# Patient Record
Sex: FEMALE | Race: WHITE | NOT HISPANIC OR LATINO | Employment: OTHER | URBAN - METROPOLITAN AREA
[De-identification: names, ages, dates, MRNs, and addresses within clinical notes are randomized per-mention and may not be internally consistent; named-entity substitution may affect disease eponyms.]

---

## 2020-01-27 ENCOUNTER — TELEPHONE (OUTPATIENT)
Dept: SCHEDULING | Facility: IMAGING CENTER | Age: 65
End: 2020-01-27

## 2020-02-05 ENCOUNTER — OFFICE VISIT (OUTPATIENT)
Dept: MEDICAL GROUP | Facility: MEDICAL CENTER | Age: 65
End: 2020-02-05
Payer: MEDICARE

## 2020-02-05 ENCOUNTER — TELEPHONE (OUTPATIENT)
Dept: SCHEDULING | Facility: IMAGING CENTER | Age: 65
End: 2020-02-05

## 2020-02-05 VITALS
HEIGHT: 63 IN | WEIGHT: 176 LBS | DIASTOLIC BLOOD PRESSURE: 74 MMHG | SYSTOLIC BLOOD PRESSURE: 110 MMHG | TEMPERATURE: 97.2 F | OXYGEN SATURATION: 91 % | BODY MASS INDEX: 31.18 KG/M2 | HEART RATE: 91 BPM

## 2020-02-05 DIAGNOSIS — Z12.11 COLON CANCER SCREENING: ICD-10-CM

## 2020-02-05 DIAGNOSIS — M54.41 CHRONIC BILATERAL LOW BACK PAIN WITH BILATERAL SCIATICA: ICD-10-CM

## 2020-02-05 DIAGNOSIS — Z00.00 HEALTHCARE MAINTENANCE: ICD-10-CM

## 2020-02-05 DIAGNOSIS — G89.29 CHRONIC BILATERAL LOW BACK PAIN WITH BILATERAL SCIATICA: ICD-10-CM

## 2020-02-05 DIAGNOSIS — K21.9 GASTROESOPHAGEAL REFLUX DISEASE WITHOUT ESOPHAGITIS: ICD-10-CM

## 2020-02-05 DIAGNOSIS — I10 ESSENTIAL HYPERTENSION: ICD-10-CM

## 2020-02-05 DIAGNOSIS — F33.41 RECURRENT MAJOR DEPRESSIVE DISORDER, IN PARTIAL REMISSION (HCC): ICD-10-CM

## 2020-02-05 DIAGNOSIS — E78.2 MIXED HYPERLIPIDEMIA: ICD-10-CM

## 2020-02-05 DIAGNOSIS — E66.9 DIABETES MELLITUS TYPE 2 IN OBESE: ICD-10-CM

## 2020-02-05 DIAGNOSIS — M54.42 CHRONIC BILATERAL LOW BACK PAIN WITH BILATERAL SCIATICA: ICD-10-CM

## 2020-02-05 DIAGNOSIS — E11.69 DIABETES MELLITUS TYPE 2 IN OBESE: ICD-10-CM

## 2020-02-05 DIAGNOSIS — E03.9 HYPOTHYROIDISM (ACQUIRED): ICD-10-CM

## 2020-02-05 DIAGNOSIS — F41.9 ANXIETY: ICD-10-CM

## 2020-02-05 PROCEDURE — 99204 OFFICE O/P NEW MOD 45 MIN: CPT | Performed by: FAMILY MEDICINE

## 2020-02-05 RX ORDER — GABAPENTIN 600 MG/1
600 TABLET ORAL 3 TIMES DAILY
COMMUNITY
End: 2020-03-25 | Stop reason: SDUPTHER

## 2020-02-05 RX ORDER — OMEPRAZOLE 40 MG/1
40 CAPSULE, DELAYED RELEASE ORAL DAILY
COMMUNITY
End: 2021-02-01 | Stop reason: SDUPTHER

## 2020-02-05 RX ORDER — LISINOPRIL 10 MG/1
TABLET ORAL
COMMUNITY
Start: 2020-02-03 | End: 2020-03-25 | Stop reason: SDUPTHER

## 2020-02-05 RX ORDER — SIMVASTATIN 40 MG
TABLET ORAL
COMMUNITY
Start: 2020-02-03 | End: 2020-04-14 | Stop reason: SDUPTHER

## 2020-02-05 RX ORDER — CYCLOBENZAPRINE HCL 10 MG
10 TABLET ORAL 3 TIMES DAILY PRN
COMMUNITY
End: 2020-04-20 | Stop reason: SDUPTHER

## 2020-02-05 RX ORDER — ESTRADIOL 1 MG/1
1 TABLET ORAL DAILY
COMMUNITY
End: 2020-04-14

## 2020-02-05 RX ORDER — MORPHINE SULFATE 15 MG/1
15 TABLET ORAL EVERY 4 HOURS PRN
COMMUNITY
End: 2020-04-14

## 2020-02-05 RX ORDER — LEVOTHYROXINE SODIUM 0.05 MG/1
50 TABLET ORAL
COMMUNITY
End: 2020-05-26 | Stop reason: SDUPTHER

## 2020-02-05 RX ORDER — DULOXETIN HYDROCHLORIDE 20 MG/1
20 CAPSULE, DELAYED RELEASE ORAL DAILY
COMMUNITY
End: 2020-05-08

## 2020-02-05 RX ORDER — ERGOCALCIFEROL 1.25 MG/1
CAPSULE ORAL
COMMUNITY
End: 2020-10-05

## 2020-02-05 RX ORDER — HYDROXYZINE HYDROCHLORIDE 25 MG/1
25 TABLET, FILM COATED ORAL 3 TIMES DAILY PRN
COMMUNITY
End: 2020-05-12 | Stop reason: SDUPTHER

## 2020-02-05 RX ORDER — CARBAMAZEPINE 200 MG/1
200 TABLET ORAL 3 TIMES DAILY
COMMUNITY
End: 2020-03-25 | Stop reason: SDUPTHER

## 2020-02-05 RX ORDER — CLONAZEPAM 1 MG/1
0.5 TABLET ORAL 2 TIMES DAILY
COMMUNITY
End: 2020-04-14 | Stop reason: SDUPTHER

## 2020-02-05 RX ORDER — DICLOFENAC SODIUM 75 MG/1
75 TABLET, DELAYED RELEASE ORAL 2 TIMES DAILY
COMMUNITY
End: 2021-05-18

## 2020-02-05 RX ORDER — BUSPIRONE HYDROCHLORIDE 10 MG/1
10 TABLET ORAL 2 TIMES DAILY
COMMUNITY
End: 2020-05-12

## 2020-02-05 RX ORDER — GEMFIBROZIL 600 MG/1
600 TABLET, FILM COATED ORAL 2 TIMES DAILY
COMMUNITY
End: 2020-04-14

## 2020-02-05 ASSESSMENT — PATIENT HEALTH QUESTIONNAIRE - PHQ9: CLINICAL INTERPRETATION OF PHQ2 SCORE: 0

## 2020-02-06 NOTE — PROGRESS NOTES
CC: New patient: Hypertension, hyperlipidemia, diabetes, hypothyroidism, chronic back pain, depression, anxiety, acid reflux,    HPI:  Grisel presents today to establish new PCP.  Just moved from [place].    Patient has been active and independent with all ADLs.  Has the following chronic medical issues:    Essential hypertension  Has been adequately controlled on current medication. Denies headache, chest pain, and SOB.patient has been on lisinopril 10 mg daily.  No side effects    Mixed hyperlipidemia  She has been tolerating the statin. Denies muscle pain LFTs has been normal.  She is currently on simvastatin 40 mg daily, and gemfibrozil 600 mg twice a day.  Reported no previous side effects.    Diabetes mellitus type 2 in obese (HCC)  As per patient last A1c was 6.0.  Denies any polyuria and polydipsia.  Currently on Jardiance 10 mg daily and metformin 1000 mg twice a day.  No side effects.    Hypothyroidism (acquired)  He has been tolerating Levothyroxine, no palpitation, no cold or heat intolerance, she is currently on levothyroxine 50 mcg daily.    Recurrent major depressive disorder, in partial remission (HCC)/Anxiety  Her mood has been fluctuating, it has been associated with anxiety.  Patient stated that he is very sensitive person.  Lately she has been having a lot of family loss and stressors.  She has been on brexpiprazole, BuSpar, and duloxetine.  She also has been on clonazepam 0.5 mg twice a day for more than 10 years.Her previous PCP tried to taper it down she develops severe withdrawal symptoms.    Gastroesophageal reflux disease without esophagitis  Reported no epigastric pain, heartburn, nausea, vomiting.  Patient has been doing fine on omeprazole 40 mg daily.    Chronic bilateral low back pain with bilateral sciatica  Patient has history of lower back surgery (laminectomy plus fusion the lumbar spine ).  And since then she has been on morphine.  She used to see pain management in [place],  wants to establish with one in [place]    Pneumonia shot and flu shot are up-to-date.  Patient is due for colonoscopy.            Patient Active Problem List    Diagnosis Date Noted   • Essential hypertension 02/05/2020   • Mixed hyperlipidemia 02/05/2020   • Diabetes mellitus type 2 in obese ([facility]) 02/05/2020   • Hypothyroidism (acquired) 02/05/2020   • Recurrent major depressive disorder, in partial remission ([facility]) 02/05/2020   • Gastroesophageal reflux disease without esophagitis 02/05/2020   • Anxiety 02/05/2020       Current Outpatient Medications   Medication Sig Dispense Refill   • lisinopril (PRINIVIL) 10 MG Tab      • simvastatin (ZOCOR) 40 MG Tab      • metformin (GLUCOPHAGE) 1000 MG tablet Take 1,000 mg by mouth 2 times a day, with meals.     • omeprazole (PRILOSEC) 40 MG delayed-release capsule Take 40 mg by mouth every day.     • gemfibrozil (LOPID) 600 MG Tab Take 600 mg by mouth 2 times a day.     • clonazePAM (KLONOPIN) 1 MG Tab Take 0.5 mg by mouth 2 times a day.     • morphine (MS IR) 15 MG tablet Take 15 mg by mouth every four hours as needed for Severe Pain.     • levothyroxine (SYNTHROID) 50 MCG Tab Take 50 mcg by mouth Every morning on an empty stomach.     • busPIRone (BUSPAR) 10 MG Tab tablet Take 10 mg by mouth 2 times a day.     • DULoxetine (CYMBALTA) 20 MG Cap DR Particles Take 20 mg by mouth every day.     • Empagliflozin (JARDIANCE) 10 MG Tab Take  by mouth.     • Brexpiprazole (REXULTI) 1 MG Tab Take  by mouth.     • metoprolol (LOPRESSOR) 25 MG Tab Take 25 mg by mouth 2 times a day.     • estradiol (ESTRACE) 1 MG Tab Take 1 mg by mouth every day.     • gabapentin (NEURONTIN) 600 MG tablet Take 600 mg by mouth 3 times a day.     • vitamin D, Ergocalciferol, (DRISDOL) 1.25 MG (56583 UT) Cap capsule Take  by mouth every 7 days.     • diclofenac EC (VOLTAREN) 75 MG Tablet Delayed Response Take 75 mg by mouth 2 times a day.     • carBAMazepine (TEGRETOL) 200 MG Tab Take 200 mg by mouth 3 times  a day.     • cyclobenzaprine (FLEXERIL) 10 MG Tab Take 10 mg by mouth 3 times a day as needed.     • hydrOXYzine HCl (ATARAX) 25 MG Tab Take 25 mg by mouth 3 times a day as needed for Itching.       No current facility-administered medications for this visit.          Allergies as of 02/05/2020   • (No Known Allergies)        Social History     Socioeconomic History   • Marital status:      Spouse name: Not on file   • Number of children: Not on file   • Years of education: Not on file   • Highest education level: Not on file   Occupational History   • Not on file   Social Needs   • Financial resource strain: Not on file   • Food insecurity:     Worry: Not on file     Inability: Not on file   • Transportation needs:     Medical: Not on file     Non-medical: Not on file   Tobacco Use   • Smoking status: Current Some Day Smoker     Packs/day: 0.25     Types: Cigarettes   • Smokeless tobacco: Never Used   Substance and Sexual Activity   • Alcohol use: Yes     Comment: rare   • Drug use: Never   • Sexual activity: Not Currently   Lifestyle   • Physical activity:     Days per week: Not on file     Minutes per session: Not on file   • Stress: Not on file   Relationships   • Social connections:     Talks on phone: Not on file     Gets together: Not on file     Attends Religion service: Not on file     Active member of club or organization: Not on file     Attends meetings of clubs or organizations: Not on file     Relationship status: Not on file   • Intimate partner violence:     Fear of current or ex partner: Not on file     Emotionally abused: Not on file     Physically abused: Not on file     Forced sexual activity: Not on file   Other Topics Concern   • Not on file   Social History Narrative   • Not on file       History reviewed. No pertinent family history.    History reviewed. No pertinent surgical history.    ROS:  Denies any Headache, Blurred Vision, Confusion Chest pain,  Shortness of breath,  Abdominal  "pain, Changes of bowel or bladder, Lower ext edema, Fevers, Nights sweats, Weight Changes, Focal weakness or numbness.  All other systems are negative.    /74 (BP Location: Left arm, Patient Position: Sitting, BP Cuff Size: Adult)   Pulse 91   Temp 36.2 °C (97.2 °F) (Temporal)   Ht 1.6 m (5' 3\")   Wt 79.8 kg (176 lb)   SpO2 91%   BMI 31.18 kg/m²     Physical Exam:  Gen:         Alert and oriented, No apparent distress.  HEENT:   Perrla, TM clear,  Oralpharynx no erythema or exudates.  Neck:       No Jugular venous distension, Lymphadenopathy, Thyromegaly, Bruits.  Lungs:     Clear to auscultation bilaterally  CV:          Regular rate and rhythm. No murmurs, rubs or gallops.  Abd:         Soft non tender, non distended. Normal active bowel sounds. No                                        Hepatosplenomegaly, No pulsatile masses.  Ext:          No clubbing, cyanosis, edema.      Assessment and Plan.   64 y.o. female     1. Essential hypertension  Controlled.  Continue on lisinopril 10 mg daily    - CBC WITH DIFFERENTIAL; Future    2. Mixed hyperlipidemia  He has been tolerating the statin. Denies muscle pain LFTs has been normal  For now continue on simvastatin 40 mg daily, and gemfibrozil 600 mg twice a day.  Will check lipid panel.    - Lipid Profile; Future    3. Diabetes mellitus type 2 in obese (HCC)  As per patient last A1c was 6.0  For now continue on Jardiance 10 mg daily and metformin 1000 mg twice a day.    - CBC WITH DIFFERENTIAL; Future  - Comp Metabolic Panel; Future  - HEMOGLOBIN A1C; Future  - Lipid Profile; Future  - MICROALBUMIN CREAT RATIO URINE; Future    4. Hypothyroidism (acquired)  He has been tolerating Levothyroxine, no palpitation, no cold or heat intolerance  Continue levothyroxine 50 mcg daily.    - TSH; Future  - FREE THYROXINE; Future    5. Recurrent major depressive disorder, in partial remission (HCC)  Mood has been fluctuating, associated with anxiety.  She is currently on: " Brexpiprazole, BuSpar, duloxetine  Patient has been taking clonazepam for anxiety, 0.5 mg twice a day.  Refer patient to psychiatry.    - REFERRAL TO PSYCHIATRY    6. Gastroesophageal reflux disease without esophagitis  Patient has been doing fine on omeprazole 40 mg daily.    7. Anxiety  Has been doing fine on clonazepam, 0.5 mg twice a day.  Has been taking it for more than 10 years.  Her previous PCP tried to taper it down she develops severe withdrawal symptoms.  Refer patient to psychiatry.    - REFERRAL TO PSYCHIATRY    8. Healthcare maintenance  Pneumonia shot and flu shot are up-to-date.  Patient is due for colonoscopy.    9. Colon cancer screening    - REFERRAL TO GI FOR COLONOSCOPY    10. Chronic bilateral low back pain with bilateral sciatica  Has been on morphine.  Discussed avoid taking benzodiazepines with pain medication.  Patient referred to pain clinic.    - REFERRAL TO PAIN CLINIC

## 2020-03-16 ENCOUNTER — TELEPHONE (OUTPATIENT)
Dept: MEDICAL GROUP | Facility: PHYSICIAN GROUP | Age: 65
End: 2020-03-16

## 2020-03-16 NOTE — TELEPHONE ENCOUNTER
Future Appointments       Provider Department Center    3/25/2020 5:00 PM FANNY Bell [facility]        NEW PATIENT VISIT PRE-VISIT PLANNING    1.  [employer] Patient is checked in Patient Demographics? YES    2.  Immunizations were updated in Epic using WebIZ?: No WebIZ record       •  Web Iz Recommendations: Patient has no WebIZ Records    3.  Is this appointment scheduled as a Hospital Follow-Up? No    4.  Patient is due for the following Health Maintenance Topics:   Health Maintenance Due   Topic Date Due   • Annual Wellness Visit  1955   • HEPATITIS C SCREENING  1955   • A1C SCREENING  1955   • FASTING LIPID PROFILE  1955   • URINE ACR / MICROALBUMIN  1955   • DIABETES MONOFILAMENT / LE EXAM  1955   • IMM PNEUMOCOCCAL VACCINE: 0-64 Years (1 of 1 - PPSV23) 05/01/1961   • IMM DTaP/Tdap/Td Vaccine (1 - Tdap) 05/01/1966   • RETINAL SCREENING  05/01/1973   • SERUM CREATININE  05/01/1973   • IMM HEP B VACCINE (1 of 3 - Risk 3-dose series) 05/01/1974   • PAP SMEAR  05/01/1976   • MAMMOGRAM  05/01/1995   • COLONOSCOPY  05/01/2005   • IMM ZOSTER VACCINES (1 of 2) 05/01/2005   • IMM INFLUENZA (1) 09/01/2019     5. Orders for overdue Health Maintenance topics pended in Pre-Charting? NO    6.  Reviewed/Updated the following with patient:   •   Preferred Pharmacy? YES       •   Preferred Lab? YES       •   Preferred Communication? YES       •   Allergies? YES       •   Medications? YES. Was Abstract Encounter opened and chart updated? YES       •   Social History? YES. Was Abstract Encounter opened and chart updated? YES       •   Family History (document living status of immediate family members and if + hx of cancer, diabetes, hypertension, hyperlipidemia, heart attack, stroke) YES. Was Abstract Encounter opened and chart updated? YES    7.  Updated Care Team?       •   DME Company (gait device, O2, CPAP, etc.) YES       •   Other  Specialists (eye doctor, derm, GYN, cardiology, endo, etc): YES    8.  Patient was informed to arrive 15 min prior to their   scheduled appointment and bring in their medication bottles.

## 2020-03-25 ENCOUNTER — OFFICE VISIT (OUTPATIENT)
Dept: MEDICAL GROUP | Facility: PHYSICIAN GROUP | Age: 65
End: 2020-03-25
Payer: MEDICARE

## 2020-03-25 VITALS
SYSTOLIC BLOOD PRESSURE: 126 MMHG | HEIGHT: 63 IN | TEMPERATURE: 98.4 F | BODY MASS INDEX: 31.89 KG/M2 | WEIGHT: 180 LBS | OXYGEN SATURATION: 97 % | DIASTOLIC BLOOD PRESSURE: 70 MMHG | HEART RATE: 89 BPM

## 2020-03-25 DIAGNOSIS — K21.9 GASTROESOPHAGEAL REFLUX DISEASE WITHOUT ESOPHAGITIS: ICD-10-CM

## 2020-03-25 DIAGNOSIS — H66.002 NON-RECURRENT ACUTE SUPPURATIVE OTITIS MEDIA OF LEFT EAR WITHOUT SPONTANEOUS RUPTURE OF TYMPANIC MEMBRANE: ICD-10-CM

## 2020-03-25 DIAGNOSIS — G89.29 OTHER CHRONIC PAIN: ICD-10-CM

## 2020-03-25 DIAGNOSIS — F33.41 RECURRENT MAJOR DEPRESSIVE DISORDER, IN PARTIAL REMISSION (HCC): ICD-10-CM

## 2020-03-25 DIAGNOSIS — Z11.59 NEED FOR HEPATITIS C SCREENING TEST: ICD-10-CM

## 2020-03-25 DIAGNOSIS — E11.69 DIABETES MELLITUS TYPE 2 IN OBESE: ICD-10-CM

## 2020-03-25 DIAGNOSIS — F41.9 ANXIETY: ICD-10-CM

## 2020-03-25 DIAGNOSIS — I10 ESSENTIAL HYPERTENSION: ICD-10-CM

## 2020-03-25 DIAGNOSIS — E66.9 DIABETES MELLITUS TYPE 2 IN OBESE: ICD-10-CM

## 2020-03-25 DIAGNOSIS — Z23 NEED FOR VACCINATION: ICD-10-CM

## 2020-03-25 DIAGNOSIS — E03.9 HYPOTHYROIDISM (ACQUIRED): ICD-10-CM

## 2020-03-25 DIAGNOSIS — E78.2 MIXED HYPERLIPIDEMIA: ICD-10-CM

## 2020-03-25 DIAGNOSIS — Z12.31 ENCOUNTER FOR SCREENING MAMMOGRAM FOR MALIGNANT NEOPLASM OF BREAST: ICD-10-CM

## 2020-03-25 PROCEDURE — 90472 IMMUNIZATION ADMIN EACH ADD: CPT | Performed by: NURSE PRACTITIONER

## 2020-03-25 PROCEDURE — 90471 IMMUNIZATION ADMIN: CPT | Performed by: NURSE PRACTITIONER

## 2020-03-25 PROCEDURE — 99215 OFFICE O/P EST HI 40 MIN: CPT | Mod: 25 | Performed by: NURSE PRACTITIONER

## 2020-03-25 PROCEDURE — 90750 HZV VACC RECOMBINANT IM: CPT | Performed by: NURSE PRACTITIONER

## 2020-03-25 PROCEDURE — 90715 TDAP VACCINE 7 YRS/> IM: CPT | Performed by: NURSE PRACTITIONER

## 2020-03-25 RX ORDER — CARBAMAZEPINE 200 MG/1
200 TABLET ORAL DAILY
Qty: 90 TAB | Refills: 0 | Status: SHIPPED | OUTPATIENT
Start: 2020-03-25 | End: 2020-04-14

## 2020-03-25 RX ORDER — GABAPENTIN 600 MG/1
600 TABLET ORAL 3 TIMES DAILY
Qty: 180 TAB | Refills: 2 | Status: SHIPPED | OUTPATIENT
Start: 2020-03-25 | End: 2020-09-22 | Stop reason: SDUPTHER

## 2020-03-25 RX ORDER — AMOXICILLIN AND CLAVULANATE POTASSIUM 875; 125 MG/1; MG/1
1 TABLET, FILM COATED ORAL 2 TIMES DAILY
Qty: 20 TAB | Refills: 0 | Status: SHIPPED | OUTPATIENT
Start: 2020-03-25 | End: 2020-04-14

## 2020-03-25 RX ORDER — LISINOPRIL 10 MG/1
10 TABLET ORAL DAILY
Qty: 90 TAB | Refills: 3 | Status: SHIPPED | OUTPATIENT
Start: 2020-03-25 | End: 2020-04-14

## 2020-03-25 ASSESSMENT — PATIENT HEALTH QUESTIONNAIRE - PHQ9
1. LITTLE INTEREST OR PLEASURE IN DOING THINGS: SEVERAL DAYS
2. FEELING DOWN, DEPRESSED, IRRITABLE, OR HOPELESS: SEVERAL DAYS
6. FEELING BAD ABOUT YOURSELF - OR THAT YOU ARE A FAILURE OR HAVE LET YOURSELF OR YOUR FAMILY DOWN: SEVERAL DAYS
3. TROUBLE FALLING OR STAYING ASLEEP OR SLEEPING TOO MUCH: NEARLY EVERY DAY
9. THOUGHTS THAT YOU WOULD BE BETTER OFF DEAD, OR OF HURTING YOURSELF: NOT AT ALL
5. POOR APPETITE OR OVEREATING: NEARLY EVERY DAY
4. FEELING TIRED OR HAVING LITTLE ENERGY: MORE THAN HALF THE DAYS
8. MOVING OR SPEAKING SO SLOWLY THAT OTHER PEOPLE COULD HAVE NOTICED. OR THE OPPOSITE, BEING SO FIGETY OR RESTLESS THAT YOU HAVE BEEN MOVING AROUND A LOT MORE THAN USUAL: SEVERAL DAYS
SUM OF ALL RESPONSES TO PHQ QUESTIONS 1-9: 13
7. TROUBLE CONCENTRATING ON THINGS, SUCH AS READING THE NEWSPAPER OR WATCHING TELEVISION: SEVERAL DAYS
SUM OF ALL RESPONSES TO PHQ9 QUESTIONS 1 AND 2: 2

## 2020-03-25 NOTE — LETTER
Grisel Mark  [address]  [place] [address]

## 2020-03-25 NOTE — LETTER
Counts include 234 beds at the [facility]  FANNY Bell  [address]  [place] [address]  Fax: [phone]   Authorization for Release/Disclosure of   Protected Health Information   Name: NASH SOMMER : 1955 SSN: [id number]   Address: [address] [place] [address] [address]  [place] [address] Phone:    [phone] (home)    I authorize the entity listed below to release/disclose the PHI below to:   Counts include 234 beds at the [facility]/FANNY Bell and FANNY Bell   Provider or Entity Name:  DR. SHAVONNE STEIN   [facility]   Phone:  [phone]    Fax:     Reason for request: continuity of care   Information to be released:    [  ] LAST COLONOSCOPY,  including any PATH REPORT and follow-up  [  ] LAST FIT/COLOGUARD RESULT [  ] LAST DEXA  [  ] LAST MAMMOGRAM  [  ] LAST PAP  [  ] LAST LABS [  ] RETINA EXAM REPORT  [  ] IMMUNIZATION RECORDS  [XXX] Release all info      [  ] Check here and initial the line next to each item to release ALL health information INCLUDING  _____ Care and treatment for drug and / or alcohol abuse  _____ HIV testing, infection status, or AIDS  _____ Genetic Testing    DATES OF SERVICE OR TIME PERIOD TO BE DISCLOSED: _____________  I understand and acknowledge that:  * This Authorization may be revoked at any time by you in writing, except if your health information has already been used or disclosed.  * Your health information that will be used or disclosed as a result of you signing this authorization could be re-disclosed by the recipient. If this occurs, your re-disclosed health information may no longer be protected by State or Federal laws.  * You may refuse to sign this Authorization. Your refusal will not affect your ability to obtain treatment.  * This Authorization becomes effective upon signing and will  on (date) __________.      If no date is indicated, this Authorization will  one (1) year from the signature date.    Name:  Grisel Mark    Signature:continuity of care   Date:     3/25/2020       PLEASE FAX REQUESTED RECORDS BACK TO: [phone]

## 2020-03-26 NOTE — PATIENT INSTRUCTIONS
"Get established with eye doc locally  Make appt for colonoscopy  Get mammogram     Cut estrogen; if you have a lot of cramps (legs / muscles) take over the counter magnesium supplement \"Mag Calm\" drink;     Cut back on senna; increase water intake / fiber / probiotic + prebiotic / Miralax    Begin tapering Tegretol (carbemazapine);     "

## 2020-03-26 NOTE — PROGRESS NOTES
Annual Health Assessment Questions:    1.  Are you currently engaging in any exercise or physical activity? Yes    2.  How would you describe your mood or emotional well-being today? depressed    3.  Have you had any falls in the last year? No    4.  Have you noticed any problems with your balance or had difficulty walking? No    5.  In the last six months have you experienced any leakage of urine? Yes    6. DPA/Advanced Directive: Patient does not have an Advanced Directive.  A packet and workshop information was given on Advanced Directives.

## 2020-03-30 ENCOUNTER — TELEPHONE (OUTPATIENT)
Dept: MEDICAL GROUP | Facility: PHYSICIAN GROUP | Age: 65
End: 2020-03-30

## 2020-03-30 NOTE — TELEPHONE ENCOUNTER
ESTABLISHED PATIENT PRE-VISIT PLANNING     Patient was contacted to complete PVP.    1.  Reviewed notes from the last few office visits within the medical group: Yes    2.  If any orders were placed at last visit or intended to be done for this visit (i.e. 6 mos follow-up), do we have Results/Consult Notes?        •  Labs - Labs ordered, NOT completed. Patient advised to complete prior to next appointment.       •  Imaging - Imaging ordered, NOT completed. Patient advised to complete prior to next appointment.       •  Referrals - Referral ordered, patient has NOT been seen.    3. Is this appointment scheduled as a Hospital Follow-Up? No    4.  Immunizations were updated in [employer] using WebIZ?: Yes       •  Web Iz Recommendations: FLU and VARICELLA (Chicken Pox)     5.  Patient is due for the following Health Maintenance Topics:   Health Maintenance Due   Topic Date Due   • Annual Wellness Visit  1955   • HEPATITIS C SCREENING  1955   • A1C SCREENING  1955   • FASTING LIPID PROFILE  1955   • URINE ACR / MICROALBUMIN  1955   • DIABETES MONOFILAMENT / LE EXAM  1955   • IMM PNEUMOCOCCAL VACCINE: 0-64 Years (1 of 1 - PPSV23) 05/01/1961   • RETINAL SCREENING  05/01/1973   • SERUM CREATININE  05/01/1973   • IMM HEP B VACCINE (1 of 3 - Risk 3-dose series) 05/01/1974   • MAMMOGRAM  05/01/1995   • COLONOSCOPY  05/01/2005   • IMM INFLUENZA (1) 09/01/2019       6. Orders for overdue Health Maintenance topics pended in Pre-Charting? NO    7.  AHA (MDX) form printed for Provider? YES    8.  Patient was informed to arrive 15 min prior to their scheduled appointment and bring in their medication bottles.

## 2020-04-09 ENCOUNTER — HOSPITAL ENCOUNTER (OUTPATIENT)
Dept: LAB | Facility: MEDICAL CENTER | Age: 65
End: 2020-04-09
Attending: NURSE PRACTITIONER
Payer: MEDICARE

## 2020-04-09 DIAGNOSIS — E03.9 HYPOTHYROIDISM (ACQUIRED): ICD-10-CM

## 2020-04-09 DIAGNOSIS — Z11.59 NEED FOR HEPATITIS C SCREENING TEST: ICD-10-CM

## 2020-04-09 DIAGNOSIS — F33.41 RECURRENT MAJOR DEPRESSIVE DISORDER, IN PARTIAL REMISSION (HCC): ICD-10-CM

## 2020-04-09 DIAGNOSIS — I10 ESSENTIAL HYPERTENSION: ICD-10-CM

## 2020-04-09 DIAGNOSIS — E66.9 DIABETES MELLITUS TYPE 2 IN OBESE: ICD-10-CM

## 2020-04-09 DIAGNOSIS — E78.2 MIXED HYPERLIPIDEMIA: ICD-10-CM

## 2020-04-09 DIAGNOSIS — E11.69 DIABETES MELLITUS TYPE 2 IN OBESE: ICD-10-CM

## 2020-04-09 DIAGNOSIS — K21.9 GASTROESOPHAGEAL REFLUX DISEASE WITHOUT ESOPHAGITIS: ICD-10-CM

## 2020-04-09 DIAGNOSIS — F41.9 ANXIETY: ICD-10-CM

## 2020-04-09 LAB
25(OH)D3 SERPL-MCNC: 28 NG/ML (ref 30–100)
ALBUMIN SERPL BCP-MCNC: 4.1 G/DL (ref 3.2–4.9)
ALBUMIN/GLOB SERPL: 1.3 G/DL
ALP SERPL-CCNC: 61 U/L (ref 30–99)
ALT SERPL-CCNC: 7 U/L (ref 2–50)
ANION GAP SERPL CALC-SCNC: 14 MMOL/L (ref 7–16)
AST SERPL-CCNC: 10 U/L (ref 12–45)
BASOPHILS # BLD AUTO: 0.4 % (ref 0–1.8)
BASOPHILS # BLD: 0.03 K/UL (ref 0–0.12)
BILIRUB SERPL-MCNC: 0.3 MG/DL (ref 0.1–1.5)
BUN SERPL-MCNC: 19 MG/DL (ref 8–22)
CALCIUM SERPL-MCNC: 9.8 MG/DL (ref 8.5–10.5)
CHLORIDE SERPL-SCNC: 100 MMOL/L (ref 96–112)
CHOLEST SERPL-MCNC: 138 MG/DL (ref 100–199)
CO2 SERPL-SCNC: 21 MMOL/L (ref 20–33)
CREAT SERPL-MCNC: 0.71 MG/DL (ref 0.5–1.4)
CREAT UR-MCNC: 109.36 MG/DL
EOSINOPHIL # BLD AUTO: 0.11 K/UL (ref 0–0.51)
EOSINOPHIL NFR BLD: 1.4 % (ref 0–6.9)
ERYTHROCYTE [DISTWIDTH] IN BLOOD BY AUTOMATED COUNT: 45.5 FL (ref 35.9–50)
EST. AVERAGE GLUCOSE BLD GHB EST-MCNC: 148 MG/DL
FASTING STATUS PATIENT QL REPORTED: NORMAL
GLOBULIN SER CALC-MCNC: 3.2 G/DL (ref 1.9–3.5)
GLUCOSE SERPL-MCNC: 140 MG/DL (ref 65–99)
HBA1C MFR BLD: 6.8 % (ref 0–5.6)
HCT VFR BLD AUTO: 40.7 % (ref 37–47)
HCV AB SER QL: NORMAL
HDLC SERPL-MCNC: 42 MG/DL
HGB BLD-MCNC: 13.3 G/DL (ref 12–16)
IMM GRANULOCYTES # BLD AUTO: 0.04 K/UL (ref 0–0.11)
IMM GRANULOCYTES NFR BLD AUTO: 0.5 % (ref 0–0.9)
LDLC SERPL CALC-MCNC: 67 MG/DL
LYMPHOCYTES # BLD AUTO: 2.66 K/UL (ref 1–4.8)
LYMPHOCYTES NFR BLD: 34.8 % (ref 22–41)
MCH RBC QN AUTO: 31.3 PG (ref 27–33)
MCHC RBC AUTO-ENTMCNC: 32.7 G/DL (ref 33.6–35)
MCV RBC AUTO: 95.8 FL (ref 81.4–97.8)
MICROALBUMIN UR-MCNC: 9.5 MG/DL
MICROALBUMIN/CREAT UR: 87 MG/G (ref 0–30)
MONOCYTES # BLD AUTO: 0.77 K/UL (ref 0–0.85)
MONOCYTES NFR BLD AUTO: 10.1 % (ref 0–13.4)
NEUTROPHILS # BLD AUTO: 4.03 K/UL (ref 2–7.15)
NEUTROPHILS NFR BLD: 52.8 % (ref 44–72)
NRBC # BLD AUTO: 0 K/UL
NRBC BLD-RTO: 0 /100 WBC
PLATELET # BLD AUTO: 263 K/UL (ref 164–446)
PMV BLD AUTO: 11.1 FL (ref 9–12.9)
POTASSIUM SERPL-SCNC: 4.7 MMOL/L (ref 3.6–5.5)
PROT SERPL-MCNC: 7.3 G/DL (ref 6–8.2)
RBC # BLD AUTO: 4.25 M/UL (ref 4.2–5.4)
SODIUM SERPL-SCNC: 135 MMOL/L (ref 135–145)
TRIGL SERPL-MCNC: 144 MG/DL (ref 0–149)
TSH SERPL DL<=0.005 MIU/L-ACNC: 3.57 UIU/ML (ref 0.38–5.33)
VIT B12 SERPL-MCNC: 217 PG/ML (ref 211–911)
WBC # BLD AUTO: 7.6 K/UL (ref 4.8–10.8)

## 2020-04-09 PROCEDURE — 36415 COLL VENOUS BLD VENIPUNCTURE: CPT

## 2020-04-09 PROCEDURE — 82607 VITAMIN B-12: CPT

## 2020-04-09 PROCEDURE — 82306 VITAMIN D 25 HYDROXY: CPT

## 2020-04-09 PROCEDURE — 82043 UR ALBUMIN QUANTITATIVE: CPT

## 2020-04-09 PROCEDURE — 85025 COMPLETE CBC W/AUTO DIFF WBC: CPT

## 2020-04-09 PROCEDURE — 80061 LIPID PANEL: CPT

## 2020-04-09 PROCEDURE — 83036 HEMOGLOBIN GLYCOSYLATED A1C: CPT

## 2020-04-09 PROCEDURE — 84443 ASSAY THYROID STIM HORMONE: CPT

## 2020-04-09 PROCEDURE — G0472 HEP C SCREEN HIGH RISK/OTHER: HCPCS

## 2020-04-09 PROCEDURE — 82570 ASSAY OF URINE CREATININE: CPT

## 2020-04-09 PROCEDURE — 80053 COMPREHEN METABOLIC PANEL: CPT

## 2020-04-14 ENCOUNTER — TELEMEDICINE (OUTPATIENT)
Dept: MEDICAL GROUP | Facility: PHYSICIAN GROUP | Age: 65
End: 2020-04-14
Payer: MEDICARE

## 2020-04-14 VITALS
BODY MASS INDEX: 33.13 KG/M2 | TEMPERATURE: 98 F | DIASTOLIC BLOOD PRESSURE: 66 MMHG | SYSTOLIC BLOOD PRESSURE: 107 MMHG | HEART RATE: 64 BPM | WEIGHT: 180 LBS | HEIGHT: 62 IN

## 2020-04-14 DIAGNOSIS — H60.392 OTHER INFECTIVE ACUTE OTITIS EXTERNA OF LEFT EAR: ICD-10-CM

## 2020-04-14 DIAGNOSIS — E78.2 MIXED HYPERLIPIDEMIA: ICD-10-CM

## 2020-04-14 DIAGNOSIS — I10 ESSENTIAL HYPERTENSION: ICD-10-CM

## 2020-04-14 DIAGNOSIS — E03.9 HYPOTHYROIDISM (ACQUIRED): ICD-10-CM

## 2020-04-14 DIAGNOSIS — F41.9 ANXIETY: ICD-10-CM

## 2020-04-14 DIAGNOSIS — F33.41 RECURRENT MAJOR DEPRESSIVE DISORDER, IN PARTIAL REMISSION (HCC): ICD-10-CM

## 2020-04-14 DIAGNOSIS — E66.9 DIABETES MELLITUS TYPE 2 IN OBESE: ICD-10-CM

## 2020-04-14 DIAGNOSIS — E11.69 DIABETES MELLITUS TYPE 2 IN OBESE: ICD-10-CM

## 2020-04-14 PROCEDURE — 99214 OFFICE O/P EST MOD 30 MIN: CPT | Mod: CR,95 | Performed by: NURSE PRACTITIONER

## 2020-04-14 RX ORDER — LISINOPRIL 10 MG/1
10 TABLET ORAL DAILY
Qty: 90 TAB | Refills: 3 | Status: SHIPPED | OUTPATIENT
Start: 2020-04-14 | End: 2021-03-02 | Stop reason: SDUPTHER

## 2020-04-14 RX ORDER — CIPROFLOXACIN AND DEXAMETHASONE 3; 1 MG/ML; MG/ML
4 SUSPENSION/ DROPS AURICULAR (OTIC) 2 TIMES DAILY
Qty: 1 BOTTLE | Refills: 0 | Status: SHIPPED | OUTPATIENT
Start: 2020-04-14 | End: 2020-04-21

## 2020-04-14 RX ORDER — SIMVASTATIN 40 MG
40 TABLET ORAL EVERY EVENING
Qty: 90 TAB | Refills: 3 | Status: SHIPPED | OUTPATIENT
Start: 2020-04-14 | End: 2020-07-14

## 2020-04-14 RX ORDER — CLONAZEPAM 1 MG/1
0.5 TABLET ORAL 2 TIMES DAILY
Qty: 90 TAB | Refills: 0 | Status: SHIPPED
Start: 2020-04-14 | End: 2020-05-12 | Stop reason: SDUPTHER

## 2020-04-14 RX ORDER — BREXPIPRAZOLE 1 MG/1
1 TABLET ORAL DAILY
Qty: 90 TAB | Refills: 3 | Status: SHIPPED | OUTPATIENT
Start: 2020-04-14 | End: 2020-05-12 | Stop reason: SDUPTHER

## 2020-04-14 RX ORDER — EMPAGLIFLOZIN 10 MG/1
10 TABLET, FILM COATED ORAL DAILY
Qty: 90 TAB | Refills: 3 | Status: SHIPPED | OUTPATIENT
Start: 2020-04-14 | End: 2020-05-19

## 2020-04-14 ASSESSMENT — FIBROSIS 4 INDEX: FIB4 SCORE: 0.92

## 2020-04-15 ENCOUNTER — TELEPHONE (OUTPATIENT)
Dept: MEDICAL GROUP | Facility: PHYSICIAN GROUP | Age: 65
End: 2020-04-15

## 2020-04-15 NOTE — PROGRESS NOTES
Telemedicine Visit: Established Patient     This encounter was conducted via Zoom .   Verbal consent was obtained. Patient's identity was verified.    Subjective:   CC: Ear pain  Grisel Mark is a 64 y.o. female presenting for evaluation and management of:    Ear pain: Patient treated with 10-day course of Augmentin last month due to otitis media, but reports persistent external ear tenderness.  Pain elicited when patient tugs on pinna of left ear and when pressing on area surrounding tragus.  Denies swelling, redness, pain with chewing and extending jaw.  Not experiencing fever, fatigue, malaise.  This is a recurrent problem.    Chronic constipation: After discussion, patient has drastically limited the use of senna based laxatives and has incorporated MiraLAX into her diet as well as Metamucil.  She reports she is much more regular and experiencing much less abdominal pain and distention than previously.    Anxiety: Is a chronic issue for patient.  She has been on clonazepam since 1995, however is decreased her dosing over the past several years.  Now taking 0.5 mg twice daily.  She has plans to establish with a local mental health care provider, however this is been put on hold due to the current viral pandemic.  Anxiety and depression normally fairly well controlled with Rexulti and clonazepam, however due to the pandemic she feels much more on edge.  Currently not taking any other controlled substances.  Low risk for abuse.    ROS   Denies any recent fevers or chills. No nausea or vomiting. No chest pains or shortness of breath.     No Known Allergies    Current medicines (including changes today)  Current Outpatient Medications   Medication Sig Dispense Refill   • clonazePAM (KLONOPIN) 1 MG Tab Take 0.5 Tabs by mouth 2 times a day for 90 days. 90 Tab 0   • ciprofloxacin/dexamethasone (CIPRODEX) 0.3-0.1 % Suspension Place 4 Drops in left ear 2 times a day for 7 days. 1 Bottle 0   • lisinopril (PRINIVIL) 10 MG  Tab Take 1 Tab by mouth every day. 90 Tab 3   • simvastatin (ZOCOR) 40 MG Tab Take 1 Tab by mouth every evening. 90 Tab 3   • metformin (GLUCOPHAGE) 1000 MG tablet Take 1 Tab by mouth 2 times a day, with meals. 180 Tab 3   • Empagliflozin (JARDIANCE) 10 MG Tab Take 10 mg by mouth every day. 90 Tab 3   • Brexpiprazole (REXULTI) 1 MG Tab Take 1 mg by mouth every day. 90 Tab 3   • gabapentin (NEURONTIN) 600 MG tablet Take 1 Tab by mouth 3 times a day. 180 Tab 2   • omeprazole (PRILOSEC) 40 MG delayed-release capsule Take 40 mg by mouth every day.     • levothyroxine (SYNTHROID) 50 MCG Tab Take 50 mcg by mouth Every morning on an empty stomach.     • busPIRone (BUSPAR) 10 MG Tab tablet Take 10 mg by mouth 2 times a day.     • DULoxetine (CYMBALTA) 20 MG Cap DR Particles Take 20 mg by mouth every day.     • metoprolol (LOPRESSOR) 25 MG Tab Take 25 mg by mouth 2 times a day.     • vitamin D, Ergocalciferol, (DRISDOL) 1.25 MG (79503 UT) Cap capsule Take  by mouth every 7 days.     • diclofenac EC (VOLTAREN) 75 MG Tablet Delayed Response Take 75 mg by mouth 2 times a day.     • cyclobenzaprine (FLEXERIL) 10 MG Tab Take 10 mg by mouth 3 times a day as needed.     • hydrOXYzine HCl (ATARAX) 25 MG Tab Take 25 mg by mouth 3 times a day as needed for Itching.       No current facility-administered medications for this visit.        Patient Active Problem List    Diagnosis Date Noted   • Essential hypertension 02/05/2020   • Mixed hyperlipidemia 02/05/2020   • Diabetes mellitus type 2 in obese (HCC) 02/05/2020   • Hypothyroidism (acquired) 02/05/2020   • Recurrent major depressive disorder, in partial remission ([facility]) 02/05/2020   • Gastroesophageal reflux disease without esophagitis 02/05/2020   • Anxiety 02/05/2020       Family History   Problem Relation Age of Onset   • Cancer Mother    • Stroke Father         Heart attack   • Dementia Sister    • Stroke Brother         heart Attack   • Cancer Brother         Skin   • Diabetes  Brother    • Kidney Disease Brother    • Cancer Brother    • Parkinson's Disease Sister    • No Known Problems Sister    • Diabetes Daughter    • Hypertension Daughter        She  has a past medical history of Anxiety, Chronic back pain, Constipation, Depression, Diabetes (HCC), GERD (gastroesophageal reflux disease), Heart murmur, Hyperlipidemia, Hypertension, and Thyroid disease.  She  has a past surgical history that includes laminotomy; cholecystectomy; carpal tunnel release; and abdominal hysterectomy total.       Objective:   Vitals obtained by patient:  Vitals:    04/14/20 1556   BP: 107/66   Pulse: 64   Temp: 36.7 °C (98 °F)         Physical Exam:  Constitutional: Alert, no distress, well-groomed.  Skin: No rashes in visible areas.  Eye: Round. Conjunctiva clear, lids normal. No icterus.   ENMT: Lips pink without lesions, good dentition, moist mucous membranes. Phonation normal.  Left ear structure nonerythematous, no visible sites of edema insurrounding tissue.  Neck: No masses, no thyromegaly. Moves freely without pain.  CV: Pulse as reported by patient  Respiratory: Unlabored respiratory effort, no cough or audible wheeze  Psych: Alert and oriented x3, normal affect and mood.       Assessment and Plan:   The following treatment plan was discussed:     1. Anxiety: Discussed controlled substance agreement and obtained verbal consent.  Patient is motivated to continue weaning use of this medication and hopefully get on a better controlled regimen on a routine basis.  However, due to the nature of this medication type regarding dependence, withdrawal, potential for seizure and death; continued use while gradually weaning is indicated.  While waiting to get in with mental health care specialist, I am happy to refill this.  Records have been requested multiple times from previous provider, we will continue to do so, however it is understandable as many clinics are running limited schedule or exclusively  telemedicine due to current viral pandemic.    90-day supply provided, patient aware that this is a temporary leniency allowed by the [facility] due to the pandemic in order to reduce excessive movement outside of the home and reduce viral spread.  [place] controlled substance program checked along with narc check, no other controlled substances being prescribed or used at this time.    - clonazePAM (KLONOPIN) 1 MG Tab; Take 0.5 Tabs by mouth 2 times a day for 90 days.  Dispense: 90 Tab; Refill: 0  - Brexpiprazole (REXULTI) 1 MG Tab; Take 1 mg by mouth every day.  Dispense: 90 Tab; Refill: 3    2. Other infective acute otitis externa of left ear: Due to external tenderness, it does seem that patient may have continued inflammation with possible infection of the external auditory canal.  At this time we will do combination antibiotic and glucocorticoid drop.  We did discuss several red flag symptoms for her to seek out additional medical care such as tenderness with jaw movement, redness pain swelling and heat in the tissue surrounding the ear, or persistent worsening symptoms in general.  - ciprofloxacin/dexamethasone (CIPRODEX) 0.3-0.1 % Suspension; Place 4 Drops in left ear 2 times a day for 7 days.  Dispense: 1 Bottle; Refill: 0    3. Essential hypertension: Continue current regimen  - lisinopril (PRINIVIL) 10 MG Tab; Take 1 Tab by mouth every day.  Dispense: 90 Tab; Refill: 3    4. Hypothyroidism (acquired): We will recheck TSH with reflex T4    5. Diabetes mellitus type 2 in obese (HCC): Well-controlled on current regimen, will continue  - metformin (GLUCOPHAGE) 1000 MG tablet; Take 1 Tab by mouth 2 times a day, with meals.  Dispense: 180 Tab; Refill: 3  - Empagliflozin (JARDIANCE) 10 MG Tab; Take 10 mg by mouth every day.  Dispense: 90 Tab; Refill: 3    6. Mixed hyperlipidemia  - simvastatin (ZOCOR) 40 MG Tab; Take 1 Tab by mouth every evening.  Dispense: 90 Tab; Refill: 3    7. Recurrent major depressive disorder, in  partial remission (HCC)  - Brexpiprazole (REXULTI) 1 MG Tab; Take 1 mg by mouth every day.  Dispense: 90 Tab; Refill: 3    Overall, patient is doing quite well.  Refills provided and I encouraged her to follow through with the referrals previously placed.  She was previously anxious to do this because of the pandemic however we discussed the benefit of getting on the schedule for the next several months in order to reduce overall wait time.    I am comfortable with patient returning in 6 months for further checkup at that point we can reevaluate labs    Follow-up: Return in about 6 months (around 10/14/2020).

## 2020-04-15 NOTE — TELEPHONE ENCOUNTER
Patient called and requested that you provide refills on her pain medication. I was going to call her and ask her to make an appointment, but it looks like she has been using Morphine Sulfate. Wanted to double check and see if she needs to go to pain management before just making an appointment. Thank you.

## 2020-04-17 ENCOUNTER — OFFICE VISIT (OUTPATIENT)
Dept: PHYSICAL MEDICINE AND REHAB | Facility: MEDICAL CENTER | Age: 65
End: 2020-04-17
Payer: MEDICARE

## 2020-04-17 VITALS
HEART RATE: 74 BPM | DIASTOLIC BLOOD PRESSURE: 84 MMHG | HEIGHT: 62 IN | OXYGEN SATURATION: 95 % | WEIGHT: 182.32 LBS | BODY MASS INDEX: 33.55 KG/M2 | TEMPERATURE: 97.8 F | SYSTOLIC BLOOD PRESSURE: 120 MMHG

## 2020-04-17 DIAGNOSIS — Z98.890 S/P LUMBAR LAMINECTOMY: ICD-10-CM

## 2020-04-17 DIAGNOSIS — F41.9 ANXIETY: ICD-10-CM

## 2020-04-17 DIAGNOSIS — M54.16 LEFT LUMBAR RADICULITIS: ICD-10-CM

## 2020-04-17 DIAGNOSIS — E55.9 VITAMIN D DEFICIENCY: ICD-10-CM

## 2020-04-17 DIAGNOSIS — F33.41 RECURRENT MAJOR DEPRESSIVE DISORDER, IN PARTIAL REMISSION (HCC): ICD-10-CM

## 2020-04-17 PROCEDURE — 99205 OFFICE O/P NEW HI 60 MIN: CPT | Performed by: PHYSICAL MEDICINE & REHABILITATION

## 2020-04-17 ASSESSMENT — PATIENT HEALTH QUESTIONNAIRE - PHQ9
5. POOR APPETITE OR OVEREATING: 3 - NEARLY EVERY DAY
CLINICAL INTERPRETATION OF PHQ2 SCORE: 2
SUM OF ALL RESPONSES TO PHQ QUESTIONS 1-9: 11

## 2020-04-17 ASSESSMENT — PAIN SCALES - GENERAL: PAINLEVEL: 8=MODERATE-SEVERE PAIN

## 2020-04-17 ASSESSMENT — FIBROSIS 4 INDEX: FIB4 SCORE: 0.92

## 2020-04-17 NOTE — PROGRESS NOTES
"New patient note    Physiatry (physical medicine and  Rehabilitation), interventional spine and sports medicine    Date of Service: 04/17/2020    Chief complaint:   Chief Complaint   Patient presents with   • New Patient     Lower back pain       HISTORY    HPI: Grisel Mark 64 y.o. female who presents today for evaluation of low back and leg pain.     She reports that she has \"major problems\" in her back.  Her primary physician in [place] used to treat her with morphine and muscle relaxants.  She reports that she moved here from [place] in December 2019.  Previously, she was treated at a pain clinic.   From what she reports, she has been on opioids for a long time, at least 25 years.    She has not had a prescription in two months and now has about 1-2 weeks left of her morphine from previous scripts written in [place].  Her new PCP has written clonazepam for her.  From what she reports, she has been taking this since 1995.  In the past, she was taking 5/day and this has been weaned gradually.     Currently, she is taking gabapentin 600mg po tid.      Her pain she describes as \"sharp back pain\" and at times, she cannot walk on it.  Pain radiates into the left leg with burning pain, numbness.    When she coughs and sneezings, this can sometimes cause \"terrorizing pain.\"  Sometimes she cannot get out of bed.  Everything makes her pain worse.  Walking is very difficult.  Pain is a 8/10 on the VAS.    She reports that she has been disabled since her surgery in 1995    Medical records review:  I reviewed the note from the referring provider Peter Bruce * dated 02/05/2020: She was seen to establish care, having just moved from [place].  She was seen for essential hypertension, mixed hyperlipidemia, DM2 in obese, hypothyroidism, recurrent MDD in partial depression/anxiety, GERD without esophagitis, chronic bilateral low back pain with bilateral sciatica.  Medications associated with the above were " written, related labs ordered including CBC, CMP, Hb A1c, lipids, microalbumin, TSH, free thryoxine, referral to ps\ychiatry, referral to GI for colonoscopy and referral to pain clinic.    Previous treatments:    Physical Therapy: Yes    Medications the patient is tried: Narcotics, gabapentin and muscle relaxers    Previous interventions: [facility] these included right lumbar radiofrequency procedures    Previous surgeries to relieve the above pain:  Surgery on October 28, 1998      ROS:   CV: irregular heart, reports history of tachycardia  Psych: depression since 1995  Heme: anemia  Endo: hypothyroidism, diabetes    Red Flags ROS:   Fever, Chills, Sweats: Denies  Involuntary Weight Loss: Denies  Bladder Incontinence: Denies  Bowel Incontinence: Denies  Saddle Anesthesia: Denies    All other systems reviewed and negative.       PMHx:   Past Medical History:   Diagnosis Date   • Anxiety    • Chronic back pain    • Constipation    • Depression    • Diabetes (HCC)    • GERD (gastroesophageal reflux disease)    • Heart murmur     tachycardia   • Hyperlipidemia    • Hypertension    • Thyroid disease        PSHx:   Past Surgical History:   Procedure Laterality Date   • ABDOMINAL HYSTERECTOMY TOTAL     • CARPAL TUNNEL RELEASE     • CHOLECYSTECTOMY     • LAMINOTOMY         Family history   Family History   Problem Relation Age of Onset   • Cancer Mother    • Stroke Father         Heart attack   • Dementia Sister    • Stroke Brother         heart Attack   • Cancer Brother         Skin   • Diabetes Brother    • Kidney Disease Brother    • Cancer Brother    • Parkinson's Disease Sister    • No Known Problems Sister    • Diabetes Daughter    • Hypertension Daughter          Medications:   Current Outpatient Medications   Medication   • clonazePAM (KLONOPIN) 1 MG Tab   • ciprofloxacin/dexamethasone (CIPRODEX) 0.3-0.1 % Suspension   • lisinopril (PRINIVIL) 10 MG Tab   • simvastatin (ZOCOR) 40 MG Tab    • metformin (GLUCOPHAGE) 1000 MG tablet   • Empagliflozin (JARDIANCE) 10 MG Tab   • Brexpiprazole (REXULTI) 1 MG Tab   • gabapentin (NEURONTIN) 600 MG tablet   • omeprazole (PRILOSEC) 40 MG delayed-release capsule   • levothyroxine (SYNTHROID) 50 MCG Tab   • busPIRone (BUSPAR) 10 MG Tab tablet   • DULoxetine (CYMBALTA) 20 MG Cap DR Particles   • metoprolol (LOPRESSOR) 25 MG Tab   • vitamin D, Ergocalciferol, (DRISDOL) 1.25 MG (20639 UT) Cap capsule   • diclofenac EC (VOLTAREN) 75 MG Tablet Delayed Response   • cyclobenzaprine (FLEXERIL) 10 MG Tab   • hydrOXYzine HCl (ATARAX) 25 MG Tab     No current facility-administered medications for this visit.        Allergies:   No Known Allergies    Social Hx:   Social History     Socioeconomic History   • Marital status:      Spouse name: Not on file   • Number of children: Not on file   • Years of education: Not on file   • Highest education level: Not on file   Occupational History   • Not on file   Social Needs   • Financial resource strain: Not on file   • Food insecurity     Worry: Not on file     Inability: Not on file   • Transportation needs     Medical: Not on file     Non-medical: Not on file   Tobacco Use   • Smoking status: Light Tobacco Smoker     Packs/day: 0.25     Types: Cigarettes   • Smokeless tobacco: Never Used   Substance and Sexual Activity   • Alcohol use: Not Currently     Comment: rare   • Drug use: Never   • Sexual activity: Not Currently   Lifestyle   • Physical activity     Days per week: Not on file     Minutes per session: Not on file   • Stress: Not on file   Relationships   • Social connections     Talks on phone: Not on file     Gets together: Not on file     Attends Bahai service: Not on file     Active member of club or organization: Not on file     Attends meetings of clubs or organizations: Not on file     Relationship status: Not on file   • Intimate partner violence     Fear of current or ex partner: Not on file      "Emotionally abused: Not on file     Physically abused: Not on file     Forced sexual activity: Not on file   Other Topics Concern   •  Service No   • Blood Transfusions Yes   • Caffeine Concern No   • Occupational Exposure No   • Hobby Hazards No   • Sleep Concern Yes   • Stress Concern Yes   • Weight Concern Yes   • Special Diet No   • Back Care No   • Exercise Yes   • Bike Helmet No   • Seat Belt Yes   • Self-Exams Yes   Social History Narrative   • Not on file         EXAMINATION     Physical Exam:   Vitals: /84 (BP Location: Left arm, Patient Position: Sitting, BP Cuff Size: Small adult)   Pulse 74   Temp 36.6 °C (97.8 °F) (Temporal)   Ht 1.575 m (5' 2\")   Wt 82.7 kg (182 lb 5.1 oz)   SpO2 95%     Constitutional:   Body Habitus: Body mass index is 33.35 kg/m².  Cooperation: Fully cooperates with exam  Appearance: Well-groomed, well-nourished, not disheveled, no acute distress    Eyes: No scleral icterus, no proptosis     ENT -no obvious auditory deficits, no facial droop     Skin -no rashes or lesions noted     Respiratory-  breathing comfortable on room air, no audible wheezing    Cardiovascular- capillary refills less than 2 seconds. No lower extremity edema is noted.     Gastrointestinal - no obvious abdominal masses, No tenderness to palpation in the abdomen    Psychiatric- alert and oriented ×3. Normal affect.     Gait - normal gait, no use of ambulatory device, nonantalgic. The patient can toe walk with ease. The patient can heel walk with ease..     Musculoskeletal -   Cervical spine   Inspection: No deformities of the skin over the cervical spine. No rashes or lesions.    full  A/P ROM in all directions, without  pain      Spurling’s sign: negative bilaterally    No signs of muscular atrophy in bilateral upper extremities     Thoracic/Lumbar Spine/Sacral Spine/Hips   Inspection: No evidence of atrophy in bilateral lower extremities throughout     ROM: full  AROM with flexion with " increased pain, extension, lateral flexion bilaterally    Palpation:   No tenderness to palpation in midline at T1-T12 levels. No tenderness to palpation in the left and right of the midline T1-L3, POSITIVE for tenderness to palpation to the para-midline region in the lower lumbar levels.  Mild tenderness of trapezius and medial elbows bilaterally.  No medial knee tenderness  palpation over SI joint: positive bilaterally    palpation over buttock: positive right, negative left    palpation in hip or over the greater trochanters: negative bilaterally      Lumbar spine Special tests  Neuro tension  Straight leg test positive on the left and negative on the right    HIP  Range of motion in the hips is within normal limits in internal and external rotation bilaterally    SI joint tests  Observation patient sits on one buttocks: Negative  ALLIE test positive bilaterally       Neuro       Key points for the international standards for neurological classification of spinal cord injury (ISNCSCI) to light touch.     Dermatome R L   C4 2 2   C5 2 2   C6 2 2   C7 2 2   C8 2 2   T1 2 2   T2 2 2   L2 2 2   L3 2 2   L4 2 2   L5 2 2   S1 2 2   S2 2 2           Motor Exam Upper Extremities   ? Myotome R L   Shoulder flexion C5 5 5   Elbow flexion C5 5 5   Wrist extension C6 5 5   Elbow extension C7 5 5   Finger flexion C8 5 5   Finger abduction T1 5 5         Motor Exam Lower Extremities    ? Myotome R L   Hip flexion L2 5 5   Knee extension L3 5 5   Ankle dorsiflexion L4 5 5   Toe extension L5 5 4   Ankle plantarflexion S1 5 5         Luna’s sign negative bilaterally   Babinski sign negative bilaterally   Clonus of the ankle negative bilaterally     Reflexes  ?  R L   Biceps  2+ 2+   Brachioradialis  2+ 2+   Patella  2+ 2+   Achilles   2+ 1+       MEDICAL DECISION MAKING    Medical records review: see under HPI section.     DATA    Labs:     04/09/2020 Vitamin D, 25:  28L  04/09/2020 Vitamin B12: 217    Lab Results   Component  Value Date/Time    SODIUM 135 04/09/2020 09:26 AM    POTASSIUM 4.7 04/09/2020 09:26 AM    CHLORIDE 100 04/09/2020 09:26 AM    CO2 21 04/09/2020 09:26 AM    ANION 14.0 04/09/2020 09:26 AM    GLUCOSE 140 (H) 04/09/2020 09:26 AM    BUN 19 04/09/2020 09:26 AM    CREATININE 0.71 04/09/2020 09:26 AM    CALCIUM 9.8 04/09/2020 09:26 AM    ASTSGOT 10 (L) 04/09/2020 09:26 AM    ALTSGPT 7 04/09/2020 09:26 AM    TBILIRUBIN 0.3 04/09/2020 09:26 AM    ALBUMIN 4.1 04/09/2020 09:26 AM    TOTPROTEIN 7.3 04/09/2020 09:26 AM    GLOBULIN 3.2 04/09/2020 09:26 AM    AGRATIO 1.3 04/09/2020 09:26 AM       No results found for: PROTHROMBTM, INR     Lab Results   Component Value Date/Time    WBC 7.6 04/09/2020 09:26 AM    RBC 4.25 04/09/2020 09:26 AM    HEMOGLOBIN 13.3 04/09/2020 09:26 AM    HEMATOCRIT 40.7 04/09/2020 09:26 AM    MCV 95.8 04/09/2020 09:26 AM    MCH 31.3 04/09/2020 09:26 AM    MCHC 32.7 (L) 04/09/2020 09:26 AM    MPV 11.1 04/09/2020 09:26 AM    NEUTSPOLYS 52.80 04/09/2020 09:26 AM    LYMPHOCYTES 34.80 04/09/2020 09:26 AM    MONOCYTES 10.10 04/09/2020 09:26 AM    EOSINOPHILS 1.40 04/09/2020 09:26 AM    BASOPHILS 0.40 04/09/2020 09:26 AM        Lab Results   Component Value Date/Time    HBA1C 6.8 (H) 04/09/2020 09:26 AM        Imaging: I personally reviewed following images, these are my reads: None available    IMAGING radiology reads. I reviewed the following radiology reads: None available                                                                                                                                                                                Diagnosis   Visit Diagnoses     ICD-10-CM   1. Left lumbar radiculitis M54.16   2. S/P lumbar laminectomy Z98.890   3. Vitamin D deficiency E55.9   4. Recurrent major depressive disorder, in partial remission (HCC) F33.41   5. Anxiety F41.9           ASSESSMENT:  Grisel Mark 64 y.o. female seen for above     Grisel was seen today for new patient.    Diagnoses and  all orders for this visit:    Left lumbar radiculitis  -     DX-LUMBAR SPINE-4+ VIEWS; Future  -     MR-LUMBAR SPINE-WITH & W/O; Future  -     Patient has been identified as having a positive depression screening. Appropriate orders and counseling have been given.    S/P lumbar laminectomy  -     Pain Management Screen  -     DX-LUMBAR SPINE-4+ VIEWS; Future  -     MR-LUMBAR SPINE-WITH & W/O; Future    Vitamin D deficiency    Recurrent major depressive disorder, in partial remission (HCC)  -     Patient has been identified as having a positive depression screening. Appropriate orders and counseling have been given.    Anxiety  -     Patient has been identified as having a positive depression screening. Appropriate orders and counseling have been given.      1. Discussed obtaining xray and MRI of the lumbar spine to further evaluate findings on exam and symptoms from history to determine possible treatment targets.  There may be other treatment options that will allow for reduction of chronic medications.  2. Discussed that she will continue vitamin D supplementation.  Encouraged her to supplement vitamin B12 as well, given that she is at the lowest end of normal.  3. Continue gabapentin 600mg po tid.  4. We discussed obtaining UDS today.  Given that she is taking chronic benzodiazepines, we discussed that this will need to be carefully considered as the combination of chronic benzodiazepines and opioids is a high risk.  She has a referral to Psychiatry pending after visit with PCP on 02/05/2020.    Outside records requested:  The patient signed outside records request form for her outside records including outside images.      Follow-up: Return after imaging studies.    Thank you very much for asking me to participate in Grisel Mark's care.  Please contact me with any questions or concerns.    Please note that this dictation was created using voice recognition software. I have made every reasonable attempt to  correct obvious errors but there may be errors of grammar and content that I may have overlooked prior to finalization of this note.      Paresh Ogden MD  Physical Medicine and Rehabilitation  Interventional Spine and Sports Physiatry  [facility]            Peter Bruce

## 2020-04-20 RX ORDER — CYCLOBENZAPRINE HCL 10 MG
10 TABLET ORAL 3 TIMES DAILY PRN
Qty: 30 TAB | Refills: 0 | Status: SHIPPED | OUTPATIENT
Start: 2020-04-20 | End: 2020-07-14

## 2020-04-20 NOTE — TELEPHONE ENCOUNTER
----- Message from Grisel Mark sent at 4/20/2020  9:05 AM PDT -----  Regarding: Prescription Question  Contact: [phone]  Have been having back spasms alot.Need  Cyclobenzaprine filled please .Thank you

## 2020-04-29 ENCOUNTER — HOSPITAL ENCOUNTER (OUTPATIENT)
Dept: RADIOLOGY | Facility: MEDICAL CENTER | Age: 65
End: 2020-04-29
Payer: MEDICARE

## 2020-05-05 ENCOUNTER — HOSPITAL ENCOUNTER (OUTPATIENT)
Dept: RADIOLOGY | Facility: MEDICAL CENTER | Age: 65
End: 2020-05-05
Attending: PHYSICAL MEDICINE & REHABILITATION
Payer: MEDICARE

## 2020-05-05 DIAGNOSIS — Z98.890 S/P LUMBAR LAMINECTOMY: ICD-10-CM

## 2020-05-05 DIAGNOSIS — M54.16 LEFT LUMBAR RADICULITIS: ICD-10-CM

## 2020-05-05 PROCEDURE — 72110 X-RAY EXAM L-2 SPINE 4/>VWS: CPT

## 2020-05-05 PROCEDURE — 700117 HCHG RX CONTRAST REV CODE 255: Performed by: PHYSICAL MEDICINE & REHABILITATION

## 2020-05-05 PROCEDURE — A9576 INJ PROHANCE MULTIPACK: HCPCS | Performed by: PHYSICAL MEDICINE & REHABILITATION

## 2020-05-05 PROCEDURE — 72158 MRI LUMBAR SPINE W/O & W/DYE: CPT

## 2020-05-05 RX ADMIN — GADOTERIDOL 15 ML: 279.3 INJECTION, SOLUTION INTRAVENOUS at 14:33

## 2020-05-06 ENCOUNTER — TELEPHONE (OUTPATIENT)
Dept: PHYSICAL MEDICINE AND REHAB | Facility: MEDICAL CENTER | Age: 65
End: 2020-05-06

## 2020-05-06 NOTE — TELEPHONE ENCOUNTER
I called to Dr Hathaway's office for folow up regarding Medical request sent 4/21//2020 and resent 4/30/2020 I spoke with Kathryn and they said they are waiting for the approval from the Doctor to send the records. RR

## 2020-05-08 ENCOUNTER — OFFICE VISIT (OUTPATIENT)
Dept: PHYSICAL MEDICINE AND REHAB | Facility: MEDICAL CENTER | Age: 65
End: 2020-05-08
Payer: MEDICARE

## 2020-05-08 VITALS
WEIGHT: 186.29 LBS | HEART RATE: 75 BPM | TEMPERATURE: 98.2 F | BODY MASS INDEX: 33.01 KG/M2 | OXYGEN SATURATION: 93 % | DIASTOLIC BLOOD PRESSURE: 80 MMHG | HEIGHT: 63 IN | SYSTOLIC BLOOD PRESSURE: 130 MMHG

## 2020-05-08 DIAGNOSIS — M47.816 LUMBAR SPONDYLOSIS: ICD-10-CM

## 2020-05-08 DIAGNOSIS — F33.41 RECURRENT MAJOR DEPRESSIVE DISORDER, IN PARTIAL REMISSION (HCC): ICD-10-CM

## 2020-05-08 DIAGNOSIS — M54.16 LEFT LUMBAR RADICULITIS: ICD-10-CM

## 2020-05-08 DIAGNOSIS — Z98.890 S/P LUMBAR LAMINECTOMY: ICD-10-CM

## 2020-05-08 DIAGNOSIS — M51.36 DDD (DEGENERATIVE DISC DISEASE), LUMBAR: ICD-10-CM

## 2020-05-08 PROCEDURE — 99215 OFFICE O/P EST HI 40 MIN: CPT | Performed by: PHYSICAL MEDICINE & REHABILITATION

## 2020-05-08 RX ORDER — HYDROCODONE BITARTRATE AND ACETAMINOPHEN 5; 325 MG/1; MG/1
1 TABLET ORAL 2 TIMES DAILY PRN
Qty: 60 TAB | Refills: 0 | Status: SHIPPED | OUTPATIENT
Start: 2020-05-08 | End: 2020-06-07

## 2020-05-08 RX ORDER — DULOXETIN HYDROCHLORIDE 30 MG/1
30 CAPSULE, DELAYED RELEASE ORAL DAILY
Qty: 7 CAP | Refills: 0 | Status: SHIPPED | OUTPATIENT
Start: 2020-05-08 | End: 2020-05-12

## 2020-05-08 RX ORDER — DULOXETIN HYDROCHLORIDE 60 MG/1
60 CAPSULE, DELAYED RELEASE ORAL DAILY
Qty: 30 CAP | Refills: 0 | Status: SHIPPED | OUTPATIENT
Start: 2020-05-08 | End: 2020-05-12

## 2020-05-08 ASSESSMENT — PATIENT HEALTH QUESTIONNAIRE - PHQ9
5. POOR APPETITE OR OVEREATING: 3 - NEARLY EVERY DAY
SUM OF ALL RESPONSES TO PHQ QUESTIONS 1-9: 17
CLINICAL INTERPRETATION OF PHQ2 SCORE: 4

## 2020-05-08 ASSESSMENT — PAIN SCALES - GENERAL: PAINLEVEL: 9=SEVERE PAIN

## 2020-05-08 ASSESSMENT — FIBROSIS 4 INDEX: FIB4 SCORE: .934132727969572995

## 2020-05-08 NOTE — PATIENT INSTRUCTIONS
Your procedure will be at the [facility] special procedure suite.    [facility] [place] [address]       PRE-PROCEDURE INSTRUCTIONS  You may take your regular medications except:   · No Anti-inflammatories 5 days prior to your procedure. Anti-inflammatories include medicines such as  ibuprofen (Motrin, Advil), Excedrin, Naproxen (Aleve, Anaprox, Naprelan, Naprosyn), Celecoxib (Celebrex), Diclofenac (Voltaren-XR tab), and Meloxicam (Mobic).   · No Glucophage or Metformin 24 hours before your procedure. You may resume next day after your procedure.  · Call the physiatry office if you are taking or prescribed anti-biotics within five days of procedure.  · Please ask provider if you are taking any new diabetes medication.  · CONTINUE TAKING BLOOD PRESSURE MEDICATIONS AS PRESCRIBED.  · Pain medications will not be prescribed on the procedure day. Procedural pain medication may be used by your provider   · Call your doctor's office performing the procedure if you have a fever, chills, rash or new illness prior to your procedure    Anticoagulation/antiplatelet medications  No Blood thinning medications such as Coumadin or Plavix 5 days prior to procedure unless your doctor said to continue these medications. Call your doctor if a new medication is prescribed in this class.     Restrictions for eating before procedure:   · If you are getting procedural sedation, then do not eat to for 8 hours prior to procedure appointment time. Do not drink fluids for four hours prior to your procedure time.   · If you are not having procedural sedation, then Skip the meal prior to your procedure. If you have a morning procedure then skip breakfast. If you have an afternoon procedure then skip lunch.   · You may drink clear liquids up to 2 hours prior to your procedure  · You must have a  the day of procedure to accompany you home.      POST PROCEDURE INSTRUCTIONS   · No heavy lifting, strenuous bending or  strenuous exercise for 3 days after your procedure.  · No hot tubs, baths, swimming for 3 days after your procedure  · You can remove the bandage the day after the procedure.  · IF YOU RECEIVED A STEROID INJECTION. PLEASE NOTE THAT THERE MAY BE A DELAY FOR THE INJECTION TO START WORKING, THE DELAY MAY BE UP TO TWO WEEKS. IF YOU HAVE DIABETES, PLEASE NOTE THAT YOUR SUGAR LEVELS MAY BE ELEVATED FOR 1-2 DAYS AFTER A STEROID INJECTION.  THE STEROID MAY CAUSE TEMPORARY SYMPTOMS WHICH USUALLY RESOLVE ON THEIR OWN WITHIN 1 TO 2 DAYS INCLUDING FACIAL FLUSHING OR A FEELING OF WARMTH ON THE FACE, TEMPORARY INCREASES IN BLOOD SUGAR, INSOMNIA, INCREASED HUNGER  · IF YOU RECEIVED A DIAGNOSTIC PROCEDURE (SUCH AS A MEDIAL BRANCH BLOCK), PLEASE NOTE THAT WE DO EXPECT THIS INJECTION TO WEAR OFF.  IT IS IMPORTANT TO COMPLETE THE PAIN DIARY AND LIST THE PAIN SCORE ONLY FOR THE REGION WHERE THE PROCEDURE WAS AND BRING THIS TO YOUR FOLLOW UP VISIT.  · IF YOU RECEIVED A RADIOFREQUENCY PROCEDURE, THERE MAY BE SOME SORENESS AFTER THE PROCEDURE.  THIS IS NORMAL.  · IF YOU EXPERIENCE PROLONGED WEAKNESS LONGER THAN ONE DAY, BOWEL OR BLADDER INCONTINENCE THEN PLEASE CALL THE PHYSIATRY OFFICE.  · Your leg may feel heavy, weak and numb for up to 1-2 days. Be very careful walking.   ·  You may resume normal activities 3 days after procedure.

## 2020-05-08 NOTE — PROGRESS NOTES
"Follow-up patient note    Physiatry (physical medicine and  Rehabilitation), interventional spine and sports medicine    Date of Service: 05/08/2020    Chief complaint:   Chief Complaint   Patient presents with   • Follow-Up     Lower back pain       HISTORY    HPI: Grisel Mark 65 y.o. female who presents today for follow-up evaluation of low back and leg pain. She has at least a 25 year history of chronic back pain which has been managed with opioids for that long.  Additionally, she has chronically been prescribed benzodiazepines and this has been gradually weaned.  She has been disabled since her surgery in 1995.    Today, she reports that she has some increased pain over the last two weeks.  She has pain that is radiating into the left leg down the side of her leg.  She reports that her pain is a 10/10 on the VAS.  Tingling is present in both feet.    She reports that she had \"burning of the nerves\" and this only lasted for about 3 weeks of relief.  This was within the last few years.    She is taking gabapentin 600mg po tid.  She does think that this has been doing pretty well.  Her pain medications from Morphine 15mg is now once a day, which is down from three times a day.    From what she reports, she was told by her surgeon that she would \"be in a wheelchair\" in a few years, around the time of her surgery.    A few years ago, she worked with a pain psychologist.  It sounds like they worked on some breathing exercises and stretching exercises.    Medical records review:  Review of records   Dr. Dheeraj De La Rosa:  08/20/2019 Right L3-4, L4-5, L5-S1 radiofrequency ablation      I reviewed the note from the referring provider Peter Bruce * dated 02/05/2020: She was seen to establish care, having just moved from [place].  She was seen for essential hypertension, mixed hyperlipidemia, DM2 in obese, hypothyroidism, recurrent MDD in partial depression/anxiety, GERD without esophagitis, chronic " bilateral low back pain with bilateral sciatica.  Medications associated with the above were written, related labs ordered including CBC, CMP, Hb A1c, lipids, microalbumin, TSH, free thryoxine, referral to ps\ychiatry, referral to GI for colonoscopy and referral to pain clinic.    Previous treatments:    Physical Therapy: Yes    Medications the patient is tried: Narcotics, gabapentin and muscle relaxers    Previous interventions: [facility] these included right lumbar radiofrequency procedures    Previous surgeries to relieve the above pain:  Surgery on October 28, 1998      ROS: Unchanged from 04/17/2020 except as noted in the HPI   CV: irregular heart, reports history of tachycardia  Psych: depression since 1995  Heme: anemia  Endo: hypothyroidism, diabetes    Red Flags ROS:   Fever, Chills, Sweats: Denies  Involuntary Weight Loss: Denies  Bladder Incontinence: Denies  Bowel Incontinence: Denies  Saddle Anesthesia: Denies    All other systems reviewed and negative.       PMHx:   Past Medical History:   Diagnosis Date   • Anxiety    • Chronic back pain    • Constipation    • Depression    • Diabetes (HCC)    • GERD (gastroesophageal reflux disease)    • Heart murmur     tachycardia   • Hyperlipidemia    • Hypertension    • Thyroid disease        PSHx:   Past Surgical History:   Procedure Laterality Date   • ABDOMINAL HYSTERECTOMY TOTAL     • CARPAL TUNNEL RELEASE     • CHOLECYSTECTOMY     • LAMINOTOMY         Family history   Family History   Problem Relation Age of Onset   • Cancer Mother    • Stroke Father         Heart attack   • Dementia Sister    • Stroke Brother         heart Attack   • Cancer Brother         Skin   • Diabetes Brother    • Kidney Disease Brother    • Cancer Brother    • Parkinson's Disease Sister    • No Known Problems Sister    • Diabetes Daughter    • Hypertension Daughter          Medications:   Current Outpatient Medications   Medication   • DULoxetine  (CYMBALTA) 30 MG Cap DR Particles   • DULoxetine (CYMBALTA) 60 MG Cap DR Particles delayed-release capsule   • HYDROcodone-acetaminophen (NORCO) 5-325 MG Tab per tablet   • cyclobenzaprine (FLEXERIL) 10 MG Tab   • clonazePAM (KLONOPIN) 1 MG Tab   • lisinopril (PRINIVIL) 10 MG Tab   • simvastatin (ZOCOR) 40 MG Tab   • metformin (GLUCOPHAGE) 1000 MG tablet   • Empagliflozin (JARDIANCE) 10 MG Tab   • Brexpiprazole (REXULTI) 1 MG Tab   • gabapentin (NEURONTIN) 600 MG tablet   • omeprazole (PRILOSEC) 40 MG delayed-release capsule   • levothyroxine (SYNTHROID) 50 MCG Tab   • busPIRone (BUSPAR) 10 MG Tab tablet   • metoprolol (LOPRESSOR) 25 MG Tab   • vitamin D, Ergocalciferol, (DRISDOL) 1.25 MG (10530 UT) Cap capsule   • diclofenac EC (VOLTAREN) 75 MG Tablet Delayed Response   • hydrOXYzine HCl (ATARAX) 25 MG Tab     No current facility-administered medications for this visit.        Allergies:   No Known Allergies    Social Hx:   Social History     Socioeconomic History   • Marital status:      Spouse name: Not on file   • Number of children: Not on file   • Years of education: Not on file   • Highest education level: Not on file   Occupational History   • Not on file   Social Needs   • Financial resource strain: Not on file   • Food insecurity     Worry: Not on file     Inability: Not on file   • Transportation needs     Medical: Not on file     Non-medical: Not on file   Tobacco Use   • Smoking status: Light Tobacco Smoker     Packs/day: 0.25     Types: Cigarettes   • Smokeless tobacco: Never Used   Substance and Sexual Activity   • Alcohol use: Not Currently     Comment: rare   • Drug use: Never   • Sexual activity: Not Currently   Lifestyle   • Physical activity     Days per week: Not on file     Minutes per session: Not on file   • Stress: Not on file   Relationships   • Social connections     Talks on phone: Not on file     Gets together: Not on file     Attends Hindu service: Not on file     Active  "member of club or organization: Not on file     Attends meetings of clubs or organizations: Not on file     Relationship status: Not on file   • Intimate partner violence     Fear of current or ex partner: Not on file     Emotionally abused: Not on file     Physically abused: Not on file     Forced sexual activity: Not on file   Other Topics Concern   •  Service No   • Blood Transfusions Yes   • Caffeine Concern No   • Occupational Exposure No   • Hobby Hazards No   • Sleep Concern Yes   • Stress Concern Yes   • Weight Concern Yes   • Special Diet No   • Back Care No   • Exercise Yes   • Bike Helmet No   • Seat Belt Yes   • Self-Exams Yes   Social History Narrative   • Not on file         EXAMINATION     Physical Exam:   Vitals: /80 (BP Location: Left arm, Patient Position: Sitting, BP Cuff Size: Large adult)   Pulse 75   Temp 36.8 °C (98.2 °F) (Temporal)   Ht 1.6 m (5' 3\")   Wt 84.5 kg (186 lb 4.6 oz)   SpO2 93%     Constitutional:   Body Habitus: Body mass index is 33 kg/m².  Cooperation: Fully cooperates with exam  Appearance: Well-groomed, well-nourished, not disheveled, no acute distress    Eyes: No scleral icterus, no proptosis     ENT -no obvious auditory deficits, wearing a facial mask    Skin -no rashes or lesions noted     Respiratory-  breathing comfortable on room air, no audible wheezing    Cardiovascular- capillary refills less than 2 seconds. no lower extremity edema is noted.     Psychiatric- alert and oriented ×3. Normal affect.     Gait - normal gait, no use of ambulatory device, mildly antalgic.     Musculoskeletal -   Thoracic/Lumbar Spine/Sacral Spine/Hips   Inspection: no evidence of atrophy in bilateral lower extremities throughout     Pain with lumbar flexion, but greater pain with extension/rotation bilaterally    Palpation:   No tenderness to palpation in midline at T1-T12 levels. No tenderness to palpation in the left and right of the midline T1-L3, POSITIVE for " tenderness to palpation to the para-midline region in the lower lumbar levels.      Neuro     Key points for the international standards for neurological classification of spinal cord injury (ISNCSCI) to light touch.     Dermatome R L   L2 2 2   L3 2 2   L4 2 2   L5 2 2   S1 2 2   S2 2 2       Motor Exam Lower Extremities    ? Myotome R L   Hip flexion L2 5 5   Knee extension L3 5 5   Ankle dorsiflexion L4 5 5   Toe extension L5 5 4   Ankle plantarflexion S1 5 5       Reflexes  ?  R L   Patella  2+ 2+   Achilles   2+ 1+       MEDICAL DECISION MAKING    Medical records review: see under HPI section.     DATA    Labs:   04/24/2020 UDS positive for morphine and metabolites, clonazepam  04/09/2020 Vitamin D, 25:  28L  04/09/2020 Vitamin B12: 217    Lab Results   Component Value Date/Time    SODIUM 135 04/09/2020 09:26 AM    POTASSIUM 4.7 04/09/2020 09:26 AM    CHLORIDE 100 04/09/2020 09:26 AM    CO2 21 04/09/2020 09:26 AM    ANION 14.0 04/09/2020 09:26 AM    GLUCOSE 140 (H) 04/09/2020 09:26 AM    BUN 19 04/09/2020 09:26 AM    CREATININE 0.71 04/09/2020 09:26 AM    CALCIUM 9.8 04/09/2020 09:26 AM    ASTSGOT 10 (L) 04/09/2020 09:26 AM    ALTSGPT 7 04/09/2020 09:26 AM    TBILIRUBIN 0.3 04/09/2020 09:26 AM    ALBUMIN 4.1 04/09/2020 09:26 AM    TOTPROTEIN 7.3 04/09/2020 09:26 AM    GLOBULIN 3.2 04/09/2020 09:26 AM    AGRATIO 1.3 04/09/2020 09:26 AM       No results found for: PROTHROMBTM, INR     Lab Results   Component Value Date/Time    WBC 7.6 04/09/2020 09:26 AM    RBC 4.25 04/09/2020 09:26 AM    HEMOGLOBIN 13.3 04/09/2020 09:26 AM    HEMATOCRIT 40.7 04/09/2020 09:26 AM    MCV 95.8 04/09/2020 09:26 AM    MCH 31.3 04/09/2020 09:26 AM    MCHC 32.7 (L) 04/09/2020 09:26 AM    MPV 11.1 04/09/2020 09:26 AM    NEUTSPOLYS 52.80 04/09/2020 09:26 AM    LYMPHOCYTES 34.80 04/09/2020 09:26 AM    MONOCYTES 10.10 04/09/2020 09:26 AM    EOSINOPHILS 1.40 04/09/2020 09:26 AM    BASOPHILS 0.40 04/09/2020 09:26 AM        Lab Results    Component Value Date/Time    HBA1C 6.8 (H) 04/09/2020 09:26 AM        Imaging: I personally reviewed following images, these are my reads:   MRI lumbar spine 05/05/2020  At L1-2, no central or foraminal stenosis  At L2-3, no central or foraminal stenosis  At L3-4, mild disc bulge and mild ligamentum flavum thickening.  No central canal stenosis. Borderline left foraminal stenosis. Schmorl's node.   At L4-5, diffuse disc bulge with mild ligamentum flavum thickening.  No central canal stenosis.  There is facet arthropathy, left greater than right. Mild foraminal stenosis bilaterally. S/P left L4/5 hemilaminectomy  At L5-S1, there is mild-borderline foraminal stenosis with facet arthropathy and mild disc bulge.  No central canal stenosis    Xray lumbar spine 05/05/2020  There is mild decreased joint space at L3-4 and L4-5.    Facet arthropathy is noted, greatest at L5-S1 bilaterally.  No significant motion on flexion and extension films    IMAGING radiology reads. I reviewed the following radiology reads  MRI lumbar spine 05/05/2020  IMPRESSION:     1.  Caudal lumbar facet arthropathy left worse than right with no bony destruction to indicate septic or inflammatory arthropathy. This most likely is just degenerative     2.  Mild caudal lumbar foraminal stenoses     3.  No significant central stenosis.     4.  Left L4/5 hemilaminectomy                                                                                   Xray lumbar spine 05/05/2020     IMPRESSION:     1.  No acute lumbar compression fracture or subluxation.     2.  Posterior disc space narrowing at L4-5 and to lesser extent at L3-4.     3.  Bilateral facet arthropathy at L5-S1.                                                                                             Diagnosis   Visit Diagnoses     ICD-10-CM   1. Lumbar spondylosis M47.816   2. DDD (degenerative disc disease), lumbar M51.36   3. Left lumbar radiculitis M54.16   4. S/P lumbar laminectomy  Z98.890   5. Recurrent major depressive disorder, in partial remission (formerly [facility]) F33.41           ASSESSMENT:  Grisel Mark 65 y.o. female seen for above     Grisel was seen today for follow-up.    Diagnoses and all orders for this visit:    Lumbar spondylosis  -     REFERRAL TO PHYSIATRY (PMR)    DDD (degenerative disc disease), lumbar  -     REFERRAL TO PHYSIATRY (PMR)    Left lumbar radiculitis    S/P lumbar laminectomy  -     DULoxetine (CYMBALTA) 30 MG Cap DR Particles; Take 1 Cap by mouth every day for 7 days.  -     DULoxetine (CYMBALTA) 60 MG Cap DR Particles delayed-release capsule; Take 1 Cap by mouth every day for 30 days.  -     HYDROcodone-acetaminophen (NORCO) 5-325 MG Tab per tablet; Take 1 Tab by mouth 2 times a day as needed for up to 30 days.  -     Consent for Opiate Prescription  -     Controlled Substance Treatment Agreement    Recurrent major depressive disorder, in partial remission (HCC)  -     Patient has been identified as having a positive depression screening. Appropriate orders and counseling have been given.  -     DULoxetine (CYMBALTA) 30 MG Cap DR Particles; Take 1 Cap by mouth every day for 7 days.  -     DULoxetine (CYMBALTA) 60 MG Cap DR Particles delayed-release capsule; Take 1 Cap by mouth every day for 30 days.         1. Discussed findings of imaging studies.    2. Discussed left lumbar three through lumbar five medial branch blocks.  If the diagnostic blocks are successful, will consider radiofrequency ablation. The risks benefits and alternatives to this procedure were discussed and the patient wishes to proceed with the procedure. Risks include but are not limited to damage to surrounding structures, infection, bleeding, worsening of pain which can be permanent, weakness which can be permanent. Benefits include pain relief, improved function. Alternatives includes not doing the procedure.   We can consider other treatment options, including epidural or transforaminal  injections, depending on results of above treatment.  3. Discussed use of low dose of opioids with goal of reducing.  She has been taking morphine 15mg po tid and now is down to one a day.  Trial use of norco with goal of minimizing.  We discussed risks of use of opioids and benzodiazepines.  Psychiatry referral pending.  Placed by PCP on 02/05/2020.  4. Continue vitamin D3 and encouraged her to get vitamin B12.      Follow-up: Return in about 4 weeks (around 6/5/2020) for Hospital injection.    Thank you very much for asking me to participate in Grisel Mark's care.  Please contact me with any questions or concerns.    Please note that this dictation was created using voice recognition software. I have made every reasonable attempt to correct obvious errors but there may be errors of grammar and content that I may have overlooked prior to finalization of this note.      Paresh Ogden MD  Physical Medicine and Rehabilitation  Interventional Spine and Sports Physiatry  [facility]
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
## 2020-05-12 ENCOUNTER — TELEMEDICINE (OUTPATIENT)
Dept: BEHAVIORAL HEALTH | Facility: CLINIC | Age: 65
End: 2020-05-12
Payer: MEDICARE

## 2020-05-12 VITALS — HEIGHT: 63 IN | BODY MASS INDEX: 32.6 KG/M2 | WEIGHT: 184 LBS

## 2020-05-12 DIAGNOSIS — F43.10 POSTTRAUMATIC STRESS DISORDER, DELAYED ONSET: ICD-10-CM

## 2020-05-12 DIAGNOSIS — F41.1 GENERALIZED ANXIETY DISORDER: ICD-10-CM

## 2020-05-12 DIAGNOSIS — F33.1 DEPRESSION, MAJOR, RECURRENT, MODERATE (HCC): ICD-10-CM

## 2020-05-12 DIAGNOSIS — F41.9 ANXIETY: ICD-10-CM

## 2020-05-12 DIAGNOSIS — F33.41 RECURRENT MAJOR DEPRESSIVE DISORDER, IN PARTIAL REMISSION (HCC): ICD-10-CM

## 2020-05-12 DIAGNOSIS — Z98.890 S/P LUMBAR LAMINECTOMY: ICD-10-CM

## 2020-05-12 PROCEDURE — 90792 PSYCH DIAG EVAL W/MED SRVCS: CPT | Mod: 95,CR | Performed by: PSYCHIATRY & NEUROLOGY

## 2020-05-12 RX ORDER — CLONAZEPAM 1 MG/1
0.5 TABLET ORAL 2 TIMES DAILY
Qty: 60 TAB | Refills: 2 | Status: SHIPPED | OUTPATIENT
Start: 2020-05-12 | End: 2020-06-02

## 2020-05-12 RX ORDER — BUPROPION HYDROCHLORIDE 150 MG/1
150 TABLET ORAL EVERY MORNING
Qty: 30 TAB | Refills: 2 | Status: SHIPPED | OUTPATIENT
Start: 2020-05-12 | End: 2020-08-06

## 2020-05-12 RX ORDER — BREXPIPRAZOLE 1 MG/1
1 TABLET ORAL DAILY
Qty: 90 TAB | Refills: 3 | Status: SHIPPED | OUTPATIENT
Start: 2020-05-12 | End: 2020-06-19

## 2020-05-12 RX ORDER — HYDROXYZINE HYDROCHLORIDE 25 MG/1
25 TABLET, FILM COATED ORAL 3 TIMES DAILY PRN
Qty: 90 TAB | Refills: 2 | Status: SHIPPED | OUTPATIENT
Start: 2020-05-12 | End: 2020-06-11

## 2020-05-12 ASSESSMENT — FIBROSIS 4 INDEX: FIB4 SCORE: .934132727969572995

## 2020-05-14 ENCOUNTER — TELEPHONE (OUTPATIENT)
Dept: PHYSICAL MEDICINE AND REHAB | Facility: MEDICAL CENTER | Age: 65
End: 2020-05-14

## 2020-05-14 NOTE — TELEPHONE ENCOUNTER
Let's schedule her for a sooner follow-up so that we can discuss her feeling that she needs to change her medication.  Thank you.

## 2020-05-14 NOTE — TELEPHONE ENCOUNTER
Patient called asking if she can  change her Norco 2 tabs 2 times a day because the way she is taking now 1 tab 2 times a day it is to long in between and she can afford the pain. Please advise. Thank you. RR

## 2020-05-19 DIAGNOSIS — E11.69 DIABETES MELLITUS TYPE 2 IN OBESE: ICD-10-CM

## 2020-05-19 DIAGNOSIS — E66.9 DIABETES MELLITUS TYPE 2 IN OBESE: ICD-10-CM

## 2020-05-19 RX ORDER — EMPAGLIFLOZIN 25 MG/1
25 TABLET, FILM COATED ORAL DAILY
Qty: 90 TAB | Refills: 3 | Status: SHIPPED | OUTPATIENT
Start: 2020-05-19 | End: 2021-04-06 | Stop reason: SDUPTHER

## 2020-05-20 ENCOUNTER — OFFICE VISIT (OUTPATIENT)
Dept: PHYSICAL MEDICINE AND REHAB | Facility: MEDICAL CENTER | Age: 65
End: 2020-05-20
Payer: MEDICARE

## 2020-05-20 VITALS
HEIGHT: 63 IN | HEART RATE: 63 BPM | DIASTOLIC BLOOD PRESSURE: 70 MMHG | OXYGEN SATURATION: 95 % | SYSTOLIC BLOOD PRESSURE: 110 MMHG | BODY MASS INDEX: 31.84 KG/M2 | TEMPERATURE: 96.8 F | WEIGHT: 179.68 LBS

## 2020-05-20 DIAGNOSIS — Z98.890 S/P LUMBAR LAMINECTOMY: ICD-10-CM

## 2020-05-20 DIAGNOSIS — M47.816 LUMBAR SPONDYLOSIS: ICD-10-CM

## 2020-05-20 DIAGNOSIS — E55.9 VITAMIN D DEFICIENCY: ICD-10-CM

## 2020-05-20 DIAGNOSIS — F33.41 RECURRENT MAJOR DEPRESSIVE DISORDER, IN PARTIAL REMISSION (HCC): ICD-10-CM

## 2020-05-20 DIAGNOSIS — F41.9 ANXIETY: ICD-10-CM

## 2020-05-20 DIAGNOSIS — M51.36 DDD (DEGENERATIVE DISC DISEASE), LUMBAR: ICD-10-CM

## 2020-05-20 PROCEDURE — 99215 OFFICE O/P EST HI 40 MIN: CPT | Performed by: PHYSICAL MEDICINE & REHABILITATION

## 2020-05-20 RX ORDER — DULOXETIN HYDROCHLORIDE 30 MG/1
90 CAPSULE, DELAYED RELEASE ORAL
COMMUNITY
Start: 2020-05-12 | End: 2020-06-19 | Stop reason: SDUPTHER

## 2020-05-20 RX ORDER — MORPHINE SULFATE 15 MG/1
15 TABLET, FILM COATED, EXTENDED RELEASE ORAL EVERY 12 HOURS
Qty: 60 TAB | Refills: 0 | Status: SHIPPED | OUTPATIENT
Start: 2020-05-20 | End: 2020-06-19 | Stop reason: SDUPTHER

## 2020-05-20 RX ORDER — ESTRADIOL 1 MG/1
TABLET ORAL
COMMUNITY
Start: 2020-05-19 | End: 2020-08-28 | Stop reason: SDUPTHER

## 2020-05-20 ASSESSMENT — PATIENT HEALTH QUESTIONNAIRE - PHQ9
SUM OF ALL RESPONSES TO PHQ QUESTIONS 1-9: 10
5. POOR APPETITE OR OVEREATING: 3 - NEARLY EVERY DAY
CLINICAL INTERPRETATION OF PHQ2 SCORE: 2

## 2020-05-20 ASSESSMENT — FIBROSIS 4 INDEX: FIB4 SCORE: .934132727969572995

## 2020-05-20 ASSESSMENT — PAIN SCALES - GENERAL: PAINLEVEL: 9=SEVERE PAIN

## 2020-05-20 NOTE — PROGRESS NOTES
Follow-up patient note    Physiatry (physical medicine and  Rehabilitation), interventional spine and sports medicine    Date of Service: 05/20/2020    Chief complaint:   Chief Complaint   Patient presents with   • Follow-Up     back pain       HISTORY    HPI: Grisel Mark 65 y.o. female who presents today for follow-up evaluation of low back and leg pain. She has at least a 25 year history of chronic back pain which has been managed with opioids for that long.  Additionally, she has chronically been prescribed benzodiazepines and this has been gradually weaned.  She has been disabled since her surgery in 1995.    Since her last visit, she has been to see the psychiatrist and they are making changes to her medications.  They are titrating her duloxetine to 90mg daily.  She has been weaned off of the klonipin as well.    She reports that her pain is a 9/10 on the VAS.  She thinks that the having more back spasms.  Her pain continues to be primarily axial.  Usually, she takes cyclobenzaprine at night and gets 5-6 hours of sleep.    Her pain has been managed with chronic opioids for more than a decade.  Additionally, she takes gabapentin 600mg po tid.  This seems to help somewhat.    She has been taking vitamin D.      Medical records review:  Dr. Francisco Olmos, plan to increase duloxetine 90mg daily, discontinue buspirone 20mg po bid, start buproprion XL 150mg daily, continue brexipiprazole 1mg qhs, hydroxyzine 25mg po tid prn, clonazepam 0.5mg daily prn    Review of records   Dr. Dheeraj De La Rosa:  08/20/2019 Right L3-4, L4-5, L5-S1 radiofrequency ablation      I reviewed the note from the referring provider Peter Bruce * dated 02/05/2020: She was seen to establish care, having just moved from [place].  She was seen for essential hypertension, mixed hyperlipidemia, DM2 in obese, hypothyroidism, recurrent MDD in partial depression/anxiety, GERD without esophagitis, chronic bilateral low back pain  with bilateral sciatica.  Medications associated with the above were written, related labs ordered including CBC, CMP, Hb A1c, lipids, microalbumin, TSH, free thryoxine, referral to ps\ychiatry, referral to GI for colonoscopy and referral to pain clinic.    Previous treatments:    Physical Therapy: Yes    Medications the patient is tried: Narcotics, gabapentin and muscle relaxers    Previous interventions: [facility] [place] these included right lumbar radiofrequency procedures    Previous surgeries to relieve the above pain:  Surgery on October 28, 1998      ROS: Unchanged from 05/08/2020 except as noted in the HPI   CV: irregular heart, reports history of tachycardia  Psych: depression since 1995  Heme: anemia  Endo: hypothyroidism, diabetes    Red Flags ROS:   Fever, Chills, Sweats: Denies  Involuntary Weight Loss: Denies  Bladder Incontinence: Denies  Bowel Incontinence: Denies  Saddle Anesthesia: Denies    All other systems reviewed and negative.       PMHx:   Past Medical History:   Diagnosis Date   • Anxiety    • Chronic back pain    • Constipation    • Depression    • Diabetes (HCC)    • GERD (gastroesophageal reflux disease)    • Heart murmur     tachycardia   • Hyperlipidemia    • Hypertension    • Thyroid disease        PSHx:   Past Surgical History:   Procedure Laterality Date   • ABDOMINAL HYSTERECTOMY TOTAL     • CARPAL TUNNEL RELEASE     • CHOLECYSTECTOMY     • LAMINOTOMY         Family history   Family History   Problem Relation Age of Onset   • Cancer Mother    • Stroke Father         Heart attack   • Dementia Sister    • Stroke Brother         heart Attack   • Cancer Brother         Skin   • Diabetes Brother    • Kidney Disease Brother    • Cancer Brother    • Parkinson's Disease Sister    • No Known Problems Sister    • Diabetes Daughter    • Hypertension Daughter          Medications:   Current Outpatient Medications   Medication   • morphine ER (MS CONTIN) 15 MG Tab CR tablet    • Empagliflozin (JARDIANCE) 25 MG Tab   • DULoxetine HCl 30 MG Capsule Delayed Release Sprinkle   • buPROPion (WELLBUTRIN XL) 150 MG XL tablet   • Brexpiprazole (REXULTI) 1 MG Tab   • hydrOXYzine HCl (ATARAX) 25 MG Tab   • clonazePAM (KLONOPIN) 1 MG Tab   • HYDROcodone-acetaminophen (NORCO) 5-325 MG Tab per tablet   • cyclobenzaprine (FLEXERIL) 10 MG Tab   • lisinopril (PRINIVIL) 10 MG Tab   • simvastatin (ZOCOR) 40 MG Tab   • metformin (GLUCOPHAGE) 1000 MG tablet   • gabapentin (NEURONTIN) 600 MG tablet   • omeprazole (PRILOSEC) 40 MG delayed-release capsule   • levothyroxine (SYNTHROID) 50 MCG Tab   • metoprolol (LOPRESSOR) 25 MG Tab   • vitamin D, Ergocalciferol, (DRISDOL) 1.25 MG (74047 UT) Cap capsule   • diclofenac EC (VOLTAREN) 75 MG Tablet Delayed Response   • estradiol (ESTRACE) 1 MG Tab   • DULoxetine (CYMBALTA) 30 MG Cap DR Particles     No current facility-administered medications for this visit.        Allergies:   No Known Allergies    Social Hx:   Social History     Socioeconomic History   • Marital status:      Spouse name: Not on file   • Number of children: Not on file   • Years of education: Not on file   • Highest education level: Not on file   Occupational History   • Not on file   Social Needs   • Financial resource strain: Not on file   • Food insecurity     Worry: Not on file     Inability: Not on file   • Transportation needs     Medical: Not on file     Non-medical: Not on file   Tobacco Use   • Smoking status: Light Tobacco Smoker     Packs/day: 0.25     Types: Cigarettes   • Smokeless tobacco: Never Used   Substance and Sexual Activity   • Alcohol use: Not Currently     Comment: rare   • Drug use: Never   • Sexual activity: Not Currently   Lifestyle   • Physical activity     Days per week: Not on file     Minutes per session: Not on file   • Stress: Not on file   Relationships   • Social connections     Talks on phone: Not on file     Gets together: Not on file     Attends  "[employer]ish service: Not on file     Active member of club or organization: Not on file     Attends meetings of clubs or organizations: Not on file     Relationship status: Not on file   • Intimate partner violence     Fear of current or ex partner: Not on file     Emotionally abused: Not on file     Physically abused: Not on file     Forced sexual activity: Not on file   Other Topics Concern   •  Service No   • Blood Transfusions Yes   • Caffeine Concern No   • Occupational Exposure No   • Hobby Hazards No   • Sleep Concern Yes   • Stress Concern Yes   • Weight Concern Yes   • Special Diet No   • Back Care No   • Exercise Yes   • Bike Helmet No   • Seat Belt Yes   • Self-Exams Yes   Social History Narrative   • Not on file         EXAMINATION     Physical Exam:   Vitals: /70 (BP Location: Left arm, Patient Position: Sitting, BP Cuff Size: Adult)   Pulse 63   Temp 36 °C (96.8 °F) (Temporal)   Ht 1.6 m (5' 3\")   Wt 81.5 kg (179 lb 10.8 oz)   SpO2 95%     Constitutional:   Body Habitus: Body mass index is 31.83 kg/m².  Cooperation: Fully cooperates with exam  Appearance: Well-groomed, well-nourished, not disheveled, no acute distress    Eyes: No scleral icterus, no proptosis     ENT -no obvious auditory deficits, wearing a facial mask    Skin -no rashes or lesions noted     Respiratory-  breathing comfortable on room air, no audible wheezing    Cardiovascular- capillary refills less than 2 seconds. no lower extremity edema is noted.     Psychiatric- alert and oriented ×3. Normal affect.     Gait - normal gait, no use of ambulatory device, mildly antalgic.     Musculoskeletal -   Thoracic/Lumbar Spine/Sacral Spine/Hips   Inspection: no evidence of atrophy in bilateral lower extremities throughout     Pain with lumbar flexion, but greater pain with extension/rotation bilaterally    Palpation:   No tenderness to palpation in midline at T1-T12 levels. No tenderness to palpation in the left and right of " the midline T1-L3, POSITIVE for tenderness to palpation to the para-midline region in the lower lumbar levels.      Neuro     Key points for the international standards for neurological classification of spinal cord injury (ISNCSCI) to light touch.     Dermatome R L   L2 2 2   L3 2 2   L4 2 2   L5 2 2   S1 2 2   S2 2 2       Motor Exam Lower Extremities    ? Myotome R L   Hip flexion L2 5 5   Knee extension L3 5 5   Ankle dorsiflexion L4 5 5   Toe extension L5 5 4   Ankle plantarflexion S1 5 5       Reflexes  ?  R L   Patella  2+ 2+   Achilles   2+ 1+       MEDICAL DECISION MAKING    Medical records review: see under HPI section.     DATA    Labs:   04/24/2020 UDS positive for morphine and metabolites, clonazepam  04/09/2020 Vitamin D, 25:  28L  04/09/2020 Vitamin B12: 217    Lab Results   Component Value Date/Time    SODIUM 135 04/09/2020 09:26 AM    POTASSIUM 4.7 04/09/2020 09:26 AM    CHLORIDE 100 04/09/2020 09:26 AM    CO2 21 04/09/2020 09:26 AM    ANION 14.0 04/09/2020 09:26 AM    GLUCOSE 140 (H) 04/09/2020 09:26 AM    BUN 19 04/09/2020 09:26 AM    CREATININE 0.71 04/09/2020 09:26 AM    CALCIUM 9.8 04/09/2020 09:26 AM    ASTSGOT 10 (L) 04/09/2020 09:26 AM    ALTSGPT 7 04/09/2020 09:26 AM    TBILIRUBIN 0.3 04/09/2020 09:26 AM    ALBUMIN 4.1 04/09/2020 09:26 AM    TOTPROTEIN 7.3 04/09/2020 09:26 AM    GLOBULIN 3.2 04/09/2020 09:26 AM    AGRATIO 1.3 04/09/2020 09:26 AM       No results found for: PROTHROMBTM, INR     Lab Results   Component Value Date/Time    WBC 7.6 04/09/2020 09:26 AM    RBC 4.25 04/09/2020 09:26 AM    HEMOGLOBIN 13.3 04/09/2020 09:26 AM    HEMATOCRIT 40.7 04/09/2020 09:26 AM    MCV 95.8 04/09/2020 09:26 AM    MCH 31.3 04/09/2020 09:26 AM    MCHC 32.7 (L) 04/09/2020 09:26 AM    MPV 11.1 04/09/2020 09:26 AM    NEUTSPOLYS 52.80 04/09/2020 09:26 AM    LYMPHOCYTES 34.80 04/09/2020 09:26 AM    MONOCYTES 10.10 04/09/2020 09:26 AM    EOSINOPHILS 1.40 04/09/2020 09:26 AM    BASOPHILS 0.40 04/09/2020  09:26 AM        Lab Results   Component Value Date/Time    HBA1C 6.8 (H) 04/09/2020 09:26 AM        Imaging: I personally reviewed following images, these are my reads:   MRI lumbar spine 05/05/2020  At L1-2, no central or foraminal stenosis  At L2-3, no central or foraminal stenosis  At L3-4, mild disc bulge and mild ligamentum flavum thickening.  No central canal stenosis. Borderline left foraminal stenosis. Schmorl's node.   At L4-5, diffuse disc bulge with mild ligamentum flavum thickening.  No central canal stenosis.  There is facet arthropathy, left greater than right. Mild foraminal stenosis bilaterally. S/P left L4/5 hemilaminectomy  At L5-S1, there is mild-borderline foraminal stenosis with facet arthropathy and mild disc bulge.  No central canal stenosis    Xray lumbar spine 05/05/2020  There is mild decreased joint space at L3-4 and L4-5.    Facet arthropathy is noted, greatest at L5-S1 bilaterally.  No significant motion on flexion and extension films    IMAGING radiology reads. I reviewed the following radiology reads  MRI lumbar spine 05/05/2020  IMPRESSION:     1.  Caudal lumbar facet arthropathy left worse than right with no bony destruction to indicate septic or inflammatory arthropathy. This most likely is just degenerative     2.  Mild caudal lumbar foraminal stenoses     3.  No significant central stenosis.     4.  Left L4/5 hemilaminectomy                                                                                   Xray lumbar spine 05/05/2020     IMPRESSION:     1.  No acute lumbar compression fracture or subluxation.     2.  Posterior disc space narrowing at L4-5 and to lesser extent at L3-4.     3.  Bilateral facet arthropathy at L5-S1.                                                                                             Diagnosis   Visit Diagnoses     ICD-10-CM   1. S/P lumbar laminectomy  Z98.890   2. DDD (degenerative disc disease), lumbar  M51.36   3. Lumbar spondylosis   M47.816   4. Recurrent major depressive disorder, in partial remission (HCC)  F33.41   5. Anxiety  F41.9   6. Vitamin D deficiency  E55.9         ASSESSMENT:  Grisel Mark 65 y.o. female seen for above     Grisel was seen today for follow-up.    Diagnoses and all orders for this visit:    S/P lumbar laminectomy  -     morphine ER (MS CONTIN) 15 MG Tab CR tablet; Take 1 Tab by mouth every 12 hours for 30 days.  -     Consent for Opiate Prescription  -     Controlled Substance Treatment Agreement    DDD (degenerative disc disease), lumbar  -     morphine ER (MS CONTIN) 15 MG Tab CR tablet; Take 1 Tab by mouth every 12 hours for 30 days.  -     Consent for Opiate Prescription  -     Controlled Substance Treatment Agreement    Lumbar spondylosis    Recurrent major depressive disorder, in partial remission (HCC)  -     Patient has been identified as having a positive depression screening. Appropriate orders and counseling have been given.    Anxiety    Vitamin D deficiency       1. Discussed trial of left lumbar three through lumbar five medial branch blocks.  If the diagnostic blocks are successful, will consider radiofrequency ablation. The risks benefits and alternatives to this procedure were discussed and the patient wishes to proceed with the procedure. Risks include but are not limited to damage to surrounding structures, infection, bleeding, worsening of pain which can be permanent, weakness which can be permanent. Benefits include pain relief, improved function. Alternatives includes not doing the procedure.   We can consider other treatment options, including epidural or transforaminal injections, depending on results of above treatment.  Order was placed at last visit, will schedule as soon as possible.  2. Continue to follow-up with Behavioral health.  Discussed medication changes.  She will continue with these changes, she is titrating duloxetine to 90mg daily.  Weaning klonipin.  3. Continue vitamin D3  4.  Plan to trial MS Contin 15mg po q12h.  Discussed that she take this twice a day.  Okay to continue cyclobenzaprine at bedtime and discussed that she might need to increase this as she decreases klonipin.  She will consider this.  Risks of chronic opioids discussed and we have previously discussed goal of decreasing or discontinuing klonipin as she is able.        Follow-up: Return in about 4 weeks (around 6/17/2020) for OV and for Hospital injection, when able to schedule.    Thank you very much for asking me to participate in Grisel Mark's care.  Please contact me with any questions or concerns.    Please note that this dictation was created using voice recognition software. I have made every reasonable attempt to correct obvious errors but there may be errors of grammar and content that I may have overlooked prior to finalization of this note.      Paresh Ogden MD  Physical Medicine and Rehabilitation  Interventional Spine and Sports Physiatry  [facility]

## 2020-05-21 DIAGNOSIS — E66.9 DIABETES MELLITUS TYPE 2 IN OBESE: ICD-10-CM

## 2020-05-21 DIAGNOSIS — E11.69 DIABETES MELLITUS TYPE 2 IN OBESE: ICD-10-CM

## 2020-05-28 ENCOUNTER — TELEPHONE (OUTPATIENT)
Dept: BEHAVIORAL HEALTH | Facility: CLINIC | Age: 65
End: 2020-05-28

## 2020-05-28 ENCOUNTER — HOSPITAL ENCOUNTER (OUTPATIENT)
Dept: RADIOLOGY | Facility: MEDICAL CENTER | Age: 65
End: 2020-05-28
Attending: NURSE PRACTITIONER
Payer: MEDICARE

## 2020-05-28 DIAGNOSIS — Z12.31 ENCOUNTER FOR SCREENING MAMMOGRAM FOR MALIGNANT NEOPLASM OF BREAST: ICD-10-CM

## 2020-05-28 PROCEDURE — 77067 SCR MAMMO BI INCL CAD: CPT

## 2020-05-28 RX ORDER — LEVOTHYROXINE SODIUM 0.05 MG/1
50 TABLET ORAL
Qty: 90 TAB | Refills: 3 | Status: SHIPPED | OUTPATIENT
Start: 2020-05-28 | End: 2021-03-03 | Stop reason: SDUPTHER

## 2020-05-28 NOTE — TELEPHONE ENCOUNTER
1. Caller Name: Grisel Davidson                          Call Back Number: [phone]        How would the patient prefer to be contacted with a response: Phone call OK to leave a detailed message    patient called today because she had questions about her medication. Please call pt back.

## 2020-05-28 NOTE — TELEPHONE ENCOUNTER
Patient was last seen by PCP 3/20. Last TSH read 3.57 (4/20). Will refill for 12 months. 6 months sent on BP med.     Lab Results   Component Value Date/Time    SODIUM 135 04/09/2020 09:26 AM    POTASSIUM 4.7 04/09/2020 09:26 AM    CHLORIDE 100 04/09/2020 09:26 AM    CO2 21 04/09/2020 09:26 AM    GLUCOSE 140 (H) 04/09/2020 09:26 AM    BUN 19 04/09/2020 09:26 AM    CREATININE 0.71 04/09/2020 09:26 AM

## 2020-05-29 ENCOUNTER — TELEPHONE (OUTPATIENT)
Dept: BEHAVIORAL HEALTH | Facility: CLINIC | Age: 65
End: 2020-05-29

## 2020-05-29 NOTE — TELEPHONE ENCOUNTER
1. Caller Name: Grisel Davidson                          Call Back Number: [phone]        How would the patient prefer to be contacted with a response: Phone call OK to leave a detailed message    patient would like a call back because she is feeling really tired and with no motivation to do anything. Please advise

## 2020-06-02 NOTE — NON-PROVIDER
PAT note: PAT call made 02/06/2020 at 1222. Spoke with Grisel. Health history, allergies, medications and pre-procedure/sedation instructions reviewed with patient. Pre-procedure respiratory screening completed. Pt denies being sick/symptomatic(fever, cough, shortness of breath)  within 72 hours or having been in close contact with symptomatic individuals in the past 2 weeks.Pt was informed to contact his physician should he or any close personal contact become symptomatic prior to the procedure. Pt was told to bring a mask as he would be wearing it during the entire stay. It was recommended that the pt self isolate until the procedure and that his  would have to remain on site in vehicle til pt is d/milind. Pt verbalized understanding of instructions.Pt took ASA 81 mg this AM. Advised pt to call  regarding this. Pt. verbalized understanding

## 2020-06-03 ENCOUNTER — HOSPITAL ENCOUNTER (OUTPATIENT)
Dept: RADIOLOGY | Facility: REHABILITATION | Age: 65
End: 2020-06-03
Attending: PHYSICAL MEDICINE & REHABILITATION
Payer: MEDICARE

## 2020-06-03 ENCOUNTER — HOSPITAL ENCOUNTER (OUTPATIENT)
Dept: PAIN MANAGEMENT | Facility: REHABILITATION | Age: 65
End: 2020-06-03
Attending: PHYSICAL MEDICINE & REHABILITATION
Payer: MEDICARE

## 2020-06-03 VITALS
HEIGHT: 63 IN | HEART RATE: 74 BPM | TEMPERATURE: 96.2 F | BODY MASS INDEX: 31.6 KG/M2 | WEIGHT: 178.35 LBS | RESPIRATION RATE: 17 BRPM | DIASTOLIC BLOOD PRESSURE: 65 MMHG | OXYGEN SATURATION: 94 % | SYSTOLIC BLOOD PRESSURE: 105 MMHG

## 2020-06-03 PROCEDURE — 64494 INJ PARAVERT F JNT L/S 2 LEV: CPT

## 2020-06-03 PROCEDURE — 64495 INJ PARAVERT F JNT L/S 3 LEV: CPT

## 2020-06-03 PROCEDURE — 700101 HCHG RX REV CODE 250

## 2020-06-03 PROCEDURE — 700117 HCHG RX CONTRAST REV CODE 255

## 2020-06-03 PROCEDURE — 64493 INJ PARAVERT F JNT L/S 1 LEV: CPT

## 2020-06-03 RX ORDER — LIDOCAINE HYDROCHLORIDE 20 MG/ML
INJECTION, SOLUTION EPIDURAL; INFILTRATION; INTRACAUDAL; PERINEURAL
Status: COMPLETED
Start: 2020-06-03 | End: 2020-06-03

## 2020-06-03 RX ORDER — BUPIVACAINE HYDROCHLORIDE 5 MG/ML
INJECTION, SOLUTION EPIDURAL; INTRACAUDAL
Status: COMPLETED
Start: 2020-06-03 | End: 2020-06-03

## 2020-06-03 RX ORDER — DEXAMETHASONE SODIUM PHOSPHATE 10 MG/ML
INJECTION, SOLUTION INTRAMUSCULAR; INTRAVENOUS
Status: COMPLETED
Start: 2020-06-03 | End: 2020-06-03

## 2020-06-03 RX ADMIN — LIDOCAINE HYDROCHLORIDE 5 ML: 20 INJECTION, SOLUTION EPIDURAL; INFILTRATION; INTRACAUDAL; PERINEURAL at 09:06

## 2020-06-03 RX ADMIN — IOHEXOL 5 ML: 240 INJECTION, SOLUTION INTRATHECAL; INTRAVASCULAR; INTRAVENOUS; ORAL at 09:05

## 2020-06-03 ASSESSMENT — FIBROSIS 4 INDEX: FIB4 SCORE: .934132727969572995

## 2020-06-03 NOTE — NON-PROVIDER
Accompanied  by RN  from procedure room ambulatory  .Fluids and food tolerated well. Ice compress applied to procedure site. Reviewed  discharged home care  and pain diary  instruction given and understood by patient. Post procedure evaluated  by Dr. Ogden . Discharged ambulatory  without difficulty or any deficits with  designated .

## 2020-06-03 NOTE — NON-PROVIDER
Verified patient name.Confirmed procedure, site and  consent signed. H&P updated. Reviewed medication allergies and current medication in [facility]. Printed home care discharged, SWATH[facility] education sheet information  protocol , pre and post including infection  prevention verbal instruction given to patient and verbalized understanding. Has  Busty / daughter.Pertinent medical health  information reviewed with her  in epic  ( PTSD, type 2 DM, A. Fib.using CPAP machine but not diagnosed with sleep apnea ) .  Significant information ( blood sugar taken at 0700 AM today was 161 mg. / dl,  Stop bang score 4 , had Norco and Lorazepam today).  Patient denied taking blood thinner and anti-inflammatory medication. Hand off given to GASTON Jamison RN. and  Dr. Ogden .

## 2020-06-03 NOTE — PROCEDURES
Physician/s:Paresh Ogden MD     Pre-operative Diagnosis: Lumbar spondylosis(M47.816), Lumbar Degenerative Disc Disease (M51.36)     Post-operative Diagnosis: Lumbar spondylosis(M47.816), Lumbar Degenerative Disc Disease (M51.36)     Procedure: Left lumbar three medial branch block                     Left lumbar four medial branch block                     Left lumbar five dorsal ramus block    Description of procedure:     The risks, benefits, and alternatives of the procedure were reviewed and discussed with the patient.  Written informed consent was freely obtained. A pre-procedural time-out was conducted by the physician verifying patient’s identity, procedure to be performed, procedure site and side, and allergy verification. Appropriate equipment was determined to be in place for the procedure.      In the fluoroscopy suite the patient was placed in a prone position and the skin was prepped and draped in the usual sterile fashion. The fluoroscope was placed over the lower back at the appropriate angles, and the targets for injection were marked. A 25g 3.5 inch needle was placed into each of the markings at the three levels, and approx 1cc of 1% Lidocaine was injected subcutaneously into the epidermal and dermal layers. The needle was removed. A 25g needle was then placed at the intersection of the transverse process and superior articular process at the left lumbar three medial branch, left lumbar four medial branch and left lumbar five dorsal ramus levels.  The needle tips were then verified by AP and lateral views. In the AP view, less than 1 cc of contrast dye was used to highlight the medial branch while the fluoroscope was running live. Following negative aspiration, approx 1.0 cc containing 2% lidocaine without epinephrine was then injected at the above levels, and the needles were removed intact after restyleted. The patient's back was covered with a 4x4 gauze, the area was cleansed with sterile normal  saline, and a dressing was applied.      There were no complications noted, the patient was hemodynamically stable, and tolerated the procedure well.      She noted improvement in her low back pain after the procedure from 7/10 to 0/10, she will bring in information about how she feels today at follow-up.     Paresh Ogden MD  Physical Medicine and Rehabilitation  Interventional Spine and Sports Physiatry  [facility]

## 2020-06-03 NOTE — NON-PROVIDER
Preprocedure assessment completed.  Pertinent health informatio type 2 diabetic, Afib hx. communicated to physician and staff during time out.  Patient positioned preprocedure by RN, CST, and XRAY.  Pillow under knees for support.

## 2020-06-04 DIAGNOSIS — E11.69 DIABETES MELLITUS TYPE 2 IN OBESE: ICD-10-CM

## 2020-06-04 DIAGNOSIS — E66.9 DIABETES MELLITUS TYPE 2 IN OBESE: ICD-10-CM

## 2020-06-08 DIAGNOSIS — G47.33 OBSTRUCTIVE SLEEP APNEA: ICD-10-CM

## 2020-06-09 NOTE — PROGRESS NOTES
Patient reports 15 years of CPAP use for sleep apnea.  [facility]  Placed.

## 2020-06-16 DIAGNOSIS — F41.1 GENERALIZED ANXIETY DISORDER: ICD-10-CM

## 2020-06-16 DIAGNOSIS — F33.1 DEPRESSION, MAJOR, RECURRENT, MODERATE (HCC): ICD-10-CM

## 2020-06-16 DIAGNOSIS — F43.10 POSTTRAUMATIC STRESS DISORDER, DELAYED ONSET: ICD-10-CM

## 2020-06-19 ENCOUNTER — OFFICE VISIT (OUTPATIENT)
Dept: PHYSICAL MEDICINE AND REHAB | Facility: MEDICAL CENTER | Age: 65
End: 2020-06-19
Payer: MEDICARE

## 2020-06-19 VITALS
SYSTOLIC BLOOD PRESSURE: 128 MMHG | HEART RATE: 77 BPM | WEIGHT: 178.57 LBS | DIASTOLIC BLOOD PRESSURE: 68 MMHG | BODY MASS INDEX: 31.64 KG/M2 | HEIGHT: 63 IN | TEMPERATURE: 97.3 F | OXYGEN SATURATION: 94 %

## 2020-06-19 DIAGNOSIS — E55.9 VITAMIN D DEFICIENCY: ICD-10-CM

## 2020-06-19 DIAGNOSIS — Z98.890 S/P LUMBAR LAMINECTOMY: ICD-10-CM

## 2020-06-19 DIAGNOSIS — M47.816 LUMBAR SPONDYLOSIS: ICD-10-CM

## 2020-06-19 DIAGNOSIS — M51.36 DDD (DEGENERATIVE DISC DISEASE), LUMBAR: ICD-10-CM

## 2020-06-19 DIAGNOSIS — F41.9 ANXIETY: ICD-10-CM

## 2020-06-19 DIAGNOSIS — F33.41 RECURRENT MAJOR DEPRESSIVE DISORDER, IN PARTIAL REMISSION (HCC): ICD-10-CM

## 2020-06-19 PROCEDURE — 99215 OFFICE O/P EST HI 40 MIN: CPT | Performed by: PHYSICAL MEDICINE & REHABILITATION

## 2020-06-19 RX ORDER — CLONAZEPAM 1 MG/1
1 TABLET ORAL DAILY
COMMUNITY
End: 2020-07-14 | Stop reason: SDUPTHER

## 2020-06-19 RX ORDER — MORPHINE SULFATE 15 MG/1
15 TABLET, FILM COATED, EXTENDED RELEASE ORAL EVERY 12 HOURS
Qty: 60 TAB | Refills: 0 | Status: SHIPPED | OUTPATIENT
Start: 2020-06-20 | End: 2020-07-17 | Stop reason: SDUPTHER

## 2020-06-19 RX ORDER — DULOXETIN HYDROCHLORIDE 30 MG/1
90 CAPSULE, DELAYED RELEASE ORAL DAILY
Qty: 90 CAP | Refills: 2 | Status: SHIPPED | OUTPATIENT
Start: 2020-06-19 | End: 2020-07-19

## 2020-06-19 RX ORDER — BUPROPION HYDROCHLORIDE 150 MG/1
150 TABLET ORAL EVERY MORNING
COMMUNITY
End: 2020-07-14

## 2020-06-19 ASSESSMENT — PATIENT HEALTH QUESTIONNAIRE - PHQ9
CLINICAL INTERPRETATION OF PHQ2 SCORE: 4
5. POOR APPETITE OR OVEREATING: 1 - SEVERAL DAYS
SUM OF ALL RESPONSES TO PHQ QUESTIONS 1-9: 9

## 2020-06-19 ASSESSMENT — PAIN SCALES - GENERAL: PAINLEVEL: 8=MODERATE-SEVERE PAIN

## 2020-06-19 ASSESSMENT — FIBROSIS 4 INDEX: FIB4 SCORE: .934132727969572995

## 2020-06-19 NOTE — PROGRESS NOTES
"Follow-up patient note    Physiatry (physical medicine and  Rehabilitation), interventional spine and sports medicine    Date of Service: 06/19/2020    Chief complaint:   Chief Complaint   Patient presents with   • Follow-Up     Back pain       HISTORY    HPI: Grisel Mark 65 y.o. female who presents today for follow-up evaluation of low back and leg pain.     She returns after left lumbar three through lumbar five medial branch blocks on 06/03/2020.  She reports that she felt \"fantastic\" the day of the procedure with more than 50% reduction of her pain from 7 to 3/10 on the NRS.  This gradually returned to baseline.  She feels like she was \"feeling no pain\" the day of the procedure and this took a few days before the pain and spasms returned.    Since the last visit, she reports that she was taken off of her benzodiazepines \"cold turkey\" and reports that she was \"totally out of it\" taking hydroxyzine 25mg po tid.  This did not help her anxiety and she is now back to taking clonazepam 1mg once a day.  She has been on this since around 1995 and is fine to continue with gradual wean.  She has had other changes to her psych meds, but is not sure of the dose of the wellbutrin.  Continue duloxetine 90mg daily.    She does have some radicular symptoms into the left leg.  This has been starting to worsen again.  No falls.    For constipation, she takes metamucil, she thinks.  She is managing to have daily.  MS Contin 15mg po q12h.  This seems to be managing her pain.  When pain worsens, she takes a break.    She reports that her pain is a 8/10 on the NRS.    Her pain has been managed with chronic opioids for more than a decade.  Additionally, she takes gabapentin 600mg po tid.  This does seem to help somewhat.  She is rarely taking flexeril, sometimes at bedtime.    She has been taking vitamin D.      Medical records review:  Dr. Francisco Olmos, plan to increase duloxetine 90mg daily, discontinue buspirone 20mg po " bid, start buproprion XL 150mg daily, continue brexipiprazole 1mg qhs, hydroxyzine 25mg po tid prn, clonazepam 0.5mg daily prn    Review of records   Dr. Dheeraj De La Rosa:  08/20/2019 Right L3-4, L4-5, L5-S1 radiofrequency ablation      I reviewed the note from the referring provider Peter Bruce * dated 02/05/2020: She was seen to [facility] care, having just moved from [place].  She was seen for essential hypertension, mixed hyperlipidemia, DM2 in obese, hypothyroidism, recurrent MDD in partial depression/anxiety, GERD without esophagitis, chronic bilateral low back pain with bilateral sciatica.  Medications associated with the above were written, related labs ordered including CBC, CMP, Hb A1c, lipids, microalbumin, TSH, free thryoxine, referral to ps\ychiatry, referral to GI for colonoscopy and referral to pain clinic.    Previous treatments:    Physical Therapy: Yes    Medications the patient is tried: Narcotics, gabapentin and muscle relaxers    Previous interventions[facility] these included right lumbar radiofrequency procedures    Previous surgeries to relieve the above pain:  Surgery on October 28, 1998      ROS: Unchanged from 05/20/2020 except as noted in the HPI   CV: irregular heart, reports history of tachycardia  Psych: depression since 1995  Heme: anemia  Endo: hypothyroidism, diabetes    Red Flags ROS:   Fever, Chills, Sweats: Denies  Involuntary Weight Loss: Denies  Bladder Incontinence: Denies  Bowel Incontinence: Denies  Saddle Anesthesia: Denies    All other systems reviewed and negative.       PMHx:   Past Medical History:   Diagnosis Date   • Anxiety    • Chronic back pain    • Constipation    • Depression    • Diabetes (HCC)    • GERD (gastroesophageal reflux disease)    • Heart murmur     tachycardia   • Hyperlipidemia    • Hypertension    • Thyroid disease        PSHx:   Past Surgical History:   Procedure Laterality Date   • ABDOMINAL HYSTERECTOMY  TOTAL     • CARPAL TUNNEL RELEASE     • CHOLECYSTECTOMY     • LAMINOTOMY         Family history   Family History   Problem Relation Age of Onset   • Cancer Mother    • Stroke Father         Heart attack   • Dementia Sister    • Stroke Brother         heart Attack   • Cancer Brother         Skin   • Diabetes Brother    • Kidney Disease Brother    • Cancer Brother    • Parkinson's Disease Sister    • No Known Problems Sister    • Diabetes Daughter    • Hypertension Daughter          Medications:   Current Outpatient Medications   Medication   • buPROPion (WELLBUTRIN XL) 150 MG XL tablet   • clonazePAM (KLONOPIN) 1 MG Tab   • [START ON 6/20/2020] morphine ER (MS CONTIN) 15 MG Tab CR tablet   • levothyroxine (SYNTHROID) 50 MCG Tab   • metoprolol (LOPRESSOR) 25 MG Tab   • estradiol (ESTRACE) 1 MG Tab   • Empagliflozin (JARDIANCE) 25 MG Tab   • cyclobenzaprine (FLEXERIL) 10 MG Tab   • lisinopril (PRINIVIL) 10 MG Tab   • simvastatin (ZOCOR) 40 MG Tab   • metformin (GLUCOPHAGE) 1000 MG tablet   • gabapentin (NEURONTIN) 600 MG tablet   • omeprazole (PRILOSEC) 40 MG delayed-release capsule   • vitamin D, Ergocalciferol, (DRISDOL) 1.25 MG (19801 UT) Cap capsule   • Blood Glucose Meter Kit   • DULoxetine (CYMBALTA) 30 MG Cap DR Particles   • diclofenac EC (VOLTAREN) 75 MG Tablet Delayed Response     No current facility-administered medications for this visit.        Allergies:   No Known Allergies    Social Hx:   Social History     Socioeconomic History   • Marital status:      Spouse name: Not on file   • Number of children: Not on file   • Years of education: Not on file   • Highest education level: Not on file   Occupational History   • Not on file   Social Needs   • Financial resource strain: Not on file   • Food insecurity     Worry: Not on file     Inability: Not on file   • Transportation needs     Medical: Not on file     Non-medical: Not on file   Tobacco Use   • Smoking status: Light Tobacco Smoker      "Packs/day: 0.25     Types: Cigarettes   • Smokeless tobacco: Never Used   Substance and Sexual Activity   • Alcohol use: Not Currently     Comment: rare   • Drug use: Never   • Sexual activity: Not Currently   Lifestyle   • Physical activity     Days per week: Not on file     Minutes per session: Not on file   • Stress: Not on file   Relationships   • Social connections     Talks on phone: Not on file     Gets together: Not on file     Attends Congregation service: Not on file     Active member of club or organization: Not on file     Attends meetings of clubs or organizations: Not on file     Relationship status: Not on file   • Intimate partner violence     Fear of current or ex partner: Not on file     Emotionally abused: Not on file     Physically abused: Not on file     Forced sexual activity: Not on file   Other Topics Concern   •  Service No   • Blood Transfusions Yes   • Caffeine Concern No   • Occupational Exposure No   • Hobby Hazards No   • Sleep Concern Yes   • Stress Concern Yes   • Weight Concern Yes   • Special Diet No   • Back Care No   • Exercise Yes   • Bike Helmet No   • Seat Belt Yes   • Self-Exams Yes   Social History Narrative   • Not on file         EXAMINATION     Physical Exam:   Vitals: /68 (BP Location: Left arm, Patient Position: Sitting, BP Cuff Size: Large adult long)   Pulse 77   Temp 36.3 °C (97.3 °F) (Temporal)   Ht 1.6 m (5' 3\")   Wt 81 kg (178 lb 9.2 oz)   SpO2 94%     Constitutional:   Body Habitus: Body mass index is 31.63 kg/m².  Cooperation: Fully cooperates with exam  Appearance: Well-groomed, well-nourished, not disheveled, no acute distress    Eyes: No scleral icterus, no proptosis     ENT -no obvious auditory deficits, wearing a facial mask    Skin -no rashes or lesions noted     Respiratory-  breathing comfortable on room air, no audible wheezing    Cardiovascular- no lower extremity edema is noted.     Psychiatric- alert and oriented ×3. Normal affect. "     Gait - normal gait, no use of ambulatory device, mildly antalgic.     Musculoskeletal -   Thoracic/Lumbar Spine/Sacral Spine/Hips   Inspection: no evidence of atrophy in bilateral lower extremities throughout     Pain with lumbar flexion, but greater pain with extension/rotation bilaterally    Palpation:   No tenderness to palpation in midline at T1-T12 levels. No tenderness to palpation in the left and right of the midline T1-L3, POSITIVE for tenderness to palpation to the para-midline region in the lower lumbar levels.      Neuro     Key points for the international standards for neurological classification of spinal cord injury (ISNCSCI) to light touch.     Dermatome R L   L2 2 2   L3 2 2   L4 2 2   L5 2 1   S1 2 2   S2 2 2       Motor Exam Lower Extremities    ? Myotome R L   Hip flexion L2 5 5   Knee extension L3 5 5   Ankle dorsiflexion L4 5 5   Toe extension L5 5 4   Ankle plantarflexion S1 5 5       Reflexes  ?  R L   Patella  2+ 2+   Achilles   2+ 1+       MEDICAL DECISION MAKING    Medical records review: see under HPI section.     DATA    Labs:   04/24/2020 UDS positive for morphine and metabolites, clonazepam  04/09/2020 Vitamin D, 25:  28L  04/09/2020 Vitamin B12: 217    Lab Results   Component Value Date/Time    SODIUM 135 04/09/2020 09:26 AM    POTASSIUM 4.7 04/09/2020 09:26 AM    CHLORIDE 100 04/09/2020 09:26 AM    CO2 21 04/09/2020 09:26 AM    ANION 14.0 04/09/2020 09:26 AM    GLUCOSE 140 (H) 04/09/2020 09:26 AM    BUN 19 04/09/2020 09:26 AM    CREATININE 0.71 04/09/2020 09:26 AM    CALCIUM 9.8 04/09/2020 09:26 AM    ASTSGOT 10 (L) 04/09/2020 09:26 AM    ALTSGPT 7 04/09/2020 09:26 AM    TBILIRUBIN 0.3 04/09/2020 09:26 AM    ALBUMIN 4.1 04/09/2020 09:26 AM    TOTPROTEIN 7.3 04/09/2020 09:26 AM    GLOBULIN 3.2 04/09/2020 09:26 AM    AGRATIO 1.3 04/09/2020 09:26 AM       No results found for: PROTHROMBTM, INR     Lab Results   Component Value Date/Time    WBC 7.6 04/09/2020 09:26 AM    RBC 4.25  04/09/2020 09:26 AM    HEMOGLOBIN 13.3 04/09/2020 09:26 AM    HEMATOCRIT 40.7 04/09/2020 09:26 AM    MCV 95.8 04/09/2020 09:26 AM    MCH 31.3 04/09/2020 09:26 AM    MCHC 32.7 (L) 04/09/2020 09:26 AM    MPV 11.1 04/09/2020 09:26 AM    NEUTSPOLYS 52.80 04/09/2020 09:26 AM    LYMPHOCYTES 34.80 04/09/2020 09:26 AM    MONOCYTES 10.10 04/09/2020 09:26 AM    EOSINOPHILS 1.40 04/09/2020 09:26 AM    BASOPHILS 0.40 04/09/2020 09:26 AM        Lab Results   Component Value Date/Time    HBA1C 6.8 (H) 04/09/2020 09:26 AM        Imaging: I personally reviewed following images, these are my reads:   MRI lumbar spine 05/05/2020  At L1-2, no central or foraminal stenosis  At L2-3, no central or foraminal stenosis  At L3-4, mild disc bulge and mild ligamentum flavum thickening.  No central canal stenosis. Borderline left foraminal stenosis. Schmorl's node.   At L4-5, diffuse disc bulge with mild ligamentum flavum thickening.  No central canal stenosis.  There is facet arthropathy, left greater than right. Mild foraminal stenosis bilaterally. S/P left L4/5 hemilaminectomy  At L5-S1, there is mild-borderline foraminal stenosis with facet arthropathy and mild disc bulge.  No central canal stenosis    Xray lumbar spine 05/05/2020  There is mild decreased joint space at L3-4 and L4-5.    Facet arthropathy is noted, greatest at L5-S1 bilaterally.  No significant motion on flexion and extension films    IMAGING radiology reads. I reviewed the following radiology reads  MRI lumbar spine 05/05/2020  IMPRESSION:     1.  Caudal lumbar facet arthropathy left worse than right with no bony destruction to indicate septic or inflammatory arthropathy. This most likely is just degenerative     2.  Mild caudal lumbar foraminal stenoses     3.  No significant central stenosis.     4.  Left L4/5 hemilaminectomy                                                                                   Xray lumbar spine 05/05/2020     IMPRESSION:     1.  No acute  lumbar compression fracture or subluxation.     2.  Posterior disc space narrowing at L4-5 and to lesser extent at L3-4.     3.  Bilateral facet arthropathy at L5-S1.                                                                                             Diagnosis   Visit Diagnoses     ICD-10-CM   1. S/P lumbar laminectomy  Z98.890   2. DDD (degenerative disc disease), lumbar  M51.36   3. Lumbar spondylosis  M47.816   4. Recurrent major depressive disorder, in partial remission (HCC)  F33.41   5. Anxiety  F41.9   6. Vitamin D deficiency  E55.9         ASSESSMENT:  Grisel Mark 65 y.o. female seen for above     Grisel was seen today for follow-up.    Diagnoses and all orders for this visit:    S/P lumbar laminectomy  -     morphine ER (MS CONTIN) 15 MG Tab CR tablet; Take 1 Tab by mouth every 12 hours for 30 days.  -     Consent for Opiate Prescription  -     Controlled Substance Treatment Agreement    DDD (degenerative disc disease), lumbar  -     REFERRAL TO PHYSIATRY (PMR)  -     morphine ER (MS CONTIN) 15 MG Tab CR tablet; Take 1 Tab by mouth every 12 hours for 30 days.  -     Consent for Opiate Prescription  -     Controlled Substance Treatment Agreement    Lumbar spondylosis  -     REFERRAL TO PHYSIATRY (PMR)    Recurrent major depressive disorder, in partial remission (HCC)    Anxiety    Vitamin D deficiency         1. Discussed successful trial of left lumbar three through lumbar five medial branch blocks.  If the diagnostic blocks are successful, will consider radiofrequency ablation. Plan for second diagnostic block.  The risks benefits and alternatives to this procedure were discussed and the patient wishes to proceed with the procedure. Risks include but are not limited to damage to surrounding structures, infection, bleeding, worsening of pain which can be permanent, weakness which can be permanent. Benefits include pain relief, improved function. Alternatives includes not doing the procedure.   We  can consider other treatment options, including epidural or transforaminal injections, depending on results of above treatment.  Order was placed at last visit, will schedule as soon as possible.  2. Discussed that we will consider left lumbar four and lumbar five TFESI in the future, if radicular symptoms persist.  Pain in the low back was significantly reduced with lumbar 3-5 MBB, will to work on this first.  She is in agreement.  3. Continue to follow-up with Behavioral health.  Discussed medication changes.  She will continue with these changes, she is titrating duloxetine to 90mg daily.  Weaning klonipin.  Discussed that she will follow-up with them for gradual wean of klonipin to see if this is more successful.  4. Continue vitamin D3  5.  and most recent UDS reviewed.  Plan to trial MS Contin 15mg po q12h.  Discussed that she take this twice a day.  Okay to continue cyclobenzaprine at bedtime as needed and discussed that she might need to increase this as she decreases klonipin.  She will consider this.  Risks of chronic opioids discussed and we have previously discussed goal of decreasing or discontinuing klonipin as she is able.  Discussed that this might take months.  Continue regular bowel movements with OTC medications as needed.    6. She will continue with Dr. Wadsworth, Psychiatry about this.      Follow-up: Return for Hospital injection.    Thank you very much for asking me to participate in Grisel Mark's care.  Please contact me with any questions or concerns.    Please note that this dictation was created using voice recognition software. I have made every reasonable attempt to correct obvious errors but there may be errors of grammar and content that I may have overlooked prior to finalization of this note.      Paresh Ogden MD  Physical Medicine and Rehabilitation  Interventional Spine and Sports Physiatry  [facility]

## 2020-06-19 NOTE — PATIENT INSTRUCTIONS
Your procedure will be at the [facility] special procedure suite.    [facility] [place] [address]       PRE-PROCEDURE INSTRUCTIONS  You may take your regular medications except:   · No Anti-inflammatories 5 days prior to your procedure. Anti-inflammatories include medicines such as  ibuprofen (Motrin, Advil), Excedrin, Naproxen (Aleve, Anaprox, Naprelan, Naprosyn), Celecoxib (Celebrex), Diclofenac (Voltaren-XR tab), and Meloxicam (Mobic).   · No Glucophage or Metformin 24 hours before your procedure. You may resume next day after your procedure.  · Call the physiatry office if you are taking or prescribed anti-biotics within five days of procedure.  · Please ask provider if you are taking any new diabetes medication.  · CONTINUE TAKING BLOOD PRESSURE MEDICATIONS AS PRESCRIBED.  · Pain medications will not be prescribed on the procedure day. Procedural pain medication may be used by your provider   · Call your doctor's office performing the procedure if you have a fever, chills, rash or new illness prior to your procedure    Anticoagulation/antiplatelet medications  No Blood thinning medications such as Coumadin or Plavix 5 days prior to procedure unless your doctor said to continue these medications. Call your doctor if a new medication is prescribed in this class.     Restrictions for eating before procedure:   · If you are getting procedural sedation, then do not eat to for 8 hours prior to procedure appointment time. Do not drink fluids for four hours prior to your procedure time.   · If you are not having procedural sedation, then Skip the meal prior to your procedure. If you have a morning procedure then skip breakfast. If you have an afternoon procedure then skip lunch.   · You may drink clear liquids up to 2 hours prior to your procedure  · You must have a  the day of procedure to accompany you home.      POST PROCEDURE INSTRUCTIONS   · No heavy lifting, strenuous bending or  strenuous exercise for 3 days after your procedure.  · No hot tubs, baths, swimming for 3 days after your procedure  · You can remove the bandage the day after the procedure.  · IF YOU RECEIVED A STEROID INJECTION. PLEASE NOTE THAT THERE MAY BE A DELAY FOR THE INJECTION TO START WORKING, THE DELAY MAY BE UP TO TWO WEEKS. IF YOU HAVE DIABETES, PLEASE NOTE THAT YOUR SUGAR LEVELS MAY BE ELEVATED FOR 1-2 DAYS AFTER A STEROID INJECTION.  THE STEROID MAY CAUSE TEMPORARY SYMPTOMS WHICH USUALLY RESOLVE ON THEIR OWN WITHIN 1 TO 2 DAYS INCLUDING FACIAL FLUSHING OR A FEELING OF WARMTH ON THE FACE, TEMPORARY INCREASES IN BLOOD SUGAR, INSOMNIA, INCREASED HUNGER  · IF YOU RECEIVED A DIAGNOSTIC PROCEDURE (SUCH AS A MEDIAL BRANCH BLOCK), PLEASE NOTE THAT WE DO EXPECT THIS INJECTION TO WEAR OFF.  IT IS IMPORTANT TO COMPLETE THE PAIN DIARY AND LIST THE PAIN SCORE ONLY FOR THE REGION WHERE THE PROCEDURE WAS AND BRING THIS TO YOUR FOLLOW UP VISIT.  · IF YOU RECEIVED A RADIOFREQUENCY PROCEDURE, THERE MAY BE SOME SORENESS AFTER THE PROCEDURE.  THIS IS NORMAL.  · IF YOU EXPERIENCE PROLONGED WEAKNESS LONGER THAN ONE DAY, BOWEL OR BLADDER INCONTINENCE THEN PLEASE CALL THE PHYSIATRY OFFICE.  · Your leg may feel heavy, weak and numb for up to 1-2 days. Be very careful walking.   ·  You may resume normal activities 3 days after procedure.

## 2020-07-10 ENCOUNTER — TELEPHONE (OUTPATIENT)
Dept: MEDICAL GROUP | Facility: PHYSICIAN GROUP | Age: 65
End: 2020-07-10

## 2020-07-10 NOTE — TELEPHONE ENCOUNTER
ESTABLISHED PATIENT PRE-VISIT PLANNING     Patient was contacted to complete PVP.    1.  Reviewed notes from the last few office visits within the medical group: Yes    2.  If any orders were placed at last visit or intended to be done for this visit (i.e. 6 mos follow-up), do we have Results/Consult Notes?        •  Labs - Labs ordered, NOT completed. Patient advised to complete prior to next appointment.       •  Imaging - Imaging ordered, completed and results are in chart.       •  Referrals - Referral ordered, patient was seen and consult notes are in chart. Care Teams updated  YES.    3. Is this appointment scheduled as a Hospital Follow-Up? No    4.  Immunizations were updated in [facility] using WebIZ?: Yes       •  Web Iz Recommendations: FLU, PNEUMOVAX (PPSV23), TD, VARICELLA (Chicken Pox)  and SHINGRIX (Shingles)    5.  Patient is due for the following Health Maintenance Topics:   Health Maintenance Due   Topic Date Due   • Annual Wellness Visit  1955   • DIABETES MONOFILAMENT / LE EXAM  1955   • RETINAL SCREENING  05/01/1973   • IMM HEP B VACCINE (1 of 3 - Risk 3-dose series) 05/01/1974   • COLONOSCOPY  05/01/2005   • BONE DENSITY  05/01/2020   • IMM PNEUMOCOCCAL VACCINE: 65+ Years (1 of 2 - PCV13) 05/01/2020   • IMM ZOSTER VACCINES (2 of 2) 05/20/2020       6. Orders for overdue Health Maintenance topics pended in Pre-Charting? NO    7.  AHA (MDX) form printed for Provider? No, already completed    8.  Patient was informed to arrive 15 min prior to their scheduled appointment and bring in their medication bottles.

## 2020-07-13 RX ORDER — GEMFIBROZIL 600 MG/1
600 TABLET, FILM COATED ORAL 2 TIMES DAILY
Qty: 60 TAB | Refills: 0 | OUTPATIENT
Start: 2020-07-13

## 2020-07-14 ENCOUNTER — OFFICE VISIT (OUTPATIENT)
Dept: MEDICAL GROUP | Facility: PHYSICIAN GROUP | Age: 65
End: 2020-07-14
Payer: MEDICARE

## 2020-07-14 ENCOUNTER — APPOINTMENT (OUTPATIENT)
Dept: BEHAVIORAL HEALTH | Facility: CLINIC | Age: 65
End: 2020-07-14
Payer: MEDICARE

## 2020-07-14 VITALS
HEIGHT: 63 IN | WEIGHT: 179 LBS | BODY MASS INDEX: 31.71 KG/M2 | TEMPERATURE: 97.9 F | DIASTOLIC BLOOD PRESSURE: 66 MMHG | OXYGEN SATURATION: 94 % | SYSTOLIC BLOOD PRESSURE: 110 MMHG | HEART RATE: 66 BPM

## 2020-07-14 DIAGNOSIS — F43.10 POSTTRAUMATIC STRESS DISORDER, DELAYED ONSET: ICD-10-CM

## 2020-07-14 DIAGNOSIS — E78.2 MIXED HYPERLIPIDEMIA: ICD-10-CM

## 2020-07-14 DIAGNOSIS — F33.1 DEPRESSION, MAJOR, RECURRENT, MODERATE (HCC): ICD-10-CM

## 2020-07-14 DIAGNOSIS — F41.9 ANXIETY: ICD-10-CM

## 2020-07-14 PROCEDURE — 99214 OFFICE O/P EST MOD 30 MIN: CPT | Performed by: NURSE PRACTITIONER

## 2020-07-14 RX ORDER — CLONAZEPAM 1 MG/1
1 TABLET ORAL DAILY
Qty: 90 TAB | Refills: 0 | Status: SHIPPED | OUTPATIENT
Start: 2020-07-14 | End: 2020-10-09 | Stop reason: SDUPTHER

## 2020-07-14 RX ORDER — HYDROXYZINE HYDROCHLORIDE 25 MG/1
25 TABLET, FILM COATED ORAL 3 TIMES DAILY PRN
COMMUNITY
End: 2020-07-14

## 2020-07-14 RX ORDER — ROSUVASTATIN CALCIUM 10 MG/1
10 TABLET, COATED ORAL EVERY EVENING
Qty: 90 TAB | Refills: 3 | Status: SHIPPED | OUTPATIENT
Start: 2020-07-14 | End: 2021-03-03 | Stop reason: SDUPTHER

## 2020-07-14 ASSESSMENT — FIBROSIS 4 INDEX: FIB4 SCORE: .934132727969572995

## 2020-07-14 NOTE — PROGRESS NOTES
Chief Complaint   Patient presents with   • Follow-Up     Storm:   Morphine ER and fluoroscope    Psych: duloxetine 90mg daily  Clonazepam 1mg daily  buspar 10mg TID two am one pm    Estradiol 1mg daily      Subjective:     Grisel Mark is a 65 y.o. female presenting for controlled substance visit.    Patient has a chronic depression, anxiety, and posttraumatic stress disorder.  She has been on clonazepam 1 mg daily for roughly 30 years.  Previously referred to psychiatry for assistance in tapering down use or optimizing mental health care treatment.  Unfortunately, patient had quite a negative experience with this referral.  Reports she was told to stop taking her clonazepam abruptly and instead placed on hydroxyzine 3 times a day.  Patient experienced extreme fatigue and weakness.  She does not want to go back to that provider, but is still amenable to seeing another specialist.  She is currently taking clonazepam, BuSpar, duloxetine.  She has returned to her previous medication regimen.  Reports a previous psychiatrist added Rexulti and that caused her to have spasmodic jerking movements.  She is also stopped the hydroxyzine due to the excessive sedation and negative side effects.    Patient established with physiatry for chronic back pain.  She is on extended release morphine with good control and undergoing epidural injections.    Patient is on controlled substances, but denies excessive sedation and reports overall symptom control with use.  Narc check reviewed, consistent with prescription and patient report.  Urine drug screen done through physiatry and April, updated controlled substance agreement for this office indicated.    Dyslipidemia: Patient previously on simvastatin and gemfibrozil for hypertriglyceridemia and elevated LDL.  We have previously discussed switching her to rosuvastatin to simplify medication regimen and getting rid of gemfibrozil.  Patient is amenable to this.  Fortunately not  experiencing any notable side effects of the current statin therapy.      Review of systems:      Denies chest pain, shortness of breath, sore throat, difficulty swallowing, new cough, dizziness, severe headache, altered cognition, changes in bowel or bladder habits, decreased sensation, decreased strength, numbness or tingling, rash or skin concerns, changes in vision, fatigue, myalgias, painful or swollen lymph nodes.       Current Outpatient Medications:   •  clonazePAM (KLONOPIN) 1 MG Tab, Take 1 Tab by mouth every day for 90 days., Disp: 90 Tab, Rfl: 0  •  rosuvastatin (CRESTOR) 10 MG Tab, Take 1 Tab by mouth every evening., Disp: 90 Tab, Rfl: 3  •  morphine ER (MS CONTIN) 15 MG Tab CR tablet, Take 1 Tab by mouth every 12 hours for 30 days., Disp: 60 Tab, Rfl: 0  •  DULoxetine (CYMBALTA) 30 MG Cap DR Particles, Take 3 Caps by mouth every day for 30 days., Disp: 90 Cap, Rfl: 2  •  levothyroxine (SYNTHROID) 50 MCG Tab, Take 1 Tab by mouth Every morning on an empty stomach., Disp: 90 Tab, Rfl: 3  •  metoprolol (LOPRESSOR) 25 MG Tab, Take 1 Tab by mouth 2 times a day., Disp: 180 Tab, Rfl: 1  •  Blood Glucose Meter Kit, Test blood sugar as recommended by provider. Abbott Freestyle Lite blood glucose monitoring kit., Disp: 1 Kit, Rfl: 0  •  estradiol (ESTRACE) 1 MG Tab, , Disp: , Rfl:   •  Empagliflozin (JARDIANCE) 25 MG Tab, Take 25 mg by mouth every day., Disp: 90 Tab, Rfl: 3  •  lisinopril (PRINIVIL) 10 MG Tab, Take 1 Tab by mouth every day., Disp: 90 Tab, Rfl: 3  •  metformin (GLUCOPHAGE) 1000 MG tablet, Take 1 Tab by mouth 2 times a day, with meals., Disp: 180 Tab, Rfl: 3  •  gabapentin (NEURONTIN) 600 MG tablet, Take 1 Tab by mouth 3 times a day., Disp: 180 Tab, Rfl: 2  •  omeprazole (PRILOSEC) 40 MG delayed-release capsule, Take 40 mg by mouth every day., Disp: , Rfl:   •  vitamin D, Ergocalciferol, (DRISDOL) 1.25 MG (68171 UT) Cap capsule, Take  by mouth every 7 days., Disp: , Rfl:   •  diclofenac EC  "(VOLTAREN) 75 MG Tablet Delayed Response, Take 75 mg by mouth 2 times a day., Disp: , Rfl:     Allergies, past medical history, past surgical history, family history, social history reviewed and updated    Objective:     Vitals: /66 (BP Location: Left arm, Patient Position: Sitting, BP Cuff Size: Adult)   Pulse 66   Temp 36.6 °C (97.9 °F) (Temporal)   Ht 1.6 m (5' 3\")   Wt 81.2 kg (179 lb)   LMP  (LMP Unknown)   SpO2 94%   BMI 31.71 kg/m²   Constitutional: Alert, no distress, well-groomed.  Skin: Warm, dry, good turgor, no rashes in visible areas.  Eye: Equal, round and reactive, conjunctiva clear, lids normal.  ENMT: Lips without lesions, good dentition, moist mucous membranes.  Neck: Trachea midline, no masses, no thyromegaly.  Respiratory: Unlabored respiratory effort, no cough.  MSK: Normal gait, moves all extremities.  Neuro: Grossly non-focal.   Psych: Alert and oriented x3, normal affect and mood.      Assessment/Plan:     Grisel was seen today for follow-up.    Diagnoses and all orders for this visit:    Anxiety / Posttraumatic stress disorder, delayed onset / Depression, major, recurrent, moderate (HCC): Additional referral placed to psychiatry for new provider.  Encourage patient to reach out to me if she does not feel comfortable and would like a new practice.  We did discuss that due to her history of multiple medications and negative side effects, she would most benefit from specialty oversight.  She is talked about stopping the duloxetine and switching to citalopram.  Refills for clonazepam placed.  -     REFERRAL TO PSYCHIATRY  -     clonazePAM (KLONOPIN) 1 MG Tab; Take 1 Tab by mouth every day for 90 days.  -     Controlled Substance Treatment Agreement    Mixed hyperlipidemia: Advised patient to stop gemfibrozil and simvastatin and start 10 mg rosuvastatin.  Did review signs and symptoms of adverse effects such as myalgias.  If this occurs, she is to stop the medication and follow-up in " the clinic immediately.  -     rosuvastatin (CRESTOR) 10 MG Tab; Take 1 Tab by mouth every evening.    Patient to follow-up in 3 months for controlled substance refill if she has not yet established with additional psychiatrist.  At that time we will also review an updated A1c and complete wellness measures.      Return in about 3 months (around 10/14/2020) for Controlled Sub Refill.    Patient verbalized understanding and agreed to plan of care.  Encouraged to contact me with needs via [employer] or by phone if needed.      I have placed the above orders and discussed them with an approved delegating provider.  The MA is performing the below orders under the direction of Dr Walls.    Please note that this dictation was created using voice recognition software. I have made every reasonable attempt to correct obvious errors, but I expect that there are errors of grammar and possibly content that I did not discover before finalizing the note.

## 2020-07-17 ENCOUNTER — OFFICE VISIT (OUTPATIENT)
Dept: PHYSICAL MEDICINE AND REHAB | Facility: MEDICAL CENTER | Age: 65
End: 2020-07-17
Payer: MEDICARE

## 2020-07-17 VITALS
WEIGHT: 179.45 LBS | BODY MASS INDEX: 31.8 KG/M2 | OXYGEN SATURATION: 95 % | HEIGHT: 63 IN | TEMPERATURE: 97.3 F | SYSTOLIC BLOOD PRESSURE: 128 MMHG | HEART RATE: 84 BPM | DIASTOLIC BLOOD PRESSURE: 92 MMHG

## 2020-07-17 DIAGNOSIS — M51.36 DDD (DEGENERATIVE DISC DISEASE), LUMBAR: ICD-10-CM

## 2020-07-17 DIAGNOSIS — Z98.890 S/P LUMBAR LAMINECTOMY: ICD-10-CM

## 2020-07-17 DIAGNOSIS — R10.9 FLANK PAIN: ICD-10-CM

## 2020-07-17 DIAGNOSIS — M62.838 MUSCLE SPASM: ICD-10-CM

## 2020-07-17 DIAGNOSIS — M47.816 LUMBAR SPONDYLOSIS: ICD-10-CM

## 2020-07-17 DIAGNOSIS — Z79.899 MEDICATION MANAGEMENT: ICD-10-CM

## 2020-07-17 PROCEDURE — 99215 OFFICE O/P EST HI 40 MIN: CPT | Performed by: PHYSICAL MEDICINE & REHABILITATION

## 2020-07-17 RX ORDER — MORPHINE SULFATE 15 MG/1
15 TABLET, FILM COATED, EXTENDED RELEASE ORAL EVERY 12 HOURS
Qty: 60 TAB | Refills: 0 | Status: SHIPPED | OUTPATIENT
Start: 2020-07-20 | End: 2020-08-18 | Stop reason: SDUPTHER

## 2020-07-17 RX ORDER — METHOCARBAMOL 750 MG/1
750 TABLET, FILM COATED ORAL 4 TIMES DAILY
Qty: 40 TAB | Refills: 0 | Status: SHIPPED | OUTPATIENT
Start: 2020-07-17 | End: 2020-07-27

## 2020-07-17 ASSESSMENT — PATIENT HEALTH QUESTIONNAIRE - PHQ9
CLINICAL INTERPRETATION OF PHQ2 SCORE: 2
5. POOR APPETITE OR OVEREATING: 1 - SEVERAL DAYS
SUM OF ALL RESPONSES TO PHQ QUESTIONS 1-9: 7

## 2020-07-17 ASSESSMENT — PAIN SCALES - GENERAL: PAINLEVEL: 9=SEVERE PAIN

## 2020-07-17 ASSESSMENT — FIBROSIS 4 INDEX: FIB4 SCORE: .934132727969572995

## 2020-07-17 NOTE — PROGRESS NOTES
Follow-up patient note    Physiatry (physical medicine and  Rehabilitation), interventional spine and sports medicine    Date of Service: 07/17/2020    Chief complaint:   Chief Complaint   Patient presents with   • Follow-Up     Back pain       HISTORY    HPI: Grisel Mark 65 y.o. female who presents today for follow-up evaluation of low back and leg pain.     Today, Grisel reports that she started having worsened pain in the last two weeks.  She is not sure what she did that set this off. It is very painful on the left gluteal region and this radiates into the left leg and goes all the way to the foot.  When she moves her head into flexion, this makes the pain worse.  Pain is 9/10 on the NRS.  Sneezing makes her pain worse and it shoots in her ribs.  After talking more, she reports that she did the laundry, which required lifting and reaching above shoulder height from her demonstration.  It is the only thing she has done that is not a usual thing that she does.    She is taking MS Contin 15mg po q12h, which helps with some of her pain.  She is taking duloxetine 90mg daily.  She is taking metamucil.  Her bowel movements are intermittently constipated.  She is not taking a stool softener.  Additionally, she continues gabapentin 600mg po tid.  With increased pain, she did use a few of the norco that she has remaining from previous script prior to change to MS Contin.  She has not otherwise required short-acting medication.    Since this last visit, she has seen Bridget Nice and has resumed clonazepam 1mg.  She has a new referral to a psychiatrist and they will work on reducing this more gradually.      Medical records review:  Dr. Francisco Olmos, plan to increase duloxetine 90mg daily, discontinue buspirone 20mg po bid, start buproprion XL 150mg daily, continue brexipiprazole 1mg qhs, hydroxyzine 25mg po tid prn, clonazepam 0.5mg daily prn    Review of records   Dr. Dheeraj De La Rosa:  08/20/2019 Right L3-4,  L4-5, L5-S1 radiofrequency ablation    I reviewed the note from the referring provider Peter Bruce * dated 02/05/2020: She was seen to [facility], having just moved from [place].  She was seen for essential hypertension, mixed hyperlipidemia, DM2 in obese, hypothyroidism, recurrent MDD in partial depression/anxiety, GERD without esophagitis, chronic bilateral low back pain with bilateral sciatica.  Medications associated with the above were written, related labs ordered including CBC, CMP, Hb A1c, lipids, microalbumin, TSH, free thryoxine, referral to ps\ychiatry, referral to GI for colonoscopy and referral to pain clinic.    Previous treatments:    Physical Therapy: Yes    Medications the patient is tried: Narcotics, gabapentin and muscle relaxers    Previous interventions: [facility] these included right lumbar radiofrequency procedures    Previous surgeries to relieve the above pain:  Surgery on October 28, 1998      ROS: Unchanged from 06/19/2020 except as noted in the HPI   CV: irregular heart, reports history of tachycardia  Psych: depression since 1995  Heme: anemia  Endo: hypothyroidism, diabetes    Red Flags ROS:   Fever, Chills, Sweats: Denies  Involuntary Weight Loss: Denies  Bladder Incontinence: Denies  Bowel Incontinence: Denies  Saddle Anesthesia: Denies    All other systems reviewed and negative.       PMHx:   Past Medical History:   Diagnosis Date   • Anxiety    • Chronic back pain    • Constipation    • Depression    • Diabetes (HCC)    • GERD (gastroesophageal reflux disease)    • Heart murmur     tachycardia   • Hyperlipidemia    • Hypertension    • Thyroid disease        PSHx:   Past Surgical History:   Procedure Laterality Date   • ABDOMINAL HYSTERECTOMY TOTAL     • CARPAL TUNNEL RELEASE     • CHOLECYSTECTOMY     • LAMINOTOMY         Family history   Family History   Problem Relation Age of Onset   • Cancer Mother    • Stroke Father         Heart  attack   • Dementia Sister    • Stroke Brother         heart Attack   • Cancer Brother         Skin   • Diabetes Brother    • Kidney Disease Brother    • Cancer Brother    • Parkinson's Disease Sister    • No Known Problems Sister    • Diabetes Daughter    • Hypertension Daughter          Medications:   Current Outpatient Medications   Medication   • methocarbamol (ROBAXIN) 750 MG Tab   • [START ON 7/20/2020] morphine ER (MS CONTIN) 15 MG Tab CR tablet   • clonazePAM (KLONOPIN) 1 MG Tab   • rosuvastatin (CRESTOR) 10 MG Tab   • DULoxetine (CYMBALTA) 30 MG Cap DR Particles   • levothyroxine (SYNTHROID) 50 MCG Tab   • metoprolol (LOPRESSOR) 25 MG Tab   • estradiol (ESTRACE) 1 MG Tab   • Empagliflozin (JARDIANCE) 25 MG Tab   • lisinopril (PRINIVIL) 10 MG Tab   • metformin (GLUCOPHAGE) 1000 MG tablet   • gabapentin (NEURONTIN) 600 MG tablet   • omeprazole (PRILOSEC) 40 MG delayed-release capsule   • vitamin D, Ergocalciferol, (DRISDOL) 1.25 MG (78480 UT) Cap capsule   • diclofenac EC (VOLTAREN) 75 MG Tablet Delayed Response   • Blood Glucose Meter Kit     No current facility-administered medications for this visit.        Allergies:   No Known Allergies    Social Hx:   Social History     Socioeconomic History   • Marital status:      Spouse name: Not on file   • Number of children: Not on file   • Years of education: Not on file   • Highest education level: Not on file   Occupational History   • Not on file   Social Needs   • Financial resource strain: Not on file   • Food insecurity     Worry: Not on file     Inability: Not on file   • Transportation needs     Medical: Not on file     Non-medical: Not on file   Tobacco Use   • Smoking status: Light Tobacco Smoker     Packs/day: 0.25     Types: Cigarettes   • Smokeless tobacco: Never Used   Substance and Sexual Activity   • Alcohol use: Not Currently     Comment: rare   • Drug use: Never   • Sexual activity: Not Currently   Lifestyle   • Physical activity      "Days per week: Not on file     Minutes per session: Not on file   • Stress: Not on file   Relationships   • Social connections     Talks on phone: Not on file     Gets together: Not on file     Attends Scientologist service: Not on file     Active member of club or organization: Not on file     Attends meetings of clubs or organizations: Not on file     Relationship status: Not on file   • Intimate partner violence     Fear of current or ex partner: Not on file     Emotionally abused: Not on file     Physically abused: Not on file     Forced sexual activity: Not on file   Other Topics Concern   •  Service No   • Blood Transfusions Yes   • Caffeine Concern No   • Occupational Exposure No   • Hobby Hazards No   • Sleep Concern Yes   • Stress Concern Yes   • Weight Concern Yes   • Special Diet No   • Back Care No   • Exercise Yes   • Bike Helmet No   • Seat Belt Yes   • Self-Exams Yes   Social History Narrative   • Not on file         EXAMINATION     Physical Exam:   Vitals: /92 (BP Location: Left arm, Patient Position: Sitting, BP Cuff Size: Large adult)   Pulse 84   Temp 36.3 °C (97.3 °F) (Temporal)   Ht 1.6 m (5' 3\")   Wt 81.4 kg (179 lb 7.3 oz)   SpO2 95%     Constitutional:   Body Habitus: Body mass index is 31.79 kg/m².  Cooperation: Fully cooperates with exam  Appearance: Well-groomed, well-nourished, not disheveled, no acute distress    Eyes: No scleral icterus, no proptosis     ENT -no obvious auditory deficits, wearing a facial mask    Skin -no rashes or lesions noted     Respiratory-  breathing comfortable on room air, no audible wheezing    Cardiovascular- no lower extremity edema is noted.     Psychiatric- alert and oriented ×3. Normal affect.     Gait - normal gait, no use of ambulatory device, antalgic.     Musculoskeletal -   Thoracic/Lumbar Spine/Sacral Spine/Hips   Inspection: no evidence of atrophy in bilateral lower extremities throughout     Pain with lumbar flexion limited, limited " lateral side bending, pain with extension on the left.    Palpation:   No tenderness to palpation in midline at T1-T12 levels. No tenderness to palpation in the left and right of the midline T1-L3, POSITIVE for tenderness to palpation in the thoracic and para-midline region in the lower lumbar levels with muscle spasms left thoracic paraspinals and lumbar paraspinals      Flank tenderness to percussion on the left    Neuro     Key points for the international standards for neurological classification of spinal cord injury (ISNCSCI) to light touch.     Dermatome R L   L2 2 2   L3 2 2   L4 2 2   L5 2 1   S1 2 2   S2 2 2       Motor Exam Lower Extremities    ? Myotome R L   Hip flexion L2 5 5   Knee extension L3 5 5   Ankle dorsiflexion L4 5 5   Toe extension L5 5 4   Ankle plantarflexion S1 5 5       Reflexes  ?  R L   Patella  2+ 2+   Achilles   2+ 1+       MEDICAL DECISION MAKING    Medical records review: see under HPI section.     DATA    Labs:   04/24/2020 UDS positive for morphine and metabolites, clonazepam  04/09/2020 Vitamin D, 25:  28L  04/09/2020 Vitamin B12: 217    Lab Results   Component Value Date/Time    SODIUM 135 04/09/2020 09:26 AM    POTASSIUM 4.7 04/09/2020 09:26 AM    CHLORIDE 100 04/09/2020 09:26 AM    CO2 21 04/09/2020 09:26 AM    ANION 14.0 04/09/2020 09:26 AM    GLUCOSE 140 (H) 04/09/2020 09:26 AM    BUN 19 04/09/2020 09:26 AM    CREATININE 0.71 04/09/2020 09:26 AM    CALCIUM 9.8 04/09/2020 09:26 AM    ASTSGOT 10 (L) 04/09/2020 09:26 AM    ALTSGPT 7 04/09/2020 09:26 AM    TBILIRUBIN 0.3 04/09/2020 09:26 AM    ALBUMIN 4.1 04/09/2020 09:26 AM    TOTPROTEIN 7.3 04/09/2020 09:26 AM    GLOBULIN 3.2 04/09/2020 09:26 AM    AGRATIO 1.3 04/09/2020 09:26 AM       No results found for: PROTHROMBTM, INR     Lab Results   Component Value Date/Time    WBC 7.6 04/09/2020 09:26 AM    RBC 4.25 04/09/2020 09:26 AM    HEMOGLOBIN 13.3 04/09/2020 09:26 AM    HEMATOCRIT 40.7 04/09/2020 09:26 AM    MCV 95.8  04/09/2020 09:26 AM    MCH 31.3 04/09/2020 09:26 AM    MCHC 32.7 (L) 04/09/2020 09:26 AM    MPV 11.1 04/09/2020 09:26 AM    NEUTSPOLYS 52.80 04/09/2020 09:26 AM    LYMPHOCYTES 34.80 04/09/2020 09:26 AM    MONOCYTES 10.10 04/09/2020 09:26 AM    EOSINOPHILS 1.40 04/09/2020 09:26 AM    BASOPHILS 0.40 04/09/2020 09:26 AM        Lab Results   Component Value Date/Time    HBA1C 6.8 (H) 04/09/2020 09:26 AM        Imaging: I personally reviewed following images, these are my reads:   MRI lumbar spine 05/05/2020  At L1-2, no central or foraminal stenosis  At L2-3, no central or foraminal stenosis  At L3-4, mild disc bulge and mild ligamentum flavum thickening.  No central canal stenosis. Borderline left foraminal stenosis. Schmorl's node.   At L4-5, diffuse disc bulge with mild ligamentum flavum thickening.  No central canal stenosis.  There is facet arthropathy, left greater than right. Mild foraminal stenosis bilaterally. S/P left L4/5 hemilaminectomy  At L5-S1, there is mild-borderline foraminal stenosis with facet arthropathy and mild disc bulge.  No central canal stenosis    Xray lumbar spine 05/05/2020  There is mild decreased joint space at L3-4 and L4-5.    Facet arthropathy is noted, greatest at L5-S1 bilaterally.  No significant motion on flexion and extension films    IMAGING radiology reads. I reviewed the following radiology reads  MRI lumbar spine 05/05/2020  IMPRESSION:     1.  Caudal lumbar facet arthropathy left worse than right with no bony destruction to indicate septic or inflammatory arthropathy. This most likely is just degenerative     2.  Mild caudal lumbar foraminal stenoses     3.  No significant central stenosis.     4.  Left L4/5 hemilaminectomy                                                                                   Xray lumbar spine 05/05/2020     IMPRESSION:     1.  No acute lumbar compression fracture or subluxation.     2.  Posterior disc space narrowing at L4-5 and to lesser extent  at L3-4.     3.  Bilateral facet arthropathy at L5-S1.                                                                                             Diagnosis   Visit Diagnoses     ICD-10-CM   1. S/P lumbar laminectomy  Z98.890   2. DDD (degenerative disc disease), lumbar  M51.36   3. Muscle spasm  M62.838   4. Lumbar spondylosis  M47.816   5. Flank pain  R10.9   6. Medication management  Z79.899         ASSESSMENT:  Grisel Mark 65 y.o. female seen for above     Grisel was seen today for follow-up.    Diagnoses and all orders for this visit:    S/P lumbar laminectomy  -     morphine ER (MS CONTIN) 15 MG Tab CR tablet; Take 1 Tab by mouth every 12 hours for 30 days.  -     Consent for Opiate Prescription  -     Controlled Substance Treatment Agreement    DDD (degenerative disc disease), lumbar  -     morphine ER (MS CONTIN) 15 MG Tab CR tablet; Take 1 Tab by mouth every 12 hours for 30 days.  -     Consent for Opiate Prescription  -     Controlled Substance Treatment Agreement    Muscle spasm  -     methocarbamol (ROBAXIN) 750 MG Tab; Take 1 Tab by mouth 4 times a day for 10 days.    Lumbar spondylosis    Flank pain  -     Cancel: URINALYSIS; Future  -     Basic Metabolic Panel; Future  -     [id number] VIEW; Future  -     URINALYSIS; Future    Medication management  -     Basic Metabolic Panel; Future  -     URINALYSIS; Future       1. Discussed acute muscle spasms.  She does not have any urinary symptoms, but plan to order U/A, BMP and abdominal xray.  If symptoms worsen, she will see UC/ED care.  Most suspicious for muscle spasms caused by lifting and stretching at/above shoulder height.  Trial short-term use of robaxin.  Discussed side effects.   2. Discussed previously successful trial of left lumbar three through lumbar five medial branch blocks.  Given the acute change in symptoms, we have determined that we will hold off on second block until she returns to baseline.  If the diagnostic blocks are  successful, will consider radiofrequency ablation. Plan for second diagnostic block.  The risks benefits and alternatives to this procedure were discussed and the patient wishes to proceed with the procedure. Risks include but are not limited to damage to surrounding structures, infection, bleeding, worsening of pain which can be permanent, weakness which can be permanent. Benefits include pain relief, improved function. Alternatives includes not doing the procedure.   We can consider other treatment options, including epidural or transforaminal injections, depending on results of above treatment.  Order was placed at last visit, will schedule as soon as possible.  3. Discussed that we will consider left lumbar four and lumbar five TFESI in the future, if radicular symptoms persist.  Pain in the low back was significantly reduced with lumbar 3-5 MBB, will to work on this first.  Plan to reassess this.  4. Continue to follow-up with Behavioral health.  Discussed medication changes.  She will continue with these changes, she is titrating duloxetine to 90mg daily.  Discussed that she will follow-up with them for gradual wean of klonipin to see if this is more successful.  She has returned to 1mg daily at this time.  5. Continue vitamin D3  6.  and most recent UDS reviewed.  Plan to trial MS Contin 15mg po q12h.  Discussed that she take this twice a day.  Risks of chronic opioids discussed and we have previously discussed goal of decreasing or discontinuing klonipin as she is able.  Discussed that this might take months.    7. Continue regular bowel movements with OTC medications as needed.   Advised taking colase 100mg daily and miralax prn for constipation.  Decrease colace to QOD if she develops loose stools.      Follow-up: Return in about 2 weeks (around 7/31/2020).    Thank you very much for asking me to participate in Grisel Mark's care.  Please contact me with any questions or concerns.    Please note that  this dictation was created using voice recognition software. I have made every reasonable attempt to correct obvious errors but there may be errors of grammar and content that I may have overlooked prior to finalization of this note.      Paresh Ogden MD  Physical Medicine and Rehabilitation  Interventional Spine and Sports Physiatry  [facility]

## 2020-07-20 ENCOUNTER — PATIENT MESSAGE (OUTPATIENT)
Dept: MEDICAL GROUP | Facility: PHYSICIAN GROUP | Age: 65
End: 2020-07-20

## 2020-07-20 RX ORDER — BUSPIRONE HYDROCHLORIDE 10 MG/1
10 TABLET ORAL 2 TIMES DAILY
Qty: 180 TAB | Refills: 0 | Status: SHIPPED | OUTPATIENT
Start: 2020-07-20 | End: 2020-09-21 | Stop reason: SDUPTHER

## 2020-07-21 ENCOUNTER — PATIENT MESSAGE (OUTPATIENT)
Dept: MEDICAL GROUP | Facility: PHYSICIAN GROUP | Age: 65
End: 2020-07-21

## 2020-07-21 DIAGNOSIS — F33.1 DEPRESSION, MAJOR, RECURRENT, MODERATE (HCC): ICD-10-CM

## 2020-07-25 ENCOUNTER — HOSPITAL ENCOUNTER (OUTPATIENT)
Dept: RADIOLOGY | Facility: MEDICAL CENTER | Age: 65
End: 2020-07-25
Attending: PHYSICAL MEDICINE & REHABILITATION
Payer: MEDICARE

## 2020-07-25 DIAGNOSIS — R10.9 FLANK PAIN: ICD-10-CM

## 2020-07-25 PROCEDURE — 74018 RADEX ABDOMEN 1 VIEW: CPT

## 2020-07-27 RX ORDER — DULOXETIN HYDROCHLORIDE 30 MG/1
90 CAPSULE, DELAYED RELEASE ORAL DAILY
Qty: 270 CAP | Refills: 1 | Status: SHIPPED | OUTPATIENT
Start: 2020-07-27 | End: 2021-03-24 | Stop reason: SDUPTHER

## 2020-07-29 DIAGNOSIS — M51.36 DDD (DEGENERATIVE DISC DISEASE), LUMBAR: ICD-10-CM

## 2020-07-29 DIAGNOSIS — M47.816 LUMBAR SPONDYLOSIS: ICD-10-CM

## 2020-07-29 DIAGNOSIS — M62.838 MUSCLE SPASM: ICD-10-CM

## 2020-07-29 DIAGNOSIS — Z98.890 S/P LUMBAR LAMINECTOMY: ICD-10-CM

## 2020-07-29 RX ORDER — METHOCARBAMOL 750 MG/1
750 TABLET, FILM COATED ORAL 4 TIMES DAILY PRN
Qty: 40 TAB | Refills: 0 | Status: SHIPPED | OUTPATIENT
Start: 2020-07-29 | End: 2020-08-25 | Stop reason: SDUPTHER

## 2020-07-29 RX ORDER — HYDROCODONE BITARTRATE AND ACETAMINOPHEN 5; 325 MG/1; MG/1
1 TABLET ORAL EVERY 6 HOURS PRN
Qty: 30 TAB | Refills: 0 | Status: SHIPPED | OUTPATIENT
Start: 2020-07-29 | End: 2020-08-18

## 2020-07-31 ENCOUNTER — OFFICE VISIT (OUTPATIENT)
Dept: PHYSICAL MEDICINE AND REHAB | Facility: MEDICAL CENTER | Age: 65
End: 2020-07-31
Payer: MEDICARE

## 2020-07-31 VITALS
SYSTOLIC BLOOD PRESSURE: 122 MMHG | WEIGHT: 179.9 LBS | HEART RATE: 84 BPM | DIASTOLIC BLOOD PRESSURE: 80 MMHG | HEIGHT: 63 IN | OXYGEN SATURATION: 92 % | TEMPERATURE: 97.4 F | BODY MASS INDEX: 31.88 KG/M2

## 2020-07-31 DIAGNOSIS — Z98.890 S/P LUMBAR LAMINECTOMY: ICD-10-CM

## 2020-07-31 DIAGNOSIS — M62.838 MUSCLE SPASM: ICD-10-CM

## 2020-07-31 DIAGNOSIS — M51.36 DDD (DEGENERATIVE DISC DISEASE), LUMBAR: ICD-10-CM

## 2020-07-31 DIAGNOSIS — M54.16 LEFT LUMBAR RADICULITIS: ICD-10-CM

## 2020-07-31 DIAGNOSIS — M47.816 LUMBAR SPONDYLOSIS: ICD-10-CM

## 2020-07-31 PROCEDURE — 99215 OFFICE O/P EST HI 40 MIN: CPT | Performed by: PHYSICAL MEDICINE & REHABILITATION

## 2020-07-31 ASSESSMENT — PAIN SCALES - GENERAL: PAINLEVEL: 8=MODERATE-SEVERE PAIN

## 2020-07-31 ASSESSMENT — FIBROSIS 4 INDEX: FIB4 SCORE: .934132727969572995

## 2020-07-31 ASSESSMENT — PATIENT HEALTH QUESTIONNAIRE - PHQ9
CLINICAL INTERPRETATION OF PHQ2 SCORE: 2
5. POOR APPETITE OR OVEREATING: 2 - MORE THAN HALF THE DAYS
SUM OF ALL RESPONSES TO PHQ QUESTIONS 1-9: 6

## 2020-07-31 NOTE — H&P (VIEW-ONLY)
Follow-up patient note    Physiatry (physical medicine and  Rehabilitation), interventional spine and sports medicine    Date of Service: 07/31/2020    Chief complaint:   Chief Complaint   Patient presents with   • Follow-Up     Back pain       HISTORY    HPI: Grisel Mark 65 y.o. female who presents today for follow-up evaluation of low back and leg pain.     Since the last visit, she reports that she has less pain that at the visit on 07/17/2020.  No     She reports that her pain is better than it was.  Her pain was 8/10 on the NRS.  She has noted more pain in the left leg when she sits for a long time.  Walking seems to aggravate the back and laying down too much worsens pain in the back.    It sounds like she has had some good bowel movements and this has helped.  Also, she has been taking the muscle relaxant and this has been helpful.  When she did not have them for two days, her pain was worse.  Still, it is much better than at last visit on 07/17/2020.  Sneezing is not painful now.    Otherwise, she continues to take her MS Contin 15mg po q12h, which helps with some of her pain.  She is taking duloxetine 90mg daily.  Her medications seem to help the most for the first few hours after taking MS Contin.  It sounds like she has been trying to be as active as she can.    She is now taking her colace daily and up to twice a day.  Continuing metamucil.  This has helped with regular bowel movements.    Continuing gabapentin 600mg po tid.        Medical records review:  Dr. Francisco Olmos, plan to increase duloxetine 90mg daily, discontinue buspirone 20mg po bid, start buproprion XL 150mg daily, continue brexipiprazole 1mg qhs, hydroxyzine 25mg po tid prn, clonazepam 0.5mg daily prn    Review of records   Dr. Dheeraj De La Rosa:  08/20/2019 Right L3-4, L4-5, L5-S1 radiofrequency ablation    I reviewed the note from the referring provider Peetr Bruce * dated 02/05/2020: She was seen to establish care,  having just moved from [place].  She was seen for essential hypertension, mixed hyperlipidemia, DM2 in obese, hypothyroidism, recurrent MDD in partial depression/anxiety, GERD without esophagitis, chronic bilateral low back pain with bilateral sciatica.  Medications associated with the above were written, related labs ordered including CBC, CMP, Hb A1c, lipids, microalbumin, TSH, free thryoxine, referral to ps\ychiatry, referral to GI for colonoscopy and referral to pain clinic.    Previous treatments:    Physical Therapy: Yes    Medications the patient is tried: Narcotics, gabapentin and muscle relaxers    Previous interventions: [facility] these included right lumbar radiofrequency procedures    Previous surgeries to relieve the above pain:  Surgery on October 28, 1998      ROS: Unchanged from 07/17/2020 except as noted in the HPI   CV: irregular heart, reports history of tachycardia  Psych: depression since 1995  Heme: anemia  Endo: hypothyroidism, diabetes    Red Flags ROS:   Fever, Chills, Sweats: Denies  Involuntary Weight Loss: Denies  Bladder Incontinence: Denies  Bowel Incontinence: Denies  Saddle Anesthesia: Denies    All other systems reviewed and negative.       PMHx:   Past Medical History:   Diagnosis Date   • Anxiety    • Chronic back pain    • Constipation    • Depression    • Diabetes (HCC)    • GERD (gastroesophageal reflux disease)    • Heart murmur     tachycardia   • Hyperlipidemia    • Hypertension    • Thyroid disease        PSHx:   Past Surgical History:   Procedure Laterality Date   • ABDOMINAL HYSTERECTOMY TOTAL     • CARPAL TUNNEL RELEASE     • CHOLECYSTECTOMY     • LAMINOTOMY         Family history   Family History   Problem Relation Age of Onset   • Cancer Mother    • Stroke Father         Heart attack   • Dementia Sister    • Stroke Brother         heart Attack   • Cancer Brother         Skin   • Diabetes Brother    • Kidney Disease Brother    • Cancer  Brother    • Parkinson's Disease Sister    • No Known Problems Sister    • Diabetes Daughter    • Hypertension Daughter          Medications:   Current Outpatient Medications   Medication   • methocarbamol (ROBAXIN) 750 MG Tab   • HYDROcodone-acetaminophen (NORCO) 5-325 MG Tab per tablet   • DULoxetine (CYMBALTA) 30 MG Cap DR Particles   • busPIRone (BUSPAR) 10 MG Tab tablet   • morphine ER (MS CONTIN) 15 MG Tab CR tablet   • clonazePAM (KLONOPIN) 1 MG Tab   • rosuvastatin (CRESTOR) 10 MG Tab   • levothyroxine (SYNTHROID) 50 MCG Tab   • metoprolol (LOPRESSOR) 25 MG Tab   • estradiol (ESTRACE) 1 MG Tab   • Empagliflozin (JARDIANCE) 25 MG Tab   • lisinopril (PRINIVIL) 10 MG Tab   • metformin (GLUCOPHAGE) 1000 MG tablet   • gabapentin (NEURONTIN) 600 MG tablet   • omeprazole (PRILOSEC) 40 MG delayed-release capsule   • vitamin D, Ergocalciferol, (DRISDOL) 1.25 MG (28594 UT) Cap capsule   • diclofenac EC (VOLTAREN) 75 MG Tablet Delayed Response   • Blood Glucose Meter Kit     No current facility-administered medications for this visit.        Allergies:   No Known Allergies    Social Hx:   Social History     Socioeconomic History   • Marital status:      Spouse name: Not on file   • Number of children: Not on file   • Years of education: Not on file   • Highest education level: Not on file   Occupational History   • Not on file   Social Needs   • Financial resource strain: Not on file   • Food insecurity     Worry: Not on file     Inability: Not on file   • Transportation needs     Medical: Not on file     Non-medical: Not on file   Tobacco Use   • Smoking status: Light Tobacco Smoker     Packs/day: 0.25     Types: Cigarettes   • Smokeless tobacco: Never Used   Substance and Sexual Activity   • Alcohol use: Not Currently     Comment: rare   • Drug use: Never   • Sexual activity: Not Currently   Lifestyle   • Physical activity     Days per week: Not on file     Minutes per session: Not on file   • Stress: Not on  "file   Relationships   • Social connections     Talks on phone: Not on file     Gets together: Not on file     Attends Confucianism service: Not on file     Active member of club or organization: Not on file     Attends meetings of clubs or organizations: Not on file     Relationship status: Not on file   • Intimate partner violence     Fear of current or ex partner: Not on file     Emotionally abused: Not on file     Physically abused: Not on file     Forced sexual activity: Not on file   Other Topics Concern   •  Service No   • Blood Transfusions Yes   • Caffeine Concern No   • Occupational Exposure No   • Hobby Hazards No   • Sleep Concern Yes   • Stress Concern Yes   • Weight Concern Yes   • Special Diet No   • Back Care No   • Exercise Yes   • Bike Helmet No   • Seat Belt Yes   • Self-Exams Yes   Social History Narrative   • Not on file         EXAMINATION     Physical Exam:   Vitals: /80 (BP Location: Left arm, Patient Position: Sitting, BP Cuff Size: Small adult)   Pulse 84   Temp 36.3 °C (97.4 °F) (Temporal)   Ht 1.6 m (5' 3\")   Wt 81.6 kg (179 lb 14.3 oz)   SpO2 92%     Constitutional:   Body Habitus: Body mass index is 31.87 kg/m².  Cooperation: Fully cooperates with exam  Appearance: Well-groomed, well-nourished, not disheveled, no acute distress    Eyes: No scleral icterus, no proptosis     ENT -no obvious auditory deficits, wearing a facial mask    Skin -no rashes or lesions noted     Respiratory-  breathing comfortable on room air, no audible wheezing    Cardiovascular- no lower extremity edema is noted.     Psychiatric- alert and oriented ×3. Normal affect.     Gait - normal gait, no use of ambulatory device, antalgic.     Musculoskeletal -   Thoracic/Lumbar Spine/Sacral Spine/Hips   Inspection: no evidence of atrophy in bilateral lower extremities throughout     Pain with extension and rotation on the left.  Otherwise functional ROM    Palpation:   No tenderness to palpation in " midline at T1-T12 levels. No tenderness to palpation in the left and right of the midline T1-L3, mildly POSITIVE for tenderness to palpation in the thoracic and para-midline region in the lower lumbar levels with muscle spasms left thoracic paraspinals and lumbar paraspinals      No flank tenderness on the left to percussion    Neuro     Key points for the international standards for neurological classification of spinal cord injury (ISNCSCI) to light touch.     Dermatome R L   L2 2 2   L3 2 2   L4 2 2   L5 2 1   S1 2 2   S2 2 2       Motor Exam Lower Extremities    ? Myotome R L   Hip flexion L2 5 5   Knee extension L3 5 5   Ankle dorsiflexion L4 5 5   Toe extension L5 5 4   Ankle plantarflexion S1 5 5       Reflexes  ?  R L   Patella  2+ 2+   Achilles   2+ 1+       MEDICAL DECISION MAKING    Medical records review: see under HPI section.     DATA    Labs:   04/24/2020 UDS positive for morphine and metabolites, clonazepam  04/09/2020 Vitamin D, 25:  28L  04/09/2020 Vitamin B12: 217    Lab Results   Component Value Date/Time    SODIUM 135 04/09/2020 09:26 AM    POTASSIUM 4.7 04/09/2020 09:26 AM    CHLORIDE 100 04/09/2020 09:26 AM    CO2 21 04/09/2020 09:26 AM    ANION 14.0 04/09/2020 09:26 AM    GLUCOSE 140 (H) 04/09/2020 09:26 AM    BUN 19 04/09/2020 09:26 AM    CREATININE 0.71 04/09/2020 09:26 AM    CALCIUM 9.8 04/09/2020 09:26 AM    ASTSGOT 10 (L) 04/09/2020 09:26 AM    ALTSGPT 7 04/09/2020 09:26 AM    TBILIRUBIN 0.3 04/09/2020 09:26 AM    ALBUMIN 4.1 04/09/2020 09:26 AM    TOTPROTEIN 7.3 04/09/2020 09:26 AM    GLOBULIN 3.2 04/09/2020 09:26 AM    AGRATIO 1.3 04/09/2020 09:26 AM       No results found for: PROTHROMBTM, INR     Lab Results   Component Value Date/Time    WBC 7.6 04/09/2020 09:26 AM    RBC 4.25 04/09/2020 09:26 AM    HEMOGLOBIN 13.3 04/09/2020 09:26 AM    HEMATOCRIT 40.7 04/09/2020 09:26 AM    MCV 95.8 04/09/2020 09:26 AM    MCH 31.3 04/09/2020 09:26 AM    MCHC 32.7 (L) 04/09/2020 09:26 AM    MPV  11.1 04/09/2020 09:26 AM    NEUTSPOLYS 52.80 04/09/2020 09:26 AM    LYMPHOCYTES 34.80 04/09/2020 09:26 AM    MONOCYTES 10.10 04/09/2020 09:26 AM    EOSINOPHILS 1.40 04/09/2020 09:26 AM    BASOPHILS 0.40 04/09/2020 09:26 AM        Lab Results   Component Value Date/Time    HBA1C 6.8 (H) 04/09/2020 09:26 AM        Imaging: I personally reviewed following images, these are my reads:     MRI lumbar spine 05/05/2020  At L1-2, no central or foraminal stenosis  At L2-3, no central or foraminal stenosis  At L3-4, mild disc bulge and mild ligamentum flavum thickening.  No central canal stenosis. Borderline left foraminal stenosis. Schmorl's node.   At L4-5, diffuse disc bulge with mild ligamentum flavum thickening.  No central canal stenosis.  There is facet arthropathy, left greater than right. Mild foraminal stenosis bilaterally. S/P left L4/5 hemilaminectomy  At L5-S1, there is mild-borderline foraminal stenosis with facet arthropathy and mild disc bulge.  No central canal stenosis    Xray lumbar spine 05/05/2020  There is mild decreased joint space at L3-4 and L4-5.    Facet arthropathy is noted, greatest at L5-S1 bilaterally.  No significant motion on flexion and extension films    IMAGING radiology reads. I reviewed the following radiology reads    Xray abdomen 07/17/2020  IMPRESSION:     Nonspecific bowel gas pattern. No evidence small bowel obstruction.      MRI lumbar spine 05/05/2020  IMPRESSION:     1.  Caudal lumbar facet arthropathy left worse than right with no bony destruction to indicate septic or inflammatory arthropathy. This most likely is just degenerative     2.  Mild caudal lumbar foraminal stenoses     3.  No significant central stenosis.     4.  Left L4/5 hemilaminectomy                                                                                   Xray lumbar spine 05/05/2020     IMPRESSION:     1.  No acute lumbar compression fracture or subluxation.     2.  Posterior disc space narrowing at L4-5  and to lesser extent at L3-4.     3.  Bilateral facet arthropathy at L5-S1.                                                                                             Diagnosis   Visit Diagnoses     ICD-10-CM   1. S/P lumbar laminectomy  Z98.890   2. Muscle spasm  M62.838   3. DDD (degenerative disc disease), lumbar  M51.36   4. Lumbar spondylosis  M47.816   5. Left lumbar radiculitis  M54.16         ASSESSMENT:  Grisel Mark 65 y.o. female seen for above     Grisel was seen today for follow-up.    Diagnoses and all orders for this visit:    S/P lumbar laminectomy  -     REFERRAL TO PHYSIATRY (PMR)    Muscle spasm    DDD (degenerative disc disease), lumbar    Lumbar spondylosis    Left lumbar radiculitis  -     REFERRAL TO PHYSIATRY (PMR)       1. Discussed previously successful trial of left lumbar three through lumbar five medial branch blocks.  Plan to proceed with second block.  If the diagnostic blocks are successful, will consider radiofrequency ablation. Plan for second diagnostic block.  The risks benefits and alternatives to this procedure were discussed and the patient wishes to proceed with the procedure. Risks include but are not limited to damage to surrounding structures, infection, bleeding, worsening of pain which can be permanent, weakness which can be permanent. Benefits include pain relief, improved function. Alternatives includes not doing the procedure.     2. Discussed that we will consider left lumbar four and lumbar five TFESI in the future, plan to schedule this after the MBB.   Pain in the low back was significantly reduced with lumbar 3-5 MBB, will to work on this first.  Her axial pain is constant.  She continues to have some intermittent radicular symptoms.  Discussed left lumbar four and lumbar five TFESI.  The risks benefits and alternatives to this procedure were discussed and the patient wishes to proceed with the procedure. Risks include but are not limited to damage to  surrounding structures, infection, bleeding, worsening of pain which can be permanent, weakness which can be permanent. Benefits include pain relief, improved function. Alternatives includes not doing the procedure.  Plan to schedule after her second MBB first.  She is in agreement with the plan.  3. Continue to follow-up with Behavioral health for long-term goal of weaning klonipin.  Continue duloxetine 90mg daily.   4. Continue vitamin D3  5.  and most recent UDS reviewed.  Continue MS Contin 15mg po q12h.  Discussed that she take this twice a day.  Risks of chronic opioids discussed and we have previously discussed goal of decreasing or discontinuing klonipin as she is able.  Discussed that this might take months.  Short-term use of norco with goal using for severe break through pain.  6. Continue regular bowel movements with OTC medications as needed.   Advised taking colase 100mg daily and miralax prn for constipation.  Decrease colace to QOD if she develops loose stools.      Follow-up: Return for Hospital injection.    Thank you very much for asking me to participate in Grisel Mark's care.  Please contact me with any questions or concerns.    Please note that this dictation was created using voice recognition software. I have made every reasonable attempt to correct obvious errors but there may be errors of grammar and content that I may have overlooked prior to finalization of this note.      Paresh Ogden MD  Physical Medicine and Rehabilitation  Interventional Spine and Sports Physiatry  [facility]

## 2020-07-31 NOTE — PATIENT INSTRUCTIONS
Your procedure will be at the [facility] special procedure suite.    [facility] [place] [address]       PRE-PROCEDURE INSTRUCTIONS  You may take your regular medications except:   · No Anti-inflammatories 5 days prior to your procedure. Anti-inflammatories include medicines such as  ibuprofen (Motrin, Advil), Excedrin, Naproxen (Aleve, Anaprox, Naprelan, Naprosyn), Celecoxib (Celebrex), Diclofenac (Voltaren-XR tab), and Meloxicam (Mobic).   · No Glucophage or Metformin 24 hours before your procedure. You may resume next day after your procedure.  · Call the physiatry office if you are taking or prescribed anti-biotics within five days of procedure.  · Please ask provider if you are taking any new diabetes medication.  · CONTINUE TAKING BLOOD PRESSURE MEDICATIONS AS PRESCRIBED.  · Pain medications will not be prescribed on the procedure day. Procedural pain medication may be used by your provider   · Call your doctor's office performing the procedure if you have a fever, chills, rash or new illness prior to your procedure    Anticoagulation/antiplatelet medications  No Blood thinning medications such as Coumadin or Plavix 5 days prior to procedure unless your doctor said to continue these medications. Call your doctor if a new medication is prescribed in this class.     Restrictions for eating before procedure:   · If you are getting procedural sedation, then do not eat to for 8 hours prior to procedure appointment time. Do not drink fluids for four hours prior to your procedure time.   · If you are not having procedural sedation, then Skip the meal prior to your procedure. If you have a morning procedure then skip breakfast. If you have an afternoon procedure then skip lunch.   · You may drink clear liquids up to 2 hours prior to your procedure  · You must have a  the day of procedure to accompany you home.      POST PROCEDURE INSTRUCTIONS   · No heavy lifting, strenuous bending or  strenuous exercise for 3 days after your procedure.  · No hot tubs, baths, swimming for 3 days after your procedure  · You can remove the bandage the day after the procedure.  · IF YOU RECEIVED A STEROID INJECTION. PLEASE NOTE THAT THERE MAY BE A DELAY FOR THE INJECTION TO START WORKING, THE DELAY MAY BE UP TO TWO WEEKS. IF YOU HAVE DIABETES, PLEASE NOTE THAT YOUR SUGAR LEVELS MAY BE ELEVATED FOR 1-2 DAYS AFTER A STEROID INJECTION.  THE STEROID MAY CAUSE TEMPORARY SYMPTOMS WHICH USUALLY RESOLVE ON THEIR OWN WITHIN 1 TO 2 DAYS INCLUDING FACIAL FLUSHING OR A FEELING OF WARMTH ON THE FACE, TEMPORARY INCREASES IN BLOOD SUGAR, INSOMNIA, INCREASED HUNGER  · IF YOU RECEIVED A DIAGNOSTIC PROCEDURE (SUCH AS A MEDIAL BRANCH BLOCK), PLEASE NOTE THAT WE DO EXPECT THIS INJECTION TO WEAR OFF.  IT IS IMPORTANT TO COMPLETE THE PAIN DIARY AND LIST THE PAIN SCORE ONLY FOR THE REGION WHERE THE PROCEDURE WAS AND BRING THIS TO YOUR FOLLOW UP VISIT.  · IF YOU RECEIVED A RADIOFREQUENCY PROCEDURE, THERE MAY BE SOME SORENESS AFTER THE PROCEDURE.  THIS IS NORMAL.  · IF YOU EXPERIENCE PROLONGED WEAKNESS LONGER THAN ONE DAY, BOWEL OR BLADDER INCONTINENCE THEN PLEASE CALL THE PHYSIATRY OFFICE.  · Your leg may feel heavy, weak and numb for up to 1-2 days. Be very careful walking.   ·  You may resume normal activities 3 days after procedure.

## 2020-07-31 NOTE — PROGRESS NOTES
Follow-up patient note    Physiatry (physical medicine and  Rehabilitation), interventional spine and sports medicine    Date of Service: 07/31/2020    Chief complaint:   Chief Complaint   Patient presents with   • Follow-Up     Back pain       HISTORY    HPI: Grisel Mark 65 y.o. female who presents today for follow-up evaluation of low back and leg pain.     Since the last visit, she reports that she has less pain that at the visit on 07/17/2020.  No     She reports that her pain is better than it was.  Her pain was 8/10 on the NRS.  She has noted more pain in the left leg when she sits for a long time.  Walking seems to aggravate the back and laying down too much worsens pain in the back.    It sounds like she has had some good bowel movements and this has helped.  Also, she has been taking the muscle relaxant and this has been helpful.  When she did not have them for two days, her pain was worse.  Still, it is much better than at last visit on 07/17/2020.  Sneezing is not painful now.    Otherwise, she continues to take her MS Contin 15mg po q12h, which helps with some of her pain.  She is taking duloxetine 90mg daily.  Her medications seem to help the most for the first few hours after taking MS Contin.  It sounds like she has been trying to be as active as she can.    She is now taking her colace daily and up to twice a day.  Continuing metamucil.  This has helped with regular bowel movements.    Continuing gabapentin 600mg po tid.        Medical records review:  Dr. Francisco Olmos, plan to increase duloxetine 90mg daily, discontinue buspirone 20mg po bid, start buproprion XL 150mg daily, continue brexipiprazole 1mg qhs, hydroxyzine 25mg po tid prn, clonazepam 0.5mg daily prn    Review of records   Dr. Dheeraj De La Rosa:  08/20/2019 Right L3-4, L4-5, L5-S1 radiofrequency ablation    I reviewed the note from the referring provider Peter Bruce * dated 02/05/2020: She was seen to establish care,  having just moved from [place].  She was seen for essential hypertension, mixed hyperlipidemia, DM2 in obese, hypothyroidism, recurrent MDD in partial depression/anxiety, GERD without esophagitis, chronic bilateral low back pain with bilateral sciatica.  Medications associated with the above were written, related labs ordered including CBC, CMP, Hb A1c, lipids, microalbumin, TSH, free thryoxine, referral to ps\ychiatry, referral to GI for colonoscopy and referral to pain clinic.    Previous treatments:    Physical Therapy: Yes    Medications the patient is tried: Narcotics, gabapentin and muscle relaxers    Previous interventions: [facility] these included right lumbar radiofrequency procedures    Previous surgeries to relieve the above pain:  Surgery on October 28, 1998      ROS: Unchanged from 07/17/2020 except as noted in the HPI   CV: irregular heart, reports history of tachycardia  Psych: depression since 1995  Heme: anemia  Endo: hypothyroidism, diabetes    Red Flags ROS:   Fever, Chills, Sweats: Denies  Involuntary Weight Loss: Denies  Bladder Incontinence: Denies  Bowel Incontinence: Denies  Saddle Anesthesia: Denies    All other systems reviewed and negative.       PMHx:   Past Medical History:   Diagnosis Date   • Anxiety    • Chronic back pain    • Constipation    • Depression    • Diabetes (HCC)    • GERD (gastroesophageal reflux disease)    • Heart murmur     tachycardia   • Hyperlipidemia    • Hypertension    • Thyroid disease        PSHx:   Past Surgical History:   Procedure Laterality Date   • ABDOMINAL HYSTERECTOMY TOTAL     • CARPAL TUNNEL RELEASE     • CHOLECYSTECTOMY     • LAMINOTOMY         Family history   Family History   Problem Relation Age of Onset   • Cancer Mother    • Stroke Father         Heart attack   • Dementia Sister    • Stroke Brother         heart Attack   • Cancer Brother         Skin   • Diabetes Brother    • Kidney Disease Brother    • Cancer  Brother    • Parkinson's Disease Sister    • No Known Problems Sister    • Diabetes Daughter    • Hypertension Daughter          Medications:   Current Outpatient Medications   Medication   • methocarbamol (ROBAXIN) 750 MG Tab   • HYDROcodone-acetaminophen (NORCO) 5-325 MG Tab per tablet   • DULoxetine (CYMBALTA) 30 MG Cap DR Particles   • busPIRone (BUSPAR) 10 MG Tab tablet   • morphine ER (MS CONTIN) 15 MG Tab CR tablet   • clonazePAM (KLONOPIN) 1 MG Tab   • rosuvastatin (CRESTOR) 10 MG Tab   • levothyroxine (SYNTHROID) 50 MCG Tab   • metoprolol (LOPRESSOR) 25 MG Tab   • estradiol (ESTRACE) 1 MG Tab   • Empagliflozin (JARDIANCE) 25 MG Tab   • lisinopril (PRINIVIL) 10 MG Tab   • metformin (GLUCOPHAGE) 1000 MG tablet   • gabapentin (NEURONTIN) 600 MG tablet   • omeprazole (PRILOSEC) 40 MG delayed-release capsule   • vitamin D, Ergocalciferol, (DRISDOL) 1.25 MG (28294 UT) Cap capsule   • diclofenac EC (VOLTAREN) 75 MG Tablet Delayed Response   • Blood Glucose Meter Kit     No current facility-administered medications for this visit.        Allergies:   No Known Allergies    Social Hx:   Social History     Socioeconomic History   • Marital status:      Spouse name: Not on file   • Number of children: Not on file   • Years of education: Not on file   • Highest education level: Not on file   Occupational History   • Not on file   Social Needs   • Financial resource strain: Not on file   • Food insecurity     Worry: Not on file     Inability: Not on file   • Transportation needs     Medical: Not on file     Non-medical: Not on file   Tobacco Use   • Smoking status: Light Tobacco Smoker     Packs/day: 0.25     Types: Cigarettes   • Smokeless tobacco: Never Used   Substance and Sexual Activity   • Alcohol use: Not Currently     Comment: rare   • Drug use: Never   • Sexual activity: Not Currently   Lifestyle   • Physical activity     Days per week: Not on file     Minutes per session: Not on file   • Stress: Not on  "file   Relationships   • Social connections     Talks on phone: Not on file     Gets together: Not on file     Attends Anabaptist service: Not on file     Active member of club or organization: Not on file     Attends meetings of clubs or organizations: Not on file     Relationship status: Not on file   • Intimate partner violence     Fear of current or ex partner: Not on file     Emotionally abused: Not on file     Physically abused: Not on file     Forced sexual activity: Not on file   Other Topics Concern   •  Service No   • Blood Transfusions Yes   • Caffeine Concern No   • Occupational Exposure No   • Hobby Hazards No   • Sleep Concern Yes   • Stress Concern Yes   • Weight Concern Yes   • Special Diet No   • Back Care No   • Exercise Yes   • Bike Helmet No   • Seat Belt Yes   • Self-Exams Yes   Social History Narrative   • Not on file         EXAMINATION     Physical Exam:   Vitals: /80 (BP Location: Left arm, Patient Position: Sitting, BP Cuff Size: Small adult)   Pulse 84   Temp 36.3 °C (97.4 °F) (Temporal)   Ht 1.6 m (5' 3\")   Wt 81.6 kg (179 lb 14.3 oz)   SpO2 92%     Constitutional:   Body Habitus: Body mass index is 31.87 kg/m².  Cooperation: Fully cooperates with exam  Appearance: Well-groomed, well-nourished, not disheveled, no acute distress    Eyes: No scleral icterus, no proptosis     ENT -no obvious auditory deficits, wearing a facial mask    Skin -no rashes or lesions noted     Respiratory-  breathing comfortable on room air, no audible wheezing    Cardiovascular- no lower extremity edema is noted.     Psychiatric- alert and oriented ×3. Normal affect.     Gait - normal gait, no use of ambulatory device, antalgic.     Musculoskeletal -   Thoracic/Lumbar Spine/Sacral Spine/Hips   Inspection: no evidence of atrophy in bilateral lower extremities throughout     Pain with extension and rotation on the left.  Otherwise functional ROM    Palpation:   No tenderness to palpation in " midline at T1-T12 levels. No tenderness to palpation in the left and right of the midline T1-L3, mildly POSITIVE for tenderness to palpation in the thoracic and para-midline region in the lower lumbar levels with muscle spasms left thoracic paraspinals and lumbar paraspinals      No flank tenderness on the left to percussion    Neuro     Key points for the international standards for neurological classification of spinal cord injury (ISNCSCI) to light touch.     Dermatome R L   L2 2 2   L3 2 2   L4 2 2   L5 2 1   S1 2 2   S2 2 2       Motor Exam Lower Extremities    ? Myotome R L   Hip flexion L2 5 5   Knee extension L3 5 5   Ankle dorsiflexion L4 5 5   Toe extension L5 5 4   Ankle plantarflexion S1 5 5       Reflexes  ?  R L   Patella  2+ 2+   Achilles   2+ 1+       MEDICAL DECISION MAKING    Medical records review: see under HPI section.     DATA    Labs:   04/24/2020 UDS positive for morphine and metabolites, clonazepam  04/09/2020 Vitamin D, 25:  28L  04/09/2020 Vitamin B12: 217    Lab Results   Component Value Date/Time    SODIUM 135 04/09/2020 09:26 AM    POTASSIUM 4.7 04/09/2020 09:26 AM    CHLORIDE 100 04/09/2020 09:26 AM    CO2 21 04/09/2020 09:26 AM    ANION 14.0 04/09/2020 09:26 AM    GLUCOSE 140 (H) 04/09/2020 09:26 AM    BUN 19 04/09/2020 09:26 AM    CREATININE 0.71 04/09/2020 09:26 AM    CALCIUM 9.8 04/09/2020 09:26 AM    ASTSGOT 10 (L) 04/09/2020 09:26 AM    ALTSGPT 7 04/09/2020 09:26 AM    TBILIRUBIN 0.3 04/09/2020 09:26 AM    ALBUMIN 4.1 04/09/2020 09:26 AM    TOTPROTEIN 7.3 04/09/2020 09:26 AM    GLOBULIN 3.2 04/09/2020 09:26 AM    AGRATIO 1.3 04/09/2020 09:26 AM       No results found for: PROTHROMBTM, INR     Lab Results   Component Value Date/Time    WBC 7.6 04/09/2020 09:26 AM    RBC 4.25 04/09/2020 09:26 AM    HEMOGLOBIN 13.3 04/09/2020 09:26 AM    HEMATOCRIT 40.7 04/09/2020 09:26 AM    MCV 95.8 04/09/2020 09:26 AM    MCH 31.3 04/09/2020 09:26 AM    MCHC 32.7 (L) 04/09/2020 09:26 AM    MPV  11.1 04/09/2020 09:26 AM    NEUTSPOLYS 52.80 04/09/2020 09:26 AM    LYMPHOCYTES 34.80 04/09/2020 09:26 AM    MONOCYTES 10.10 04/09/2020 09:26 AM    EOSINOPHILS 1.40 04/09/2020 09:26 AM    BASOPHILS 0.40 04/09/2020 09:26 AM        Lab Results   Component Value Date/Time    HBA1C 6.8 (H) 04/09/2020 09:26 AM        Imaging: I personally reviewed following images, these are my reads:     MRI lumbar spine 05/05/2020  At L1-2, no central or foraminal stenosis  At L2-3, no central or foraminal stenosis  At L3-4, mild disc bulge and mild ligamentum flavum thickening.  No central canal stenosis. Borderline left foraminal stenosis. Schmorl's node.   At L4-5, diffuse disc bulge with mild ligamentum flavum thickening.  No central canal stenosis.  There is facet arthropathy, left greater than right. Mild foraminal stenosis bilaterally. S/P left L4/5 hemilaminectomy  At L5-S1, there is mild-borderline foraminal stenosis with facet arthropathy and mild disc bulge.  No central canal stenosis    Xray lumbar spine 05/05/2020  There is mild decreased joint space at L3-4 and L4-5.    Facet arthropathy is noted, greatest at L5-S1 bilaterally.  No significant motion on flexion and extension films    IMAGING radiology reads. I reviewed the following radiology reads    Xray abdomen 07/17/2020  IMPRESSION:     Nonspecific bowel gas pattern. No evidence small bowel obstruction.      MRI lumbar spine 05/05/2020  IMPRESSION:     1.  Caudal lumbar facet arthropathy left worse than right with no bony destruction to indicate septic or inflammatory arthropathy. This most likely is just degenerative     2.  Mild caudal lumbar foraminal stenoses     3.  No significant central stenosis.     4.  Left L4/5 hemilaminectomy                                                                                   Xray lumbar spine 05/05/2020     IMPRESSION:     1.  No acute lumbar compression fracture or subluxation.     2.  Posterior disc space narrowing at L4-5  and to lesser extent at L3-4.     3.  Bilateral facet arthropathy at L5-S1.                                                                                             Diagnosis   Visit Diagnoses     ICD-10-CM   1. S/P lumbar laminectomy  Z98.890   2. Muscle spasm  M62.838   3. DDD (degenerative disc disease), lumbar  M51.36   4. Lumbar spondylosis  M47.816   5. Left lumbar radiculitis  M54.16         ASSESSMENT:  Grisel Mark 65 y.o. female seen for above     Grisel was seen today for follow-up.    Diagnoses and all orders for this visit:    S/P lumbar laminectomy  -     REFERRAL TO PHYSIATRY (PMR)    Muscle spasm    DDD (degenerative disc disease), lumbar    Lumbar spondylosis    Left lumbar radiculitis  -     REFERRAL TO PHYSIATRY (PMR)       1. Discussed previously successful trial of left lumbar three through lumbar five medial branch blocks.  Plan to proceed with second block.  If the diagnostic blocks are successful, will consider radiofrequency ablation. Plan for second diagnostic block.  The risks benefits and alternatives to this procedure were discussed and the patient wishes to proceed with the procedure. Risks include but are not limited to damage to surrounding structures, infection, bleeding, worsening of pain which can be permanent, weakness which can be permanent. Benefits include pain relief, improved function. Alternatives includes not doing the procedure.     2. Discussed that we will consider left lumbar four and lumbar five TFESI in the future, plan to schedule this after the MBB.   Pain in the low back was significantly reduced with lumbar 3-5 MBB, will to work on this first.  Her axial pain is constant.  She continues to have some intermittent radicular symptoms.  Discussed left lumbar four and lumbar five TFESI.  The risks benefits and alternatives to this procedure were discussed and the patient wishes to proceed with the procedure. Risks include but are not limited to damage to  surrounding structures, infection, bleeding, worsening of pain which can be permanent, weakness which can be permanent. Benefits include pain relief, improved function. Alternatives includes not doing the procedure.  Plan to schedule after her second MBB first.  She is in agreement with the plan.  3. Continue to follow-up with Behavioral health for long-term goal of weaning klonipin.  Continue duloxetine 90mg daily.   4. Continue vitamin D3  5.  and most recent UDS reviewed.  Continue MS Contin 15mg po q12h.  Discussed that she take this twice a day.  Risks of chronic opioids discussed and we have previously discussed goal of decreasing or discontinuing klonipin as she is able.  Discussed that this might take months.  Short-term use of norco with goal using for severe break through pain.  6. Continue regular bowel movements with OTC medications as needed.   Advised taking colase 100mg daily and miralax prn for constipation.  Decrease colace to QOD if she develops loose stools.      Follow-up: Return for Hospital injection.    Thank you very much for asking me to participate in Grisel Mark's care.  Please contact me with any questions or concerns.    Please note that this dictation was created using voice recognition software. I have made every reasonable attempt to correct obvious errors but there may be errors of grammar and content that I may have overlooked prior to finalization of this note.      Paresh Ogden MD  Physical Medicine and Rehabilitation  Interventional Spine and Sports Physiatry  [facility]

## 2020-08-06 RX ORDER — BUPROPION HYDROCHLORIDE 150 MG/1
TABLET ORAL
Qty: 30 TAB | Refills: 2 | Status: SHIPPED | OUTPATIENT
Start: 2020-08-06 | End: 2020-08-10

## 2020-08-10 NOTE — PREPROCEDURE INSTRUCTIONS
PAT call made 8/10/2020 at approx 14:40, spoke with pt after confirming 2 pt identifiers. Health hx, medications, allergies  reviewed with pt, as well as pre-procedure/sedation instructions. Respiratory screen completed. Pt denies being sick/symptomatic (fever, cough, SOB, diarrhea) w/in last 72 hrs, or having close contact w/ sick individuals in the past 2 wks. Pt education to notify his/her MD should he/she become symptomatic prior to procedure. Pt verbalizes understanding. Pt told to bring mask and the he/she will be wearing it entire stay. Informed pt it is recommended pt self isolate for 72 hrs or from time of this call until procedure, also the their  needs to remain on site in vehicle until pt is discharged. Pt verbalizes understanding.

## 2020-08-12 ENCOUNTER — HOSPITAL ENCOUNTER (OUTPATIENT)
Facility: REHABILITATION | Age: 65
End: 2020-08-12
Attending: PHYSICAL MEDICINE & REHABILITATION | Admitting: PHYSICAL MEDICINE & REHABILITATION
Payer: MEDICARE

## 2020-08-12 ENCOUNTER — APPOINTMENT (OUTPATIENT)
Dept: RADIOLOGY | Facility: REHABILITATION | Age: 65
End: 2020-08-12
Attending: PHYSICAL MEDICINE & REHABILITATION
Payer: MEDICARE

## 2020-08-12 ENCOUNTER — APPOINTMENT (OUTPATIENT)
Dept: PAIN MANAGEMENT | Facility: REHABILITATION | Age: 65
End: 2020-08-12
Attending: PHYSICAL MEDICINE & REHABILITATION
Payer: MEDICARE

## 2020-08-12 VITALS
HEIGHT: 63 IN | TEMPERATURE: 96.6 F | OXYGEN SATURATION: 93 % | WEIGHT: 177.47 LBS | DIASTOLIC BLOOD PRESSURE: 68 MMHG | RESPIRATION RATE: 16 BRPM | SYSTOLIC BLOOD PRESSURE: 103 MMHG | BODY MASS INDEX: 31.45 KG/M2 | HEART RATE: 66 BPM

## 2020-08-12 PROCEDURE — 64484 NJX AA&/STRD TFRM EPI L/S EA: CPT

## 2020-08-12 PROCEDURE — 700117 HCHG RX CONTRAST REV CODE 255

## 2020-08-12 PROCEDURE — 700101 HCHG RX REV CODE 250

## 2020-08-12 PROCEDURE — 700111 HCHG RX REV CODE 636 W/ 250 OVERRIDE (IP)

## 2020-08-12 PROCEDURE — 64483 NJX AA&/STRD TFRM EPI L/S 1: CPT

## 2020-08-12 PROCEDURE — 64494 INJ PARAVERT F JNT L/S 2 LEV: CPT

## 2020-08-12 PROCEDURE — 64493 INJ PARAVERT F JNT L/S 1 LEV: CPT

## 2020-08-12 RX ORDER — DEXAMETHASONE SODIUM PHOSPHATE 10 MG/ML
20 INJECTION, SOLUTION INTRAMUSCULAR; INTRAVENOUS ONCE
Status: DISCONTINUED | OUTPATIENT
Start: 2020-08-12 | End: 2020-08-12 | Stop reason: HOSPADM

## 2020-08-12 RX ORDER — DEXAMETHASONE SODIUM PHOSPHATE 10 MG/ML
INJECTION, SOLUTION INTRAMUSCULAR; INTRAVENOUS
Status: COMPLETED
Start: 2020-08-12 | End: 2020-08-12

## 2020-08-12 RX ORDER — BUPIVACAINE HYDROCHLORIDE 5 MG/ML
INJECTION, SOLUTION EPIDURAL; INTRACAUDAL
Status: COMPLETED
Start: 2020-08-12 | End: 2020-08-12

## 2020-08-12 RX ORDER — LIDOCAINE HYDROCHLORIDE 20 MG/ML
INJECTION, SOLUTION EPIDURAL; INFILTRATION; INTRACAUDAL; PERINEURAL
Status: COMPLETED
Start: 2020-08-12 | End: 2020-08-12

## 2020-08-12 RX ADMIN — DEXAMETHASONE SODIUM PHOSPHATE 10 MG: 10 INJECTION, SOLUTION INTRAMUSCULAR; INTRAVENOUS at 08:26

## 2020-08-12 RX ADMIN — LIDOCAINE HYDROCHLORIDE 2 ML: 20 INJECTION, SOLUTION EPIDURAL; INFILTRATION; INTRACAUDAL; PERINEURAL at 08:23

## 2020-08-12 RX ADMIN — IOHEXOL 2 ML: 240 INJECTION, SOLUTION INTRATHECAL; INTRAVASCULAR; INTRAVENOUS; ORAL at 08:22

## 2020-08-12 RX ADMIN — LIDOCAINE HYDROCHLORIDE 2 ML: 20 INJECTION, SOLUTION EPIDURAL; INFILTRATION; INTRACAUDAL; PERINEURAL at 08:26

## 2020-08-12 ASSESSMENT — FIBROSIS 4 INDEX: FIB4 SCORE: .934132727969572995

## 2020-08-12 NOTE — NON-PROVIDER
Pt tolerated food and fluid post procedure without nausea or vomiting. Ambulated without difficulty. Discharged into care of responsible adult.Tegederm with guaze clean,dry and intact.

## 2020-08-12 NOTE — NON-PROVIDER
Current meds. See medication reconciliation form. Reviewed with pt. Pt denies taking ASA,other blood thinners or anti-inflammatories. Pre-procedure assessment completed and information  communicated to procedure room RN during handoff. Pt has a ride post-procedure (daughter is ). Printed and verbal discharge instructions as well as education regarding infection prevention given to pt/pt's family who verbalized understanding. Pt's blood sugar was 118 at 0630 today, taken at home.Dr Ogden made aware pre-procedure.

## 2020-08-12 NOTE — INTERVAL H&P NOTE
H&P updated today for procedure.    CV: RRR, nl S1, S2  Lungs: Clear to auscultation bilaterally    We discussed that we elect to proceed with left lumbar four and lumbar five TFESI today based on her worsening leg symptoms.    Paresh Ogden MD  Physical Medicine and Rehabilitation  Interventional Spine and Sports Physiatry  [facility]

## 2020-08-12 NOTE — PROCEDURES
Pre-operative Diagnosis: s/p lumbar laminectomy (Z98.890)                     Left lumbar radiculitis (M54.16)     Post-operative Diagnosis:s/p lumbar laminectomy (Z98.890)                     Left lumbar radiculitis (M54.16)     Procedure: Left Lumbar Transforaminal Epidural Steroid  at the L4-5 and L5-S1 levels.      Type of Anesthesia: Local anesthesia  Blood Loss: None  Physician: Paresh Ogden MD     Description of procedure:     The risks, benefits, and alternatives of the procedure were reviewed and discussed with the patient.  Written informed consent was freely obtained. A pre-procedural time-out was conducted by the physician verifying patient’s identity, procedure to be performed, procedure site and side, and allergy verification. Appropriate equipment was determined to be in place for the procedure.      Method of Procedure: The patient signed an informed consent form in the pre-op area after all risks and complications were discussed and all questions were answered.     The patient was prepped and draped in a sterile fashion in the prone position.  The patient's spine was surveyed under fluoroscopic visualization and anatomical landmarks were identified.     The patient's vital signs were carefully monitored before, throughout, and after the procedure.      Left L4 transforaminal epidural steroid injection     The fluoroscope was placed over the lumbar spine and adjusted into the proper AP/Oblique view to enter the transforaminal space at the levels below. The targets for injection were then marked at the left L4-5. A 25g 1.5 inch needle was placed into the marked site, and approx 2cc of 1% Lidocaine was injected subcutaneously into the epidermal and dermal layers. The needle was removed. A 25g 3.5 inch spinal needle was then placed and advanced under fluoroscopic guidance in an oblique view towards the subpedicular epidural space of the levels noted below. The needle position was confirmed to not be  "past the 6 o'clock position in the AP view and it was in the neural foramen and the lateral view. Under live fluoroscopic guidance in the AP view, contrast dye was used to highlight the epidural space spread.  Following negative aspiration, approx 1mL of 2% lidocaine preservative free with 1 cc of dexamethasone(10mg/cc)  and 0.5ml of preservative free normal saline was then injected at each level, and the needle was removed leaving a trail of 1% lidocaine. The patient's back was covered with a 4x4 gauze, the area was cleansed with sterile normal saline, and a dressing was applied. There were no complications noted.      Perisheathogram and Spot Film:  After Omnipaque was injected, a left L4 \"perisheathogram\" occurred without evidence of subarachnoid or vascular flow.  A spot films was ordered in AP and lateral to confirm the correct needle tip placement.     Leftt L5 transforaminal epidural steroid injection     The fluoroscope was placed over the lumbar spine and adjusted into the proper AP/Oblique view to enter the transforaminal space at the levels below. The targets for injection were then marked at the left L5-S1. A 25g 1.5 inch needle was placed into the marked site, and approx 2cc of 1% Lidocaine was injected subcutaneously into the epidermal and dermal layers. The needle was removed. A 25g 3.5 inch spinal needle was then placed and advanced under fluoroscopic guidance in an oblique view towards the subpedicular epidural space of the levels noted below. The needle position was confirmed to not be past the 6 o'clock position in the AP view and it was in the neural foramen and the lateral view. Under live fluoroscopic guidance in the AP view, contrast dye was used to highlight the epidural space spread.  Following negative aspiration, approx 2.0cc of a solution containing 1cc of 2% lidocaine preservative free with 1cc of dexamethasone(10mg/cc) and 1cc of normal saline was then injected, and the needles were " "removed intact after restyleted. The patient's back was covered with a 4x4 gauze, the area was cleansed with sterile normal saline, and a dressing was applied. There were no complications noted.         Perisheathogram and Spot Film:  After Omnipaque was injected, a left L5 \"perisheathogram\" occurred without evidence of subarachnoid or vascular flow.  A spot films was ordered in AP and lateral to confirm the correct needle tip placement.     The patient was then evaluated post-procedure, and was hemodynamically stable prior to leaving the post-operative care unit.      Paresh Ogden MD  Physical Medicine and Rehabilitation  Interventional Spine and Sports Physiatry  [facility]       "

## 2020-08-12 NOTE — NON-PROVIDER
Pre- procedure nursing checklist  assessment done.  Consent signed and H&P updated. Patient escorted   to procedure room. Positioned onto procedure table and necessary monitoring equipment connected ( BP,  pulse oximeter ) with the help by  team  (ST,  X-ray TECH and RN ). Hands supported with stool underneath headrest of the bed , lower extremities supported with pillow.

## 2020-08-12 NOTE — NON-PROVIDER
Patient identified. Site and procedure confirmed and rajwinder by Dr. Ogden .Medication allergies. Reviewed pertinent medical history ( type 2 DM, SWATHI, seizure).  Patient using CPAP machine, blood sugar this morning was 118 mg. /dl.Aspirin 81 mg and Voltaren off for 4 days. .Timeout completed, team and patient agreed.

## 2020-08-13 ENCOUNTER — TELEPHONE (OUTPATIENT)
Dept: PHYSICAL MEDICINE AND REHAB | Facility: MEDICAL CENTER | Age: 65
End: 2020-08-13

## 2020-08-13 NOTE — TELEPHONE ENCOUNTER
I called patient after her Special procedure Left lumbar four and lumbar five transforaminal epidural steroid injection and she stated she is doing ok , yesterday  She was a little sore but today she is ok and she is icing the area. RR

## 2020-08-18 ENCOUNTER — OFFICE VISIT (OUTPATIENT)
Dept: PHYSICAL MEDICINE AND REHAB | Facility: MEDICAL CENTER | Age: 65
End: 2020-08-18
Payer: MEDICARE

## 2020-08-18 VITALS
SYSTOLIC BLOOD PRESSURE: 128 MMHG | HEIGHT: 63 IN | OXYGEN SATURATION: 92 % | HEART RATE: 79 BPM | TEMPERATURE: 97.1 F | DIASTOLIC BLOOD PRESSURE: 64 MMHG | WEIGHT: 176.37 LBS | BODY MASS INDEX: 31.25 KG/M2

## 2020-08-18 DIAGNOSIS — Z98.890 S/P LUMBAR LAMINECTOMY: ICD-10-CM

## 2020-08-18 DIAGNOSIS — M62.838 MUSCLE SPASM: ICD-10-CM

## 2020-08-18 DIAGNOSIS — E55.9 VITAMIN D DEFICIENCY: ICD-10-CM

## 2020-08-18 DIAGNOSIS — M51.36 DDD (DEGENERATIVE DISC DISEASE), LUMBAR: ICD-10-CM

## 2020-08-18 DIAGNOSIS — M54.16 LEFT LUMBAR RADICULITIS: ICD-10-CM

## 2020-08-18 DIAGNOSIS — M47.816 LUMBAR SPONDYLOSIS: ICD-10-CM

## 2020-08-18 PROCEDURE — 99214 OFFICE O/P EST MOD 30 MIN: CPT | Performed by: PHYSICAL MEDICINE & REHABILITATION

## 2020-08-18 RX ORDER — NALOXONE HYDROCHLORIDE 4 MG/.1ML
SPRAY NASAL
Qty: 1 EACH | Refills: 0 | Status: SHIPPED | OUTPATIENT
Start: 2020-08-18 | End: 2020-09-17 | Stop reason: SDUPTHER

## 2020-08-18 RX ORDER — MORPHINE SULFATE 15 MG/1
15 TABLET, FILM COATED, EXTENDED RELEASE ORAL EVERY 12 HOURS
Qty: 60 TAB | Refills: 0 | Status: SHIPPED | OUTPATIENT
Start: 2020-08-28 | End: 2020-09-22 | Stop reason: SDUPTHER

## 2020-08-18 ASSESSMENT — PATIENT HEALTH QUESTIONNAIRE - PHQ9
SUM OF ALL RESPONSES TO PHQ QUESTIONS 1-9: 4
5. POOR APPETITE OR OVEREATING: 0 - NOT AT ALL
CLINICAL INTERPRETATION OF PHQ2 SCORE: 2

## 2020-08-18 ASSESSMENT — FIBROSIS 4 INDEX: FIB4 SCORE: .934132727969572995

## 2020-08-18 ASSESSMENT — PAIN SCALES - GENERAL: PAINLEVEL: 9=SEVERE PAIN

## 2020-08-18 NOTE — PROGRESS NOTES
Follow-up patient note    Physiatry (physical medicine and  Rehabilitation), interventional spine and sports medicine    Date of Service: 08/18/2020    Chief complaint:   Chief Complaint   Patient presents with   • Follow-Up     Lower back pain       HISTORY    HPI: Grisel Mark 65 y.o. female who presents today for follow-up evaluation of low back and leg pain.     Grisel returns for follow-up regarding her low back and leg pain.  She had a left L4 and L5 TFESI on 08/12/2020.   Her leg pain has significantly improved.  The left ankle and leg pain is much better.  Her back pain continues.  She did have a temporary increase in her glucose levels, up to 180.    Overall, her back pain is a 9/10 on the NRS.  This is axial pain.  She does not feel like her leg is going to give out.  Walking is more comfortable.  Laying down is more comfortable as well.     She continues to take MS Contin 15mg po q12h.  This helps with her pain.  She does not report side effects.  She has not had to take norco 5/325 and still has some at home.    Her bowel movements are regular.  She takes colace twice a day and metamucil.  She seems to be able to take the colace more once a day.    Continuing gabapentin 600mg po tid.        Medical records review:  Dr. Francisco Olmos, plan to increase duloxetine 90mg daily, discontinue buspirone 20mg po bid, start buproprion XL 150mg daily, continue brexipiprazole 1mg qhs, hydroxyzine 25mg po tid prn, clonazepam 0.5mg daily prn    Review of records   Dr. Dheeraj De La Rosa:  08/20/2019 Right L3-4, L4-5, L5-S1 radiofrequency ablation    I reviewed the note from the referring provider Peter Bruce * dated 02/05/2020: She was seen to establish care, having just moved from [place].  She was seen for essential hypertension, mixed hyperlipidemia, DM2 in obese, hypothyroidism, recurrent MDD in partial depression/anxiety, GERD without esophagitis, chronic bilateral low back pain with bilateral  sciatica.  Medications associated with the above were written, related labs ordered including CBC, CMP, Hb A1c, lipids, microalbumin, TSH, free thryoxine, referral to ps\ychiatry, referral to GI for colonoscopy and referral to pain clinic.    Previous treatments:    Physical Therapy: Yes    Medications the patient is tried: Narcotics, gabapentin and muscle relaxers    Previous interventions: [facility] these included right lumbar radiofrequency procedures    Previous surgeries to relieve the above pain:  Surgery on October 28, 1998      ROS: Unchanged from 07/31/2020 except as noted in the HPI   CV: irregular heart, reports history of tachycardia  Psych: depression since 1995  Heme: anemia  Endo: hypothyroidism, diabetes    Red Flags ROS:   Fever, Chills, Sweats: Denies  Involuntary Weight Loss: Denies  Bladder Incontinence: Denies  Bowel Incontinence: Denies  Saddle Anesthesia: Denies    All other systems reviewed and negative.       PMHx:   Past Medical History:   Diagnosis Date   • Anxiety    • Chronic back pain    • Constipation    • Depression    • Diabetes (HCC)    • GERD (gastroesophageal reflux disease)    • Heart murmur     tachycardia   • Hyperlipidemia    • Hypertension    • Thyroid disease        PSHx:   Past Surgical History:   Procedure Laterality Date   • PB INJ,FORAMEN,L/S,1 LEVEL Left 8/12/2020    Procedure: INJECTION, SPINE, LUMBOSACRAL, EPIDURAL, 1 LEVEL, TRANSFORAMINAL APPROACH;  Surgeon: Paresh Ogden M.D.;  [facility];  Service: Pain Management   • PB INJ,FORAMEN,L/S,ADDL LEVELS Left 8/12/2020    Procedure: INJECTION, SPINE, LUMBOSACRAL, EPIDURAL, TRANSFORAMINAL APPROACH;  Surgeon: Paresh Ogden M.D.;  Location: [facility];  Service: Pain Management   • ABDOMINAL HYSTERECTOMY TOTAL     • CARPAL TUNNEL RELEASE     • CHOLECYSTECTOMY     • LAMINOTOMY         Family history   Family History   Problem Relation Age of  Onset   • Cancer Mother    • Stroke Father         Heart attack   • Dementia Sister    • Stroke Brother         heart Attack   • Cancer Brother         Skin   • Diabetes Brother    • Kidney Disease Brother    • Cancer Brother    • Parkinson's Disease Sister    • No Known Problems Sister    • Diabetes Daughter    • Hypertension Daughter          Medications:   Current Outpatient Medications   Medication   • [START ON 8/28/2020] morphine ER (MS CONTIN) 15 MG Tab CR tablet   • Naloxone (NARCAN) 4 MG/0.1ML Liquid   • HYDROcodone-acetaminophen (NORCO) 5-325 MG Tab per tablet   • DULoxetine (CYMBALTA) 30 MG Cap DR Particles   • busPIRone (BUSPAR) 10 MG Tab tablet   • clonazePAM (KLONOPIN) 1 MG Tab   • rosuvastatin (CRESTOR) 10 MG Tab   • levothyroxine (SYNTHROID) 50 MCG Tab   • metoprolol (LOPRESSOR) 25 MG Tab   • Blood Glucose Meter Kit   • estradiol (ESTRACE) 1 MG Tab   • Empagliflozin (JARDIANCE) 25 MG Tab   • lisinopril (PRINIVIL) 10 MG Tab   • metformin (GLUCOPHAGE) 1000 MG tablet   • gabapentin (NEURONTIN) 600 MG tablet   • omeprazole (PRILOSEC) 40 MG delayed-release capsule   • vitamin D, Ergocalciferol, (DRISDOL) 1.25 MG (96386 UT) Cap capsule   • diclofenac EC (VOLTAREN) 75 MG Tablet Delayed Response     No current facility-administered medications for this visit.        Allergies:   No Known Allergies    Social Hx:   Social History     Socioeconomic History   • Marital status:      Spouse name: Not on file   • Number of children: Not on file   • Years of education: Not on file   • Highest education level: Not on file   Occupational History   • Not on file   Social Needs   • Financial resource strain: Not on file   • Food insecurity     Worry: Not on file     Inability: Not on file   • Transportation needs     Medical: Not on file     Non-medical: Not on file   Tobacco Use   • Smoking status: Light Tobacco Smoker     Packs/day: 0.25     Types: Cigarettes   • Smokeless tobacco: Never Used   Substance and  "Sexual Activity   • Alcohol use: Not Currently     Comment: rare   • Drug use: Never   • Sexual activity: Not Currently   Lifestyle   • Physical activity     Days per week: Not on file     Minutes per session: Not on file   • Stress: Not on file   Relationships   • Social connections     Talks on phone: Not on file     Gets together: Not on file     Attends Church service: Not on file     Active member of club or organization: Not on file     Attends meetings of clubs or organizations: Not on file     Relationship status: Not on file   • Intimate partner violence     Fear of current or ex partner: Not on file     Emotionally abused: Not on file     Physically abused: Not on file     Forced sexual activity: Not on file   Other Topics Concern   •  Service No   • Blood Transfusions Yes   • Caffeine Concern No   • Occupational Exposure No   • Hobby Hazards No   • Sleep Concern Yes   • Stress Concern Yes   • Weight Concern Yes   • Special Diet No   • Back Care No   • Exercise Yes   • Bike Helmet No   • Seat Belt Yes   • Self-Exams Yes   Social History Narrative   • Not on file         EXAMINATION     Physical Exam:   Vitals: /64 (BP Location: Left arm, Patient Position: Sitting, BP Cuff Size: Small adult)   Pulse 79   Temp 36.2 °C (97.1 °F) (Temporal)   Ht 1.6 m (5' 3\")   Wt 80 kg (176 lb 5.9 oz)   SpO2 92%     Constitutional:   Body Habitus: Body mass index is 31.24 kg/m².  Cooperation: Fully cooperates with exam  Appearance: Well-groomed, well-nourished, not disheveled, no acute distress    Eyes: No scleral icterus, no proptosis     ENT -no obvious auditory deficits, wearing a facial mask    Skin -no rashes or lesions noted, no lesions near site of injection from 08/12/2020    Respiratory-  breathing comfortable on room air, no audible wheezing    Cardiovascular- no lower extremity edema is noted.     Psychiatric- alert and oriented ×3. Normal affect.     Gait - normal gait, no use of ambulatory " device, antalgic.     Musculoskeletal -   Thoracic/Lumbar Spine/Sacral Spine/Hips   Inspection: no evidence of atrophy in bilateral lower extremities throughout     Pain with extension and rotation on the left.  Otherwise functional ROM    Palpation:   No tenderness to palpation in midline at T1-T12 levels. No tenderness to palpation in the left and right of the midline T1-L3, mildly POSITIVE for tenderness to palpation in the thoracic and para-midline region in the lower lumbar levels with mild muscle spasms left thoracic paraspinals and lumbar paraspinals      Neuro     Key points for the international standards for neurological classification of spinal cord injury (ISNCSCI) to light touch.     Dermatome R L   L2 2 2   L3 2 2   L4 2 2   L5 2 2   S1 2 2   S2 2 2       Motor Exam Lower Extremities    ? Myotome R L   Hip flexion L2 5 5   Knee extension L3 5 5   Ankle dorsiflexion L4 5 5   Toe extension L5 5 5   Ankle plantarflexion S1 5 5       Reflexes  ?  R L   Patella  2+ 2+   Achilles   2+ 1+       MEDICAL DECISION MAKING    Medical records review: see under HPI section.     DATA    Labs:   04/24/2020 UDS positive for morphine and metabolites, clonazepam  04/09/2020 Vitamin D, 25:  28L  04/09/2020 Vitamin B12: 217    Lab Results   Component Value Date/Time    SODIUM 135 04/09/2020 09:26 AM    POTASSIUM 4.7 04/09/2020 09:26 AM    CHLORIDE 100 04/09/2020 09:26 AM    CO2 21 04/09/2020 09:26 AM    ANION 14.0 04/09/2020 09:26 AM    GLUCOSE 140 (H) 04/09/2020 09:26 AM    BUN 19 04/09/2020 09:26 AM    CREATININE 0.71 04/09/2020 09:26 AM    CALCIUM 9.8 04/09/2020 09:26 AM    ASTSGOT 10 (L) 04/09/2020 09:26 AM    ALTSGPT 7 04/09/2020 09:26 AM    TBILIRUBIN 0.3 04/09/2020 09:26 AM    ALBUMIN 4.1 04/09/2020 09:26 AM    TOTPROTEIN 7.3 04/09/2020 09:26 AM    GLOBULIN 3.2 04/09/2020 09:26 AM    AGRATIO 1.3 04/09/2020 09:26 AM       No results found for: PROTHROMBTM, INR     Lab Results   Component Value Date/Time    WBC 7.6  04/09/2020 09:26 AM    RBC 4.25 04/09/2020 09:26 AM    HEMOGLOBIN 13.3 04/09/2020 09:26 AM    HEMATOCRIT 40.7 04/09/2020 09:26 AM    MCV 95.8 04/09/2020 09:26 AM    MCH 31.3 04/09/2020 09:26 AM    MCHC 32.7 (L) 04/09/2020 09:26 AM    MPV 11.1 04/09/2020 09:26 AM    NEUTSPOLYS 52.80 04/09/2020 09:26 AM    LYMPHOCYTES 34.80 04/09/2020 09:26 AM    MONOCYTES 10.10 04/09/2020 09:26 AM    EOSINOPHILS 1.40 04/09/2020 09:26 AM    BASOPHILS 0.40 04/09/2020 09:26 AM        Lab Results   Component Value Date/Time    HBA1C 6.8 (H) 04/09/2020 09:26 AM        Imaging: I personally reviewed following images, these are my reads:     MRI lumbar spine 05/05/2020  At L1-2, no central or foraminal stenosis  At L2-3, no central or foraminal stenosis  At L3-4, mild disc bulge and mild ligamentum flavum thickening.  No central canal stenosis. Borderline left foraminal stenosis. Schmorl's node.   At L4-5, diffuse disc bulge with mild ligamentum flavum thickening.  No central canal stenosis.  There is facet arthropathy, left greater than right. Mild foraminal stenosis bilaterally. S/P left L4/5 hemilaminectomy  At L5-S1, there is mild-borderline foraminal stenosis with facet arthropathy and mild disc bulge.  No central canal stenosis    Xray lumbar spine 05/05/2020  There is mild decreased joint space at L3-4 and L4-5.    Facet arthropathy is noted, greatest at L5-S1 bilaterally.  No significant motion on flexion and extension films    IMAGING radiology reads. I reviewed the following radiology reads    Xray abdomen 07/17/2020  IMPRESSION:     Nonspecific bowel gas pattern. No evidence small bowel obstruction.      MRI lumbar spine 05/05/2020  IMPRESSION:     1.  Caudal lumbar facet arthropathy left worse than right with no bony destruction to indicate septic or inflammatory arthropathy. This most likely is just degenerative     2.  Mild caudal lumbar foraminal stenoses     3.  No significant central stenosis.     4.  Left L4/5  hemilaminectomy                                                                                   Xray lumbar spine 05/05/2020     IMPRESSION:     1.  No acute lumbar compression fracture or subluxation.     2.  Posterior disc space narrowing at L4-5 and to lesser extent at L3-4.     3.  Bilateral facet arthropathy at L5-S1.                                                                                             Diagnosis   Visit Diagnoses     ICD-10-CM   1. S/P lumbar laminectomy  Z98.890   2. Lumbar spondylosis  M47.816   3. DDD (degenerative disc disease), lumbar  M51.36   4. Left lumbar radiculitis  M54.16   5. Vitamin D deficiency  E55.9   6. Muscle spasm  M62.838         ASSESSMENT:  Grisel Mark 65 y.o. female seen for above     Grisel was seen today for follow-up.    Diagnoses and all orders for this visit:    S/P lumbar laminectomy  -     morphine ER (MS CONTIN) 15 MG Tab CR tablet; Take 1 Tab by mouth every 12 hours for 30 days.  -     Consent for Opiate Prescription  -     Controlled Substance Treatment Agreement  -     Naloxone (NARCAN) 4 MG/0.1ML Liquid; One spray in one nostril for overdose and call 911.  -     Pain Management Screen    Lumbar spondylosis    DDD (degenerative disc disease), lumbar  -     morphine ER (MS CONTIN) 15 MG Tab CR tablet; Take 1 Tab by mouth every 12 hours for 30 days.  -     Consent for Opiate Prescription  -     Controlled Substance Treatment Agreement  -     Naloxone (NARCAN) 4 MG/0.1ML Liquid; One spray in one nostril for overdose and call 911.  -     Pain Management Screen    Left lumbar radiculitis  Comments:  improved    Vitamin D deficiency    Muscle spasm       1. She has had improvement of her left lumbar radiculitis after the left lumbar four and lumbar five TFESI.  She has continued axial back pain, as we suspected that she would.  Discussed previously successful trial of left lumbar three through lumbar five medial branch blocks.  Plan to proceed with second  block.  If the diagnostic blocks are successful, will consider radiofrequency ablation. Plan for second diagnostic block.  The risks benefits and alternatives to this procedure were discussed and the patient wishes to proceed with the procedure. Risks include but are not limited to damage to surrounding structures, infection, bleeding, worsening of pain which can be permanent, weakness which can be permanent. Benefits include pain relief, improved function. Alternatives includes not doing the procedure.   For   2. Discussed future wean of clonazepam.  She is currently taking 1mg at night.  We discussed plans for gradual wean starting in October.  If she meets with Behavioral health, they can discuss this.  Otherwise, we discussed that I can help her with a gradual wean.    3. Check UDS today.   4. Continue vitamin D3  5.  reviewed.  Continue MS Contin 15mg po q12h.  Discussed that she take this twice a day.  Risks of chronic opioids discussed and we have previously discussed goal of decreasing or discontinuing klonipin as she is able.  No norco refilled today.  Short-term use of norco with goal using for severe break through pain.  6. Continue colase 100mg daily and miralax prn for constipation.  Decrease colace to QOD if she develops loose stools. She is maintaining regular bowel movements.  7. Muscle spasms are better.  She will discontinue regular use of muscle relaxants.      Follow-up: Return in about 5 weeks (around 9/22/2020) for Hospital injection.    Thank you very much for asking me to participate in Grisel Mark's care.  Please contact me with any questions or concerns.    Please note that this dictation was created using voice recognition software. I have made every reasonable attempt to correct obvious errors but there may be errors of grammar and content that I may have overlooked prior to finalization of this note.      Paresh Ogden MD  Physical Medicine and Rehabilitation  Interventional Spine  and Sports Physiatry  [facility]

## 2020-08-18 NOTE — H&P (VIEW-ONLY)
Follow-up patient note    Physiatry (physical medicine and  Rehabilitation), interventional spine and sports medicine    Date of Service: 08/18/2020    Chief complaint:   Chief Complaint   Patient presents with   • Follow-Up     Lower back pain       HISTORY    HPI: Grisel Mark 65 y.o. female who presents today for follow-up evaluation of low back and leg pain.     Grisel returns for follow-up regarding her low back and leg pain.  She had a left L4 and L5 TFESI on 08/12/2020.   Her leg pain has significantly improved.  The left ankle and leg pain is much better.  Her back pain continues.  She did have a temporary increase in her glucose levels, up to 180.    Overall, her back pain is a 9/10 on the NRS.  This is axial pain.  She does not feel like her leg is going to give out.  Walking is more comfortable.  Laying down is more comfortable as well.     She continues to take MS Contin 15mg po q12h.  This helps with her pain.  She does not report side effects.  She has not had to take norco 5/325 and still has some at home.    Her bowel movements are regular.  She takes colace twice a day and metamucil.  She seems to be able to take the colace more once a day.    Continuing gabapentin 600mg po tid.        Medical records review:  Dr. Francisco Olmos, plan to increase duloxetine 90mg daily, discontinue buspirone 20mg po bid, start buproprion XL 150mg daily, continue brexipiprazole 1mg qhs, hydroxyzine 25mg po tid prn, clonazepam 0.5mg daily prn    Review of records   Dr. Dheeraj De La Rosa:  08/20/2019 Right L3-4, L4-5, L5-S1 radiofrequency ablation    I reviewed the note from the referring provider Peter Bruce * dated 02/05/2020: She was seen to establish care, having just moved from [place].  She was seen for essential hypertension, mixed hyperlipidemia, DM2 in obese, hypothyroidism, recurrent MDD in partial depression/anxiety, GERD without esophagitis, chronic bilateral low back pain with bilateral  sciatica.  Medications associated with the above were written, related labs ordered including CBC, CMP, Hb A1c, lipids, microalbumin, TSH, free thryoxine, referral to ps\ychiatry, referral to GI for colonoscopy and referral to pain clinic.    Previous treatments:    Physical Therapy: Yes    Medications the patient is tried: Narcotics, gabapentin and muscle relaxers    Previous interventions: [facility] these included right lumbar radiofrequency procedures    Previous surgeries to relieve the above pain:  Surgery on October 28, 1998      ROS: Unchanged from 07/31/2020 except as noted in the HPI   CV: irregular heart, reports history of tachycardia  Psych: depression since 1995  Heme: anemia  Endo: hypothyroidism, diabetes    Red Flags ROS:   Fever, Chills, Sweats: Denies  Involuntary Weight Loss: Denies  Bladder Incontinence: Denies  Bowel Incontinence: Denies  Saddle Anesthesia: Denies    All other systems reviewed and negative.       PMHx:   Past Medical History:   Diagnosis Date   • Anxiety    • Chronic back pain    • Constipation    • Depression    • Diabetes (HCC)    • GERD (gastroesophageal reflux disease)    • Heart murmur     tachycardia   • Hyperlipidemia    • Hypertension    • Thyroid disease        PSHx:   Past Surgical History:   Procedure Laterality Date   • PB INJ,FORAMEN,L/S,1 LEVEL Left 8/12/2020    Procedure: INJECTION, SPINE, LUMBOSACRAL, EPIDURAL, 1 LEVEL, TRANSFORAMINAL APPROACH;  Surgeon: Paresh Ogden M.D.;  [facility];  Service: Pain Management   • PB INJ,FORAMEN,L/S,ADDL LEVELS Left 8/12/2020    Procedure: INJECTION, SPINE, LUMBOSACRAL, EPIDURAL, TRANSFORAMINAL APPROACH;  Surgeon: Paresh Ogden M.D.;  Location: [facility];  Service: Pain Management   • ABDOMINAL HYSTERECTOMY TOTAL     • CARPAL TUNNEL RELEASE     • CHOLECYSTECTOMY     • LAMINOTOMY         Family history   Family History   Problem Relation Age of  Onset   • Cancer Mother    • Stroke Father         Heart attack   • Dementia Sister    • Stroke Brother         heart Attack   • Cancer Brother         Skin   • Diabetes Brother    • Kidney Disease Brother    • Cancer Brother    • Parkinson's Disease Sister    • No Known Problems Sister    • Diabetes Daughter    • Hypertension Daughter          Medications:   Current Outpatient Medications   Medication   • [START ON 8/28/2020] morphine ER (MS CONTIN) 15 MG Tab CR tablet   • Naloxone (NARCAN) 4 MG/0.1ML Liquid   • HYDROcodone-acetaminophen (NORCO) 5-325 MG Tab per tablet   • DULoxetine (CYMBALTA) 30 MG Cap DR Particles   • busPIRone (BUSPAR) 10 MG Tab tablet   • clonazePAM (KLONOPIN) 1 MG Tab   • rosuvastatin (CRESTOR) 10 MG Tab   • levothyroxine (SYNTHROID) 50 MCG Tab   • metoprolol (LOPRESSOR) 25 MG Tab   • Blood Glucose Meter Kit   • estradiol (ESTRACE) 1 MG Tab   • Empagliflozin (JARDIANCE) 25 MG Tab   • lisinopril (PRINIVIL) 10 MG Tab   • metformin (GLUCOPHAGE) 1000 MG tablet   • gabapentin (NEURONTIN) 600 MG tablet   • omeprazole (PRILOSEC) 40 MG delayed-release capsule   • vitamin D, Ergocalciferol, (DRISDOL) 1.25 MG (04148 UT) Cap capsule   • diclofenac EC (VOLTAREN) 75 MG Tablet Delayed Response     No current facility-administered medications for this visit.        Allergies:   No Known Allergies    Social Hx:   Social History     Socioeconomic History   • Marital status:      Spouse name: Not on file   • Number of children: Not on file   • Years of education: Not on file   • Highest education level: Not on file   Occupational History   • Not on file   Social Needs   • Financial resource strain: Not on file   • Food insecurity     Worry: Not on file     Inability: Not on file   • Transportation needs     Medical: Not on file     Non-medical: Not on file   Tobacco Use   • Smoking status: Light Tobacco Smoker     Packs/day: 0.25     Types: Cigarettes   • Smokeless tobacco: Never Used   Substance and  "Sexual Activity   • Alcohol use: Not Currently     Comment: rare   • Drug use: Never   • Sexual activity: Not Currently   Lifestyle   • Physical activity     Days per week: Not on file     Minutes per session: Not on file   • Stress: Not on file   Relationships   • Social connections     Talks on phone: Not on file     Gets together: Not on file     Attends [employer] service: Not on file     Active member of club or organization: Not on file     Attends meetings of clubs or organizations: Not on file     Relationship status: Not on file   • Intimate partner violence     Fear of current or ex partner: Not on file     Emotionally abused: Not on file     Physically abused: Not on file     Forced sexual activity: Not on file   Other Topics Concern   •  Service No   • Blood Transfusions Yes   • Caffeine Concern No   • Occupational Exposure No   • Hobby Hazards No   • Sleep Concern Yes   • Stress Concern Yes   • Weight Concern Yes   • Special Diet No   • Back Care No   • Exercise Yes   • Bike Helmet No   • Seat Belt Yes   • Self-Exams Yes   Social History Narrative   • Not on file         EXAMINATION     Physical Exam:   Vitals: /64 (BP Location: Left arm, Patient Position: Sitting, BP Cuff Size: Small adult)   Pulse 79   Temp 36.2 °C (97.1 °F) (Temporal)   Ht 1.6 m (5' 3\")   Wt 80 kg (176 lb 5.9 oz)   SpO2 92%     Constitutional:   Body Habitus: Body mass index is 31.24 kg/m².  Cooperation: Fully cooperates with exam  Appearance: Well-groomed, well-nourished, not disheveled, no acute distress    Eyes: No scleral icterus, no proptosis     ENT -no obvious auditory deficits, wearing a facial mask    Skin -no rashes or lesions noted, no lesions near site of injection from 08/12/2020    Respiratory-  breathing comfortable on room air, no audible wheezing    Cardiovascular- no lower extremity edema is noted.     Psychiatric- alert and oriented ×3. Normal affect.     Gait - normal gait, no use of ambulatory " device, antalgic.     Musculoskeletal -   Thoracic/Lumbar Spine/Sacral Spine/Hips   Inspection: no evidence of atrophy in bilateral lower extremities throughout     Pain with extension and rotation on the left.  Otherwise functional ROM    Palpation:   No tenderness to palpation in midline at T1-T12 levels. No tenderness to palpation in the left and right of the midline T1-L3, mildly POSITIVE for tenderness to palpation in the thoracic and para-midline region in the lower lumbar levels with mild muscle spasms left thoracic paraspinals and lumbar paraspinals      Neuro     Key points for the international standards for neurological classification of spinal cord injury (ISNCSCI) to light touch.     Dermatome R L   L2 2 2   L3 2 2   L4 2 2   L5 2 2   S1 2 2   S2 2 2       Motor Exam Lower Extremities    ? Myotome R L   Hip flexion L2 5 5   Knee extension L3 5 5   Ankle dorsiflexion L4 5 5   Toe extension L5 5 5   Ankle plantarflexion S1 5 5       Reflexes  ?  R L   Patella  2+ 2+   Achilles   2+ 1+       MEDICAL DECISION MAKING    Medical records review: see under HPI section.     DATA    Labs:   04/24/2020 UDS positive for morphine and metabolites, clonazepam  04/09/2020 Vitamin D, 25:  28L  04/09/2020 Vitamin B12: 217    Lab Results   Component Value Date/Time    SODIUM 135 04/09/2020 09:26 AM    POTASSIUM 4.7 04/09/2020 09:26 AM    CHLORIDE 100 04/09/2020 09:26 AM    CO2 21 04/09/2020 09:26 AM    ANION 14.0 04/09/2020 09:26 AM    GLUCOSE 140 (H) 04/09/2020 09:26 AM    BUN 19 04/09/2020 09:26 AM    CREATININE 0.71 04/09/2020 09:26 AM    CALCIUM 9.8 04/09/2020 09:26 AM    ASTSGOT 10 (L) 04/09/2020 09:26 AM    ALTSGPT 7 04/09/2020 09:26 AM    TBILIRUBIN 0.3 04/09/2020 09:26 AM    ALBUMIN 4.1 04/09/2020 09:26 AM    TOTPROTEIN 7.3 04/09/2020 09:26 AM    GLOBULIN 3.2 04/09/2020 09:26 AM    AGRATIO 1.3 04/09/2020 09:26 AM       No results found for: PROTHROMBTM, INR     Lab Results   Component Value Date/Time    WBC 7.6  04/09/2020 09:26 AM    RBC 4.25 04/09/2020 09:26 AM    HEMOGLOBIN 13.3 04/09/2020 09:26 AM    HEMATOCRIT 40.7 04/09/2020 09:26 AM    MCV 95.8 04/09/2020 09:26 AM    MCH 31.3 04/09/2020 09:26 AM    MCHC 32.7 (L) 04/09/2020 09:26 AM    MPV 11.1 04/09/2020 09:26 AM    NEUTSPOLYS 52.80 04/09/2020 09:26 AM    LYMPHOCYTES 34.80 04/09/2020 09:26 AM    MONOCYTES 10.10 04/09/2020 09:26 AM    EOSINOPHILS 1.40 04/09/2020 09:26 AM    BASOPHILS 0.40 04/09/2020 09:26 AM        Lab Results   Component Value Date/Time    HBA1C 6.8 (H) 04/09/2020 09:26 AM        Imaging: I personally reviewed following images, these are my reads:     MRI lumbar spine 05/05/2020  At L1-2, no central or foraminal stenosis  At L2-3, no central or foraminal stenosis  At L3-4, mild disc bulge and mild ligamentum flavum thickening.  No central canal stenosis. Borderline left foraminal stenosis. Schmorl's node.   At L4-5, diffuse disc bulge with mild ligamentum flavum thickening.  No central canal stenosis.  There is facet arthropathy, left greater than right. Mild foraminal stenosis bilaterally. S/P left L4/5 hemilaminectomy  At L5-S1, there is mild-borderline foraminal stenosis with facet arthropathy and mild disc bulge.  No central canal stenosis    Xray lumbar spine 05/05/2020  There is mild decreased joint space at L3-4 and L4-5.    Facet arthropathy is noted, greatest at L5-S1 bilaterally.  No significant motion on flexion and extension films    IMAGING radiology reads. I reviewed the following radiology reads    Xray abdomen 07/17/2020  IMPRESSION:     Nonspecific bowel gas pattern. No evidence small bowel obstruction.      MRI lumbar spine 05/05/2020  IMPRESSION:     1.  Caudal lumbar facet arthropathy left worse than right with no bony destruction to indicate septic or inflammatory arthropathy. This most likely is just degenerative     2.  Mild caudal lumbar foraminal stenoses     3.  No significant central stenosis.     4.  Left L4/5  hemilaminectomy                                                                                   Xray lumbar spine 05/05/2020     IMPRESSION:     1.  No acute lumbar compression fracture or subluxation.     2.  Posterior disc space narrowing at L4-5 and to lesser extent at L3-4.     3.  Bilateral facet arthropathy at L5-S1.                                                                                             Diagnosis   Visit Diagnoses     ICD-10-CM   1. S/P lumbar laminectomy  Z98.890   2. Lumbar spondylosis  M47.816   3. DDD (degenerative disc disease), lumbar  M51.36   4. Left lumbar radiculitis  M54.16   5. Vitamin D deficiency  E55.9   6. Muscle spasm  M62.838         ASSESSMENT:  Grisel Mark 65 y.o. female seen for above     Grisel was seen today for follow-up.    Diagnoses and all orders for this visit:    S/P lumbar laminectomy  -     morphine ER (MS CONTIN) 15 MG Tab CR tablet; Take 1 Tab by mouth every 12 hours for 30 days.  -     Consent for Opiate Prescription  -     Controlled Substance Treatment Agreement  -     Naloxone (NARCAN) 4 MG/0.1ML Liquid; One spray in one nostril for overdose and call 911.  -     Pain Management Screen    Lumbar spondylosis    DDD (degenerative disc disease), lumbar  -     morphine ER (MS CONTIN) 15 MG Tab CR tablet; Take 1 Tab by mouth every 12 hours for 30 days.  -     Consent for Opiate Prescription  -     Controlled Substance Treatment Agreement  -     Naloxone (NARCAN) 4 MG/0.1ML Liquid; One spray in one nostril for overdose and call 911.  -     Pain Management Screen    Left lumbar radiculitis  Comments:  improved    Vitamin D deficiency    Muscle spasm       1. She has had improvement of her left lumbar radiculitis after the left lumbar four and lumbar five TFESI.  She has continued axial back pain, as we suspected that she would.  Discussed previously successful trial of left lumbar three through lumbar five medial branch blocks.  Plan to proceed with second  block.  If the diagnostic blocks are successful, will consider radiofrequency ablation. Plan for second diagnostic block.  The risks benefits and alternatives to this procedure were discussed and the patient wishes to proceed with the procedure. Risks include but are not limited to damage to surrounding structures, infection, bleeding, worsening of pain which can be permanent, weakness which can be permanent. Benefits include pain relief, improved function. Alternatives includes not doing the procedure.   For   2. Discussed future wean of clonazepam.  She is currently taking 1mg at night.  We discussed plans for gradual wean starting in October.  If she meets with Behavioral health, they can discuss this.  Otherwise, we discussed that I can help her with a gradual wean.    3. Check UDS today.   4. Continue vitamin D3  5.  reviewed.  Continue MS Contin 15mg po q12h.  Discussed that she take this twice a day.  Risks of chronic opioids discussed and we have previously discussed goal of decreasing or discontinuing klonipin as she is able.  No norco refilled today.  Short-term use of norco with goal using for severe break through pain.  6. Continue colase 100mg daily and miralax prn for constipation.  Decrease colace to QOD if she develops loose stools. She is maintaining regular bowel movements.  7. Muscle spasms are better.  She will discontinue regular use of muscle relaxants.      Follow-up: Return in about 5 weeks (around 9/22/2020) for Hospital injection.    Thank you very much for asking me to participate in Grisel Mark's care.  Please contact me with any questions or concerns.    Please note that this dictation was created using voice recognition software. I have made every reasonable attempt to correct obvious errors but there may be errors of grammar and content that I may have overlooked prior to finalization of this note.      Paresh Ogden MD  Physical Medicine and Rehabilitation  Interventional Spine  and Sports Physiatry  [facility]

## 2020-08-25 ENCOUNTER — TELEMEDICINE (OUTPATIENT)
Dept: MEDICAL GROUP | Facility: PHYSICIAN GROUP | Age: 65
End: 2020-08-25
Payer: MEDICARE

## 2020-08-25 VITALS — WEIGHT: 176 LBS | BODY MASS INDEX: 31.18 KG/M2 | HEIGHT: 63 IN

## 2020-08-25 DIAGNOSIS — B37.9 YEAST INFECTION: ICD-10-CM

## 2020-08-25 DIAGNOSIS — M62.838 MUSCLE SPASM: ICD-10-CM

## 2020-08-25 PROCEDURE — 99214 OFFICE O/P EST MOD 30 MIN: CPT | Mod: 95,CR | Performed by: NURSE PRACTITIONER

## 2020-08-25 RX ORDER — FLUCONAZOLE 150 MG/1
150 TABLET ORAL
Qty: 2 TAB | Refills: 0 | Status: SHIPPED | OUTPATIENT
Start: 2020-08-25 | End: 2021-07-19

## 2020-08-25 RX ORDER — METHOCARBAMOL 750 MG/1
750 TABLET, FILM COATED ORAL 4 TIMES DAILY PRN
Qty: 40 TAB | Refills: 0 | Status: SHIPPED | OUTPATIENT
Start: 2020-08-25 | End: 2020-09-04

## 2020-08-25 ASSESSMENT — FIBROSIS 4 INDEX: FIB4 SCORE: .934132727969572995

## 2020-08-25 ASSESSMENT — ENCOUNTER SYMPTOMS
DIAPHORESIS: 0
BACK PAIN: 1
MYALGIAS: 1
COUGH: 0
FLANK PAIN: 0
PALPITATIONS: 0
POLYDIPSIA: 0
CHILLS: 0
WEIGHT LOSS: 0
FEVER: 0
BRUISES/BLEEDS EASILY: 0

## 2020-08-25 NOTE — PROGRESS NOTES
Telemedicine: Established Patient   This evaluation was concted via Zoom using secure and encrypted videoconferencing technology. The patient was in a private location in the state of [place].    The patient's identity was confirmed and verbal consent was obtained for this virtual visit.    Subjective:   CC:   Chief Complaint   Patient presents with   • Other     burning sensation        Grisel Mark is a 65 y.o. female presenting for evaluation and management of:    Perineum pain: Patient reports she has had roughly week of waxing and waning irritation/discomfort of her vulva.  She denies any burning with urination, however does report the urine itself irritates the tissue surrounding the urethra.  No blood in urine, malodorous urine, abnormal vaginal discharge.  She does report that she experiences occasional intermittent urinary incontinence, usually resulting in scant/mild leakage.  For this she uses a panty liner.  She feels this may have been causing irritation and excoriation to the left so she has taken a break from these for several days and noticed mild symptom improvement.  She feels the area is feels well overall and is mildly red.    Muscle spasm: Patient reports she has a muscle spasm on the left shoulder.  This is happened before and she has been treated successfully with muscle relaxers.  She is established with [facility] and undergoing treatment with Dr. lepe.  She reports that she was in a lot of stress over the last several weeks as her sister and nephew recently passed away.  She thinks the tension she is carrying potentiated the muscle spasm that was triggered by an odd movement.  She has been doing heat application and gentle stretching but this is not providing sufficient relief.    Review of Systems   Constitutional: Negative for chills, diaphoresis, fever, malaise/fatigue and weight loss.   Respiratory: Negative for cough.    Cardiovascular: Negative for chest pain and palpitations.    Genitourinary: Positive for dysuria. Negative for flank pain, frequency, hematuria and urgency.   Musculoskeletal: Positive for back pain and myalgias.   Endo/Heme/Allergies: Negative for environmental allergies and polydipsia. Does not bruise/bleed easily.         No Known Allergies    Current medicines (including changes today)  Current Outpatient Medications   Medication Sig Dispense Refill   • fluconazole (DIFLUCAN) 150 MG tablet Take 1 Tab by mouth every 72 hours. 2 Tab 0   • miconazole (MICONAZOLE 7) 2 % Cream Insert 1 Applicator in vagina every evening. 45 g 0   • methocarbamol (ROBAXIN) 750 MG Tab Take 1 Tab by mouth 4 times a day as needed for up to 10 days. 40 Tab 0   • [START ON 8/28/2020] morphine ER (MS CONTIN) 15 MG Tab CR tablet Take 1 Tab by mouth every 12 hours for 30 days. 60 Tab 0   • Naloxone (NARCAN) 4 MG/0.1ML Liquid One spray in one nostril for overdose and call 911. 1 Each 0   • DULoxetine (CYMBALTA) 30 MG Cap DR Particles Take 3 Caps by mouth every day. 270 Cap 1   • busPIRone (BUSPAR) 10 MG Tab tablet Take 1 Tab by mouth 2 times a day. 180 Tab 0   • clonazePAM (KLONOPIN) 1 MG Tab Take 1 Tab by mouth every day for 90 days. 90 Tab 0   • rosuvastatin (CRESTOR) 10 MG Tab Take 1 Tab by mouth every evening. 90 Tab 3   • levothyroxine (SYNTHROID) 50 MCG Tab Take 1 Tab by mouth Every morning on an empty stomach. 90 Tab 3   • metoprolol (LOPRESSOR) 25 MG Tab Take 1 Tab by mouth 2 times a day. 180 Tab 1   • Blood Glucose Meter Kit Test blood sugar as recommended by provider. Abbott Freestyle Lite blood glucose monitoring kit. 1 Kit 0   • estradiol (ESTRACE) 1 MG Tab      • Empagliflozin (JARDIANCE) 25 MG Tab Take 25 mg by mouth every day. 90 Tab 3   • lisinopril (PRINIVIL) 10 MG Tab Take 1 Tab by mouth every day. 90 Tab 3   • metformin (GLUCOPHAGE) 1000 MG tablet Take 1 Tab by mouth 2 times a day, with meals. 180 Tab 3   • gabapentin (NEURONTIN) 600 MG tablet Take 1 Tab by mouth 3 times a day. 180  "Tab 2   • omeprazole (PRILOSEC) 40 MG delayed-release capsule Take 40 mg by mouth every day.     • vitamin D, Ergocalciferol, (DRISDOL) 1.25 MG (27211 UT) Cap capsule Take  by mouth every 7 days.     • diclofenac EC (VOLTAREN) 75 MG Tablet Delayed Response Take 75 mg by mouth 2 times a day.       No current facility-administered medications for this visit.        Patient Active Problem List    Diagnosis Date Noted   • Generalized anxiety disorder 05/12/2020   • Posttraumatic stress disorder, delayed onset 05/12/2020   • Essential hypertension 02/05/2020   • Mixed hyperlipidemia 02/05/2020   • Diabetes mellitus type 2 in obese (HCC) 02/05/2020   • Hypothyroidism (acquired) 02/05/2020   • Depression, major, recurrent, moderate (HCC) 02/05/2020   • Gastroesophageal reflux disease without esophagitis 02/05/2020   • Anxiety 02/05/2020       Family History   Problem Relation Age of Onset   • Cancer Mother    • Stroke Father         Heart attack   • Dementia Sister    • Stroke Brother         heart Attack   • Cancer Brother         Skin   • Diabetes Brother    • Kidney Disease Brother    • Cancer Brother    • Parkinson's Disease Sister    • No Known Problems Sister    • Diabetes Daughter    • Hypertension Daughter        She  has a past medical history of Anxiety, Chronic back pain, Constipation, Depression, Diabetes (HCC), GERD (gastroesophageal reflux disease), Heart murmur, Hyperlipidemia, Hypertension, and Thyroid disease.  She  has a past surgical history that includes laminotomy; cholecystectomy; carpal tunnel release; abdominal hysterectomy total; pr inj,foramen,l/s,1 level (Left, 8/12/2020); and pr inj,foramen,l/s,addl levels (Left, 8/12/2020).       Objective:   Ht 1.6 m (5' 3\")   Wt 79.8 kg (176 lb)   LMP  (LMP Unknown)   BMI 31.18 kg/m²     Physical Exam   Constitutional: She is oriented to person, place, and time and well-developed, well-nourished, and in no distress. No distress.   HENT:   Head: " Normocephalic and atraumatic.   Right Ear: External ear normal.   Left Ear: External ear normal.   Nose: Nose normal.   Eyes: Conjunctivae and EOM are normal.   Pulmonary/Chest: Effort normal.   Neurological: She is alert and oriented to person, place, and time.   Skin: Skin is warm and dry. She is not diaphoretic.   Psychiatric: Mood, memory, affect and judgment normal.       Assessment and Plan:   The following treatment plan was discussed:     1. Yeast infection: Patient is currently trying to shelter in place as much as possible to reduce risk of infection of the COVID-19 pandemic.  Her daughter is newly pregnant and she does not want to expose her unnecessarily.  We discussed the potential of her coming to the clinic and doing a self swab for vaginal pathogens panel.  Due to the sensitivity of the vulva, I do think a yeast infection is a likely diagnosis, particularly due to continuous use of incontinence pads.  To reduce unnecessary exposure, we will treat for yeast infection with oral Diflucan and topical miconazole.  I advised the patient that she should apply a thin layer of the miconazole ointment over the vulva prior to bedtime.  If symptoms worsen or fail to improve after 48 hours of treatment, she needs to come to the clinic for further evaluation.  - fluconazole (DIFLUCAN) 150 MG tablet; Take 1 Tab by mouth every 72 hours.  Dispense: 2 Tab; Refill: 0  - miconazole (MICONAZOLE 7) 2 % Cream; Insert 1 Applicator in vagina every evening.  Dispense: 45 g; Refill: 0    2. Muscle spasm: Refilled patient's methocarbamol.  She is tolerated this medication in the past well.  Recommended she use this sparingly and progress to the painful muscle.  Engage in gentle stretching with alternating heat and ice application for relief.  If this does not improve over a week, return to clinic or discuss with physiatry.  - methocarbamol (ROBAXIN) 750 MG Tab; Take 1 Tab by mouth 4 times a day as needed for up to 10 days.   Dispense: 40 Tab; Refill: 0        Follow-up: Return As scheduled.

## 2020-08-27 ENCOUNTER — APPOINTMENT (OUTPATIENT)
Dept: PHYSICAL MEDICINE AND REHAB | Facility: MEDICAL CENTER | Age: 65
End: 2020-08-27
Payer: MEDICARE

## 2020-08-28 DIAGNOSIS — N95.1 VASOMOTOR SYMPTOMS DUE TO MENOPAUSE: ICD-10-CM

## 2020-08-28 RX ORDER — ESTRADIOL 1 MG/1
1 TABLET ORAL DAILY
Qty: 30 TAB | Refills: 5 | Status: SHIPPED | OUTPATIENT
Start: 2020-08-28 | End: 2021-02-01

## 2020-08-31 ENCOUNTER — TELEMEDICINE (OUTPATIENT)
Dept: MEDICAL GROUP | Facility: PHYSICIAN GROUP | Age: 65
End: 2020-08-31
Payer: MEDICARE

## 2020-08-31 VITALS — BODY MASS INDEX: 31.18 KG/M2 | WEIGHT: 176 LBS | HEIGHT: 63 IN

## 2020-08-31 DIAGNOSIS — N76.1 SUBACUTE VAGINITIS: ICD-10-CM

## 2020-08-31 DIAGNOSIS — L50.8 CHRONIC URTICARIA: ICD-10-CM

## 2020-08-31 PROCEDURE — 99214 OFFICE O/P EST MOD 30 MIN: CPT | Mod: 95,CR | Performed by: NURSE PRACTITIONER

## 2020-08-31 RX ORDER — METRONIDAZOLE 7.5 MG/G
1 GEL VAGINAL
Qty: 5 EACH | Refills: 0 | Status: SHIPPED | OUTPATIENT
Start: 2020-08-31 | End: 2020-09-05

## 2020-08-31 RX ORDER — FLUOCINOLONE ACETONIDE 0.11 MG/ML
1 OIL TOPICAL 2 TIMES DAILY PRN
Qty: 500 ML | Refills: 1 | Status: SHIPPED | OUTPATIENT
Start: 2020-08-31 | End: 2020-10-05

## 2020-08-31 ASSESSMENT — ENCOUNTER SYMPTOMS
COUGH: 0
HEADACHES: 0
WHEEZING: 0
NAUSEA: 0
DIZZINESS: 0
PALPITATIONS: 0
MYALGIAS: 1
FEVER: 0
VOMITING: 0
WEIGHT LOSS: 0

## 2020-08-31 ASSESSMENT — FIBROSIS 4 INDEX: FIB4 SCORE: .934132727969572995

## 2020-08-31 NOTE — PROGRESS NOTES
Telemedicine: Established Patient   This evaluation was conducted via [employer] using secure and encrypted videoconferencing technology. The patient was in a private location in the state of [place].    The patient's identity was confirmed and verbal consent was obtained for this virtual visit.    Subjective:   CC:   Chief Complaint   Patient presents with   • Follow-Up   • Itching     head/sometimes whole body        Grisel Mark is a 65 y.o. female presenting for evaluation and management of:    Chronic vaginitis: Chronic, uncontrolled.  Patient has had episodes of vaginitis including hypersensitivity of the vulva feeling extra friable and tender for the past several weeks.  Initially tried a course of antifungal presuming this was a yeast infection due to discharge reported.  She reports the symptoms improved briefly but recurred a couple of days after completing regimen.  Denies notable itching or odor.  Is on estrogen replacement therapy, 1 mg estradiol daily.    Cholinergic urticaria: Patient is having acute stress response due to daughter getting surgery, other daughter pregnant, losing her sister recently, and the global pandemic.  This profound increase in stress over the past month has led to a an urticarial rash over her body entirely.  This does include her scalp, she has itched it to the point that there are small scabs.  Does take a Benadryl at night reports this helps her sleep and minimizes itching.  Has not tried any topical remedies.    Review of Systems   Constitutional: Negative for fever, malaise/fatigue and weight loss.   Respiratory: Negative for cough and wheezing.    Cardiovascular: Negative for chest pain and palpitations.   Gastrointestinal: Negative for nausea and vomiting.   Musculoskeletal: Positive for myalgias.   Skin: Positive for itching and rash.   Neurological: Negative for dizziness and headaches.         No Known Allergies    Current medicines (including changes today)  Current  Outpatient Medications   Medication Sig Dispense Refill   • metroNIDAZOLE (METROGEL VAGINAL) 0.75 % Gel Insert 1 Applicator in vagina every bedtime for 5 days. 5 Each 0   • Fluocinolone Acetonide Body 0.01 % Oil 1 Application by Apply externally route 2 times a day as needed (itching). 500 mL 1   • estradiol (ESTRACE) 1 MG Tab Take 1 Tab by mouth every day. 30 Tab 5   • fluconazole (DIFLUCAN) 150 MG tablet Take 1 Tab by mouth every 72 hours. 2 Tab 0   • methocarbamol (ROBAXIN) 750 MG Tab Take 1 Tab by mouth 4 times a day as needed for up to 10 days. 40 Tab 0   • morphine ER (MS CONTIN) 15 MG Tab CR tablet Take 1 Tab by mouth every 12 hours for 30 days. 60 Tab 0   • Naloxone (NARCAN) 4 MG/0.1ML Liquid One spray in one nostril for overdose and call 911. 1 Each 0   • DULoxetine (CYMBALTA) 30 MG Cap DR Particles Take 3 Caps by mouth every day. 270 Cap 1   • busPIRone (BUSPAR) 10 MG Tab tablet Take 1 Tab by mouth 2 times a day. 180 Tab 0   • clonazePAM (KLONOPIN) 1 MG Tab Take 1 Tab by mouth every day for 90 days. 90 Tab 0   • rosuvastatin (CRESTOR) 10 MG Tab Take 1 Tab by mouth every evening. 90 Tab 3   • levothyroxine (SYNTHROID) 50 MCG Tab Take 1 Tab by mouth Every morning on an empty stomach. 90 Tab 3   • metoprolol (LOPRESSOR) 25 MG Tab Take 1 Tab by mouth 2 times a day. 180 Tab 1   • Blood Glucose Meter Kit Test blood sugar as recommended by provider. Abbott Freestyle Lite blood glucose monitoring kit. 1 Kit 0   • Empagliflozin (JARDIANCE) 25 MG Tab Take 25 mg by mouth every day. 90 Tab 3   • lisinopril (PRINIVIL) 10 MG Tab Take 1 Tab by mouth every day. 90 Tab 3   • metformin (GLUCOPHAGE) 1000 MG tablet Take 1 Tab by mouth 2 times a day, with meals. 180 Tab 3   • gabapentin (NEURONTIN) 600 MG tablet Take 1 Tab by mouth 3 times a day. 180 Tab 2   • omeprazole (PRILOSEC) 40 MG delayed-release capsule Take 40 mg by mouth every day.     • vitamin D, Ergocalciferol, (DRISDOL) 1.25 MG (22967 UT) Cap capsule Take  by  "mouth every 7 days.     • diclofenac EC (VOLTAREN) 75 MG Tablet Delayed Response Take 75 mg by mouth 2 times a day.       No current facility-administered medications for this visit.        Patient Active Problem List    Diagnosis Date Noted   • Generalized anxiety disorder 05/12/2020   • Posttraumatic stress disorder, delayed onset 05/12/2020   • Essential hypertension 02/05/2020   • Mixed hyperlipidemia 02/05/2020   • Diabetes mellitus type 2 in obese (HCC) 02/05/2020   • Hypothyroidism (acquired) 02/05/2020   • Depression, major, recurrent, moderate (HCC) 02/05/2020   • Gastroesophageal reflux disease without esophagitis 02/05/2020   • Anxiety 02/05/2020       Family History   Problem Relation Age of Onset   • Cancer Mother    • Stroke Father         Heart attack   • Dementia Sister    • Stroke Brother         heart Attack   • Cancer Brother         Skin   • Diabetes Brother    • Kidney Disease Brother    • Cancer Brother    • Parkinson's Disease Sister    • No Known Problems Sister    • Diabetes Daughter    • Hypertension Daughter        She  has a past medical history of Anxiety, Chronic back pain, Constipation, Depression, Diabetes (HCC), GERD (gastroesophageal reflux disease), Heart murmur, Hyperlipidemia, Hypertension, and Thyroid disease.  She  has a past surgical history that includes laminotomy; cholecystectomy; carpal tunnel release; abdominal hysterectomy total; pr inj,foramen,l/s,1 level (Left, 8/12/2020); and pr inj,foramen,l/s,addl levels (Left, 8/12/2020).       Objective:   Ht 1.6 m (5' 3\")   Wt 79.8 kg (176 lb)   LMP  (LMP Unknown)   BMI 31.18 kg/m²     Physical Exam   Constitutional: She is oriented to person, place, and time and well-developed, well-nourished, and in no distress.   HENT:   Head: Normocephalic and atraumatic.   Right Ear: External ear normal.   Left Ear: External ear normal.   Nose: Nose normal.   Eyes: Conjunctivae and EOM are normal.   Pulmonary/Chest: Effort normal. "   Neurological: She is alert and oriented to person, place, and time.   Skin: Rash noted. Rash is urticarial.   Psychiatric: Mood, memory, affect and judgment normal.       Assessment and Plan:   The following treatment plan was discussed:     1. Subacute vaginitis: Patient is still leaving virtual visits due to concern for the health of her pregnant daughter.  As of this is quite difficult to determine the exact cause of vaginitis and was going on.  Failed antifungal therapy, will do 5-day course of intravaginal metronidazole.  Encourage patient to use intravaginal moisturizers.  May benefit from a low-dose topical estrogen to use as needed, but I would like to decrease her systemic estrogen due to the profound risk of thrombosis development  - metroNIDAZOLE (METROGEL VAGINAL) 0.75 % Gel; Insert 1 Applicator in vagina every bedtime for 5 days.  Dispense: 5 Each; Refill: 0    2. Chronic urticaria: Provided low-dose steroid and oil patient can apply this to her body and scalp.  If this does not begin to improve within the week, patient advised to reach out to me and let me know at that point we will consider referral to Derm or a higher dose corticosteroid.  - Fluocinolone Acetonide Body 0.01 % Oil; 1 Application by Apply externally route 2 times a day as needed (itching).  Dispense: 500 mL; Refill: 1        Follow-up: Return in about 3 months (around 11/30/2020) for Annual Wellness.

## 2020-09-04 ENCOUNTER — PATIENT MESSAGE (OUTPATIENT)
Dept: MEDICAL GROUP | Facility: PHYSICIAN GROUP | Age: 65
End: 2020-09-04

## 2020-09-04 DIAGNOSIS — M54.41 CHRONIC BILATERAL LOW BACK PAIN WITH BILATERAL SCIATICA: ICD-10-CM

## 2020-09-04 DIAGNOSIS — G89.29 CHRONIC BILATERAL LOW BACK PAIN WITH BILATERAL SCIATICA: ICD-10-CM

## 2020-09-04 DIAGNOSIS — M54.42 CHRONIC BILATERAL LOW BACK PAIN WITH BILATERAL SCIATICA: ICD-10-CM

## 2020-09-09 ENCOUNTER — HOSPITAL ENCOUNTER (OUTPATIENT)
Facility: REHABILITATION | Age: 65
End: 2020-09-09
Attending: PHYSICAL MEDICINE & REHABILITATION | Admitting: PHYSICAL MEDICINE & REHABILITATION
Payer: MEDICARE

## 2020-09-09 ENCOUNTER — APPOINTMENT (OUTPATIENT)
Dept: RADIOLOGY | Facility: REHABILITATION | Age: 65
End: 2020-09-09
Attending: PHYSICAL MEDICINE & REHABILITATION
Payer: MEDICARE

## 2020-09-09 VITALS
BODY MASS INDEX: 31.6 KG/M2 | RESPIRATION RATE: 15 BRPM | TEMPERATURE: 96.9 F | SYSTOLIC BLOOD PRESSURE: 114 MMHG | DIASTOLIC BLOOD PRESSURE: 64 MMHG | HEIGHT: 63 IN | WEIGHT: 178.35 LBS | HEART RATE: 65 BPM | OXYGEN SATURATION: 94 %

## 2020-09-09 PROCEDURE — 700117 HCHG RX CONTRAST REV CODE 255

## 2020-09-09 PROCEDURE — 64494 INJ PARAVERT F JNT L/S 2 LEV: CPT

## 2020-09-09 PROCEDURE — 700101 HCHG RX REV CODE 250

## 2020-09-09 PROCEDURE — 64493 INJ PARAVERT F JNT L/S 1 LEV: CPT

## 2020-09-09 RX ORDER — BUPIVACAINE HYDROCHLORIDE 5 MG/ML
INJECTION, SOLUTION EPIDURAL; INTRACAUDAL
Status: COMPLETED
Start: 2020-09-09 | End: 2020-09-09

## 2020-09-09 RX ORDER — DEXAMETHASONE SODIUM PHOSPHATE 10 MG/ML
INJECTION, SOLUTION INTRAMUSCULAR; INTRAVENOUS
Status: COMPLETED
Start: 2020-09-09 | End: 2020-09-09

## 2020-09-09 RX ADMIN — BUPIVACAINE HYDROCHLORIDE 3 ML: 5 INJECTION, SOLUTION EPIDURAL; INTRACAUDAL; PERINEURAL at 08:35

## 2020-09-09 RX ADMIN — IOHEXOL 3 ML: 240 INJECTION, SOLUTION INTRATHECAL; INTRAVASCULAR; INTRAVENOUS; ORAL at 08:31

## 2020-09-09 ASSESSMENT — FIBROSIS 4 INDEX: FIB4 SCORE: .934132727969572995

## 2020-09-09 ASSESSMENT — PAIN DESCRIPTION - PAIN TYPE
TYPE: CHRONIC PAIN
TYPE: CHRONIC PAIN

## 2020-09-09 NOTE — NON-PROVIDER
Patient identified. Site and procedure confirmed and rajwinder by Dr. Ogden .Medication allergies. Reviewed pertinent medical history ( HTN, type 2 DM,sleep apnea ).Blood sugar taken this morning was 97 mg. /dl, not using CPAP machine , .Aspirin 81 mg and Diclofenac off for 8 days. Timeout completed, team and patient agreed.

## 2020-09-09 NOTE — NOTE TO PATIENT VIA PORTAL
Patient tolerated food and fluids post procedure without n/v. Ambulated without difficulty. Pain diary given and explained to patient. She verbalized understanding. Discharged into care of responsible adult.

## 2020-09-09 NOTE — INTERVAL H&P NOTE
"H&P reviewed. The patient was examined and there are no changes to the H&P       /63   Pulse 72   Temp 36.1 °C (96.9 °F) (Temporal)   Resp 16   Ht 1.6 m (5' 3\")   Wt 80.9 kg (178 lb 5.6 oz)   SpO2 96%     CV: RRR, Normal S1, S2  RESP: Clear to ascultation bilaterally    Continue with injection as planned.    Paresh Ogden MD  Physical Medicine and Rehabilitation  Interventional Spine and Sports Physiatry  [facility]      "

## 2020-09-09 NOTE — NON-PROVIDER
Patient on prone positioned onto procedure table, with the help by team ( X- ray Tech, CST,RN ). Necessary equipment connected  ( BP, pulse oximeter,  ).Consent for procedure signed and H&P updated. Hands supported on stool underneath headrest of the bed with pillow on lower extremities.

## 2020-09-09 NOTE — NON-PROVIDER
Current meds. See medication reconciliation form. Patient denied taking ASA or any blood thinners/anti inflammatories. Communication to procedure room RN during handoff. Pt has a ride back home post-procedure,her daughter Roxanne is the .  Print and verbal discharge instructions given as well as education regarding infection prevention to patient and daughter who verbalized understanding.

## 2020-09-09 NOTE — OP REPORT
Physician/s: Paresh Ogden MD     Pre-operative Diagnosis: Lumbar DDD(M51.36), Lumbar spondylosis(M47.816)     Post-operative Diagnosis: Lumbar DDD(M51.36), Lumbar spondylosis(M47.816)     Procedure: Left lumbar three medial branch block                     Left lumbar four medial branch block                     Left lumbar five dorsal ramus block                           Description of procedure:     The risks, benefits, and alternatives of the procedure were reviewed and discussed with the patient.  Written informed consent was freely obtained. A pre-procedural time-out was conducted by the physician verifying patient’s identity, procedure to be performed, procedure site and side, and allergy verification. Appropriate equipment was determined to be in place for the procedure.      In the fluoroscopy suite the patient was placed in a prone position and the skin was prepped and draped in the usual sterile fashion. The fluoroscope was placed over the lower back at the appropriate angles, and the targets for injection were marked. A 25g 3.5 inch needle was placed into each of the markings at the three levels, and approx 1cc of 1% Lidocaine was injected subcutaneously into the epidermal and dermal layers. The needle was removed. A 25g needle was then placed at the intersection of the transverse process and superior articular process at the left lumbar three medial branch, left lumbar four medial branch and left lumbar five dorsal ramus levels.  The needle tips were then verified by AP and lateral views. In the AP view, less than 1 cc of contrast dye was used to highlight the medial branch while the fluoroscope was running live. Following negative aspiration, approx 1.0 cc containing 0.5% marcaine without epinephrine was then injected at the above levels, and the needles were removed intact after restyleted. The patient's back was covered with a 4x4 gauze, the area was cleansed with sterile normal saline, and a  dressing was applied.     There were no complications noted, the patient was hemodynamically stable, and tolerated the procedure well.      The patient was given a discharge sheet to bring in information about how she feels today at follow-up.     Paresh Ogden MD  Physical Medicine and Rehabilitation  Interventional Spine and Sports Physiatry  [facility]

## 2020-09-10 ENCOUNTER — TELEPHONE (OUTPATIENT)
Dept: PHYSICAL MEDICINE AND REHAB | Facility: MEDICAL CENTER | Age: 65
End: 2020-09-10

## 2020-09-10 NOTE — TELEPHONE ENCOUNTER
I called patient to follow up after her Special procedure Left lumbar three through lumbar five medial branch blocks, 2nd diagnostic block and she stated she had a little pain this morning but is getting better and she is icing the area. RR

## 2020-09-17 ENCOUNTER — TELEPHONE (OUTPATIENT)
Dept: PHYSICAL MEDICINE AND REHAB | Facility: MEDICAL CENTER | Age: 65
End: 2020-09-17

## 2020-09-17 DIAGNOSIS — Z98.890 S/P LUMBAR LAMINECTOMY: ICD-10-CM

## 2020-09-17 DIAGNOSIS — M51.36 DDD (DEGENERATIVE DISC DISEASE), LUMBAR: ICD-10-CM

## 2020-09-17 DIAGNOSIS — G89.29 OTHER CHRONIC PAIN: ICD-10-CM

## 2020-09-17 RX ORDER — NALOXONE HYDROCHLORIDE 4 MG/.1ML
SPRAY NASAL
Qty: 1 EACH | Refills: 0 | Status: SHIPPED | OUTPATIENT
Start: 2020-09-17 | End: 2022-10-07 | Stop reason: SDUPTHER

## 2020-09-17 NOTE — TELEPHONE ENCOUNTER
Please let Grisel know that I have reordered the Narcan script.  I have been notified by her insurance company that she has not filled this yet.  They have notified me because she is considered high risk due to her medication dose and combination and has not filled this script.  Please have her get this filled to have it on hand, if needed.     Thank you,     Paresh Ogden MD

## 2020-09-18 ENCOUNTER — TELEPHONE (OUTPATIENT)
Dept: PHYSICAL MEDICINE AND REHAB | Facility: MEDICAL CENTER | Age: 65
End: 2020-09-18

## 2020-09-22 ENCOUNTER — OFFICE VISIT (OUTPATIENT)
Dept: PHYSICAL MEDICINE AND REHAB | Facility: MEDICAL CENTER | Age: 65
End: 2020-09-22
Payer: MEDICARE

## 2020-09-22 VITALS
OXYGEN SATURATION: 97 % | DIASTOLIC BLOOD PRESSURE: 68 MMHG | HEIGHT: 62 IN | HEART RATE: 75 BPM | BODY MASS INDEX: 32.33 KG/M2 | SYSTOLIC BLOOD PRESSURE: 110 MMHG | TEMPERATURE: 97.4 F | WEIGHT: 175.71 LBS

## 2020-09-22 DIAGNOSIS — E11.69 DIABETES MELLITUS TYPE 2 IN OBESE: ICD-10-CM

## 2020-09-22 DIAGNOSIS — M25.561 PAIN IN BOTH KNEES, UNSPECIFIED CHRONICITY: ICD-10-CM

## 2020-09-22 DIAGNOSIS — M51.36 DDD (DEGENERATIVE DISC DISEASE), LUMBAR: ICD-10-CM

## 2020-09-22 DIAGNOSIS — M54.16 LEFT LUMBAR RADICULITIS: ICD-10-CM

## 2020-09-22 DIAGNOSIS — E66.9 DIABETES MELLITUS TYPE 2 IN OBESE: ICD-10-CM

## 2020-09-22 DIAGNOSIS — M25.562 PAIN IN BOTH KNEES, UNSPECIFIED CHRONICITY: ICD-10-CM

## 2020-09-22 DIAGNOSIS — Z98.890 S/P LUMBAR LAMINECTOMY: ICD-10-CM

## 2020-09-22 DIAGNOSIS — R20.2 PARESTHESIA: ICD-10-CM

## 2020-09-22 PROCEDURE — 99215 OFFICE O/P EST HI 40 MIN: CPT | Performed by: PHYSICAL MEDICINE & REHABILITATION

## 2020-09-22 RX ORDER — HYDROCODONE BITARTRATE AND ACETAMINOPHEN 5; 325 MG/1; MG/1
1 TABLET ORAL EVERY 4 HOURS PRN
Qty: 30 TAB | Refills: 0 | Status: SHIPPED | OUTPATIENT
Start: 2020-09-27 | End: 2020-10-23 | Stop reason: SDUPTHER

## 2020-09-22 RX ORDER — MORPHINE SULFATE 15 MG/1
15 TABLET, FILM COATED, EXTENDED RELEASE ORAL EVERY 12 HOURS
Qty: 60 TAB | Refills: 0 | Status: SHIPPED | OUTPATIENT
Start: 2020-09-27 | End: 2020-10-23 | Stop reason: SDUPTHER

## 2020-09-22 RX ORDER — GABAPENTIN 600 MG/1
600 TABLET ORAL 4 TIMES DAILY
Qty: 360 TAB | Refills: 0 | Status: SHIPPED | OUTPATIENT
Start: 2020-09-22 | End: 2020-11-06 | Stop reason: SDUPTHER

## 2020-09-22 ASSESSMENT — PATIENT HEALTH QUESTIONNAIRE - PHQ9
5. POOR APPETITE OR OVEREATING: 0 - NOT AT ALL
SUM OF ALL RESPONSES TO PHQ QUESTIONS 1-9: 5
CLINICAL INTERPRETATION OF PHQ2 SCORE: 2

## 2020-09-22 ASSESSMENT — FIBROSIS 4 INDEX: FIB4 SCORE: .934132727969572995

## 2020-09-22 ASSESSMENT — PAIN SCALES - GENERAL: PAINLEVEL: 8=MODERATE-SEVERE PAIN

## 2020-09-22 NOTE — PATIENT INSTRUCTIONS
Gabapentin:  Continue to taking 600mg three times a day.  Increase to 600mg three times a day and 300mg at bedtime for 3-5days  Increase to 600mg four times a day.    Please call the office with any questions.

## 2020-09-22 NOTE — PROGRESS NOTES
Follow-up patient note    Physiatry (physical medicine and  Rehabilitation), interventional spine and sports medicine    Date of Service: 09/22/2020    Chief complaint:   Chief Complaint   Patient presents with   • Follow-Up     Lower back pain       HISTORY    HPI: Grisel Mark 65 y.o. female who presents today for follow-up evaluation of low back and leg pain.     Since the last visit, she had left lumbar three through lumbar five medial branch.  Unlike the first block, which helped with her axial pain, this time, she did not have significant improvement.  Pain was reduced from 7/10 on the NRS to 5/10 on the NRS.    Today, she reports that the pain in her low back and left leg has worsened.  She has more burning into the left leg.  It is more painful.  The leg pain had improved significantly after left L4 and L5 TFESI on 08/12/2020.   She has been having pain that is now a 9/10 on the NRS.     She continues to take MS Contin 15mg po q12h.  This helps with her pain.  She does not report side effects.  She has not had to take norco 5/325 and still has some at home.  The gabapentin 600mg po tid usually helps more, but this is not helping as much.  The foot pain is worse and it is more sensitive.  She continues to take duloxetine 90mg daily.  The leg does not seem particularly weak, but she feels somewhat off balance.   She has had diabetes for about 20 years.    Her knees have both been bothering her more lately.  Worse with weight-bearing    Her bowel movements are regular.  She still takes [employer] stool softener twice a day and metamucil.        Medical records review:  Dr. Francisco Olmos, plan to increase duloxetine 90mg daily, discontinue buspirone 20mg po bid, start buproprion XL 150mg daily, continue brexipiprazole 1mg qhs, hydroxyzine 25mg po tid prn, clonazepam 0.5mg daily prn    Review of records   Dr. Dheeraj De La Rosa:  08/20/2019 Right L3-4, L4-5, L5-S1 radiofrequency ablation    I reviewed the note from  the referring provider Peter Bruce * dated 02/05/2020: She was seen to establish care, having just moved from [place].  She was seen for essential hypertension, mixed hyperlipidemia, DM2 in obese, hypothyroidism, recurrent MDD in partial depression/anxiety, GERD without esophagitis, chronic bilateral low back pain with bilateral sciatica.  Medications associated with the above were written, related labs ordered including CBC, CMP, Hb A1c, lipids, microalbumin, TSH, free thryoxine, referral to ps\ychiatry, referral to GI for colonoscopy and referral to pain clinic.    Previous treatments:    Physical Therapy: Yes    Medications the patient is tried: Narcotics, gabapentin and muscle relaxers    Previous interventions: [facility] these included right lumbar radiofrequency procedures    Previous surgeries to relieve the above pain:  Surgery on October 28, 1998      ROS: Unchanged from 08/18/2020 except as noted in the HPI   CV: irregular heart, reports history of tachycardia  Psych: depression since 1995  Heme: anemia  Endo: hypothyroidism, diabetes    Red Flags ROS:   Fever, Chills, Sweats: Denies  Involuntary Weight Loss: Denies  Bladder Incontinence: Denies  Bowel Incontinence: Denies  Saddle Anesthesia: Denies    All other systems reviewed and negative.       PMHx:   Past Medical History:   Diagnosis Date   • Anxiety    • Chronic back pain    • Constipation    • Depression    • Diabetes (HCC)    • GERD (gastroesophageal reflux disease)    • Heart murmur     tachycardia   • Hyperlipidemia    • Hypertension    • Thyroid disease        PSHx:   Past Surgical History:   Procedure Laterality Date   • LUMBAR MEDIAL BRANCH BLOCKS Left 9/9/2020    Procedure: BLOCK, NERVE, SPINAL, LUMBAR, POSTERIOR RAMUS, MEDIAL BRANCH;  Surgeon: Paresh Ogden M.D.;  [facility];  Service: Pain Management   • PB INJ,FORAMEN,L/S,1 LEVEL Left 8/12/2020    Procedure:  INJECTION, SPINE, LUMBOSACRAL, EPIDURAL, 1 LEVEL, TRANSFORAMINAL APPROACH;  Surgeon: Paresh Ogden M.D.;  Location: [facility];  Service: Pain Management   • PB INJ,FORAMEN,L/S,ADDL LEVELS Left 8/12/2020    Procedure: INJECTION, SPINE, LUMBOSACRAL, EPIDURAL, TRANSFORAMINAL APPROACH;  Surgeon: Paresh Ogden M.D.;  Location: [facility];  Service: Pain Management   • LAMINOTOMY  1998   • ABDOMINAL HYSTERECTOMY TOTAL     • CARPAL TUNNEL RELEASE     • CHOLECYSTECTOMY         Family history   Family History   Problem Relation Age of Onset   • Cancer Mother    • Stroke Father         Heart attack   • Dementia Sister    • Stroke Brother         heart Attack   • Cancer Brother         Skin   • Diabetes Brother    • Kidney Disease Brother    • Cancer Brother    • Parkinson's Disease Sister    • No Known Problems Sister    • Diabetes Daughter    • Hypertension Daughter          Medications:   Current Outpatient Medications   Medication   • gabapentin (NEURONTIN) 600 MG tablet   • [START ON 9/27/2020] morphine ER (MS CONTIN) 15 MG Tab CR tablet   • [START ON 9/27/2020] HYDROcodone-acetaminophen (NORCO) 5-325 MG Tab per tablet   • Naloxone (NARCAN) 4 MG/0.1ML Liquid   • Diclofenac Sodium 1 % Gel   • Fluocinolone Acetonide Body 0.01 % Oil   • estradiol (ESTRACE) 1 MG Tab   • fluconazole (DIFLUCAN) 150 MG tablet   • DULoxetine (CYMBALTA) 30 MG Cap DR Particles   • clonazePAM (KLONOPIN) 1 MG Tab   • rosuvastatin (CRESTOR) 10 MG Tab   • levothyroxine (SYNTHROID) 50 MCG Tab   • metoprolol (LOPRESSOR) 25 MG Tab   • Blood Glucose Meter Kit   • Empagliflozin (JARDIANCE) 25 MG Tab   • lisinopril (PRINIVIL) 10 MG Tab   • metformin (GLUCOPHAGE) 1000 MG tablet   • omeprazole (PRILOSEC) 40 MG delayed-release capsule   • vitamin D, Ergocalciferol, (DRISDOL) 1.25 MG (95253 UT) Cap capsule   • busPIRone (BUSPAR) 10 MG Tab tablet   • diclofenac EC (VOLTAREN) 75 MG Tablet Delayed Response     No current  facility-administered medications for this visit.        Allergies:   No Known Allergies    Social Hx:   Social History     Socioeconomic History   • Marital status:      Spouse name: Not on file   • Number of children: Not on file   • Years of education: Not on file   • Highest education level: Not on file   Occupational History   • Not on file   Social Needs   • Financial resource strain: Not on file   • Food insecurity     Worry: Not on file     Inability: Not on file   • Transportation needs     Medical: Not on file     Non-medical: Not on file   Tobacco Use   • Smoking status: Former Smoker     Packs/day: 0.25     Types: Cigarettes   • Smokeless tobacco: Never Used   Substance and Sexual Activity   • Alcohol use: Not Currently     Comment: rare   • Drug use: Never   • Sexual activity: Not Currently   Lifestyle   • Physical activity     Days per week: Not on file     Minutes per session: Not on file   • Stress: Not on file   Relationships   • Social connections     Talks on phone: Not on file     Gets together: Not on file     Attends Church service: Not on file     Active member of club or organization: Not on file     Attends meetings of clubs or organizations: Not on file     Relationship status: Not on file   • Intimate partner violence     Fear of current or ex partner: Not on file     Emotionally abused: Not on file     Physically abused: Not on file     Forced sexual activity: Not on file   Other Topics Concern   •  Service No   • Blood Transfusions Yes   • Caffeine Concern No   • Occupational Exposure No   • Hobby Hazards No   • Sleep Concern Yes   • Stress Concern Yes   • Weight Concern Yes   • Special Diet No   • Back Care No   • Exercise Yes   • Bike Helmet No   • Seat Belt Yes   • Self-Exams Yes   Social History Narrative   • Not on file         EXAMINATION     Physical Exam:   Vitals: /68 (BP Location: Left arm, Patient Position: Sitting, BP Cuff Size: Adult long)   Pulse  "75   Temp 36.3 °C (97.4 °F) (Temporal)   Ht 1.575 m (5' 2\")   Wt 79.7 kg (175 lb 11.3 oz)   SpO2 97%     Constitutional:   Body Habitus: Body mass index is 32.14 kg/m².  Cooperation: Fully cooperates with exam  Appearance: Well-groomed, well-nourished, not disheveled, no acute distress    Eyes: No scleral icterus, no proptosis     ENT -no obvious auditory deficits, wearing a facial mask    Skin -no rashes or lesions noted, no lesions near site of injection from 09/9/2020     Respiratory-  breathing comfortable on room air, no audible wheezing    Cardiovascular- no lower extremity edema is noted.     Psychiatric- alert and oriented ×3. Normal affect.     Gait - normal gait, no use of ambulatory device, antalgic.     Musculoskeletal -   Thoracic/Lumbar Spine/Sacral Spine/Hips   Inspection: no evidence of atrophy in bilateral lower extremities throughout     Pain with extension and rotation on the left.  Otherwise functional ROM    Palpation:   No tenderness to palpation in midline at T1-T12 levels. No tenderness to palpation in the left and right of the midline T1-L3, mildly POSITIVE for tenderness to palpation in the thoracic and para-midline region in the lower lumbar levels with mild muscle spasms left thoracic paraspinals and lumbar paraspinals      Knees:  Left knee: Mild effusion, medial and lateral joint line tenderness, mild lateral instability.  Negative Lachman's    Right knee: No effusion, mild lateral joint line tenderness, no medial or lateral instability.  Negative Lachman's.    Neuro     Key points for the international standards for neurological classification of spinal cord injury (ISNCSCI) to light touch.     Dermatome R L   L2 2 2   L3 2 2   L4 2 2   L5 2 1   S1 2 1   S2 2 2       Motor Exam Lower Extremities    ? Myotome R L   Hip flexion L2 5 5   Knee extension L3 5 5   Ankle dorsiflexion L4 5 5   Toe extension L5 5 5   Ankle plantarflexion S1 5 5       Reflexes  ?  R L   Patella  2+ 2+ "   Achilles   2+ 1+       MEDICAL DECISION MAKING    Medical records review: see under HPI section.     DATA    Labs:   08/18/2020: UDS positive for morphine and metabolites, clonazepam and metabolites, hydrocodone and metabolites  04/24/2020 UDS positive for morphine and metabolites, clonazepam  04/09/2020 Vitamin D, 25:  28L  04/09/2020 Vitamin B12: 217    Lab Results   Component Value Date/Time    SODIUM 135 04/09/2020 09:26 AM    POTASSIUM 4.7 04/09/2020 09:26 AM    CHLORIDE 100 04/09/2020 09:26 AM    CO2 21 04/09/2020 09:26 AM    ANION 14.0 04/09/2020 09:26 AM    GLUCOSE 140 (H) 04/09/2020 09:26 AM    BUN 19 04/09/2020 09:26 AM    CREATININE 0.71 04/09/2020 09:26 AM    CALCIUM 9.8 04/09/2020 09:26 AM    ASTSGOT 10 (L) 04/09/2020 09:26 AM    ALTSGPT 7 04/09/2020 09:26 AM    TBILIRUBIN 0.3 04/09/2020 09:26 AM    ALBUMIN 4.1 04/09/2020 09:26 AM    TOTPROTEIN 7.3 04/09/2020 09:26 AM    GLOBULIN 3.2 04/09/2020 09:26 AM    AGRATIO 1.3 04/09/2020 09:26 AM       No results found for: PROTHROMBTM, INR     Lab Results   Component Value Date/Time    WBC 7.6 04/09/2020 09:26 AM    RBC 4.25 04/09/2020 09:26 AM    HEMOGLOBIN 13.3 04/09/2020 09:26 AM    HEMATOCRIT 40.7 04/09/2020 09:26 AM    MCV 95.8 04/09/2020 09:26 AM    MCH 31.3 04/09/2020 09:26 AM    MCHC 32.7 (L) 04/09/2020 09:26 AM    MPV 11.1 04/09/2020 09:26 AM    NEUTSPOLYS 52.80 04/09/2020 09:26 AM    LYMPHOCYTES 34.80 04/09/2020 09:26 AM    MONOCYTES 10.10 04/09/2020 09:26 AM    EOSINOPHILS 1.40 04/09/2020 09:26 AM    BASOPHILS 0.40 04/09/2020 09:26 AM        Lab Results   Component Value Date/Time    HBA1C 6.8 (H) 04/09/2020 09:26 AM        Imaging: I personally reviewed following images, these are my reads:     MRI lumbar spine 05/05/2020  At L1-2, no central or foraminal stenosis  At L2-3, no central or foraminal stenosis  At L3-4, mild disc bulge and mild ligamentum flavum thickening.  No central canal stenosis. Borderline left foraminal stenosis. Schmorl's  node.   At L4-5, diffuse disc bulge with mild ligamentum flavum thickening.  No central canal stenosis.  There is facet arthropathy, left greater than right. Mild foraminal stenosis bilaterally. S/P left L4/5 hemilaminectomy  At L5-S1, there is mild-borderline foraminal stenosis with facet arthropathy and mild disc bulge.  No central canal stenosis    Xray lumbar spine 05/05/2020  There is mild decreased joint space at L3-4 and L4-5.    Facet arthropathy is noted, greatest at L5-S1 bilaterally.  No significant motion on flexion and extension films    IMAGING radiology reads. I reviewed the following radiology reads    Xray abdomen 07/17/2020  IMPRESSION:     Nonspecific bowel gas pattern. No evidence small bowel obstruction.      MRI lumbar spine 05/05/2020  IMPRESSION:     1.  Caudal lumbar facet arthropathy left worse than right with no bony destruction to indicate septic or inflammatory arthropathy. This most likely is just degenerative     2.  Mild caudal lumbar foraminal stenoses     3.  No significant central stenosis.     4.  Left L4/5 hemilaminectomy                                                                                   Xray lumbar spine 05/05/2020     IMPRESSION:     1.  No acute lumbar compression fracture or subluxation.     2.  Posterior disc space narrowing at L4-5 and to lesser extent at L3-4.     3.  Bilateral facet arthropathy at L5-S1.                                                                                             Diagnosis   Visit Diagnoses     ICD-10-CM   1. S/P lumbar laminectomy  Z98.890   2. Left lumbar radiculitis  M54.16   3. Pain in both knees, unspecified chronicity  M25.561    M25.562   4. Diabetes mellitus type 2 in obese (HCC)  E11.69    E66.9   5. DDD (degenerative disc disease), lumbar  M51.36   6. Paresthesia  R20.2         ASSESSMENT:  Grisel Mark 65 y.o. female seen for above     Grisel was seen today for follow-up.    Diagnoses and all orders for this  visit:    S/P lumbar laminectomy  -     REFERRAL TO PHYSICAL THERAPY Reason for Therapy: Eval/Treat/Report  -     morphine ER (MS CONTIN) 15 MG Tab CR tablet; Take 1 Tab by mouth every 12 hours for 30 days.  -     HYDROcodone-acetaminophen (NORCO) 5-325 MG Tab per tablet; Take 1 Tab by mouth every four hours as needed for up to 30 days.  -     Consent for Opiate Prescription  -     Controlled Substance Treatment Agreement  -     REFERRAL TO PHYSICAL MEDICINE REHAB    Left lumbar radiculitis  -     REFERRAL TO PHYSICAL THERAPY Reason for Therapy: Eval/Treat/Report  -     gabapentin (NEURONTIN) 600 MG tablet; Take 1 Tab by mouth 4 times a day for 90 days.  -     REFERRAL TO PHYSICAL MEDICINE REHAB    Pain in both knees, unspecified chronicity  -     DX-KNEE COMPLETE 4+ LEFT; Future  -     DX-KNEE COMPLETE 4+ RIGHT; Future    Diabetes mellitus type 2 in obese (HCC)  -     gabapentin (NEURONTIN) 600 MG tablet; Take 1 Tab by mouth 4 times a day for 90 days.    DDD (degenerative disc disease), lumbar  -     morphine ER (MS CONTIN) 15 MG Tab CR tablet; Take 1 Tab by mouth every 12 hours for 30 days.  -     HYDROcodone-acetaminophen (NORCO) 5-325 MG Tab per tablet; Take 1 Tab by mouth every four hours as needed for up to 30 days.  -     Consent for Opiate Prescription  -     Controlled Substance Treatment Agreement    Paresthesia  -     REFERRAL TO PHYSICAL MEDICINE REHAB         1. Discussed getting xrays of the knees bilaterally  2. Continue gabapentin.  Titrate to 600mg po qid.  Instructions given.  3. Discussed possible Behavioral health referral.  She is going to talk with her PCP about this, no referral placed today.  4. She has Narcan at home.  5.  and UDS reviewed.  6. Continue MS Contin 15mg po q12h.  Discussed that she take this twice a day.  Refill for 09/27/2020.  [facility] for severe pain.  #30 for the month  7. Risks of chronic opioids discussed and we have previously discussed goal of decreasing or  discontinuing klonipin as she is able.  No norco refilled today.  Short-term use of norco with goal using for severe break through pain.  8. Continue colase 100mg daily and miralax prn for constipation.  Decrease colace to QOD if she develops loose stools. She is maintaining regular bowel movements.  9. Information about SCS given.    10. Ordered EMG of the bilateral lower extremities      Follow-up: Return for EMG.    Thank you very much for asking me to participate in Grisel Mark's care.  Please contact me with any questions or concerns.    Please note that this dictation was created using voice recognition software. I have made every reasonable attempt to correct obvious errors but there may be errors of grammar and content that I may have overlooked prior to finalization of this note.      Paresh Ogden MD  Physical Medicine and Rehabilitation  Interventional Spine and Sports Physiatry  [facility]

## 2020-09-23 RX ORDER — BUSPIRONE HYDROCHLORIDE 10 MG/1
10 TABLET ORAL 2 TIMES DAILY
Qty: 180 TAB | Refills: 1 | Status: SHIPPED | OUTPATIENT
Start: 2020-09-23 | End: 2020-10-01 | Stop reason: SDUPTHER

## 2020-09-30 ENCOUNTER — PATIENT MESSAGE (OUTPATIENT)
Dept: MEDICAL GROUP | Facility: PHYSICIAN GROUP | Age: 65
End: 2020-09-30

## 2020-09-30 DIAGNOSIS — F41.1 GENERALIZED ANXIETY DISORDER: ICD-10-CM

## 2020-09-30 DIAGNOSIS — E11.9 CONTROLLED TYPE 2 DIABETES MELLITUS WITHOUT COMPLICATION, WITHOUT LONG-TERM CURRENT USE OF INSULIN (HCC): ICD-10-CM

## 2020-10-01 RX ORDER — GLUCOSAMINE HCL/CHONDROITIN SU 500-400 MG
CAPSULE ORAL
Qty: 100 EACH | Refills: 3 | Status: SHIPPED | OUTPATIENT
Start: 2020-10-01 | End: 2021-09-19 | Stop reason: SDUPTHER

## 2020-10-01 RX ORDER — BUSPIRONE HYDROCHLORIDE 10 MG/1
10 TABLET ORAL 3 TIMES DAILY
Qty: 270 TAB | Refills: 3 | Status: SHIPPED | OUTPATIENT
Start: 2020-10-01 | End: 2021-10-07 | Stop reason: SDUPTHER

## 2020-10-01 RX ORDER — LANCETS 30 GAUGE
EACH MISCELLANEOUS
Qty: 300 EACH | Refills: 3 | Status: SHIPPED | OUTPATIENT
Start: 2020-10-01 | End: 2021-09-19 | Stop reason: SDUPTHER

## 2020-10-07 ENCOUNTER — APPOINTMENT (OUTPATIENT)
Dept: SLEEP MEDICINE | Facility: MEDICAL CENTER | Age: 65
End: 2020-10-07
Payer: MEDICARE

## 2020-10-07 ENCOUNTER — TELEPHONE (OUTPATIENT)
Dept: PHYSICAL MEDICINE AND REHAB | Facility: MEDICAL CENTER | Age: 65
End: 2020-10-07

## 2020-10-09 ENCOUNTER — OFFICE VISIT (OUTPATIENT)
Dept: MEDICAL GROUP | Facility: PHYSICIAN GROUP | Age: 65
End: 2020-10-09
Payer: MEDICARE

## 2020-10-09 VITALS
HEIGHT: 62 IN | OXYGEN SATURATION: 95 % | SYSTOLIC BLOOD PRESSURE: 122 MMHG | TEMPERATURE: 97.8 F | WEIGHT: 175 LBS | HEART RATE: 80 BPM | DIASTOLIC BLOOD PRESSURE: 66 MMHG | BODY MASS INDEX: 32.2 KG/M2

## 2020-10-09 DIAGNOSIS — E11.69 DIABETES MELLITUS TYPE 2 IN OBESE: ICD-10-CM

## 2020-10-09 DIAGNOSIS — F43.10 POSTTRAUMATIC STRESS DISORDER, DELAYED ONSET: ICD-10-CM

## 2020-10-09 DIAGNOSIS — L50.9 URTICARIA: ICD-10-CM

## 2020-10-09 DIAGNOSIS — F41.1 GENERALIZED ANXIETY DISORDER: ICD-10-CM

## 2020-10-09 DIAGNOSIS — E66.9 DIABETES MELLITUS TYPE 2 IN OBESE: ICD-10-CM

## 2020-10-09 DIAGNOSIS — F33.1 DEPRESSION, MAJOR, RECURRENT, MODERATE (HCC): ICD-10-CM

## 2020-10-09 DIAGNOSIS — F41.9 ANXIETY: ICD-10-CM

## 2020-10-09 DIAGNOSIS — H66.002 NON-RECURRENT ACUTE SUPPURATIVE OTITIS MEDIA OF LEFT EAR WITHOUT SPONTANEOUS RUPTURE OF TYMPANIC MEMBRANE: ICD-10-CM

## 2020-10-09 DIAGNOSIS — Z23 NEED FOR VACCINATION: ICD-10-CM

## 2020-10-09 PROCEDURE — 99214 OFFICE O/P EST MOD 30 MIN: CPT | Mod: 25 | Performed by: NURSE PRACTITIONER

## 2020-10-09 PROCEDURE — 90750 HZV VACC RECOMBINANT IM: CPT | Performed by: NURSE PRACTITIONER

## 2020-10-09 PROCEDURE — 90472 IMMUNIZATION ADMIN EACH ADD: CPT | Performed by: NURSE PRACTITIONER

## 2020-10-09 PROCEDURE — G0008 ADMIN INFLUENZA VIRUS VAC: HCPCS | Performed by: NURSE PRACTITIONER

## 2020-10-09 PROCEDURE — 90662 IIV NO PRSV INCREASED AG IM: CPT | Performed by: NURSE PRACTITIONER

## 2020-10-09 RX ORDER — AMOXICILLIN AND CLAVULANATE POTASSIUM 875; 125 MG/1; MG/1
1 TABLET, FILM COATED ORAL 2 TIMES DAILY
Qty: 14 TAB | Refills: 0 | Status: SHIPPED | OUTPATIENT
Start: 2020-10-09 | End: 2020-10-16

## 2020-10-09 RX ORDER — HYDROXYZINE HYDROCHLORIDE 25 MG/1
25-50 TABLET, FILM COATED ORAL NIGHTLY PRN
Qty: 60 TAB | Refills: 11 | Status: SHIPPED | OUTPATIENT
Start: 2020-10-09 | End: 2021-08-05

## 2020-10-09 RX ORDER — CLONAZEPAM 1 MG/1
1 TABLET ORAL 2 TIMES DAILY
Qty: 180 TAB | Refills: 0 | Status: SHIPPED | OUTPATIENT
Start: 2020-10-09 | End: 2021-02-18 | Stop reason: SDUPTHER

## 2020-10-09 ASSESSMENT — FIBROSIS 4 INDEX: FIB4 SCORE: .934132727969572995

## 2020-10-09 NOTE — PROGRESS NOTES
Chief Complaint   Patient presents with   • Follow-Up   • Itching     head/arms/back         Subjective:     Grisel Mark is a 65 y.o. female presenting for follow-up.    This is a very nice established patient here for refill of clonazepam.    Anxiety: Chronic, uncontrolled..  Patient has long history of anxiety and posttraumatic stress disorder.  Unfortunately, patient has gone through extreme life stress over the last year losing numerous family members due to cancer, COVID-19, and chronic disease.  She is quite tearful as she is unable to connect with her other children who are out of the state, 1 of which required emergency surgery.  With time, she hopes to taper use, but it is clear that decreasing dose at this point is inappropriate due to significant exacerbation of anxiety and life stress.  She has been on 1 mg clonazepam as well as 30 mg duloxetine 3 times daily for several years.  She is been originally started on the clonazepam 20-30 years ago at higher doses and has gradually tapered down.  Overall she tolerates this well, does not call for early refills, not exhibiting any signs of abuse or inappropriate use.    Up-to-date on urine drug screen and controlled substance agreement.    Otitis media: Patient has had 1 week of significant left ear pain and tenderness.  Has used warm compresses and rest.  Tenderness surrounding the left ear, denies fever, chills, malaise.  Does experience some headache.    Diabetes: Well-controlled on current regimen, tolerating Jardiance and metformin quite well without significant signs or symptoms of adverse effect.  Updated A1c indicated.  Last one done in April, indicated good control of 6.8.  Mild microalbuminuria seen, updated urine sample indicated    Review of systems:      Denies chest pain, shortness of breath, sore throat, difficulty swallowing, new cough, dizziness, severe headache,  painful or swollen lymph nodes.       Current Outpatient Medications:   •   clonazePAM (KLONOPIN) 1 MG Tab, Take 1 Tab by mouth 2 times a day for 90 days., Disp: 180 Tab, Rfl: 0  •  hydrOXYzine HCl (ATARAX) 25 MG Tab, Take 1-2 Tabs by mouth at bedtime as needed for Itching or Anxiety., Disp: 60 Tab, Rfl: 11  •  amoxicillin-clavulanate (AUGMENTIN) 875-125 MG Tab, Take 1 Tab by mouth 2 times a day for 7 days., Disp: 14 Tab, Rfl: 0  •  busPIRone (BUSPAR) 10 MG Tab tablet, Take 1 Tab by mouth 3 times a day., Disp: 270 Tab, Rfl: 3  •  Blood Glucose Test Strips, Use one Abbott Freedom Lite strip to test blood sugar twice daily and PRN., Disp: 270 Each, Rfl: 3  •  Lancets, Use one Abbott Naturita Lite lancet to test blood sugar twice daily and PRN., Disp: 300 Each, Rfl: 3  •  Alcohol Swabs, Wipe site with prep pad prior to injection., Disp: 100 Each, Rfl: 3  •  gabapentin (NEURONTIN) 600 MG tablet, Take 1 Tab by mouth 4 times a day for 90 days., Disp: 360 Tab, Rfl: 0  •  morphine ER (MS CONTIN) 15 MG Tab CR tablet, Take 1 Tab by mouth every 12 hours for 30 days., Disp: 60 Tab, Rfl: 0  •  HYDROcodone-acetaminophen (NORCO) 5-325 MG Tab per tablet, Take 1 Tab by mouth every four hours as needed for up to 30 days., Disp: 30 Tab, Rfl: 0  •  Naloxone (NARCAN) 4 MG/0.1ML Liquid, One spray in one nostril for overdose and call 911., Disp: 1 Each, Rfl: 0  •  Diclofenac Sodium 1 % Gel, Apply 1 g to skin as directed 2 times a day as needed (joint pain)., Disp: 150 g, Rfl: 2  •  estradiol (ESTRACE) 1 MG Tab, Take 1 Tab by mouth every day., Disp: 30 Tab, Rfl: 5  •  fluconazole (DIFLUCAN) 150 MG tablet, Take 1 Tab by mouth every 72 hours., Disp: 2 Tab, Rfl: 0  •  DULoxetine (CYMBALTA) 30 MG Cap DR Particles, Take 3 Caps by mouth every day., Disp: 270 Cap, Rfl: 1  •  rosuvastatin (CRESTOR) 10 MG Tab, Take 1 Tab by mouth every evening., Disp: 90 Tab, Rfl: 3  •  levothyroxine (SYNTHROID) 50 MCG Tab, Take 1 Tab by mouth Every morning on an empty stomach., Disp: 90 Tab, Rfl: 3  •  metoprolol (LOPRESSOR) 25 MG Tab,  "Take 1 Tab by mouth 2 times a day., Disp: 180 Tab, Rfl: 1  •  Blood Glucose Meter Kit, Test blood sugar as recommended by provider. Abbott Freestyle Lite blood glucose monitoring kit., Disp: 1 Kit, Rfl: 0  •  Empagliflozin (JARDIANCE) 25 MG Tab, Take 25 mg by mouth every day., Disp: 90 Tab, Rfl: 3  •  lisinopril (PRINIVIL) 10 MG Tab, Take 1 Tab by mouth every day., Disp: 90 Tab, Rfl: 3  •  metformin (GLUCOPHAGE) 1000 MG tablet, Take 1 Tab by mouth 2 times a day, with meals., Disp: 180 Tab, Rfl: 3  •  omeprazole (PRILOSEC) 40 MG delayed-release capsule, Take 40 mg by mouth every day., Disp: , Rfl:   •  diclofenac EC (VOLTAREN) 75 MG Tablet Delayed Response, Take 75 mg by mouth 2 times a day., Disp: , Rfl:     Allergies, past medical history, past surgical history, family history, social history reviewed and updated    Objective:     Vitals: /66 (BP Location: Right arm, Patient Position: Sitting, BP Cuff Size: Adult)   Pulse 80   Temp 36.6 °C (97.8 °F) (Temporal)   Ht 1.575 m (5' 2\")   Wt 79.4 kg (175 lb)   LMP  (LMP Unknown)   SpO2 95%   BMI 32.01 kg/m²   Constitutional: Alert, no distress, well-groomed.  Skin: Warm, dry, good turgor, no rashes in visible areas.  Eye: Equal, round and reactive, conjunctiva clear, lids normal.  ENMT: Lips without lesions, good dentition, moist mucous membranes.  Left TM dull, bulging, mildly erythematous with separative effusion.  Canal nonerythematous.  Neck: Trachea midline, no masses, no thyromegaly.  Respiratory: Unlabored respiratory effort, no cough.  MSK: Normal gait, moves all extremities.  Neuro: Grossly non-focal.   Psych: Alert and oriented x3, normal affect and mood.    Monofilament testing with a 10 gram force: sensation intact: intact bilaterally  Visual Inspection: Feet without maceration, ulcers, fissures.  Pedal pulses: intact bilaterally    Assessment/Plan:     Grisel was seen today for follow-up and itching.    Diagnoses and all orders for this " visit:      Generalized anxiety disorder: Due to poor control of overall symptoms, will increase back to twice daily dosing.  Advised patient take these with caution due to risk of excess sedation.  In the evening she has been experiencing significant anxiety leading to worsening chronic urticaria, hydroxyzine was also provided for support.  Advised that she not take hydroxyzine and additional dose of clonazepam at the same time but instead begin more routine use of 1 or the other and then at the second agent as needed.  -     clonazePAM (KLONOPIN) 1 MG Tab; Take 1 Tab by mouth 2 times a day for 90 days.  -     hydrOXYzine HCl (ATARAX) 25 MG Tab; Take 1-2 Tabs by mouth at bedtime as needed for Itching or Anxiety.    Posttraumatic stress disorder, delayed onset  -     clonazePAM (KLONOPIN) 1 MG Tab; Take 1 Tab by mouth 2 times a day for 90 days.      Urticaria: Fluocinonide oil prescribed but denied by insurance.  Patient believes this is significantly worsened associated with her anxiety and stress.  I do concur with this but believes she would benefit from dermatology referral for further treatment guidance.  As her daughter is pregnant and she wishes to limit her exposure to the public, she does prefer virtual visit which fortunately [facility] offers at this time.  -     hydrOXYzine HCl (ATARAX) 25 MG Tab; Take 1-2 Tabs by mouth at bedtime as needed for Itching or Anxiety.  -     REFERRAL TO DERMATOLOGY    Need for vaccination:  -     INFLUENZA VACCINE, HIGH DOSE (65+ ONLY)  -     Shingles Vaccine (SHINGRIX)    Non-recurrent acute suppurative otitis media of left ear without spontaneous rupture of tympanic membrane  -     amoxicillin-clavulanate (AUGMENTIN) 875-125 MG Tab; Take 1 Tab by mouth 2 times a day for 7 days.          Return in about 2 months (around 12/9/2020) for Annual Wellness.    Patient verbalized understanding and agreed to plan of care.  Encouraged to contact me with needs via [employer] or  by phone if needed.      I have placed the above orders and discussed them with an approved delegating provider.  The MA is performing the below orders under the direction of Dr Walls.    Please note that this dictation was created using voice recognition software. I have made every reasonable attempt to correct obvious errors, but I expect that there are errors of grammar and possibly content that I did not discover before finalizing the note.

## 2020-10-12 ENCOUNTER — APPOINTMENT (OUTPATIENT)
Dept: PHYSICAL THERAPY | Facility: REHABILITATION | Age: 65
End: 2020-10-12
Attending: PHYSICAL MEDICINE & REHABILITATION
Payer: MEDICARE

## 2020-10-12 NOTE — OP THERAPY EVALUATION
Outpatient Physical Therapy  INITIAL EVALUATION    [facility] [address]  [address]  [place] [address]  Phone:  [phone]  Fax:  [phone]    Date of Evaluation: 10/12/2020    Patient: Grisel Mark  YOB: 1955  MRN: [id number]     Referring Provider: Paresh Ogden M.D.  [address]  [address]  [place] [address]   Referring Diagnosis S/P lumbar laminectomy [Z98.890];Left lumbar radiculitis [M54.16]     Time Calculation                   Chief Complaint: No chief complaint on file.    Visit Diagnoses     ICD-10-CM   1. Left lumbar radiculitis  M54.16   2. S/P lumbar laminectomy  Z98.890         Subjective:   History of Present Illness:     Mechanism of injury:    Grisel Mark 65 y.o. female who presents today for follow-up evaluation of low back and leg pain.      Since the last visit, she had left lumbar three through lumbar five medial branch.  Unlike the first block, which helped with her axial pain, this time, she did not have significant improvement.  Pain was reduced from 7/10 on the NRS to 5/10 on the NRS.     Today, she reports that the pain in her low back and left leg has worsened.  She has more burning into the left leg.  It is more painful.  The leg pain had improved significantly after left L4 and L5 TFESI on 08/12/2020.   She has been having pain that is now a 9/10 on the NRS.      She continues to take MS Contin 15mg po q12h.  This helps with her pain.  She does not report side effects.  She has not had to take norco 5/325 and still has some at home.  The gabapentin 600mg po tid usually helps more, but this is not helping as much.  The foot pain is worse and it is more sensitive.  She continues to take duloxetine 90mg daily.  The leg does not seem particularly weak, but she feels somewhat off balance.   She has had diabetes for about 20 years.         Past Medical History:   Diagnosis Date   • Anxiety    • Chronic back pain    •  Constipation    • Depression    • Diabetes (HCC)    • GERD (gastroesophageal reflux disease)    • Heart murmur     tachycardia   • Hyperlipidemia    • Hypertension    • Thyroid disease      Past Surgical History:   Procedure Laterality Date   • LUMBAR MEDIAL BRANCH BLOCKS Left 9/9/2020    Procedure: BLOCK, NERVE, SPINAL, LUMBAR, POSTERIOR RAMUS, MEDIAL BRANCH;  Surgeon: Paresh Ogden M.D.;  [facility];  Service: Pain Management   • PB INJ,FORAMEN,L/S,1 LEVEL Left 8/12/2020    Procedure: INJECTION, SPINE, LUMBOSACRAL, EPIDURAL, 1 LEVEL, TRANSFORAMINAL APPROACH;  Surgeon: Paresh Ogden M.D.;  Location[facility];  Service: Pain Management   • PB INJ,FORAMEN,L/S,ADDL LEVELS Left 8/12/2020    Procedure: INJECTION, SPINE, LUMBOSACRAL, EPIDURAL, TRANSFORAMINAL APPROACH;  Surgeon: Paresh Ogden M.D.;  Location: [facility];  Service: Pain Management   • LAMINOTOMY  1998   • ABDOMINAL HYSTERECTOMY TOTAL     • CARPAL TUNNEL RELEASE     • CHOLECYSTECTOMY       Social History     Tobacco Use   • Smoking status: Former Smoker     Packs/day: 0.25     Types: Cigarettes   • Smokeless tobacco: Never Used   Substance Use Topics   • Alcohol use: Not Currently     Comment: rare     Family and Occupational History     Socioeconomic History   • Marital status:      Spouse name: Not on file   • Number of children: Not on file   • Years of education: Not on file   • Highest education level: Not on file   Occupational History   • Not on file       Objective    Exercises/Treatment  Time-based treatments/modalities:           Assessment/Response/Plan    Functional Assessment Used        Referring provider co-signature:  I have reviewed this plan of care and my co-signature certifies the need for services.    Certification Period: 10/12/2020 to  12/07/20    Physician Signature: ________________________________ Date: ______________

## 2020-10-16 DIAGNOSIS — H60.392 OTHER INFECTIVE ACUTE OTITIS EXTERNA OF LEFT EAR: ICD-10-CM

## 2020-10-16 RX ORDER — AMOXICILLIN AND CLAVULANATE POTASSIUM 875; 125 MG/1; MG/1
1 TABLET, FILM COATED ORAL 2 TIMES DAILY
Qty: 14 TAB | Refills: 0 | Status: SHIPPED | OUTPATIENT
Start: 2020-10-16 | End: 2020-10-23

## 2020-10-16 RX ORDER — CIPROFLOXACIN AND DEXAMETHASONE 3; 1 MG/ML; MG/ML
4 SUSPENSION/ DROPS AURICULAR (OTIC) 2 TIMES DAILY
Qty: 7.5 ML | Refills: 0 | Status: SHIPPED | OUTPATIENT
Start: 2020-10-16 | End: 2021-01-12 | Stop reason: SDUPTHER

## 2020-10-23 ENCOUNTER — OFFICE VISIT (OUTPATIENT)
Dept: PHYSICAL MEDICINE AND REHAB | Facility: MEDICAL CENTER | Age: 65
End: 2020-10-23
Payer: MEDICARE

## 2020-10-23 VITALS
OXYGEN SATURATION: 93 % | WEIGHT: 183.42 LBS | HEART RATE: 92 BPM | SYSTOLIC BLOOD PRESSURE: 124 MMHG | HEIGHT: 62 IN | BODY MASS INDEX: 33.75 KG/M2 | DIASTOLIC BLOOD PRESSURE: 80 MMHG | TEMPERATURE: 97.2 F

## 2020-10-23 DIAGNOSIS — M54.16 LEFT LUMBAR RADICULITIS: ICD-10-CM

## 2020-10-23 DIAGNOSIS — Z98.890 S/P LUMBAR LAMINECTOMY: ICD-10-CM

## 2020-10-23 DIAGNOSIS — M70.62 GREATER TROCHANTERIC BURSITIS OF LEFT HIP: ICD-10-CM

## 2020-10-23 DIAGNOSIS — M51.36 DDD (DEGENERATIVE DISC DISEASE), LUMBAR: ICD-10-CM

## 2020-10-23 DIAGNOSIS — M54.50 LOW BACK PAIN WITH RADIATION: ICD-10-CM

## 2020-10-23 PROCEDURE — 99214 OFFICE O/P EST MOD 30 MIN: CPT | Mod: 25 | Performed by: PHYSICAL MEDICINE & REHABILITATION

## 2020-10-23 PROCEDURE — 20611 DRAIN/INJ JOINT/BURSA W/US: CPT | Mod: LT | Performed by: PHYSICAL MEDICINE & REHABILITATION

## 2020-10-23 RX ORDER — METHYLPREDNISOLONE ACETATE 80 MG/ML
80 INJECTION, SUSPENSION INTRA-ARTICULAR; INTRALESIONAL; INTRAMUSCULAR; SOFT TISSUE ONCE
Status: COMPLETED | OUTPATIENT
Start: 2020-10-23 | End: 2020-10-23

## 2020-10-23 RX ORDER — MORPHINE SULFATE 15 MG/1
15 TABLET, FILM COATED, EXTENDED RELEASE ORAL EVERY 12 HOURS
Qty: 60 TAB | Refills: 0 | Status: SHIPPED | OUTPATIENT
Start: 2020-10-29 | End: 2020-11-06 | Stop reason: SDUPTHER

## 2020-10-23 RX ORDER — HYDROCODONE BITARTRATE AND ACETAMINOPHEN 5; 325 MG/1; MG/1
1 TABLET ORAL EVERY 4 HOURS PRN
Qty: 45 TAB | Refills: 0 | Status: SHIPPED | OUTPATIENT
Start: 2020-10-29 | End: 2020-11-06 | Stop reason: SDUPTHER

## 2020-10-23 RX ADMIN — METHYLPREDNISOLONE ACETATE 80 MG: 80 INJECTION, SUSPENSION INTRA-ARTICULAR; INTRALESIONAL; INTRAMUSCULAR; SOFT TISSUE at 08:31

## 2020-10-23 ASSESSMENT — PAIN SCALES - GENERAL: PAINLEVEL: 10=SEVERE PAIN

## 2020-10-23 ASSESSMENT — PATIENT HEALTH QUESTIONNAIRE - PHQ9
5. POOR APPETITE OR OVEREATING: 0 - NOT AT ALL
CLINICAL INTERPRETATION OF PHQ2 SCORE: 2
SUM OF ALL RESPONSES TO PHQ QUESTIONS 1-9: 4

## 2020-10-23 ASSESSMENT — FIBROSIS 4 INDEX: FIB4 SCORE: .934132727969572995

## 2020-10-23 NOTE — PROCEDURES
Date of Service: 10/23/2020    Physician/s: Paresh Ogden MD    Pre-operative Diagnosis: left greater trochanteric bursitis    Post-operative Diagnosis: left greater trochanteric bursitis    Procedure: left greater trochanteric bursa injection ultrasound-guided    Description of procedure:    The risks, benefits, and alternatives of the procedure were reviewed and discussed with the patient.  Written informed consent was freely obtained. A pre-procedural time-out was conducted by the physician verifying patient’s identity, procedure to be performed, procedure site and side, and allergy verification. Appropriate equipment was determined to be in place for the procedure.     No sedation was used for this procedure.     In the office suite the patient was placed in a lateral position with her left side up, and the skin was prepped and draped in the usual sterile fashion. The ultrasound probe was placed over the left greater trochanter and the joint space was located and the target for injection was marked. 1% lidocaine was used as a local anesthetic with 27g needle. A 22g 3.5 inch needle was placed into skin and advanced under ultrasound guidance into the greater trochanteric bursa. Following negative aspiration, approx 5cc of 1% lidocaine with 1cc of 80mg/mL depomedrol was then injected into the joint, and the needle was subsequently removed intact after restyleted. The patient's hip was wiped with a 4x4 gauze, the area was cleansed with alcohol prep, and a bandaid was applied. There were no complications noted. The images were saved for permanent storage.       Preprocedural pain: 10/10  Postprocedural pain: 6/10    Paresh Ogden MD  Physical Medicine and Rehabilitation  Interventional Spine and Sports Physiatry  [facility]

## 2020-10-23 NOTE — PROGRESS NOTES
Follow-up patient note    Physiatry (physical medicine and  Rehabilitation), interventional spine and sports medicine    Date of Service: 10/23/2020    Chief complaint:   Chief Complaint   Patient presents with   • Follow-Up     Lower back pain       HISTORY    HPI: Grisel Mark 65 y.o. female who presents today for follow-up evaluation of low back and leg pain.     Since the last visit, Grisel reports that she has had worsening of the pain in her low back and left leg.  It radiates into the left gluteal region and lateral leg.  This goes down into the left leg to the foot.  This is aching and stabbing.  Numbness is present in her toes.  Her laminectomy was in 1998.  Previous injection on left L4 sand L5 TFESI on 08/12/2020 helped with her leg pain.     No bowel or bladder changes.  No falls.    Knees are intermittently painful when she walks, but she has not been walking as much lately.   A copper knee sleeve seems to be helping.    Physical therapy for her low back is scheduled to start on 10/28/2020.    For her pain medication, she is taking MS Contin 15mg po q12h. Norco 5/325 for prn breakthrough.  Gabapentin 600mg po tid.  She takes duloxetine 90mg daily.  She has had diabetes for about 20 years    Her bowel movements are regular.  She still takes [employer] stool softener twice a day and metamucil.        Medical records review:  Dr. Francisco Olmos, plan to increase duloxetine 90mg daily, discontinue buspirone 20mg po bid, start buproprion XL 150mg daily, continue brexipiprazole 1mg qhs, hydroxyzine 25mg po tid prn, clonazepam 0.5mg daily prn    Review of records   Dr. Dheeraj De La Rosa:  08/20/2019 Right L3-4, L4-5, L5-S1 radiofrequency ablation    I reviewed the note from the referring provider Pteer Bruce * dated 02/05/2020: She was seen to establish care, having just moved from [place].  She was seen for essential hypertension, mixed hyperlipidemia, DM2 in obese, hypothyroidism, recurrent MDD in  partial depression/anxiety, GERD without esophagitis, chronic bilateral low back pain with bilateral sciatica.  Medications associated with the above were written, related labs ordered including CBC, CMP, Hb A1c, lipids, microalbumin, TSH, free thryoxine, referral to ps\ychiatry, referral to GI for colonoscopy and referral to pain clinic.    Previous treatments:    Physical Therapy: Yes    Medications the patient is tried: Narcotics, gabapentin and muscle relaxers    Previous interventions: [facility] these included right lumbar radiofrequency procedures    Previous surgeries to relieve the above pain:  Surgery on October 28, 1998      ROS: Unchanged from 09/22/2020 except as noted in the HPI   ENT: ear infection, treated with augmentin  CV: irregular heart, reports history of tachycardia  Psych: depression since 1995  Heme: anemia  Endo: hypothyroidism, diabetes    Red Flags ROS:   Fever, Chills, Sweats: Denies  Involuntary Weight Loss: Denies  Bladder Incontinence: Denies  Bowel Incontinence: Denies  Saddle Anesthesia: Denies    All other systems reviewed and negative.       PMHx:   Past Medical History:   Diagnosis Date   • Anxiety    • Chronic back pain    • Constipation    • Depression    • Diabetes (HCC)    • GERD (gastroesophageal reflux disease)    • Heart murmur     tachycardia   • Hyperlipidemia    • Hypertension    • Thyroid disease        PSHx:   Past Surgical History:   Procedure Laterality Date   • LUMBAR MEDIAL BRANCH BLOCKS Left 9/9/2020    Procedure: BLOCK, NERVE, SPINAL, LUMBAR, POSTERIOR RAMUS, MEDIAL BRANCH;  Surgeon: Paresh Ogden M.D.;  [facility];  Service: Pain Management   • PB INJ,FORAMEN,L/S,1 LEVEL Left 8/12/2020    Procedure: INJECTION, SPINE, LUMBOSACRAL, EPIDURAL, 1 LEVEL, TRANSFORAMINAL APPROACH;  Surgeon: Paresh Ogden M.D.;  Location: [facility];  Service: Pain Management   • PB INJ,FORAMEN,L/S,ADDL  LEVELS Left 8/12/2020    Procedure: INJECTION, SPINE, LUMBOSACRAL, EPIDURAL, TRANSFORAMINAL APPROACH;  Surgeon: Paresh Ogden M.D.;  Location: [facility];  Service: Pain Management   • LAMINOTOMY  1998   • ABDOMINAL HYSTERECTOMY TOTAL     • CARPAL TUNNEL RELEASE     • CHOLECYSTECTOMY         Family history   Family History   Problem Relation Age of Onset   • Cancer Mother    • Stroke Father         Heart attack   • Dementia Sister    • Stroke Brother         heart Attack   • Cancer Brother         Skin   • Diabetes Brother    • Kidney Disease Brother    • Cancer Brother    • Parkinson's Disease Sister    • No Known Problems Sister    • Diabetes Daughter    • Hypertension Daughter          Medications:   Current Outpatient Medications   Medication   • [START ON 10/29/2020] morphine ER (MS CONTIN) 15 MG Tab CR tablet   • [START ON 10/29/2020] HYDROcodone-acetaminophen (NORCO) 5-325 MG Tab per tablet   • clonazePAM (KLONOPIN) 1 MG Tab   • hydrOXYzine HCl (ATARAX) 25 MG Tab   • busPIRone (BUSPAR) 10 MG Tab tablet   • gabapentin (NEURONTIN) 600 MG tablet   • Naloxone (NARCAN) 4 MG/0.1ML Liquid   • Diclofenac Sodium 1 % Gel   • estradiol (ESTRACE) 1 MG Tab   • fluconazole (DIFLUCAN) 150 MG tablet   • DULoxetine (CYMBALTA) 30 MG Cap DR Particles   • rosuvastatin (CRESTOR) 10 MG Tab   • levothyroxine (SYNTHROID) 50 MCG Tab   • metoprolol (LOPRESSOR) 25 MG Tab   • Empagliflozin (JARDIANCE) 25 MG Tab   • lisinopril (PRINIVIL) 10 MG Tab   • metformin (GLUCOPHAGE) 1000 MG tablet   • omeprazole (PRILOSEC) 40 MG delayed-release capsule   • diclofenac EC (VOLTAREN) 75 MG Tablet Delayed Response   • Blood Glucose Test Strips   • Lancets   • Alcohol Swabs   • Blood Glucose Meter Kit     No current facility-administered medications for this visit.        Allergies:   No Known Allergies    Social Hx:   Social History     Socioeconomic History   • Marital status:      Spouse name: Not on file   • Number of  "children: Not on file   • Years of education: Not on file   • Highest education level: Not on file   Occupational History   • Not on file   Social Needs   • Financial resource strain: Not on file   • Food insecurity     Worry: Not on file     Inability: Not on file   • Transportation needs     Medical: Not on file     Non-medical: Not on file   Tobacco Use   • Smoking status: Former Smoker     Packs/day: 0.25     Types: Cigarettes   • Smokeless tobacco: Never Used   Substance and Sexual Activity   • Alcohol use: Not Currently     Comment: rare   • Drug use: Never   • Sexual activity: Not Currently   Lifestyle   • Physical activity     Days per week: Not on file     Minutes per session: Not on file   • Stress: Not on file   Relationships   • Social connections     Talks on phone: Not on file     Gets together: Not on file     Attends Taoist service: Not on file     Active member of club or organization: Not on file     Attends meetings of clubs or organizations: Not on file     Relationship status: Not on file   • Intimate partner violence     Fear of current or ex partner: Not on file     Emotionally abused: Not on file     Physically abused: Not on file     Forced sexual activity: Not on file   Other Topics Concern   •  Service No   • Blood Transfusions Yes   • Caffeine Concern No   • Occupational Exposure No   • Hobby Hazards No   • Sleep Concern Yes   • Stress Concern Yes   • Weight Concern Yes   • Special Diet No   • Back Care No   • Exercise Yes   • Bike Helmet No   • Seat Belt Yes   • Self-Exams Yes   Social History Narrative   • Not on file         EXAMINATION     Physical Exam:   Vitals: /80 (BP Location: Left arm, Patient Position: Sitting, BP Cuff Size: Small adult)   Pulse 92   Temp 36.2 °C (97.2 °F) (Temporal)   Ht 1.575 m (5' 2\")   Wt 83.2 kg (183 lb 6.8 oz)   SpO2 93%     Constitutional:   Body Habitus: Body mass index is 33.55 kg/m².  Cooperation: Fully cooperates with " exam  Appearance: Well-groomed, well-nourished, not disheveled, no acute distress    Eyes: No scleral icterus, no proptosis     ENT -no obvious auditory deficits, wearing a facial mask    Skin -no rashes or lesions noted    Respiratory-  breathing comfortable on room air, no audible wheezing    Cardiovascular- no lower extremity edema is noted.     Psychiatric- alert and oriented ×3. Normal affect.     Gait - normal gait, no use of ambulatory device, antalgic.  Slow, short step length    Musculoskeletal -   Thoracic/Lumbar Spine/Sacral Spine/Hips   Inspection: no evidence of atrophy in bilateral lower extremities throughout     ROM of the lumbar spine not fully evaluated due to pain    Palpation:   Significant tenderness over left greater trochanter.  Tenderness over the left PSIS and lumbar paraspinals left greater than right    Neuro     Key points for the international standards for neurological classification of spinal cord injury (ISNCSCI) to light touch.     Dermatome R L   L2 2 2   L3 2 2   L4 2 2   L5 2 1   S1 2 1   S2 2 2       Motor Exam Lower Extremities    ? Myotome R L   Hip flexion L2 5 5   Knee extension L3 5 5   Ankle dorsiflexion L4 5 5   Toe extension L5 5 5   Ankle plantarflexion S1 5 5       Reflexes  ?  R L   Patella  2+ 2+   Achilles   2+ 1+       MEDICAL DECISION MAKING    Medical records review: see under HPI section.     DATA    Labs:   08/18/2020: UDS positive for morphine and metabolites, clonazepam and metabolites, hydrocodone and metabolites  04/24/2020 UDS positive for morphine and metabolites, clonazepam  04/09/2020 Vitamin D, 25:  28L  04/09/2020 Vitamin B12: 217    Lab Results   Component Value Date/Time    SODIUM 135 04/09/2020 09:26 AM    POTASSIUM 4.7 04/09/2020 09:26 AM    CHLORIDE 100 04/09/2020 09:26 AM    CO2 21 04/09/2020 09:26 AM    ANION 14.0 04/09/2020 09:26 AM    GLUCOSE 140 (H) 04/09/2020 09:26 AM    BUN 19 04/09/2020 09:26 AM    CREATININE 0.71 04/09/2020 09:26 AM     CALCIUM 9.8 04/09/2020 09:26 AM    ASTSGOT 10 (L) 04/09/2020 09:26 AM    ALTSGPT 7 04/09/2020 09:26 AM    TBILIRUBIN 0.3 04/09/2020 09:26 AM    ALBUMIN 4.1 04/09/2020 09:26 AM    TOTPROTEIN 7.3 04/09/2020 09:26 AM    GLOBULIN 3.2 04/09/2020 09:26 AM    AGRATIO 1.3 04/09/2020 09:26 AM       No results found for: PROTHROMBTM, INR     Lab Results   Component Value Date/Time    WBC 7.6 04/09/2020 09:26 AM    RBC 4.25 04/09/2020 09:26 AM    HEMOGLOBIN 13.3 04/09/2020 09:26 AM    HEMATOCRIT 40.7 04/09/2020 09:26 AM    MCV 95.8 04/09/2020 09:26 AM    MCH 31.3 04/09/2020 09:26 AM    MCHC 32.7 (L) 04/09/2020 09:26 AM    MPV 11.1 04/09/2020 09:26 AM    NEUTSPOLYS 52.80 04/09/2020 09:26 AM    LYMPHOCYTES 34.80 04/09/2020 09:26 AM    MONOCYTES 10.10 04/09/2020 09:26 AM    EOSINOPHILS 1.40 04/09/2020 09:26 AM    BASOPHILS 0.40 04/09/2020 09:26 AM        Lab Results   Component Value Date/Time    HBA1C 6.8 (H) 04/09/2020 09:26 AM        Imaging: I personally reviewed following images, these are my reads:     MRI lumbar spine 05/05/2020  At L1-2, no central or foraminal stenosis  At L2-3, no central or foraminal stenosis  At L3-4, mild disc bulge and mild ligamentum flavum thickening.  No central canal stenosis. Borderline left foraminal stenosis. Schmorl's node.   At L4-5, diffuse disc bulge with mild ligamentum flavum thickening.  No central canal stenosis.  There is facet arthropathy, left greater than right. Mild foraminal stenosis bilaterally. S/P left L4/5 hemilaminectomy  At L5-S1, there is mild-borderline foraminal stenosis with facet arthropathy and mild disc bulge.  No central canal stenosis    Xray lumbar spine 05/05/2020  There is mild decreased joint space at L3-4 and L4-5.    Facet arthropathy is noted, greatest at L5-S1 bilaterally.  No significant motion on flexion and extension films    IMAGING radiology reads. I reviewed the following radiology reads    Xray abdomen 07/17/2020  IMPRESSION:     Nonspecific bowel  gas pattern. No evidence small bowel obstruction.      MRI lumbar spine 05/05/2020  IMPRESSION:     1.  Caudal lumbar facet arthropathy left worse than right with no bony destruction to indicate septic or inflammatory arthropathy. This most likely is just degenerative  2.  Mild caudal lumbar foraminal stenoses  3.  No significant central stenosis.  4.  Left L4/5 hemilaminectomy                                                                                   Xray lumbar spine 05/05/2020     IMPRESSION:     1.  No acute lumbar compression fracture or subluxation.  2.  Posterior disc space narrowing at L4-5 and to lesser extent at L3-4.  3.  Bilateral facet arthropathy at L5-S1.                                                                                             Diagnosis   Visit Diagnoses     ICD-10-CM   1. S/P lumbar laminectomy  Z98.890   2. DDD (degenerative disc disease), lumbar  M51.36   3. Low back pain with radiation  M54.5   4. Left lumbar radiculitis  M54.16   5. Greater trochanteric bursitis of left hip  M70.62         ASSESSMENT:  Grisel Mark 65 y.o. female seen for above     Grisel was seen today for follow-up.    Diagnoses and all orders for this visit:    S/P lumbar laminectomy  -     REFERRAL TO [facility] - PHYSIATRY (PMR)  -     morphine ER (MS CONTIN) 15 MG Tab CR tablet; Take 1 Tab by mouth every 12 hours for 30 days.  -     HYDROcodone-acetaminophen (NORCO) 5-325 MG Tab per tablet; Take 1 Tab by mouth every four hours as needed for up to 30 days.  -     Consent for Opiate Prescription  -     Controlled Substance Treatment Agreement  -     REFERRAL TO PHYSIATRY (PMR)    DDD (degenerative disc disease), lumbar  -     morphine ER (MS CONTIN) 15 MG Tab CR tablet; Take 1 Tab by mouth every 12 hours for 30 days.  -     HYDROcodone-acetaminophen (NORCO) 5-325 MG Tab per tablet; Take 1 Tab by mouth every four hours as needed for up to 30 days.  -     Consent for Opiate Prescription  -     Controlled  Substance Treatment Agreement    Low back pain with radiation  -     REFERRAL TO [facility] - PHYSIATRY (PMR)    Left lumbar radiculitis  -     REFERRAL TO PHYSIATRY (PMR)    Greater trochanteric bursitis of left hip  -     REFERRAL TO PHYSIATRY (PMR)  -     methylPREDNISolone acetate (DEPO-MEDROL) injection 80 mg         1.  Left greater trochanteric bursa injection discussed.  The risks benefits and alternatives to this procedure were discussed and the patient wishes to proceed with the procedure. Risks include but are not limited to damage to surrounding structures, infection, bleeding, worsening of pain which can be permanent, weakness which can be permanent. Benefits include pain relief, improved function. Alternatives includes not doing the procedure.  The patient would like to proceed.  See separate procedure note from the same date.  2. She will get the xray orders when she is able.  3. Continue gabapentin 600mg po qid.  4. Reviewed  and most recent UDS.  Continue MS Contin 15mg q12h.  Norco for breakthrough.  #45/month  5. Discussed plans for future wean of clonazepam.  She reports that her PCP did not feel that this was the best time for wean  6. Continue colase 100mg daily and miralax prn for constipation.  Decrease colace to QOD if she develops loose stools. She is maintaining regular bowel movements.  7. Information about SCS given for her review.  8. Ordered EMG of the bilateral lower extremities  9. Discussed possible left lumbar four and lumbar five transforaminal epidural steroid injection, will hold if symptoms improve.  The risks benefits and alternatives to this procedure were discussed and the patient wishes to proceed with the procedure. Risks include but are not limited to damage to surrounding structures, infection, bleeding, worsening of pain which can be permanent, weakness which can be permanent. Benefits include pain relief, improved function. Alternatives includes not doing the procedure.     10. Plan for PT trial as scheduled to start 10/28/2020.      Follow-up: Return in about 2 weeks (around 11/6/2020).    Thank you very much for asking me to participate in Grisel Mark's care.  Please contact me with any questions or concerns.    Please note that this dictation was created using voice recognition software. I have made every reasonable attempt to correct obvious errors but there may be errors of grammar and content that I may have overlooked prior to finalization of this note.      Paresh Ogden MD  Physical Medicine and Rehabilitation  Interventional Spine and Sports Physiatry  [facility]

## 2020-10-23 NOTE — PATIENT INSTRUCTIONS
Your procedure will be at the [facility] special procedure suite.    [facility] [place] [address]       PRE-PROCEDURE INSTRUCTIONS  You may take your regular medications except:   · No Anti-inflammatories 5 days prior to your procedure. Anti-inflammatories include medicines such as  ibuprofen (Motrin, Advil), Excedrin, Naproxen (Aleve, Anaprox, Naprelan, Naprosyn), Celecoxib (Celebrex), Diclofenac (Voltaren-XR tab), and Meloxicam (Mobic).   · No Glucophage or Metformin 24 hours before your procedure. You may resume next day after your procedure.  · Call the physiatry office if you are taking or prescribed anti-biotics within five days of procedure.  · Please ask provider if you are taking any new diabetes medication.  · CONTINUE TAKING BLOOD PRESSURE MEDICATIONS AS PRESCRIBED.  · Pain medications will not be prescribed on the procedure day. Procedural pain medication may be used by your provider   · Call your doctor's office performing the procedure if you have a fever, chills, rash or new illness prior to your procedure    Anticoagulation/antiplatelet medications  No Blood thinning medications such as Coumadin or Plavix 5 days prior to procedure unless your doctor said to continue these medications. Call your doctor if a new medication is prescribed in this class.     Restrictions for eating before procedure:   · If you are getting procedural sedation, then do not eat to for 8 hours prior to procedure appointment time. Do not drink fluids for four hours prior to your procedure time.   · If you are not having procedural sedation, then Skip the meal prior to your procedure. If you have a morning procedure then skip breakfast. If you have an afternoon procedure then skip lunch.   · You may drink clear liquids up to 2 hours prior to your procedure  · You must have a  the day of procedure to accompany you home.      POST PROCEDURE INSTRUCTIONS   · No heavy lifting, strenuous bending or  strenuous exercise for 3 days after your procedure.  · No hot tubs, baths, swimming for 3 days after your procedure  · You can remove the bandage the day after the procedure.  · IF YOU RECEIVED A STEROID INJECTION. PLEASE NOTE THAT THERE MAY BE A DELAY FOR THE INJECTION TO START WORKING, THE DELAY MAY BE UP TO TWO WEEKS. IF YOU HAVE DIABETES, PLEASE NOTE THAT YOUR SUGAR LEVELS MAY BE ELEVATED FOR 1-2 DAYS AFTER A STEROID INJECTION.  THE STEROID MAY CAUSE TEMPORARY SYMPTOMS WHICH USUALLY RESOLVE ON THEIR OWN WITHIN 1 TO 2 DAYS INCLUDING FACIAL FLUSHING OR A FEELING OF WARMTH ON THE FACE, TEMPORARY INCREASES IN BLOOD SUGAR, INSOMNIA, INCREASED HUNGER  · IF YOU RECEIVED A DIAGNOSTIC PROCEDURE (SUCH AS A MEDIAL BRANCH BLOCK), PLEASE NOTE THAT WE DO EXPECT THIS INJECTION TO WEAR OFF.  IT IS IMPORTANT TO COMPLETE THE PAIN DIARY AND LIST THE PAIN SCORE ONLY FOR THE REGION WHERE THE PROCEDURE WAS AND BRING THIS TO YOUR FOLLOW UP VISIT.  · IF YOU RECEIVED A RADIOFREQUENCY PROCEDURE, THERE MAY BE SOME SORENESS AFTER THE PROCEDURE.  THIS IS NORMAL.  · IF YOU EXPERIENCE PROLONGED WEAKNESS LONGER THAN ONE DAY, BOWEL OR BLADDER INCONTINENCE THEN PLEASE CALL THE PHYSIATRY OFFICE.  · Your leg may feel heavy, weak and numb for up to 1-2 days. Be very careful walking.   ·  You may resume normal activities 3 days after procedure.

## 2020-10-28 ENCOUNTER — PHYSICAL THERAPY (OUTPATIENT)
Dept: PHYSICAL THERAPY | Facility: REHABILITATION | Age: 65
End: 2020-10-28
Attending: PHYSICAL MEDICINE & REHABILITATION
Payer: MEDICARE

## 2020-10-28 DIAGNOSIS — Z98.890 S/P LUMBAR LAMINECTOMY: ICD-10-CM

## 2020-10-28 DIAGNOSIS — M54.16 LEFT LUMBAR RADICULITIS: ICD-10-CM

## 2020-10-28 PROCEDURE — 97110 THERAPEUTIC EXERCISES: CPT

## 2020-10-28 PROCEDURE — 97161 PT EVAL LOW COMPLEX 20 MIN: CPT

## 2020-10-28 ASSESSMENT — ENCOUNTER SYMPTOMS
ALLEVIATING FACTORS: LYING DOWN
ALLEVIATING FACTORS: POSITION CHANGE

## 2020-10-28 ASSESSMENT — ACTIVITIES OF DAILY LIVING (ADL): POOR_BALANCE: 1

## 2020-10-28 NOTE — OP THERAPY EVALUATION
"  Outpatient Physical Therapy  INITIAL EVALUATION    [facility] [address]  [address]  [place] [address]  Phone:  [phone]  Fax:  [phone]    Date of Evaluation: 10/28/2020    Patient: Grisel Mark  YOB: 1955  MRN: [id number]     Referring Provider: Paresh Ogden M.D.  [address]  [address]  [facility]  [place] [address]   Referring Diagnosis S/P lumbar laminectomy [Z98.890];Left lumbar radiculitis [M54.16]     Time Calculation                   Chief Complaint: No chief complaint on file.    Visit Diagnoses     ICD-10-CM   1. Left lumbar radiculitis  M54.16   2. S/P lumbar laminectomy  Z98.890         Subjective:   History of Present Illness:     Mechanism of injury:    Pt presents to PT with complaint of worsening of the pain in her low back and left leg.  Notes the problem has worsened over the last 4-5 yrs. The pain radiates into the left gluteal region and lateral leg to the foot.  Numbness is present in her toes (constatnt in the big toe and comes/goes in the others).  PMH notable for a lumbar laminectomy in 1998 (which was done for the L LE pain.  States it helped some, but did not resolve the problem) and DMII. Has undergone an injection on left L4 and L5 TFESI on 08/12/2020 helped with her leg pain, but only for \"about a week.\" Also underwent a L GT injection on 10/23/20. Again states she had relief (80% improvement), but is feeling the pain slowly return just 5 days after.      No bowel or bladder changes.  No falls. Knees are intermittently painful when she walks, but is not on a consistent regimen.  Copper knee sleeve seems to be helping.     Prior level of function:  Retired, active around the home with cleaning/laundry tasks.  Living with her dtr, who typically cooks.   Sleep disturbance:  Interrupted sleep  Pain:     Pain timing: Every morning until about 4 pm, and then again after 9 pm if she doesn't take her meds.    Relieving " factors:  Lying down and position change (Pain meds: MS Contin 15mg po q12h. Norco 5/325 for prn breakthrough.  Gabapentin 600mg po tid.  Duloxetine 90mg daily)    Exacerbated by: Sweeping, walking longer than 30 min, prolonged sitting.    Symptom course: worsening.  Social Support:     Lives in:  One-story house    Lives with:  Adult children (grandbaby on the way)  Hand dominance:  Right  Diagnostic Tests:     MRI studies: abnormal (May 2020)      Diagnostic Tests Comments:    IMPRESSION:     1.  Caudal lumbar facet arthropathy left worse than right with no bony destruction to indicate septic or inflammatory arthropathy. This most likely is just degenerative     2.  Mild caudal lumbar foraminal stenoses     3.  No significant central stenosis.     4.  Left L4/5 hemilaminectomy  Treatments:     Previous treatment:  Injection treatment and medication  Patient Goals:     Patient goals for therapy:  Decreased pain, increased motion, independence with ADLs/IADLs, increased strength and return to sport/leisure activities      Past Medical History:   Diagnosis Date   • Anxiety    • Chronic back pain    • Constipation    • Depression    • Diabetes (HCC)    • GERD (gastroesophageal reflux disease)    • Heart murmur     tachycardia   • Hyperlipidemia    • Hypertension    • Thyroid disease      Past Surgical History:   Procedure Laterality Date   • LUMBAR MEDIAL BRANCH BLOCKS Left 9/9/2020    Procedure: BLOCK, NERVE, SPINAL, LUMBAR, POSTERIOR RAMUS, MEDIAL BRANCH;  Surgeon: Paresh Ogden M.D.;  [facility];  Service: Pain Management   • PB INJ,FORAMEN,L/S,1 LEVEL Left 8/12/2020    Procedure: INJECTION, SPINE, LUMBOSACRAL, EPIDURAL, 1 LEVEL, TRANSFORAMINAL APPROACH;  Surgeon: Paresh Ogden M.D.;  Location[facility];  Service: Pain Management   • PB INJ,FORAMEN,L/S,ADDL LEVELS Left 8/12/2020    Procedure: INJECTION, SPINE, LUMBOSACRAL, EPIDURAL, TRANSFORAMINAL APPROACH;   Surgeon: Paresh Ogden M.D.;  Location: [facility];  Service: Pain Management   • LAMINOTOMY  1998   • ABDOMINAL HYSTERECTOMY TOTAL     • CARPAL TUNNEL RELEASE     • CHOLECYSTECTOMY       Social History     Tobacco Use   • Smoking status: Former Smoker     Packs/day: 0.25     Types: Cigarettes   • Smokeless tobacco: Never Used   Substance Use Topics   • Alcohol use: Not Currently     Comment: rare     Family and Occupational History     Socioeconomic History   • Marital status:      Spouse name: Not on file   • Number of children: Not on file   • Years of education: Not on file   • Highest education level: Not on file   Occupational History   • Not on file       Objective     Observations     Additional Observation Details  Standing: genu varus on the L, lumbar hypolordosis and L SB.  Gait: L leaning to compensate for L hip weakness. Increased lateral sway. Decreased trunk rotation.   Reported stomach sleeper    Postural Observations  Seated posture: poor  Standing posture: poor  Correction of posture: has no consistent effect    Additional Postural Observation Details  Tends to sit L leaned.     Hip Screen   Hip range of motion within functional limits with the following exceptions: Guarded with L hip AROM in flexion past 105 deg and ER past 40 degrees.     Neurological Testing     Reflexes   Left   Patellar (L4): trace (1+)  Achilles (S1): trace (1+)    Right   Patellar (L4): trace (1+)  Achilles (S1): trace (1+)    Myotome testing   Lumbar (left)   All left lumbar myotomes within normal limits    Lumbar (right)   All right lumbar myotomes within normal limits    Dermatome testing   Lumbar (left)   All left lumbar dermatomes intact    Lumbar (right)   All right lumbar dermatomes intact    Palpation   Left   Tenderness of the gluteus tiff, gluteus medius, iliotibial, lumbar paraspinals and piriformis.     Right   Tenderness of the gluteus medius, lumbar paraspinals and piriformis.  "    Tenderness     Left Hip   Tenderness in the greater trochanter, iliac crest and iliolumbar ligament.      Active Range of Motion     Lumbar   Flexion: decreased (FT to knees.  Having to walk hands up LEs due to pain on return)  Extension: within functional limits (pain EOR)  Left lateral flexion: decreased  Right lateral flexion: within functional limits  Left rotation: decreased (25% of full)  Right rotation: decreased (50% of full)    Joint Play   Spine     Central PA [place]        L1: hypomobile       L2: hypomobile and painful       L3: hypomobile and painful       L4: hypomobile and painful       L5: hypomobile and painful        Strength:      Lower extremities   Normal right lower extremity strength    Left Hip   Planes of Motion   Flexion: 3+  Extension: 4-  Abduction: 4-  Adduction: 4    Left Knee   Flexion: 4  Extension: 4-    Left Ankle/Foot   Dorsiflexion: 4+  Plantar flexion: 4+        Therapeutic Exercises (CPT 25564):     1. With LEs hooklying on ball:    2. Pelvic tilts, re-ed x 3 min    3. Small range LTR, x 10 ea side    4. Ball rolls, x 15    5. KTOS, 2x30\" ea    6. TNE regarding central sensitivity, effects of long term narcotic use, avoidance of boom/bust and how to montior sx during HEP performance.       Therapeutic Exercise Summary: Insuff time to trial IFC today      Time-based treatments/modalities:           Assessment, Response and Plan:   Impairments: abnormal gait, abnormal muscle tone, abnormal or restricted ROM, difficulty performing job, impaired functional mobility, impaired balance, impaired physical strength, lacks appropriate home exercise program, limited ADL's and pain with function    Assessment details:  Ms. Mark is a 65 y.o female who presents to PT with complaint of worsening chronic LBP and L LE pain.  PT evaluation is consistent with lumbar DJD with L L4/5 radiculopathy. Due to the chronicity of the problem, she has notable deviations in posture and gait, decreased " lumbar mobility, limited lumbopelvic strength and control.  The problems related to decreased functional mobility and independence around her home.  Skilled PT services are indicated to address the mentioned functional limitations and enhance QOL.     Barriers to therapy: will be OOT x 3 weeks in November.  Prognosis: good    Goals:   Short Term Goals:   - Improve lumbar rotation to at least 75% of full bilaterally  - Improve L hip AROM flex and ER to equal the R  - Able to indep TA brace in all functional positions.   Short term goal time span:  1-2 weeks      Long Term Goals:    - Improve RMQ at least 10 points  - Pt reports she is able to walk the grocery store with no more than 5/10 pain  - Pt is able to perform full bridge without pain x 10 reps  - Pt is indep with her HEP   Long term goal time span:  1-2 months    Plan:   Therapy options:  Physical therapy treatment to continue  Planned therapy interventions:  E Stim Unattended (CPT 28771), Functional Training, Self Care (CPT 08254), Hot or Cold Pack Therapy (CPT 70446), Neuromuscular Re-education (CPT 32613), Manual Therapy (CPT 57278), Mechanical Traction (CPT 11235), Therapeutic Exercise (CPT 77696) and Therapeutic Activities (CPT 03904)  Frequency: 1-2x/week.  Duration in visits:  8  Discussed with:  Patient      Functional Assessment Used    RMQ= 20/24    Referring provider co-signature:  I have reviewed this plan of care and my co-signature certifies the need for services.    Certification Period: 10/28/2020 to  12/23/20    Physician Signature: ________________________________ Date: ______________

## 2020-11-05 ENCOUNTER — APPOINTMENT (OUTPATIENT)
Dept: PHYSICAL MEDICINE AND REHAB | Facility: MEDICAL CENTER | Age: 65
End: 2020-11-05
Payer: MEDICARE

## 2020-11-06 ENCOUNTER — OFFICE VISIT (OUTPATIENT)
Dept: PHYSICAL MEDICINE AND REHAB | Facility: MEDICAL CENTER | Age: 65
End: 2020-11-06
Payer: MEDICARE

## 2020-11-06 VITALS
SYSTOLIC BLOOD PRESSURE: 130 MMHG | DIASTOLIC BLOOD PRESSURE: 90 MMHG | HEART RATE: 91 BPM | BODY MASS INDEX: 32.74 KG/M2 | OXYGEN SATURATION: 92 % | WEIGHT: 177.91 LBS | HEIGHT: 62 IN | TEMPERATURE: 97.5 F

## 2020-11-06 DIAGNOSIS — E55.9 VITAMIN D DEFICIENCY: ICD-10-CM

## 2020-11-06 DIAGNOSIS — M54.16 LEFT LUMBAR RADICULITIS: ICD-10-CM

## 2020-11-06 DIAGNOSIS — Z98.890 S/P LUMBAR LAMINECTOMY: ICD-10-CM

## 2020-11-06 DIAGNOSIS — E11.69 DIABETES MELLITUS TYPE 2 IN OBESE: ICD-10-CM

## 2020-11-06 DIAGNOSIS — E66.9 DIABETES MELLITUS TYPE 2 IN OBESE: ICD-10-CM

## 2020-11-06 DIAGNOSIS — M51.36 DDD (DEGENERATIVE DISC DISEASE), LUMBAR: ICD-10-CM

## 2020-11-06 DIAGNOSIS — M54.50 LOW BACK PAIN WITH RADIATION: ICD-10-CM

## 2020-11-06 PROCEDURE — 99214 OFFICE O/P EST MOD 30 MIN: CPT | Performed by: PHYSICAL MEDICINE & REHABILITATION

## 2020-11-06 RX ORDER — GABAPENTIN 600 MG/1
600 TABLET ORAL 4 TIMES DAILY
Qty: 360 TAB | Refills: 0 | Status: SHIPPED | OUTPATIENT
Start: 2020-12-21 | End: 2021-01-21 | Stop reason: SDUPTHER

## 2020-11-06 RX ORDER — BREXPIPRAZOLE 1 MG/1
1 TABLET ORAL
COMMUNITY
End: 2021-05-04 | Stop reason: SDUPTHER

## 2020-11-06 RX ORDER — HYDROCODONE BITARTRATE AND ACETAMINOPHEN 5; 325 MG/1; MG/1
1 TABLET ORAL EVERY 4 HOURS PRN
Qty: 45 TAB | Refills: 0 | Status: SHIPPED | OUTPATIENT
Start: 2020-11-27 | End: 2020-12-27

## 2020-11-06 RX ORDER — HYDROCODONE BITARTRATE AND ACETAMINOPHEN 5; 325 MG/1; MG/1
1 TABLET ORAL EVERY 4 HOURS PRN
Qty: 45 TAB | Refills: 0 | Status: SHIPPED | OUTPATIENT
Start: 2020-12-27 | End: 2021-01-21 | Stop reason: SDUPTHER

## 2020-11-06 RX ORDER — MORPHINE SULFATE 15 MG/1
15 TABLET, FILM COATED, EXTENDED RELEASE ORAL EVERY 12 HOURS
Qty: 60 TAB | Refills: 0 | Status: SHIPPED | OUTPATIENT
Start: 2020-11-27 | End: 2020-12-27

## 2020-11-06 RX ORDER — MORPHINE SULFATE 15 MG/1
15 TABLET, FILM COATED, EXTENDED RELEASE ORAL EVERY 12 HOURS
Qty: 60 TAB | Refills: 0 | Status: SHIPPED | OUTPATIENT
Start: 2020-12-27 | End: 2021-01-21 | Stop reason: SDUPTHER

## 2020-11-06 ASSESSMENT — FIBROSIS 4 INDEX: FIB4 SCORE: .934132727969572995

## 2020-11-06 ASSESSMENT — PATIENT HEALTH QUESTIONNAIRE - PHQ9
5. POOR APPETITE OR OVEREATING: 0 - NOT AT ALL
CLINICAL INTERPRETATION OF PHQ2 SCORE: 2

## 2020-11-06 ASSESSMENT — PAIN SCALES - GENERAL: PAINLEVEL: 10=SEVERE PAIN

## 2020-11-07 NOTE — PROGRESS NOTES
Follow-up patient note    Physiatry (physical medicine and  Rehabilitation), interventional spine and sports medicine    Date of Service: 11/06/2020    Chief complaint:   Chief Complaint   Patient presents with   • Follow-Up     Hip and back pain       HISTORY    HPI: Grisel Mark 65 y.o. female who presents today for follow-up evaluation of low back and leg pain.     At the last visit, left greater trochanteric bursa injection helped with symptoms for a few days, but the symptoms returned.  Walking on the left leg is painful and she has more symptoms into the left leg to the foot.  She feels like pain is through the whole leg.  She cannot sleep on the left side.  It feels swollen.    Medications for pain include: Duloxetine 90mg.  MS Contin 15mg q12h.  Norco 5/325 for breakthrough.  Gabapentin 600mg po QID.  No side effects.    Bowel movements are regular.  She continues her stool softener twice a day    Her knee pain continues, copper knee sleeve seems to help somewhat.    Physical therapy started, she plans to continue November 11, 2020.    By history:  Her laminectomy was in 1998.  Previous injection on left L4 sand L5 TFESI on 08/12/2020 helped with her leg pain.     Medical records review:  Dr. Francisco Olmos, plan to increase duloxetine 90mg daily, discontinue buspirone 20mg po bid, start buproprion XL 150mg daily, continue brexipiprazole 1mg qhs, hydroxyzine 25mg po tid prn, clonazepam 0.5mg daily prn    Review of records   Dr. Dheeraj De La Rosa:  08/20/2019 Right L3-4, L4-5, L5-S1 radiofrequency ablation    I reviewed the note from the referring provider Peter Bruce * dated 02/05/2020: She was seen to establish care, having just moved from [place].  She was seen for essential hypertension, mixed hyperlipidemia, DM2 in obese, hypothyroidism, recurrent MDD in partial depression/anxiety, GERD without esophagitis, chronic bilateral low back pain with bilateral sciatica.  Medications associated  with the above were written, related labs ordered including CBC, CMP, Hb A1c, lipids, microalbumin, TSH, free thryoxine, referral to ps\ychiatry, referral to GI for colonoscopy and referral to pain clinic.    Previous treatments:    Physical Therapy: Yes    Medications the patient is tried: Narcotics, gabapentin and muscle relaxers    Previous interventions: [facility] these included right lumbar radiofrequency procedures    Previous surgeries to relieve the above pain:  Surgery on October 28, 1998      ROS: Unchanged from 10/23/2020 except as noted in the HPI   ENT: ear infection, treated with augmentin  CV: irregular heart, reports history of tachycardia  Psych: depression since 1995  Heme: anemia  Endo: hypothyroidism, diabetes    Red Flags ROS:   Fever, Chills, Sweats: Denies  Involuntary Weight Loss: Denies  Bladder Incontinence: Denies  Bowel Incontinence: Denies  Saddle Anesthesia: Denies    All other systems reviewed and negative.       PMHx:   Past Medical History:   Diagnosis Date   • Anxiety    • Chronic back pain    • Constipation    • Depression    • Diabetes (HCC)    • GERD (gastroesophageal reflux disease)    • Heart murmur     tachycardia   • Hyperlipidemia    • Hypertension    • Thyroid disease        PSHx:   Past Surgical History:   Procedure Laterality Date   • LUMBAR MEDIAL BRANCH BLOCKS Left 9/9/2020    Procedure: BLOCK, NERVE, SPINAL, LUMBAR, POSTERIOR RAMUS, MEDIAL BRANCH;  Surgeon: Paresh Ogden M.D.;  [facility];  Service: Pain Management   • PB INJ,FORAMEN,L/S,1 LEVEL Left 8/12/2020    Procedure: INJECTION, SPINE, LUMBOSACRAL, EPIDURAL, 1 LEVEL, TRANSFORAMINAL APPROACH;  Surgeon: Paresh Ogden M.D.;  Location: [facility];  Service: Pain Management   • PB INJ,FORAMEN,L/S,ADDL LEVELS Left 8/12/2020    Procedure: INJECTION, SPINE, LUMBOSACRAL, EPIDURAL, TRANSFORAMINAL APPROACH;  Surgeon: Paresh Ogden M.D.;   Location: [facility];  Service: Pain Management   • LAMINOTOMY  1998   • ABDOMINAL HYSTERECTOMY TOTAL     • CARPAL TUNNEL RELEASE     • CHOLECYSTECTOMY         Family history   Family History   Problem Relation Age of Onset   • Cancer Mother    • Stroke Father         Heart attack   • Dementia Sister    • Stroke Brother         heart Attack   • Cancer Brother         Skin   • Diabetes Brother    • Kidney Disease Brother    • Cancer Brother    • Parkinson's Disease Sister    • No Known Problems Sister    • Diabetes Daughter    • Hypertension Daughter          Medications:   Current Outpatient Medications   Medication   • Brexpiprazole (REXULTI) 1 MG Tab   • [START ON 12/21/2020] gabapentin (NEURONTIN) 600 MG tablet   • [START ON 11/27/2020] HYDROcodone-acetaminophen (NORCO) 5-325 MG Tab per tablet   • [START ON 11/27/2020] morphine ER (MS CONTIN) 15 MG Tab CR tablet   • [START ON 12/27/2020] morphine ER (MS CONTIN) 15 MG Tab CR tablet   • [START ON 12/27/2020] HYDROcodone-acetaminophen (NORCO) 5-325 MG Tab per tablet   • clonazePAM (KLONOPIN) 1 MG Tab   • hydrOXYzine HCl (ATARAX) 25 MG Tab   • busPIRone (BUSPAR) 10 MG Tab tablet   • Naloxone (NARCAN) 4 MG/0.1ML Liquid   • Diclofenac Sodium 1 % Gel   • estradiol (ESTRACE) 1 MG Tab   • fluconazole (DIFLUCAN) 150 MG tablet   • DULoxetine (CYMBALTA) 30 MG Cap DR Particles   • rosuvastatin (CRESTOR) 10 MG Tab   • levothyroxine (SYNTHROID) 50 MCG Tab   • metoprolol (LOPRESSOR) 25 MG Tab   • Empagliflozin (JARDIANCE) 25 MG Tab   • lisinopril (PRINIVIL) 10 MG Tab   • metformin (GLUCOPHAGE) 1000 MG tablet   • omeprazole (PRILOSEC) 40 MG delayed-release capsule   • Blood Glucose Test Strips   • Lancets   • Alcohol Swabs   • Blood Glucose Meter Kit   • diclofenac EC (VOLTAREN) 75 MG Tablet Delayed Response     No current facility-administered medications for this visit.        Allergies:   No Known Allergies    Social Hx:   Social History     Socioeconomic  "History   • Marital status:      Spouse name: Not on file   • Number of children: Not on file   • Years of education: Not on file   • Highest education level: Not on file   Occupational History   • Not on file   Social Needs   • Financial resource strain: Not on file   • Food insecurity     Worry: Not on file     Inability: Not on file   • Transportation needs     Medical: Not on file     Non-medical: Not on file   Tobacco Use   • Smoking status: Former Smoker     Packs/day: 0.25     Types: Cigarettes   • Smokeless tobacco: Never Used   Substance and Sexual Activity   • Alcohol use: Not Currently     Comment: rare   • Drug use: Never   • Sexual activity: Not Currently   Lifestyle   • Physical activity     Days per week: Not on file     Minutes per session: Not on file   • Stress: Not on file   Relationships   • Social connections     Talks on phone: Not on file     Gets together: Not on file     Attends Yarsanism service: Not on file     Active member of club or organization: Not on file     Attends meetings of clubs or organizations: Not on file     Relationship status: Not on file   • Intimate partner violence     Fear of current or ex partner: Not on file     Emotionally abused: Not on file     Physically abused: Not on file     Forced sexual activity: Not on file   Other Topics Concern   •  Service No   • Blood Transfusions Yes   • Caffeine Concern No   • Occupational Exposure No   • Hobby Hazards No   • Sleep Concern Yes   • Stress Concern Yes   • Weight Concern Yes   • Special Diet No   • Back Care No   • Exercise Yes   • Bike Helmet No   • Seat Belt Yes   • Self-Exams Yes   Social History Narrative   • Not on file         EXAMINATION     Physical Exam:   Vitals: /90 (BP Location: Right arm, Patient Position: Sitting, BP Cuff Size: Adult long)   Pulse 91   Temp 36.4 °C (97.5 °F) (Temporal)   Ht 1.575 m (5' 2\")   Wt 80.7 kg (177 lb 14.6 oz)   SpO2 92%     Constitutional:   Body " Habitus: Body mass index is 32.54 kg/m².  Cooperation: Fully cooperates with exam  Appearance: Well-groomed, well-nourished, not disheveled, no acute distress    Eyes: No scleral icterus, no proptosis     ENT -no obvious auditory deficits, wearing a facial mask    Skin -no rashes or lesions noted, no warmth or erythema at the site of the injection over the left lateral thigh    Respiratory-  breathing comfortable on room air, no audible wheezing    Cardiovascular- no lower extremity edema is noted.     Psychiatric- alert and oriented ×3. Normal affect.     Gait - normal gait, no use of ambulatory device, antalgic.  Slow, short step length, no loss of balance    Musculoskeletal -   Thoracic/Lumbar Spine/Sacral Spine/Hips   Inspection: no evidence of atrophy in bilateral lower extremities throughout     ROM of the lumbar spine not fully evaluated due to pain    Palpation:   Minimal tenderness over left greater trochanter.  Tenderness over the left PSIS and lumbar paraspinals left greater than right    Neuro     Key points for the international standards for neurological classification of spinal cord injury (ISNCSCI) to light touch.     Dermatome R L   L2 2 2   L3 2 2   L4 2 2   L5 2 1   S1 2 1   S2 2 2       Motor Exam Lower Extremities    ? Myotome R L   Hip flexion L2 5 5   Knee extension L3 5 5   Ankle dorsiflexion L4 5 5   Toe extension L5 5 5   Ankle plantarflexion S1 5 5       Reflexes  ?  R L   Patella  2+ 2+   Achilles   2+ 1+       MEDICAL DECISION MAKING    Medical records review: see under HPI section.     DATA    Labs:   08/18/2020: UDS positive for morphine and metabolites, clonazepam and metabolites, hydrocodone and metabolites  04/24/2020 UDS positive for morphine and metabolites, clonazepam  04/09/2020 Vitamin D, 25:  28L  04/09/2020 Vitamin B12: 217    Lab Results   Component Value Date/Time    SODIUM 135 04/09/2020 09:26 AM    POTASSIUM 4.7 04/09/2020 09:26 AM    CHLORIDE 100 04/09/2020 09:26 AM     CO2 21 04/09/2020 09:26 AM    ANION 14.0 04/09/2020 09:26 AM    GLUCOSE 140 (H) 04/09/2020 09:26 AM    BUN 19 04/09/2020 09:26 AM    CREATININE 0.71 04/09/2020 09:26 AM    CALCIUM 9.8 04/09/2020 09:26 AM    ASTSGOT 10 (L) 04/09/2020 09:26 AM    ALTSGPT 7 04/09/2020 09:26 AM    TBILIRUBIN 0.3 04/09/2020 09:26 AM    ALBUMIN 4.1 04/09/2020 09:26 AM    TOTPROTEIN 7.3 04/09/2020 09:26 AM    GLOBULIN 3.2 04/09/2020 09:26 AM    AGRATIO 1.3 04/09/2020 09:26 AM       No results found for: PROTHROMBTM, INR     Lab Results   Component Value Date/Time    WBC 7.6 04/09/2020 09:26 AM    RBC 4.25 04/09/2020 09:26 AM    HEMOGLOBIN 13.3 04/09/2020 09:26 AM    HEMATOCRIT 40.7 04/09/2020 09:26 AM    MCV 95.8 04/09/2020 09:26 AM    MCH 31.3 04/09/2020 09:26 AM    MCHC 32.7 (L) 04/09/2020 09:26 AM    MPV 11.1 04/09/2020 09:26 AM    NEUTSPOLYS 52.80 04/09/2020 09:26 AM    LYMPHOCYTES 34.80 04/09/2020 09:26 AM    MONOCYTES 10.10 04/09/2020 09:26 AM    EOSINOPHILS 1.40 04/09/2020 09:26 AM    BASOPHILS 0.40 04/09/2020 09:26 AM        Lab Results   Component Value Date/Time    HBA1C 6.8 (H) 04/09/2020 09:26 AM        Imaging: I personally reviewed following images, these are my reads:     MRI lumbar spine 05/05/2020  At L1-2, no central or foraminal stenosis  At L2-3, no central or foraminal stenosis  At L3-4, mild disc bulge and mild ligamentum flavum thickening.  No central canal stenosis. Borderline left foraminal stenosis. Schmorl's node.   At L4-5, diffuse disc bulge with mild ligamentum flavum thickening.  No central canal stenosis.  There is facet arthropathy, left greater than right. Mild foraminal stenosis bilaterally. S/P left L4/5 hemilaminectomy  At L5-S1, there is mild-borderline foraminal stenosis with facet arthropathy and mild disc bulge.  No central canal stenosis    Xray lumbar spine 05/05/2020  There is mild decreased joint space at L3-4 and L4-5.    Facet arthropathy is noted, greatest at L5-S1 bilaterally.  No  significant motion on flexion and extension films    IMAGING radiology reads. I reviewed the following radiology reads    Xray abdomen 07/17/2020  IMPRESSION:     Nonspecific bowel gas pattern. No evidence small bowel obstruction.      MRI lumbar spine 05/05/2020  IMPRESSION:     1.  Caudal lumbar facet arthropathy left worse than right with no bony destruction to indicate septic or inflammatory arthropathy. This most likely is just degenerative  2.  Mild caudal lumbar foraminal stenoses  3.  No significant central stenosis.  4.  Left L4/5 hemilaminectomy                                                                                   Xray lumbar spine 05/05/2020     IMPRESSION:     1.  No acute lumbar compression fracture or subluxation.  2.  Posterior disc space narrowing at L4-5 and to lesser extent at L3-4.  3.  Bilateral facet arthropathy at L5-S1.                                                                                             Diagnosis   Visit Diagnoses     ICD-10-CM   1. S/P lumbar laminectomy  Z98.890   2. DDD (degenerative disc disease), lumbar  M51.36   3. Low back pain with radiation  M54.5   4. Left lumbar radiculitis  M54.16   5. Vitamin D deficiency  E55.9   6. Diabetes mellitus type 2 in obese (HCC)  E11.69    E66.9         ASSESSMENT:  Grisel Mark 65 y.o. female seen for above     Grisel was seen today for follow-up.    Diagnoses and all orders for this visit:    S/P lumbar laminectomy  -     HYDROcodone-acetaminophen (NORCO) 5-325 MG Tab per tablet; Take 1 Tab by mouth every four hours as needed for up to 30 days.  -     morphine ER (MS CONTIN) 15 MG Tab CR tablet; Take 1 Tab by mouth every 12 hours for 30 days.  -     morphine ER (MS CONTIN) 15 MG Tab CR tablet; Take 1 Tab by mouth every 12 hours for 30 days.  -     HYDROcodone-acetaminophen (NORCO) 5-325 MG Tab per tablet; Take 1 Tab by mouth every four hours as needed for up to 30 days.  -     Consent for Opiate Prescription  -      Controlled Substance Treatment Agreement    DDD (degenerative disc disease), lumbar  -     HYDROcodone-acetaminophen (NORCO) 5-325 MG Tab per tablet; Take 1 Tab by mouth every four hours as needed for up to 30 days.  -     morphine ER (MS CONTIN) 15 MG Tab CR tablet; Take 1 Tab by mouth every 12 hours for 30 days.  -     morphine ER (MS CONTIN) 15 MG Tab CR tablet; Take 1 Tab by mouth every 12 hours for 30 days.  -     HYDROcodone-acetaminophen (NORCO) 5-325 MG Tab per tablet; Take 1 Tab by mouth every four hours as needed for up to 30 days.  -     Consent for Opiate Prescription  -     Controlled Substance Treatment Agreement    Low back pain with radiation    Left lumbar radiculitis  -     gabapentin (NEURONTIN) 600 MG tablet; Take 1 Tab by mouth 4 times a day for 90 days.    Vitamin D deficiency    Diabetes mellitus type 2 in obese (HCC)  -     gabapentin (NEURONTIN) 600 MG tablet; Take 1 Tab by mouth 4 times a day for 90 days.         1. Tenderness over the left greater trochanter is improved, but she continues to have radicular symptoms into the left leg.  She is not anxious for surgical management.  We discussed left lumbar four and lumbar five transforaminal epidural steroid injection.  The risks benefits and alternatives to this procedure were discussed and the patient wishes to proceed with the procedure. Risks include but are not limited to damage to surrounding structures, infection, bleeding, worsening of pain which can be permanent, weakness which can be permanent. Benefits include pain relief, improved function. Alternatives includes not doing the procedure.  The patient would like to proceed.    2. We discussed that she will review information about SCS.  3. Continue gabapentin 600mg po qid.  4. Reviewed  and most recent UDS.  Continue MS Contin 15mg q12h. Norco for breakthrough.  #45/month.  Scripts given for the next two months.  5. Discussed plans for future wean of clonazepam, on hold for the  next few months.  She reports that her PCP did not feel that this was the best time for wean  6. Continue colase 100mg daily and miralax prn for constipation.  Bowel movements are regular.  7. Ordered EMG of the bilateral lower extremities, will need to reschedule in the future.  8. Continue physical therapy and home exercise program.  9. Continue vitamin D supplementation.    Follow-up: Return for EMG, Hospital injection.    Thank you very much for asking me to participate in Grisel Mark's care.  Please contact me with any questions or concerns.    Please note that this dictation was created using voice recognition software. I have made every reasonable attempt to correct obvious errors but there may be errors of grammar and content that I may have overlooked prior to finalization of this note.      Paresh Ogden MD  Physical Medicine and Rehabilitation  Interventional Spine and Sports Physiatry  [facility]

## 2020-11-07 NOTE — PATIENT INSTRUCTIONS
Your procedure will be at the [facility] special procedure suite.    [facility] [place] [address]       PRE-PROCEDURE INSTRUCTIONS  You may take your regular medications except:   · No Anti-inflammatories 5 days prior to your procedure. Anti-inflammatories include medicines such as  ibuprofen (Motrin, Advil), Excedrin, Naproxen (Aleve, Anaprox, Naprelan, Naprosyn), Celecoxib (Celebrex), Diclofenac (Voltaren-XR tab), and Meloxicam (Mobic).   · No Glucophage or Metformin 24 hours before your procedure. You may resume next day after your procedure.  · Call the physiatry office if you are taking or prescribed anti-biotics within five days of procedure.  · Please ask provider if you are taking any new diabetes medication.  · CONTINUE TAKING BLOOD PRESSURE MEDICATIONS AS PRESCRIBED.  · Pain medications will not be prescribed on the procedure day. Procedural pain medication may be used by your provider   · Call your doctor's office performing the procedure if you have a fever, chills, rash or new illness prior to your procedure    Anticoagulation/antiplatelet medications  No Blood thinning medications such as Coumadin or Plavix 5 days prior to procedure unless your doctor said to continue these medications. Call your doctor if a new medication is prescribed in this class.     Restrictions for eating before procedure:   · If you are getting procedural sedation, then do not eat to for 8 hours prior to procedure appointment time. Do not drink fluids for four hours prior to your procedure time.   · If you are not having procedural sedation, then Skip the meal prior to your procedure. If you have a morning procedure then skip breakfast. If you have an afternoon procedure then skip lunch.   · You may drink clear liquids up to 2 hours prior to your procedure  · You must have a  the day of procedure to accompany you home.      POST PROCEDURE INSTRUCTIONS   · No heavy lifting, strenuous bending or  strenuous exercise for 3 days after your procedure.  · No hot tubs, baths, swimming for 3 days after your procedure  · You can remove the bandage the day after the procedure.  · IF YOU RECEIVED A STEROID INJECTION. PLEASE NOTE THAT THERE MAY BE A DELAY FOR THE INJECTION TO START WORKING, THE DELAY MAY BE UP TO TWO WEEKS. IF YOU HAVE DIABETES, PLEASE NOTE THAT YOUR SUGAR LEVELS MAY BE ELEVATED FOR 1-2 DAYS AFTER A STEROID INJECTION.  THE STEROID MAY CAUSE TEMPORARY SYMPTOMS WHICH USUALLY RESOLVE ON THEIR OWN WITHIN 1 TO 2 DAYS INCLUDING FACIAL FLUSHING OR A FEELING OF WARMTH ON THE FACE, TEMPORARY INCREASES IN BLOOD SUGAR, INSOMNIA, INCREASED HUNGER  · IF YOU RECEIVED A DIAGNOSTIC PROCEDURE (SUCH AS A MEDIAL BRANCH BLOCK), PLEASE NOTE THAT WE DO EXPECT THIS INJECTION TO WEAR OFF.  IT IS IMPORTANT TO COMPLETE THE PAIN DIARY AND LIST THE PAIN SCORE ONLY FOR THE REGION WHERE THE PROCEDURE WAS AND BRING THIS TO YOUR FOLLOW UP VISIT.  · IF YOU RECEIVED A RADIOFREQUENCY PROCEDURE, THERE MAY BE SOME SORENESS AFTER THE PROCEDURE.  THIS IS NORMAL.  · IF YOU EXPERIENCE PROLONGED WEAKNESS LONGER THAN ONE DAY, BOWEL OR BLADDER INCONTINENCE THEN PLEASE CALL THE PHYSIATRY OFFICE.  · Your leg may feel heavy, weak and numb for up to 1-2 days. Be very careful walking.   ·  You may resume normal activities 3 days after procedure.

## 2020-11-10 NOTE — OP THERAPY DAILY TREATMENT
"  Outpatient Physical Therapy  DAILY TREATMENT     [facility] [place] [address] [address] [facility]  [address]  [place] [address]  Phone:  [phone]  Fax:  [phone]    Date: 11/11/2020    Patient: Grisel Mark  YOB: 1955  MRN: [id number]     Time Calculation    Start time: 0718  Stop time: 0823 Time Calculation (min): 65 minutes         Chief Complaint: Back Problem    Visit #: 2    SUBJECTIVE:  Pt returns for tx after missing her last visit.  Overslept her alarm. \"It's been a rough week with pain.\"  States she has seen Dr. Ogden since being here last.  Reports she has an EMG upcoming and 'she discussed something else with me, but I can't remember.'     OBJECTIVE:      Therapeutic Exercises (CPT 98983):     1. NuStep, L1 x 10 min    2. Pelvic tilts, 3D x 10 ea way    3. Small range LTR, x 10 ea side    4. TA re-ed with cuff, x 2 min    5. - TA march, x 10 ea    6. - TA BKFO, x 10 ea    7. - TA ball roll, x 15    8. DKTC with ball support, x 1 min    9. STS, x 10      Therapeutic Exercise Summary: Education re: functional use of TA brace/lift throughout her day. Pacing strategies to avoid boom/bust cycles.     Therapeutic Treatments and Modalities:     1. E Stim Unattended (CPT 12111), IFC and MH x 15 min    Time-based treatments/modalities:    Physical Therapy Timed Treatment Charges  Therapeutic exercise minutes (CPT 50560): 45 minutes    ASSESSMENT:   Response to treatment: Pt reports 50% reduced sx after above program.  Numbness in the L foot resolved with slight flexion bias. States it usually takes 2 hrs to get moving in the morning and has been doing the HEP mid day.  Encouraged to perform first thing and again throughout the day. Poor functional strength for lumbar support during ADLs.  Hoping she will begin to apply strategies reviewed above to enhance movement tolerance.     PLAN/RECOMMENDATIONS:   Plan for treatment: therapy treatment to continue next visit.  Planned " interventions for next visit: continue with current treatment. TNE, seated ball PTs, rows, pull backs.

## 2020-11-11 ENCOUNTER — PHYSICAL THERAPY (OUTPATIENT)
Dept: PHYSICAL THERAPY | Facility: REHABILITATION | Age: 65
End: 2020-11-11
Attending: PHYSICAL MEDICINE & REHABILITATION
Payer: MEDICARE

## 2020-11-11 DIAGNOSIS — M54.16 LEFT LUMBAR RADICULITIS: ICD-10-CM

## 2020-11-11 DIAGNOSIS — Z98.890 S/P LUMBAR LAMINECTOMY: ICD-10-CM

## 2020-11-11 PROCEDURE — 97110 THERAPEUTIC EXERCISES: CPT

## 2020-11-11 PROCEDURE — 97014 ELECTRIC STIMULATION THERAPY: CPT

## 2020-12-03 ENCOUNTER — APPOINTMENT (OUTPATIENT)
Dept: PHYSICAL THERAPY | Facility: REHABILITATION | Age: 65
End: 2020-12-03
Attending: PHYSICAL MEDICINE & REHABILITATION
Payer: MEDICARE

## 2020-12-08 ENCOUNTER — TELEPHONE (OUTPATIENT)
Dept: MEDICAL GROUP | Facility: PHYSICIAN GROUP | Age: 65
End: 2020-12-08

## 2020-12-08 NOTE — TELEPHONE ENCOUNTER
ESTABLISHED PATIENT PRE-VISIT PLANNING     Patient was NOT contacted to complete PVP.    1.  Reviewed notes from the last few office visits within the medical group: Yes    2.  If any orders were placed at last visit or intended to be done for this visit (i.e. 6 mos follow-up), do we have Results/Consult Notes?        •  Labs - Labs were not ordered at last office visit.       •  Imaging - Imaging was not ordered at last office visit.       •  Referrals - Referral ordered, patient has NOT been seen.    3. Is this appointment scheduled as a Hospital Follow-Up? No    4.  Immunizations were updated in Epic using WebIZ?:        •  Web Iz Recommendations:     5.  Patient is due for the following Health Maintenance Topics:   Health Maintenance Due   Topic Date Due   • Annual Wellness Visit  1955   • COLONOSCOPY  05/01/2005   • IMM PNEUMOCOCCAL VACCINE: 65+ Years (1 of 1 - PPSV23) 05/01/2020   • A1C SCREENING  10/09/2020           6. Orders for overdue Health Maintenance topics pended in Pre-Charting? NO    7.  AHA (MDX) form printed for Provider? YES    8.  Patient was NOT informed to arrive 15 min prior to their scheduled appointment and bring in their medication bottles.

## 2020-12-22 ENCOUNTER — APPOINTMENT (OUTPATIENT)
Dept: DERMATOLOGY | Facility: IMAGING CENTER | Age: 65
End: 2020-12-22
Payer: MEDICARE

## 2021-01-04 ENCOUNTER — TELEPHONE (OUTPATIENT)
Dept: MEDICAL GROUP | Facility: PHYSICIAN GROUP | Age: 66
End: 2021-01-04

## 2021-01-04 NOTE — TELEPHONE ENCOUNTER
ESTABLISHED PATIENT PRE-VISIT PLANNING     Patient was contacted to complete PVP.     Note: Patient will not be contacted if there is no indication to call.     1.  Reviewed notes from the last few office visits within the medical group: Yes    2.  If any orders were placed at last visit or intended to be done for this visit (i.e. 6 mos follow-up), do we have Results/Consult Notes?         •  Labs - Labs ordered, NOT completed. Patient advised to complete prior to next appointment.  Note: If patient appointment is for lab review and patient did not complete labs, check with provider if OK to reschedule patient until labs completed.       •  Imaging - Imaging was not ordered at last office visit.       •  Referrals - Referral ordered, patient has NOT been seen.    3. Is this appointment scheduled as a Hospital Follow-Up? No    4.  Immunizations were updated in Epic using Reconcile Outside Information activity? Yes    5.  Patient is due for the following Health Maintenance Topics:   Health Maintenance Due   Topic Date Due   • Annual Wellness Visit  1955   • COLONOSCOPY  05/01/2005   • IMM PNEUMOCOCCAL VACCINE: 65+ Years (1 of 1 - PPSV23) 05/01/2020   • A1C SCREENING  10/09/2020       6.  AHA (Pulse8) form printed for Provider? No, already completed

## 2021-01-12 ENCOUNTER — OFFICE VISIT (OUTPATIENT)
Dept: MEDICAL GROUP | Facility: PHYSICIAN GROUP | Age: 66
End: 2021-01-12
Payer: MEDICARE

## 2021-01-12 VITALS
OXYGEN SATURATION: 94 % | RESPIRATION RATE: 12 BRPM | WEIGHT: 192 LBS | HEIGHT: 62 IN | BODY MASS INDEX: 35.33 KG/M2 | TEMPERATURE: 97 F | SYSTOLIC BLOOD PRESSURE: 130 MMHG | HEART RATE: 90 BPM | DIASTOLIC BLOOD PRESSURE: 80 MMHG

## 2021-01-12 DIAGNOSIS — Z23 NEED FOR VACCINATION: ICD-10-CM

## 2021-01-12 DIAGNOSIS — E66.9 DIABETES MELLITUS TYPE 2 IN OBESE: ICD-10-CM

## 2021-01-12 DIAGNOSIS — E78.2 MIXED HYPERLIPIDEMIA: ICD-10-CM

## 2021-01-12 DIAGNOSIS — I10 ESSENTIAL HYPERTENSION: ICD-10-CM

## 2021-01-12 DIAGNOSIS — R53.82 CHRONIC FATIGUE: ICD-10-CM

## 2021-01-12 DIAGNOSIS — E03.9 HYPOTHYROIDISM (ACQUIRED): ICD-10-CM

## 2021-01-12 DIAGNOSIS — E11.69 DIABETES MELLITUS TYPE 2 IN OBESE: ICD-10-CM

## 2021-01-12 DIAGNOSIS — H60.63 CHRONIC NON-INFECTIVE OTITIS EXTERNA OF BOTH EARS, UNSPECIFIED TYPE: ICD-10-CM

## 2021-01-12 DIAGNOSIS — F41.9 ANXIETY: ICD-10-CM

## 2021-01-12 DIAGNOSIS — H60.392 OTHER INFECTIVE ACUTE OTITIS EXTERNA OF LEFT EAR: ICD-10-CM

## 2021-01-12 DIAGNOSIS — R79.0 LOW MAGNESIUM LEVEL: ICD-10-CM

## 2021-01-12 LAB
HBA1C MFR BLD: 7 % (ref 0–5.6)
INT CON NEG: ABNORMAL
INT CON POS: ABNORMAL

## 2021-01-12 PROCEDURE — G0009 ADMIN PNEUMOCOCCAL VACCINE: HCPCS | Performed by: NURSE PRACTITIONER

## 2021-01-12 PROCEDURE — 99214 OFFICE O/P EST MOD 30 MIN: CPT | Mod: 25 | Performed by: NURSE PRACTITIONER

## 2021-01-12 PROCEDURE — 90732 PPSV23 VACC 2 YRS+ SUBQ/IM: CPT | Performed by: NURSE PRACTITIONER

## 2021-01-12 PROCEDURE — 83036 HEMOGLOBIN GLYCOSYLATED A1C: CPT | Performed by: NURSE PRACTITIONER

## 2021-01-12 RX ORDER — PEN NEEDLE, DIABETIC 31 GX5/16"
NEEDLE, DISPOSABLE MISCELLANEOUS
COMMUNITY
Start: 2020-11-11 | End: 2020-11-12

## 2021-01-12 RX ORDER — CLONAZEPAM 1 MG/1
0.5 TABLET ORAL 2 TIMES DAILY
Qty: 90 TAB | Refills: 0 | Status: SHIPPED | OUTPATIENT
Start: 2021-01-12 | End: 2021-04-12

## 2021-01-12 RX ORDER — CIPROFLOXACIN AND DEXAMETHASONE 3; 1 MG/ML; MG/ML
4 SUSPENSION/ DROPS AURICULAR (OTIC) 2 TIMES DAILY
Qty: 7.5 ML | Refills: 0 | Status: SHIPPED | OUTPATIENT
Start: 2021-01-12 | End: 2021-01-19

## 2021-01-12 RX ORDER — BLOOD-GLUCOSE METER
KIT MISCELLANEOUS
COMMUNITY
Start: 2020-11-25 | End: 2020-11-26

## 2021-01-12 ASSESSMENT — FIBROSIS 4 INDEX: FIB4 SCORE: .934132727969572995

## 2021-01-12 NOTE — PROGRESS NOTES
Chief Complaint   Patient presents with   • Otalgia     (L) ear pain and itching, Pt had an ear infection 1 month ago    • Anxiety     refill clonazepam          Subjective:     Grisel Mark is a 65 y.o. female presenting for follow up.    Chronic ear itching / tenderness: This has been an intermittent issue for many years.  Patient has been treated or not for otitis externa with intraotic corticosteroids/antibiotics.  Most recent time was 3 months ago.  Experience significant relief with treatment but then symptoms recurred when she went from [place] to [place].  The change in temperature in climate seem to exacerbate itching and irritation.  She is hoping to get established with an ear nose and throat specialist in the area to discuss chronic management.  Referral placed.  Intra-aortic corticosteroids provided.  Advised over-the-counter nondrowsy antihistamine to reduce trigger stimulus.    Anxiety: Chronic, controlled.  Patient has been on clonazepam 0.5 mg twice daily since the mid 1980s.  We have been discussing titrating this down which she is quite interested in.  However due to significant stress of the COVID-19 pandemic and her daughter is high risk pregnancy, she would like to stay on this dose for the next couple of months, I planned that I and her pain management physiatrist all agree on.    Diabetes: A1c in clinic today 7.0.  We will continue to manage with improved diet and lifestyle modifications, hoping to further reduce A1c during recheck.      Review of systems:      Denies chest pain, shortness of breath, sore throat, difficulty swallowing, new cough, intolerable depression rash or skin concerns, changes in vision, painful or swollen lymph nodes.       Current Outpatient Medications:   •  clonazePAM (KLONOPIN) 1 MG Tab, Take 0.5 Tabs by mouth 2 times a day for 90 days., Disp: 90 Tab, Rfl: 0  •  metoprolol (LOPRESSOR) 25 MG Tab, Take 1 Tab by mouth 2 times a day., Disp: 180 Tab, Rfl: 0  •   Brexpiprazole (REXULTI) 1 MG Tab, Take 1 mg by mouth 1 time daily as needed., Disp: , Rfl:   •  hydrOXYzine HCl (ATARAX) 25 MG Tab, Take 1-2 Tabs by mouth at bedtime as needed for Itching or Anxiety., Disp: 60 Tab, Rfl: 11  •  busPIRone (BUSPAR) 10 MG Tab tablet, Take 1 Tab by mouth 3 times a day., Disp: 270 Tab, Rfl: 3  •  Blood Glucose Test Strips, Use one Abbott Freedom Lite strip to test blood sugar twice daily and PRN., Disp: 270 Each, Rfl: 3  •  Lancets, Use one Abbott Spreckels Lite lancet to test blood sugar twice daily and PRN., Disp: 300 Each, Rfl: 3  •  Alcohol Swabs, Wipe site with prep pad prior to injection., Disp: 100 Each, Rfl: 3  •  Naloxone (NARCAN) 4 MG/0.1ML Liquid, One spray in one nostril for overdose and call 911., Disp: 1 Each, Rfl: 0  •  Diclofenac Sodium 1 % Gel, Apply 1 g to skin as directed 2 times a day as needed (joint pain)., Disp: 150 g, Rfl: 2  •  estradiol (ESTRACE) 1 MG Tab, Take 1 Tab by mouth every day., Disp: 30 Tab, Rfl: 5  •  fluconazole (DIFLUCAN) 150 MG tablet, Take 1 Tab by mouth every 72 hours., Disp: 2 Tab, Rfl: 0  •  DULoxetine (CYMBALTA) 30 MG Cap DR Particles, Take 3 Caps by mouth every day., Disp: 270 Cap, Rfl: 1  •  rosuvastatin (CRESTOR) 10 MG Tab, Take 1 Tab by mouth every evening., Disp: 90 Tab, Rfl: 3  •  levothyroxine (SYNTHROID) 50 MCG Tab, Take 1 Tab by mouth Every morning on an empty stomach., Disp: 90 Tab, Rfl: 3  •  Blood Glucose Meter Kit, Test blood sugar as recommended by provider. Abbott Freestyle Lite blood glucose monitoring kit., Disp: 1 Kit, Rfl: 0  •  Empagliflozin (JARDIANCE) 25 MG Tab, Take 25 mg by mouth every day., Disp: 90 Tab, Rfl: 3  •  lisinopril (PRINIVIL) 10 MG Tab, Take 1 Tab by mouth every day., Disp: 90 Tab, Rfl: 3  •  omeprazole (PRILOSEC) 40 MG delayed-release capsule, Take 40 mg by mouth every day., Disp: , Rfl:   •  diclofenac EC (VOLTAREN) 75 MG Tablet Delayed Response, Take 75 mg by mouth 2 times a day., Disp: , Rfl:   •   "cyanocobalamin (VITAMIN B-12) 500 MCG Tab, Take 500 mcg by mouth every day., Disp: , Rfl:   •  gabapentin (NEURONTIN) 600 MG tablet, Take 1 Tab by mouth 4 times a day for 90 days., Disp: 360 Tab, Rfl: 0  •  morphine ER (MS CONTIN) 15 MG Tab CR tablet, Take 1 Tab by mouth every 12 hours for 30 days., Disp: 60 Tab, Rfl: 0  •  HYDROcodone-acetaminophen (NORCO) 5-325 MG Tab per tablet, Take 1 Tab by mouth every four hours as needed for up to 30 days., Disp: 45 Tab, Rfl: 0  •  [START ON 2/21/2021] morphine ER (MS CONTIN) 15 MG Tab CR tablet, Take 1 Tab by mouth every 12 hours for 30 days., Disp: 60 Tab, Rfl: 0  •  [START ON 2/21/2021] HYDROcodone-acetaminophen (NORCO) 5-325 MG Tab per tablet, Take 1 Tab by mouth every four hours as needed for up to 30 days., Disp: 45 Tab, Rfl: 0  •  metformin (GLUCOPHAGE) 1000 MG tablet, Take 1 Tab by mouth 2 times a day, with meals., Disp: 200 Tab, Rfl: 0    Allergies, past medical history, past surgical history, family history, social history reviewed and updated    Objective:     Vitals: /80 (BP Location: Right arm, Patient Position: Sitting, BP Cuff Size: Large adult)   Pulse 90   Temp 36.1 °C (97 °F)   Resp 12   Ht 1.575 m (5' 2\")   Wt 87.1 kg (192 lb)   LMP  (LMP Unknown)   SpO2 94%   BMI 35.12 kg/m²   General: Alert, cooperative, dressed appropriately for weather / situation  Eyes:Normocephalic.  EOMI, no icterus or pallor.  Conjunctivae clear without erythema / irritation.  ENT:  External ears developed; Bilat TMs visualized; bilateral ear canals notably erythematous and edematous   Heart: Regular rate and rhythm.  S1 and S2 normal.  No murmurs auscultated; no murmurs / bruits heard over bilateral carotids.    Respiratory: Normal respiratory effort.  Clear to auscultation bilaterally.  AP ratio 1:2   Abdomen: Non-distended; Musculoskeletal: Gait is normal.  Bilateral  strength strong equal.  Moves extremities freely and equally bilaterally  Neuro:  AAOx3 Visual " tracking intact, no nystagmus;   Psych:  Affect/mood is normal, judgement is good, memory is intact, grooming is appropriate.    Assessment/Plan:     Grisel was seen today for otalgia and anxiety.    Diagnoses and all orders for this visit:    Need for vaccination  -     Pneumoccocal Polysaccharide Vaccine 23-Valent =>1yo SQ/IM    Anxiety  -     clonazePAM (KLONOPIN) 1 MG Tab; Take 0.5 Tabs by mouth 2 times a day for 90 days.    Essential hypertension  -     TSH WITH REFLEX TO FT4; Future  -     MICROALBUMIN CREAT RATIO URINE; Future    Mixed hyperlipidemia  -     Lipid Profile; Future    Diabetes mellitus type 2 in obese (HCC)  -     VITAMIN B12; Future  -     Comp Metabolic Panel; Future  -     HEMOGLOBIN A1C; Future  -     Lipid Profile; Future  -     MICROALBUMIN CREAT RATIO URINE; Future  -     POCT Hemoglobin A1C    Hypothyroidism (acquired)  -     TSH WITH REFLEX TO FT4; Future    Chronic fatigue  -     VITAMIN B12; Future  -     CBC WITH DIFFERENTIAL; Future  -     Comp Metabolic Panel; Future  -     VITAMIN D,25 HYDROXY; Future  -     TSH WITH REFLEX TO FT4; Future  -     MAGNESIUM; Future    Low magnesium level  -     MAGNESIUM; Future    Chronic non-infective otitis externa of both ears, unspecified type  -     REFERRAL TO ENT    Other infective acute otitis externa of left ear  -     ciprofloxacin/dexamethasone (CIPRODEX) 0.3-0.1 % Suspension; Administer 4 Drops into the left ear 2 times a day for 7 days.          Return in about 3 months (around 4/12/2021).    Patient verbalized understanding and agreed to plan of care.  Encouraged to contact me with needs via [employer] or by phone if needed.      I have placed the above orders and discussed them with an approved delegating provider.  The MA is performing the below orders under the direction of Dr Walls.    Please note that this dictation was created using voice recognition software. I have made every reasonable attempt to correct obvious errors, but I  expect that there are errors of grammar and possibly content that I did not discover before finalizing the note.

## 2021-01-18 DIAGNOSIS — E66.9 DIABETES MELLITUS TYPE 2 IN OBESE: ICD-10-CM

## 2021-01-18 DIAGNOSIS — E11.69 DIABETES MELLITUS TYPE 2 IN OBESE: ICD-10-CM

## 2021-01-21 ENCOUNTER — OFFICE VISIT (OUTPATIENT)
Dept: PHYSICAL MEDICINE AND REHAB | Facility: MEDICAL CENTER | Age: 66
End: 2021-01-21
Payer: MEDICARE

## 2021-01-21 VITALS
TEMPERATURE: 97.4 F | OXYGEN SATURATION: 91 % | BODY MASS INDEX: 33.36 KG/M2 | SYSTOLIC BLOOD PRESSURE: 132 MMHG | WEIGHT: 188.27 LBS | HEART RATE: 86 BPM | DIASTOLIC BLOOD PRESSURE: 80 MMHG | HEIGHT: 63 IN

## 2021-01-21 DIAGNOSIS — E66.9 DIABETES MELLITUS TYPE 2 IN OBESE: ICD-10-CM

## 2021-01-21 DIAGNOSIS — M54.16 LEFT LUMBAR RADICULITIS: ICD-10-CM

## 2021-01-21 DIAGNOSIS — E11.69 DIABETES MELLITUS TYPE 2 IN OBESE: ICD-10-CM

## 2021-01-21 DIAGNOSIS — R20.2 PARESTHESIA: ICD-10-CM

## 2021-01-21 DIAGNOSIS — Z98.890 S/P LUMBAR LAMINECTOMY: ICD-10-CM

## 2021-01-21 DIAGNOSIS — E66.9 CLASS 1 OBESITY WITH BODY MASS INDEX (BMI) OF 33.0 TO 33.9 IN ADULT, UNSPECIFIED OBESITY TYPE, UNSPECIFIED WHETHER SERIOUS COMORBIDITY PRESENT: ICD-10-CM

## 2021-01-21 DIAGNOSIS — M51.36 DDD (DEGENERATIVE DISC DISEASE), LUMBAR: ICD-10-CM

## 2021-01-21 PROCEDURE — 95910 NRV CNDJ TEST 7-8 STUDIES: CPT | Performed by: PHYSICAL MEDICINE & REHABILITATION

## 2021-01-21 PROCEDURE — 95886 MUSC TEST DONE W/N TEST COMP: CPT | Performed by: PHYSICAL MEDICINE & REHABILITATION

## 2021-01-21 PROCEDURE — 99214 OFFICE O/P EST MOD 30 MIN: CPT | Mod: 25 | Performed by: PHYSICAL MEDICINE & REHABILITATION

## 2021-01-21 RX ORDER — HYDROCODONE BITARTRATE AND ACETAMINOPHEN 5; 325 MG/1; MG/1
1 TABLET ORAL EVERY 4 HOURS PRN
Qty: 45 TAB | Refills: 0 | Status: SHIPPED | OUTPATIENT
Start: 2021-01-22 | End: 2021-02-21

## 2021-01-21 RX ORDER — GABAPENTIN 600 MG/1
600 TABLET ORAL 4 TIMES DAILY
Qty: 360 TAB | Refills: 0 | Status: SHIPPED | OUTPATIENT
Start: 2021-01-21 | End: 2021-04-21

## 2021-01-21 RX ORDER — HYDROCODONE BITARTRATE AND ACETAMINOPHEN 5; 325 MG/1; MG/1
1 TABLET ORAL EVERY 4 HOURS PRN
Qty: 45 TAB | Refills: 0 | Status: SHIPPED | OUTPATIENT
Start: 2021-02-21 | End: 2021-03-12 | Stop reason: SDUPTHER

## 2021-01-21 RX ORDER — CHOLECALCIFEROL (VITAMIN D3) 125 MCG
500 CAPSULE ORAL DAILY
COMMUNITY

## 2021-01-21 RX ORDER — MORPHINE SULFATE 15 MG/1
15 TABLET, FILM COATED, EXTENDED RELEASE ORAL EVERY 12 HOURS
Qty: 60 TAB | Refills: 0 | Status: SHIPPED | OUTPATIENT
Start: 2021-02-21 | End: 2021-03-12 | Stop reason: SDUPTHER

## 2021-01-21 RX ORDER — MORPHINE SULFATE 15 MG/1
15 TABLET, FILM COATED, EXTENDED RELEASE ORAL EVERY 12 HOURS
Qty: 60 TAB | Refills: 0 | Status: SHIPPED | OUTPATIENT
Start: 2021-01-22 | End: 2021-02-21

## 2021-01-21 ASSESSMENT — FIBROSIS 4 INDEX: FIB4 SCORE: .934132727969572995

## 2021-01-21 ASSESSMENT — PAIN SCALES - GENERAL: PAINLEVEL: 8=MODERATE-SEVERE PAIN

## 2021-01-21 NOTE — PATIENT INSTRUCTIONS
Your procedure will be at the [facility] special procedure suite.    [facility] [place] [address]       PRE-PROCEDURE INSTRUCTIONS  You may take your regular medications except:   · No Anti-inflammatories 5 days prior to your procedure. Anti-inflammatories include medicines such as  ibuprofen (Motrin, Advil), Excedrin, Naproxen (Aleve, Anaprox, Naprelan, Naprosyn), Celecoxib (Celebrex), Diclofenac (Voltaren-XR tab), and Meloxicam (Mobic).   · No Glucophage or Metformin 24 hours before your procedure. You may resume next day after your procedure.  · Call the physiatry office if you are taking or prescribed anti-biotics within five days of procedure.  · Please ask provider if you are taking any new diabetes medication.  · CONTINUE TAKING BLOOD PRESSURE MEDICATIONS AS PRESCRIBED.  · Pain medications will not be prescribed on the procedure day. Procedural pain medication may be used by your provider   · Call your doctor's office performing the procedure if you have a fever, chills, rash or new illness prior to your procedure    Anticoagulation/antiplatelet medications  No Blood thinning medications such as Coumadin or Plavix 5 days prior to procedure unless your doctor said to continue these medications. Call your doctor if a new medication is prescribed in this class.     Restrictions for eating before procedure:   · If you are getting procedural sedation, then do not eat to for 8 hours prior to procedure appointment time. Do not drink fluids for four hours prior to your procedure time.   · If you are not having procedural sedation, then Skip the meal prior to your procedure. If you have a morning procedure then skip breakfast. If you have an afternoon procedure then skip lunch.   · You may drink clear liquids up to 2 hours prior to your procedure  · You must have a  the day of procedure to accompany you home.      POST PROCEDURE INSTRUCTIONS   · No heavy lifting, strenuous bending or  strenuous exercise for 3 days after your procedure.  · No hot tubs, baths, swimming for 3 days after your procedure  · You can remove the bandage the day after the procedure.  · IF YOU RECEIVED A STEROID INJECTION. PLEASE NOTE THAT THERE MAY BE A DELAY FOR THE INJECTION TO START WORKING, THE DELAY MAY BE UP TO TWO WEEKS. IF YOU HAVE DIABETES, PLEASE NOTE THAT YOUR SUGAR LEVELS MAY BE ELEVATED FOR 1-2 DAYS AFTER A STEROID INJECTION.  THE STEROID MAY CAUSE TEMPORARY SYMPTOMS WHICH USUALLY RESOLVE ON THEIR OWN WITHIN 1 TO 2 DAYS INCLUDING FACIAL FLUSHING OR A FEELING OF WARMTH ON THE FACE, TEMPORARY INCREASES IN BLOOD SUGAR, INSOMNIA, INCREASED HUNGER  · IF YOU RECEIVED A DIAGNOSTIC PROCEDURE (SUCH AS A MEDIAL BRANCH BLOCK), PLEASE NOTE THAT WE DO EXPECT THIS INJECTION TO WEAR OFF.  IT IS IMPORTANT TO COMPLETE THE PAIN DIARY AND LIST THE PAIN SCORE ONLY FOR THE REGION WHERE THE PROCEDURE WAS AND BRING THIS TO YOUR FOLLOW UP VISIT.  · IF YOU RECEIVED A RADIOFREQUENCY PROCEDURE, THERE MAY BE SOME SORENESS AFTER THE PROCEDURE.  THIS IS NORMAL.  · IF YOU EXPERIENCE PROLONGED WEAKNESS LONGER THAN ONE DAY, BOWEL OR BLADDER INCONTINENCE THEN PLEASE CALL THE PHYSIATRY OFFICE.  · Your leg may feel heavy, weak and numb for up to 1-2 days. Be very careful walking.   ·  You may resume normal activities 3 days after procedure.

## 2021-01-21 NOTE — PROCEDURES
"  [facility]  Sports and Spine, Physiatry, EMG  [facility]  [address]  [place] [address]    Test Date:  2021    Patient: Grisel Mark : 1955 Physician: Paresh Ogden MD   Sex: Female Height: 5' 3\" Ref Phys: Paresh Ogden MD   MRN#: [id number] Weight: 85.4 kg Technician:      Patient Complaints:  Low back and primarily left leg pain    Grisel presents for electrodiagnostic evaluation of the lower extremities. She has a history of lumbar spine surgery in  with worsening low back and left leg pain.  She does have some right-sided low back pain, but the radicular symptoms are on the left.  No falls.  It seems like her symptoms have been worsening.  See E&M for more details.    PMH/PSH/Meds/SH are unchanged from previous visit, reviewed today.      Past medical history is positive for lumbar spine surgery and DM, negative for thryoid disease, cervical spine surgery, or known history of cancer.    Exam: See E&M for the same date    NCV & EMG Findings:  Evaluation of the left sural sensory nerve showed prolonged distal peak latency (4.1 ms) and decreased conduction velocity (Calf-Lat Mall, 34 m/s).  This normalizes with temperature correction.  All remaining nerves (as indicated in the following tables) were within normal limits.  All left vs. right side differences were within normal limits.  All H Reflex left vs. right side latency differences were within normal limits.      Needle evaluation of the left gastroc, the left posterior tibialis, and the left low lumbosacral paraspinal muscles showed slightly increased spontaneous activity.  The left anterior tibialis muscle showed increased motor unit amplitude and diminished recruitment.  The left biceps femoris (short head) muscle showed slightly increased polyphasic potentials.  All remaining muscles (as indicated in the following table) showed no evidence of electrical instability.  "     Impression/Recommendations:  Abnormal study  1. Today's electrodiagnostic findings are consistent with:      A. Left acute to subacute lumbosacral radiculopathy  2. Today's electrodiagnostic findings point against:      A. Left fibular neuropathy      B. Left tibial neuropathy      C. Right tibial neuropathy      D. Generalized large fiber peripheral polyneuropathy  3. Clinically, her symptoms are consistent with lumbar radiculopathy.  Please see E&M for further treatment plans.    ___________________________  Paresh Ogden MD        Nerve Conduction Studies  Anti Sensory Summary Table     Stim Site NR Peak (ms) Norm Peak (ms) P-T Amp (µV) Norm P-T Amp Site1 Site2 Delta-P (ms) Dist (cm) Juventino (m/s) Norm Juventino (m/s)   Left Sural Anti Sensory (Lat Mall)    Temp over the sural on the left 28.9C   Calf    4.1 <4.0 20.7 >5.0 Calf Lat Mall 4.1 14.0 34 >35   Right Sural Anti Sensory (Lat Mall)   Calf    3.9 <4.0 10.7 >5.0 Calf Lat Mall 3.9 14.0 36 >35     Motor Summary Table     Stim Site NR Onset (ms) Norm Onset (ms) O-P Amp (mV) Norm O-P Amp Site1 Site2 Delta-0 (ms) Dist (cm) Juventino (m/s) Norm Juventino (m/s)   Left Fibular Motor (Ext Dig Brev)    Temp over the left sural 29.8C   Ankle    5.3 <6.1 4.7 >2.5 B Fib Ankle 6.2 27.0 44 >38   B Fib    11.5  4.3  Poplt B Fib 1.5 9.0 60 >40   Poplt    13.0  4.3          Left Tibial Motor (Abd Dudley Brev)   Ankle    4.8 <6.1 6.6 >3.0 Knee Ankle 7.9 34.0 43 >35   Knee    12.7  5.8          Right Tibial Motor (Abd Dudley Brev)   Ankle    4.6 <6.1 5.6 >3.0 Knee Ankle 8.8 36.0 41 >35   Knee    13.4  4.0            H Reflex Studies     NR H-Lat (ms) L-R H-Lat (ms) L-R Lat Norm   Left Tibial (Gastroc)      30.55 0.88 <2.0   Right Tibial (Gastroc)      29.67 0.88 <2.0     EMG+     Side Muscle Nerve Root Ins Act Fibs Psw Amp Dur Poly Recrt Int Pat Other Comment   Left Gastroc Tibial S1-2 Nml 1+ 1+ Nml Nml 0 Nml Nml None    Left AntTibialis Dp Br Fibular L4-5 Nml Nml Nml Incr Nml 0 Reduced Nml None     Left PostTibialis Tibial L5, S1 Nml 1+ 1+ Nml Nml 0 Nml Nml None    Left VastusMed Femoral L2-4 Nml Nml Nml Nml Nml 0 Nml Nml None    Left RectFemoris Femoral L2-4 Nml Nml Nml Nml Nml 0 Nml Nml None    Left BicepsFemS Sciatic L5-S1 Nml Nml Nml Nml Nml 1+ Nml Nml None    Left TensorFascLat SupGluteal L4-5, S1 Nml Nml Nml Nml Nml 0 Nml Nml None    Left Lumbo Parasp Up Rami L1-2 Nml Nml Nml      None    Left Lumbo Parasp Mid Rami L3-4 Nml Nml Nml      None    Left Lumbo Parasp Low Rami L5-S1 Nml 1+ 1+      None            Waveforms:

## 2021-01-21 NOTE — PROGRESS NOTES
Follow-up patient note    Physiatry (physical medicine and  Rehabilitation), interventional spine and sports medicine    Date of Service: 1/21/2021    Chief complaint:   Chief Complaint   Patient presents with   • Follow-Up     Lower back pain       HISTORY    HPI: Grisel Mark 65 y.o. female who presents today for follow-up evaluation of low back and leg pain.     Since the last visit, Grisel reports that she has had continued low back pain.  She has been having worsening symptoms in the left leg.  She feels that she has symptoms from the back to the bottom of the foot.  The symptoms are present on the right, but much less than on the left and primarily in the low back.    She is working on reducing her clonazepam with her PCP.  Her pain medications include: Duloxetine 90mg.  MS Contin 15mg q12h.  Norco 5/325 for breakthrough.  Gabapentin 600mg po QID.  No side effects.    Bowel movements are regular.  She continues her stool softener once a day.      Physical therapy started, she plans to continue November 11, 2020.  She starts this again 1/27/2021 and has been doing her home exercise program that has been given.    By history:  Her laminectomy was in 1998.  Previous injection on left L4 sand L5 TFESI on 08/12/2020 helped with her leg pain.     Medical records review:  Dr. Francisco Olmos, plan to increase duloxetine 90mg daily, discontinue buspirone 20mg po bid, start buproprion XL 150mg daily, continue brexipiprazole 1mg qhs, hydroxyzine 25mg po tid prn, clonazepam 0.5mg daily prn    Review of records   Dr. Dheeraj De La Rosa:  08/20/2019 Right L3-4, L4-5, L5-S1 radiofrequency ablation    I reviewed the note from the referring provider Peter Bruce * dated 02/05/2020: She was seen to establish care, having just moved from [place].  She was seen for essential hypertension, mixed hyperlipidemia, DM2 in obese, hypothyroidism, recurrent MDD in partial depression/anxiety, GERD without esophagitis, chronic  bilateral low back pain with bilateral sciatica.  Medications associated with the above were written, related labs ordered including CBC, CMP, Hb A1c, lipids, microalbumin, TSH, free thryoxine, referral to ps\ychiatry, referral to GI for colonoscopy and referral to pain clinic.    Previous treatments:    Physical Therapy: Yes    Medications the patient is tried: Narcotics, gabapentin and muscle relaxers    Previous interventions: [facility] these included right lumbar radiofrequency procedures    Previous surgeries to relieve the above pain:  Surgery on October 28, 1998      ROS: Unchanged from 11/06/2020 except as noted in the HPI   ENT: ear infection, treated with augmentin  CV: irregular heart, reports history of tachycardia  Psych: depression since 1995  Heme: anemia  Endo: hypothyroidism, diabetes    Red Flags ROS:   Fever, Chills, Sweats: Denies  Involuntary Weight Loss: Denies  Bladder Incontinence: Denies  Bowel Incontinence: Denies  Saddle Anesthesia: Denies    All other systems reviewed and negative.       PMHx:   Past Medical History:   Diagnosis Date   • Anxiety    • Chronic back pain    • Constipation    • Depression    • Diabetes (HCC)    • GERD (gastroesophageal reflux disease)    • Heart murmur     tachycardia   • Hyperlipidemia    • Hypertension    • Thyroid disease        PSHx:   Past Surgical History:   Procedure Laterality Date   • LUMBAR MEDIAL BRANCH BLOCKS Left 9/9/2020    Procedure: BLOCK, NERVE, SPINAL, LUMBAR, POSTERIOR RAMUS, MEDIAL BRANCH;  Surgeon: Paresh Ogden M.D.;  [facility];  Service: Pain Management   • FL NJX AA&/STRD TFRML EPI LUMBAR/SACRAL 1 LEVEL Left 8/12/2020    Procedure: INJECTION, SPINE, LUMBOSACRAL, EPIDURAL, 1 LEVEL, TRANSFORAMINAL APPROACH;  Surgeon: Paresh Ogden M.D.;  Location: [facility];  Service: Pain Management   • FL NJX AA&/STRD TFRML EPI LUMBAR/SACRAL EA ADDL Left 8/12/2020     Procedure: INJECTION, SPINE, LUMBOSACRAL, EPIDURAL, TRANSFORAMINAL APPROACH;  Surgeon: Paresh Ogden M.D.;  Location: [facility];  Service: Pain Management   • LAMINOTOMY  1998   • ABDOMINAL HYSTERECTOMY TOTAL     • CARPAL TUNNEL RELEASE     • CHOLECYSTECTOMY         Family history   Family History   Problem Relation Age of Onset   • Cancer Mother    • Stroke Father         Heart attack   • Dementia Sister    • Stroke Brother         heart Attack   • Cancer Brother         Skin   • Diabetes Brother    • Kidney Disease Brother    • Cancer Brother    • Parkinson's Disease Sister    • No Known Problems Sister    • Diabetes Daughter    • Hypertension Daughter          Medications:   Current Outpatient Medications   Medication   • cyanocobalamin (VITAMIN B-12) 500 MCG Tab   • gabapentin (NEURONTIN) 600 MG tablet   • [START ON 1/22/2021] morphine ER (MS CONTIN) 15 MG Tab CR tablet   • [START ON 1/22/2021] HYDROcodone-acetaminophen (NORCO) 5-325 MG Tab per tablet   • [START ON 2/21/2021] morphine ER (MS CONTIN) 15 MG Tab CR tablet   • [START ON 2/21/2021] HYDROcodone-acetaminophen (NORCO) 5-325 MG Tab per tablet   • metformin (GLUCOPHAGE) 1000 MG tablet   • clonazePAM (KLONOPIN) 1 MG Tab   • metoprolol (LOPRESSOR) 25 MG Tab   • Brexpiprazole (REXULTI) 1 MG Tab   • hydrOXYzine HCl (ATARAX) 25 MG Tab   • busPIRone (BUSPAR) 10 MG Tab tablet   • Naloxone (NARCAN) 4 MG/0.1ML Liquid   • Diclofenac Sodium 1 % Gel   • estradiol (ESTRACE) 1 MG Tab   • fluconazole (DIFLUCAN) 150 MG tablet   • DULoxetine (CYMBALTA) 30 MG Cap DR Particles   • rosuvastatin (CRESTOR) 10 MG Tab   • levothyroxine (SYNTHROID) 50 MCG Tab   • Empagliflozin (JARDIANCE) 25 MG Tab   • lisinopril (PRINIVIL) 10 MG Tab   • omeprazole (PRILOSEC) 40 MG delayed-release capsule   • diclofenac EC (VOLTAREN) 75 MG Tablet Delayed Response   • Blood Glucose Test Strips   • Lancets   • Alcohol Swabs   • Blood Glucose Meter Kit     No current  facility-administered medications for this visit.        Allergies:   No Known Allergies    Social Hx:   Social History     Socioeconomic History   • Marital status:      Spouse name: Not on file   • Number of children: Not on file   • Years of education: Not on file   • Highest education level: Not on file   Occupational History   • Not on file   Social Needs   • Financial resource strain: Not on file   • Food insecurity     Worry: Not on file     Inability: Not on file   • Transportation needs     Medical: Not on file     Non-medical: Not on file   Tobacco Use   • Smoking status: Former Smoker     Packs/day: 0.25     Types: Cigarettes   • Smokeless tobacco: Never Used   Substance and Sexual Activity   • Alcohol use: Not Currently     Comment: rare   • Drug use: Never   • Sexual activity: Not Currently   Lifestyle   • Physical activity     Days per week: Not on file     Minutes per session: Not on file   • Stress: Not on file   Relationships   • Social connections     Talks on phone: Not on file     Gets together: Not on file     Attends [employer] service: Not on file     Active member of club or organization: Not on file     Attends meetings of clubs or organizations: Not on file     Relationship status: Not on file   • Intimate partner violence     Fear of current or ex partner: Not on file     Emotionally abused: Not on file     Physically abused: Not on file     Forced sexual activity: Not on file   Other Topics Concern   •  Service No   • Blood Transfusions Yes   • Caffeine Concern No   • Occupational Exposure No   • Hobby Hazards No   • Sleep Concern Yes   • Stress Concern Yes   • Weight Concern Yes   • Special Diet No   • Back Care No   • Exercise Yes   • Bike Helmet No   • Seat Belt Yes   • Self-Exams Yes   Social History Narrative   • Not on file         EXAMINATION     Physical Exam:   Vitals: /80 (BP Location: Right arm, Patient Position: Sitting, BP Cuff Size: Large adult)   Pulse  "86   Temp 36.3 °C (97.4 °F) (Temporal)   Ht 1.6 m (5' 3\")   Wt 85.4 kg (188 lb 4.4 oz)   SpO2 91%     Constitutional:   Body Habitus: Body mass index is 33.35 kg/m².  Cooperation: Fully cooperates with exam  Appearance: Well-groomed, well-nourished, not disheveled, no acute distress    Eyes: No scleral icterus, no proptosis     ENT -no obvious auditory deficits, wearing a facial mask    Skin -no rashes or lesions noted    Respiratory-  breathing comfortable on room air, no audible wheezing    Cardiovascular- no lower extremity edema is noted.     Psychiatric- alert and oriented ×3. Normal affect.     Gait - normal gait, no use of ambulatory device, antalgic.     Musculoskeletal -   Thoracic/Lumbar Spine/Sacral Spine/Hips   Inspection: no evidence of atrophy in bilateral lower extremities throughout     ROM of the lumbar spine not fully evaluated due to pain    Neuro     Key points for the international standards for neurological classification of spinal cord injury (ISNCSCI) to light touch.     Dermatome R L   L2 2 2   L3 2 2   L4 2 2   L5 2 1   S1 2 1   S2 2 2       Motor Exam Lower Extremities    ? Myotome R L   Hip flexion L2 5 5   Knee extension L3 5 5   Ankle dorsiflexion L4 5 5   Toe extension L5 5 5   Ankle plantarflexion S1 5 5       Reflexes  ?  R L   Patella  2+ 2+   Achilles   2+ 1+       MEDICAL DECISION MAKING    Medical records review: see under HPI section.     DATA    Labs:   08/18/2020: UDS positive for morphine and metabolites, clonazepam and metabolites, hydrocodone and metabolites  04/24/2020 UDS positive for morphine and metabolites, clonazepam  04/09/2020 Vitamin D, 25:  28L  04/09/2020 Vitamin B12: 217    Lab Results   Component Value Date/Time    SODIUM 135 04/09/2020 09:26 AM    POTASSIUM 4.7 04/09/2020 09:26 AM    CHLORIDE 100 04/09/2020 09:26 AM    CO2 21 04/09/2020 09:26 AM    ANION 14.0 04/09/2020 09:26 AM    GLUCOSE 140 (H) 04/09/2020 09:26 AM    BUN 19 04/09/2020 09:26 AM    " CREATININE 0.71 04/09/2020 09:26 AM    CALCIUM 9.8 04/09/2020 09:26 AM    ASTSGOT 10 (L) 04/09/2020 09:26 AM    ALTSGPT 7 04/09/2020 09:26 AM    TBILIRUBIN 0.3 04/09/2020 09:26 AM    ALBUMIN 4.1 04/09/2020 09:26 AM    TOTPROTEIN 7.3 04/09/2020 09:26 AM    GLOBULIN 3.2 04/09/2020 09:26 AM    AGRATIO 1.3 04/09/2020 09:26 AM       No results found for: PROTHROMBTM, INR     Lab Results   Component Value Date/Time    WBC 7.6 04/09/2020 09:26 AM    RBC 4.25 04/09/2020 09:26 AM    HEMOGLOBIN 13.3 04/09/2020 09:26 AM    HEMATOCRIT 40.7 04/09/2020 09:26 AM    MCV 95.8 04/09/2020 09:26 AM    MCH 31.3 04/09/2020 09:26 AM    MCHC 32.7 (L) 04/09/2020 09:26 AM    MPV 11.1 04/09/2020 09:26 AM    NEUTSPOLYS 52.80 04/09/2020 09:26 AM    LYMPHOCYTES 34.80 04/09/2020 09:26 AM    MONOCYTES 10.10 04/09/2020 09:26 AM    EOSINOPHILS 1.40 04/09/2020 09:26 AM    BASOPHILS 0.40 04/09/2020 09:26 AM        Lab Results   Component Value Date/Time    HBA1C 7.0 (A) 01/12/2021 08:29 AM        Imaging: I personally reviewed following images, these are my reads:     MRI lumbar spine 05/05/2020  At L1-2, no central or foraminal stenosis  At L2-3, no central or foraminal stenosis  At L3-4, mild disc bulge and mild ligamentum flavum thickening.  No central canal stenosis. Borderline left foraminal stenosis. Schmorl's node.   At L4-5, diffuse disc bulge with mild ligamentum flavum thickening.  No central canal stenosis.  There is facet arthropathy, left greater than right. Mild foraminal stenosis bilaterally. S/P left L4/5 hemilaminectomy  At L5-S1, there is mild-borderline foraminal stenosis with facet arthropathy and mild disc bulge.  No central canal stenosis    Xray lumbar spine 05/05/2020  There is mild decreased joint space at L3-4 and L4-5.    Facet arthropathy is noted, greatest at L5-S1 bilaterally.  No significant motion on flexion and extension films    IMAGING radiology reads. I reviewed the following radiology reads    Xray abdomen  07/17/2020  IMPRESSION:     Nonspecific bowel gas pattern. No evidence small bowel obstruction.      MRI lumbar spine 05/05/2020  IMPRESSION:     1.  Caudal lumbar facet arthropathy left worse than right with no bony destruction to indicate septic or inflammatory arthropathy. This most likely is just degenerative  2.  Mild caudal lumbar foraminal stenoses  3.  No significant central stenosis.  4.  Left L4/5 hemilaminectomy                                                                                   Xray lumbar spine 05/05/2020     IMPRESSION:     1.  No acute lumbar compression fracture or subluxation.  2.  Posterior disc space narrowing at L4-5 and to lesser extent at L3-4.  3.  Bilateral facet arthropathy at L5-S1.                                                                                             Diagnosis   Visit Diagnoses     ICD-10-CM   1. Left lumbar radiculitis  M54.16   2. S/P lumbar laminectomy  Z98.890   3. DDD (degenerative disc disease), lumbar  M51.36   4. Diabetes mellitus type 2 in obese (HCC)  E11.69    E66.9   5. Class 1 obesity with body mass index (BMI) of 33.0 to 33.9 in adult, unspecified obesity type, unspecified whether serious comorbidity present  E66.9    Z68.33         ASSESSMENT:  Grisel Mark 65 y.o. female seen for above     Grisel was seen today for follow-up.    Diagnoses and all orders for this visit:    Left lumbar radiculitis  -     gabapentin (NEURONTIN) 600 MG tablet; Take 1 Tab by mouth 4 times a day for 90 days.    S/P lumbar laminectomy  -     morphine ER (MS CONTIN) 15 MG Tab CR tablet; Take 1 Tab by mouth every 12 hours for 30 days.  -     HYDROcodone-acetaminophen (NORCO) 5-325 MG Tab per tablet; Take 1 Tab by mouth every four hours as needed for up to 30 days.  -     morphine ER (MS CONTIN) 15 MG Tab CR tablet; Take 1 Tab by mouth every 12 hours for 30 days.  -     HYDROcodone-acetaminophen (NORCO) 5-325 MG Tab per tablet; Take 1 Tab by mouth every four hours  as needed for up to 30 days.  -     Consent for Opiate Prescription  -     Controlled Substance Treatment Agreement    DDD (degenerative disc disease), lumbar  -     morphine ER (MS CONTIN) 15 MG Tab CR tablet; Take 1 Tab by mouth every 12 hours for 30 days.  -     HYDROcodone-acetaminophen (NORCO) 5-325 MG Tab per tablet; Take 1 Tab by mouth every four hours as needed for up to 30 days.  -     morphine ER (MS CONTIN) 15 MG Tab CR tablet; Take 1 Tab by mouth every 12 hours for 30 days.  -     HYDROcodone-acetaminophen (NORCO) 5-325 MG Tab per tablet; Take 1 Tab by mouth every four hours as needed for up to 30 days.  -     Consent for Opiate Prescription  -     Controlled Substance Treatment Agreement    Diabetes mellitus type 2 in obese (HCC)  -     gabapentin (NEURONTIN) 600 MG tablet; Take 1 Tab by mouth 4 times a day for 90 days.  -     REFERRAL TO [facility] IMPROVEMENT PROGRAMS (HIP)    Class 1 obesity with body mass index (BMI) of 33.0 to 33.9 in adult, unspecified obesity type, unspecified whether serious comorbidity present  -     REFERRAL TO [facility] IMPROVEMENT PROGRAMS (HIP)         1. Discussed referral to HIP, she is feeling frustrated with trying to control her weight, has DM type 2.  She would like to proceed with this referral  2. Continue vitamin B12 supplementation.  3. Discussed her current medications.   and most recent UDS reviewed and consistent. Scripts given for the next two months for her MS Contin 15mg q12h and norco 5/325 #45 per month.  4. Continue gabapentin 600mg po q6h.  Refilled today.  5. EMG performed today and noted under procedure note from today's date.  6. Discussed possible SCS.  She has reviewed information.  She is leaning towards a trial but has questions.    7. Plan for left lumbar four and lumbar five transforaminal epidural steroid injection.  The risks benefits and alternatives to this procedure were discussed and the patient wishes to proceed with the  procedure. Risks include but are not limited to damage to surrounding structures, infection, bleeding, worsening of pain which can be permanent, weakness which can be permanent. Benefits include pain relief, improved function. Alternatives includes not doing the procedure.  The patient would like to proceed.    8. Discussed plans for future wean of clonazepam, on hold for the next few months.  She reports that her PCP did not feel that this was the best time for wean  9. Continue colase 100mg daily and miralax prn for constipation.  Bowel movements are regular.  10. Continue physical therapy and home exercise program.    Follow-up: Return for Hospital injection.    Thank you very much for asking me to participate in Grisel Mark's care.  Please contact me with any questions or concerns.    Please note that this dictation was created using voice recognition software. I have made every reasonable attempt to correct obvious errors but there may be errors of grammar and content that I may have overlooked prior to finalization of this note.      Paresh Ogden MD  Physical Medicine and Rehabilitation  Interventional Spine and Sports Physiatry  [facility]

## 2021-01-25 ENCOUNTER — TELEPHONE (OUTPATIENT)
Dept: PHYSICAL THERAPY | Facility: REHABILITATION | Age: 66
End: 2021-01-25

## 2021-01-25 NOTE — OP THERAPY DISCHARGE SUMMARY
Outpatient Physical Therapy  DISCHARGE SUMMARY NOTE      [facility] [address].  [address]  [place] [address]  Phone:  [phone]  Fax:  [phone]    Date of Visit: 01/25/2021    Patient: Grisel Mark  YOB: 1955  MRN: [id number]     Referring Provider: No referring provider defined for this encounter.   Referring Diagnosis No admission diagnoses are documented for this encounter.         Functional Assessment Used        Your patient is being discharged from Physical Therapy with the following comments:   · Patient has failed to schedule or reschedule follow-up visits    Comments:  Ms. Mark was seen for only 2 PT sessions treating her low back pain and lumbar radiculopathy.  While she was willing to participate during our sessions, her attendance was inconsistent and unltimately she never rescheduled after cancelling. D/C due to lapse in care.      Alanna Maguire, PT, DPT    Date: 1/25/2021

## 2021-01-27 ENCOUNTER — APPOINTMENT (OUTPATIENT)
Dept: PHYSICAL THERAPY | Facility: REHABILITATION | Age: 66
End: 2021-01-27
Attending: PHYSICAL MEDICINE & REHABILITATION
Payer: MEDICARE

## 2021-01-29 DIAGNOSIS — N95.1 VASOMOTOR SYMPTOMS DUE TO MENOPAUSE: ICD-10-CM

## 2021-02-01 RX ORDER — OMEPRAZOLE 40 MG/1
40 CAPSULE, DELAYED RELEASE ORAL DAILY
Qty: 90 CAP | Refills: 0 | Status: SHIPPED | OUTPATIENT
Start: 2021-02-01 | End: 2021-03-29 | Stop reason: SDUPTHER

## 2021-02-01 RX ORDER — ESTRADIOL 1 MG/1
TABLET ORAL
Qty: 90 TAB | Refills: 0 | Status: SHIPPED | OUTPATIENT
Start: 2021-02-01 | End: 2021-04-05 | Stop reason: SDUPTHER

## 2021-02-02 DIAGNOSIS — M54.16 LEFT LUMBAR RADICULITIS: ICD-10-CM

## 2021-02-02 DIAGNOSIS — Z98.890 S/P LUMBAR LAMINECTOMY: ICD-10-CM

## 2021-02-18 ENCOUNTER — TELEPHONE (OUTPATIENT)
Dept: MEDICAL GROUP | Facility: PHYSICIAN GROUP | Age: 66
End: 2021-02-18

## 2021-02-18 DIAGNOSIS — F33.1 DEPRESSION, MAJOR, RECURRENT, MODERATE (HCC): ICD-10-CM

## 2021-02-18 DIAGNOSIS — F43.10 POSTTRAUMATIC STRESS DISORDER, DELAYED ONSET: ICD-10-CM

## 2021-02-18 DIAGNOSIS — F41.9 ANXIETY: ICD-10-CM

## 2021-02-18 DIAGNOSIS — F41.1 GENERALIZED ANXIETY DISORDER: ICD-10-CM

## 2021-02-18 RX ORDER — CLONAZEPAM 1 MG/1
1 TABLET ORAL 2 TIMES DAILY
Qty: 180 TABLET | Refills: 0 | Status: SHIPPED | OUTPATIENT
Start: 2021-02-25 | End: 2021-05-26 | Stop reason: SDUPTHER

## 2021-02-18 NOTE — TELEPHONE ENCOUNTER
ESTABLISHED PATIENT PRE-VISIT PLANNING     Patient was contacted to complete PVP.     Note: Patient will not be contacted if there is no indication to call.     1.  Reviewed notes from the last few office visits within the medical group: Yes    2.  If any orders were placed at last visit or intended to be done for this visit (i.e. 6 mos follow-up), do we have Results/Consult Notes?         •  Labs - Labs ordered, NOT completed. Patient advised to complete prior to next appointment.  Note: If patient appointment is for lab review and patient did not complete labs, check with provider if OK to reschedule patient until labs completed.       •  Imaging - Imaging ordered, NOT completed. Patient advised to complete prior to next appointment.       •  Referrals - No referrals were ordered at last office visit.    3. Is this appointment scheduled as a Hospital Follow-Up? No    4.  Immunizations were updated in Epic using Reconcile Outside Information activity? Yes    5.  Patient is due for the following Health Maintenance Topics:   Health Maintenance Due   Topic Date Due   • Annual Wellness Visit  1955   • COLONOSCOPY  05/01/2005       6.  AHA (Pulse8) form printed for Provider? Yes

## 2021-02-19 ENCOUNTER — OFFICE VISIT (OUTPATIENT)
Dept: PHYSICAL MEDICINE AND REHAB | Facility: MEDICAL CENTER | Age: 66
End: 2021-02-19
Payer: MEDICARE

## 2021-02-19 VITALS
TEMPERATURE: 97.6 F | DIASTOLIC BLOOD PRESSURE: 80 MMHG | BODY MASS INDEX: 33.01 KG/M2 | HEART RATE: 94 BPM | WEIGHT: 186.29 LBS | HEIGHT: 63 IN | OXYGEN SATURATION: 92 % | SYSTOLIC BLOOD PRESSURE: 124 MMHG

## 2021-02-19 DIAGNOSIS — Z98.890 S/P LUMBAR LAMINECTOMY: ICD-10-CM

## 2021-02-19 DIAGNOSIS — M54.16 LEFT LUMBAR RADICULITIS: ICD-10-CM

## 2021-02-19 DIAGNOSIS — E66.9 CLASS 1 OBESITY WITH BODY MASS INDEX (BMI) OF 33.0 TO 33.9 IN ADULT, UNSPECIFIED OBESITY TYPE, UNSPECIFIED WHETHER SERIOUS COMORBIDITY PRESENT: ICD-10-CM

## 2021-02-19 DIAGNOSIS — M51.36 DDD (DEGENERATIVE DISC DISEASE), LUMBAR: ICD-10-CM

## 2021-02-19 PROCEDURE — 99214 OFFICE O/P EST MOD 30 MIN: CPT | Performed by: PHYSICAL MEDICINE & REHABILITATION

## 2021-02-19 ASSESSMENT — PATIENT HEALTH QUESTIONNAIRE - PHQ9
SUM OF ALL RESPONSES TO PHQ QUESTIONS 1-9: 6
CLINICAL INTERPRETATION OF PHQ2 SCORE: 1
5. POOR APPETITE OR OVEREATING: 0 - NOT AT ALL

## 2021-02-19 ASSESSMENT — FIBROSIS 4 INDEX: FIB4 SCORE: .934132727969572995

## 2021-02-19 ASSESSMENT — PAIN SCALES - GENERAL: PAINLEVEL: 8=MODERATE-SEVERE PAIN

## 2021-02-19 NOTE — PROGRESS NOTES
Follow-up patient note    Physiatry (physical medicine and  Rehabilitation), interventional spine and sports medicine    Date of Service: 02/19/2021    Chief complaint:   Chief Complaint   Patient presents with   • Follow-Up     Back Pain       HISTORY    HPI: Grisel Mark 65 y.o. female who presents today for follow-up evaluation of low back and leg pain.     Grisel reports that she had increased back and left leg pain.  This was increased burning pain into the left leg with radicular pain.  She reports that this was significantly worse and she does not report any particular thing that she did that could have made it worse.  Now, she is taking gabapentin 600mg five times a day as instructed.  This has been helpful.  Pain is now 8/10 on the NRS.    She continues to take 2/day of the klonipin.  She is working on reducing her clonazepam with her PCP.  Her pain medications include: Duloxetine 90mg.  MS Contin 15mg q12h.  Norco 5/325 for breakthrough.  No side effects.    Bowel movements are regular.  She continues her stool softener twice a day.      Physical therapy started, she plans to continue November 11, 2020.  This got put on hold.  She is doing her home exercise program.    She has talked with Dr. Villa for evaluation for SCS trial.    By history:  Her laminectomy was in 1998.  Previous injection on left L4 sand L5 TFESI on 08/12/2020 helped with her leg pain.     Medical records review:  Dr. Francisco Olmos, plan to increase duloxetine 90mg daily, discontinue buspirone 20mg po bid, start buproprion XL 150mg daily, continue brexipiprazole 1mg qhs, hydroxyzine 25mg po tid prn, clonazepam 0.5mg daily prn    Review of records   Dr. Dheeraj De La Rosa:  08/20/2019 Right L3-4, L4-5, L5-S1 radiofrequency ablation    I reviewed the note from the referring provider Peter Bruce * dated 02/05/2020: She was seen to establish care, having just moved from [place].  She was seen for essential hypertension,  mixed hyperlipidemia, DM2 in obese, hypothyroidism, recurrent MDD in partial depression/anxiety, GERD without esophagitis, chronic bilateral low back pain with bilateral sciatica.  Medications associated with the above were written, related labs ordered including CBC, CMP, Hb A1c, lipids, microalbumin, TSH, free thryoxine, referral to ps\ychiatry, referral to GI for colonoscopy and referral to pain clinic.    Previous treatments:    Physical Therapy: Yes    Medications the patient is tried: Narcotics, gabapentin and muscle relaxers    Previous interventions: [facility] these included right lumbar radiofrequency procedures    Previous surgeries to relieve the above pain:  Surgery on October 28, 1998      ROS: Unchanged from 01/21/2021 except as noted in the HPI   ENT: ear infection, treated with augmentin  CV: irregular heart, reports history of tachycardia  Psych: depression since 1995  Heme: anemia  Endo: hypothyroidism, diabetes    Red Flags ROS:   Fever, Chills, Sweats: Denies  Involuntary Weight Loss: Denies  Bladder Incontinence: Denies  Bowel Incontinence: Denies  Saddle Anesthesia: Denies    All other systems reviewed and negative.       PMHx:   Past Medical History:   Diagnosis Date   • Anxiety    • Chronic back pain    • Constipation    • Depression    • Diabetes (HCC)    • GERD (gastroesophageal reflux disease)    • Heart murmur     tachycardia   • Hyperlipidemia    • Hypertension    • Thyroid disease        PSHx:   Past Surgical History:   Procedure Laterality Date   • LUMBAR MEDIAL BRANCH BLOCKS Left 9/9/2020    Procedure: BLOCK, NERVE, SPINAL, LUMBAR, POSTERIOR RAMUS, MEDIAL BRANCH;  Surgeon: Paresh Ogden M.D.;  [facility];  Service: Pain Management   • NC NJX AA&/STRD TFRML EPI LUMBAR/SACRAL 1 LEVEL Left 8/12/2020    Procedure: INJECTION, SPINE, LUMBOSACRAL, EPIDURAL, 1 LEVEL, TRANSFORAMINAL APPROACH;  Surgeon: Paresh Ogden M.D.;  Location:  [facility];  Service: Pain Management   • HI NJX AA&/STRD TFRML EPI LUMBAR/SACRAL EA ADDL Left 8/12/2020    Procedure: INJECTION, SPINE, LUMBOSACRAL, EPIDURAL, TRANSFORAMINAL APPROACH;  Surgeon: Paresh Ogden M.D.;  Location: [facility];  Service: Pain Management   • LAMINOTOMY  1998   • ABDOMINAL HYSTERECTOMY TOTAL     • CARPAL TUNNEL RELEASE     • CHOLECYSTECTOMY         Family history   Family History   Problem Relation Age of Onset   • Cancer Mother    • Stroke Father         Heart attack   • Dementia Sister    • Stroke Brother         heart Attack   • Cancer Brother         Skin   • Diabetes Brother    • Kidney Disease Brother    • Cancer Brother    • Parkinson's Disease Sister    • No Known Problems Sister    • Diabetes Daughter    • Hypertension Daughter          Medications:   Current Outpatient Medications   Medication   • [START ON 2/25/2021] clonazePAM (KLONOPIN) 1 MG Tab   • estradiol (ESTRACE) 1 MG Tab   • omeprazole (PRILOSEC) 40 MG delayed-release capsule   • cyanocobalamin (VITAMIN B-12) 500 MCG Tab   • gabapentin (NEURONTIN) 600 MG tablet   • morphine ER (MS CONTIN) 15 MG Tab CR tablet   • HYDROcodone-acetaminophen (NORCO) 5-325 MG Tab per tablet   • [START ON 2/21/2021] morphine ER (MS CONTIN) 15 MG Tab CR tablet   • [START ON 2/21/2021] HYDROcodone-acetaminophen (NORCO) 5-325 MG Tab per tablet   • metformin (GLUCOPHAGE) 1000 MG tablet   • clonazePAM (KLONOPIN) 1 MG Tab   • metoprolol (LOPRESSOR) 25 MG Tab   • Brexpiprazole (REXULTI) 1 MG Tab   • hydrOXYzine HCl (ATARAX) 25 MG Tab   • busPIRone (BUSPAR) 10 MG Tab tablet   • Naloxone (NARCAN) 4 MG/0.1ML Liquid   • Diclofenac Sodium 1 % Gel   • fluconazole (DIFLUCAN) 150 MG tablet   • DULoxetine (CYMBALTA) 30 MG Cap DR Particles   • rosuvastatin (CRESTOR) 10 MG Tab   • levothyroxine (SYNTHROID) 50 MCG Tab   • Empagliflozin (JARDIANCE) 25 MG Tab   • lisinopril (PRINIVIL) 10 MG Tab   • diclofenac EC (VOLTAREN) 75  MG Tablet Delayed Response   • Blood Glucose Test Strips   • Lancets   • Alcohol Swabs   • Blood Glucose Meter Kit     No current facility-administered medications for this visit.       Allergies:   No Known Allergies    Social Hx:   Social History     Socioeconomic History   • Marital status:      Spouse name: Not on file   • Number of children: Not on file   • Years of education: Not on file   • Highest education level: Not on file   Occupational History   • Not on file   Tobacco Use   • Smoking status: Former Smoker     Packs/day: 0.25     Types: Cigarettes   • Smokeless tobacco: Never Used   Substance and Sexual Activity   • Alcohol use: Not Currently     Comment: rare   • Drug use: Never   • Sexual activity: Not Currently   Other Topics Concern   •  Service No   • Blood Transfusions Yes   • Caffeine Concern No   • Occupational Exposure No   • Hobby Hazards No   • Sleep Concern Yes   • Stress Concern Yes   • Weight Concern Yes   • Special Diet No   • Back Care No   • Exercise Yes   • Bike Helmet No   • Seat Belt Yes   • Self-Exams Yes   Social History Narrative   • Not on file     Social Determinants of Health     Financial Resource Strain:    • Difficulty of Paying Living Expenses:    Food Insecurity:    • Worried About Running Out of Food in the Last Year:    • Ran Out of Food in the Last Year:    Transportation Needs:    • Lack of Transportation (Medical):    • Lack of Transportation (Non-Medical):    Physical Activity:    • Days of Exercise per Week:    • Minutes of Exercise per Session:    Stress:    • Feeling of Stress :    Social Connections:    • Frequency of Communication with Friends and Family:    • Frequency of Social Gatherings with Friends and Family:    • Attends Confucianism Services:    • Active Member of Clubs or Organizations:    • Attends Club or Organization Meetings:    • Marital Status:    Intimate Partner Violence:    • Fear of Current or Ex-Partner:    • Emotionally Abused:   "  • Physically Abused:    • Sexually Abused:          EXAMINATION     Physical Exam:   Vitals: /80 (BP Location: Right arm, Patient Position: Sitting, BP Cuff Size: Small adult)   Pulse 94   Temp 36.4 °C (97.6 °F) (Temporal)   Ht 1.6 m (5' 3\")   Wt 84.5 kg (186 lb 4.6 oz)   SpO2 92%     Constitutional:   Body Habitus: Body mass index is 33 kg/m².  Cooperation: Fully cooperates with exam  Appearance: Well-groomed, well-nourished, not disheveled, no acute distress    Eyes: No scleral icterus, no proptosis     ENT -no obvious auditory deficits, wearing a facial mask    Skin -no rashes or lesions noted    Respiratory-  breathing comfortable on room air, no audible wheezing    Cardiovascular- no lower extremity edema is noted.     Psychiatric- alert and oriented ×3. Normal affect.     Gait - normal gait, no use of ambulatory device, antalgic.     Musculoskeletal -   Thoracic/Lumbar Spine/Sacral Spine/Hips   Inspection: no evidence of atrophy in bilateral lower extremities throughout     ROM of the lumbar spine not fully evaluated due to pain    Seated SLR is positive on the left and negative on the right    Neuro     Key points for the international standards for neurological classification of spinal cord injury (ISNCSCI) to light touch.     Dermatome R L   L2 2 2   L3 2 2   L4 2 2   L5 2 1   S1 2 1   S2 2 2       Motor Exam Lower Extremities    ? Myotome R L   Hip flexion L2 5 5   Knee extension L3 5 5   Ankle dorsiflexion L4 5 5   Toe extension L5 5 5   Ankle plantarflexion S1 5 5       Reflexes  ?  R L   Patella  2+ 2+   Achilles   2+ 1+       MEDICAL DECISION MAKING    Medical records review: see under HPI section.     DATA    Labs:   08/18/2020: UDS positive for morphine and metabolites, clonazepam and metabolites, hydrocodone and metabolites  04/24/2020 UDS positive for morphine and metabolites, clonazepam  04/09/2020 Vitamin D, 25:  28L  04/09/2020 Vitamin B12: 217    Lab Results   Component Value " Date/Time    SODIUM 135 04/09/2020 09:26 AM    POTASSIUM 4.7 04/09/2020 09:26 AM    CHLORIDE 100 04/09/2020 09:26 AM    CO2 21 04/09/2020 09:26 AM    ANION 14.0 04/09/2020 09:26 AM    GLUCOSE 140 (H) 04/09/2020 09:26 AM    BUN 19 04/09/2020 09:26 AM    CREATININE 0.71 04/09/2020 09:26 AM    CALCIUM 9.8 04/09/2020 09:26 AM    ASTSGOT 10 (L) 04/09/2020 09:26 AM    ALTSGPT 7 04/09/2020 09:26 AM    TBILIRUBIN 0.3 04/09/2020 09:26 AM    ALBUMIN 4.1 04/09/2020 09:26 AM    TOTPROTEIN 7.3 04/09/2020 09:26 AM    GLOBULIN 3.2 04/09/2020 09:26 AM    AGRATIO 1.3 04/09/2020 09:26 AM       No results found for: PROTHROMBTM, INR     Lab Results   Component Value Date/Time    WBC 7.6 04/09/2020 09:26 AM    RBC 4.25 04/09/2020 09:26 AM    HEMOGLOBIN 13.3 04/09/2020 09:26 AM    HEMATOCRIT 40.7 04/09/2020 09:26 AM    MCV 95.8 04/09/2020 09:26 AM    MCH 31.3 04/09/2020 09:26 AM    MCHC 32.7 (L) 04/09/2020 09:26 AM    MPV 11.1 04/09/2020 09:26 AM    NEUTSPOLYS 52.80 04/09/2020 09:26 AM    LYMPHOCYTES 34.80 04/09/2020 09:26 AM    MONOCYTES 10.10 04/09/2020 09:26 AM    EOSINOPHILS 1.40 04/09/2020 09:26 AM    BASOPHILS 0.40 04/09/2020 09:26 AM        Lab Results   Component Value Date/Time    HBA1C 7.0 (A) 01/12/2021 08:29 AM        Imaging: I personally reviewed following images, these are my reads:     MRI lumbar spine 05/05/2020  At L1-2, no central or foraminal stenosis  At L2-3, no central or foraminal stenosis  At L3-4, mild disc bulge and mild ligamentum flavum thickening.  No central canal stenosis. Borderline left foraminal stenosis. Schmorl's node.   At L4-5, diffuse disc bulge with mild ligamentum flavum thickening.  No central canal stenosis.  There is facet arthropathy, left greater than right. Mild foraminal stenosis bilaterally. S/P left L4/5 hemilaminectomy  At L5-S1, there is mild-borderline foraminal stenosis with facet arthropathy and mild disc bulge.  No central canal stenosis    Xray lumbar spine 05/05/2020  There is  mild decreased joint space at L3-4 and L4-5.    Facet arthropathy is noted, greatest at L5-S1 bilaterally.  No significant motion on flexion and extension films    IMAGING radiology reads. I reviewed the following radiology reads    Xray abdomen 07/17/2020  IMPRESSION:     Nonspecific bowel gas pattern. No evidence small bowel obstruction.      MRI lumbar spine 05/05/2020  IMPRESSION:     1.  Caudal lumbar facet arthropathy left worse than right with no bony destruction to indicate septic or inflammatory arthropathy. This most likely is just degenerative  2.  Mild caudal lumbar foraminal stenoses  3.  No significant central stenosis.  4.  Left L4/5 hemilaminectomy                                                                                   Xray lumbar spine 05/05/2020     IMPRESSION:     1.  No acute lumbar compression fracture or subluxation.  2.  Posterior disc space narrowing at L4-5 and to lesser extent at L3-4.  3.  Bilateral facet arthropathy at L5-S1.                                                                                             Diagnosis   Visit Diagnoses     ICD-10-CM   1. S/P lumbar laminectomy  Z98.890   2. Left lumbar radiculitis  M54.16   3. DDD (degenerative disc disease), lumbar  M51.36   4. Class 1 obesity with body mass index (BMI) of 33.0 to 33.9 in adult, unspecified obesity type, unspecified whether serious comorbidity present  E66.9    Z68.33         ASSESSMENT:  Grisel Mark 65 y.o. female seen for above     Grisel was seen today for follow-up.    Diagnoses and all orders for this visit:    S/P lumbar laminectomy    Left lumbar radiculitis    DDD (degenerative disc disease), lumbar    Class 1 obesity with body mass index (BMI) of 33.0 to 33.9 in adult, unspecified obesity type, unspecified whether serious comorbidity present         1. Discussed that she has scripts on file for MS Contin q12h and norco 5/325 #45 per month.  Due for refill 02/21/2021 and should be on hold at  [facility].   2. Continue gabapentin 600mg five a day.  No refill given today.  Discussed that we will trial reducing this back to four a day after the left lumbar four and lumbar five transforaminal epidural steroid injection.  The risks benefits and alternatives to this procedure were discussed and the patient wishes to proceed with the procedure. Risks include but are not limited to damage to surrounding structures, infection, bleeding, worsening of pain which can be permanent, weakness which can be permanent. Benefits include pain relief, improved function. Alternatives includes not doing the procedure.  The patient would like to proceed.    3. Psychology referral pending regarding SCS trial.  The risks benefits and alternatives to this procedure were discussed and the patient wishes to proceed with the procedure. Risks include but are not limited to damage to surrounding structures, infection, bleeding, worsening of pain which can be permanent, weakness which can be permanent. Benefits include pain relief, improved function. Alternatives includes not doing the procedure.  Reviewed model of SCS to help answer her questions.  4. Discussed plans for future wean of clonazepam, on hold for the next few months.  She reports that her PCP did not feel that this was the best time for wean  5. Continue colase 100mg daily-bid and miralax prn for constipation.  Bowel movements are regular.  6. Continue physical therapy and home exercise program.  7. Continue with HIP referral    Follow-up: Return for Hospital injection.    Thank you very much for asking me to participate in Grisel Mark's care.  Please contact me with any questions or concerns.    Please note that this dictation was created using voice recognition software. I have made every reasonable attempt to correct obvious errors but there may be errors of grammar and content that I may have overlooked prior to finalization of this note.      Paresh Ogden,  MD  Physical Medicine and Rehabilitation  Interventional Spine and Sports Physiatry  [facility]

## 2021-02-19 NOTE — PATIENT INSTRUCTIONS
Your procedure will be at the [facility] special procedure suite.    [facility] [place] [address]       PRE-PROCEDURE INSTRUCTIONS  You may take your regular medications except:   · No Anti-inflammatories 5 days prior to your procedure. Anti-inflammatories include medicines such as  ibuprofen (Motrin, Advil), Excedrin, Naproxen (Aleve, Anaprox, Naprelan, Naprosyn), Celecoxib (Celebrex), Diclofenac (Voltaren-XR tab), and Meloxicam (Mobic).   · No Glucophage or Metformin 24 hours before your procedure. You may resume next day after your procedure.  · Call the physiatry office if you are taking or prescribed anti-biotics within five days of procedure.  · Please ask provider if you are taking any new diabetes medication.  · CONTINUE TAKING BLOOD PRESSURE MEDICATIONS AS PRESCRIBED.  · Pain medications will not be prescribed on the procedure day. Procedural pain medication may be used by your provider   · Call your doctor's office performing the procedure if you have a fever, chills, rash or new illness prior to your procedure    Anticoagulation/antiplatelet medications  No Blood thinning medications such as Coumadin or Plavix 5 days prior to procedure unless your doctor said to continue these medications. Call your doctor if a new medication is prescribed in this class.     Restrictions for eating before procedure:   · If you are getting procedural sedation, then do not eat to for 8 hours prior to procedure appointment time. Do not drink fluids for four hours prior to your procedure time.   · If you are not having procedural sedation, then Skip the meal prior to your procedure. If you have a morning procedure then skip breakfast. If you have an afternoon procedure then skip lunch.   · You may drink clear liquids up to 2 hours prior to your procedure  · You must have a  the day of procedure to accompany you home.      POST PROCEDURE INSTRUCTIONS   · No heavy lifting, strenuous bending or  strenuous exercise for 3 days after your procedure.  · No hot tubs, baths, swimming for 3 days after your procedure  · You can remove the bandage the day after the procedure.  · IF YOU RECEIVED A STEROID INJECTION. PLEASE NOTE THAT THERE MAY BE A DELAY FOR THE INJECTION TO START WORKING, THE DELAY MAY BE UP TO TWO WEEKS. IF YOU HAVE DIABETES, PLEASE NOTE THAT YOUR SUGAR LEVELS MAY BE ELEVATED FOR 1-2 DAYS AFTER A STEROID INJECTION.  THE STEROID MAY CAUSE TEMPORARY SYMPTOMS WHICH USUALLY RESOLVE ON THEIR OWN WITHIN 1 TO 2 DAYS INCLUDING FACIAL FLUSHING OR A FEELING OF WARMTH ON THE FACE, TEMPORARY INCREASES IN BLOOD SUGAR, INSOMNIA, INCREASED HUNGER  · IF YOU RECEIVED A DIAGNOSTIC PROCEDURE (SUCH AS A MEDIAL BRANCH BLOCK), PLEASE NOTE THAT WE DO EXPECT THIS INJECTION TO WEAR OFF.  IT IS IMPORTANT TO COMPLETE THE PAIN DIARY AND LIST THE PAIN SCORE ONLY FOR THE REGION WHERE THE PROCEDURE WAS AND BRING THIS TO YOUR FOLLOW UP VISIT.  · IF YOU RECEIVED A RADIOFREQUENCY PROCEDURE, THERE MAY BE SOME SORENESS AFTER THE PROCEDURE.  THIS IS NORMAL.  · IF YOU EXPERIENCE PROLONGED WEAKNESS LONGER THAN ONE DAY, BOWEL OR BLADDER INCONTINENCE THEN PLEASE CALL THE PHYSIATRY OFFICE.  · Your leg may feel heavy, weak and numb for up to 1-2 days. Be very careful walking.   ·  You may resume normal activities 3 days after procedure.

## 2021-02-19 NOTE — H&P (VIEW-ONLY)
Follow-up patient note    Physiatry (physical medicine and  Rehabilitation), interventional spine and sports medicine    Date of Service: 02/19/2021    Chief complaint:   Chief Complaint   Patient presents with   • Follow-Up     Back Pain       HISTORY    HPI: Grisel Mark 65 y.o. female who presents today for follow-up evaluation of low back and leg pain.     Grisel reports that she had increased back and left leg pain.  This was increased burning pain into the left leg with radicular pain.  She reports that this was significantly worse and she does not report any particular thing that she did that could have made it worse.  Now, she is taking gabapentin 600mg five times a day as instructed.  This has been helpful.  Pain is now 8/10 on the NRS.    She continues to take 2/day of the klonipin.  She is working on reducing her clonazepam with her PCP.  Her pain medications include: Duloxetine 90mg.  MS Contin 15mg q12h.  Norco 5/325 for breakthrough.  No side effects.    Bowel movements are regular.  She continues her stool softener twice a day.      Physical therapy started, she plans to continue November 11, 2020.  This got put on hold.  She is doing her home exercise program.    She has talked with Dr. Villa for evaluation for SCS trial.    By history:  Her laminectomy was in 1998.  Previous injection on left L4 sand L5 TFESI on 08/12/2020 helped with her leg pain.     Medical records review:  Dr. Francisco Olmos, plan to increase duloxetine 90mg daily, discontinue buspirone 20mg po bid, start buproprion XL 150mg daily, continue brexipiprazole 1mg qhs, hydroxyzine 25mg po tid prn, clonazepam 0.5mg daily prn    Review of records   Dr. Dheeraj De La Rosa:  08/20/2019 Right L3-4, L4-5, L5-S1 radiofrequency ablation    I reviewed the note from the referring provider Peter Bruce * dated 02/05/2020: She was seen to establish care, having just moved from [place].  She was seen for essential hypertension,  mixed hyperlipidemia, DM2 in obese, hypothyroidism, recurrent MDD in partial depression/anxiety, GERD without esophagitis, chronic bilateral low back pain with bilateral sciatica.  Medications associated with the above were written, related labs ordered including CBC, CMP, Hb A1c, lipids, microalbumin, TSH, free thryoxine, referral to ps\ychiatry, referral to GI for colonoscopy and referral to pain clinic.    Previous treatments:    Physical Therapy: Yes    Medications the patient is tried: Narcotics, gabapentin and muscle relaxers    Previous interventions: [facility] these included right lumbar radiofrequency procedures    Previous surgeries to relieve the above pain:  Surgery on October 28, 1998      ROS: Unchanged from 01/21/2021 except as noted in the HPI   ENT: ear infection, treated with augmentin  CV: irregular heart, reports history of tachycardia  Psych: depression since 1995  Heme: anemia  Endo: hypothyroidism, diabetes    Red Flags ROS:   Fever, Chills, Sweats: Denies  Involuntary Weight Loss: Denies  Bladder Incontinence: Denies  Bowel Incontinence: Denies  Saddle Anesthesia: Denies    All other systems reviewed and negative.       PMHx:   Past Medical History:   Diagnosis Date   • Anxiety    • Chronic back pain    • Constipation    • Depression    • Diabetes (HCC)    • GERD (gastroesophageal reflux disease)    • Heart murmur     tachycardia   • Hyperlipidemia    • Hypertension    • Thyroid disease        PSHx:   Past Surgical History:   Procedure Laterality Date   • LUMBAR MEDIAL BRANCH BLOCKS Left 9/9/2020    Procedure: BLOCK, NERVE, SPINAL, LUMBAR, POSTERIOR RAMUS, MEDIAL BRANCH;  Surgeon: Paresh Ogden M.D.;  [facility];  Service: Pain Management   • DE NJX AA&/STRD TFRML EPI LUMBAR/SACRAL 1 LEVEL Left 8/12/2020    Procedure: INJECTION, SPINE, LUMBOSACRAL, EPIDURAL, 1 LEVEL, TRANSFORAMINAL APPROACH;  Surgeon: Paresh Ogden M.D.;  Location:  [facility];  Service: Pain Management   • SC NJX AA&/STRD TFRML EPI LUMBAR/SACRAL EA ADDL Left 8/12/2020    Procedure: INJECTION, SPINE, LUMBOSACRAL, EPIDURAL, TRANSFORAMINAL APPROACH;  Surgeon: Paresh Ogden M.D.;  Location: [facility];  Service: Pain Management   • LAMINOTOMY  1998   • ABDOMINAL HYSTERECTOMY TOTAL     • CARPAL TUNNEL RELEASE     • CHOLECYSTECTOMY         Family history   Family History   Problem Relation Age of Onset   • Cancer Mother    • Stroke Father         Heart attack   • Dementia Sister    • Stroke Brother         heart Attack   • Cancer Brother         Skin   • Diabetes Brother    • Kidney Disease Brother    • Cancer Brother    • Parkinson's Disease Sister    • No Known Problems Sister    • Diabetes Daughter    • Hypertension Daughter          Medications:   Current Outpatient Medications   Medication   • [START ON 2/25/2021] clonazePAM (KLONOPIN) 1 MG Tab   • estradiol (ESTRACE) 1 MG Tab   • omeprazole (PRILOSEC) 40 MG delayed-release capsule   • cyanocobalamin (VITAMIN B-12) 500 MCG Tab   • gabapentin (NEURONTIN) 600 MG tablet   • morphine ER (MS CONTIN) 15 MG Tab CR tablet   • HYDROcodone-acetaminophen (NORCO) 5-325 MG Tab per tablet   • [START ON 2/21/2021] morphine ER (MS CONTIN) 15 MG Tab CR tablet   • [START ON 2/21/2021] HYDROcodone-acetaminophen (NORCO) 5-325 MG Tab per tablet   • metformin (GLUCOPHAGE) 1000 MG tablet   • clonazePAM (KLONOPIN) 1 MG Tab   • metoprolol (LOPRESSOR) 25 MG Tab   • Brexpiprazole (REXULTI) 1 MG Tab   • hydrOXYzine HCl (ATARAX) 25 MG Tab   • busPIRone (BUSPAR) 10 MG Tab tablet   • Naloxone (NARCAN) 4 MG/0.1ML Liquid   • Diclofenac Sodium 1 % Gel   • fluconazole (DIFLUCAN) 150 MG tablet   • DULoxetine (CYMBALTA) 30 MG Cap DR Particles   • rosuvastatin (CRESTOR) 10 MG Tab   • levothyroxine (SYNTHROID) 50 MCG Tab   • Empagliflozin (JARDIANCE) 25 MG Tab   • lisinopril (PRINIVIL) 10 MG Tab   • diclofenac EC (VOLTAREN) 75  MG Tablet Delayed Response   • Blood Glucose Test Strips   • Lancets   • Alcohol Swabs   • Blood Glucose Meter Kit     No current facility-administered medications for this visit.       Allergies:   No Known Allergies    Social Hx:   Social History     Socioeconomic History   • Marital status:      Spouse name: Not on file   • Number of children: Not on file   • Years of education: Not on file   • Highest education level: Not on file   Occupational History   • Not on file   Tobacco Use   • Smoking status: Former Smoker     Packs/day: 0.25     Types: Cigarettes   • Smokeless tobacco: Never Used   Substance and Sexual Activity   • Alcohol use: Not Currently     Comment: rare   • Drug use: Never   • Sexual activity: Not Currently   Other Topics Concern   •  Service No   • Blood Transfusions Yes   • Caffeine Concern No   • Occupational Exposure No   • Hobby Hazards No   • Sleep Concern Yes   • Stress Concern Yes   • Weight Concern Yes   • Special Diet No   • Back Care No   • Exercise Yes   • Bike Helmet No   • Seat Belt Yes   • Self-Exams Yes   Social History Narrative   • Not on file     Social Determinants of Health     Financial Resource Strain:    • Difficulty of Paying Living Expenses:    Food Insecurity:    • Worried About Running Out of Food in the Last Year:    • Ran Out of Food in the Last Year:    Transportation Needs:    • Lack of Transportation (Medical):    • Lack of Transportation (Non-Medical):    Physical Activity:    • Days of Exercise per Week:    • Minutes of Exercise per Session:    Stress:    • Feeling of Stress :    Social Connections:    • Frequency of Communication with Friends and Family:    • Frequency of Social Gatherings with Friends and Family:    • Attends Worship Services:    • Active Member of Clubs or Organizations:    • Attends Club or Organization Meetings:    • Marital Status:    Intimate Partner Violence:    • Fear of Current or Ex-Partner:    • Emotionally Abused:   "  • Physically Abused:    • Sexually Abused:          EXAMINATION     Physical Exam:   Vitals: /80 (BP Location: Right arm, Patient Position: Sitting, BP Cuff Size: Small adult)   Pulse 94   Temp 36.4 °C (97.6 °F) (Temporal)   Ht 1.6 m (5' 3\")   Wt 84.5 kg (186 lb 4.6 oz)   SpO2 92%     Constitutional:   Body Habitus: Body mass index is 33 kg/m².  Cooperation: Fully cooperates with exam  Appearance: Well-groomed, well-nourished, not disheveled, no acute distress    Eyes: No scleral icterus, no proptosis     ENT -no obvious auditory deficits, wearing a facial mask    Skin -no rashes or lesions noted    Respiratory-  breathing comfortable on room air, no audible wheezing    Cardiovascular- no lower extremity edema is noted.     Psychiatric- alert and oriented ×3. Normal affect.     Gait - normal gait, no use of ambulatory device, antalgic.     Musculoskeletal -   Thoracic/Lumbar Spine/Sacral Spine/Hips   Inspection: no evidence of atrophy in bilateral lower extremities throughout     ROM of the lumbar spine not fully evaluated due to pain    Seated SLR is positive on the left and negative on the right    Neuro     Key points for the international standards for neurological classification of spinal cord injury (ISNCSCI) to light touch.     Dermatome R L   L2 2 2   L3 2 2   L4 2 2   L5 2 1   S1 2 1   S2 2 2       Motor Exam Lower Extremities    ? Myotome R L   Hip flexion L2 5 5   Knee extension L3 5 5   Ankle dorsiflexion L4 5 5   Toe extension L5 5 5   Ankle plantarflexion S1 5 5       Reflexes  ?  R L   Patella  2+ 2+   Achilles   2+ 1+       MEDICAL DECISION MAKING    Medical records review: see under HPI section.     DATA    Labs:   08/18/2020: UDS positive for morphine and metabolites, clonazepam and metabolites, hydrocodone and metabolites  04/24/2020 UDS positive for morphine and metabolites, clonazepam  04/09/2020 Vitamin D, 25:  28L  04/09/2020 Vitamin B12: 217    Lab Results   Component Value " Date/Time    SODIUM 135 04/09/2020 09:26 AM    POTASSIUM 4.7 04/09/2020 09:26 AM    CHLORIDE 100 04/09/2020 09:26 AM    CO2 21 04/09/2020 09:26 AM    ANION 14.0 04/09/2020 09:26 AM    GLUCOSE 140 (H) 04/09/2020 09:26 AM    BUN 19 04/09/2020 09:26 AM    CREATININE 0.71 04/09/2020 09:26 AM    CALCIUM 9.8 04/09/2020 09:26 AM    ASTSGOT 10 (L) 04/09/2020 09:26 AM    ALTSGPT 7 04/09/2020 09:26 AM    TBILIRUBIN 0.3 04/09/2020 09:26 AM    ALBUMIN 4.1 04/09/2020 09:26 AM    TOTPROTEIN 7.3 04/09/2020 09:26 AM    GLOBULIN 3.2 04/09/2020 09:26 AM    AGRATIO 1.3 04/09/2020 09:26 AM       No results found for: PROTHROMBTM, INR     Lab Results   Component Value Date/Time    WBC 7.6 04/09/2020 09:26 AM    RBC 4.25 04/09/2020 09:26 AM    HEMOGLOBIN 13.3 04/09/2020 09:26 AM    HEMATOCRIT 40.7 04/09/2020 09:26 AM    MCV 95.8 04/09/2020 09:26 AM    MCH 31.3 04/09/2020 09:26 AM    MCHC 32.7 (L) 04/09/2020 09:26 AM    MPV 11.1 04/09/2020 09:26 AM    NEUTSPOLYS 52.80 04/09/2020 09:26 AM    LYMPHOCYTES 34.80 04/09/2020 09:26 AM    MONOCYTES 10.10 04/09/2020 09:26 AM    EOSINOPHILS 1.40 04/09/2020 09:26 AM    BASOPHILS 0.40 04/09/2020 09:26 AM        Lab Results   Component Value Date/Time    HBA1C 7.0 (A) 01/12/2021 08:29 AM        Imaging: I personally reviewed following images, these are my reads:     MRI lumbar spine 05/05/2020  At L1-2, no central or foraminal stenosis  At L2-3, no central or foraminal stenosis  At L3-4, mild disc bulge and mild ligamentum flavum thickening.  No central canal stenosis. Borderline left foraminal stenosis. Schmorl's node.   At L4-5, diffuse disc bulge with mild ligamentum flavum thickening.  No central canal stenosis.  There is facet arthropathy, left greater than right. Mild foraminal stenosis bilaterally. S/P left L4/5 hemilaminectomy  At L5-S1, there is mild-borderline foraminal stenosis with facet arthropathy and mild disc bulge.  No central canal stenosis    Xray lumbar spine 05/05/2020  There is  mild decreased joint space at L3-4 and L4-5.    Facet arthropathy is noted, greatest at L5-S1 bilaterally.  No significant motion on flexion and extension films    IMAGING radiology reads. I reviewed the following radiology reads    Xray abdomen 07/17/2020  IMPRESSION:     Nonspecific bowel gas pattern. No evidence small bowel obstruction.      MRI lumbar spine 05/05/2020  IMPRESSION:     1.  Caudal lumbar facet arthropathy left worse than right with no bony destruction to indicate septic or inflammatory arthropathy. This most likely is just degenerative  2.  Mild caudal lumbar foraminal stenoses  3.  No significant central stenosis.  4.  Left L4/5 hemilaminectomy                                                                                   Xray lumbar spine 05/05/2020     IMPRESSION:     1.  No acute lumbar compression fracture or subluxation.  2.  Posterior disc space narrowing at L4-5 and to lesser extent at L3-4.  3.  Bilateral facet arthropathy at L5-S1.                                                                                             Diagnosis   Visit Diagnoses     ICD-10-CM   1. S/P lumbar laminectomy  Z98.890   2. Left lumbar radiculitis  M54.16   3. DDD (degenerative disc disease), lumbar  M51.36   4. Class 1 obesity with body mass index (BMI) of 33.0 to 33.9 in adult, unspecified obesity type, unspecified whether serious comorbidity present  E66.9    Z68.33         ASSESSMENT:  Grisel Mark 65 y.o. female seen for above     Grisel was seen today for follow-up.    Diagnoses and all orders for this visit:    S/P lumbar laminectomy    Left lumbar radiculitis    DDD (degenerative disc disease), lumbar    Class 1 obesity with body mass index (BMI) of 33.0 to 33.9 in adult, unspecified obesity type, unspecified whether serious comorbidity present         1. Discussed that she has scripts on file for MS Contin q12h and norco 5/325 #45 per month.  Due for refill 02/21/2021 and should be on hold at  [facility].   2. Continue gabapentin 600mg five a day.  No refill given today.  Discussed that we will trial reducing this back to four a day after the left lumbar four and lumbar five transforaminal epidural steroid injection.  The risks benefits and alternatives to this procedure were discussed and the patient wishes to proceed with the procedure. Risks include but are not limited to damage to surrounding structures, infection, bleeding, worsening of pain which can be permanent, weakness which can be permanent. Benefits include pain relief, improved function. Alternatives includes not doing the procedure.  The patient would like to proceed.    3. Psychology referral pending regarding SCS trial.  The risks benefits and alternatives to this procedure were discussed and the patient wishes to proceed with the procedure. Risks include but are not limited to damage to surrounding structures, infection, bleeding, worsening of pain which can be permanent, weakness which can be permanent. Benefits include pain relief, improved function. Alternatives includes not doing the procedure.  Reviewed model of SCS to help answer her questions.  4. Discussed plans for future wean of clonazepam, on hold for the next few months.  She reports that her PCP did not feel that this was the best time for wean  5. Continue colase 100mg daily-bid and miralax prn for constipation.  Bowel movements are regular.  6. Continue physical therapy and home exercise program.  7. Continue with HIP referral    Follow-up: Return for Hospital injection.    Thank you very much for asking me to participate in Grisel Mark's care.  Please contact me with any questions or concerns.    Please note that this dictation was created using voice recognition software. I have made every reasonable attempt to correct obvious errors but there may be errors of grammar and content that I may have overlooked prior to finalization of this note.      Paresh Ogden,  MD  Physical Medicine and Rehabilitation  Interventional Spine and Sports Physiatry  [facility]

## 2021-02-22 NOTE — PREPROCEDURE INSTRUCTIONS
2nd PAT call attempted 02/22/21 at 1058. No answer at number provided. Message with call back number provided. Pt was informed that it is necessary that he/she returns this call to review pre-procedure instructions and to do the pre-screening before 3 PM on Tuesday (02/23/2021), Failure to return this call will result in the appt being rescheduled.

## 2021-02-23 NOTE — PREPROCEDURE INSTRUCTIONS
PAT note: 4th PAT call attempted 02/23/21 at 1325. Spoke with Grisel. Health history, allergies, medications and pre-procedure/sedation instructions reviewed with patient. Pre-procedure respiratory screening completed. Pt denies being sick/symptomatic(fever, cough, shortness of breath)  within 72 hours or having been in close contact with symptomatic individuals in the past 2 weeks.Pt was informed to contact his/her physician should he/she or any close personal contact become symptomatic prior to the procedure. Pt was told to bring a mask as he/she would be wearing it during the entire stay. It was recommended that the pt self isolate 72 hrs before the procedure and  recommend that his/her  remain on site til pt is d/milind. Pt verbalized understanding of instructions.

## 2021-02-24 ENCOUNTER — APPOINTMENT (OUTPATIENT)
Dept: RADIOLOGY | Facility: REHABILITATION | Age: 66
End: 2021-02-24
Attending: PHYSICAL MEDICINE & REHABILITATION
Payer: MEDICARE

## 2021-02-24 ENCOUNTER — HOSPITAL ENCOUNTER (OUTPATIENT)
Facility: REHABILITATION | Age: 66
End: 2021-02-24
Attending: PHYSICAL MEDICINE & REHABILITATION | Admitting: PHYSICAL MEDICINE & REHABILITATION
Payer: MEDICARE

## 2021-02-24 VITALS
SYSTOLIC BLOOD PRESSURE: 108 MMHG | TEMPERATURE: 98 F | OXYGEN SATURATION: 92 % | BODY MASS INDEX: 32.81 KG/M2 | WEIGHT: 185.19 LBS | DIASTOLIC BLOOD PRESSURE: 69 MMHG | RESPIRATION RATE: 15 BRPM | HEIGHT: 63 IN | HEART RATE: 66 BPM

## 2021-02-24 PROCEDURE — 700111 HCHG RX REV CODE 636 W/ 250 OVERRIDE (IP)

## 2021-02-24 PROCEDURE — 64484 NJX AA&/STRD TFRM EPI L/S EA: CPT

## 2021-02-24 PROCEDURE — 700101 HCHG RX REV CODE 250

## 2021-02-24 PROCEDURE — 700117 HCHG RX CONTRAST REV CODE 255

## 2021-02-24 PROCEDURE — 64483 NJX AA&/STRD TFRM EPI L/S 1: CPT

## 2021-02-24 RX ORDER — ONDANSETRON 2 MG/ML
4 INJECTION INTRAMUSCULAR; INTRAVENOUS
Status: DISCONTINUED | OUTPATIENT
Start: 2021-02-24 | End: 2021-02-24 | Stop reason: HOSPADM

## 2021-02-24 RX ORDER — DEXAMETHASONE SODIUM PHOSPHATE 10 MG/ML
INJECTION, SOLUTION INTRAMUSCULAR; INTRAVENOUS
Status: COMPLETED
Start: 2021-02-24 | End: 2021-02-24

## 2021-02-24 RX ORDER — LIDOCAINE HYDROCHLORIDE 20 MG/ML
INJECTION, SOLUTION EPIDURAL; INFILTRATION; INTRACAUDAL; PERINEURAL
Status: COMPLETED
Start: 2021-02-24 | End: 2021-02-24

## 2021-02-24 RX ADMIN — DEXAMETHASONE SODIUM PHOSPHATE 20 MG: 10 INJECTION, SOLUTION INTRAMUSCULAR; INTRAVENOUS at 08:01

## 2021-02-24 RX ADMIN — IOHEXOL 5 ML: 240 INJECTION, SOLUTION INTRATHECAL; INTRAVASCULAR; INTRAVENOUS; ORAL at 08:01

## 2021-02-24 RX ADMIN — LIDOCAINE HYDROCHLORIDE 5 ML: 20 INJECTION, SOLUTION EPIDURAL; INFILTRATION; INTRACAUDAL; PERINEURAL at 07:59

## 2021-02-24 ASSESSMENT — PAIN DESCRIPTION - PAIN TYPE: TYPE: CHRONIC PAIN

## 2021-02-24 ASSESSMENT — FIBROSIS 4 INDEX: FIB4 SCORE: .934132727969572995

## 2021-02-24 NOTE — NON-PROVIDER
Name verified. Consent for procedure and site confirmed and signed.H&P updated.Current medications , allergies reviewed . Printed home care discharged  SWATHI sheet information protocol  verbal instructions given and  she  verbalized understanding. Pertinent medical health information  reviewed  (SWATHI on CPAP, HTN, GERD ) . Confirmed  waiting. Blood sugar taken this morning was 159 mg. / dl. Denied taking anti-coagulant and anti- inflammatories medication.Had Norco and Morphine today at 0600 AM. Communicated to Dr. Ogden and Mary WALTERS.

## 2021-02-24 NOTE — PROGRESS NOTES
Preprocedure assessment completed.  Pertinent health information, HTN, DM2, Gerd communicated to physician and staff during time out.  Patient positioned preprocedure by RN, CST and XRAY.  Pillow under ankles for support.

## 2021-02-24 NOTE — INTERVAL H&P NOTE
"H&P reviewed. The patient was examined and there are no changes to the H&P      /61   Pulse 65   Temp 36.7 °C (98 °F) (Temporal)   Resp 18   Ht 1.6 m (5' 3\")   Wt 84 kg (185 lb 3 oz)   SpO2 93%     CV: RRR, Normal S1, S2  RESP: Clear to auscultation bilaterally    Continue with injection as planned.    Paresh Ogden MD  Physical Medicine and Rehabilitation  Interventional Spine and Sports Physiatry  [facility]      "

## 2021-02-24 NOTE — NON-PROVIDER
Escorted   by RN  from procedure room ambulatory .Fluids and food tolerated well. Ice compress applied to procedure site. Reviewed  discharged home care   instruction given and understood by patient. Post procedure evaluated  by Dr. Ogden . Discharged ambulatory  without difficulty or any deficits with  designated .

## 2021-02-24 NOTE — OP REPORT
Pre-operative Diagnosis: Left lumbar radiculitis(M54.16)          S/P lumbar laminectomy(Z98.890)     Post-operative Diagnosis: Left lumbar radiculitis(M54.16)          S/P lumbar laminectomy(Z98.890)     Procedure:Left Lumbar Transforaminal Epidural Steroid  at the L4-5 and L5-S1 levels.      Type of Anesthesia: Local anesthesia  Blood Loss: None  Physician: Paresh Ogden MD     Description of procedure:     The risks, benefits, and alternatives of the procedure were reviewed and discussed with the patient.  Written informed consent was freely obtained. A pre-procedural time-out was conducted by the physician verifying patient’s identity, procedure to be performed, procedure site and side, and allergy verification. Appropriate equipment was determined to be in place for the procedure.      Method of Procedure: The patient signed an informed consent form in the pre-op area after all risks and complications were discussed and all questions were answered.     The patient was prepped and draped in a sterile fashion in the prone position.  The patient's spine was surveyed under fluoroscopic visualization and anatomical landmarks were identified.     The patient's vital signs were carefully monitored before, throughout, and after the procedure.      Left L4 transforaminal epidural steroid injection     The fluoroscope was placed over the lumbar spine and adjusted into the proper AP/Oblique view to enter the transforaminal space at the levels below. The targets for injection were then marked at the leftL4-5. A 25g 1.5 inch needle was placed into the marked site, and approx 2cc of 1% Lidocaine was injected subcutaneously into the epidermal and dermal layers. The needle was removed. A 25 gauge 3.5 inch spinal needle was then placed and advanced under fluoroscopic guidance in an oblique view towards the subpedicular epidural space of the levels noted below. The needle position was confirmed to not be past the 6 o'clock  "position in the AP view and it was in the neural foramen and the lateral view. Under live fluoroscopic guidance in the AP view, contrast dye was used to highlight the epidural space spread.  Following negative aspiration, approx 1mL of 2% lidocaine preservative free with 1 cc of dexamethasone(10mg/cc) and 1ml of preservative free normal saline was then injected at each level, and the needle was removed leaving a trail of 1% lidocaine. The patient's back was covered with a 4x4 gauze, the area was cleansed with sterile normal saline, and a dressing was applied. There were no complications noted.      Perisheathogram and Spot Film:  After Omnipaque was injected, a leftL4 \"perisheathogram\" occurred without evidence of subarachnoid or vascular flow.  A spot films was ordered in AP and lateral to confirm the correct needle tip placement.     Left L5 transforaminal epidural steroid injection     The fluoroscope was placed over the lumbar spine and adjusted into the proper AP/Oblique view to enter the transforaminal space at the levels below. The targets for injection were then marked at the left L5-S1. A 25g 1.5 inch needle was placed into the marked site, and approx 2cc of 1% Lidocaine was injected subcutaneously into the epidermal and dermal layers. The needle was removed. A 25 gauge 3.5 inch spinal needle was then placed and advanced under fluoroscopic guidance in an oblique view towards the subpedicular epidural space of the levels noted below. The needle position was confirmed to not be past the 6 o'clock position in the AP view and it was in the neural foramen and the lateral view. Under live fluoroscopic guidance in the AP view, contrast dye was used to highlight the epidural space spread.  Following negative aspiration, approx 1cc of 2% lidocaine preservative free with 1cc of dexamethasone(10mg/cc) and 1cc of normal saline was then injected at each level, and the needles were removed intact after restyleted. The " "patient's back was covered with a 4x4 gauze, the area was cleansed with sterile normal saline, and a dressing was applied. There were no complications noted.         Perisheathogram and Spot Film:  After Omnipaque was injected, a left L5 \"perisheathogram\" occurred without evidence of subarachnoid or vascular flow.  A spot films was ordered in AP and lateral to confirm the correct needle tip placement.     The patient was then evaluated post-procedure, and was hemodynamically stable prior to leaving the post-operative care unit.      Paresh Ogden MD  Physical Medicine and Rehabilitation  Interventional Spine and Sports Physiatry  [facility]     CPT: [id number], [id number]    "

## 2021-02-25 ENCOUNTER — TELEPHONE (OUTPATIENT)
Dept: PHYSICAL MEDICINE AND REHAB | Facility: MEDICAL CENTER | Age: 66
End: 2021-02-25

## 2021-02-26 ENCOUNTER — APPOINTMENT (OUTPATIENT)
Dept: PHYSICAL MEDICINE AND REHAB | Facility: MEDICAL CENTER | Age: 66
End: 2021-02-26
Payer: MEDICARE

## 2021-02-26 NOTE — TELEPHONE ENCOUNTER
I called patient to follow up after her Special procedure Left lumbar four and lumbar five transforaminal epidural steroid injection and he stated she is doing good she is a little soar and icing the area, no bladder and bowel issues. RR

## 2021-03-02 DIAGNOSIS — I10 ESSENTIAL HYPERTENSION: ICD-10-CM

## 2021-03-03 DIAGNOSIS — I10 ESSENTIAL HYPERTENSION: ICD-10-CM

## 2021-03-03 DIAGNOSIS — Z23 NEED FOR VACCINATION: ICD-10-CM

## 2021-03-03 DIAGNOSIS — E78.2 MIXED HYPERLIPIDEMIA: ICD-10-CM

## 2021-03-04 RX ORDER — ROSUVASTATIN CALCIUM 10 MG/1
10 TABLET, COATED ORAL EVERY EVENING
Qty: 100 TABLET | Refills: 1 | Status: SHIPPED | OUTPATIENT
Start: 2021-03-04 | End: 2021-06-21

## 2021-03-04 RX ORDER — LISINOPRIL 10 MG/1
10 TABLET ORAL DAILY
Qty: 90 TABLET | Refills: 3 | OUTPATIENT
Start: 2021-03-04

## 2021-03-04 RX ORDER — LEVOTHYROXINE SODIUM 0.05 MG/1
50 TABLET ORAL
Qty: 90 TABLET | Refills: 1 | Status: SHIPPED | OUTPATIENT
Start: 2021-03-04 | End: 2021-10-28 | Stop reason: SDUPTHER

## 2021-03-04 RX ORDER — LISINOPRIL 10 MG/1
10 TABLET ORAL DAILY
Qty: 100 TABLET | Refills: 0 | Status: SHIPPED | OUTPATIENT
Start: 2021-03-04 | End: 2021-08-04

## 2021-03-04 NOTE — TELEPHONE ENCOUNTER
Pt has had OV within the 12 month protocol and lipid panel is current and TSH is 3.57. 6 month supply sent to pharmacy.   Lab Results   Component Value Date/Time    CHOLSTRLTOT 138 04/09/2020 09:26 AM    LDL 67 04/09/2020 09:26 AM    HDL 42 04/09/2020 09:26 AM    TRIGLYCERIDE 144 04/09/2020 09:26 AM       Lab Results   Component Value Date/Time    SODIUM 135 04/09/2020 09:26 AM    POTASSIUM 4.7 04/09/2020 09:26 AM    CHLORIDE 100 04/09/2020 09:26 AM    CO2 21 04/09/2020 09:26 AM    GLUCOSE 140 (H) 04/09/2020 09:26 AM    BUN 19 04/09/2020 09:26 AM    CREATININE 0.71 04/09/2020 09:26 AM     Lab Results   Component Value Date/Time    ALKPHOSPHAT 61 04/09/2020 09:26 AM    ASTSGOT 10 (L) 04/09/2020 09:26 AM    ALTSGPT 7 04/09/2020 09:26 AM    TBILIRUBIN 0.3 04/09/2020 09:26 AM

## 2021-03-12 ENCOUNTER — OFFICE VISIT (OUTPATIENT)
Dept: PHYSICAL MEDICINE AND REHAB | Facility: MEDICAL CENTER | Age: 66
End: 2021-03-12
Payer: MEDICARE

## 2021-03-12 VITALS
BODY MASS INDEX: 33.91 KG/M2 | SYSTOLIC BLOOD PRESSURE: 130 MMHG | OXYGEN SATURATION: 91 % | HEIGHT: 63 IN | DIASTOLIC BLOOD PRESSURE: 68 MMHG | HEART RATE: 82 BPM | TEMPERATURE: 97 F | WEIGHT: 191.36 LBS

## 2021-03-12 DIAGNOSIS — M54.16 LEFT LUMBAR RADICULITIS: ICD-10-CM

## 2021-03-12 DIAGNOSIS — E66.9 DIABETES MELLITUS TYPE 2 IN OBESE: ICD-10-CM

## 2021-03-12 DIAGNOSIS — E11.69 DIABETES MELLITUS TYPE 2 IN OBESE: ICD-10-CM

## 2021-03-12 DIAGNOSIS — Z98.890 S/P LUMBAR LAMINECTOMY: ICD-10-CM

## 2021-03-12 DIAGNOSIS — E66.9 CLASS 1 OBESITY WITH BODY MASS INDEX (BMI) OF 33.0 TO 33.9 IN ADULT, UNSPECIFIED OBESITY TYPE, UNSPECIFIED WHETHER SERIOUS COMORBIDITY PRESENT: ICD-10-CM

## 2021-03-12 DIAGNOSIS — M51.36 DDD (DEGENERATIVE DISC DISEASE), LUMBAR: ICD-10-CM

## 2021-03-12 PROCEDURE — 99214 OFFICE O/P EST MOD 30 MIN: CPT | Performed by: PHYSICAL MEDICINE & REHABILITATION

## 2021-03-12 RX ORDER — HYDROCODONE BITARTRATE AND ACETAMINOPHEN 5; 325 MG/1; MG/1
1 TABLET ORAL EVERY 4 HOURS PRN
Qty: 45 TABLET | Refills: 0 | Status: SHIPPED | OUTPATIENT
Start: 2021-03-24 | End: 2021-04-23

## 2021-03-12 RX ORDER — MORPHINE SULFATE 15 MG/1
15 TABLET, FILM COATED, EXTENDED RELEASE ORAL EVERY 12 HOURS
Qty: 60 TABLET | Refills: 0 | Status: SHIPPED | OUTPATIENT
Start: 2021-03-24 | End: 2021-04-23

## 2021-03-12 RX ORDER — HYDROCODONE BITARTRATE AND ACETAMINOPHEN 5; 325 MG/1; MG/1
1 TABLET ORAL EVERY 4 HOURS PRN
Qty: 45 TABLET | Refills: 0 | Status: SHIPPED | OUTPATIENT
Start: 2021-04-23 | End: 2021-05-13 | Stop reason: SDUPTHER

## 2021-03-12 RX ORDER — MORPHINE SULFATE 15 MG/1
15 TABLET, FILM COATED, EXTENDED RELEASE ORAL EVERY 12 HOURS
Qty: 60 TABLET | Refills: 0 | Status: SHIPPED | OUTPATIENT
Start: 2021-04-23 | End: 2021-05-13 | Stop reason: SDUPTHER

## 2021-03-12 ASSESSMENT — FIBROSIS 4 INDEX: FIB4 SCORE: .934132727969572995

## 2021-03-12 ASSESSMENT — PATIENT HEALTH QUESTIONNAIRE - PHQ9
SUM OF ALL RESPONSES TO PHQ QUESTIONS 1-9: 6
CLINICAL INTERPRETATION OF PHQ2 SCORE: 2
5. POOR APPETITE OR OVEREATING: 0 - NOT AT ALL

## 2021-03-12 ASSESSMENT — PAIN SCALES - GENERAL: PAINLEVEL: 6=MODERATE PAIN

## 2021-03-12 NOTE — PROGRESS NOTES
"Follow-up patient note    Physiatry (physical medicine and  Rehabilitation), interventional spine and sports medicine    Date of Service: 03/12/2021    Chief complaint:   Chief Complaint   Patient presents with   • Follow-Up     Lower back       HISTORY    HPI: Grisel Mark 65 y.o. female who presents today for follow-up evaluation of low back and leg pain.     Since her last visit, Grisel reports that she has been gaining weight.  She thinks that she is eating more \"junk food' and feels like she is the heaviest she has been in a long time.    Pain continues with aching and burning pain in her low back with aching pain into her left leg.      She has had some back spasms and pain doing laundry, but this is doing better since injection on 02/24/2021 left L4 and L5 TFESI.  She did briefly aggravate her pain after changing the sheets herself.  Laying on the left side is not painful now and the left \"hip pain\" is better.      She is feeling like she doesn't need to take the fifth gabapentin now.  Pain is now 8/10 on the NRS.    She continues to take 2/day of the klonipin.  She is working on reducing her clonazepam with her PCP.  Her pain medications include: Duloxetine 90mg.  MS Contin 15mg q12h.  Norco 5/325 for breakthrough.  She has been taking norco only for severe pain and has #13 left.   No side effects.    Bowel movements are regular and daily.  She continues her stool softener twice a day.  She has had some loose stools and figured out her stool softener had laxative also.    She has talked with Dr. Villa for evaluation for SCS trial.  She has been in discussion, but has not scheduled yet.    By history:  Her laminectomy was in 1998.  Previous injection on left L4 sand L5 TFESI on 08/12/2020 helped with her leg pain.     Medical records review:  Dr. Francisco Olmos, plan to increase duloxetine 90mg daily, discontinue buspirone 20mg po bid, start buproprion XL 150mg daily, continue brexipiprazole 1mg qhs, " hydroxyzine 25mg po tid prn, clonazepam 0.5mg daily prn    Review of records   Dr. Dheeraj De La Rosa:  08/20/2019 Right L3-4, L4-5, L5-S1 radiofrequency ablation    I reviewed the note from the referring provider Pteer Bruce * dated 02/05/2020: She was seen to establish care, having just moved from [place].  She was seen for essential hypertension, mixed hyperlipidemia, DM2 in obese, hypothyroidism, recurrent MDD in partial depression/anxiety, GERD without esophagitis, chronic bilateral low back pain with bilateral sciatica.  Medications associated with the above were written, related labs ordered including CBC, CMP, Hb A1c, lipids, microalbumin, TSH, free thryoxine, referral to ps\ychiatry, referral to GI for colonoscopy and referral to pain clinic.    Previous treatments:    Physical Therapy: Yes    Medications the patient is tried: Narcotics, gabapentin and muscle relaxers    Previous interventions: [facility] these included right lumbar radiofrequency procedures    Previous surgeries to relieve the above pain:  Surgery on October 28, 1998      ROS:   ENT: ear infection, treated with augmentin  CV: irregular heart, reports history of tachycardia  Psych: depression since 1995  Heme: anemia  Endo: hypothyroidism, diabetes    Red Flags ROS:   Fever, Chills, Sweats: Denies  Involuntary Weight Loss: Denies  Bladder Incontinence: Denies  Bowel Incontinence: Denies  Saddle Anesthesia: Denies    All other systems reviewed and negative.       PMHx:   Past Medical History:   Diagnosis Date   • Anxiety    • Chronic back pain    • Constipation    • Depression    • Diabetes (HCC)    • GERD (gastroesophageal reflux disease)    • Heart murmur     tachycardia   • Hyperlipidemia    • Hypertension    • Thyroid disease        PSHx:   Past Surgical History:   Procedure Laterality Date   • BLOCK EPIDURAL STEROID INJECTION Left 2/24/2021    Procedure: INJECTION, STEROID, EPIDURAL;  Surgeon: Paresh  LUPE Ogden M.D.;  [facility];  Service: Pain Management   • LUMBAR MEDIAL BRANCH BLOCKS Left 9/9/2020    Procedure: BLOCK, NERVE, SPINAL, LUMBAR, POSTERIOR RAMUS, MEDIAL BRANCH;  Surgeon: Paresh Ogden M.D.;  Location: [facility];  Service: Pain Management   • IA NJX AA&/STRD TFRML EPI LUMBAR/SACRAL 1 LEVEL Left 8/12/2020    Procedure: INJECTION, SPINE, LUMBOSACRAL, EPIDURAL, 1 LEVEL, TRANSFORAMINAL APPROACH;  Surgeon: Paresh Ogden M.D.;  Location: [facility];  Service: Pain Management   • IA NJX AA&/STRD TFRML EPI LUMBAR/SACRAL EA ADDL Left 8/12/2020    Procedure: INJECTION, SPINE, LUMBOSACRAL, EPIDURAL, TRANSFORAMINAL APPROACH;  Surgeon: Paresh Ogden M.D.;  Location: [facility];  Service: Pain Management   • LAMINOTOMY  1998   • ABDOMINAL HYSTERECTOMY TOTAL     • CARPAL TUNNEL RELEASE     • CHOLECYSTECTOMY         Family history   Family History   Problem Relation Age of Onset   • Cancer Mother    • Stroke Father         Heart attack   • Dementia Sister    • Stroke Brother         heart Attack   • Cancer Brother         Skin   • Diabetes Brother    • Kidney Disease Brother    • Cancer Brother    • Parkinson's Disease Sister    • No Known Problems Sister    • Diabetes Daughter    • Hypertension Daughter          Medications:   Current Outpatient Medications   Medication   • [START ON 3/24/2021] morphine ER (MS CONTIN) 15 MG Tab CR tablet   • [START ON 3/24/2021] HYDROcodone-acetaminophen (NORCO) 5-325 MG Tab per tablet   • [START ON 4/23/2021] HYDROcodone-acetaminophen (NORCO) 5-325 MG Tab per tablet   • [START ON 4/23/2021] morphine ER (MS CONTIN) 15 MG Tab CR tablet   • metoprolol tartrate (LOPRESSOR) 25 MG Tab   • lisinopril (PRINIVIL) 10 MG Tab   • levothyroxine (SYNTHROID) 50 MCG Tab   • rosuvastatin (CRESTOR) 10 MG Tab   • clonazePAM (KLONOPIN) 1 MG Tab   • estradiol (ESTRACE) 1 MG Tab   • omeprazole (PRILOSEC) 40 MG  delayed-release capsule   • cyanocobalamin (VITAMIN B-12) 500 MCG Tab   • gabapentin (NEURONTIN) 600 MG tablet   • metformin (GLUCOPHAGE) 1000 MG tablet   • clonazePAM (KLONOPIN) 1 MG Tab   • Brexpiprazole (REXULTI) 1 MG Tab   • hydrOXYzine HCl (ATARAX) 25 MG Tab   • busPIRone (BUSPAR) 10 MG Tab tablet   • Naloxone (NARCAN) 4 MG/0.1ML Liquid   • Diclofenac Sodium 1 % Gel   • fluconazole (DIFLUCAN) 150 MG tablet   • DULoxetine (CYMBALTA) 30 MG Cap DR Particles   • Empagliflozin (JARDIANCE) 25 MG Tab   • diclofenac EC (VOLTAREN) 75 MG Tablet Delayed Response   • Blood Glucose Test Strips   • Lancets   • Alcohol Swabs   • Blood Glucose Meter Kit     No current facility-administered medications for this visit.       Allergies:   No Known Allergies    Social Hx:   Social History     Socioeconomic History   • Marital status:      Spouse name: Not on file   • Number of children: Not on file   • Years of education: Not on file   • Highest education level: Not on file   Occupational History   • Not on file   Tobacco Use   • Smoking status: Former Smoker     Packs/day: 0.25     Types: Cigarettes   • Smokeless tobacco: Never Used   Substance and Sexual Activity   • Alcohol use: Not Currently     Comment: rare   • Drug use: Never   • Sexual activity: Not Currently   Other Topics Concern   •  Service No   • Blood Transfusions Yes   • Caffeine Concern No   • Occupational Exposure No   • Hobby Hazards No   • Sleep Concern Yes   • Stress Concern Yes   • Weight Concern Yes   • Special Diet No   • Back Care No   • Exercise Yes   • Bike Helmet No   • Seat Belt Yes   • Self-Exams Yes   Social History Narrative   • Not on file     Social Determinants of Health     Financial Resource Strain:    • Difficulty of Paying Living Expenses:    Food Insecurity:    • Worried About Running Out of Food in the Last Year:    • Ran Out of Food in the Last Year:    Transportation Needs:    • Lack of Transportation (Medical):    • Lack  "of Transportation (Non-Medical):    Physical Activity:    • Days of Exercise per Week:    • Minutes of Exercise per Session:    Stress:    • Feeling of Stress :    Social Connections:    • Frequency of Communication with Friends and Family:    • Frequency of Social Gatherings with Friends and Family:    • Attends [employer] Services:    • Active Member of Clubs or Organizations:    • Attends Club or Organization Meetings:    • Marital Status:    Intimate Partner Violence:    • Fear of Current or Ex-Partner:    • Emotionally Abused:    • Physically Abused:    • Sexually Abused:          EXAMINATION     Physical Exam:   Vitals: /68 (BP Location: Right arm, Patient Position: Sitting, BP Cuff Size: Small adult)   Pulse 82   Temp 36.1 °C (97 °F) (Temporal)   Ht 1.6 m (5' 3\")   Wt 86.8 kg (191 lb 5.8 oz)   SpO2 91%     Constitutional:   Body Habitus: Body mass index is 33.9 kg/m².  Cooperation: Fully cooperates with exam  Appearance: Well-groomed, well-nourished, not disheveled, no acute distress    Eyes: No scleral icterus, no proptosis     ENT -no obvious auditory deficits, wearing a facial mask    Skin -no rashes or lesions noted, no warmth or erythema near injection sites    Respiratory-  breathing comfortable on room air,  no audible wheezing    Cardiovascular- no lower extremity edema is noted.     Psychiatric- alert and oriented ×3. Normal affect.     Gait - normal gait, no use of ambulatory device, antalgic.     Musculoskeletal -   Thoracic/Lumbar Spine/Sacral Spine/Hips   Inspection: no evidence of atrophy in bilateral lower extremities throughout     ROM of the lumbar spine not fully evaluated due to pain    Seated SLR is negative on the left and negative on the right     Neuro     Key points for the international standards for neurological classification of spinal cord injury (ISNCSCI) to light touch.     Dermatome R L   L2 2 2   L3 2 2   L4 2 2   L5 2 1   S1 2 1   S2 2 2       Motor Exam Lower " Extremities    ? Myotome R L   Hip flexion L2 5 5   Knee extension L3 5 5   Ankle dorsiflexion L4 5 5   Toe extension L5 5 5   Ankle plantarflexion S1 5 5       Reflexes  ?  R L   Patella  2+ 2+   Achilles   2+ 1+       MEDICAL DECISION MAKING    Medical records review: see under HPI section.     DATA    Labs:   08/18/2020: UDS positive for morphine and metabolites, clonazepam and metabolites, hydrocodone and metabolites  04/24/2020 UDS positive for morphine and metabolites, clonazepam  04/09/2020 Vitamin D, 25:  28L  04/09/2020 Vitamin B12: 217    Lab Results   Component Value Date/Time    SODIUM 135 04/09/2020 09:26 AM    POTASSIUM 4.7 04/09/2020 09:26 AM    CHLORIDE 100 04/09/2020 09:26 AM    CO2 21 04/09/2020 09:26 AM    ANION 14.0 04/09/2020 09:26 AM    GLUCOSE 140 (H) 04/09/2020 09:26 AM    BUN 19 04/09/2020 09:26 AM    CREATININE 0.71 04/09/2020 09:26 AM    CALCIUM 9.8 04/09/2020 09:26 AM    ASTSGOT 10 (L) 04/09/2020 09:26 AM    ALTSGPT 7 04/09/2020 09:26 AM    TBILIRUBIN 0.3 04/09/2020 09:26 AM    ALBUMIN 4.1 04/09/2020 09:26 AM    TOTPROTEIN 7.3 04/09/2020 09:26 AM    GLOBULIN 3.2 04/09/2020 09:26 AM    AGRATIO 1.3 04/09/2020 09:26 AM       No results found for: PROTHROMBTM, INR     Lab Results   Component Value Date/Time    WBC 7.6 04/09/2020 09:26 AM    RBC 4.25 04/09/2020 09:26 AM    HEMOGLOBIN 13.3 04/09/2020 09:26 AM    HEMATOCRIT 40.7 04/09/2020 09:26 AM    MCV 95.8 04/09/2020 09:26 AM    MCH 31.3 04/09/2020 09:26 AM    MCHC 32.7 (L) 04/09/2020 09:26 AM    MPV 11.1 04/09/2020 09:26 AM    NEUTSPOLYS 52.80 04/09/2020 09:26 AM    LYMPHOCYTES 34.80 04/09/2020 09:26 AM    MONOCYTES 10.10 04/09/2020 09:26 AM    EOSINOPHILS 1.40 04/09/2020 09:26 AM    BASOPHILS 0.40 04/09/2020 09:26 AM        Lab Results   Component Value Date/Time    HBA1C 7.0 (A) 01/12/2021 08:29 AM        Imaging: I personally reviewed following images, these are my reads:     MRI lumbar spine 05/05/2020  At L1-2, no central or foraminal  stenosis  At L2-3, no central or foraminal stenosis  At L3-4, mild disc bulge and mild ligamentum flavum thickening.  No central canal stenosis. Borderline left foraminal stenosis. Schmorl's node.   At L4-5, diffuse disc bulge with mild ligamentum flavum thickening.  No central canal stenosis.  There is facet arthropathy, left greater than right. Mild foraminal stenosis bilaterally. S/P left L4/5 hemilaminectomy  At L5-S1, there is mild-borderline foraminal stenosis with facet arthropathy and mild disc bulge.  No central canal stenosis    Xray lumbar spine 05/05/2020  There is mild decreased joint space at L3-4 and L4-5.    Facet arthropathy is noted, greatest at L5-S1 bilaterally.  No significant motion on flexion and extension films    IMAGING radiology reads. I reviewed the following radiology reads    Xray abdomen 07/17/2020  IMPRESSION:     Nonspecific bowel gas pattern. No evidence small bowel obstruction.      MRI lumbar spine 05/05/2020  IMPRESSION:     1.  Caudal lumbar facet arthropathy left worse than right with no bony destruction to indicate septic or inflammatory arthropathy. This most likely is just degenerative  2.  Mild caudal lumbar foraminal stenoses  3.  No significant central stenosis.  4.  Left L4/5 hemilaminectomy                                                                                   Xray lumbar spine 05/05/2020     IMPRESSION:     1.  No acute lumbar compression fracture or subluxation.  2.  Posterior disc space narrowing at L4-5 and to lesser extent at L3-4.  3.  Bilateral facet arthropathy at L5-S1.                                                                                             Diagnosis   Visit Diagnoses     ICD-10-CM   1. S/P lumbar laminectomy  Z98.890   2. Left lumbar radiculitis  M54.16   3. Class 1 obesity with body mass index (BMI) of 33.0 to 33.9 in adult, unspecified obesity type, unspecified whether serious comorbidity present  E66.9    Z68.33   4. Diabetes  mellitus type 2 in obese (HCC)  E11.69    E66.9   5. DDD (degenerative disc disease), lumbar  M51.36         ASSESSMENT:  Grisel Mark 65 y.o. female seen for above     Grisel was seen today for follow-up.    Diagnoses and all orders for this visit:    S/P lumbar laminectomy  -     Pain Management Screen  -     morphine ER (MS CONTIN) 15 MG Tab CR tablet; Take 1 tablet by mouth every 12 hours for 30 days.  -     HYDROcodone-acetaminophen (NORCO) 5-325 MG Tab per tablet; Take 1 tablet by mouth every four hours as needed for up to 30 days.  -     HYDROcodone-acetaminophen (NORCO) 5-325 MG Tab per tablet; Take 1 tablet by mouth every four hours as needed for up to 30 days.  -     morphine ER (MS CONTIN) 15 MG Tab CR tablet; Take 1 tablet by mouth every 12 hours for 30 days.  -     Consent for Opiate Prescription  -     Controlled Substance Treatment Agreement    Left lumbar radiculitis    Class 1 obesity with body mass index (BMI) of 33.0 to 33.9 in adult, unspecified obesity type, unspecified whether serious comorbidity present    Diabetes mellitus type 2 in obese ([facility])    DDD (degenerative disc disease), lumbar  -     morphine ER (MS CONTIN) 15 MG Tab CR tablet; Take 1 tablet by mouth every 12 hours for 30 days.  -     HYDROcodone-acetaminophen (NORCO) 5-325 MG Tab per tablet; Take 1 tablet by mouth every four hours as needed for up to 30 days.  -     HYDROcodone-acetaminophen (NORCO) 5-325 MG Tab per tablet; Take 1 tablet by mouth every four hours as needed for up to 30 days.  -     morphine ER (MS CONTIN) 15 MG Tab CR tablet; Take 1 tablet by mouth every 12 hours for 30 days.  -     Consent for Opiate Prescription  -     Controlled Substance Treatment Agreement       1. Discussed that she has improvement on symptoms after left L4 and L5 TFESI.  Some continued symptoms.  Discussed that she will continue her activities as tolerated and be aware of aggravating activities.  2. Reduce gabapentin to 600mg four a day as  tolerated.  3. Continue MS Contin 15mg po q12h and norco for breakthrough.  Advised that she decrease this use, not to exceed #45/month.  Last refill 02/22/2021.  4. Discussed possible SCS trial, but discussed that I would like her to work with Dr. Villa first and we can reassess readiness.  5. Discussed plans for future wean of clonazepam, on hold for the next few months, refilled for 90 days on 02/26/2021, discussed goal of weaning this medication, I will send my note to Bridget Nice for consideration of starting to wean.  5. Continue colase 100mg daily-bid and miralax prn for constipation.  Bowel movements are regular.  6. Continue physical therapy and home exercise program.  She is doing these daily.  7. Continue with HIP referral.  She has not scheduled this yet, but reports that she is going to try this.     Follow-up: Return in about 2 months (around 5/12/2021).    Thank you very much for asking me to participate in Grisel Mark's care.  Please contact me with any questions or concerns.    Please note that this dictation was created using voice recognition software. I have made every reasonable attempt to correct obvious errors but there may be errors of grammar and content that I may have overlooked prior to finalization of this note.      Paresh Ogden MD  Physical Medicine and Rehabilitation  Interventional Spine and Sports Physiatry  [facility]

## 2021-03-24 DIAGNOSIS — F33.1 DEPRESSION, MAJOR, RECURRENT, MODERATE (HCC): ICD-10-CM

## 2021-03-25 RX ORDER — DULOXETIN HYDROCHLORIDE 30 MG/1
90 CAPSULE, DELAYED RELEASE ORAL DAILY
Qty: 270 CAPSULE | Refills: 0 | Status: SHIPPED | OUTPATIENT
Start: 2021-03-25 | End: 2021-06-24

## 2021-03-30 RX ORDER — OMEPRAZOLE 40 MG/1
40 CAPSULE, DELAYED RELEASE ORAL DAILY
Qty: 90 CAPSULE | Refills: 0 | Status: SHIPPED | OUTPATIENT
Start: 2021-03-30 | End: 2021-07-06

## 2021-04-05 DIAGNOSIS — N95.1 VASOMOTOR SYMPTOMS DUE TO MENOPAUSE: ICD-10-CM

## 2021-04-05 NOTE — TELEPHONE ENCOUNTER
Received request via: Pharmacy    Was the patient seen in the last year in this department? Yes lov 1/12/21    Does the patient have an active prescription (recently filled or refills available) for medication(s) requested? No

## 2021-04-06 DIAGNOSIS — E11.69 DIABETES MELLITUS TYPE 2 IN OBESE: ICD-10-CM

## 2021-04-06 DIAGNOSIS — E66.9 DIABETES MELLITUS TYPE 2 IN OBESE: ICD-10-CM

## 2021-04-06 RX ORDER — EMPAGLIFLOZIN 25 MG/1
25 TABLET, FILM COATED ORAL DAILY
Qty: 100 TABLET | Refills: 0 | Status: SHIPPED | OUTPATIENT
Start: 2021-04-06 | End: 2021-07-19

## 2021-04-06 RX ORDER — ESTRADIOL 1 MG/1
1 TABLET ORAL
Qty: 90 TABLET | Refills: 0 | Status: SHIPPED | OUTPATIENT
Start: 2021-04-06 | End: 2021-07-19

## 2021-05-04 ENCOUNTER — HOSPITAL ENCOUNTER (OUTPATIENT)
Facility: MEDICAL CENTER | Age: 66
End: 2021-05-04
Attending: PHYSICAL MEDICINE & REHABILITATION
Payer: MEDICARE

## 2021-05-04 ENCOUNTER — TELEMEDICINE (OUTPATIENT)
Dept: MEDICAL GROUP | Facility: PHYSICIAN GROUP | Age: 66
End: 2021-05-04
Payer: MEDICARE

## 2021-05-04 ENCOUNTER — HOSPITAL ENCOUNTER (OUTPATIENT)
Dept: LAB | Facility: MEDICAL CENTER | Age: 66
End: 2021-05-04
Attending: PHYSICAL MEDICINE & REHABILITATION
Payer: MEDICARE

## 2021-05-04 ENCOUNTER — HOSPITAL ENCOUNTER (OUTPATIENT)
Dept: LAB | Facility: MEDICAL CENTER | Age: 66
End: 2021-05-04
Attending: NURSE PRACTITIONER
Payer: MEDICARE

## 2021-05-04 VITALS — WEIGHT: 191 LBS | HEIGHT: 63 IN | BODY MASS INDEX: 33.84 KG/M2

## 2021-05-04 DIAGNOSIS — E78.2 MIXED HYPERLIPIDEMIA: ICD-10-CM

## 2021-05-04 DIAGNOSIS — I10 ESSENTIAL HYPERTENSION: ICD-10-CM

## 2021-05-04 DIAGNOSIS — R10.9 FLANK PAIN: ICD-10-CM

## 2021-05-04 DIAGNOSIS — R53.82 CHRONIC FATIGUE: ICD-10-CM

## 2021-05-04 DIAGNOSIS — Z79.899 MEDICATION MANAGEMENT: ICD-10-CM

## 2021-05-04 DIAGNOSIS — F33.41 RECURRENT MAJOR DEPRESSIVE DISORDER, IN PARTIAL REMISSION (HCC): ICD-10-CM

## 2021-05-04 DIAGNOSIS — N39.46 MIXED STRESS AND URGE URINARY INCONTINENCE: ICD-10-CM

## 2021-05-04 DIAGNOSIS — R79.0 LOW MAGNESIUM LEVEL: ICD-10-CM

## 2021-05-04 DIAGNOSIS — E66.9 DIABETES MELLITUS TYPE 2 IN OBESE: ICD-10-CM

## 2021-05-04 DIAGNOSIS — E03.9 HYPOTHYROIDISM (ACQUIRED): ICD-10-CM

## 2021-05-04 DIAGNOSIS — E11.69 DIABETES MELLITUS TYPE 2 IN OBESE: ICD-10-CM

## 2021-05-04 LAB
ALBUMIN SERPL BCP-MCNC: 3.8 G/DL (ref 3.2–4.9)
ALBUMIN/GLOB SERPL: 1.5 G/DL
ALP SERPL-CCNC: 72 U/L (ref 30–99)
ALT SERPL-CCNC: 29 U/L (ref 2–50)
ANION GAP SERPL CALC-SCNC: 10 MMOL/L (ref 7–16)
APPEARANCE UR: NORMAL
AST SERPL-CCNC: 25 U/L (ref 12–45)
BASOPHILS # BLD AUTO: 0.5 % (ref 0–1.8)
BASOPHILS # BLD: 0.04 K/UL (ref 0–0.12)
BILIRUB SERPL-MCNC: 0.4 MG/DL (ref 0.1–1.5)
BILIRUB UR QL STRIP.AUTO: NORMAL
BUN SERPL-MCNC: 7 MG/DL (ref 8–22)
CALCIUM SERPL-MCNC: 8.8 MG/DL (ref 8.5–10.5)
CHLORIDE SERPL-SCNC: 105 MMOL/L (ref 96–112)
CHOLEST SERPL-MCNC: 112 MG/DL (ref 100–199)
CO2 SERPL-SCNC: 26 MMOL/L (ref 20–33)
COLOR UR: NORMAL
CREAT SERPL-MCNC: 0.58 MG/DL (ref 0.5–1.4)
CREAT UR-MCNC: 62.21 MG/DL
EOSINOPHIL # BLD AUTO: 0.28 K/UL (ref 0–0.51)
EOSINOPHIL NFR BLD: 3.7 % (ref 0–6.9)
ERYTHROCYTE [DISTWIDTH] IN BLOOD BY AUTOMATED COUNT: 44.7 FL (ref 35.9–50)
EST. AVERAGE GLUCOSE BLD GHB EST-MCNC: 163 MG/DL
FASTING STATUS PATIENT QL REPORTED: NORMAL
GLOBULIN SER CALC-MCNC: 2.6 G/DL (ref 1.9–3.5)
GLUCOSE SERPL-MCNC: 134 MG/DL (ref 65–99)
GLUCOSE UR STRIP.AUTO-MCNC: NORMAL MG/DL
HBA1C MFR BLD: 7.3 % (ref 4–5.6)
HCT VFR BLD AUTO: 43.8 % (ref 37–47)
HDLC SERPL-MCNC: 42 MG/DL
HGB BLD-MCNC: 14.6 G/DL (ref 12–16)
IMM GRANULOCYTES # BLD AUTO: 0.01 K/UL (ref 0–0.11)
IMM GRANULOCYTES NFR BLD AUTO: 0.1 % (ref 0–0.9)
KETONES UR STRIP.AUTO-MCNC: NORMAL MG/DL
LDLC SERPL CALC-MCNC: 45 MG/DL
LEUKOCYTE ESTERASE UR QL STRIP.AUTO: NORMAL
LYMPHOCYTES # BLD AUTO: 2.99 K/UL (ref 1–4.8)
LYMPHOCYTES NFR BLD: 39.8 % (ref 22–41)
MAGNESIUM SERPL-MCNC: 1.7 MG/DL (ref 1.5–2.5)
MCH RBC QN AUTO: 30.9 PG (ref 27–33)
MCHC RBC AUTO-ENTMCNC: 33.3 G/DL (ref 33.6–35)
MCV RBC AUTO: 92.6 FL (ref 81.4–97.8)
MICRO URNS: NORMAL
MICROALBUMIN UR-MCNC: 6.8 MG/DL
MICROALBUMIN/CREAT UR: 109 MG/G (ref 0–30)
MONOCYTES # BLD AUTO: 0.7 K/UL (ref 0–0.85)
MONOCYTES NFR BLD AUTO: 9.3 % (ref 0–13.4)
NEUTROPHILS # BLD AUTO: 3.49 K/UL (ref 2–7.15)
NEUTROPHILS NFR BLD: 46.6 % (ref 44–72)
NITRITE UR QL STRIP.AUTO: NORMAL
NRBC # BLD AUTO: 0 K/UL
NRBC BLD-RTO: 0 /100 WBC
PH UR STRIP.AUTO: NORMAL [PH] (ref 5–8)
PLATELET # BLD AUTO: 188 K/UL (ref 164–446)
PMV BLD AUTO: 10.6 FL (ref 9–12.9)
POTASSIUM SERPL-SCNC: 4 MMOL/L (ref 3.6–5.5)
PROT SERPL-MCNC: 6.4 G/DL (ref 6–8.2)
PROT UR QL STRIP: NORMAL MG/DL
RBC # BLD AUTO: 4.73 M/UL (ref 4.2–5.4)
RBC UR QL AUTO: NORMAL
SODIUM SERPL-SCNC: 141 MMOL/L (ref 135–145)
SP GR UR STRIP.AUTO: NORMAL
TRIGL SERPL-MCNC: 123 MG/DL (ref 0–149)
TSH SERPL DL<=0.005 MIU/L-ACNC: 1.34 UIU/ML (ref 0.38–5.33)
UROBILINOGEN UR STRIP.AUTO-MCNC: NORMAL MG/DL
VIT B12 SERPL-MCNC: 835 PG/ML (ref 211–911)
WBC # BLD AUTO: 7.5 K/UL (ref 4.8–10.8)

## 2021-05-04 PROCEDURE — 99214 OFFICE O/P EST MOD 30 MIN: CPT | Mod: CR | Performed by: NURSE PRACTITIONER

## 2021-05-04 PROCEDURE — 80053 COMPREHEN METABOLIC PANEL: CPT

## 2021-05-04 PROCEDURE — 83036 HEMOGLOBIN GLYCOSYLATED A1C: CPT

## 2021-05-04 PROCEDURE — 80307 DRUG TEST PRSMV CHEM ANLYZR: CPT

## 2021-05-04 PROCEDURE — 85025 COMPLETE CBC W/AUTO DIFF WBC: CPT

## 2021-05-04 PROCEDURE — 84443 ASSAY THYROID STIM HORMONE: CPT

## 2021-05-04 PROCEDURE — 36415 COLL VENOUS BLD VENIPUNCTURE: CPT

## 2021-05-04 PROCEDURE — 83735 ASSAY OF MAGNESIUM: CPT

## 2021-05-04 PROCEDURE — 82043 UR ALBUMIN QUANTITATIVE: CPT

## 2021-05-04 PROCEDURE — 82306 VITAMIN D 25 HYDROXY: CPT

## 2021-05-04 PROCEDURE — 81003 URINALYSIS AUTO W/O SCOPE: CPT

## 2021-05-04 PROCEDURE — 80061 LIPID PANEL: CPT

## 2021-05-04 PROCEDURE — 82570 ASSAY OF URINE CREATININE: CPT

## 2021-05-04 PROCEDURE — 82607 VITAMIN B-12: CPT

## 2021-05-04 RX ORDER — BREXPIPRAZOLE 1 MG/1
2 TABLET ORAL DAILY
Qty: 180 TABLET | Refills: 3 | Status: SHIPPED | OUTPATIENT
Start: 2021-05-04 | End: 2021-05-07

## 2021-05-04 ASSESSMENT — FIBROSIS 4 INDEX: FIB4 SCORE: 0.95

## 2021-05-04 NOTE — PROGRESS NOTES
Telemedicine: Established Patient   This evaluation was conducted via Zoom using secure and encrypted videoconferencing technology. The patient was in a private location in the state of [place].    The patient's identity was confirmed and verbal consent was obtained for this virtual visit.    Subjective:   CC:   Chief Complaint   Patient presents with   • Medication Refill       Grisel Mark is a 66 y.o. female presenting for evaluation and management of:    Depression/anxiety: Chronic and uncontrolled.  Patient feels like her depression has plateaued and is no longer improving.  Feels anhedonia neck and unmotivated more days than not.  Has a very good support system in place, talks frequently with her children and family.    Still struggles with anxiety, but hopeful to taper her benzodiazepine.  Tolerating current regimen well without significant side effects such as oversedation, altered mental status, suicidal ideation.    Urinary frequency: Patient reports mixed urge and stress incontinence, wearing pads frequently.  States this has been a gradual issue but feels like it is progressively worsening.  She is interested in identifying possible causes as well as potential treatment modalities.  Denies any malodorous urine, hematuria, pain with urination.      Review of Systems   Constitutional: Negative for chills and diaphoresis.   HENT: Negative for congestion and sore throat.    Eyes: Negative for photophobia.   Respiratory: Negative for cough and shortness of breath.    Cardiovascular: Negative for chest pain and claudication.   Gastrointestinal: Negative for blood in stool and constipation.   Musculoskeletal: Positive for back pain, joint pain, myalgias and neck pain.   Skin: Negative for rash.   Neurological: Negative for dizziness, focal weakness and loss of consciousness.   Endo/Heme/Allergies: Does not bruise/bleed easily.   Psychiatric/Behavioral: Positive for depression. Negative for hallucinations,  memory loss, substance abuse and suicidal ideas. The patient is not nervous/anxious.          No Known Allergies    Current medicines (including changes today)  Current Outpatient Medications   Medication Sig Dispense Refill   • Brexpiprazole 2 MG Tab Take 2 mg by mouth every day. 90 tablet 3   • estradiol (ESTRACE) 1 MG Tab Take 1 tablet by mouth every day. 90 tablet 0   • Empagliflozin (JARDIANCE) 25 MG Tab Take 25 mg by mouth every day. 100 tablet 0   • omeprazole (PRILOSEC) 40 MG delayed-release capsule Take 1 capsule by mouth every day. 90 capsule 0   • DULoxetine (CYMBALTA) 30 MG Cap DR Particles Take 3 Capsules by mouth every day. 270 capsule 0   • HYDROcodone-acetaminophen (NORCO) 5-325 MG Tab per tablet Take 1 tablet by mouth every four hours as needed for up to 30 days. 45 tablet 0   • morphine ER (MS CONTIN) 15 MG Tab CR tablet Take 1 tablet by mouth every 12 hours for 30 days. 60 tablet 0   • metoprolol tartrate (LOPRESSOR) 25 MG Tab TAKE 1 TABLET BY MOUTH TWICE DAILY 180 tablet 0   • lisinopril (PRINIVIL) 10 MG Tab Take 1 tablet by mouth every day. 100 tablet 0   • levothyroxine (SYNTHROID) 50 MCG Tab Take 1 tablet by mouth Every morning on an empty stomach. 90 tablet 1   • rosuvastatin (CRESTOR) 10 MG Tab Take 1 tablet by mouth every evening. 100 tablet 1   • clonazePAM (KLONOPIN) 1 MG Tab Take 1 tablet by mouth 2 times a day for 90 days. 180 tablet 0   • cyanocobalamin (VITAMIN B-12) 500 MCG Tab Take 500 mcg by mouth every day.     • metformin (GLUCOPHAGE) 1000 MG tablet Take 1 Tab by mouth 2 times a day, with meals. 200 Tab 0   • hydrOXYzine HCl (ATARAX) 25 MG Tab Take 1-2 Tabs by mouth at bedtime as needed for Itching or Anxiety. 60 Tab 11   • busPIRone (BUSPAR) 10 MG Tab tablet Take 1 Tab by mouth 3 times a day. 270 Tab 3   • Blood Glucose Test Strips Use one Abbott Freedom Lite strip to test blood sugar twice daily and PRN. 270 Each 3   • Lancets Use one Abbott deskwolf Lite lancet to test blood  sugar twice daily and PRN. 300 Each 3   • Alcohol Swabs Wipe site with prep pad prior to injection. 100 Each 3   • Naloxone (NARCAN) 4 MG/0.1ML Liquid One spray in one nostril for overdose and call 911. 1 Each 0   • Diclofenac Sodium 1 % Gel Apply 1 g to skin as directed 2 times a day as needed (joint pain). 150 g 2   • fluconazole (DIFLUCAN) 150 MG tablet Take 1 Tab by mouth every 72 hours. 2 Tab 0   • Blood Glucose Meter Kit Test blood sugar as recommended by provider. Abbott Freestyle Lite blood glucose monitoring kit. 1 Kit 0   • diclofenac EC (VOLTAREN) 75 MG Tablet Delayed Response Take 75 mg by mouth 2 times a day.     • gabapentin (NEURONTIN) 600 MG tablet Take 600 mg by mouth.       No current facility-administered medications for this visit.       Patient Active Problem List    Diagnosis Date Noted   • Generalized anxiety disorder 05/12/2020   • Posttraumatic stress disorder, delayed onset 05/12/2020   • Essential hypertension 02/05/2020   • Mixed hyperlipidemia 02/05/2020   • Diabetes mellitus type 2 in obese (HCC) 02/05/2020   • Hypothyroidism (acquired) 02/05/2020   • Depression, major, recurrent, moderate (HCC) 02/05/2020   • Gastroesophageal reflux disease without esophagitis 02/05/2020   • Anxiety 02/05/2020       Family History   Problem Relation Age of Onset   • Cancer Mother    • Stroke Father         Heart attack   • Dementia Sister    • Stroke Brother         heart Attack   • Cancer Brother         Skin   • Diabetes Brother    • Kidney Disease Brother    • Cancer Brother    • Parkinson's Disease Sister    • No Known Problems Sister    • Diabetes Daughter    • Hypertension Daughter        She  has a past medical history of Anxiety, Chronic back pain, Constipation, Depression, Diabetes (HCC), GERD (gastroesophageal reflux disease), Heart murmur, Hyperlipidemia, Hypertension, and Thyroid disease.  She  has a past surgical history that includes cholecystectomy; carpal tunnel release; abdominal  "hysterectomy total; pr njx aa&/strd tfrml epi lumbar/sacral 1 level (Left, 8/12/2020); pr njx aa&/strd tfrml epi lumbar/sacral ea addl (Left, 8/12/2020); lumbar medial branch blocks (Left, 9/9/2020); laminotomy (1998); and block epidural steroid injection (Left, 2/24/2021).       Objective:   Ht 1.6 m (5' 3\")   Wt 86.6 kg (191 lb)   LMP  (LMP Unknown)   BMI 33.83 kg/m²     Physical Exam   Constitutional: She is oriented to person, place, and time and well-developed, well-nourished, and in no distress. No distress.   HENT:   Head: Normocephalic and atraumatic.   Right Ear: External ear normal.   Left Ear: External ear normal.   Eyes: Conjunctivae and EOM are normal. Right eye exhibits no discharge. Left eye exhibits no discharge.   Pulmonary/Chest: Effort normal. No respiratory distress.   Abdominal: She exhibits no distension.   Musculoskeletal:         General: No edema. Normal range of motion.      Cervical back: Normal range of motion.   Neurological: She is alert and oriented to person, place, and time.   Skin: No rash noted. She is not diaphoretic.   Psychiatric: Mood, memory, affect and judgment normal.       Assessment and Plan:   The following treatment plan was discussed:     1. Mixed stress and urge urinary incontinence: Discussed benefit of multimodal approach regarding possible nerve stimulation, medication, pelvic floor physical therapy, biofeedback.  Referral placed to urogynecology specialty.  - REFERRAL TO GYNECOLOGY    2. Recurrent major depressive disorder, in partial remission (HCC): Increase Rexulti to 2 mg daily.  Continue to taper benzodiazepine.  - Brexpiprazole 2 MG Tab; Take 2 mg by mouth every day.  Dispense: 90 tablet; Refill: 3    Other orders  - gabapentin (NEURONTIN) 600 MG tablet; Take 600 mg by mouth.    In accordance with [place] Bill 474, this patient complies with all of the following:    -I have made a good juanito effort to review previous and current medical records    -Physical " and psychological exam is been done including risk of abuse, mental health assessment.    -Current treatment plan available in patient's chart    -Alternative treatment options have been discussed, attempted, and found to be insufficient.    Potential risks and benefits reviewed at length including but not limited to risk of dependency, addiction, overdose    -Controlled substance agreement reviewed and completed along with a urine drug screen yearly.    -Discussed common side effects and mitigation strategies of them.    -PDMP reviewed    -Advised to avoid use of any other sedative agents concurrently with controlled substances including prescriptions, alcohol, marijuana.        Follow-up: Return in about 3 months (around 8/4/2021).

## 2021-05-04 NOTE — PROGRESS NOTES
Annual Health Assessment Questions:    1.  Are you currently engaging in any exercise or physical activity? No    2.  How would you describe your mood or emotional well-being today? good    3.  Have you had any falls in the last year? No    4.  Have you noticed any problems with your balance or had difficulty walking? No    5.  In the last six months have you experienced any leakage of urine? Yes    6. DPA/Advanced Directive: Patient has Advanced Directive on file.

## 2021-05-06 LAB — 25(OH)D3 SERPL-MCNC: 27 NG/ML (ref 30–80)

## 2021-05-07 ENCOUNTER — TELEPHONE (OUTPATIENT)
Dept: MEDICAL GROUP | Facility: PHYSICIAN GROUP | Age: 66
End: 2021-05-07

## 2021-05-07 RX ORDER — GABAPENTIN 600 MG/1
600 TABLET ORAL
COMMUNITY
Start: 2021-04-25 | End: 2021-06-24 | Stop reason: SDUPTHER

## 2021-05-07 ASSESSMENT — ENCOUNTER SYMPTOMS
SORE THROAT: 0
CHILLS: 0
BLOOD IN STOOL: 0
DIAPHORESIS: 0
BACK PAIN: 1
HALLUCINATIONS: 0
FOCAL WEAKNESS: 0
CLAUDICATION: 0
CONSTIPATION: 0
NECK PAIN: 1
NERVOUS/ANXIOUS: 0
COUGH: 0
MEMORY LOSS: 0
LOSS OF CONSCIOUSNESS: 0
PHOTOPHOBIA: 0
BRUISES/BLEEDS EASILY: 0
DIZZINESS: 0
DEPRESSION: 1
SHORTNESS OF BREATH: 0
MYALGIAS: 1

## 2021-05-07 ASSESSMENT — LIFESTYLE VARIABLES: SUBSTANCE_ABUSE: 0

## 2021-05-07 NOTE — TELEPHONE ENCOUNTER
Sofia Gill  Her insurance wont cover the Rexulti 1 mg bid but they will cover if its 2 mg once a day.  Please advise

## 2021-05-08 LAB
1OH-MIDAZOLAM UR QL SCN: NOT DETECTED
6MAM UR QL: NOT DETECTED
7AMINOCLONAZEPAM UR QL: PRESENT
A-OH ALPRAZ UR QL: NOT DETECTED
ALPRAZ UR QL: NOT DETECTED
AMPHET UR QL SCN: NOT DETECTED
ANNOTATION COMMENT IMP: NORMAL
ANNOTATION COMMENT IMP: NORMAL
BARBITURATES UR QL: NOT DETECTED
BUPRENORPHINE UR QL: NOT DETECTED
BZE UR QL: NOT DETECTED
CARBOXYTHC UR QL: NOT DETECTED
CARISOPRODOL UR QL: NOT DETECTED
CLONAZEPAM UR QL: NOT DETECTED
CODEINE UR QL: NOT DETECTED
DIAZEPAM UR QL: NOT DETECTED
ETHYL GLUCURONIDE UR QL: NOT DETECTED
FENTANYL UR QL: NOT DETECTED
GABAPENTIN UR QL: PRESENT
HYDROCODONE UR QL: PRESENT
HYDROMORPHONE UR QL: PRESENT
LORAZEPAM UR QL: NOT DETECTED
MDA UR QL: NOT DETECTED
MDEA UR QL: NOT DETECTED
MDMA UR QL: NOT DETECTED
MEPERIDINE UR QL: NOT DETECTED
METHADONE UR QL: NOT DETECTED
METHAMPHET UR QL: NOT DETECTED
MIDAZOLAM UR QL SCN: NOT DETECTED
MORPHINE UR QL: PRESENT
NALOXONE UR QL SCN: NOT DETECTED
NORBUPRENORPHINE UR QL CFM: NOT DETECTED
NORDIAZEPAM UR QL: NOT DETECTED
NORFENTANYL UR QL: NOT DETECTED
NORHYDROCODONE UR QL CFM: PRESENT
NOROXYCODONE UR QL CFM: NOT DETECTED
NOROXYMORPH CO100 Q0458: NOT DETECTED
OXAZEPAM UR QL: NOT DETECTED
OXYCODONE UR QL: NOT DETECTED
OXYMORPHONE UR QL: NOT DETECTED
PATHOLOGY STUDY: NORMAL
PCP UR QL: NOT DETECTED
PHENTERMINE UR QL: NOT DETECTED
PPAA UR QL: NOT DETECTED
PREGABALIN UR QL SCN: NOT DETECTED
SERVICE CMNT-IMP: NORMAL
TAPENADOL OSULF CO200 Q0473: NOT DETECTED
TAPENTADOL UR QL SCN: NOT DETECTED
TEMAZEPAM UR QL: NOT DETECTED
TRAMADOL UR QL: NOT DETECTED
ZOLPIDEM PHENYL-4-CARB UR QL SCN: NOT DETECTED
ZOLPIDEM UR QL: NOT DETECTED

## 2021-05-13 ENCOUNTER — OFFICE VISIT (OUTPATIENT)
Dept: PHYSICAL MEDICINE AND REHAB | Facility: MEDICAL CENTER | Age: 66
End: 2021-05-13
Payer: MEDICARE

## 2021-05-13 VITALS
SYSTOLIC BLOOD PRESSURE: 130 MMHG | OXYGEN SATURATION: 94 % | HEIGHT: 63 IN | HEART RATE: 89 BPM | DIASTOLIC BLOOD PRESSURE: 72 MMHG | TEMPERATURE: 97.1 F | BODY MASS INDEX: 33.87 KG/M2 | WEIGHT: 191.14 LBS

## 2021-05-13 DIAGNOSIS — E66.9 CLASS 1 OBESITY WITH BODY MASS INDEX (BMI) OF 33.0 TO 33.9 IN ADULT, UNSPECIFIED OBESITY TYPE, UNSPECIFIED WHETHER SERIOUS COMORBIDITY PRESENT: ICD-10-CM

## 2021-05-13 DIAGNOSIS — Z98.890 S/P LUMBAR LAMINECTOMY: ICD-10-CM

## 2021-05-13 DIAGNOSIS — M25.562 LEFT KNEE PAIN, UNSPECIFIED CHRONICITY: ICD-10-CM

## 2021-05-13 DIAGNOSIS — M54.16 LEFT LUMBAR RADICULITIS: ICD-10-CM

## 2021-05-13 DIAGNOSIS — M51.36 DDD (DEGENERATIVE DISC DISEASE), LUMBAR: ICD-10-CM

## 2021-05-13 PROCEDURE — 99214 OFFICE O/P EST MOD 30 MIN: CPT | Performed by: PHYSICAL MEDICINE & REHABILITATION

## 2021-05-13 RX ORDER — MORPHINE SULFATE 15 MG/1
15 TABLET, FILM COATED, EXTENDED RELEASE ORAL EVERY 12 HOURS
Qty: 60 TABLET | Refills: 0 | Status: SHIPPED | OUTPATIENT
Start: 2021-05-23 | End: 2021-06-22

## 2021-05-13 RX ORDER — HYDROCODONE BITARTRATE AND ACETAMINOPHEN 5; 325 MG/1; MG/1
1 TABLET ORAL EVERY 4 HOURS PRN
Qty: 60 TABLET | Refills: 0 | Status: SHIPPED | OUTPATIENT
Start: 2021-05-13 | End: 2021-06-12

## 2021-05-13 ASSESSMENT — PATIENT HEALTH QUESTIONNAIRE - PHQ9
5. POOR APPETITE OR OVEREATING: 2 - MORE THAN HALF THE DAYS
SUM OF ALL RESPONSES TO PHQ QUESTIONS 1-9: 9
CLINICAL INTERPRETATION OF PHQ2 SCORE: 2

## 2021-05-13 ASSESSMENT — FIBROSIS 4 INDEX: FIB4 SCORE: 1.63

## 2021-05-13 ASSESSMENT — PAIN SCALES - GENERAL: PAINLEVEL: 8=MODERATE-SEVERE PAIN

## 2021-05-13 NOTE — PATIENT INSTRUCTIONS
1. Psychology     OSEAS SUMMERS   [id number]  [facility] [address]  [phone]    2. Contact AKILAH Bell to make dietician appointment     Your procedure will be at the [facility] special procedure suite.    [address] [facility] [place] [address]       PRE-PROCEDURE INSTRUCTIONS  You may take your regular medications except:   · No Anti-inflammatories 5 days prior to your procedure. Anti-inflammatories include medicines such as  ibuprofen (Motrin, Advil), Excedrin, Naproxen (Aleve, Anaprox, Naprelan, Naprosyn), Celecoxib (Celebrex), Diclofenac (Voltaren-XR tab), and Meloxicam (Mobic).   · No Glucophage or Metformin 24 hours before your procedure. You may resume next day after your procedure.  · Call the physiatry office if you are taking or prescribed anti-biotics within five days of procedure.  · Please ask provider if you are taking any new diabetes medication.  · CONTINUE TAKING BLOOD PRESSURE MEDICATIONS AS PRESCRIBED.  · Pain medications will not be prescribed on the procedure day. Procedural pain medication may be used by your provider   · Call your doctor's office performing the procedure if you have a fever, chills, rash or new illness prior to your procedure    Anticoagulation/antiplatelet medications  No Blood thinning medications such as Coumadin or Plavix 5 days prior to procedure unless your doctor said to continue these medications. Call your doctor if a new medication is prescribed in this class.     Restrictions for eating before procedure:   · If you are getting procedural sedation, then do not eat to for 8 hours prior to procedure appointment time. Do not drink fluids for four hours prior to your procedure time.   · If you are not having procedural sedation, then Skip the meal prior to your procedure. If you have a morning procedure then skip breakfast. If you have an afternoon procedure then skip lunch.   · You may drink clear liquids up to 2 hours prior to your  procedure  · You must have a  the day of procedure to accompany you home.      POST PROCEDURE INSTRUCTIONS   · No heavy lifting, strenuous bending or strenuous exercise for 3 days after your procedure.  · No hot tubs, baths, swimming for 3 days after your procedure  · You can remove the bandage the day after the procedure.  · IF YOU RECEIVED A STEROID INJECTION. PLEASE NOTE THAT THERE MAY BE A DELAY FOR THE INJECTION TO START WORKING, THE DELAY MAY BE UP TO TWO WEEKS. IF YOU HAVE DIABETES, PLEASE NOTE THAT YOUR SUGAR LEVELS MAY BE ELEVATED FOR 1-2 DAYS AFTER A STEROID INJECTION.  THE STEROID MAY CAUSE TEMPORARY SYMPTOMS WHICH USUALLY RESOLVE ON THEIR OWN WITHIN 1 TO 2 DAYS INCLUDING FACIAL FLUSHING OR A FEELING OF WARMTH ON THE FACE, TEMPORARY INCREASES IN BLOOD SUGAR, INSOMNIA, INCREASED HUNGER  · IF YOU RECEIVED A DIAGNOSTIC PROCEDURE (SUCH AS A MEDIAL BRANCH BLOCK), PLEASE NOTE THAT WE DO EXPECT THIS INJECTION TO WEAR OFF.  IT IS IMPORTANT TO COMPLETE THE PAIN DIARY AND LIST THE PAIN SCORE ONLY FOR THE REGION WHERE THE PROCEDURE WAS AND BRING THIS TO YOUR FOLLOW UP VISIT.  · IF YOU RECEIVED A RADIOFREQUENCY PROCEDURE, THERE MAY BE SOME SORENESS AFTER THE PROCEDURE.  THIS IS NORMAL.  · IF YOU EXPERIENCE PROLONGED WEAKNESS LONGER THAN ONE DAY, BOWEL OR BLADDER INCONTINENCE THEN PLEASE CALL THE PHYSIATRY OFFICE.  · Your leg may feel heavy, weak and numb for up to 1-2 days. Be very careful walking.   ·  You may resume normal activities 3 days after procedure.

## 2021-05-13 NOTE — PROGRESS NOTES
Follow-up patient note    Physiatry (physical medicine and  Rehabilitation), interventional spine and sports medicine    Date of Service: 05/13/2021    Chief complaint:   Chief Complaint   Patient presents with   • Follow-Up     Hip and foot pain       HISTORY    HPI: Grisel Mark 66 y.o. female who presents today for follow-up evaluation of low back and leg pain.     Since the last visit, Grisel continues to have pain in the low back and left leg.  This is about 8/10 on the NRS.  She has not scheduled more follow-up with Dr. Villa yet.  She is going to follow-up with him.  He felt that some counseling would be beneficial prior to procedure.    Pain radiating into the left leg and this burns into the ankle.  Previously, had improvement from injection on 02/24/2021 left L4 and L5 TFESI, but pain has been returning.  Doing chores and changing bed sheets made this worse and pain persists.    She is back to taking five gabapentin again.  Her PCP has not made changes to clonazepam with her PCP.  Bowel movements have been regular without stool softener    Her pain medications include: Duloxetine 90mg.  MS Contin 15mg q12h.  Norco 5/325 for breakthrough.  No side effects.    By history:  Her laminectomy was in 1998.  Previous injection on left L4 sand L5 TFESI on 08/12/2020 helped with her leg pain.     Medical records review:  Dr. Francisco Olmos, plan to increase duloxetine 90mg daily, discontinue buspirone 20mg po bid, start buproprion XL 150mg daily, continue brexipiprazole 1mg qhs, hydroxyzine 25mg po tid prn, clonazepam 0.5mg daily prn    Review of records   Dr. Dheeraj De La Rosa:  08/20/2019 Right L3-4, L4-5, L5-S1 radiofrequency ablation    I reviewed the note from the referring provider Peter Bruce * dated 02/05/2020: She was seen to establish care, having just moved from [place].  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  Medications associated with the above were written, related labs ordered including CBC, CMP, Hb A1c, lipids, microalbumin, TSH, free thryoxine, referral to ps\ychiatry, referral to GI for colonoscopy and referral to pain clinic.    Previous treatments:    Physical Therapy: Yes    Medications the patient is tried: Narcotics, gabapentin and muscle relaxers    Previous interventions: [facility] these included right lumbar radiofrequency procedures    Previous surgeries to relieve the above pain:  Surgery on October 28, 1998      ROS:   ENT: ear infection, treated with augmentin  CV: irregular heart, reports history of tachycardia  Psych: depression since 1995  Heme: anemia  Endo: hypothyroidism, diabetes    Red Flags ROS:   Fever, Chills, Sweats: Denies  Involuntary Weight Loss: Denies  Bladder Incontinence: Denies  Bowel Incontinence: Denies  Saddle Anesthesia: Denies    All other systems reviewed and negative.       PMHx:   Past Medical History:   Diagnosis Date   • Anxiety    • Chronic back pain    • Constipation    • Depression    • Diabetes (HCC)    • GERD (gastroesophageal reflux disease)    • Heart murmur     tachycardia   • Hyperlipidemia    • Hypertension    • Thyroid disease        PSHx:   Past Surgical History:   Procedure Laterality Date   • BLOCK EPIDURAL STEROID INJECTION Left 2/24/2021    Procedure: INJECTION, STEROID, EPIDURAL;  Surgeon: Paresh Ogden M.D.;  [facility];  Service: Pain Management   • LUMBAR MEDIAL BRANCH BLOCKS Left 9/9/2020    Procedure: BLOCK, NERVE, SPINAL, LUMBAR, POSTERIOR RAMUS, MEDIAL BRANCH;  Surgeon: Paresh Ogden M.D.;  Location: [facility];  Service: Pain Management   • OR NJX AA&/STRD TFRML EPI LUMBAR/SACRAL 1 LEVEL Left 8/12/2020    Procedure: INJECTION, SPINE, LUMBOSACRAL, EPIDURAL, 1 LEVEL,  TRANSFORAMINAL APPROACH;  Surgeon: Paresh Ogden M.D.;  Location: [facility];  Service: Pain Management   • TN NJX AA&/STRD TFRML EPI LUMBAR/SACRAL EA ADDL Left 8/12/2020    Procedure: INJECTION, SPINE, LUMBOSACRAL, EPIDURAL, TRANSFORAMINAL APPROACH;  Surgeon: Paresh Ogden M.D.;  Location: [facility];  Service: Pain Management   • LAMINOTOMY  1998   • ABDOMINAL HYSTERECTOMY TOTAL     • CARPAL TUNNEL RELEASE     • CHOLECYSTECTOMY         Family history   Family History   Problem Relation Age of Onset   • Cancer Mother    • Stroke Father         Heart attack   • Dementia Sister    • Stroke Brother         heart Attack   • Cancer Brother         Skin   • Diabetes Brother    • Kidney Disease Brother    • Cancer Brother    • Parkinson's Disease Sister    • No Known Problems Sister    • Diabetes Daughter    • Hypertension Daughter          Medications:   Current Outpatient Medications   Medication   • HYDROcodone-acetaminophen (NORCO) 5-325 MG Tab per tablet   • [START ON 5/23/2021] morphine ER (MS CONTIN) 15 MG Tab CR tablet   • Brexpiprazole 2 MG Tab   • gabapentin (NEURONTIN) 600 MG tablet   • estradiol (ESTRACE) 1 MG Tab   • Empagliflozin (JARDIANCE) 25 MG Tab   • omeprazole (PRILOSEC) 40 MG delayed-release capsule   • DULoxetine (CYMBALTA) 30 MG Cap DR Particles   • metoprolol tartrate (LOPRESSOR) 25 MG Tab   • lisinopril (PRINIVIL) 10 MG Tab   • levothyroxine (SYNTHROID) 50 MCG Tab   • rosuvastatin (CRESTOR) 10 MG Tab   • clonazePAM (KLONOPIN) 1 MG Tab   • cyanocobalamin (VITAMIN B-12) 500 MCG Tab   • metformin (GLUCOPHAGE) 1000 MG tablet   • hydrOXYzine HCl (ATARAX) 25 MG Tab   • busPIRone (BUSPAR) 10 MG Tab tablet   • Naloxone (NARCAN) 4 MG/0.1ML Liquid   • Diclofenac Sodium 1 % Gel   • fluconazole (DIFLUCAN) 150 MG tablet   • diclofenac EC (VOLTAREN) 75 MG Tablet Delayed Response   • Blood Glucose Test Strips   • Lancets   • Alcohol Swabs   • Blood Glucose Meter Kit      No current facility-administered medications for this visit.       Allergies:   No Known Allergies    Social Hx:   Social History     Socioeconomic History   • Marital status:      Spouse name: Not on file   • Number of children: Not on file   • Years of education: Not on file   • Highest education level: Not on file   Occupational History   • Not on file   Tobacco Use   • Smoking status: Former Smoker     Packs/day: 0.25     Types: Cigarettes   • Smokeless tobacco: Never Used   Vaping Use   • Vaping Use: Never used   Substance and Sexual Activity   • Alcohol use: Not Currently     Comment: rare   • Drug use: Never   • Sexual activity: Not Currently   Other Topics Concern   •  Service No   • Blood Transfusions Yes   • Caffeine Concern No   • Occupational Exposure No   • Hobby Hazards No   • Sleep Concern Yes   • Stress Concern Yes   • Weight Concern Yes   • Special Diet No   • Back Care No   • Exercise Yes   • Bike Helmet No   • Seat Belt Yes   • Self-Exams Yes   Social History Narrative   • Not on file     Social Determinants of Health     Financial Resource Strain:    • Difficulty of Paying Living Expenses:    Food Insecurity:    • Worried About Running Out of Food in the Last Year:    • Ran Out of Food in the Last Year:    Transportation Needs:    • Lack of Transportation (Medical):    • Lack of Transportation (Non-Medical):    Physical Activity:    • Days of Exercise per Week:    • Minutes of Exercise per Session:    Stress:    • Feeling of Stress :    Social Connections:    • Frequency of Communication with Friends and Family:    • Frequency of Social Gatherings with Friends and Family:    • Attends Confucianist Services:    • Active Member of Clubs or Organizations:    • Attends Club or Organization Meetings:    • Marital Status:    Intimate Partner Violence:    • Fear of Current or Ex-Partner:    • Emotionally Abused:    • Physically Abused:    • Sexually Abused:          EXAMINATION  "    Physical Exam:   Vitals: /72 (BP Location: Right arm, Patient Position: Sitting, BP Cuff Size: Small adult)   Pulse 89   Temp 36.2 °C (97.1 °F) (Temporal)   Ht 1.6 m (5' 3\")   Wt 86.7 kg (191 lb 2.2 oz)   SpO2 94%     Constitutional:   Body Habitus: Body mass index is 33.86 kg/m².  Cooperation: Fully cooperates with exam  Appearance: Well-groomed, well-nourished, not disheveled, no acute distress    Eyes: No scleral icterus, no proptosis     ENT -no obvious auditory deficits, wearing a facial mask    Skin -no rashes or lesions noted, no warmth or erythema near injection sites    Respiratory-  breathing comfortable on room air,  no audible wheezing    Cardiovascular- no lower extremity edema is noted.     Psychiatric- alert and oriented ×3. Normal affect.     Gait - normal gait, no use of ambulatory device, antalgic.     Musculoskeletal -   Thoracic/Lumbar Spine/Sacral Spine/Hips   Inspection: no evidence of atrophy in bilateral lower extremities throughout     ROM of the lumbar spine not fully evaluated due to pain    Seated SLR is negative on the left and negative on the right     Neuro     Key points for the international standards for neurological classification of spinal cord injury (ISNCSCI) to light touch.     Dermatome R L   L2 2 2   L3 2 2   L4 2 2   L5 2 1   S1 2 1   S2 2 2       Motor Exam Lower Extremities    ? Myotome R L   Hip flexion L2 5 5   Knee extension L3 5 5   Ankle dorsiflexion L4 5 5   Toe extension L5 5 5   Ankle plantarflexion S1 5 5       Reflexes  ?  R L   Patella  2+ 2+   Achilles   2+ 1+       MEDICAL DECISION MAKING    Medical records review: see under HPI section.     DATA    Labs:   05/08/2021: UDS positive for morphine and metabolites, clonazepam and metabolites, hydrocodone and metabolites  08/18/2020: UDS positive for morphine and metabolites, clonazepam and metabolites, hydrocodone and metabolites  04/24/2020 UDS positive for morphine and metabolites, " clonazepam  04/09/2020 Vitamin D, 25:  28L  04/09/2020 Vitamin B12: 217    Lab Results   Component Value Date/Time    SODIUM 141 05/04/2021 08:06 AM    POTASSIUM 4.0 05/04/2021 08:06 AM    CHLORIDE 105 05/04/2021 08:06 AM    CO2 26 05/04/2021 08:06 AM    ANION 10.0 05/04/2021 08:06 AM    GLUCOSE 134 (H) 05/04/2021 08:06 AM    BUN 7 (L) 05/04/2021 08:06 AM    CREATININE 0.58 05/04/2021 08:06 AM    CALCIUM 8.8 05/04/2021 08:06 AM    ASTSGOT 25 05/04/2021 08:06 AM    ALTSGPT 29 05/04/2021 08:06 AM    TBILIRUBIN 0.4 05/04/2021 08:06 AM    ALBUMIN 3.8 05/04/2021 08:06 AM    TOTPROTEIN 6.4 05/04/2021 08:06 AM    GLOBULIN 2.6 05/04/2021 08:06 AM    AGRATIO 1.5 05/04/2021 08:06 AM       No results found for: PROTHROMBTM, INR     Lab Results   Component Value Date/Time    WBC 7.5 05/04/2021 08:06 AM    RBC 4.73 05/04/2021 08:06 AM    HEMOGLOBIN 14.6 05/04/2021 08:06 AM    HEMATOCRIT 43.8 05/04/2021 08:06 AM    MCV 92.6 05/04/2021 08:06 AM    MCH 30.9 05/04/2021 08:06 AM    MCHC 33.3 (L) 05/04/2021 08:06 AM    MPV 10.6 05/04/2021 08:06 AM    NEUTSPOLYS 46.60 05/04/2021 08:06 AM    LYMPHOCYTES 39.80 05/04/2021 08:06 AM    MONOCYTES 9.30 05/04/2021 08:06 AM    EOSINOPHILS 3.70 05/04/2021 08:06 AM    BASOPHILS 0.50 05/04/2021 08:06 AM        Lab Results   Component Value Date/Time    HBA1C 7.3 (H) 05/04/2021 08:06 AM        Imaging: I personally reviewed following images, these are my reads:     MRI lumbar spine 05/05/2020  At L1-2, no central or foraminal stenosis  At L2-3, no central or foraminal stenosis  At L3-4, mild disc bulge and mild ligamentum flavum thickening.  No central canal stenosis. Borderline left foraminal stenosis. Schmorl's node.   At L4-5, diffuse disc bulge with mild ligamentum flavum thickening.  No central canal stenosis.  There is facet arthropathy, left greater than right. Mild foraminal stenosis bilaterally. S/P left L4/5 hemilaminectomy  At L5-S1, there is mild-borderline foraminal stenosis with facet  arthropathy and mild disc bulge.  No central canal stenosis    Xray lumbar spine 05/05/2020  There is mild decreased joint space at L3-4 and L4-5.    Facet arthropathy is noted, greatest at L5-S1 bilaterally.  No significant motion on flexion and extension films    IMAGING radiology reads. I reviewed the following radiology reads    Xray abdomen 07/17/2020  IMPRESSION:     Nonspecific bowel gas pattern. No evidence small bowel obstruction.      MRI lumbar spine 05/05/2020  IMPRESSION:     1.  Caudal lumbar facet arthropathy left worse than right with no bony destruction to indicate septic or inflammatory arthropathy. This most likely is just degenerative  2.  Mild caudal lumbar foraminal stenoses  3.  No significant central stenosis.  4.  Left L4/5 hemilaminectomy                                                                                   Xray lumbar spine 05/05/2020     IMPRESSION:     1.  No acute lumbar compression fracture or subluxation.  2.  Posterior disc space narrowing at L4-5 and to lesser extent at L3-4.  3.  Bilateral facet arthropathy at L5-S1.                                                                                             Diagnosis   Visit Diagnoses     ICD-10-CM   1. S/P lumbar laminectomy  Z98.890   2. Left lumbar radiculitis  M54.16   3. Class 1 obesity with body mass index (BMI) of 33.0 to 33.9 in adult, unspecified obesity type, unspecified whether serious comorbidity present  E66.9    Z68.33   4. DDD (degenerative disc disease), lumbar  M51.36   5. Left knee pain, unspecified chronicity  M25.562         ASSESSMENT:  Grisel Mark 65 y.o. female seen for above     Grisel was seen today for follow-up.    Diagnoses and all orders for this visit:    S/P lumbar laminectomy  -     HYDROcodone-acetaminophen (NORCO) 5-325 MG Tab per tablet; Take 1 tablet by mouth every four hours as needed for up to 30 days.  -     morphine ER (MS CONTIN) 15 MG Tab CR tablet; Take 1 tablet by mouth  every 12 hours for 30 days.  -     Consent for Opiate Prescription  -     Controlled Substance Treatment Agreement  -     REFERRAL TO OUTPATIENT INTERVENTIONAL PAIN CLINIC    Left lumbar radiculitis  -     REFERRAL TO OUTPATIENT INTERVENTIONAL PAIN CLINIC    Class 1 obesity with body mass index (BMI) of 33.0 to 33.9 in adult, unspecified obesity type, unspecified whether serious comorbidity present    DDD (degenerative disc disease), lumbar  -     HYDROcodone-acetaminophen (NORCO) 5-325 MG Tab per tablet; Take 1 tablet by mouth every four hours as needed for up to 30 days.  -     morphine ER (MS CONTIN) 15 MG Tab CR tablet; Take 1 tablet by mouth every 12 hours for 30 days.  -     Consent for Opiate Prescription  -     Controlled Substance Treatment Agreement    Left knee pain, unspecified chronicity  -     DX-KNEE 3 VIEWS LEFT; Future         1. Discussed plan for repeat left L4 and L5 transforaminal epidural steroid injection.  The risks benefits and alternatives to this procedure were discussed and the patient wishes to proceed with the procedure. Risks include but are not limited to damage to surrounding structures, infection, bleeding, worsening of pain which can be permanent, weakness which can be permanent. Benefits include pain relief, improved function. Alternatives includes not doing the procedure.    2. Continue gabapentin to 600mg five a day as tolerated.  3. Continue MS Contin 15mg po q12h and norco for breakthrough.  Discussed norco up to 2/day. Last refill 04/23/2021.    4. Discussed possible SCS trial.  Continue with Dr. Villa.  5. Continue colase 100mg daily-bid and miralax prn for constipation.  Bowel movements are regular.  6. Continue physical therapy and home exercise program.    7. Encouraged weight loss.  8. Left knee pain, plan to order xray left knee.    Follow-up: Return for Hospital injection.    Thank you very much for asking me to participate in Grisel Mark's care.  Please contact  me with any questions or concerns.    Please note that this dictation was created using voice recognition software. I have made every reasonable attempt to correct obvious errors but there may be errors of grammar and content that I may have overlooked prior to finalization of this note.      Paresh Ogden MD  Physical Medicine and Rehabilitation  Interventional Spine and Sports Physiatry  [facility]

## 2021-05-20 ENCOUNTER — HOSPITAL ENCOUNTER (OUTPATIENT)
Facility: REHABILITATION | Age: 66
End: 2021-05-20
Attending: PHYSICAL MEDICINE & REHABILITATION | Admitting: PHYSICAL MEDICINE & REHABILITATION
Payer: MEDICARE

## 2021-05-20 ENCOUNTER — APPOINTMENT (OUTPATIENT)
Dept: RADIOLOGY | Facility: REHABILITATION | Age: 66
End: 2021-05-20
Attending: PHYSICAL MEDICINE & REHABILITATION
Payer: MEDICARE

## 2021-05-20 VITALS
SYSTOLIC BLOOD PRESSURE: 133 MMHG | HEART RATE: 72 BPM | BODY MASS INDEX: 33.59 KG/M2 | TEMPERATURE: 97.7 F | WEIGHT: 189.6 LBS | RESPIRATION RATE: 18 BRPM | OXYGEN SATURATION: 93 % | DIASTOLIC BLOOD PRESSURE: 78 MMHG | HEIGHT: 63 IN

## 2021-05-20 LAB — GLUCOSE BLD-MCNC: 225 MG/DL (ref 65–99)

## 2021-05-20 PROCEDURE — 700101 HCHG RX REV CODE 250

## 2021-05-20 PROCEDURE — 700111 HCHG RX REV CODE 636 W/ 250 OVERRIDE (IP)

## 2021-05-20 PROCEDURE — 700117 HCHG RX CONTRAST REV CODE 255

## 2021-05-20 PROCEDURE — 64484 NJX AA&/STRD TFRM EPI L/S EA: CPT

## 2021-05-20 PROCEDURE — 82962 GLUCOSE BLOOD TEST: CPT

## 2021-05-20 PROCEDURE — 64483 NJX AA&/STRD TFRM EPI L/S 1: CPT

## 2021-05-20 RX ORDER — LIDOCAINE HYDROCHLORIDE 20 MG/ML
INJECTION, SOLUTION EPIDURAL; INFILTRATION; INTRACAUDAL; PERINEURAL
Status: COMPLETED
Start: 2021-05-20 | End: 2021-05-20

## 2021-05-20 RX ORDER — DEXAMETHASONE SODIUM PHOSPHATE 10 MG/ML
INJECTION, SOLUTION INTRAMUSCULAR; INTRAVENOUS
Status: COMPLETED
Start: 2021-05-20 | End: 2021-05-20

## 2021-05-20 RX ADMIN — DEXAMETHASONE SODIUM PHOSPHATE 20 MG: 10 INJECTION, SOLUTION INTRAMUSCULAR; INTRAVENOUS at 09:24

## 2021-05-20 RX ADMIN — IOHEXOL 5 ML: 240 INJECTION, SOLUTION INTRATHECAL; INTRAVASCULAR; INTRAVENOUS; ORAL at 09:25

## 2021-05-20 RX ADMIN — LIDOCAINE HYDROCHLORIDE 5 ML: 20 INJECTION, SOLUTION EPIDURAL; INFILTRATION; INTRACAUDAL; PERINEURAL at 09:25

## 2021-05-20 ASSESSMENT — FIBROSIS 4 INDEX: FIB4 SCORE: 1.63

## 2021-05-20 ASSESSMENT — PAIN DESCRIPTION - PAIN TYPE: TYPE: CHRONIC PAIN

## 2021-05-20 NOTE — PROGRESS NOTES
Handoff received from pre-procedure RN. Pre assessment complete with pertinent history reviewed (Anxiety, PTSD, HTN, DM type 2, GERD).  No allergies, stop bang = 4.  Pt positioned prone by CST, RN, XRT, ankles resting on pillow, hands resting on stool under head of procedure table.

## 2021-05-20 NOTE — NON-PROVIDER
Patient tolerated food and fluids post procedure without n/v. Ambulated without difficulty, dressing clean, dry and intact. Discharged into care of responsible adult.

## 2021-05-20 NOTE — NON-PROVIDER
Current meds, reconciliation form reviewed with patient, denied  taking anti inflammatories or blood thinners. Pre  procedure assessment completed and report given to RN during handoff. Patient had designated : Duglas (son in law) to go back home. Print and verbal discharge instructions as well as education of infection prevention given to patient and who verbalized understanding.

## 2021-05-20 NOTE — OP REPORT
Pre-operative Diagnosis: (Z98.890) S/P lumbar laminectomy                     (M54.16) Left lumbar radiculitis      Post-operative Diagnosis:Same    Procedure:Left Lumbar Transforaminal Epidural Steroid  at the L4-5 and L5-S1 levels.      Type of Anesthesia: Local anesthesia  Blood Loss: None  Physician: Paresh Ogden MD     Description of procedure:     The risks, benefits, and alternatives of the procedure were reviewed and discussed with the patient.  Written informed consent was freely obtained. A pre-procedural time-out was conducted by the physician verifying patient’s identity, procedure to be performed, procedure site and side, and allergy verification. Appropriate equipment was determined to be in place for the procedure.      Method of Procedure: The patient signed an informed consent form in the pre-op area after all risks and complications were discussed and all questions were answered.     The patient was prepped and draped in a sterile fashion in the prone position.  The patient's spine was surveyed under fluoroscopic visualization and anatomical landmarks were identified.     The patient's vital signs were carefully monitored before, throughout, and after the procedure.      Left L4 transforaminal epidural steroid injection     The fluoroscope was placed over the lumbar spine and adjusted into the proper AP/Oblique view to enter the transforaminal space at the levels below. The targets for injection were then marked at the left L4-5. A 25g 1.5 inch needle was placed into the marked site, and approx 2ml of 1% Lidocaine was injected subcutaneously into the epidermal and dermal layers. The needle was removed. A 25 gauge 3.5 inch spinal needle was then placed and advanced under fluoroscopic guidance in an oblique view towards the subpedicular epidural space of the levels noted below. The needle position was confirmed to not be past the 6 o'clock position in the AP view and it was in the neural foramen and  "the lateral view. Under live fluoroscopic guidance in the AP view, contrast dye was used to highlight the epidural space spread.  Following negative aspiration, approx 1.5mL of 2% lidocaine preservative free with 1ml of dexamethasone (10mg/ml) and 0.5ml preservative-free normal saline was then injected at each level, and the needle was removed leaving a trail of 1% lidocaine. The patient's back was covered with a 4x4 gauze, the area was cleansed with sterile normal saline, and a dressing was applied. There were no complications noted.      Perisheathogram and Spot Film:  After Omnipaque was injected, a left L4 \"perisheathogram\" occurred without evidence of subarachnoid or vascular flow.  A spot films was ordered in AP and lateral to confirm the correct needle tip placement.     Left L5 transforaminal epidural steroid injection     The fluoroscope was placed over the lumbar spine and adjusted into the proper AP/Oblique view to enter the transforaminal space at the levels below. The targets for injection were then marked at the left L5-S1. A 25g 1.5 inch needle was placed into the marked site, and approx 2ml of 1% Lidocaine was injected subcutaneously into the epidermal and dermal layers. The needle was removed. A 25 gauge 3.5 inch spinal needle was then placed and advanced under fluoroscopic guidance in an oblique view towards the subpedicular epidural space of the levels noted below. The needle position was confirmed to not be past the 6 o'clock position in the AP view and it was in the neural foramen and the lateral view. Under live fluoroscopic guidance in the AP view, contrast dye was used to highlight the epidural space spread.  Following negative aspiration, approx 1.5ml of 2% lidocaine preservative free with 1ml of dexamethasone(10mg/ml) and 0.5ml of normal saline was then injected at each level, and the needles were removed intact after restyleted. The patient's back was covered with a 4x4 gauze, the area was " "cleansed with sterile normal saline, and a dressing was applied. There were no complications noted.      Perisheathogram and Spot Film:  After Omnipaque was injected, a left L5 \"perisheathogram\" occurred without evidence of subarachnoid or vascular flow.  A spot films was ordered in AP and lateral to confirm the correct needle tip placement.     The patient was then evaluated post-procedure, and was hemodynamically stable prior to leaving the post-operative care unit.      Paresh Ogden MD  Physical Medicine and Rehabilitation  Interventional Spine and Sports Physiatry  [facility]     CPT: [id number], [id number]    "

## 2021-05-20 NOTE — H&P
Physiatry (physical medicine and  Rehabilitation), interventional spine and sports medicine     Date of Service: 05/20/2021     Chief complaint:        Chief Complaint   Patient presents with   • Follow-Up       Hip and foot pain         HISTORY     HPI: Grisel Mark 66 y.o. female who presents today for follow-up evaluation of low back and leg pain.     This note is updated from previous outpatient visit and is amended.  Grisel continues to have low back and left leg pain.  She is is here for repeat left L4 and L5 TFESI.      She is back to taking five gabapentin again.  Her PCP has not made changes to clonazepam with her PCP.  Bowel movements have been regular without stool softener     Her pain medications include: Duloxetine 90mg.  MS Contin 15mg q12h.  Norco 5/325 for breakthrough.  No side effects.     By history:  Her laminectomy was in 1998.  Previous injection on left L4 sand L5 TFESI on 08/12/2020 helped with her leg pain.      Medical records review:  Dr. Francisco Olmos, plan to increase duloxetine 90mg daily, discontinue buspirone 20mg po bid, start buproprion XL 150mg daily, continue brexipiprazole 1mg qhs, hydroxyzine 25mg po tid prn, clonazepam 0.5mg daily prn     Review of records   Dr. Dheeraj De La Rosa:  08/20/2019 Right L3-4, L4-5, L5-S1 radiofrequency ablation     I reviewed the note from the referring provider Peter Bruce * dated 02/05/2020: She was seen to [facility] care, having just moved from [place].  She was seen for essential hypertension, mixed hyperlipidemia, DM2 in obese, hypothyroidism, recurrent MDD in partial depression/anxiety, GERD without esophagitis, chronic bilateral low back pain with bilateral sciatica.  Medications associated with the above were written, related labs ordered including CBC, CMP, Hb A1c, lipids, microalbumin, TSH, free thryoxine, referral to ps\ychiatry, referral to GI for colonoscopy and referral to pain clinic.     Previous  treatments:     Physical Therapy: Yes     Medications the patient is tried: Narcotics, gabapentin and muscle relaxers     Previous interventions: [facility] these included right lumbar radiofrequency procedures     Previous surgeries to relieve the above pain:  Surgery on October 28, 1998       ROS:   ENT: ear infection, treated with augmentin  CV: irregular heart, reports history of tachycardia  Psych: depression since 1995  Heme: anemia  Endo: hypothyroidism, diabetes     Red Flags ROS:   Fever, Chills, Sweats: Denies  Involuntary Weight Loss: Denies  Bladder Incontinence: Denies  Bowel Incontinence: Denies  Saddle Anesthesia: Denies     All other systems reviewed and negative.        PMHx:   Past Medical History        Past Medical History:   Diagnosis Date   • Anxiety     • Chronic back pain     • Constipation     • Depression     • Diabetes (HCC)     • GERD (gastroesophageal reflux disease)     • Heart murmur       tachycardia   • Hyperlipidemia     • Hypertension     • Thyroid disease              PSHx:   Past Surgical History         Past Surgical History:   Procedure Laterality Date   • BLOCK EPIDURAL STEROID INJECTION Left 2/24/2021     Procedure: INJECTION, STEROID, EPIDURAL;  Surgeon: Paresh Ogden M.D.;  [facility];  Service: Pain Management   • LUMBAR MEDIAL BRANCH BLOCKS Left 9/9/2020     Procedure: BLOCK, NERVE, SPINAL, LUMBAR, POSTERIOR RAMUS, MEDIAL BRANCH;  Surgeon: Paresh Ogden M.D.;  Location: [facility];  Service: Pain Management   • DC NJX AA&/STRD TFRML EPI LUMBAR/SACRAL 1 LEVEL Left 8/12/2020     Procedure: INJECTION, SPINE, LUMBOSACRAL, EPIDURAL, 1 LEVEL, TRANSFORAMINAL APPROACH;  Surgeon: Paresh Ogden M.D.;  Location: [facility];  Service: Pain Management   • DC NJX AA&/STRD TFRML EPI LUMBAR/SACRAL EA ADDL Left 8/12/2020     Procedure: INJECTION, SPINE, LUMBOSACRAL, EPIDURAL, TRANSFORAMINAL  APPROACH;  Surgeon: Paresh Ogden M.D.;  Location: [facility];  Service: Pain Management   • LAMINOTOMY   1998   • ABDOMINAL HYSTERECTOMY TOTAL       • CARPAL TUNNEL RELEASE       • CHOLECYSTECTOMY                Family history   Family History         Family History   Problem Relation Age of Onset   • Cancer Mother     • Stroke Father           Heart attack   • Dementia Sister     • Stroke Brother           heart Attack   • Cancer Brother           Skin   • Diabetes Brother     • Kidney Disease Brother     • Cancer Brother     • Parkinson's Disease Sister     • No Known Problems Sister     • Diabetes Daughter     • Hypertension Daughter                 Medications:       Current Outpatient Medications   Medication   • HYDROcodone-acetaminophen (NORCO) 5-325 MG Tab per tablet   • [START ON 5/23/2021] morphine ER (MS CONTIN) 15 MG Tab CR tablet   • Brexpiprazole 2 MG Tab   • gabapentin (NEURONTIN) 600 MG tablet   • estradiol (ESTRACE) 1 MG Tab   • Empagliflozin (JARDIANCE) 25 MG Tab   • omeprazole (PRILOSEC) 40 MG delayed-release capsule   • DULoxetine (CYMBALTA) 30 MG Cap DR Particles   • metoprolol tartrate (LOPRESSOR) 25 MG Tab   • lisinopril (PRINIVIL) 10 MG Tab   • levothyroxine (SYNTHROID) 50 MCG Tab   • rosuvastatin (CRESTOR) 10 MG Tab   • clonazePAM (KLONOPIN) 1 MG Tab   • cyanocobalamin (VITAMIN B-12) 500 MCG Tab   • metformin (GLUCOPHAGE) 1000 MG tablet   • hydrOXYzine HCl (ATARAX) 25 MG Tab   • busPIRone (BUSPAR) 10 MG Tab tablet   • Naloxone (NARCAN) 4 MG/0.1ML Liquid   • Diclofenac Sodium 1 % Gel   • fluconazole (DIFLUCAN) 150 MG tablet   • diclofenac EC (VOLTAREN) 75 MG Tablet Delayed Response   • Blood Glucose Test Strips   • Lancets   • Alcohol Swabs   • Blood Glucose Meter Kit      No current facility-administered medications for this visit.         Allergies:   No Known Allergies     Social Hx:   Social History               Socioeconomic History   • Marital status:         Spouse name: Not on file   • Number of children: Not on file   • Years of education: Not on file   • Highest education level: Not on file   Occupational History   • Not on file   Tobacco Use   • Smoking status: Former Smoker       Packs/day: 0.25       Types: Cigarettes   • Smokeless tobacco: Never Used   Vaping Use   • Vaping Use: Never used   Substance and Sexual Activity   • Alcohol use: Not Currently       Comment: rare   • Drug use: Never   • Sexual activity: Not Currently   Other Topics Concern   •  Service No   • Blood Transfusions Yes   • Caffeine Concern No   • Occupational Exposure No   • Hobby Hazards No   • Sleep Concern Yes   • Stress Concern Yes   • Weight Concern Yes   • Special Diet No   • Back Care No   • Exercise Yes   • Bike Helmet No   • Seat Belt Yes   • Self-Exams Yes   Social History Narrative   • Not on file      Social Determinants of Health          Financial Resource Strain:    • Difficulty of Paying Living Expenses:    Food Insecurity:    • Worried About Running Out of Food in the Last Year:    • Ran Out of Food in the Last Year:    Transportation Needs:    • Lack of Transportation (Medical):    • Lack of Transportation (Non-Medical):    Physical Activity:    • Days of Exercise per Week:    • Minutes of Exercise per Session:    Stress:    • Feeling of Stress :    Social Connections:    • Frequency of Communication with Friends and Family:    • Frequency of Social Gatherings with Friends and Family:    • Attends [employer] Services:    • Active Member of Clubs or Organizations:    • Attends Club or Organization Meetings:    • Marital Status:    Intimate Partner Violence:    • Fear of Current or Ex-Partner:    • Emotionally Abused:    • Physically Abused:    • Sexually Abused:                EXAMINATION      Physical Exam:   Vitals:   Vitals:    05/20/21 0820   BP: 130/77   Pulse: 75   Resp: 16   Temp: 36.5 °C (97.7 °F)   SpO2: 93%          Constitutional:   Cooperation: Fully  cooperates with exam  Appearance: Well-groomed, well-nourished, not disheveled, no acute distress     Eyes: No scleral icterus, no proptosis      ENT -no obvious auditory deficits, wearing a facial mask     Skin -no rashes or lesions noted, no warmth or erythema near injection sites     Respiratory-  breathing comfortable on room air,  no audible wheezing, CTA bilaterally     Cardiovascular- no lower extremity edema is noted. RRR, nl S1 S2     Psychiatric- alert and oriented ×3. Normal affect.      Gait - normal gait, no use of ambulatory device, antalgic.      Musculoskeletal -   Thoracic/Lumbar Spine/Sacral Spine/Hips   Inspection: no evidence of atrophy in bilateral lower extremities throughout      ROM of the lumbar spine not fully evaluated due to pain     Seated SLR is negative on the left and negative on the right      Neuro      Key points for the international standards for neurological classification of spinal cord injury (ISNCSCI) to light touch.      Dermatome R L   L2 2 2   L3 2 2   L4 2 2   L5 2 1   S1 2 1   S2 2 2         Motor Exam Lower Extremities     ? Myotome R L   Hip flexion L2 5 5   Knee extension L3 5 5   Ankle dorsiflexion L4 5 5   Toe extension L5 5 5   Ankle plantarflexion S1 5 5         Reflexes  ?   R L   Patella   2+ 2+   Achilles    2+ 1+         MEDICAL DECISION MAKING     Medical records review: see under HPI section.      DATA     Labs:   05/08/2021: UDS positive for morphine and metabolites, clonazepam and metabolites, hydrocodone and metabolites  08/18/2020: UDS positive for morphine and metabolites, clonazepam and metabolites, hydrocodone and metabolites  04/24/2020 UDS positive for morphine and metabolites, clonazepam  04/09/2020 Vitamin D, 25:  28L  04/09/2020 Vitamin B12: 217           Lab Results   Component Value Date/Time     SODIUM 141 05/04/2021 08:06 AM     POTASSIUM 4.0 05/04/2021 08:06 AM     CHLORIDE 105 05/04/2021 08:06 AM     CO2 26 05/04/2021 08:06 AM     ANION  10.0 05/04/2021 08:06 AM     GLUCOSE 134 (H) 05/04/2021 08:06 AM     BUN 7 (L) 05/04/2021 08:06 AM     CREATININE 0.58 05/04/2021 08:06 AM     CALCIUM 8.8 05/04/2021 08:06 AM     ASTSGOT 25 05/04/2021 08:06 AM     ALTSGPT 29 05/04/2021 08:06 AM     TBILIRUBIN 0.4 05/04/2021 08:06 AM     ALBUMIN 3.8 05/04/2021 08:06 AM     TOTPROTEIN 6.4 05/04/2021 08:06 AM     GLOBULIN 2.6 05/04/2021 08:06 AM     AGRATIO 1.5 05/04/2021 08:06 AM         No results found for: PROTHROMBTM, INR            Lab Results   Component Value Date/Time     WBC 7.5 05/04/2021 08:06 AM     RBC 4.73 05/04/2021 08:06 AM     HEMOGLOBIN 14.6 05/04/2021 08:06 AM     HEMATOCRIT 43.8 05/04/2021 08:06 AM     MCV 92.6 05/04/2021 08:06 AM     MCH 30.9 05/04/2021 08:06 AM     MCHC 33.3 (L) 05/04/2021 08:06 AM     MPV 10.6 05/04/2021 08:06 AM     NEUTSPOLYS 46.60 05/04/2021 08:06 AM     LYMPHOCYTES 39.80 05/04/2021 08:06 AM     MONOCYTES 9.30 05/04/2021 08:06 AM     EOSINOPHILS 3.70 05/04/2021 08:06 AM     BASOPHILS 0.50 05/04/2021 08:06 AM               Lab Results   Component Value Date/Time     HBA1C 7.3 (H) 05/04/2021 08:06 AM         Imaging: I personally reviewed following images, these are my reads:      MRI lumbar spine 05/05/2020  At L1-2, no central or foraminal stenosis  At L2-3, no central or foraminal stenosis  At L3-4, mild disc bulge and mild ligamentum flavum thickening.  No central canal stenosis. Borderline left foraminal stenosis. Schmorl's node.   At L4-5, diffuse disc bulge with mild ligamentum flavum thickening.  No central canal stenosis.  There is facet arthropathy, left greater than right. Mild foraminal stenosis bilaterally. S/P left L4/5 hemilaminectomy  At L5-S1, there is mild-borderline foraminal stenosis with facet arthropathy and mild disc bulge.  No central canal stenosis     Xray lumbar spine 05/05/2020  There is mild decreased joint space at L3-4 and L4-5.    Facet arthropathy is noted, greatest at L5-S1 bilaterally.  No  significant motion on flexion and extension films     IMAGING radiology reads. I reviewed the following radiology reads     Xray abdomen 07/17/2020  IMPRESSION:     Nonspecific bowel gas pattern. No evidence small bowel obstruction.        MRI lumbar spine 05/05/2020  IMPRESSION:     1.  Caudal lumbar facet arthropathy left worse than right with no bony destruction to indicate septic or inflammatory arthropathy. This most likely is just degenerative  2.  Mild caudal lumbar foraminal stenoses  3.  No significant central stenosis.  4.  Left L4/5 hemilaminectomy                                                                                   Xray lumbar spine 05/05/2020     IMPRESSION:     1.  No acute lumbar compression fracture or subluxation.  2.  Posterior disc space narrowing at L4-5 and to lesser extent at L3-4.  3.  Bilateral facet arthropathy at L5-S1.                                                                                              Diagnosis        Visit Diagnoses       ICD-10-CM   1. S/P lumbar laminectomy  Z98.890   2. Left lumbar radiculitis  M54.16   3. Class 1 obesity with body mass index (BMI) of 33.0 to 33.9 in adult, unspecified obesity type, unspecified whether serious comorbidity present  E66.9     Z68.33   4. DDD (degenerative disc disease), lumbar  M51.36   5. Left knee pain, unspecified chronicity  M25.562            ASSESSMENT:  Grisel Mark 66 y.o. female seen for above     Grisel was seen today for follow-up.     Diagnoses and all orders for this visit:     S/P lumbar laminectomy  -     HYDROcodone-acetaminophen (NORCO) 5-325 MG Tab per tablet; Take 1 tablet by mouth every four hours as needed for up to 30 days.  -     morphine ER (MS CONTIN) 15 MG Tab CR tablet; Take 1 tablet by mouth every 12 hours for 30 days.  -     Consent for Opiate Prescription  -     Controlled Substance Treatment Agreement  -     REFERRAL TO OUTPATIENT INTERVENTIONAL PAIN CLINIC     Left lumbar  radiculitis  -     REFERRAL TO OUTPATIENT INTERVENTIONAL PAIN CLINIC     Class 1 obesity with body mass index (BMI) of 33.0 to 33.9 in adult, unspecified obesity type, unspecified whether serious comorbidity present     DDD (degenerative disc disease), lumbar  -     HYDROcodone-acetaminophen (NORCO) 5-325 MG Tab per tablet; Take 1 tablet by mouth every four hours as needed for up to 30 days.  -     morphine ER (MS CONTIN) 15 MG Tab CR tablet; Take 1 tablet by mouth every 12 hours for 30 days.  -     Consent for Opiate Prescription  -     Controlled Substance Treatment Agreement     Left knee pain, unspecified chronicity  -     DX-KNEE 3 VIEWS LEFT; Future           1. Discussed plan for repeat left L4 and L5 transforaminal epidural steroid injection.  The risks benefits and alternatives to this procedure were discussed and the patient wishes to proceed with the procedure. Risks include but are not limited to damage to surrounding structures, infection, bleeding, worsening of pain which can be permanent, weakness which can be permanent. Benefits include pain relief, improved function. Alternatives includes not doing the procedure.  Proceed with procedure as scheduled.    2. Encouraged her to follow-up with Dr. Villa regarding outpatient therapy.       Follow-up: Office visit     Thank you very much for asking me to participate in Grisel Mark's care.  Please contact me with any questions or concerns.     Please note that this dictation was created using voice recognition software. I have made every reasonable attempt to correct obvious errors but there may be errors of grammar and content that I may have overlooked prior to finalization of this note.        Paresh Ogden MD  Physical Medicine and Rehabilitation  Interventional Spine and Sports Physiatry  [facility]

## 2021-05-21 ENCOUNTER — TELEPHONE (OUTPATIENT)
Dept: PHYSICAL MEDICINE AND REHAB | Facility: MEDICAL CENTER | Age: 66
End: 2021-05-21

## 2021-05-21 NOTE — TELEPHONE ENCOUNTER
I called patient to follow up after her Special procedure and leave a message to call us back and update. RR

## 2021-05-21 NOTE — TELEPHONE ENCOUNTER
Patient left a message for regarding her medications which need to be refilled (HYDROcodone-acetaminophen 5-325 MG) as she did not  other prescriptions. Patient also asked about seeing Dr. Ogden for an appointment.     Please Advise.

## 2021-05-26 ENCOUNTER — TELEMEDICINE (OUTPATIENT)
Dept: MEDICAL GROUP | Facility: PHYSICIAN GROUP | Age: 66
End: 2021-05-26
Payer: MEDICARE

## 2021-05-26 VITALS — BODY MASS INDEX: 33.84 KG/M2 | WEIGHT: 191 LBS | HEIGHT: 63 IN

## 2021-05-26 DIAGNOSIS — F41.9 ANXIETY: ICD-10-CM

## 2021-05-26 DIAGNOSIS — F33.1 DEPRESSION, MAJOR, RECURRENT, MODERATE (HCC): ICD-10-CM

## 2021-05-26 DIAGNOSIS — F13.20 BENZODIAZEPINE DEPENDENCE (HCC): ICD-10-CM

## 2021-05-26 DIAGNOSIS — F11.20 OPIOID DEPENDENCE IN CONTROLLED ENVIRONMENT (HCC): ICD-10-CM

## 2021-05-26 DIAGNOSIS — F41.1 GENERALIZED ANXIETY DISORDER: ICD-10-CM

## 2021-05-26 DIAGNOSIS — F43.10 POSTTRAUMATIC STRESS DISORDER, DELAYED ONSET: ICD-10-CM

## 2021-05-26 PROCEDURE — 99214 OFFICE O/P EST MOD 30 MIN: CPT | Mod: 95,CR | Performed by: NURSE PRACTITIONER

## 2021-05-26 RX ORDER — CLONAZEPAM 1 MG/1
1 TABLET ORAL EVERY EVENING
Qty: 90 TABLET | Refills: 0 | Status: SHIPPED | OUTPATIENT
Start: 2021-05-26 | End: 2021-08-24

## 2021-05-26 RX ORDER — CLONAZEPAM 0.5 MG/1
0.5 TABLET ORAL EVERY MORNING
Qty: 90 TABLET | Refills: 0 | Status: SHIPPED | OUTPATIENT
Start: 2021-05-26 | End: 2021-08-24

## 2021-05-26 ASSESSMENT — ENCOUNTER SYMPTOMS
DIAPHORESIS: 0
BACK PAIN: 1
HEADACHES: 0
SPEECH CHANGE: 0
MYALGIAS: 1
DEPRESSION: 0
BLOOD IN STOOL: 0
BLURRED VISION: 0
SHORTNESS OF BREATH: 0
PHOTOPHOBIA: 0
ABDOMINAL PAIN: 0
FALLS: 0
DIZZINESS: 0
INSOMNIA: 0
COUGH: 0
SENSORY CHANGE: 0
LOSS OF CONSCIOUSNESS: 0
CHILLS: 0
NERVOUS/ANXIOUS: 0
FEVER: 0
BRUISES/BLEEDS EASILY: 0

## 2021-05-26 ASSESSMENT — LIFESTYLE VARIABLES: SUBSTANCE_ABUSE: 0

## 2021-05-26 ASSESSMENT — FIBROSIS 4 INDEX: FIB4 SCORE: 1.63

## 2021-05-26 NOTE — PROGRESS NOTES
Telemedicine: Established Patient   This evaluation was conducted via Zoom using secure and encrypted videoconferencing technology. The patient was in a private location in the state of [place].    The patient's identity was confirmed and verbal consent was obtained for this virtual visit.    Subjective:   CC:   Chief Complaint   Patient presents with   • Medication Refill       Grisel Mark is a 66 y.o. female presenting for evaluation and management of:    Anxiety: Chronic and controlled.  Patient manages well with duloxetine and clonazepam.  Has been on 1 mg of clonazepam twice daily for the past several months due to significant life stressors including the global pandemic in multiple medical concerns with her children.  She now feels that life is settled down and she is in a better place to begin tapering clonazepam dose.  Nighttime is still the hardest time for her in terms of insomnia, anxiety, and PTSD.    Review of Systems   Constitutional: Negative for chills, diaphoresis and fever.   HENT: Negative for congestion and hearing loss.    Eyes: Negative for blurred vision and photophobia.   Respiratory: Negative for cough and shortness of breath.    Cardiovascular: Negative for chest pain and leg swelling.   Gastrointestinal: Negative for abdominal pain and blood in stool.   Genitourinary: Negative for hematuria.   Musculoskeletal: Positive for back pain and myalgias. Negative for falls.   Skin: Negative for rash.   Neurological: Negative for dizziness, sensory change, speech change, loss of consciousness and headaches.   Endo/Heme/Allergies: Does not bruise/bleed easily.   Psychiatric/Behavioral: Negative for depression, substance abuse and suicidal ideas. The patient is not nervous/anxious and does not have insomnia.          No Known Allergies    Current medicines (including changes today)  Current Outpatient Medications   Medication Sig Dispense Refill   • clonazePAM (KLONOPIN) 1 MG Tab Take 1 tablet  by mouth every evening for 90 days. 90 tablet 0   • clonazePAM (KLONOPIN) 0.5 MG Tab Take 1 tablet by mouth every morning for 90 days. 90 tablet 0   • HYDROcodone-acetaminophen (NORCO) 5-325 MG Tab per tablet Take 1 tablet by mouth every four hours as needed for up to 30 days. 60 tablet 0   • morphine ER (MS CONTIN) 15 MG Tab CR tablet Take 1 tablet by mouth every 12 hours for 30 days. 60 tablet 0   • Brexpiprazole 2 MG Tab Take 2 mg by mouth every day. 90 tablet 3   • gabapentin (NEURONTIN) 600 MG tablet Take 600 mg by mouth.     • estradiol (ESTRACE) 1 MG Tab Take 1 tablet by mouth every day. 90 tablet 0   • Empagliflozin (JARDIANCE) 25 MG Tab Take 25 mg by mouth every day. 100 tablet 0   • omeprazole (PRILOSEC) 40 MG delayed-release capsule Take 1 capsule by mouth every day. 90 capsule 0   • DULoxetine (CYMBALTA) 30 MG Cap DR Particles Take 3 Capsules by mouth every day. 270 capsule 0   • metoprolol tartrate (LOPRESSOR) 25 MG Tab TAKE 1 TABLET BY MOUTH TWICE DAILY 180 tablet 0   • lisinopril (PRINIVIL) 10 MG Tab Take 1 tablet by mouth every day. 100 tablet 0   • levothyroxine (SYNTHROID) 50 MCG Tab Take 1 tablet by mouth Every morning on an empty stomach. 90 tablet 1   • rosuvastatin (CRESTOR) 10 MG Tab Take 1 tablet by mouth every evening. 100 tablet 1   • cyanocobalamin (VITAMIN B-12) 500 MCG Tab Take 500 mcg by mouth every day.     • metformin (GLUCOPHAGE) 1000 MG tablet Take 1 Tab by mouth 2 times a day, with meals. 200 Tab 0   • hydrOXYzine HCl (ATARAX) 25 MG Tab Take 1-2 Tabs by mouth at bedtime as needed for Itching or Anxiety. 60 Tab 11   • busPIRone (BUSPAR) 10 MG Tab tablet Take 1 Tab by mouth 3 times a day. 270 Tab 3   • Blood Glucose Test Strips Use one Abbott Freedom Lite strip to test blood sugar twice daily and PRN. 270 Each 3   • Lancets Use one Abbott Elkins Lite lancet to test blood sugar twice daily and PRN. 300 Each 3   • Alcohol Swabs Wipe site with prep pad prior to injection. 100 Each 3    • Naloxone (NARCAN) 4 MG/0.1ML Liquid One spray in one nostril for overdose and call 911. 1 Each 0   • Diclofenac Sodium 1 % Gel Apply 1 g to skin as directed 2 times a day as needed (joint pain). 150 g 2   • fluconazole (DIFLUCAN) 150 MG tablet Take 1 Tab by mouth every 72 hours. 2 Tab 0   • Blood Glucose Meter Kit Test blood sugar as recommended by provider. Abbott Freestyle Lite blood glucose monitoring kit. 1 Kit 0     No current facility-administered medications for this visit.       Patient Active Problem List    Diagnosis Date Noted   • Generalized anxiety disorder 05/12/2020   • Posttraumatic stress disorder, delayed onset 05/12/2020   • Essential hypertension 02/05/2020   • Mixed hyperlipidemia 02/05/2020   • Diabetes mellitus type 2 in obese (HCC) 02/05/2020   • Hypothyroidism (acquired) 02/05/2020   • Depression, major, recurrent, moderate (HCC) 02/05/2020   • Gastroesophageal reflux disease without esophagitis 02/05/2020   • Anxiety 02/05/2020       Family History   Problem Relation Age of Onset   • Cancer Mother    • Stroke Father         Heart attack   • Dementia Sister    • Stroke Brother         heart Attack   • Cancer Brother         Skin   • Diabetes Brother    • Kidney Disease Brother    • Cancer Brother    • Parkinson's Disease Sister    • No Known Problems Sister    • Diabetes Daughter    • Hypertension Daughter        She  has a past medical history of Anxiety, Chronic back pain, Constipation, Depression, Diabetes (HCC), GERD (gastroesophageal reflux disease), Heart murmur, Hyperlipidemia, Hypertension, and Thyroid disease.  She  has a past surgical history that includes cholecystectomy; carpal tunnel release; abdominal hysterectomy total; pr njx aa&/strd tfrml epi lumbar/sacral 1 level (Left, 8/12/2020); pr njx aa&/strd tfrml epi lumbar/sacral ea addl (Left, 8/12/2020); lumbar medial branch blocks (Left, 9/9/2020); laminotomy (1998); block epidural steroid injection (Left, 2/24/2021); and  "lumbar transforaminal epidural steroid injection (Left, 5/20/2021).       Objective:   Ht 1.6 m (5' 3\")   Wt 86.6 kg (191 lb)   LMP  (LMP Unknown)   BMI 33.83 kg/m²     Physical Exam  Constitutional:       Appearance: Normal appearance. She is normal weight.   HENT:      Head: Normocephalic and atraumatic.      Right Ear: External ear normal.      Left Ear: External ear normal.      Nose: Nose normal.   Eyes:      General:         Right eye: No discharge.         Left eye: No discharge.      Extraocular Movements: Extraocular movements intact.      Conjunctiva/sclera: Conjunctivae normal.   Pulmonary:      Effort: Pulmonary effort is normal. No respiratory distress.   Abdominal:      General: There is no distension.   Musculoskeletal:         General: No deformity. Normal range of motion.      Cervical back: Normal range of motion.   Skin:     General: Skin is dry.      Findings: No rash.   Neurological:      General: No focal deficit present.      Mental Status: She is alert and oriented to person, place, and time. Mental status is at baseline.   Psychiatric:         Mood and Affect: Mood normal.         Behavior: Behavior normal.         Thought Content: Thought content normal.         Judgment: Judgment normal.         Assessment and Plan:   The following treatment plan was discussed:     As patient is doing quite well in terms of mental health, will begin tapering clonazepam.  We will maintain the 1 mg in the evening as this is when her anxiety and PTSD is most prevalent.  Begin with 0.5 mg in the a.m., reduce from 1 mg.  Encouraged her to discuss tapering with her physiatrist.  They have talked about this before and if they have any further guidance I am happy to defer.    1. Depression, major, recurrent, moderate (HCC)  - clonazePAM (KLONOPIN) 1 MG Tab; Take 1 tablet by mouth every evening for 90 days.  Dispense: 90 tablet; Refill: 0  - clonazePAM (KLONOPIN) 0.5 MG Tab; Take 1 tablet by mouth every morning " for 90 days.  Dispense: 90 tablet; Refill: 0    2. Anxiety  - clonazePAM (KLONOPIN) 1 MG Tab; Take 1 tablet by mouth every evening for 90 days.  Dispense: 90 tablet; Refill: 0  - clonazePAM (KLONOPIN) 0.5 MG Tab; Take 1 tablet by mouth every morning for 90 days.  Dispense: 90 tablet; Refill: 0    3. Posttraumatic stress disorder, delayed onset  - clonazePAM (KLONOPIN) 1 MG Tab; Take 1 tablet by mouth every evening for 90 days.  Dispense: 90 tablet; Refill: 0  - clonazePAM (KLONOPIN) 0.5 MG Tab; Take 1 tablet by mouth every morning for 90 days.  Dispense: 90 tablet; Refill: 0    4. Generalized anxiety disorder  - clonazePAM (KLONOPIN) 1 MG Tab; Take 1 tablet by mouth every evening for 90 days.  Dispense: 90 tablet; Refill: 0  - clonazePAM (KLONOPIN) 0.5 MG Tab; Take 1 tablet by mouth every morning for 90 days.  Dispense: 90 tablet; Refill: 0    In accordance with [place] Bill 474, this patient complies with all of the following:    -I have made a good juanito effort to review previous and current medical records    -Physical and psychological exam is been done including risk of abuse, mental health assessment.    -Current treatment plan available in patient's chart    -Alternative treatment options have been discussed, attempted, and found to be insufficient.    Potential risks and benefits reviewed at length including but not limited to risk of dependency, addiction, overdose    -Controlled substance agreement reviewed and completed along with a urine drug screen yearly.    -Discussed common side effects and mitigation strategies of them.    -PDMP reviewed    -Advised to avoid use of any other sedative agents concurrently with controlled substances including prescriptions, alcohol, marijuana.        Follow-up: Return in about 3 months (around 8/26/2021).

## 2021-06-10 ENCOUNTER — APPOINTMENT (OUTPATIENT)
Dept: RADIOLOGY | Facility: MEDICAL CENTER | Age: 66
End: 2021-06-10
Attending: NURSE PRACTITIONER
Payer: MEDICARE

## 2021-06-18 DIAGNOSIS — E78.2 MIXED HYPERLIPIDEMIA: ICD-10-CM

## 2021-06-20 DIAGNOSIS — F33.1 DEPRESSION, MAJOR, RECURRENT, MODERATE (HCC): ICD-10-CM

## 2021-06-21 RX ORDER — ROSUVASTATIN CALCIUM 10 MG/1
10 TABLET, COATED ORAL EVERY EVENING
Qty: 100 TABLET | Refills: 3 | Status: SHIPPED | OUTPATIENT
Start: 2021-06-21 | End: 2021-08-05

## 2021-06-21 NOTE — TELEPHONE ENCOUNTER
Pt has had OV within the 12 month protocol and lipid panel is current. 12 month supply sent to pharmacy.   Lab Results   Component Value Date/Time    CHOLSTRLTOT 112 05/04/2021 08:06 AM    LDL 45 05/04/2021 08:06 AM    HDL 42 05/04/2021 08:06 AM    TRIGLYCERIDE 123 05/04/2021 08:06 AM       Lab Results   Component Value Date/Time    SODIUM 141 05/04/2021 08:06 AM    POTASSIUM 4.0 05/04/2021 08:06 AM    CHLORIDE 105 05/04/2021 08:06 AM    CO2 26 05/04/2021 08:06 AM    GLUCOSE 134 (H) 05/04/2021 08:06 AM    BUN 7 (L) 05/04/2021 08:06 AM    CREATININE 0.58 05/04/2021 08:06 AM     Lab Results   Component Value Date/Time    ALKPHOSPHAT 72 05/04/2021 08:06 AM    ASTSGOT 25 05/04/2021 08:06 AM    ALTSGPT 29 05/04/2021 08:06 AM    TBILIRUBIN 0.4 05/04/2021 08:06 AM

## 2021-06-24 ENCOUNTER — OFFICE VISIT (OUTPATIENT)
Dept: PHYSICAL MEDICINE AND REHAB | Facility: MEDICAL CENTER | Age: 66
End: 2021-06-24
Payer: MEDICARE

## 2021-06-24 VITALS
HEIGHT: 63 IN | SYSTOLIC BLOOD PRESSURE: 128 MMHG | OXYGEN SATURATION: 92 % | BODY MASS INDEX: 34.22 KG/M2 | HEART RATE: 75 BPM | DIASTOLIC BLOOD PRESSURE: 68 MMHG | TEMPERATURE: 97.5 F | WEIGHT: 193.12 LBS

## 2021-06-24 DIAGNOSIS — F33.1 DEPRESSION, MAJOR, RECURRENT, MODERATE (HCC): ICD-10-CM

## 2021-06-24 DIAGNOSIS — M51.36 DDD (DEGENERATIVE DISC DISEASE), LUMBAR: ICD-10-CM

## 2021-06-24 DIAGNOSIS — E66.9 DIABETES MELLITUS TYPE 2 IN OBESE: ICD-10-CM

## 2021-06-24 DIAGNOSIS — F13.20 BENZODIAZEPINE DEPENDENCE (HCC): ICD-10-CM

## 2021-06-24 DIAGNOSIS — E11.69 DIABETES MELLITUS TYPE 2 IN OBESE: ICD-10-CM

## 2021-06-24 DIAGNOSIS — M47.816 LUMBAR SPONDYLOSIS: ICD-10-CM

## 2021-06-24 DIAGNOSIS — M54.50 LUMBOSACRAL PAIN: ICD-10-CM

## 2021-06-24 DIAGNOSIS — R20.2 PARESTHESIA: ICD-10-CM

## 2021-06-24 DIAGNOSIS — Z98.890 S/P LUMBAR LAMINECTOMY: ICD-10-CM

## 2021-06-24 PROCEDURE — 99214 OFFICE O/P EST MOD 30 MIN: CPT | Performed by: PHYSICAL MEDICINE & REHABILITATION

## 2021-06-24 RX ORDER — HYDROCODONE BITARTRATE AND ACETAMINOPHEN 5; 325 MG/1; MG/1
1 TABLET ORAL EVERY 4 HOURS PRN
COMMUNITY
End: 2021-06-24 | Stop reason: SDUPTHER

## 2021-06-24 RX ORDER — HYDROCODONE BITARTRATE AND ACETAMINOPHEN 5; 325 MG/1; MG/1
1 TABLET ORAL EVERY 8 HOURS PRN
Qty: 60 TABLET | Refills: 0 | Status: SHIPPED | OUTPATIENT
Start: 2021-07-24 | End: 2021-08-23

## 2021-06-24 RX ORDER — MORPHINE SULFATE 15 MG/1
15 TABLET, FILM COATED, EXTENDED RELEASE ORAL EVERY 12 HOURS
Qty: 60 TABLET | Refills: 0 | Status: SHIPPED | OUTPATIENT
Start: 2021-06-24 | End: 2021-07-24

## 2021-06-24 RX ORDER — MORPHINE SULFATE 15 MG/1
15 TABLET, FILM COATED, EXTENDED RELEASE ORAL EVERY 12 HOURS
COMMUNITY
End: 2021-06-24 | Stop reason: SDUPTHER

## 2021-06-24 RX ORDER — DULOXETIN HYDROCHLORIDE 30 MG/1
90 CAPSULE, DELAYED RELEASE ORAL DAILY
Qty: 270 CAPSULE | Refills: 1 | Status: SHIPPED | OUTPATIENT
Start: 2021-06-24 | End: 2021-12-15 | Stop reason: SDUPTHER

## 2021-06-24 RX ORDER — HYDROCODONE BITARTRATE AND ACETAMINOPHEN 5; 325 MG/1; MG/1
1 TABLET ORAL EVERY 8 HOURS PRN
Qty: 60 TABLET | Refills: 0 | Status: SHIPPED | OUTPATIENT
Start: 2021-06-24 | End: 2021-07-24

## 2021-06-24 RX ORDER — MORPHINE SULFATE 15 MG/1
15 TABLET, FILM COATED, EXTENDED RELEASE ORAL EVERY 12 HOURS
Qty: 60 TABLET | Refills: 0 | Status: SHIPPED | OUTPATIENT
Start: 2021-07-24 | End: 2021-07-28 | Stop reason: SDUPTHER

## 2021-06-24 RX ORDER — GABAPENTIN 600 MG/1
600 TABLET ORAL 4 TIMES DAILY
Qty: 120 TABLET | Refills: 1 | Status: SHIPPED | OUTPATIENT
Start: 2021-06-24 | End: 2021-09-21

## 2021-06-24 ASSESSMENT — PATIENT HEALTH QUESTIONNAIRE - PHQ9
CLINICAL INTERPRETATION OF PHQ2 SCORE: 2
5. POOR APPETITE OR OVEREATING: 2 - MORE THAN HALF THE DAYS
SUM OF ALL RESPONSES TO PHQ QUESTIONS 1-9: 7

## 2021-06-24 ASSESSMENT — PAIN SCALES - GENERAL: PAINLEVEL: 8=MODERATE-SEVERE PAIN

## 2021-06-24 ASSESSMENT — FIBROSIS 4 INDEX: FIB4 SCORE: 1.63

## 2021-06-24 NOTE — H&P (VIEW-ONLY)
Follow-up patient note    Physiatry (physical medicine and  Rehabilitation), interventional spine and sports medicine    Date of Service: 06/24/2021    Chief complaint:   Chief Complaint   Patient presents with   • Follow-Up     Lower back pain       HISTORY    HPI: Grisel Mark 66 y.o. female who presents today for follow-up evaluation of low back and leg pain.     Her low back pain has been painful, burning pain into the left leg is better since the 02/24/2021 left L4 and L5 TFESI.  Pain is 9/10 on the NRS.    She has been having some cramping in her feet at times, does not drink water regularly.    Since her last visit, Grisel reports that she has been struggling with her weight.  Frustrated that she has gained 2 lb since last visit.    She has an appointment with Dr. Villa next week, for Thursday    Gabapentin back to 600mg po qid.  She is starting to reduce her clonazepam by 0.5mg a day. Her pain medications include: Duloxetine 90mg.  MS Contin 15mg q12h.  Norco 5/325 for breakthrough.  No side effects.    Bowel movements have been regular with stool softener from [employer]      By history:  Her laminectomy was in 1998.  Previous injection on left L4 sand L5 TFESI on 08/12/2020 helped with her leg pain.     Medical records review:  Dr. Francisco Olmos, plan to increase duloxetine 90mg daily, discontinue buspirone 20mg po bid, start buproprion XL 150mg daily, continue brexipiprazole 1mg qhs, hydroxyzine 25mg po tid prn, clonazepam 0.5mg daily prn    Review of records   Dr. Dheeraj De La Rosa:  08/20/2019 Right L3-4, L4-5, L5-S1 radiofrequency ablation    I reviewed the note from the referring provider Peter Bruce * dated 02/05/2020: She was seen to establish care, having just moved from [place].  She was seen for essential hypertension, mixed hyperlipidemia, DM2 in obese, hypothyroidism, recurrent MDD in partial depression/anxiety, GERD without esophagitis, chronic bilateral low back pain with  bilateral sciatica.  Medications associated with the above were written, related labs ordered including CBC, CMP, Hb A1c, lipids, microalbumin, TSH, free thryoxine, referral to ps\ychiatry, referral to GI for colonoscopy and referral to pain clinic.    Previous treatments:    Physical Therapy: Yes    Medications the patient is tried: Narcotics, gabapentin and muscle relaxers    Previous interventions: [facility] these included right lumbar radiofrequency procedures    Previous surgeries to relieve the above pain:  Surgery on October 28, 1998      ROS:   ENT: ear infection, treated with augmentin  CV: irregular heart, reports history of tachycardia  Psych: depression since 1995  Heme: anemia  Endo: hypothyroidism, diabetes    Red Flags ROS:   Fever, Chills, Sweats: Denies  Involuntary Weight Loss: Denies  Bladder Incontinence: Denies  Bowel Incontinence: Denies  Saddle Anesthesia: Denies    All other systems reviewed and negative.       PMHx:   Past Medical History:   Diagnosis Date   • Anxiety    • Chronic back pain    • Constipation    • Depression    • Diabetes (HCC)    • GERD (gastroesophageal reflux disease)    • Heart murmur     tachycardia   • Hyperlipidemia    • Hypertension    • Thyroid disease        PSHx:   Past Surgical History:   Procedure Laterality Date   • LUMBAR TRANSFORAMINAL EPIDURAL STEROID INJECTION Left 5/20/2021    Procedure: LEFT lumbar four and lumbar five transforaminal epidural steroid injection;  Surgeon: Paresh Ogden M.D.;  [facility];  Service: Pain Management   • BLOCK EPIDURAL STEROID INJECTION Left 2/24/2021    Procedure: INJECTION, STEROID, EPIDURAL;  Surgeon: Paresh Ogden M.D.;  Location: [facility];  Service: Pain Management   • LUMBAR MEDIAL BRANCH BLOCKS Left 9/9/2020    Procedure: BLOCK, NERVE, SPINAL, LUMBAR, POSTERIOR RAMUS, MEDIAL BRANCH;  Surgeon: Paresh Ogden M.D.;  Location: [facility];  Service: Pain Management   • MO NJX AA&/STRD TFRML EPI LUMBAR/SACRAL 1 LEVEL Left 8/12/2020    Procedure: INJECTION, SPINE, LUMBOSACRAL, EPIDURAL, 1 LEVEL, TRANSFORAMINAL APPROACH;  Surgeon: Paresh Ogden M.D.;  Location: [facility];  Service: Pain Management   • MO NJX AA&/STRD TFRML EPI LUMBAR/SACRAL EA ADDL Left 8/12/2020    Procedure: INJECTION, SPINE, LUMBOSACRAL, EPIDURAL, TRANSFORAMINAL APPROACH;  Surgeon: Paresh Ogden M.D.;  Location: [facility];  Service: Pain Management   • LAMINOTOMY  1998   • ABDOMINAL HYSTERECTOMY TOTAL     • CARPAL TUNNEL RELEASE     • CHOLECYSTECTOMY         Family history   Family History   Problem Relation Age of Onset   • Cancer Mother    • Stroke Father         Heart attack   • Dementia Sister    • Stroke Brother         heart Attack   • Cancer Brother         Skin   • Diabetes Brother    • Kidney Disease Brother    • Cancer Brother    • Parkinson's Disease Sister    • No Known Problems Sister    • Diabetes Daughter    • Hypertension Daughter          Medications:   Current Outpatient Medications   Medication   • gabapentin (NEURONTIN) 600 MG tablet   • HYDROcodone-acetaminophen (NORCO) 5-325 MG Tab per tablet   • morphine ER (MS CONTIN) 15 MG Tab CR tablet   • [START ON 7/24/2021] morphine ER (MS CONTIN) 15 MG Tab CR tablet   • [START ON 7/24/2021] HYDROcodone-acetaminophen (NORCO) 5-325 MG Tab per tablet   • rosuvastatin (CRESTOR) 10 MG Tab   • clonazePAM (KLONOPIN) 1 MG Tab   • clonazePAM (KLONOPIN) 0.5 MG Tab   • Brexpiprazole 2 MG Tab   • estradiol (ESTRACE) 1 MG Tab   • Empagliflozin (JARDIANCE) 25 MG Tab   • omeprazole (PRILOSEC) 40 MG delayed-release capsule   • metoprolol tartrate (LOPRESSOR) 25 MG Tab   • lisinopril (PRINIVIL) 10 MG Tab   • levothyroxine (SYNTHROID) 50 MCG Tab   • cyanocobalamin (VITAMIN B-12) 500 MCG Tab   • metformin (GLUCOPHAGE) 1000 MG tablet   • hydrOXYzine HCl (ATARAX) 25 MG Tab   •  busPIRone (BUSPAR) 10 MG Tab tablet   • Naloxone (NARCAN) 4 MG/0.1ML Liquid   • Diclofenac Sodium 1 % Gel   • fluconazole (DIFLUCAN) 150 MG tablet   • DULoxetine (CYMBALTA) 30 MG Cap DR Particles   • Blood Glucose Test Strips   • Lancets   • Alcohol Swabs   • Blood Glucose Meter Kit     No current facility-administered medications for this visit.       Allergies:   No Known Allergies    Social Hx:   Social History     Socioeconomic History   • Marital status:      Spouse name: Not on file   • Number of children: Not on file   • Years of education: Not on file   • Highest education level: Not on file   Occupational History   • Not on file   Tobacco Use   • Smoking status: Former Smoker     Packs/day: 0.25     Types: Cigarettes   • Smokeless tobacco: Never Used   Vaping Use   • Vaping Use: Never used   Substance and Sexual Activity   • Alcohol use: Not Currently     Comment: rare   • Drug use: Never   • Sexual activity: Not Currently   Other Topics Concern   •  Service No   • Blood Transfusions Yes   • Caffeine Concern No   • Occupational Exposure No   • Hobby Hazards No   • Sleep Concern Yes   • Stress Concern Yes   • Weight Concern Yes   • Special Diet No   • Back Care No   • Exercise Yes   • Bike Helmet No   • Seat Belt Yes   • Self-Exams Yes   Social History Narrative   • Not on file     Social Determinants of Health     Financial Resource Strain:    • Difficulty of Paying Living Expenses:    Food Insecurity:    • Worried About Running Out of Food in the Last Year:    • Ran Out of Food in the Last Year:    Transportation Needs:    • Lack of Transportation (Medical):    • Lack of Transportation (Non-Medical):    Physical Activity:    • Days of Exercise per Week:    • Minutes of Exercise per Session:    Stress:    • Feeling of Stress :    Social Connections:    • Frequency of Communication with Friends and Family:    • Frequency of Social Gatherings with Friends and Family:    • Attends Protestant  "Services:    • Active Member of Clubs or Organizations:    • Attends Club or Organization Meetings:    • Marital Status:    Intimate Partner Violence:    • Fear of Current or Ex-Partner:    • Emotionally Abused:    • Physically Abused:    • Sexually Abused:          EXAMINATION     Physical Exam:   Vitals: /68 (BP Location: Right arm, Patient Position: Sitting, BP Cuff Size: Small adult)   Pulse 75   Temp 36.4 °C (97.5 °F) (Temporal)   Ht 1.6 m (5' 3\")   Wt 87.6 kg (193 lb 2 oz)   SpO2 92%     Constitutional:   Body Habitus: Body mass index is 34.21 kg/m².  Cooperation: Fully cooperates with exam  Appearance: Well-groomed, well-nourished, not disheveled, no acute distress    Eyes: No scleral icterus, no proptosis     ENT -no obvious auditory deficits, wearing a facial mask    Skin -no rashes or lesions noted    Respiratory-  breathing comfortable on room air,  no audible wheezing    Cardiovascular- no lower extremity edema is noted.     Psychiatric- alert and oriented ×3. Normal affect.     Gait - normal gait, no use of ambulatory device, antalgic.     Musculoskeletal -   Thoracic/Lumbar Spine/Sacral Spine/Hips   Inspection: no evidence of atrophy in bilateral lower extremities throughout     ROM of the lumbar spine not fully evaluated due to pain    Seated SLR is negative on the left and negative on the right     Neuro     Key points for the international standards for neurological classification of spinal cord injury (ISNCSCI) to light touch.     Dermatome R L   L2 2 2   L3 2 2   L4 2 2   L5 2 1   S1 2 1   S2 2 2       Motor Exam Lower Extremities    ? Myotome R L   Hip flexion L2 5 5   Knee extension L3 5 5   Ankle dorsiflexion L4 5 5   Toe extension L5 5 5   Ankle plantarflexion S1 5 5       Reflexes  ?  R L   Patella  2+ 2+   Achilles   2+ 1+       MEDICAL DECISION MAKING    Medical records review: see under HPI section.     DATA    Labs:   05/08/2021: UDS positive for morphine and metabolites, " clonazepam and metabolites, hydrocodone and metabolites  08/18/2020: UDS positive for morphine and metabolites, clonazepam and metabolites, hydrocodone and metabolites  04/24/2020 UDS positive for morphine and metabolites, clonazepam  04/09/2020 Vitamin D, 25:  28L  04/09/2020 Vitamin B12: 217    Lab Results   Component Value Date/Time    SODIUM 141 05/04/2021 08:06 AM    POTASSIUM 4.0 05/04/2021 08:06 AM    CHLORIDE 105 05/04/2021 08:06 AM    CO2 26 05/04/2021 08:06 AM    ANION 10.0 05/04/2021 08:06 AM    GLUCOSE 134 (H) 05/04/2021 08:06 AM    BUN 7 (L) 05/04/2021 08:06 AM    CREATININE 0.58 05/04/2021 08:06 AM    CALCIUM 8.8 05/04/2021 08:06 AM    ASTSGOT 25 05/04/2021 08:06 AM    ALTSGPT 29 05/04/2021 08:06 AM    TBILIRUBIN 0.4 05/04/2021 08:06 AM    ALBUMIN 3.8 05/04/2021 08:06 AM    TOTPROTEIN 6.4 05/04/2021 08:06 AM    GLOBULIN 2.6 05/04/2021 08:06 AM    AGRATIO 1.5 05/04/2021 08:06 AM       No results found for: PROTHROMBTM, INR     Lab Results   Component Value Date/Time    WBC 7.5 05/04/2021 08:06 AM    RBC 4.73 05/04/2021 08:06 AM    HEMOGLOBIN 14.6 05/04/2021 08:06 AM    HEMATOCRIT 43.8 05/04/2021 08:06 AM    MCV 92.6 05/04/2021 08:06 AM    MCH 30.9 05/04/2021 08:06 AM    MCHC 33.3 (L) 05/04/2021 08:06 AM    MPV 10.6 05/04/2021 08:06 AM    NEUTSPOLYS 46.60 05/04/2021 08:06 AM    LYMPHOCYTES 39.80 05/04/2021 08:06 AM    MONOCYTES 9.30 05/04/2021 08:06 AM    EOSINOPHILS 3.70 05/04/2021 08:06 AM    BASOPHILS 0.50 05/04/2021 08:06 AM        Lab Results   Component Value Date/Time    HBA1C 7.3 (H) 05/04/2021 08:06 AM        Imaging: I personally reviewed following images, these are my reads:     MRI lumbar spine 05/05/2020  At L1-2, no central or foraminal stenosis  At L2-3, no central or foraminal stenosis  At L3-4, mild disc bulge and mild ligamentum flavum thickening.  No central canal stenosis. Borderline left foraminal stenosis. Schmorl's node.   At L4-5, diffuse disc bulge with mild ligamentum flavum  thickening.  No central canal stenosis.  There is facet arthropathy, left greater than right. Mild foraminal stenosis bilaterally. S/P left L4/5 hemilaminectomy  At L5-S1, there is mild-borderline foraminal stenosis with facet arthropathy and mild disc bulge.  No central canal stenosis    Xray lumbar spine 05/05/2020  There is mild decreased joint space at L3-4 and L4-5.    Facet arthropathy is noted, greatest at L5-S1 bilaterally.  No significant motion on flexion and extension films    IMAGING radiology reads. I reviewed the following radiology reads    Xray abdomen 07/17/2020  IMPRESSION:     Nonspecific bowel gas pattern. No evidence small bowel obstruction.      MRI lumbar spine 05/05/2020  IMPRESSION:     1.  Caudal lumbar facet arthropathy left worse than right with no bony destruction to indicate septic or inflammatory arthropathy. This most likely is just degenerative  2.  Mild caudal lumbar foraminal stenoses  3.  No significant central stenosis.  4.  Left L4/5 hemilaminectomy                                                                                   Xray lumbar spine 05/05/2020     IMPRESSION:     1.  No acute lumbar compression fracture or subluxation.  2.  Posterior disc space narrowing at L4-5 and to lesser extent at L3-4.  3.  Bilateral facet arthropathy at L5-S1.                                                                                             Diagnosis   Visit Diagnoses     ICD-10-CM   1. S/P lumbar laminectomy  Z98.890   2. DDD (degenerative disc disease), lumbar  M51.36   3. Lumbar spondylosis  M47.816   4. Lumbosacral pain  M54.5   5. Benzodiazepine dependence ([facility])  F13.20   6. Diabetes mellitus type 2 in obese ([facility])  E11.69    E66.9   7. Paresthesia  R20.2         ASSESSMENT:  Grisel Mark 66 y.o. female seen for above     Grisel was seen today for follow-up.    Diagnoses and all orders for this visit:    S/P lumbar laminectomy  -     gabapentin (NEURONTIN) 600 MG tablet; Take  1 tablet by mouth 4 times a day for 30 days.  -     HYDROcodone-acetaminophen (NORCO) 5-325 MG Tab per tablet; Take 1 tablet by mouth every 8 hours as needed for up to 30 days.  -     morphine ER (MS CONTIN) 15 MG Tab CR tablet; Take 1 tablet by mouth every 12 hours for 30 days.  -     morphine ER (MS CONTIN) 15 MG Tab CR tablet; Take 1 tablet by mouth every 12 hours for 30 days.  -     HYDROcodone-acetaminophen (NORCO) 5-325 MG Tab per tablet; Take 1 tablet by mouth every 8 hours as needed for up to 30 days.  -     Consent for Opiate Prescription  -     Controlled Substance Treatment Agreement    DDD (degenerative disc disease), lumbar  -     HYDROcodone-acetaminophen (NORCO) 5-325 MG Tab per tablet; Take 1 tablet by mouth every 8 hours as needed for up to 30 days.  -     morphine ER (MS CONTIN) 15 MG Tab CR tablet; Take 1 tablet by mouth every 12 hours for 30 days.  -     morphine ER (MS CONTIN) 15 MG Tab CR tablet; Take 1 tablet by mouth every 12 hours for 30 days.  -     HYDROcodone-acetaminophen (NORCO) 5-325 MG Tab per tablet; Take 1 tablet by mouth every 8 hours as needed for up to 30 days.  -     REFERRAL TO OUTPATIENT INTERVENTIONAL PAIN CLINIC  -     Consent for Opiate Prescription  -     Controlled Substance Treatment Agreement    Lumbar spondylosis  -     REFERRAL TO OUTPATIENT INTERVENTIONAL PAIN CLINIC    Lumbosacral pain  -     REFERRAL TO OUTPATIENT INTERVENTIONAL PAIN CLINIC    Benzodiazepine dependence (HCC)    Diabetes mellitus type 2 in obese (HCC)    Paresthesia  -     gabapentin (NEURONTIN) 600 MG tablet; Take 1 tablet by mouth 4 times a day for 30 days.           1. Discussed that she is weaning her clonazepam.  This is decreased by 0.5mg in the day and taking 1mg at night.  She will continue to do this with her PCP  2. Discussed left lumbar three through lumbar five medial branch blocks.  She had positive diagnostic blocks last summer, but held off on second diagnostic block due to decreased  axial back pain.  The risks benefits and alternatives to this procedure were discussed and the patient wishes to proceed with the procedure. Risks include but are not limited to damage to surrounding structures, infection, bleeding, worsening of pain which can be permanent, weakness which can be permanent. Benefits include pain relief, improved function. Alternatives includes not doing the procedure.  If positive, we will consider RFA.  3. Continue gabapentin to 600mg four a day as tolerated.  4. Continue MS Contin 15mg po q12h and norco for breakthrough.  Discussed norco up to 2/day. Last [id number] on 05/23 nd norco 5/21.   5. Discussed possible SCS trial.  Continue with Dr. Villa.  Scheduled for July 1  6. Continue colase 100mg daily-bid and miralax prn for constipation.  Bowel movements are regular.  7. Continue home exercise program as tolerated.    8. Encouraged weight loss, discussed working on increasing her water intake to 64 ounces.    Follow-up: Return in about 2 months (around 8/24/2021) for Hospital injection.    Thank you very much for asking me to participate in Grisel Mark's care.  Please contact me with any questions or concerns.    Please note that this dictation was created using voice recognition software. I have made every reasonable attempt to correct obvious errors but there may be errors of grammar and content that I may have overlooked prior to finalization of this note.      Paresh Ogden MD  Physical Medicine and Rehabilitation  Interventional Spine and Sports Physiatry  [facility]

## 2021-06-24 NOTE — PROGRESS NOTES
Follow-up patient note    Physiatry (physical medicine and  Rehabilitation), interventional spine and sports medicine    Date of Service: 06/24/2021    Chief complaint:   Chief Complaint   Patient presents with   • Follow-Up     Lower back pain       HISTORY    HPI: Grisel Mark 66 y.o. female who presents today for follow-up evaluation of low back and leg pain.     Her low back pain has been painful, burning pain into the left leg is better since the 02/24/2021 left L4 and L5 TFESI.  Pain is 9/10 on the NRS.    She has been having some cramping in her feet at times, does not drink water regularly.    Since her last visit, Grisel reports that she has been struggling with her weight.  Frustrated that she has gained 2 lb since last visit.    She has an appointment with Dr. Villa next week, for Thursday    Gabapentin back to 600mg po qid.  She is starting to reduce her clonazepam by 0.5mg a day. Her pain medications include: Duloxetine 90mg.  MS Contin 15mg q12h.  Norco 5/325 for breakthrough.  No side effects.    Bowel movements have been regular with stool softener from [employer]      By history:  Her laminectomy was in 1998.  Previous injection on left L4 sand L5 TFESI on 08/12/2020 helped with her leg pain.     Medical records review:  Dr. Francisco Olmos, plan to increase duloxetine 90mg daily, discontinue buspirone 20mg po bid, start buproprion XL 150mg daily, continue brexipiprazole 1mg qhs, hydroxyzine 25mg po tid prn, clonazepam 0.5mg daily prn    Review of records   Dr. Dheeraj De La Rosa:  08/20/2019 Right L3-4, L4-5, L5-S1 radiofrequency ablation    I reviewed the note from the referring provider Peter Bruce * dated 02/05/2020: She was seen to establish care, having just moved from [place].  She was seen for essential hypertension, mixed hyperlipidemia, DM2 in obese, hypothyroidism, recurrent MDD in partial depression/anxiety, GERD without esophagitis, chronic bilateral low back pain with  bilateral sciatica.  Medications associated with the above were written, related labs ordered including CBC, CMP, Hb A1c, lipids, microalbumin, TSH, free thryoxine, referral to ps\ychiatry, referral to GI for colonoscopy and referral to pain clinic.    Previous treatments:    Physical Therapy: Yes    Medications the patient is tried: Narcotics, gabapentin and muscle relaxers    Previous interventions: [facility] these included right lumbar radiofrequency procedures    Previous surgeries to relieve the above pain:  Surgery on October 28, 1998      ROS:   ENT: ear infection, treated with augmentin  CV: irregular heart, reports history of tachycardia  Psych: depression since 1995  Heme: anemia  Endo: hypothyroidism, diabetes    Red Flags ROS:   Fever, Chills, Sweats: Denies  Involuntary Weight Loss: Denies  Bladder Incontinence: Denies  Bowel Incontinence: Denies  Saddle Anesthesia: Denies    All other systems reviewed and negative.       PMHx:   Past Medical History:   Diagnosis Date   • Anxiety    • Chronic back pain    • Constipation    • Depression    • Diabetes (HCC)    • GERD (gastroesophageal reflux disease)    • Heart murmur     tachycardia   • Hyperlipidemia    • Hypertension    • Thyroid disease        PSHx:   Past Surgical History:   Procedure Laterality Date   • LUMBAR TRANSFORAMINAL EPIDURAL STEROID INJECTION Left 5/20/2021    Procedure: LEFT lumbar four and lumbar five transforaminal epidural steroid injection;  Surgeon: Paresh Ogden M.D.;  [facility];  Service: Pain Management   • BLOCK EPIDURAL STEROID INJECTION Left 2/24/2021    Procedure: INJECTION, STEROID, EPIDURAL;  Surgeon: Paresh Ogden M.D.;  Location: [facility];  Service: Pain Management   • LUMBAR MEDIAL BRANCH BLOCKS Left 9/9/2020    Procedure: BLOCK, NERVE, SPINAL, LUMBAR, POSTERIOR RAMUS, MEDIAL BRANCH;  Surgeon: Paresh Ogden M.D.;  Location: [facility];  Service: Pain Management   • DE NJX AA&/STRD TFRML EPI LUMBAR/SACRAL 1 LEVEL Left 8/12/2020    Procedure: INJECTION, SPINE, LUMBOSACRAL, EPIDURAL, 1 LEVEL, TRANSFORAMINAL APPROACH;  Surgeon: Paresh Ogden M.D.;  Location: [facility];  Service: Pain Management   • DE NJX AA&/STRD TFRML EPI LUMBAR/SACRAL EA ADDL Left 8/12/2020    Procedure: INJECTION, SPINE, LUMBOSACRAL, EPIDURAL, TRANSFORAMINAL APPROACH;  Surgeon: Paresh Ogden M.D.;  Location: [facility];  Service: Pain Management   • LAMINOTOMY  1998   • ABDOMINAL HYSTERECTOMY TOTAL     • CARPAL TUNNEL RELEASE     • CHOLECYSTECTOMY         Family history   Family History   Problem Relation Age of Onset   • Cancer Mother    • Stroke Father         Heart attack   • Dementia Sister    • Stroke Brother         heart Attack   • Cancer Brother         Skin   • Diabetes Brother    • Kidney Disease Brother    • Cancer Brother    • Parkinson's Disease Sister    • No Known Problems Sister    • Diabetes Daughter    • Hypertension Daughter          Medications:   Current Outpatient Medications   Medication   • gabapentin (NEURONTIN) 600 MG tablet   • HYDROcodone-acetaminophen (NORCO) 5-325 MG Tab per tablet   • morphine ER (MS CONTIN) 15 MG Tab CR tablet   • [START ON 7/24/2021] morphine ER (MS CONTIN) 15 MG Tab CR tablet   • [START ON 7/24/2021] HYDROcodone-acetaminophen (NORCO) 5-325 MG Tab per tablet   • rosuvastatin (CRESTOR) 10 MG Tab   • clonazePAM (KLONOPIN) 1 MG Tab   • clonazePAM (KLONOPIN) 0.5 MG Tab   • Brexpiprazole 2 MG Tab   • estradiol (ESTRACE) 1 MG Tab   • Empagliflozin (JARDIANCE) 25 MG Tab   • omeprazole (PRILOSEC) 40 MG delayed-release capsule   • metoprolol tartrate (LOPRESSOR) 25 MG Tab   • lisinopril (PRINIVIL) 10 MG Tab   • levothyroxine (SYNTHROID) 50 MCG Tab   • cyanocobalamin (VITAMIN B-12) 500 MCG Tab   • metformin (GLUCOPHAGE) 1000 MG tablet   • hydrOXYzine HCl (ATARAX) 25 MG Tab   •  busPIRone (BUSPAR) 10 MG Tab tablet   • Naloxone (NARCAN) 4 MG/0.1ML Liquid   • Diclofenac Sodium 1 % Gel   • fluconazole (DIFLUCAN) 150 MG tablet   • DULoxetine (CYMBALTA) 30 MG Cap DR Particles   • Blood Glucose Test Strips   • Lancets   • Alcohol Swabs   • Blood Glucose Meter Kit     No current facility-administered medications for this visit.       Allergies:   No Known Allergies    Social Hx:   Social History     Socioeconomic History   • Marital status:      Spouse name: Not on file   • Number of children: Not on file   • Years of education: Not on file   • Highest education level: Not on file   Occupational History   • Not on file   Tobacco Use   • Smoking status: Former Smoker     Packs/day: 0.25     Types: Cigarettes   • Smokeless tobacco: Never Used   Vaping Use   • Vaping Use: Never used   Substance and Sexual Activity   • Alcohol use: Not Currently     Comment: rare   • Drug use: Never   • Sexual activity: Not Currently   Other Topics Concern   •  Service No   • Blood Transfusions Yes   • Caffeine Concern No   • Occupational Exposure No   • Hobby Hazards No   • Sleep Concern Yes   • Stress Concern Yes   • Weight Concern Yes   • Special Diet No   • Back Care No   • Exercise Yes   • Bike Helmet No   • Seat Belt Yes   • Self-Exams Yes   Social History Narrative   • Not on file     Social Determinants of Health     Financial Resource Strain:    • Difficulty of Paying Living Expenses:    Food Insecurity:    • Worried About Running Out of Food in the Last Year:    • Ran Out of Food in the Last Year:    Transportation Needs:    • Lack of Transportation (Medical):    • Lack of Transportation (Non-Medical):    Physical Activity:    • Days of Exercise per Week:    • Minutes of Exercise per Session:    Stress:    • Feeling of Stress :    Social Connections:    • Frequency of Communication with Friends and Family:    • Frequency of Social Gatherings with Friends and Family:    • Attends Evangelical  "Services:    • Active Member of Clubs or Organizations:    • Attends Club or Organization Meetings:    • Marital Status:    Intimate Partner Violence:    • Fear of Current or Ex-Partner:    • Emotionally Abused:    • Physically Abused:    • Sexually Abused:          EXAMINATION     Physical Exam:   Vitals: /68 (BP Location: Right arm, Patient Position: Sitting, BP Cuff Size: Small adult)   Pulse 75   Temp 36.4 °C (97.5 °F) (Temporal)   Ht 1.6 m (5' 3\")   Wt 87.6 kg (193 lb 2 oz)   SpO2 92%     Constitutional:   Body Habitus: Body mass index is 34.21 kg/m².  Cooperation: Fully cooperates with exam  Appearance: Well-groomed, well-nourished, not disheveled, no acute distress    Eyes: No scleral icterus, no proptosis     ENT -no obvious auditory deficits, wearing a facial mask    Skin -no rashes or lesions noted    Respiratory-  breathing comfortable on room air,  no audible wheezing    Cardiovascular- no lower extremity edema is noted.     Psychiatric- alert and oriented ×3. Normal affect.     Gait - normal gait, no use of ambulatory device, antalgic.     Musculoskeletal -   Thoracic/Lumbar Spine/Sacral Spine/Hips   Inspection: no evidence of atrophy in bilateral lower extremities throughout     ROM of the lumbar spine not fully evaluated due to pain    Seated SLR is negative on the left and negative on the right     Neuro     Key points for the international standards for neurological classification of spinal cord injury (ISNCSCI) to light touch.     Dermatome R L   L2 2 2   L3 2 2   L4 2 2   L5 2 1   S1 2 1   S2 2 2       Motor Exam Lower Extremities    ? Myotome R L   Hip flexion L2 5 5   Knee extension L3 5 5   Ankle dorsiflexion L4 5 5   Toe extension L5 5 5   Ankle plantarflexion S1 5 5       Reflexes  ?  R L   Patella  2+ 2+   Achilles   2+ 1+       MEDICAL DECISION MAKING    Medical records review: see under HPI section.     DATA    Labs:   05/08/2021: UDS positive for morphine and metabolites, " clonazepam and metabolites, hydrocodone and metabolites  08/18/2020: UDS positive for morphine and metabolites, clonazepam and metabolites, hydrocodone and metabolites  04/24/2020 UDS positive for morphine and metabolites, clonazepam  04/09/2020 Vitamin D, 25:  28L  04/09/2020 Vitamin B12: 217    Lab Results   Component Value Date/Time    SODIUM 141 05/04/2021 08:06 AM    POTASSIUM 4.0 05/04/2021 08:06 AM    CHLORIDE 105 05/04/2021 08:06 AM    CO2 26 05/04/2021 08:06 AM    ANION 10.0 05/04/2021 08:06 AM    GLUCOSE 134 (H) 05/04/2021 08:06 AM    BUN 7 (L) 05/04/2021 08:06 AM    CREATININE 0.58 05/04/2021 08:06 AM    CALCIUM 8.8 05/04/2021 08:06 AM    ASTSGOT 25 05/04/2021 08:06 AM    ALTSGPT 29 05/04/2021 08:06 AM    TBILIRUBIN 0.4 05/04/2021 08:06 AM    ALBUMIN 3.8 05/04/2021 08:06 AM    TOTPROTEIN 6.4 05/04/2021 08:06 AM    GLOBULIN 2.6 05/04/2021 08:06 AM    AGRATIO 1.5 05/04/2021 08:06 AM       No results found for: PROTHROMBTM, INR     Lab Results   Component Value Date/Time    WBC 7.5 05/04/2021 08:06 AM    RBC 4.73 05/04/2021 08:06 AM    HEMOGLOBIN 14.6 05/04/2021 08:06 AM    HEMATOCRIT 43.8 05/04/2021 08:06 AM    MCV 92.6 05/04/2021 08:06 AM    MCH 30.9 05/04/2021 08:06 AM    MCHC 33.3 (L) 05/04/2021 08:06 AM    MPV 10.6 05/04/2021 08:06 AM    NEUTSPOLYS 46.60 05/04/2021 08:06 AM    LYMPHOCYTES 39.80 05/04/2021 08:06 AM    MONOCYTES 9.30 05/04/2021 08:06 AM    EOSINOPHILS 3.70 05/04/2021 08:06 AM    BASOPHILS 0.50 05/04/2021 08:06 AM        Lab Results   Component Value Date/Time    HBA1C 7.3 (H) 05/04/2021 08:06 AM        Imaging: I personally reviewed following images, these are my reads:     MRI lumbar spine 05/05/2020  At L1-2, no central or foraminal stenosis  At L2-3, no central or foraminal stenosis  At L3-4, mild disc bulge and mild ligamentum flavum thickening.  No central canal stenosis. Borderline left foraminal stenosis. Schmorl's node.   At L4-5, diffuse disc bulge with mild ligamentum flavum  thickening.  No central canal stenosis.  There is facet arthropathy, left greater than right. Mild foraminal stenosis bilaterally. S/P left L4/5 hemilaminectomy  At L5-S1, there is mild-borderline foraminal stenosis with facet arthropathy and mild disc bulge.  No central canal stenosis    Xray lumbar spine 05/05/2020  There is mild decreased joint space at L3-4 and L4-5.    Facet arthropathy is noted, greatest at L5-S1 bilaterally.  No significant motion on flexion and extension films    IMAGING radiology reads. I reviewed the following radiology reads    Xray abdomen 07/17/2020  IMPRESSION:     Nonspecific bowel gas pattern. No evidence small bowel obstruction.      MRI lumbar spine 05/05/2020  IMPRESSION:     1.  Caudal lumbar facet arthropathy left worse than right with no bony destruction to indicate septic or inflammatory arthropathy. This most likely is just degenerative  2.  Mild caudal lumbar foraminal stenoses  3.  No significant central stenosis.  4.  Left L4/5 hemilaminectomy                                                                                   Xray lumbar spine 05/05/2020     IMPRESSION:     1.  No acute lumbar compression fracture or subluxation.  2.  Posterior disc space narrowing at L4-5 and to lesser extent at L3-4.  3.  Bilateral facet arthropathy at L5-S1.                                                                                             Diagnosis   Visit Diagnoses     ICD-10-CM   1. S/P lumbar laminectomy  Z98.890   2. DDD (degenerative disc disease), lumbar  M51.36   3. Lumbar spondylosis  M47.816   4. Lumbosacral pain  M54.5   5. Benzodiazepine dependence ([facility])  F13.20   6. Diabetes mellitus type 2 in obese ([facility])  E11.69    E66.9   7. Paresthesia  R20.2         ASSESSMENT:  Grisel Mark 66 y.o. female seen for above     Grisel was seen today for follow-up.    Diagnoses and all orders for this visit:    S/P lumbar laminectomy  -     gabapentin (NEURONTIN) 600 MG tablet; Take  1 tablet by mouth 4 times a day for 30 days.  -     HYDROcodone-acetaminophen (NORCO) 5-325 MG Tab per tablet; Take 1 tablet by mouth every 8 hours as needed for up to 30 days.  -     morphine ER (MS CONTIN) 15 MG Tab CR tablet; Take 1 tablet by mouth every 12 hours for 30 days.  -     morphine ER (MS CONTIN) 15 MG Tab CR tablet; Take 1 tablet by mouth every 12 hours for 30 days.  -     HYDROcodone-acetaminophen (NORCO) 5-325 MG Tab per tablet; Take 1 tablet by mouth every 8 hours as needed for up to 30 days.  -     Consent for Opiate Prescription  -     Controlled Substance Treatment Agreement    DDD (degenerative disc disease), lumbar  -     HYDROcodone-acetaminophen (NORCO) 5-325 MG Tab per tablet; Take 1 tablet by mouth every 8 hours as needed for up to 30 days.  -     morphine ER (MS CONTIN) 15 MG Tab CR tablet; Take 1 tablet by mouth every 12 hours for 30 days.  -     morphine ER (MS CONTIN) 15 MG Tab CR tablet; Take 1 tablet by mouth every 12 hours for 30 days.  -     HYDROcodone-acetaminophen (NORCO) 5-325 MG Tab per tablet; Take 1 tablet by mouth every 8 hours as needed for up to 30 days.  -     REFERRAL TO OUTPATIENT INTERVENTIONAL PAIN CLINIC  -     Consent for Opiate Prescription  -     Controlled Substance Treatment Agreement    Lumbar spondylosis  -     REFERRAL TO OUTPATIENT INTERVENTIONAL PAIN CLINIC    Lumbosacral pain  -     REFERRAL TO OUTPATIENT INTERVENTIONAL PAIN CLINIC    Benzodiazepine dependence (HCC)    Diabetes mellitus type 2 in obese (HCC)    Paresthesia  -     gabapentin (NEURONTIN) 600 MG tablet; Take 1 tablet by mouth 4 times a day for 30 days.           1. Discussed that she is weaning her clonazepam.  This is decreased by 0.5mg in the day and taking 1mg at night.  She will continue to do this with her PCP  2. Discussed left lumbar three through lumbar five medial branch blocks.  She had positive diagnostic blocks last summer, but held off on second diagnostic block due to decreased  axial back pain.  The risks benefits and alternatives to this procedure were discussed and the patient wishes to proceed with the procedure. Risks include but are not limited to damage to surrounding structures, infection, bleeding, worsening of pain which can be permanent, weakness which can be permanent. Benefits include pain relief, improved function. Alternatives includes not doing the procedure.  If positive, we will consider RFA.  3. Continue gabapentin to 600mg four a day as tolerated.  4. Continue MS Contin 15mg po q12h and norco for breakthrough.  Discussed norco up to 2/day. Last [id number] on 05/23 nd norco 5/21.   5. Discussed possible SCS trial.  Continue with Dr. Villa.  Scheduled for July 1  6. Continue colase 100mg daily-bid and miralax prn for constipation.  Bowel movements are regular.  7. Continue home exercise program as tolerated.    8. Encouraged weight loss, discussed working on increasing her water intake to 64 ounces.    Follow-up: Return in about 2 months (around 8/24/2021) for Hospital injection.    Thank you very much for asking me to participate in Grisel Mark's care.  Please contact me with any questions or concerns.    Please note that this dictation was created using voice recognition software. I have made every reasonable attempt to correct obvious errors but there may be errors of grammar and content that I may have overlooked prior to finalization of this note.      Paresh Ogden MD  Physical Medicine and Rehabilitation  Interventional Spine and Sports Physiatry  [facility]

## 2021-06-24 NOTE — PATIENT INSTRUCTIONS
Your procedure will be at the [facility] special procedure suite.    [facility] [place] [address]       PRE-PROCEDURE INSTRUCTIONS  You may take your regular medications except:   · No Anti-inflammatories 5 days prior to your procedure. Anti-inflammatories include medicines such as  ibuprofen (Motrin, Advil), Excedrin, Naproxen (Aleve, Anaprox, Naprelan, Naprosyn), Celecoxib (Celebrex), Diclofenac (Voltaren-XR tab), and Meloxicam (Mobic).   · No Glucophage or Metformin 24 hours before your procedure. You may resume next day after your procedure.  · Call the physiatry office if you are taking or prescribed anti-biotics within five days of procedure.  · Please ask provider if you are taking any new diabetes medication.  · CONTINUE TAKING BLOOD PRESSURE MEDICATIONS AS PRESCRIBED.  · Pain medications will not be prescribed on the procedure day. Procedural pain medication may be used by your provider   · Call your doctor's office performing the procedure if you have a fever, chills, rash or new illness prior to your procedure    Anticoagulation/antiplatelet medications  No Blood thinning medications such as Coumadin or Plavix 5 days prior to procedure unless your doctor said to continue these medications. Call your doctor if a new medication is prescribed in this class.     Restrictions for eating before procedure:   · If you are getting procedural sedation, then do not eat to for 8 hours prior to procedure appointment time. Do not drink fluids for four hours prior to your procedure time.   · If you are not having procedural sedation, then Skip the meal prior to your procedure. If you have a morning procedure then skip breakfast. If you have an afternoon procedure then skip lunch.   · You may drink clear liquids up to 2 hours prior to your procedure  · You must have a  the day of procedure to accompany you home.      POST PROCEDURE INSTRUCTIONS   · No heavy lifting, strenuous bending or  strenuous exercise for 3 days after your procedure.  · No hot tubs, baths, swimming for 3 days after your procedure  · You can remove the bandage the day after the procedure.  · IF YOU RECEIVED A STEROID INJECTION. PLEASE NOTE THAT THERE MAY BE A DELAY FOR THE INJECTION TO START WORKING, THE DELAY MAY BE UP TO TWO WEEKS. IF YOU HAVE DIABETES, PLEASE NOTE THAT YOUR SUGAR LEVELS MAY BE ELEVATED FOR 1-2 DAYS AFTER A STEROID INJECTION.  THE STEROID MAY CAUSE TEMPORARY SYMPTOMS WHICH USUALLY RESOLVE ON THEIR OWN WITHIN 1 TO 2 DAYS INCLUDING FACIAL FLUSHING OR A FEELING OF WARMTH ON THE FACE, TEMPORARY INCREASES IN BLOOD SUGAR, INSOMNIA, INCREASED HUNGER  · IF YOU RECEIVED A DIAGNOSTIC PROCEDURE (SUCH AS A MEDIAL BRANCH BLOCK), PLEASE NOTE THAT WE DO EXPECT THIS INJECTION TO WEAR OFF.  IT IS IMPORTANT TO COMPLETE THE PAIN DIARY AND LIST THE PAIN SCORE ONLY FOR THE REGION WHERE THE PROCEDURE WAS AND BRING THIS TO YOUR FOLLOW UP VISIT.  · IF YOU RECEIVED A RADIOFREQUENCY PROCEDURE, THERE MAY BE SOME SORENESS AFTER THE PROCEDURE.  THIS IS NORMAL.  · IF YOU EXPERIENCE PROLONGED WEAKNESS LONGER THAN ONE DAY, BOWEL OR BLADDER INCONTINENCE THEN PLEASE CALL THE PHYSIATRY OFFICE.  · Your leg may feel heavy, weak and numb for up to 1-2 days. Be very careful walking.   ·  You may resume normal activities 3 days after procedure.

## 2021-06-29 RX ORDER — DULOXETIN HYDROCHLORIDE 30 MG/1
90 CAPSULE, DELAYED RELEASE ORAL DAILY
Qty: 270 CAPSULE | Refills: 0 | OUTPATIENT
Start: 2021-06-29

## 2021-06-30 NOTE — TELEPHONE ENCOUNTER
Last appointment was on 05/26/2021 6/24/21 5/26/21 5/13/21   Blood Pressure  128/68 -- 130/72

## 2021-07-01 DIAGNOSIS — E66.9 DIABETES MELLITUS TYPE 2 IN OBESE: ICD-10-CM

## 2021-07-01 DIAGNOSIS — E11.69 DIABETES MELLITUS TYPE 2 IN OBESE: ICD-10-CM

## 2021-07-06 RX ORDER — OMEPRAZOLE 40 MG/1
40 CAPSULE, DELAYED RELEASE ORAL DAILY
Qty: 90 CAPSULE | Refills: 1 | Status: SHIPPED | OUTPATIENT
Start: 2021-07-06 | End: 2021-12-15 | Stop reason: SDUPTHER

## 2021-07-14 DIAGNOSIS — N95.1 VASOMOTOR SYMPTOMS DUE TO MENOPAUSE: ICD-10-CM

## 2021-07-15 ENCOUNTER — TELEPHONE (OUTPATIENT)
Dept: MEDICAL GROUP | Facility: PHYSICIAN GROUP | Age: 66
End: 2021-07-15

## 2021-07-15 NOTE — TELEPHONE ENCOUNTER
ESTABLISHED PATIENT PRE-VISIT PLANNING     Patient was NOT contacted to complete PVP.NO answer. Left voicemail     Note: Patient will not be contacted if there is no indication to call.     1.  Reviewed notes from the last few office visits within the medical group: Yes    2.  If any orders were placed at last visit or intended to be done for this visit (i.e. 6 mos follow-up), do we have Results/Consult Notes?         •  Labs - Labs were not ordered at last office visit.  Note: If patient appointment is for lab review and patient did not complete labs, check with provider if OK to reschedule patient until labs completed.       •  Imaging - Imaging ordered, NOT completed. Patient advised to complete prior to next appointment.       •  Referrals - No referrals were ordered at last office visit.    3. Is this appointment scheduled as a Hospital Follow-Up? No    4.  Immunizations were updated in Epic using Reconcile Outside Information activity? Yes    5.  Patient is due for the following Health Maintenance Topics:   Health Maintenance Due   Topic Date Due   • Annual Wellness Visit  Never done   • COLONOSCOPY  Never done   • MAMMOGRAM  05/28/2021         6.  AHA (Pulse[employer]) form printed for Provider? Yes

## 2021-07-18 DIAGNOSIS — E11.69 DIABETES MELLITUS TYPE 2 IN OBESE: ICD-10-CM

## 2021-07-18 DIAGNOSIS — E66.9 DIABETES MELLITUS TYPE 2 IN OBESE: ICD-10-CM

## 2021-07-19 RX ORDER — EMPAGLIFLOZIN 25 MG/1
TABLET, FILM COATED ORAL
Qty: 100 TABLET | Refills: 0 | Status: SHIPPED | OUTPATIENT
Start: 2021-07-19 | End: 2021-07-20 | Stop reason: SDUPTHER

## 2021-07-19 RX ORDER — ESTRADIOL 1 MG/1
1 TABLET ORAL
Qty: 90 TABLET | Refills: 0 | Status: SHIPPED | OUTPATIENT
Start: 2021-07-19 | End: 2021-10-28 | Stop reason: SDUPTHER

## 2021-07-20 ENCOUNTER — TELEMEDICINE (OUTPATIENT)
Dept: MEDICAL GROUP | Facility: PHYSICIAN GROUP | Age: 66
End: 2021-07-20
Payer: MEDICARE

## 2021-07-20 VITALS — BODY MASS INDEX: 34.2 KG/M2 | HEIGHT: 63 IN | WEIGHT: 193 LBS

## 2021-07-20 DIAGNOSIS — J30.1 SEASONAL ALLERGIC RHINITIS DUE TO POLLEN: ICD-10-CM

## 2021-07-20 DIAGNOSIS — E55.9 VITAMIN D DEFICIENCY: ICD-10-CM

## 2021-07-20 DIAGNOSIS — E66.9 DIABETES MELLITUS TYPE 2 IN OBESE: ICD-10-CM

## 2021-07-20 DIAGNOSIS — R53.82 CHRONIC FATIGUE: ICD-10-CM

## 2021-07-20 DIAGNOSIS — E11.69 DIABETES MELLITUS TYPE 2 IN OBESE: ICD-10-CM

## 2021-07-20 PROCEDURE — 99214 OFFICE O/P EST MOD 30 MIN: CPT | Mod: 95,CR | Performed by: NURSE PRACTITIONER

## 2021-07-20 RX ORDER — EMPAGLIFLOZIN 25 MG/1
1 TABLET, FILM COATED ORAL
Qty: 100 TABLET | Refills: 3 | Status: SHIPPED | OUTPATIENT
Start: 2021-07-20 | End: 2021-10-28 | Stop reason: SDUPTHER

## 2021-07-20 RX ORDER — FLUTICASONE PROPIONATE 50 MCG
1 SPRAY, SUSPENSION (ML) NASAL DAILY
Qty: 16 G | Refills: 11 | Status: SHIPPED | OUTPATIENT
Start: 2021-07-20

## 2021-07-20 ASSESSMENT — ENCOUNTER SYMPTOMS
WEIGHT LOSS: 0
DIZZINESS: 0
NERVOUS/ANXIOUS: 0
SEIZURES: 0
CHILLS: 0
BACK PAIN: 1
CLAUDICATION: 0
ABDOMINAL PAIN: 0
PHOTOPHOBIA: 0
FALLS: 0
DIAPHORESIS: 0
SORE THROAT: 0
DEPRESSION: 0
SPEECH CHANGE: 0
PND: 0
COUGH: 0
ORTHOPNEA: 0
HEADACHES: 0
FEVER: 0
CONSTIPATION: 0
BLOOD IN STOOL: 0
LOSS OF CONSCIOUSNESS: 0
SENSORY CHANGE: 0
SHORTNESS OF BREATH: 0

## 2021-07-20 ASSESSMENT — LIFESTYLE VARIABLES: SUBSTANCE_ABUSE: 0

## 2021-07-20 ASSESSMENT — FIBROSIS 4 INDEX: FIB4 SCORE: 1.63

## 2021-07-20 NOTE — PROGRESS NOTES
Telemedicine: Established Patient   This evaluation was conducted via Zoom using secure and encrypted videoconferencing technology. The patient was in a private location in the state of [place].    The patient's identity was confirmed and verbal consent was obtained for this virtual visit.    Subjective:   CC:   Chief Complaint   Patient presents with   • Fatigue       Grisel Mark is a 66 y.o. female presenting for evaluation and management of:    Fatigue: Significant fatigue over the last 1.5 weeks; she did receive her first COVID-19 vaccination nearly 3 weeks ago.  Denies fever, chills, body aches or concern for illness.  Due to chronic pain and significant osteoarthritis she obtains corticosteroid injections routinely.  Has not been evaluated for adrenal fatigue.  Thyroid function was checked with last set of labs in May, this was stable.  Does have a history of obstructive sleep apnea and utilizes a CPAP at night though this is 10 years old.  Has upcoming appointment with sleep medicine for reevaluation.    Diabetes: Chronic and controlled.  Tolerating Metformin and Jardiance quite well without significant side effects.    Review of Systems   Constitutional: Positive for malaise/fatigue. Negative for chills, diaphoresis, fever and weight loss.   HENT: Negative for congestion and sore throat.    Eyes: Negative for photophobia.   Respiratory: Negative for cough and shortness of breath.    Cardiovascular: Negative for chest pain, orthopnea, claudication and PND.   Gastrointestinal: Negative for abdominal pain, blood in stool and constipation.   Genitourinary: Negative for dysuria and frequency.   Musculoskeletal: Positive for back pain. Negative for falls.   Skin: Negative for itching and rash.   Neurological: Negative for dizziness, sensory change, speech change, seizures, loss of consciousness and headaches.   Endo/Heme/Allergies: Negative for environmental allergies.   Psychiatric/Behavioral: Negative for  depression and substance abuse. The patient is not nervous/anxious.          No Known Allergies    Current medicines (including changes today)  Current Outpatient Medications   Medication Sig Dispense Refill   • Empagliflozin (JARDIANCE) 25 MG Tab Take 1 tablet by mouth every day. 100 tablet 3   • Cholecalciferol 1.25 MG (25294 UT) Tab Take 50,000 Units by mouth every 7 days. 12 tablet 0   • fluticasone (FLONASE) 50 MCG/ACT nasal spray Administer 1 Spray into affected nostril(S) every day. 16 g 11   • estradiol (ESTRACE) 1 MG Tab TAKE 1 TABLET BY MOUTH EVERY DAY 90 tablet 0   • aspirin EC (ECOTRIN) 81 MG Tablet Delayed Response Take 81 mg by mouth every day.     • omeprazole (PRILOSEC) 40 MG delayed-release capsule TAKE 1 CAPSULE BY MOUTH EVERY DAY 90 capsule 1   • metformin (GLUCOPHAGE) 1000 MG tablet TAKE 1 TABLET BY MOUTH TWICE DAILY WITH MEALS 200 tablet 0   • metoprolol tartrate (LOPRESSOR) 25 MG Tab TAKE 1 TABLET BY MOUTH TWICE DAILY 180 tablet 1   • DULoxetine (CYMBALTA) 30 MG Cap DR Particles TAKE 3 CAPSULES BY MOUTH EVERY  capsule 1   • gabapentin (NEURONTIN) 600 MG tablet Take 1 tablet by mouth 4 times a day for 30 days. 120 tablet 1   • HYDROcodone-acetaminophen (NORCO) 5-325 MG Tab per tablet Take 1 tablet by mouth every 8 hours as needed for up to 30 days. 60 tablet 0   • morphine ER (MS CONTIN) 15 MG Tab CR tablet Take 1 tablet by mouth every 12 hours for 30 days. 60 tablet 0   • [START ON 7/24/2021] morphine ER (MS CONTIN) 15 MG Tab CR tablet Take 1 tablet by mouth every 12 hours for 30 days. 60 tablet 0   • [START ON 7/24/2021] HYDROcodone-acetaminophen (NORCO) 5-325 MG Tab per tablet Take 1 tablet by mouth every 8 hours as needed for up to 30 days. 60 tablet 0   • rosuvastatin (CRESTOR) 10 MG Tab TAKE 1 TABLET BY MOUTH EVERY EVENING 100 tablet 3   • clonazePAM (KLONOPIN) 1 MG Tab Take 1 tablet by mouth every evening for 90 days. 90 tablet 0   • clonazePAM (KLONOPIN) 0.5 MG Tab Take 1 tablet  by mouth every morning for 90 days. 90 tablet 0   • Brexpiprazole 2 MG Tab Take 2 mg by mouth every day. 90 tablet 3   • lisinopril (PRINIVIL) 10 MG Tab Take 1 tablet by mouth every day. 100 tablet 0   • cyanocobalamin (VITAMIN B-12) 500 MCG Tab Take 500 mcg by mouth every day.     • hydrOXYzine HCl (ATARAX) 25 MG Tab Take 1-2 Tabs by mouth at bedtime as needed for Itching or Anxiety. 60 Tab 11   • busPIRone (BUSPAR) 10 MG Tab tablet Take 1 Tab by mouth 3 times a day. 270 Tab 3   • Blood Glucose Test Strips Use one Abbott Freedom Lite strip to test blood sugar twice daily and PRN. 270 Each 3   • Lancets Use one Abbott [place] Lite lancet to test blood sugar twice daily and PRN. 300 Each 3   • Alcohol Swabs Wipe site with prep pad prior to injection. 100 Each 3   • Naloxone (NARCAN) 4 MG/0.1ML Liquid One spray in one nostril for overdose and call 911. 1 Each 0   • Diclofenac Sodium 1 % Gel Apply 1 g to skin as directed 2 times a day as needed (joint pain). 150 g 2   • Blood Glucose Meter Kit Test blood sugar as recommended by provider. Abbott Freestyle Lite blood glucose monitoring kit. 1 Kit 0   • levothyroxine (SYNTHROID) 50 MCG Tab Take 1 tablet by mouth Every morning on an empty stomach. 90 tablet 1     No current facility-administered medications for this visit.       Patient Active Problem List    Diagnosis Date Noted   • Opioid dependence in controlled environment ([facility]) 05/26/2021   • Benzodiazepine dependence ([facility]) 05/26/2021   • Generalized anxiety disorder 05/12/2020   • Posttraumatic stress disorder, delayed onset 05/12/2020   • Essential hypertension 02/05/2020   • Mixed hyperlipidemia 02/05/2020   • Diabetes mellitus type 2 in obese ([facility]) 02/05/2020   • Hypothyroidism (acquired) 02/05/2020   • Depression, major, recurrent, moderate ([facility]) 02/05/2020   • Gastroesophageal reflux disease without esophagitis 02/05/2020   • Anxiety 02/05/2020       Family History   Problem Relation Age of Onset   • Cancer  "Mother    • Stroke Father         Heart attack   • Dementia Sister    • Stroke Brother         heart Attack   • Cancer Brother         Skin   • Diabetes Brother    • Kidney Disease Brother    • Cancer Brother    • Parkinson's Disease Sister    • No Known Problems Sister    • Diabetes Daughter    • Hypertension Daughter        She  has a past medical history of Anxiety, Chronic back pain, Constipation, Depression, Diabetes (HCC), GERD (gastroesophageal reflux disease), Heart murmur, Hyperlipidemia, Hypertension, and Thyroid disease.  She  has a past surgical history that includes cholecystectomy; carpal tunnel release; abdominal hysterectomy total; pr njx aa&/strd tfrml epi lumbar/sacral 1 level (Left, 8/12/2020); pr njx aa&/strd tfrml epi lumbar/sacral ea addl (Left, 8/12/2020); lumbar medial branch blocks (Left, 9/9/2020); laminotomy (1998); block epidural steroid injection (Left, 2/24/2021); and lumbar transforaminal epidural steroid injection (Left, 5/20/2021).       Objective:   Ht 1.6 m (5' 3\")   Wt 87.5 kg (193 lb)   LMP  (LMP Unknown)   BMI 34.19 kg/m²     Physical Exam  Constitutional:       Appearance: Normal appearance. She is normal weight.   HENT:      Head: Atraumatic.      Nose: Nose normal.   Eyes:      General:         Right eye: No discharge.         Left eye: No discharge.      Extraocular Movements: Extraocular movements intact.      Conjunctiva/sclera: Conjunctivae normal.   Pulmonary:      Effort: Pulmonary effort is normal. No respiratory distress.      Breath sounds: Normal breath sounds.   Musculoskeletal:         General: Normal range of motion.      Cervical back: Normal range of motion.   Skin:     Findings: No rash.   Neurological:      General: No focal deficit present.      Mental Status: She is alert and oriented to person, place, and time. Mental status is at baseline.   Psychiatric:         Mood and Affect: Mood normal.         Behavior: Behavior normal.         Thought Content: " Thought content normal.         Judgment: Judgment normal.         Assessment and Plan:   The following treatment plan was discussed:     Grisel was seen today for fatigue.    Diagnoses and all orders for this visit:    Chronic fatigue  Comments:  Rule out adrenal insufficiency due to chronic corticosteroid injections.  Reevaluate thyroid as well as CBC for potential causes of new onset fatigue  Orders:  -     TSH WITH REFLEX TO FT4; Future  -     CORTISOL; Future  -     ACTH; Future  -     CBC WITH DIFFERENTIAL; Future  -     VITAMIN B12; Future    Diabetes mellitus type 2 in obese (HCC)  Comments:  Continue medication regimen, follow-up in 3 months for A1c reevaluation  Orders:  -     Empagliflozin (JARDIANCE) 25 MG Tab; Take 1 tablet by mouth every day.    Seasonal allergic rhinitis due to pollen  Comments:  Recommended use of intranasal corticosteroid such as Flonase or Nasacort.  Demonstrated appropriate administration  Orders:  -     fluticasone (FLONASE) 50 MCG/ACT nasal spray; Administer 1 Spray into affected nostril(S) every day.    Vitamin D deficiency  -     Cholecalciferol 1.25 MG (15204 UT) Tab; Take 50,000 Units by mouth every 7 days.            Follow-up: No follow-ups on file.

## 2021-07-21 ENCOUNTER — APPOINTMENT (OUTPATIENT)
Dept: RADIOLOGY | Facility: REHABILITATION | Age: 66
End: 2021-07-21
Attending: PHYSICAL MEDICINE & REHABILITATION
Payer: MEDICARE

## 2021-07-21 ENCOUNTER — HOSPITAL ENCOUNTER (OUTPATIENT)
Facility: REHABILITATION | Age: 66
End: 2021-07-21
Attending: PHYSICAL MEDICINE & REHABILITATION | Admitting: PHYSICAL MEDICINE & REHABILITATION
Payer: MEDICARE

## 2021-07-21 VITALS
RESPIRATION RATE: 16 BRPM | HEART RATE: 61 BPM | BODY MASS INDEX: 33.44 KG/M2 | HEIGHT: 63 IN | WEIGHT: 188.71 LBS | TEMPERATURE: 97.6 F | SYSTOLIC BLOOD PRESSURE: 130 MMHG | OXYGEN SATURATION: 93 % | DIASTOLIC BLOOD PRESSURE: 78 MMHG

## 2021-07-21 PROCEDURE — 64493 INJ PARAVERT F JNT L/S 1 LEV: CPT

## 2021-07-21 PROCEDURE — 64494 INJ PARAVERT F JNT L/S 2 LEV: CPT

## 2021-07-21 PROCEDURE — 700101 HCHG RX REV CODE 250

## 2021-07-21 PROCEDURE — 700117 HCHG RX CONTRAST REV CODE 255

## 2021-07-21 RX ORDER — LIDOCAINE HYDROCHLORIDE 20 MG/ML
INJECTION, SOLUTION EPIDURAL; INFILTRATION; INTRACAUDAL; PERINEURAL
Status: COMPLETED
Start: 2021-07-21 | End: 2021-07-21

## 2021-07-21 RX ADMIN — IOHEXOL 5 ML: 240 INJECTION, SOLUTION INTRATHECAL; INTRAVASCULAR; INTRAVENOUS; ORAL at 08:10

## 2021-07-21 RX ADMIN — LIDOCAINE HYDROCHLORIDE 10 ML: 20 INJECTION, SOLUTION EPIDURAL; INFILTRATION; INTRACAUDAL; PERINEURAL at 08:10

## 2021-07-21 ASSESSMENT — FIBROSIS 4 INDEX: FIB4 SCORE: 1.63

## 2021-07-21 ASSESSMENT — PAIN DESCRIPTION - PAIN TYPE: TYPE: CHRONIC PAIN

## 2021-07-21 NOTE — OP REPORT
Physician/s:Paresh Ogden MD     Pre-operative Diagnosis:      M51.36 (ICD-10-CM) - DDD (degenerative disc disease), lumbar   M47.816 (ICD-10-CM) - Lumbar spondylosis   M54.5 (ICD-10-CM) - Lumbosacral pain        Post-operative Diagnosis: Same     Procedure: Left lumbar three medial branch block                     Left lumbar four medial branch block                     Left lumbar five dorsal ramus block     Description of procedure:     The risks, benefits, and alternatives of the procedure were reviewed and discussed with the patient.  Written informed consent was freely obtained. A pre-procedural time-out was conducted by the physician verifying patient’s identity, procedure to be performed, procedure site and side, and allergy verification. Appropriate equipment was determined to be in place for the procedure.         In the fluoroscopy suite the patient was placed in a prone position and the skin was prepped and draped in the usual sterile fashion. The fluoroscope was placed over the lower back at the appropriate angles, and the targets for injection were marked. A 25g needle was placed into each of the markings at the three levels, and approx 1cc of 1% Lidocaine was injected subcutaneously into the epidermal and dermal layers. The needle was removed. A 25g needle was then placed at the intersection of the transverse process and superior articular process at the left lumbar three medial branch, left lumbar four medial branch and left lumbar five dorsal ramus levels.  The needle tips were then verified by AP, oblique, and lateral views. In the AP view, less than 1 cc of contrast dye was used to highlight the medial branch while the fluoroscope was running live. Following negative aspiration, approx 1.0cc containing 2% lidocaine without epinephrine was then injected at the above levels, and the needles were removed intact. The patient's back was covered with a 4x4 gauze, the area was cleansed with sterile  normal saline, and a dressing was applied.      There were no complications noted, the patient was hemodynamically stable, and tolerated the procedure well.  The patient was given a pain diary to record her response to the injection.     Paresh Ogden MD  Physical Medicine and Rehabilitation  Interventional Spine and Sports Physiatry  [facility]      CPT: [id number], [id number]

## 2021-07-21 NOTE — INTERVAL H&P NOTE
"H&P reviewed. The patient was examined and there are no changes to the H&P      /71   Pulse 60   Temp 36.4 °C (97.6 °F) (Temporal)   Resp 18   Ht 1.6 m (5' 3\")   Wt 85.6 kg (188 lb 11.4 oz)   SpO2 94%     CV: RRR, Normal S1, S2  RESP: Clear to auscultation bilaterally    Continue with injection as planned.    Paresh Ogden MD  Physical Medicine and Rehabilitation  Interventional Spine and Sports Physiatry  [facility]      "

## 2021-07-21 NOTE — PROGRESS NOTES
Preprocedure assessment completed.  Pertinent health information(SWATHI wears CPAP, Anxiety, DM. GERD, HTN) communicated to physician and staff prior to time out.  Patient positioned preprocedure by RN, CSTand XRAY.  Pillow under ankles for support, arms resting on stool  below table. Time Out done, identified patient, allergies, medical history, all agreed by team.

## 2021-07-21 NOTE — PROGRESS NOTES
Pt ambulated to recovery by SELENA Talamantes and SELENA Sanchez. VSS, pt drinking juice and eating cracakers w/o n/v, pt is alert and conversational, dressings to left low back are cdi. Pt d/c'd per order and ambulated to vehicle by RN.

## 2021-07-21 NOTE — PROGRESS NOTES
Pt given verbal and written d/c instructions including verbal infection prevention information and s/s of post procedure infection, and a pain diary, and verbalizes understanding. denies nsaid use or blood thinners use in last 5 days, denies current infection or abx use. Med rec complete. Pt has post procedural ride home with daughter Daniel.

## 2021-07-22 ENCOUNTER — TELEPHONE (OUTPATIENT)
Dept: PHYSICAL MEDICINE AND REHAB | Facility: MEDICAL CENTER | Age: 66
End: 2021-07-22

## 2021-07-23 ENCOUNTER — SLEEP CENTER VISIT (OUTPATIENT)
Dept: SLEEP MEDICINE | Facility: MEDICAL CENTER | Age: 66
End: 2021-07-23
Payer: MEDICARE

## 2021-07-23 VITALS
SYSTOLIC BLOOD PRESSURE: 110 MMHG | RESPIRATION RATE: 18 BRPM | HEART RATE: 69 BPM | BODY MASS INDEX: 33.5 KG/M2 | OXYGEN SATURATION: 95 % | HEIGHT: 63 IN | DIASTOLIC BLOOD PRESSURE: 60 MMHG | WEIGHT: 189.1 LBS

## 2021-07-23 DIAGNOSIS — G47.33 OSA (OBSTRUCTIVE SLEEP APNEA): ICD-10-CM

## 2021-07-23 DIAGNOSIS — F11.20 OPIOID DEPENDENCE IN CONTROLLED ENVIRONMENT (HCC): ICD-10-CM

## 2021-07-23 DIAGNOSIS — F13.20 BENZODIAZEPINE DEPENDENCE (HCC): ICD-10-CM

## 2021-07-23 PROCEDURE — 99203 OFFICE O/P NEW LOW 30 MIN: CPT | Performed by: INTERNAL MEDICINE

## 2021-07-23 RX ORDER — ZOLPIDEM TARTRATE 5 MG/1
5 TABLET ORAL NIGHTLY PRN
Qty: 2 TABLET | Refills: 0 | Status: SHIPPED | OUTPATIENT
Start: 2021-07-23 | End: 2021-07-25

## 2021-07-23 ASSESSMENT — FIBROSIS 4 INDEX: FIB4 SCORE: 1.63

## 2021-07-23 NOTE — PROGRESS NOTES
Chief Complaint   Patient presents with   • New Patient     NP referred 6/8/20 by FANNY Bell for SWATHI       HPI: This patient is a 66 y.o. female who presents to establish care for obstructive sleep apnea.  She was diagnosed with SWATHI in [place] 20 years ago and has been on CPAP therapy since then.  Her sleep studies and compliance download are unavailable.  Her CPAP device is over 5 years old.  Her last visit with her sleep specialist was over 2 years ago.  She endorses nightly CPAP use.  Sleep diary shows bedtime of 11:30 PM, with brief, 3-4 nighttime interruptions.  Gets up by 8:30 AM, feeling rested.  Feels she is benefiting from CPAP use.  [place] sleepiness score is 3.    She is on chronic opioids.  BMI is 33.    Past Medical History:   Diagnosis Date   • Anxiety    • Chronic back pain    • Constipation    • Depression    • Diabetes (HCC)    • GERD (gastroesophageal reflux disease)    • Heart murmur     tachycardia   • Hyperlipidemia    • Hypertension    • Thyroid disease        Social History     Socioeconomic History   • Marital status:      Spouse name: Not on file   • Number of children: Not on file   • Years of education: Not on file   • Highest education level: Not on file   Occupational History   • Not on file   Tobacco Use   • Smoking status: Former Smoker     Packs/day: 0.25     Types: Cigarettes   • Smokeless tobacco: Never Used   Vaping Use   • Vaping Use: Never used   Substance and Sexual Activity   • Alcohol use: Not Currently     Comment: rare   • Drug use: Never   • Sexual activity: Not Currently   Other Topics Concern   •  Service No   • Blood Transfusions Yes   • Caffeine Concern No   • Occupational Exposure No   • Hobby Hazards No   • Sleep Concern Yes   • Stress Concern Yes   • Weight Concern Yes   • Special Diet No   • Back Care No   • Exercise Yes   • Bike Helmet No   • Seat Belt Yes   • Self-Exams Yes   Social History Narrative   • Not on file      Social Determinants of Health     Financial Resource Strain:    • Difficulty of Paying Living Expenses:    Food Insecurity:    • Worried About Running Out of Food in the Last Year:    • Ran Out of Food in the Last Year:    Transportation Needs:    • Lack of Transportation (Medical):    • Lack of Transportation (Non-Medical):    Physical Activity:    • Days of Exercise per Week:    • Minutes of Exercise per Session:    Stress:    • Feeling of Stress :    Social Connections:    • Frequency of Communication with Friends and Family:    • Frequency of Social Gatherings with Friends and Family:    • Attends Gn[employer] Services:    • Active Member of Clubs or Organizations:    • Attends Club or Organization Meetings:    • Marital Status:    Intimate Partner Violence:    • Fear of Current or Ex-Partner:    • Emotionally Abused:    • Physically Abused:    • Sexually Abused:        Family History   Problem Relation Age of Onset   • Cancer Mother    • Stroke Father         Heart attack   • Dementia Sister    • Stroke Brother         heart Attack   • Cancer Brother         Skin   • Diabetes Brother    • Kidney Disease Brother    • Cancer Brother    • Parkinson's Disease Sister    • No Known Problems Sister    • Diabetes Daughter    • Hypertension Daughter        Current Outpatient Medications on File Prior to Visit   Medication Sig Dispense Refill   • Empagliflozin (JARDIANCE) 25 MG Tab Take 1 tablet by mouth every day. 100 tablet 3   • Cholecalciferol 1.25 MG (44454 UT) Tab Take 50,000 Units by mouth every 7 days. 12 tablet 0   • fluticasone (FLONASE) 50 MCG/ACT nasal spray Administer 1 Spray into affected nostril(S) every day. 16 g 11   • estradiol (ESTRACE) 1 MG Tab TAKE 1 TABLET BY MOUTH EVERY DAY 90 tablet 0   • aspirin EC (ECOTRIN) 81 MG Tablet Delayed Response Take 81 mg by mouth every day.     • omeprazole (PRILOSEC) 40 MG delayed-release capsule TAKE 1 CAPSULE BY MOUTH EVERY DAY 90 capsule 1   • metformin  (GLUCOPHAGE) 1000 MG tablet TAKE 1 TABLET BY MOUTH TWICE DAILY WITH MEALS 200 tablet 0   • metoprolol tartrate (LOPRESSOR) 25 MG Tab TAKE 1 TABLET BY MOUTH TWICE DAILY 180 tablet 1   • DULoxetine (CYMBALTA) 30 MG Cap DR Particles TAKE 3 CAPSULES BY MOUTH EVERY  capsule 1   • gabapentin (NEURONTIN) 600 MG tablet Take 1 tablet by mouth 4 times a day for 30 days. 120 tablet 1   • HYDROcodone-acetaminophen (NORCO) 5-325 MG Tab per tablet Take 1 tablet by mouth every 8 hours as needed for up to 30 days. 60 tablet 0   • morphine ER (MS CONTIN) 15 MG Tab CR tablet Take 1 tablet by mouth every 12 hours for 30 days. 60 tablet 0   • [START ON 7/24/2021] morphine ER (MS CONTIN) 15 MG Tab CR tablet Take 1 tablet by mouth every 12 hours for 30 days. 60 tablet 0   • [START ON 7/24/2021] HYDROcodone-acetaminophen (NORCO) 5-325 MG Tab per tablet Take 1 tablet by mouth every 8 hours as needed for up to 30 days. 60 tablet 0   • rosuvastatin (CRESTOR) 10 MG Tab TAKE 1 TABLET BY MOUTH EVERY EVENING 100 tablet 3   • clonazePAM (KLONOPIN) 1 MG Tab Take 1 tablet by mouth every evening for 90 days. 90 tablet 0   • clonazePAM (KLONOPIN) 0.5 MG Tab Take 1 tablet by mouth every morning for 90 days. 90 tablet 0   • lisinopril (PRINIVIL) 10 MG Tab Take 1 tablet by mouth every day. 100 tablet 0   • levothyroxine (SYNTHROID) 50 MCG Tab Take 1 tablet by mouth Every morning on an empty stomach. 90 tablet 1   • cyanocobalamin (VITAMIN B-12) 500 MCG Tab Take 500 mcg by mouth every day.     • hydrOXYzine HCl (ATARAX) 25 MG Tab Take 1-2 Tabs by mouth at bedtime as needed for Itching or Anxiety. 60 Tab 11   • busPIRone (BUSPAR) 10 MG Tab tablet Take 1 Tab by mouth 3 times a day. 270 Tab 3   • Blood Glucose Test Strips Use one Abbott Freedom Lite strip to test blood sugar twice daily and PRN. 270 Each 3   • Lancets Use one Abbott [place] Lite lancet to test blood sugar twice daily and PRN. 300 Each 3   • Alcohol Swabs Wipe site with prep pad prior  "to injection. 100 Each 3   • Naloxone (NARCAN) 4 MG/0.1ML Liquid One spray in one nostril for overdose and call 911. 1 Each 0   • Diclofenac Sodium 1 % Gel Apply 1 g to skin as directed 2 times a day as needed (joint pain). 150 g 2   • Blood Glucose Meter Kit Test blood sugar as recommended by provider. Abbott Freestyle Lite blood glucose monitoring kit. 1 Kit 0   • Brexpiprazole 2 MG Tab Take 2 mg by mouth every day. (Patient not taking: Reported on 7/23/2021) 90 tablet 3     No current facility-administered medications on file prior to visit.       Allergies: Patient has no known allergies.    ROS:   Constitutional: Denies fevers, chills, night sweats, fatigue or weight loss  Eyes: Denies vision loss, pain, drainage, double vision  Ears, Nose, Throat: Denies earache, difficulty hearing, tinnitus, nasal congestion, hoarseness  Cardiovascular: Denies chest pain, tightness, palpitations, orthopnea or edema  Respiratory: Denies shortness of breath, cough, wheezing, hemoptysis  Sleep: Denies daytime sleepiness, snoring, apneas, insomnia, morning headaches  GI: Denies heartburn, dysphagia, nausea, abdominal pain, diarrhea or constipation  : Denies frequent urination, hematuria, discharge or painful urination  Musculoskeletal: Denies back pain, painful joints, sore muscles  Neurological: Denies weakness or headaches  Skin: No rashes    /60 (BP Location: Right arm, Patient Position: Sitting, BP Cuff Size: Large adult)   Pulse 69   Resp 18   Ht 1.6 m (5' 3\")   Wt 85.8 kg (189 lb 1.6 oz)   SpO2 95%     Physical Exam:  Appearance: Well-nourished, well-developed, in no acute distress  HEENT: Normocephalic, atraumatic, white sclera, PERRLA  Neck: No adenopathy or masses  Respiratory: no intercostal retractions or accessory muscle use  Lungs auscultation: Clear to auscultation bilaterally  Cardiovascular: Regular rate rhythm. No murmurs, rubs or gallops.  No LE edema  Abdomen: soft, nondistended  Gait: " Normal  Digits: No clubbing, cyanosis  Motor: No focal deficits  Orientation: Oriented to time, person and place    Diagnosis:  1. SWATHI (obstructive sleep apnea)  Polysomnography, 4 or More    zolpidem (AMBIEN) 5 MG Tab   2. BMI 33.0-33.9,adult     3. Opioid dependence in controlled environment (HCC)     4. Benzodiazepine dependence ([facility])         Plan:  The patient has a longtime history of obstructive sleep apnea, on CPAP therapy for the past 20 years.  She presents to [facility] care.  Her sleep study results and compliance download are unavailable.  Her chronic opiate use puts her at increased risk for central sleep apnea.  She requires updated polysomnography in the sleep laboratory to reassess sleep apnea and for requalification for CPAP therapy.  Return for after sleep study.

## 2021-07-28 ENCOUNTER — TELEPHONE (OUTPATIENT)
Dept: PHYSICAL MEDICINE AND REHAB | Facility: MEDICAL CENTER | Age: 66
End: 2021-07-28

## 2021-07-28 DIAGNOSIS — M51.36 DDD (DEGENERATIVE DISC DISEASE), LUMBAR: ICD-10-CM

## 2021-07-28 DIAGNOSIS — Z98.890 S/P LUMBAR LAMINECTOMY: ICD-10-CM

## 2021-07-28 RX ORDER — MORPHINE SULFATE 15 MG/1
15 TABLET, FILM COATED, EXTENDED RELEASE ORAL EVERY 12 HOURS
Qty: 50 TABLET | Refills: 0 | Status: SHIPPED | OUTPATIENT
Start: 2021-07-28 | End: 2021-08-22

## 2021-07-28 NOTE — TELEPHONE ENCOUNTER
[facility] pharmacy called asking for a prescription for  Morphine 15 mg  for  50 tabs the reason is because on 7/24/21 patient only got 10 pills because the pharmacy run out of the medication and they can't use the last prescription,  on your desk. Thank you.

## 2021-08-02 ENCOUNTER — PATIENT MESSAGE (OUTPATIENT)
Dept: HEALTH INFORMATION MANAGEMENT | Facility: OTHER | Age: 66
End: 2021-08-02

## 2021-08-03 ENCOUNTER — APPOINTMENT (OUTPATIENT)
Dept: PHYSICAL MEDICINE AND REHAB | Facility: MEDICAL CENTER | Age: 66
End: 2021-08-03
Payer: MEDICARE

## 2021-08-03 DIAGNOSIS — I10 ESSENTIAL HYPERTENSION: ICD-10-CM

## 2021-08-04 RX ORDER — LISINOPRIL 10 MG/1
10 TABLET ORAL DAILY
Qty: 100 TABLET | Refills: 0 | Status: SHIPPED | OUTPATIENT
Start: 2021-08-04 | End: 2021-08-05 | Stop reason: SDUPTHER

## 2021-08-05 ENCOUNTER — OFFICE VISIT (OUTPATIENT)
Dept: MEDICAL GROUP | Facility: PHYSICIAN GROUP | Age: 66
End: 2021-08-05
Payer: MEDICARE

## 2021-08-05 VITALS
TEMPERATURE: 97.7 F | OXYGEN SATURATION: 94 % | HEIGHT: 63 IN | WEIGHT: 188 LBS | RESPIRATION RATE: 16 BRPM | HEART RATE: 85 BPM | BODY MASS INDEX: 33.31 KG/M2 | DIASTOLIC BLOOD PRESSURE: 82 MMHG | SYSTOLIC BLOOD PRESSURE: 144 MMHG

## 2021-08-05 DIAGNOSIS — E66.9 DIABETES MELLITUS TYPE 2 IN OBESE: ICD-10-CM

## 2021-08-05 DIAGNOSIS — E11.69 DIABETES MELLITUS TYPE 2 IN OBESE: ICD-10-CM

## 2021-08-05 DIAGNOSIS — F41.1 GENERALIZED ANXIETY DISORDER: ICD-10-CM

## 2021-08-05 DIAGNOSIS — E11.42 TYPE 2 DIABETES MELLITUS WITH DIABETIC POLYNEUROPATHY, WITHOUT LONG-TERM CURRENT USE OF INSULIN (HCC): ICD-10-CM

## 2021-08-05 DIAGNOSIS — F33.1 DEPRESSION, MAJOR, RECURRENT, MODERATE (HCC): ICD-10-CM

## 2021-08-05 DIAGNOSIS — F13.20 BENZODIAZEPINE DEPENDENCE (HCC): ICD-10-CM

## 2021-08-05 DIAGNOSIS — I10 ESSENTIAL HYPERTENSION: ICD-10-CM

## 2021-08-05 DIAGNOSIS — E78.2 MIXED HYPERLIPIDEMIA: ICD-10-CM

## 2021-08-05 DIAGNOSIS — F11.20 OPIOID DEPENDENCE IN CONTROLLED ENVIRONMENT (HCC): ICD-10-CM

## 2021-08-05 DIAGNOSIS — E03.9 HYPOTHYROIDISM (ACQUIRED): ICD-10-CM

## 2021-08-05 DIAGNOSIS — F41.9 ANXIETY: ICD-10-CM

## 2021-08-05 DIAGNOSIS — G47.31 PRIMARY CENTRAL SLEEP APNEA: ICD-10-CM

## 2021-08-05 DIAGNOSIS — F41.8 SITUATIONAL ANXIETY: ICD-10-CM

## 2021-08-05 PROCEDURE — 99214 OFFICE O/P EST MOD 30 MIN: CPT | Performed by: NURSE PRACTITIONER

## 2021-08-05 RX ORDER — CLONAZEPAM 1 MG/1
1 TABLET ORAL
Qty: 90 TABLET | Refills: 0 | Status: SHIPPED | OUTPATIENT
Start: 2021-08-25 | End: 2021-11-23

## 2021-08-05 RX ORDER — LISINOPRIL 10 MG/1
10 TABLET ORAL DAILY
Qty: 100 TABLET | Refills: 3 | Status: SHIPPED | OUTPATIENT
Start: 2021-08-05 | End: 2022-09-06

## 2021-08-05 RX ORDER — GABAPENTIN 800 MG/1
1200 TABLET ORAL 2 TIMES DAILY
COMMUNITY
End: 2021-10-28 | Stop reason: SDUPTHER

## 2021-08-05 RX ORDER — ROSUVASTATIN CALCIUM 10 MG/1
10 TABLET, COATED ORAL EVERY EVENING
Qty: 100 TABLET | Refills: 3 | Status: SHIPPED | OUTPATIENT
Start: 2021-08-05 | End: 2022-01-05 | Stop reason: SDUPTHER

## 2021-08-05 RX ORDER — CLONAZEPAM 0.5 MG/1
0.5 TABLET ORAL EVERY MORNING
Qty: 90 TABLET | Refills: 0 | Status: SHIPPED | OUTPATIENT
Start: 2021-08-25 | End: 2021-11-23

## 2021-08-05 ASSESSMENT — FIBROSIS 4 INDEX: FIB4 SCORE: 1.63

## 2021-08-05 NOTE — ASSESSMENT & PLAN NOTE
This is a chronic stable condition.  Patient is on Cymbalta daily.    Continue daily Cymbalta.  Patient to follow-up in 3 months.

## 2021-08-05 NOTE — ASSESSMENT & PLAN NOTE
This is a recent issue.  Patient reports that in the last 6 months she has had several family members die.  She has been on Klonopin chronically for many many years.  With her previous PCP she was working on tapering off of this.  I will continue this taper for the patient.  Patient is taking 0.5 mg Klonopin in the morning and 1 mg Klonopin in the evening.    Obtained and reviewed patient utilization report from [facility] pharmacy database on 8/5/2021 8:40 AM  prior to writing prescription for controlled substance II, III or IV per [place] bill . Based on assessment of the report,my physical exam if necessary and the patient's health problem, the prescription is medically necessary.     3-month refill provided for patient.  Patient to follow-up in 3 months.

## 2021-08-05 NOTE — PROGRESS NOTES
CC: establish care    HPI:  Grisel presents with the following    Patient Active Problem List    Diagnosis Date Noted   • Primary central sleep apnea 08/05/2021   • Opioid dependence in controlled environment ([facility]) 05/26/2021   • Benzodiazepine dependence ([facility]) 05/26/2021   • Generalized anxiety disorder 05/12/2020   • Posttraumatic stress disorder, delayed onset 05/12/2020   • Essential hypertension 02/05/2020   • Mixed hyperlipidemia 02/05/2020   • Type 2 diabetes mellitus with diabetic polyneuropathy, without long-term current use of insulin ([facility]) 02/05/2020   • Hypothyroidism (acquired) 02/05/2020   • Depression, major, recurrent, moderate ([facility]) 02/05/2020   • Gastroesophageal reflux disease without esophagitis 02/05/2020   • Situational anxiety 02/05/2020       Current Outpatient Medications   Medication Sig Dispense Refill   • rosuvastatin (CRESTOR) 10 MG Tab Take 1 tablet by mouth every evening. 100 tablet 3   • gabapentin (NEURONTIN) 800 MG tablet Take 1,200 mg by mouth 2 times a day.     • lisinopril (PRINIVIL) 10 MG Tab Take 1 tablet by mouth every day. 100 tablet 3   • [START ON 8/25/2021] clonazePAM (KLONOPIN) 0.5 MG Tab Take 1 tablet by mouth every morning for 90 days. 90 tablet 0   • [START ON 8/25/2021] clonazePAM (KLONOPIN) 1 MG Tab Take 1 tablet by mouth at bedtime for 90 days. 90 tablet 0   • morphine ER (MS CONTIN) 15 MG Tab CR tablet Take 1 tablet by mouth every 12 hours for 25 days. 50 tablet 0   • Empagliflozin (JARDIANCE) 25 MG Tab Take 1 tablet by mouth every day. 100 tablet 3   • Cholecalciferol 1.25 MG (82418 UT) Tab Take 50,000 Units by mouth every 7 days. 12 tablet 0   • fluticasone (FLONASE) 50 MCG/ACT nasal spray Administer 1 Spray into affected nostril(S) every day. 16 g 11   • estradiol (ESTRACE) 1 MG Tab TAKE 1 TABLET BY MOUTH EVERY DAY 90 tablet 0   • aspirin EC (ECOTRIN) 81 MG Tablet Delayed Response Take 81 mg by mouth every day.     • omeprazole (PRILOSEC) 40 MG  delayed-release capsule TAKE 1 CAPSULE BY MOUTH EVERY DAY 90 capsule 1   • metformin (GLUCOPHAGE) 1000 MG tablet TAKE 1 TABLET BY MOUTH TWICE DAILY WITH MEALS 200 tablet 0   • metoprolol tartrate (LOPRESSOR) 25 MG Tab TAKE 1 TABLET BY MOUTH TWICE DAILY 180 tablet 1   • DULoxetine (CYMBALTA) 30 MG Cap DR Particles TAKE 3 CAPSULES BY MOUTH EVERY  capsule 1   • HYDROcodone-acetaminophen (NORCO) 5-325 MG Tab per tablet Take 1 tablet by mouth every 8 hours as needed for up to 30 days. 60 tablet 0   • clonazePAM (KLONOPIN) 1 MG Tab Take 1 tablet by mouth every evening for 90 days. 90 tablet 0   • clonazePAM (KLONOPIN) 0.5 MG Tab Take 1 tablet by mouth every morning for 90 days. 90 tablet 0   • levothyroxine (SYNTHROID) 50 MCG Tab Take 1 tablet by mouth Every morning on an empty stomach. 90 tablet 1   • cyanocobalamin (VITAMIN B-12) 500 MCG Tab Take 500 mcg by mouth every day.     • busPIRone (BUSPAR) 10 MG Tab tablet Take 1 Tab by mouth 3 times a day. 270 Tab 3   • Blood Glucose Test Strips Use one Abbott Freedom Lite strip to test blood sugar twice daily and PRN. 270 Each 3   • Lancets Use one Abbott [place] Lite lancet to test blood sugar twice daily and PRN. 300 Each 3   • Alcohol Swabs Wipe site with prep pad prior to injection. 100 Each 3   • Naloxone (NARCAN) 4 MG/0.1ML Liquid One spray in one nostril for overdose and call 911. 1 Each 0   • Blood Glucose Meter Kit Test blood sugar as recommended by provider. Abbott Freestyle Lite blood glucose monitoring kit. 1 Kit 0     No current facility-administered medications for this visit.       Allergies as of 08/05/2021   • (No Known Allergies)        Social History     Socioeconomic History   • Marital status:      Spouse name: Not on file   • Number of children: Not on file   • Years of education: Not on file   • Highest education level: Not on file   Occupational History   • Not on file   Tobacco Use   • Smoking status: Former Smoker     Packs/day: 0.25      Types: Cigarettes     Quit date:      Years since quittin.6   • Smokeless tobacco: Never Used   Vaping Use   • Vaping Use: Never used   Substance and Sexual Activity   • Alcohol use: Not Currently     Comment: rare   • Drug use: Never   • Sexual activity: Not Currently   Other Topics Concern   •  Service No   • Blood Transfusions Yes   • Caffeine Concern No   • Occupational Exposure No   • Hobby Hazards No   • Sleep Concern Yes   • Stress Concern Yes   • Weight Concern Yes   • Special Diet No   • Back Care No   • Exercise Yes   • Bike Helmet No   • Seat Belt Yes   • Self-Exams Yes   Social History Narrative   • Not on file     Social Determinants of Health     Financial Resource Strain:    • Difficulty of Paying Living Expenses:    Food Insecurity:    • Worried About Running Out of Food in the Last Year:    • Ran Out of Food in the Last Year:    Transportation Needs:    • Lack of Transportation (Medical):    • Lack of Transportation (Non-Medical):    Physical Activity:    • Days of Exercise per Week:    • Minutes of Exercise per Session:    Stress:    • Feeling of Stress :    Social Connections:    • Frequency of Communication with Friends and Family:    • Frequency of Social Gatherings with Friends and Family:    • Attends Temple Services:    • Active Member of Clubs or Organizations:    • Attends Club or Organization Meetings:    • Marital Status:    Intimate Partner Violence:    • Fear of Current or Ex-Partner:    • Emotionally Abused:    • Physically Abused:    • Sexually Abused:        Family History   Problem Relation Age of Onset   • Cancer Mother    • Stroke Father         Heart attack   • Dementia Sister    • Stroke Brother         heart Attack   • Cancer Brother         Skin   • Diabetes Brother    • Kidney Disease Brother    • Cancer Brother    • Parkinson's Disease Sister    • No Known Problems Sister    • Diabetes Daughter    • Hypertension Daughter        Past Medical History:    Diagnosis Date   • Anxiety    • Arrhythmia    • Chronic back pain    • Constipation    • Depression    • Diabetes (HCC)    • GERD (gastroesophageal reflux disease)    • Hyperlipidemia    • Hypertension    • Thyroid disease         Past Surgical History:   Procedure Laterality Date   • LUMBAR MEDIAL BRANCH BLOCKS Left 7/21/2021    Procedure: LEFT lumbar three through lumbar five medial branch blocks;  Surgeon: Paresh Ogden M.D.;  [facility];  Service: Pain Management   • LUMBAR TRANSFORAMINAL EPIDURAL STEROID INJECTION Left 5/20/2021    Procedure: LEFT lumbar four and lumbar five transforaminal epidural steroid injection;  Surgeon: Paresh Ogden M.D.;  [facility];  Service: Pain Management   • BLOCK EPIDURAL STEROID INJECTION Left 2/24/2021    Procedure: INJECTION, STEROID, EPIDURAL;  Surgeon: Paresh Ogden M.D.;  Location: [facility];  Service: Pain Management   • LUMBAR MEDIAL BRANCH BLOCKS Left 9/9/2020    Procedure: BLOCK, NERVE, SPINAL, LUMBAR, POSTERIOR RAMUS, MEDIAL BRANCH;  Surgeon: Paresh Ogden M.D.;  [facility];  Service: Pain Management   • TN NJX AA&/STRD TFRML EPI LUMBAR/SACRAL 1 LEVEL Left 8/12/2020    Procedure: INJECTION, SPINE, LUMBOSACRAL, EPIDURAL, 1 LEVEL, TRANSFORAMINAL APPROACH;  Surgeon: Paresh Ogden M.D.;  [facility];  Service: Pain Management   • TN NJX AA&/STRD TFRML EPI LUMBAR/SACRAL EA ADDL Left 8/12/2020    Procedure: INJECTION, SPINE, LUMBOSACRAL, EPIDURAL, TRANSFORAMINAL APPROACH;  Surgeon: Paresh Ogden M.D.;  [facility];  Service: Pain Management   • LAMINOTOMY  1998   • ABDOMINAL HYSTERECTOMY TOTAL     • CARPAL TUNNEL RELEASE     • CHOLECYSTECTOMY         ROS:  Gen: no fevers/chills, no changes in weight  Pulm: no sob, no cough  CV: no chest pain, no palpitations  GI: no nausea/vomiting, no diarrhea  MSk:  + myalgias  Neuro: no headaches, + numbness/tingling     Exam:   Vitals:    08/05/21 0800   BP: 144/82   Pulse: 85   Resp: 16   Temp: 36.5 °C (97.7 °F)   SpO2: 94%     Body mass index is 33.3 kg/m².    General: Normal appearing. No distress.  Head: Normocephalic.  Atraumatic.  Eyes: conjunctiva clear, pupils equal and reactive to light accommodation,  Neck: Supple without JVD.Thyroid is not enlarged.  Pulmonary: Clear to ausculation.  Normal effort. No rales, ronchi, or wheezing.  Cardiovascular: Regular rate and rhythm without murmur. Carotid and radial pulses are intact and equal bilaterally.  Pedal pulses within normal limits.  Abdomen: Soft, nontender, nondistended.   Neurologic: Grossly intact.  Sensation intact.  Skin: Warm and dry.  No obvious lesions.  Musculoskeletal: No extremity cyanosis, clubbing, or edema.  Strength 5+ and equal bilaterally in all extremities.  Psych: Normal mood and affect. Alert and oriented x3. Judgment and insight is normal.    Assessment and Plan.   66 y.o. female presenting with the following.     1. Mixed hyperlipidemia  rosuvastatin (CRESTOR) 10 MG Tab   2. Essential hypertension  lisinopril (PRINIVIL) 10 MG Tab   3. Anxiety  clonazePAM (KLONOPIN) 0.5 MG Tab    clonazePAM (KLONOPIN) 1 MG Tab   4. Generalized anxiety disorder  clonazePAM (KLONOPIN) 0.5 MG Tab    clonazePAM (KLONOPIN) 1 MG Tab    Controlled Substance Treatment Agreement   5. Benzodiazepine dependence (HCC)  clonazePAM (KLONOPIN) 0.5 MG Tab    clonazePAM (KLONOPIN) 1 MG Tab    Controlled Substance Treatment Agreement   6. Hypothyroidism (acquired)  TSH    FREE THYROXINE   7. Diabetes mellitus type 2 in obese ([facility])  MICROALBUMIN CREAT RATIO URINE    HEMOGLOBIN A1C   8. Situational anxiety     9. Opioid dependence in controlled environment ([facility])     10. Type 2 diabetes mellitus with diabetic polyneuropathy, without long-term current use of insulin ([facility])     11. Primary central sleep apnea     12. Depression, major,  recurrent, moderate (HCC)         Situational anxiety  This is a recent issue.  Patient reports that in the last 6 months she has had several family members die.  She has been on Klonopin chronically for many many years.  With her previous PCP she was working on tapering off of this.  I will continue this taper for the patient.  Patient is taking 0.5 mg Klonopin in the morning and 1 mg Klonopin in the evening.    Obtained and reviewed patient utilization report from [facility] pharmacy database on 8/5/2021 8:40 AM  prior to writing prescription for controlled substance II, III or IV per [place] bill . Based on assessment of the report,my physical exam if necessary and the patient's health problem, the prescription is medically necessary.     3-month refill provided for patient.  Patient to follow-up in 3 months.    Opioid dependence in controlled environment (HCC)  Patient has chronic back pain.  Patient does see Dr. Ogden at physiatry.  Patient did recently have a epidural of her lumbar spine.  She reports that it is getting to the point where the epidurals are no longer helping.  Patient states that her and her doctor are considering a spinal stimulator.    Continue following with Dr. Ogden.    Mixed hyperlipidemia  This is a chronic stable condition.  Patient is stable on rosuvastatin 10 mg.    Continue statin medication    Hypothyroidism (acquired)  This is a chronic issue.  Patient is stable on 50 mcg of of levothyroxine.    Continue levothyroxine.  Recheck thyroid levels in 3 months.    Type 2 diabetes mellitus with diabetic polyneuropathy, without long-term current use of insulin (HCC)  This is a chronic condition.  Patient is stable on 1000 mg of Metformin twice daily.  Patient's last A1c was 7.3%.  That was in May 2021.  Will obtain follow-up A1c in 3 months as well as a follow-up microalbumin.    Continue Metformin.  Follow-up labs in 3 months.    Primary central sleep apnea  This is a chronic issue.   Patient has been on CPAP for years.  She reports that her CPAP is quite old and she needs a new machine as hers has not been fitting very well recently.  Patient does report decrease in quality of sleep.  She has an appointment at the end of the month with pulmonology.    Follow with pulmonology for CPAP replacement.    Essential hypertension  This is a chronic stable condition.  Patient's blood pressure was slightly elevated in office today.  Patient does report being nervous as she is establishing with me as her new PCP.    Refill patient's lisinopril.  Continue lisinopril and metoprolol.  Patient to follow-up in 3 months.    Depression, major, recurrent, moderate (HCC)  This is a chronic stable condition.  Patient is on Cymbalta daily.    Continue daily Cymbalta.  Patient to follow-up in 3 months.    Generalized anxiety disorder  This is a chronic issue. Patient has been on Klonopin for several years. She is being tapered off of Klonopin slowly to prevent withdrawal symptoms. Patient is currently taking 0.5mg in the morning and 1 mg at night.    Obtained and reviewed patient utilization report from [facility] pharmacy database on 8/17/2021 11:16 AM  prior to writing prescription for controlled substance II, III or IV per [place] bill . Based on assessment of the report,my physical exam if necessary and the patient's health problem, the prescription is medically necessary.     I will refill patients Klonopin. Discussed continuing taper.       Return in about 3 months (around 11/5/2021).      I have placed the below orders and discussed them with an approved delegating provider.  The MA is performing the below orders under the direction of Dr. Alexandra.    Please note that this dictation was created using voice recognition software. I have worked with consultants from the vendor as well as technical experts from [employer] to optimize the interface. I have made every reasonable attempt to correct obvious  errors, but I expect that there are errors of grammar and possibly content that I did not discover before finalizing the note.

## 2021-08-05 NOTE — ASSESSMENT & PLAN NOTE
This is a chronic condition.  Patient is stable on 1000 mg of Metformin twice daily.  Patient's last A1c was 7.3%.  That was in May 2021.  Will obtain follow-up A1c in 3 months as well as a follow-up microalbumin.    Continue Metformin.  Follow-up labs in 3 months.

## 2021-08-05 NOTE — ASSESSMENT & PLAN NOTE
Patient has chronic back pain.  Patient does see Dr. Ogden at physiatry.  Patient did recently have a epidural of her lumbar spine.  She reports that it is getting to the point where the epidurals are no longer helping.  Patient states that her and her doctor are considering a spinal stimulator.    Continue following with Dr. Ogden.

## 2021-08-05 NOTE — ASSESSMENT & PLAN NOTE
This is a chronic stable condition.  Patient's blood pressure was slightly elevated in office today.  Patient does report being nervous as she is establishing with me as her new PCP.    Refill patient's lisinopril.  Continue lisinopril and metoprolol.  Patient to follow-up in 3 months.

## 2021-08-05 NOTE — ASSESSMENT & PLAN NOTE
This is a chronic issue.  Patient is stable on 50 mcg of of levothyroxine.    Continue levothyroxine.  Recheck thyroid levels in 3 months.

## 2021-08-05 NOTE — ASSESSMENT & PLAN NOTE
This is a chronic issue.  Patient has been on CPAP for years.  She reports that her CPAP is quite old and she needs a new machine as hers has not been fitting very well recently.  Patient does report decrease in quality of sleep.  She has an appointment at the end of the month with pulmonology.    Follow with pulmonology for CPAP replacement.

## 2021-08-05 NOTE — ASSESSMENT & PLAN NOTE
This is a chronic stable condition.  Patient is stable on rosuvastatin 10 mg.    Continue statin medication

## 2021-08-17 ENCOUNTER — PATIENT MESSAGE (OUTPATIENT)
Dept: MEDICAL GROUP | Facility: PHYSICIAN GROUP | Age: 66
End: 2021-08-17

## 2021-08-17 DIAGNOSIS — F33.41 RECURRENT MAJOR DEPRESSIVE DISORDER, IN PARTIAL REMISSION (HCC): ICD-10-CM

## 2021-08-17 DIAGNOSIS — F33.1 DEPRESSION, MAJOR, RECURRENT, MODERATE (HCC): ICD-10-CM

## 2021-08-17 NOTE — TELEPHONE ENCOUNTER
From: Grisel Mark  To: Nurse Practitioner Demi Farley  Sent: 8/17/2021 9:47 AM PDT  Subject: Needs meds refill on Rexulti 2mg tables    Need meds refill on Rexulti 2mg tablbes.  Thank you

## 2021-08-17 NOTE — ASSESSMENT & PLAN NOTE
This is a chronic issue. Patient has been on Klonopin for several years. She is being tapered off of Klonopin slowly to prevent withdrawal symptoms. Patient is currently taking 0.5mg in the morning and 1 mg at night.    Obtained and reviewed patient utilization report from [facility] pharmacy database on 8/17/2021 11:16 AM  prior to writing prescription for controlled substance II, III or IV per [place] bill . Based on assessment of the report,my physical exam if necessary and the patient's health problem, the prescription is medically necessary.     I will refill patients Klonopin. Discussed continuing taper.

## 2021-08-19 RX ORDER — BREXPIPRAZOLE 2 MG/1
2 TABLET ORAL DAILY
Qty: 90 TABLET | Refills: 3 | Status: SHIPPED | OUTPATIENT
Start: 2021-08-19 | End: 2022-04-12 | Stop reason: SDUPTHER

## 2021-08-24 ENCOUNTER — OFFICE VISIT (OUTPATIENT)
Dept: PHYSICAL MEDICINE AND REHAB | Facility: MEDICAL CENTER | Age: 66
End: 2021-08-24
Payer: MEDICARE

## 2021-08-24 VITALS
SYSTOLIC BLOOD PRESSURE: 124 MMHG | TEMPERATURE: 97 F | HEIGHT: 63 IN | OXYGEN SATURATION: 94 % | HEART RATE: 78 BPM | WEIGHT: 186.73 LBS | BODY MASS INDEX: 33.09 KG/M2 | DIASTOLIC BLOOD PRESSURE: 80 MMHG

## 2021-08-24 DIAGNOSIS — F41.9 ANXIETY: ICD-10-CM

## 2021-08-24 DIAGNOSIS — F13.20 BENZODIAZEPINE DEPENDENCE (HCC): ICD-10-CM

## 2021-08-24 DIAGNOSIS — M54.50 LUMBOSACRAL PAIN: ICD-10-CM

## 2021-08-24 DIAGNOSIS — M51.36 DDD (DEGENERATIVE DISC DISEASE), LUMBAR: ICD-10-CM

## 2021-08-24 DIAGNOSIS — M47.816 LUMBAR SPONDYLOSIS: ICD-10-CM

## 2021-08-24 DIAGNOSIS — Z98.890 S/P LUMBAR LAMINECTOMY: ICD-10-CM

## 2021-08-24 PROCEDURE — 99214 OFFICE O/P EST MOD 30 MIN: CPT | Performed by: PHYSICAL MEDICINE & REHABILITATION

## 2021-08-24 RX ORDER — HYDROCODONE BITARTRATE AND ACETAMINOPHEN 5; 325 MG/1; MG/1
1 TABLET ORAL EVERY 8 HOURS PRN
Qty: 60 TABLET | Refills: 0 | Status: SHIPPED | OUTPATIENT
Start: 2021-09-23 | End: 2021-10-19 | Stop reason: SDUPTHER

## 2021-08-24 RX ORDER — MORPHINE SULFATE 15 MG/1
15 TABLET, FILM COATED, EXTENDED RELEASE ORAL EVERY 12 HOURS
Qty: 60 TABLET | Refills: 0 | Status: SHIPPED | OUTPATIENT
Start: 2021-09-23 | End: 2021-10-19 | Stop reason: SDUPTHER

## 2021-08-24 RX ORDER — MORPHINE SULFATE 15 MG/1
15 TABLET, FILM COATED, EXTENDED RELEASE ORAL EVERY 12 HOURS
Qty: 60 TABLET | Refills: 0 | Status: SHIPPED | OUTPATIENT
Start: 2021-08-24 | End: 2021-09-23

## 2021-08-24 RX ORDER — HYDROCODONE BITARTRATE AND ACETAMINOPHEN 5; 325 MG/1; MG/1
1 TABLET ORAL EVERY 8 HOURS PRN
Qty: 60 TABLET | Refills: 0 | Status: SHIPPED | OUTPATIENT
Start: 2021-08-24 | End: 2021-09-23

## 2021-08-24 ASSESSMENT — PAIN SCALES - GENERAL: PAINLEVEL: 8=MODERATE-SEVERE PAIN

## 2021-08-24 ASSESSMENT — FIBROSIS 4 INDEX: FIB4 SCORE: 1.63

## 2021-08-24 NOTE — PROGRESS NOTES
Follow-up patient note    Physiatry (physical medicine and  Rehabilitation), interventional spine and sports medicine    Date of Service: 08/24/2021    Chief complaint:   Chief Complaint   Patient presents with   • Follow-Up     Lower back pain       HISTORY    HPI: Grisel Mark 66 y.o. female who presents today for follow-up evaluation of low back and leg pain.     Grisel returns after had 100% improvement during the anesthetic period after second diagnostic left L3-5 medial branch blocks.  Symptoms returned this evening.    Pain is bothering her with left leg pain.  Discussed that she has seen Dr. Villa, we don't have most recent update.  He suggested that they have one more visit, by her report.      Previously, had temporary relief of left leg pain after 02/24/2021 left L4 and L5 TFESI.      Pain is constant with more back pain than left leg pain.      Gabapentin back to 600mg po qid.  She is starting to reduce her clonazepam by 0.5mg a day. Her pain medications include: Duloxetine 90mg.  MS Contin 15mg q12h.  Norco 5/325 for breakthrough.  No side effects.    Bowel movements have been regular with stool softener from [employer]. Maintaining regular bowel movements.      By history:  Her laminectomy was in 1998.  Previous injection on left L4 sand L5 TFESI on 08/12/2020 helped with her leg pain.     Medical records review:  Dr. Francisco Olmos, plan to increase duloxetine 90mg daily, discontinue buspirone 20mg po bid, start buproprion XL 150mg daily, continue brexipiprazole 1mg qhs, hydroxyzine 25mg po tid prn, clonazepam 0.5mg daily prn    Review of records   Dr. Dheeraj De La Rosa:  08/20/2019 Right L3-4, L4-5, L5-S1 radiofrequency ablation    I reviewed the note from the referring provider Peter Bruce * dated 02/05/2020: She was seen to establish care, having just moved from [place].  She was seen for essential hypertension, mixed hyperlipidemia, DM2 in obese, hypothyroidism, recurrent MDD in  partial depression/anxiety, GERD without esophagitis, chronic bilateral low back pain with bilateral sciatica.  Medications associated with the above were written, related labs ordered including CBC, CMP, Hb A1c, lipids, microalbumin, TSH, free thryoxine, referral to ps\ychiatry, referral to GI for colonoscopy and referral to pain clinic.    Previous treatments:    Physical Therapy: Yes    Medications the patient is tried: Narcotics, gabapentin and muscle relaxers    Previous interventions: [facility] these included right lumbar radiofrequency procedures    Previous surgeries to relieve the above pain:  Surgery on October 28, 1998      ROS:   ENT: ear infection, treated with augmentin  CV: irregular heart, reports history of tachycardia  Psych: depression since 1995  Heme: anemia  Endo: hypothyroidism, diabetes    Red Flags ROS:   Fever, Chills, Sweats: Denies  Involuntary Weight Loss: Denies  Bladder Incontinence: Denies  Bowel Incontinence: Denies  Saddle Anesthesia: Denies    All other systems reviewed and negative.       PMHx:   Past Medical History:   Diagnosis Date   • Anxiety    • Arrhythmia    • Chronic back pain    • Constipation    • Depression    • Diabetes (HCC)    • GERD (gastroesophageal reflux disease)    • Hyperlipidemia    • Hypertension    • Thyroid disease        PSHx:   Past Surgical History:   Procedure Laterality Date   • LUMBAR MEDIAL BRANCH BLOCKS Left 7/21/2021    Procedure: LEFT lumbar three through lumbar five medial branch blocks;  Surgeon: Paresh Ogden M.D.;  [facility];  Service: Pain Management   • LUMBAR TRANSFORAMINAL EPIDURAL STEROID INJECTION Left 5/20/2021    Procedure: LEFT lumbar four and lumbar five transforaminal epidural steroid injection;  Surgeon: Paresh Ogden M.D.;  Location: [facility];  Service: Pain Management   • BLOCK EPIDURAL STEROID INJECTION Left 2/24/2021    Procedure: INJECTION,  STEROID, EPIDURAL;  Surgeon: Paresh Ogden M.D.;  Location: [facility];  Service: Pain Management   • LUMBAR MEDIAL BRANCH BLOCKS Left 9/9/2020    Procedure: BLOCK, NERVE, SPINAL, LUMBAR, POSTERIOR RAMUS, MEDIAL BRANCH;  Surgeon: Paresh Ogden M.D.;  Location: [facility];  Service: Pain Management   • [place] NJX AA&/STRD TFRML EPI LUMBAR/SACRAL 1 LEVEL Left 8/12/2020    Procedure: INJECTION, SPINE, LUMBOSACRAL, EPIDURAL, 1 LEVEL, TRANSFORAMINAL APPROACH;  Surgeon: Paresh Ogden M.D.;  Location: [facility];  Service: Pain Management   • [place] NJX AA&/STRD TFRML EPI LUMBAR/SACRAL EA ADDL Left 8/12/2020    Procedure: INJECTION, SPINE, LUMBOSACRAL, EPIDURAL, TRANSFORAMINAL APPROACH;  Surgeon: Paresh Ogden M.D.;  Location: [facility];  Service: Pain Management   • LAMINOTOMY  1998   • ABDOMINAL HYSTERECTOMY TOTAL     • CARPAL TUNNEL RELEASE     • CHOLECYSTECTOMY         Family history   Family History   Problem Relation Age of Onset   • Cancer Mother    • Stroke Father         Heart attack   • Dementia Sister    • Stroke Brother         heart Attack   • Cancer Brother         Skin   • Diabetes Brother    • Kidney Disease Brother    • Cancer Brother    • Parkinson's Disease Sister    • No Known Problems Sister    • Diabetes Daughter    • Hypertension Daughter          Medications:   Current Outpatient Medications   Medication   • morphine ER (MS CONTIN) 15 MG Tab CR tablet   • HYDROcodone-acetaminophen (NORCO) 5-325 MG Tab per tablet   • [START ON 9/23/2021] HYDROcodone-acetaminophen (NORCO) 5-325 MG Tab per tablet   • [START ON 9/23/2021] morphine ER (MS CONTIN) 15 MG Tab CR tablet   • Brexpiprazole (REXULTI) 2 MG Tab   • rosuvastatin (CRESTOR) 10 MG Tab   • gabapentin (NEURONTIN) 800 MG tablet   • lisinopril (PRINIVIL) 10 MG Tab   • [START ON 8/25/2021] clonazePAM (KLONOPIN) 0.5 MG Tab   • [START ON 8/25/2021] clonazePAM (KLONOPIN) 1 MG Tab    • Empagliflozin (JARDIANCE) 25 MG Tab   • Cholecalciferol 1.25 MG (07963 UT) Tab   • fluticasone (FLONASE) 50 MCG/ACT nasal spray   • estradiol (ESTRACE) 1 MG Tab   • aspirin EC (ECOTRIN) 81 MG Tablet Delayed Response   • omeprazole (PRILOSEC) 40 MG delayed-release capsule   • metformin (GLUCOPHAGE) 1000 MG tablet   • metoprolol tartrate (LOPRESSOR) 25 MG Tab   • DULoxetine (CYMBALTA) 30 MG Cap DR Particles   • clonazePAM (KLONOPIN) 1 MG Tab   • clonazePAM (KLONOPIN) 0.5 MG Tab   • levothyroxine (SYNTHROID) 50 MCG Tab   • cyanocobalamin (VITAMIN B-12) 500 MCG Tab   • busPIRone (BUSPAR) 10 MG Tab tablet   • Blood Glucose Test Strips   • Lancets   • Alcohol Swabs   • Naloxone (NARCAN) 4 MG/0.1ML Liquid   • Blood Glucose Meter Kit     No current facility-administered medications for this visit.       Allergies:   No Known Allergies    Social Hx:   Social History     Socioeconomic History   • Marital status:      Spouse name: Not on file   • Number of children: Not on file   • Years of education: Not on file   • Highest education level: Not on file   Occupational History   • Not on file   Tobacco Use   • Smoking status: Former Smoker     Packs/day: 0.25     Types: Cigarettes     Quit date:      Years since quittin.6   • Smokeless tobacco: Never Used   Vaping Use   • Vaping Use: Never used   Substance and Sexual Activity   • Alcohol use: Not Currently     Comment: rare   • Drug use: Yes     Types: Marijuana   • Sexual activity: Not Currently   Other Topics Concern   •  Service No   • Blood Transfusions Yes   • Caffeine Concern No   • Occupational Exposure No   • Hobby Hazards No   • Sleep Concern Yes   • Stress Concern Yes   • Weight Concern Yes   • Special Diet No   • Back Care No   • Exercise Yes   • Bike Helmet No   • Seat Belt Yes   • Self-Exams Yes   Social History Narrative   • Not on file     Social Determinants of Health     Financial Resource Strain:    • Difficulty of Paying Living  "Expenses:    Food Insecurity:    • Worried About Running Out of Food in the Last Year:    • Ran Out of Food in the Last Year:    Transportation Needs:    • Lack of Transportation (Medical):    • Lack of Transportation (Non-Medical):    Physical Activity:    • Days of Exercise per Week:    • Minutes of Exercise per Session:    Stress:    • Feeling of Stress :    Social Connections:    • Frequency of Communication with Friends and Family:    • Frequency of Social Gatherings with Friends and Family:    • Attends [employer] Services:    • Active Member of Clubs or Organizations:    • Attends Club or Organization Meetings:    • Marital Status:    Intimate Partner Violence:    • Fear of Current or Ex-Partner:    • Emotionally Abused:    • Physically Abused:    • Sexually Abused:          EXAMINATION     Physical Exam:   Vitals: /80 (BP Location: Right arm, Patient Position: Sitting, BP Cuff Size: Small adult)   Pulse 78   Temp 36.1 °C (97 °F) (Temporal)   Ht 1.6 m (5' 3\")   Wt 84.7 kg (186 lb 11.7 oz)   SpO2 94%     Constitutional:   Body Habitus: Body mass index is 33.08 kg/m².  Cooperation: Fully cooperates with exam  Appearance: Well-groomed, well-nourished, not disheveled, no acute distress    Eyes: No scleral icterus, no proptosis     ENT -no obvious auditory deficits, wearing a facial mask    Skin -no rashes or lesions noted    Respiratory-  breathing comfortable on room air,  no audible wheezing    Cardiovascular- no lower extremity edema is noted.     Psychiatric- alert and oriented ×3. Normal affect.     Gait - normal gait, no use of ambulatory device, antalgic.     Musculoskeletal -   Thoracic/Lumbar Spine/Sacral Spine/Hips   Inspection: no evidence of atrophy in bilateral lower extremities throughout     ROM of the lumbar spine decreased.  Pain with extension and quadrant loading on the left    Neuro     Key points for the international standards for neurological classification of spinal cord injury " (ISNCSCI) to light touch.     Dermatome R L   L2 2 2   L3 2 2   L4 2 2   L5 2 1   S1 2 1   S2 2 2       Motor Exam Lower Extremities    ? Myotome R L   Hip flexion L2 5 5   Knee extension L3 5 5   Ankle dorsiflexion L4 5 5   Toe extension L5 5 5   Ankle plantarflexion S1 5 5       Reflexes  ?  R L   Patella  2+ 2+   Achilles   2+ 1+       MEDICAL DECISION MAKING    Medical records review: see under HPI section.     DATA    Labs:   05/08/2021: UDS positive for morphine and metabolites, clonazepam and metabolites, hydrocodone and metabolites  08/18/2020: UDS positive for morphine and metabolites, clonazepam and metabolites, hydrocodone and metabolites  04/24/2020 UDS positive for morphine and metabolites, clonazepam  04/09/2020 Vitamin D, 25:  28L  04/09/2020 Vitamin B12: 217    Lab Results   Component Value Date/Time    SODIUM 141 05/04/2021 08:06 AM    POTASSIUM 4.0 05/04/2021 08:06 AM    CHLORIDE 105 05/04/2021 08:06 AM    CO2 26 05/04/2021 08:06 AM    ANION 10.0 05/04/2021 08:06 AM    GLUCOSE 134 (H) 05/04/2021 08:06 AM    BUN 7 (L) 05/04/2021 08:06 AM    CREATININE 0.58 05/04/2021 08:06 AM    CALCIUM 8.8 05/04/2021 08:06 AM    ASTSGOT 25 05/04/2021 08:06 AM    ALTSGPT 29 05/04/2021 08:06 AM    TBILIRUBIN 0.4 05/04/2021 08:06 AM    ALBUMIN 3.8 05/04/2021 08:06 AM    TOTPROTEIN 6.4 05/04/2021 08:06 AM    GLOBULIN 2.6 05/04/2021 08:06 AM    AGRATIO 1.5 05/04/2021 08:06 AM       No results found for: PROTHROMBTM, INR     Lab Results   Component Value Date/Time    WBC 7.5 05/04/2021 08:06 AM    RBC 4.73 05/04/2021 08:06 AM    HEMOGLOBIN 14.6 05/04/2021 08:06 AM    HEMATOCRIT 43.8 05/04/2021 08:06 AM    MCV 92.6 05/04/2021 08:06 AM    MCH 30.9 05/04/2021 08:06 AM    MCHC 33.3 (L) 05/04/2021 08:06 AM    MPV 10.6 05/04/2021 08:06 AM    NEUTSPOLYS 46.60 05/04/2021 08:06 AM    LYMPHOCYTES 39.80 05/04/2021 08:06 AM    MONOCYTES 9.30 05/04/2021 08:06 AM    EOSINOPHILS 3.70 05/04/2021 08:06 AM    BASOPHILS 0.50 05/04/2021  08:06 AM        Lab Results   Component Value Date/Time    HBA1C 7.3 (H) 05/04/2021 08:06 AM        Imaging: I personally reviewed following images, these are my reads:     MRI lumbar spine 05/05/2020  At L1-2, no central or foraminal stenosis  At L2-3, no central or foraminal stenosis  At L3-4, mild disc bulge and mild ligamentum flavum thickening.  No central canal stenosis. Borderline left foraminal stenosis. Schmorl's node.   At L4-5, diffuse disc bulge with mild ligamentum flavum thickening.  No central canal stenosis.  There is facet arthropathy, left greater than right. Mild foraminal stenosis bilaterally. S/P left L4/5 hemilaminectomy  At L5-S1, there is mild-borderline foraminal stenosis with facet arthropathy and mild disc bulge.  No central canal stenosis    Xray lumbar spine 05/05/2020  There is mild decreased joint space at L3-4 and L4-5.    Facet arthropathy is noted, greatest at L5-S1 bilaterally.  No significant motion on flexion and extension films    IMAGING radiology reads. I reviewed the following radiology reads    Xray abdomen 07/17/2020  IMPRESSION:     Nonspecific bowel gas pattern. No evidence small bowel obstruction.      MRI lumbar spine 05/05/2020  IMPRESSION:     1.  Caudal lumbar facet arthropathy left worse than right with no bony destruction to indicate septic or inflammatory arthropathy. This most likely is just degenerative  2.  Mild caudal lumbar foraminal stenoses  3.  No significant central stenosis.  4.  Left L4/5 hemilaminectomy                                                                                   Xray lumbar spine 05/05/2020     IMPRESSION:     1.  No acute lumbar compression fracture or subluxation.  2.  Posterior disc space narrowing at L4-5 and to lesser extent at L3-4.  3.  Bilateral facet arthropathy at L5-S1.                                                                                             Diagnosis   Visit Diagnoses     ICD-10-CM   1. S/P lumbar  laminectomy  Z98.890   2. DDD (degenerative disc disease), lumbar  M51.36   3. Lumbar spondylosis  M47.816   4. Lumbosacral pain  M54.5   5. Anxiety  F41.9   6. Benzodiazepine dependence (HCC)  F13.20         ASSESSMENT:  Grisel Mark 66 y.o. female seen for above     Grisel was seen today for follow-up.    Diagnoses and all orders for this visit:    S/P lumbar laminectomy  -     morphine ER (MS CONTIN) 15 MG Tab CR tablet; Take 1 Tablet by mouth every 12 hours for 30 days.  -     HYDROcodone-acetaminophen (NORCO) 5-325 MG Tab per tablet; Take 1 Tablet by mouth every 8 hours as needed (severe pain) for up to 30 days.  -     HYDROcodone-acetaminophen (NORCO) 5-325 MG Tab per tablet; Take 1 Tablet by mouth every 8 hours as needed (severe pain) for up to 30 days.  -     morphine ER (MS CONTIN) 15 MG Tab CR tablet; Take 1 Tablet by mouth every 12 hours for 30 days.  -     Consent for Opiate Prescription  -     Controlled Substance Treatment Agreement    DDD (degenerative disc disease), lumbar  -     morphine ER (MS CONTIN) 15 MG Tab CR tablet; Take 1 Tablet by mouth every 12 hours for 30 days.  -     HYDROcodone-acetaminophen (NORCO) 5-325 MG Tab per tablet; Take 1 Tablet by mouth every 8 hours as needed (severe pain) for up to 30 days.  -     HYDROcodone-acetaminophen (NORCO) 5-325 MG Tab per tablet; Take 1 Tablet by mouth every 8 hours as needed (severe pain) for up to 30 days.  -     morphine ER (MS CONTIN) 15 MG Tab CR tablet; Take 1 Tablet by mouth every 12 hours for 30 days.  -     Consent for Opiate Prescription  -     Controlled Substance Treatment Agreement    Lumbar spondylosis  -     REFERRAL TO OUTPATIENT INTERVENTIONAL PAIN CLINIC    Lumbosacral pain  -     REFERRAL TO OUTPATIENT INTERVENTIONAL PAIN CLINIC    Anxiety    Benzodiazepine dependence (HCC)         1. Discussed plan to proceed with left lumbar three through lumbar five medial branch blocks.  She has had two positive diagnostic blocks.  Plan to  proceed with radiofrequency ablation.  The risks benefits and alternatives to this procedure were discussed and the patient wishes to proceed with the procedure. Risks include but are not limited to damage to surrounding structures, infection, bleeding, worsening of pain which can be permanent, weakness which can be permanent. Benefits include pain relief, improved function. Alternatives includes not doing the procedure.    2. Continue gabapentin to 600mg four a day as tolerated.  3. Continue MS Contin 15mg po q12h and norco for breakthrough.  Discussed norco up to 2/day.  reviewed.  Most recent UDS reviewed.  Refilled for the next two months. She is continuing to take chronic benzodiazepines.  Long-term goal of wean, coordinate with PCP.  4. Discussed that we will request update from Dr. Villa.  Possible SCS trial in the future.  5. Continue to maintain regular bowel movements. Continue colase 100mg daily-bid and miralax prn for constipation.      Follow-up: Return for Hospital injection.    Thank you very much for asking me to participate in Grisel Mark's care.  Please contact me with any questions or concerns.    Please note that this dictation was created using voice recognition software. I have made every reasonable attempt to correct obvious errors but there may be errors of grammar and content that I may have overlooked prior to finalization of this note.      Paresh Ogden MD  Physical Medicine and Rehabilitation  Interventional Spine and Sports Physiatry  [facility]

## 2021-08-24 NOTE — PATIENT INSTRUCTIONS
Left lumbar three through lumbar five medial branch radiofrequency ablation    Your procedure will be at the [facility] special procedure suite.    [facility] [place] [address]       PRE-PROCEDURE INSTRUCTIONS  You may take your regular medications except:   · No Anti-inflammatories 5 days prior to your procedure. Anti-inflammatories include medicines such as  ibuprofen (Motrin, Advil), Excedrin, Naproxen (Aleve, Anaprox, Naprelan, Naprosyn), Celecoxib (Celebrex), Diclofenac (Voltaren-XR tab), and Meloxicam (Mobic).   · No Glucophage or Metformin 24 hours before your procedure. You may resume next day after your procedure.  · Call the physiatry office if you are taking or prescribed anti-biotics within five days of procedure.  · Please ask provider if you are taking any new diabetes medication.  · CONTINUE TAKING BLOOD PRESSURE MEDICATIONS AS PRESCRIBED.  · Pain medications will not be prescribed on the procedure day. Procedural pain medication may be used by your provider   · Call your doctor's office performing the procedure if you have a fever, chills, rash or new illness prior to your procedure    Anticoagulation/antiplatelet medications  No Blood thinning medications such as Coumadin or Plavix 5 days prior to procedure unless your doctor said to continue these medications. Call your doctor if a new medication is prescribed in this class.     Restrictions for eating before procedure:   · If you are getting procedural sedation, then do not eat to for 8 hours prior to procedure appointment time. Do not drink fluids for four hours prior to your procedure time.   · If you are not having procedural sedation, then Skip the meal prior to your procedure. If you have a morning procedure then skip breakfast. If you have an afternoon procedure then skip lunch.   · You may drink clear liquids up to 2 hours prior to your procedure  · You must have a  the day of procedure to accompany you  home.      POST PROCEDURE INSTRUCTIONS   · No heavy lifting, strenuous bending or strenuous exercise for 3 days after your procedure.  · No hot tubs, baths, swimming for 3 days after your procedure  · You can remove the bandage the day after the procedure.  · IF YOU RECEIVED A STEROID INJECTION. PLEASE NOTE THAT THERE MAY BE A DELAY FOR THE INJECTION TO START WORKING, THE DELAY MAY BE UP TO TWO WEEKS. IF YOU HAVE DIABETES, PLEASE NOTE THAT YOUR SUGAR LEVELS MAY BE ELEVATED FOR 1-2 DAYS AFTER A STEROID INJECTION.  THE STEROID MAY CAUSE TEMPORARY SYMPTOMS WHICH USUALLY RESOLVE ON THEIR OWN WITHIN 1 TO 2 DAYS INCLUDING FACIAL FLUSHING OR A FEELING OF WARMTH ON THE FACE, TEMPORARY INCREASES IN BLOOD SUGAR, INSOMNIA, INCREASED HUNGER  · IF YOU RECEIVED A DIAGNOSTIC PROCEDURE (SUCH AS A MEDIAL BRANCH BLOCK), PLEASE NOTE THAT WE DO EXPECT THIS INJECTION TO WEAR OFF.  IT IS IMPORTANT TO COMPLETE THE PAIN DIARY AND LIST THE PAIN SCORE ONLY FOR THE REGION WHERE THE PROCEDURE WAS AND BRING THIS TO YOUR FOLLOW UP VISIT.  · IF YOU RECEIVED A RADIOFREQUENCY PROCEDURE, THERE MAY BE SOME SORENESS AFTER THE PROCEDURE.  THIS IS NORMAL.  · IF YOU EXPERIENCE PROLONGED WEAKNESS LONGER THAN ONE DAY, BOWEL OR BLADDER INCONTINENCE THEN PLEASE CALL THE PHYSIATRY OFFICE.  · Your leg may feel heavy, weak and numb for up to 1-2 days. Be very careful walking.   ·  You may resume normal activities 3 days after procedure.

## 2021-08-26 ENCOUNTER — TELEPHONE (OUTPATIENT)
Dept: MEDICAL GROUP | Facility: PHYSICIAN GROUP | Age: 66
End: 2021-08-26

## 2021-08-26 NOTE — TELEPHONE ENCOUNTER
----- Message from LUPE Medrano.[email or web address] sent at 8/26/2021 11:56 AM PDT -----  Please call [facility] to see if they have the patients prescription for clonazepam 0.5mg. Pt states she was able to  the 1 mg prescription but not the 0.5mg prescription.

## 2021-08-28 ENCOUNTER — APPOINTMENT (OUTPATIENT)
Dept: SLEEP MEDICINE | Facility: MEDICAL CENTER | Age: 66
End: 2021-08-28
Attending: INTERNAL MEDICINE
Payer: MEDICARE

## 2021-08-31 ENCOUNTER — HOSPITAL ENCOUNTER (OUTPATIENT)
Facility: REHABILITATION | Age: 66
End: 2021-08-31
Attending: PHYSICAL MEDICINE & REHABILITATION | Admitting: PHYSICAL MEDICINE & REHABILITATION
Payer: MEDICARE

## 2021-09-02 ENCOUNTER — TELEPHONE (OUTPATIENT)
Dept: PHYSICAL MEDICINE AND REHAB | Facility: MEDICAL CENTER | Age: 66
End: 2021-09-02

## 2021-09-02 NOTE — TELEPHONE ENCOUNTER
Called pt / reviewed pre-procedure instructions for upcoming SP as well as rescheduling SP from 9/16 to 9/8. Reviewed dietary and medication restrictions. Reviewed need for  and need for early arrival. Pt knows to expect another important call from SP RNs prior to SP. Pt confirmed she understood all instructions. ND

## 2021-09-08 ENCOUNTER — TELEPHONE (OUTPATIENT)
Dept: HEALTH INFORMATION MANAGEMENT | Facility: OTHER | Age: 66
End: 2021-09-08

## 2021-09-08 NOTE — TELEPHONE ENCOUNTER
Outcome: Patient requested a call back for FIT and Comprehensive Health Assessment.     Please transfer to Patient Outreach Team at [phone] when patient returns call.    [facility] Verified: yes    Attempt # 1

## 2021-09-10 ENCOUNTER — APPOINTMENT (OUTPATIENT)
Dept: SLEEP MEDICINE | Facility: MEDICAL CENTER | Age: 66
End: 2021-09-10
Attending: INTERNAL MEDICINE
Payer: MEDICARE

## 2021-09-16 ENCOUNTER — PATIENT MESSAGE (OUTPATIENT)
Dept: MEDICAL GROUP | Facility: PHYSICIAN GROUP | Age: 66
End: 2021-09-16

## 2021-09-16 DIAGNOSIS — E66.9 DIABETES MELLITUS TYPE 2 IN OBESE: ICD-10-CM

## 2021-09-16 DIAGNOSIS — E11.69 DIABETES MELLITUS TYPE 2 IN OBESE: ICD-10-CM

## 2021-09-17 DIAGNOSIS — R20.2 PARESTHESIA: ICD-10-CM

## 2021-09-17 DIAGNOSIS — Z98.890 S/P LUMBAR LAMINECTOMY: ICD-10-CM

## 2021-09-17 NOTE — TELEPHONE ENCOUNTER
From: Grisel Mark  To: Nurse Practitioner Demi Farley  Sent: 9/16/2021 11:35 PM PDT  Subject: Meds    Hi I need my Metformin refueled.  Thank you so much Saloni

## 2021-09-17 NOTE — PATIENT COMMUNICATION
Received request via: Patient    Was the patient seen in the last year in this department? Yes    Does the patient have an active prescription (recently filled or refills available) for medication(s) requested? No     Requested Prescriptions     Pending Prescriptions Disp Refills   • metformin (GLUCOPHAGE) 1000 MG tablet 200 Tablet 0     Sig: TAKE 1 TABLET BY MOUTH TWICE DAILY WITH MEALS

## 2021-09-21 RX ORDER — GABAPENTIN 600 MG/1
TABLET ORAL
Qty: 120 TABLET | Refills: 1 | Status: SHIPPED | OUTPATIENT
Start: 2021-09-21 | End: 2021-10-01

## 2021-09-23 ENCOUNTER — TELEMEDICINE (OUTPATIENT)
Dept: MEDICAL GROUP | Facility: PHYSICIAN GROUP | Age: 66
End: 2021-09-23
Payer: MEDICARE

## 2021-09-23 VITALS — HEIGHT: 63 IN | WEIGHT: 183 LBS | RESPIRATION RATE: 20 BRPM | BODY MASS INDEX: 32.43 KG/M2

## 2021-09-23 DIAGNOSIS — H92.02 EAR PAIN, LEFT: ICD-10-CM

## 2021-09-23 DIAGNOSIS — J01.00 ACUTE NON-RECURRENT MAXILLARY SINUSITIS: ICD-10-CM

## 2021-09-23 PROCEDURE — 99213 OFFICE O/P EST LOW 20 MIN: CPT | Mod: 95,CR | Performed by: NURSE PRACTITIONER

## 2021-09-23 RX ORDER — AMOXICILLIN AND CLAVULANATE POTASSIUM 875; 125 MG/1; MG/1
1 TABLET, FILM COATED ORAL 2 TIMES DAILY
Qty: 20 TABLET | Refills: 0 | Status: SHIPPED | OUTPATIENT
Start: 2021-09-23 | End: 2021-09-30

## 2021-09-23 ASSESSMENT — FIBROSIS 4 INDEX: FIB4 SCORE: 1.63

## 2021-09-24 NOTE — ASSESSMENT & PLAN NOTE
Pt reports that she has had left ear pain and sinus pain for about one week. She also reports that her left eye has been swollen for the last week with white discharge. She denies any fever, chills, nausea/vomiting, or dizziness. She reports throbbing pain in her cheek and above her left eyebrow.     Pt is using flonase nasal spray without relief.     As patient has drainage from her eyes and nose, will treat for a sinus infection. Will send for 10 days of Augmentin.

## 2021-09-24 NOTE — PROGRESS NOTES
Virtual Visit: Established Patient   This visit was conducted via Zoom using secure and encrypted videoconferencing technology. The patient was in a private location in the state of [place].    The patient's identity was confirmed and verbal consent was obtained for this virtual visit.    Subjective:   CC: ear pain, sinus pain, for 1 week     Grisel Mark is a 66 y.o. female presenting for evaluation and management of:    Problem   Ear Pain, Left       ROS   Denies any recent fevers or chills. No nausea or vomiting. No chest pains or shortness of breath.     No Known Allergies    Current medicines (including changes today)  Current Outpatient Medications   Medication Sig Dispense Refill   • amoxicillin-clavulanate (AUGMENTIN) 875-125 MG Tab Take 1 Tablet by mouth 2 times a day for 10 days. 20 Tablet 0   • gabapentin (NEURONTIN) 600 MG tablet TAKE 1 TABLET BY MOUTH FOUR TIMES DAILY 120 Tablet 1   • Blood Glucose Meter Kit Test blood sugar as recommended by provider. Abbott Freestyle Lite blood glucose monitoring kit. 1 Kit 0   • Lancets Use one Abbott [place] Lite lancet to test blood sugar twice daily and PRN. 300 Each 3   • Alcohol Swabs Wipe site with prep pad prior to injection. 100 Each 3   • Cholecalciferol 1.25 MG (06798 UT) Tab Take 50,000 Units by mouth every 7 days. 12 Tablet 0   • metformin (GLUCOPHAGE) 1000 MG tablet TAKE 1 TABLET BY MOUTH TWICE DAILY WITH MEALS 200 Tablet 0   • morphine ER (MS CONTIN) 15 MG Tab CR tablet Take 1 Tablet by mouth every 12 hours for 30 days. 60 Tablet 0   • HYDROcodone-acetaminophen (NORCO) 5-325 MG Tab per tablet Take 1 Tablet by mouth every 8 hours as needed (severe pain) for up to 30 days. 60 Tablet 0   • HYDROcodone-acetaminophen (NORCO) 5-325 MG Tab per tablet Take 1 Tablet by mouth every 8 hours as needed (severe pain) for up to 30 days. 60 Tablet 0   • morphine ER (MS CONTIN) 15 MG Tab CR tablet Take 1 Tablet by mouth every 12 hours for 30 days. 60 Tablet 0   •  Brexpiprazole (REXULTI) 2 MG Tab Take 2 mg by mouth every day. 90 Tablet 3   • rosuvastatin (CRESTOR) 10 MG Tab Take 1 tablet by mouth every evening. 100 tablet 3   • gabapentin (NEURONTIN) 800 MG tablet Take 1,200 mg by mouth 2 times a day.     • lisinopril (PRINIVIL) 10 MG Tab Take 1 tablet by mouth every day. 100 tablet 3   • clonazePAM (KLONOPIN) 0.5 MG Tab Take 1 tablet by mouth every morning for 90 days. 90 tablet 0   • clonazePAM (KLONOPIN) 1 MG Tab Take 1 tablet by mouth at bedtime for 90 days. 90 tablet 0   • Empagliflozin (JARDIANCE) 25 MG Tab Take 1 tablet by mouth every day. 100 tablet 3   • fluticasone (FLONASE) 50 MCG/ACT nasal spray Administer 1 Spray into affected nostril(S) every day. 16 g 11   • estradiol (ESTRACE) 1 MG Tab TAKE 1 TABLET BY MOUTH EVERY DAY 90 tablet 0   • omeprazole (PRILOSEC) 40 MG delayed-release capsule TAKE 1 CAPSULE BY MOUTH EVERY DAY 90 capsule 1   • metoprolol tartrate (LOPRESSOR) 25 MG Tab TAKE 1 TABLET BY MOUTH TWICE DAILY 180 tablet 1   • DULoxetine (CYMBALTA) 30 MG Cap DR Particles TAKE 3 CAPSULES BY MOUTH EVERY  capsule 1   • levothyroxine (SYNTHROID) 50 MCG Tab Take 1 tablet by mouth Every morning on an empty stomach. 90 tablet 1   • busPIRone (BUSPAR) 10 MG Tab tablet Take 1 Tab by mouth 3 times a day. 270 Tab 3   • Blood Glucose Test Strips Use one Abbott Freedom Lite strip to test blood sugar twice daily and PRN. 270 Each 3   • Naloxone (NARCAN) 4 MG/0.1ML Liquid One spray in one nostril for overdose and call 911. 1 Each 0   • aspirin EC (ECOTRIN) 81 MG Tablet Delayed Response Take 81 mg by mouth every day.     • cyanocobalamin (VITAMIN B-12) 500 MCG Tab Take 500 mcg by mouth every day.       No current facility-administered medications for this visit.       Patient Active Problem List    Diagnosis Date Noted   • Ear pain, left 09/23/2021   • Primary central sleep apnea 08/05/2021   • Opioid dependence in controlled environment (HCC) 05/26/2021   •  "Benzodiazepine dependence (HCC) 05/26/2021   • Generalized anxiety disorder 05/12/2020   • Posttraumatic stress disorder, delayed onset 05/12/2020   • Essential hypertension 02/05/2020   • Mixed hyperlipidemia 02/05/2020   • Type 2 diabetes mellitus with diabetic polyneuropathy, without long-term current use of insulin ([facility]) 02/05/2020   • Hypothyroidism (acquired) 02/05/2020   • Depression, major, recurrent, moderate (HCC) 02/05/2020   • Gastroesophageal reflux disease without esophagitis 02/05/2020   • Situational anxiety 02/05/2020         She  has a past medical history of Anxiety, Arrhythmia, Chronic back pain, Constipation, Depression, Diabetes ([facility]), GERD (gastroesophageal reflux disease), Hyperlipidemia, Hypertension, and Thyroid disease.  She  has a past surgical history that includes cholecystectomy; carpal tunnel release; abdominal hysterectomy total; pr njx aa&/strd tfrml epi lumbar/sacral 1 level (Left, 8/12/2020); pr njx aa&/strd tfrml epi lumbar/sacral ea addl (Left, 8/12/2020); lumbar medial branch blocks (Left, 9/9/2020); laminotomy (1998); block epidural steroid injection (Left, 2/24/2021); lumbar transforaminal epidural steroid injection (Left, 5/20/2021); and lumbar medial branch blocks (Left, 7/21/2021).       Objective:   Resp 20   Ht 1.6 m (5' 3\")   Wt 83 kg (183 lb)   LMP  (LMP Unknown)   BMI 32.42 kg/m²     Physical Exam:  Constitutional: Alert, no distress, well-groomed.  Skin: No rashes in visible areas.  Eye: Round. Conjunctiva clear, lids normal. No icterus.   ENMT: Lips pink without lesions, good dentition, moist mucous membranes. Phonation normal.  Neck: Moves freely without pain.  Respiratory: Unlabored respiratory effort, no cough or audible wheeze  Psych: Alert and oriented x3, normal affect and mood.       Assessment and Plan:   The following treatment plan was discussed:     1. Ear pain, left  amoxicillin-clavulanate (AUGMENTIN) 875-125 MG Tab   2. Acute non-recurrent " maxillary sinusitis  amoxicillin-clavulanate (AUGMENTIN) 875-125 MG Tab       Ear pain, left  Pt reports that she has had left ear pain and sinus pain for about one week. She also reports that her left eye has been swollen for the last week with white discharge. She denies any fever, chills, nausea/vomiting, or dizziness. She reports throbbing pain in her cheek and above her left eyebrow.     Pt is using flonase nasal spray without relief.     As patient has drainage from her eyes and nose, will treat for a sinus infection. Will send for 10 days of Augmentin.     >> patient advised to wait to get the flu vaccine until she is feeling better.     Follow-up: Return if symptoms worsen or fail to improve.         I have placed the below orders and discussed them with an approved delegating provider.  The MA is performing the below orders under the direction of Dr. Noel.    Please note that this dictation was created using voice recognition software. I have worked with consultants from the vendor as well as technical experts from [facility] to optimize the interface. I have made every reasonable attempt to correct obvious errors, but I expect that there are errors of grammar and possibly content that I did not discover before finalizing the note.

## 2021-09-30 ENCOUNTER — OFFICE VISIT (OUTPATIENT)
Dept: MEDICAL GROUP | Facility: PHYSICIAN GROUP | Age: 66
End: 2021-09-30
Payer: MEDICARE

## 2021-09-30 VITALS
OXYGEN SATURATION: 93 % | DIASTOLIC BLOOD PRESSURE: 80 MMHG | HEART RATE: 82 BPM | HEIGHT: 63 IN | BODY MASS INDEX: 33.42 KG/M2 | WEIGHT: 188.6 LBS | SYSTOLIC BLOOD PRESSURE: 122 MMHG | TEMPERATURE: 98 F

## 2021-09-30 DIAGNOSIS — J01.81 OTHER ACUTE RECURRENT SINUSITIS: ICD-10-CM

## 2021-09-30 DIAGNOSIS — H92.02 LEFT EAR PAIN: ICD-10-CM

## 2021-09-30 PROCEDURE — 99213 OFFICE O/P EST LOW 20 MIN: CPT | Performed by: STUDENT IN AN ORGANIZED HEALTH CARE EDUCATION/TRAINING PROGRAM

## 2021-09-30 RX ORDER — PSEUDOEPHEDRINE HCL 30 MG
30-60 TABLET ORAL EVERY 6 HOURS PRN
Qty: 30 TABLET | Refills: 0 | Status: SHIPPED | OUTPATIENT
Start: 2021-09-30

## 2021-09-30 RX ORDER — LEVOFLOXACIN 750 MG/1
750 TABLET, FILM COATED ORAL DAILY
Qty: 7 TABLET | Refills: 0 | Status: SHIPPED | OUTPATIENT
Start: 2021-09-30 | End: 2022-03-18

## 2021-09-30 ASSESSMENT — FIBROSIS 4 INDEX: FIB4 SCORE: 1.63

## 2021-09-30 NOTE — PROGRESS NOTES
Subjective:     CC: headache, sinus congestion and ear pain x 2 weeks    HPI:   Grisel presents today worsening headache, sinus pressure and left ear pain. PCP AKILAH Farley.    Patient is vaccinated to COVID 19, received last Moderna vaccine 7/27/2021. Had a televisit appt with her PCP AKILAH Farley 9/23/2021 and received 10 days of augmentin  For concern of sinusitis and potential ear infection given pain.  Patient reports that she has been taking Augmentin without side effects but pain and headache have worsened.  The pressure/pain is located on the left side maxillary and frontal as well as ear pain.  She was using Ciprodex which seemed to help.  Has been using Flonase which seems to burn her nose but otherwise not very helpful.  Denies any fever, chills, cough, visual disturbances, diarrhea, body aches, chest pain or trouble breathing.  The pressure and pain are improved with Tylenol and ibuprofen which she has been alternating.    Reports she has had a history of sinus infections in the past which typically respond to antibiotics so this is slightly unusual.    Current Outpatient Medications Ordered in Epic   Medication Sig Dispense Refill   • amoxicillin-clavulanate (AUGMENTIN) 875-125 MG Tab Take 1 Tablet by mouth 2 times a day for 10 days. 20 Tablet 0   • Blood Glucose Meter Kit Test blood sugar as recommended by provider. Abbott Freestyle Lite blood glucose monitoring kit. 1 Kit 0   • Lancets Use one Abbott [place] Lite lancet to test blood sugar twice daily and PRN. 300 Each 3   • Alcohol Swabs Wipe site with prep pad prior to injection. 100 Each 3   • Cholecalciferol 1.25 MG (53047 UT) Tab Take 50,000 Units by mouth every 7 days. 12 Tablet 0   • metformin (GLUCOPHAGE) 1000 MG tablet TAKE 1 TABLET BY MOUTH TWICE DAILY WITH MEALS 200 Tablet 0   • HYDROcodone-acetaminophen (NORCO) 5-325 MG Tab per tablet Take 1 Tablet by mouth every 8 hours as needed (severe pain) for up to 30 days. 60 Tablet 0   • morphine ER  (MS CONTIN) 15 MG Tab CR tablet Take 1 Tablet by mouth every 12 hours for 30 days. 60 Tablet 0   • Brexpiprazole (REXULTI) 2 MG Tab Take 2 mg by mouth every day. 90 Tablet 3   • rosuvastatin (CRESTOR) 10 MG Tab Take 1 tablet by mouth every evening. 100 tablet 3   • gabapentin (NEURONTIN) 800 MG tablet Take 1,200 mg by mouth 2 times a day.     • lisinopril (PRINIVIL) 10 MG Tab Take 1 tablet by mouth every day. 100 tablet 3   • clonazePAM (KLONOPIN) 0.5 MG Tab Take 1 tablet by mouth every morning for 90 days. 90 tablet 0   • clonazePAM (KLONOPIN) 1 MG Tab Take 1 tablet by mouth at bedtime for 90 days. 90 tablet 0   • Empagliflozin (JARDIANCE) 25 MG Tab Take 1 tablet by mouth every day. 100 tablet 3   • fluticasone (FLONASE) 50 MCG/ACT nasal spray Administer 1 Spray into affected nostril(S) every day. 16 g 11   • estradiol (ESTRACE) 1 MG Tab TAKE 1 TABLET BY MOUTH EVERY DAY 90 tablet 0   • aspirin EC (ECOTRIN) 81 MG Tablet Delayed Response Take 81 mg by mouth every day.     • omeprazole (PRILOSEC) 40 MG delayed-release capsule TAKE 1 CAPSULE BY MOUTH EVERY DAY 90 capsule 1   • metoprolol tartrate (LOPRESSOR) 25 MG Tab TAKE 1 TABLET BY MOUTH TWICE DAILY 180 tablet 1   • DULoxetine (CYMBALTA) 30 MG Cap DR Particles TAKE 3 CAPSULES BY MOUTH EVERY  capsule 1   • levothyroxine (SYNTHROID) 50 MCG Tab Take 1 tablet by mouth Every morning on an empty stomach. 90 tablet 1   • cyanocobalamin (VITAMIN B-12) 500 MCG Tab Take 500 mcg by mouth every day.     • busPIRone (BUSPAR) 10 MG Tab tablet Take 1 Tab by mouth 3 times a day. 270 Tab 3   • Blood Glucose Test Strips Use one Abbott Freedom Lite strip to test blood sugar twice daily and PRN. 270 Each 3   • Naloxone (NARCAN) 4 MG/0.1ML Liquid One spray in one nostril for overdose and call 911. 1 Each 0   • gabapentin (NEURONTIN) 600 MG tablet TAKE 1 TABLET BY MOUTH FOUR TIMES DAILY (Patient not taking: Reported on 9/30/2021) 120 Tablet 1     No current Epic-ordered  "facility-administered medications on file.     ROS:  See HPI    Objective:     Exam:  /80 (BP Location: Left arm, Patient Position: Sitting, BP Cuff Size: Adult)   Pulse 82   Temp 36.7 °C (98 °F) (Temporal)   Ht 1.6 m (5' 3\")   Wt 85.5 kg (188 lb 9.6 oz)   LMP  (LMP Unknown)   SpO2 93%   BMI 33.41 kg/m²  Body mass index is 33.41 kg/m².    Physical Exam:  Constitutional: Well-developed and well-nourished. No acute distress.   Skin: Skin is warm and dry. No rash noted.  Head: Atraumatic without lesions.  Eyes: Conjunctivae and extraocular motions are normal. Pupils are equal, round, and reactive to light. No scleral icterus.   Ears:  External ears unremarkable. Tympanic membranes clear and intact but evidence of prior scarring on the left TM.  Nose: Nares patent. Turbinates without erythema, more edematous on the right than left. No discharge.  Left maxillary tenderness and left frontal sinus tenderness.  Mouth/Throat: Oropharynx is clear and moist. Posterior pharynx without erythema or exudates.  Wearing dentures.  Neck: Supple, trachea midline. Normal range of motion. No lymphadenopathy--cervical or supraclavicular.  Cardiovascular: Regular rate and rhythm, S1 and S2 without murmur, rubs, or gallops.  Lungs: Normal inspiratory effort, CTA bilaterally, no wheezes/rhonchi/rales  Extremities: No cyanosis, clubbing, erythema, nor edema.  Neurological: Alert and oriented x 3. No gross/focal deficits.  Psychiatric:  Behavior, mood, and affect are appropriate.    Assessment & Plan:     66 y.o. female with the following -     1. Other acute recurrent sinusitis  This is a acute condition, worsening despite Augmentin.  Unfortunately her first visit was virtual so uncertain whether or not she actually had a ear infection at that time but given that she is not improve and has risk factors for poor outcome including age/diabetes we will change her antibiotics to levo ofloxacin as below and stop Augmentin.  Okay to " continue Flonase but encouraged saline rinses up to 4 times a day as well to improve drainage.  Okay to continue Tylenol or ibuprofen for pain but if above measures do not improve symptoms can take Sudafed as needed, possible side effects discussed.  May to follow-up if needed should she not improve. Discussed ER/return precautions.  - levoFLOXacin (LEVAQUIN) 750 MG tablet; Take 1 Tablet by mouth every day. Can stop at 5 days if symptoms resolved after 5 days  Dispense: 7 Tablet; Refill: 0  - pseudoephedrine (SUDAFED) 30 MG Tab; Take 1-2 Tablets by mouth every 6 hours as needed for Congestion (sinus pressure not relieved by other measures).  Dispense: 30 Tablet; Refill: 0    2. Left ear pain  This is an acute condition, suspect referred pain from sinusitis as above as ear exam normal today.  Treat as above.    Return in about 5 days (around 10/5/2021), or if symptoms worsen or fail to improve.    Please note that this dictation was created using voice recognition software. I have made every reasonable attempt to correct obvious errors, but I expect that there are errors of grammar and possibly content that I did not discover before finalizing the note.

## 2021-09-30 NOTE — PATIENT INSTRUCTIONS
[facility] Saline rinse up to 4 times a day.  Sudafed as needed if other measures not helpful.  Can stop augmentin and start levofloxacin daily. Can stop levofloxacin after 5 days if all symptoms resolved, if not then 7.      Sinusitis, Adult  Sinusitis is inflammation of your sinuses. Sinuses are hollow spaces in the bones around your face. Your sinuses are located:  · Around your eyes.  · In the middle of your forehead.  · Behind your nose.  · In your cheekbones.  Mucus normally drains out of your sinuses. When your nasal tissues become inflamed or swollen, mucus can become trapped or blocked. This allows bacteria, viruses, and fungi to grow, which leads to infection. Most infections of the sinuses are caused by a virus.  Sinusitis can develop quickly. It can last for up to 4 weeks (acute) or for more than 12 weeks (chronic). Sinusitis often develops after a cold.  What are the causes?  This condition is caused by anything that creates swelling in the sinuses or stops mucus from draining. This includes:  · Allergies.  · Asthma.  · Infection from bacteria or viruses.  · Deformities or blockages in your nose or sinuses.  · Abnormal growths in the nose (nasal polyps).  · Pollutants, such as chemicals or irritants in the air.  · Infection from fungi (rare).  What increases the risk?  You are more likely to develop this condition if you:  · Have a weak body defense system (immune system).  · Do a lot of swimming or diving.  · Overuse nasal sprays.  · Smoke.  What are the signs or symptoms?  The main symptoms of this condition are pain and a feeling of pressure around the affected sinuses. Other symptoms include:  · Stuffy nose or congestion.  · Thick drainage from your nose.  · Swelling and warmth over the affected sinuses.  · Headache.  · Upper toothache.  · A cough that may get worse at night.  · Extra mucus that collects in the throat or the back of the nose (postnasal drip).  · Decreased sense of smell and  taste.  · Fatigue.  · A fever.  · Sore throat.  · Bad breath.  How is this diagnosed?  This condition is diagnosed based on:  · Your symptoms.  · Your medical history.  · A physical exam.  · Tests to find out if your condition is acute or chronic. This may include:  ? Checking your nose for nasal polyps.  ? Viewing your sinuses using a device that has a light (endoscope).  ? Testing for allergies or bacteria.  ? Imaging tests, such as an MRI or CT scan.  In rare cases, a bone biopsy may be done to rule out more serious types of fungal sinus disease.  How is this treated?  Treatment for sinusitis depends on the cause and whether your condition is chronic or acute.  · If caused by a virus, your symptoms should go away on their own within 10 days. You may be given medicines to relieve symptoms. They include:  ? Medicines that shrink swollen nasal passages (topical intranasal decongestants).  ? Medicines that treat allergies (antihistamines).  ? A spray that eases inflammation of the nostrils (topical intranasal corticosteroids).  ? Rinses that help get rid of thick mucus in your nose (nasal saline washes).  · If caused by bacteria, your health care provider may recommend waiting to see if your symptoms improve. Most bacterial infections will get better without antibiotic medicine. You may be given antibiotics if you have:  ? A severe infection.  ? A weak immune system.  · If caused by narrow nasal passages or nasal polyps, you may need to have surgery.  Follow these instructions at home:  Medicines  · Take, use, or apply over-the-counter and prescription medicines only as told by your health care provider. These may include nasal sprays.  · If you were prescribed an antibiotic medicine, take it as told by your health care provider. Do not stop taking the antibiotic even if you start to feel better.  Hydrate and humidify    · Drink enough fluid to keep your urine pale yellow. Staying hydrated will help to thin your  mucus.  · Use a cool mist humidifier to keep the humidity level in your home above 50%.  · Inhale steam for 10-15 minutes, 3-4 times a day, or as told by your health care provider. You can do this in the bathroom while a hot shower is running.  · Limit your exposure to cool or dry air.  Rest  · Rest as much as possible.  · Sleep with your head raised (elevated).  · Make sure you get enough sleep each night.  General instructions    · Apply a warm, moist washcloth to your face 3-4 times a day or as told by your health care provider. This will help with discomfort.  · Wash your hands often with soap and water to reduce your exposure to germs. If soap and water are not available, use hand .  · Do not smoke. Avoid being around people who are smoking (secondhand smoke).  · Keep all follow-up visits as told by your health care provider. This is important.  Contact a health care provider if:  · You have a fever.  · Your symptoms get worse.  · Your symptoms do not improve within 10 days.  Get help right away if:  · You have a severe headache.  · You have persistent vomiting.  · You have severe pain or swelling around your face or eyes.  · You have vision problems.  · You develop confusion.  · Your neck is stiff.  · You have trouble breathing.  Summary  · Sinusitis is soreness and inflammation of your sinuses. Sinuses are hollow spaces in the bones around your face.  · This condition is caused by nasal tissues that become inflamed or swollen. The swelling traps or blocks the flow of mucus. This allows bacteria, viruses, and fungi to grow, which leads to infection.  · If you were prescribed an antibiotic medicine, take it as told by your health care provider. Do not stop taking the antibiotic even if you start to feel better.  · Keep all follow-up visits as told by your health care provider. This is important.  This information is not intended to replace advice given to you by your health care provider. Make sure you  discuss any questions you have with your health care provider.  Document Released: 12/18/2006 Document Revised: 05/20/2019 Document Reviewed: 05/20/2019  Elsevier Patient Education © 2020 [employer]

## 2021-10-06 ENCOUNTER — APPOINTMENT (OUTPATIENT)
Dept: MEDICAL GROUP | Facility: PHYSICIAN GROUP | Age: 66
End: 2021-10-06
Payer: MEDICARE

## 2021-10-07 DIAGNOSIS — F41.1 GENERALIZED ANXIETY DISORDER: ICD-10-CM

## 2021-10-07 RX ORDER — BUSPIRONE HYDROCHLORIDE 10 MG/1
10 TABLET ORAL 3 TIMES DAILY
Qty: 270 TABLET | Refills: 3 | Status: SHIPPED | OUTPATIENT
Start: 2021-10-07 | End: 2022-04-12 | Stop reason: SDUPTHER

## 2021-10-13 ENCOUNTER — TELEPHONE (OUTPATIENT)
Dept: MEDICAL GROUP | Facility: PHYSICIAN GROUP | Age: 66
End: 2021-10-13

## 2021-10-13 NOTE — TELEPHONE ENCOUNTER
ESTABLISHED PATIENT PRE-VISIT PLANNING     Patient was NOT contacted to complete PVP. MO ANSWER. LEFT VOICEMAIL.     Note: Patient will not be contacted if there is no indication to call.     1.  Reviewed notes from the last few office visits within the medical group: Yes    2.  If any orders were placed at last visit or intended to be done for this visit (i.e. 6 mos follow-up), do we have Results/Consult Notes?         •  Labs - Labs ordered, NOT completed. Patient advised to complete prior to next appointment.  Note: If patient appointment is for lab review and patient did not complete labs, check with provider if OK to reschedule patient until labs completed.       •  Imaging - Imaging was not ordered at last office visit.       •  Referrals - No referrals were ordered at last office visit.    3. Is this appointment scheduled as a Hospital Follow-Up? No    4.  Immunizations were updated in Epic using Reconcile Outside Information activity? Yes    5.  Patient is due for the following Health Maintenance Topics:   Health Maintenance Due   Topic Date Due   • Annual Wellness Visit  Never done   • COLORECTAL CANCER SCREENING  Never done   • BONE DENSITY  Never done   • MAMMOGRAM  05/28/2021   • RETINAL SCREENING  08/31/2021   • IMM INFLUENZA (1) 09/01/2021   • DIABETES MONOFILAMENT / LE EXAM  10/09/2021   • A1C SCREENING  11/04/2021       6.  AHA (Pulse[employer]) form printed for Provider? Yes

## 2021-10-19 ENCOUNTER — OFFICE VISIT (OUTPATIENT)
Dept: PHYSICAL MEDICINE AND REHAB | Facility: MEDICAL CENTER | Age: 66
End: 2021-10-19
Payer: MEDICARE

## 2021-10-19 VITALS
WEIGHT: 189.38 LBS | HEART RATE: 81 BPM | SYSTOLIC BLOOD PRESSURE: 124 MMHG | HEIGHT: 63 IN | DIASTOLIC BLOOD PRESSURE: 76 MMHG | BODY MASS INDEX: 33.55 KG/M2 | OXYGEN SATURATION: 94 % | TEMPERATURE: 97.6 F

## 2021-10-19 DIAGNOSIS — M25.562 CHRONIC PAIN OF LEFT KNEE: ICD-10-CM

## 2021-10-19 DIAGNOSIS — Z98.890 S/P LUMBAR LAMINECTOMY: ICD-10-CM

## 2021-10-19 DIAGNOSIS — G89.29 CHRONIC PAIN OF LEFT KNEE: ICD-10-CM

## 2021-10-19 DIAGNOSIS — M51.36 DDD (DEGENERATIVE DISC DISEASE), LUMBAR: ICD-10-CM

## 2021-10-19 PROCEDURE — 99214 OFFICE O/P EST MOD 30 MIN: CPT | Performed by: PHYSICAL MEDICINE & REHABILITATION

## 2021-10-19 RX ORDER — HYDROCODONE BITARTRATE AND ACETAMINOPHEN 5; 325 MG/1; MG/1
1 TABLET ORAL EVERY 8 HOURS PRN
Qty: 60 TABLET | Refills: 0 | Status: SHIPPED | OUTPATIENT
Start: 2021-12-23 | End: 2022-01-18 | Stop reason: SDUPTHER

## 2021-10-19 RX ORDER — MORPHINE SULFATE 15 MG/1
15 TABLET, FILM COATED, EXTENDED RELEASE ORAL EVERY 12 HOURS
Qty: 60 TABLET | Refills: 0 | Status: SHIPPED | OUTPATIENT
Start: 2021-10-24 | End: 2021-11-02

## 2021-10-19 RX ORDER — MORPHINE SULFATE 15 MG/1
15 TABLET, FILM COATED, EXTENDED RELEASE ORAL EVERY 12 HOURS
Qty: 60 TABLET | Refills: 0 | Status: SHIPPED | OUTPATIENT
Start: 2021-11-23 | End: 2021-12-23

## 2021-10-19 RX ORDER — MORPHINE SULFATE 15 MG/1
15 TABLET, FILM COATED, EXTENDED RELEASE ORAL EVERY 12 HOURS
Qty: 60 TABLET | Refills: 0 | Status: SHIPPED | OUTPATIENT
Start: 2021-12-23 | End: 2021-11-02

## 2021-10-19 RX ORDER — HYDROCODONE BITARTRATE AND ACETAMINOPHEN 5; 325 MG/1; MG/1
1 TABLET ORAL EVERY 8 HOURS PRN
Qty: 60 TABLET | Refills: 0 | Status: SHIPPED | OUTPATIENT
Start: 2021-11-23 | End: 2021-12-23

## 2021-10-19 RX ORDER — HYDROCODONE BITARTRATE AND ACETAMINOPHEN 5; 325 MG/1; MG/1
1 TABLET ORAL EVERY 8 HOURS PRN
Qty: 60 TABLET | Refills: 0 | Status: SHIPPED | OUTPATIENT
Start: 2021-10-24 | End: 2021-11-23

## 2021-10-19 RX ORDER — IBUPROFEN 800 MG/1
800 TABLET ORAL EVERY 8 HOURS PRN
Qty: 30 TABLET | Refills: 1 | Status: SHIPPED | OUTPATIENT
Start: 2021-10-19 | End: 2021-11-23

## 2021-10-19 ASSESSMENT — PATIENT HEALTH QUESTIONNAIRE - PHQ9
SUM OF ALL RESPONSES TO PHQ QUESTIONS 1-9: 5
CLINICAL INTERPRETATION OF PHQ2 SCORE: 2
5. POOR APPETITE OR OVEREATING: 1 - SEVERAL DAYS

## 2021-10-19 ASSESSMENT — FIBROSIS 4 INDEX: FIB4 SCORE: 1.63

## 2021-10-19 ASSESSMENT — PAIN SCALES - GENERAL: PAINLEVEL: 8=MODERATE-SEVERE PAIN

## 2021-10-20 NOTE — PROGRESS NOTES
Follow-up patient note    Physiatry (physical medicine and  Rehabilitation), interventional spine and sports medicine    Date of Service: 10/19/2021    Chief complaint:   Chief Complaint   Patient presents with   • Follow-Up     Back pain       HISTORY    HPI: Grisel Mark 66 y.o. female who presents today for follow-up evaluation of low back and leg pain.     Left knee pain has been worse.  This interrupted our planned procedure, she was not able to reschedule the RFA.  The left knee continues to be a problem.  This left knee has been intermittently swelling.  The left knee locks on her.  She did not get her left knee xray from 05/13/2021.    Her low back and left leg pain continue.  She is wondering about SCS.  She reports that she has had visits with Dr. Villa, who cleared her.  Our most recent reports are from July 2021 with recommendation of a few more visits.  Will try to get updated notes    Gabapentin back to 600mg po qid.  She is starting to reduce her clonazepam by 0.5mg a day. Her pain medications: Duloxetine 90mg.  MS Contin 15mg q12h.  Norco 5/325 for breakthrough.  No side effects.    Bowel movements have been regular, using stool softener from [employer]. Maintaining regular bowel movements.      By history:  Her laminectomy was in 1998.  Previous injection on left L4 and L5 TFESI on 08/12/2020 helped with her leg pain.     Medical records review:  Dr. Francisco Olmos, plan to increase duloxetine 90mg daily, discontinue buspirone 20mg po bid, start buproprion XL 150mg daily, continue brexipiprazole 1mg qhs, hydroxyzine 25mg po tid prn, clonazepam 0.5mg daily prn    Review of records   Dr. Dheeraj De La Rosa:  08/20/2019 Right L3-4, L4-5, L5-S1 radiofrequency ablation    I reviewed the note from the referring provider Peter Bruce * dated 02/05/2020: She was seen to establish care, having just moved from [place].  She was seen for essential hypertension, mixed hyperlipidemia, DM2 in obese,  hypothyroidism, recurrent MDD in partial depression/anxiety, GERD without esophagitis, chronic bilateral low back pain with bilateral sciatica.  Medications associated with the above were written, related labs ordered including CBC, CMP, Hb A1c, lipids, microalbumin, TSH, free thryoxine, referral to ps\ychiatry, referral to GI for colonoscopy and referral to pain clinic.    Previous treatments:    Physical Therapy: Yes    Medications the patient is tried: Narcotics, gabapentin and muscle relaxers    Previous interventions: [facility] these included right lumbar radiofrequency procedures    Previous surgeries to relieve the above pain:  Surgery on October 28, 1998      ROS:   ENT: ear infection, treated with augmentin  CV: irregular heart, reports history of tachycardia  Psych: depression since 1995  Heme: anemia  Endo: hypothyroidism, diabetes    Red Flags ROS:   Fever, Chills, Sweats: Denies  Involuntary Weight Loss: Denies  Bladder Incontinence: Denies  Bowel Incontinence: Denies  Saddle Anesthesia: Denies    All other systems reviewed and negative.       PMHx:   Past Medical History:   Diagnosis Date   • Anxiety    • Arrhythmia    • Chronic back pain    • Constipation    • Depression    • Diabetes (HCC)    • GERD (gastroesophageal reflux disease)    • Hyperlipidemia    • Hypertension    • Thyroid disease        PSHx:   Past Surgical History:   Procedure Laterality Date   • LUMBAR MEDIAL BRANCH BLOCKS Left 7/21/2021    Procedure: LEFT lumbar three through lumbar five medial branch blocks;  Surgeon: Paresh Ogden M.D.;  [facility];  Service: Pain Management   • LUMBAR TRANSFORAMINAL EPIDURAL STEROID INJECTION Left 5/20/2021    Procedure: LEFT lumbar four and lumbar five transforaminal epidural steroid injection;  Surgeon: Paresh Ogden M.D.;  Location: [facility];  Service: Pain Management   • BLOCK EPIDURAL STEROID INJECTION Left  2/24/2021    Procedure: INJECTION, STEROID, EPIDURAL;  Surgeon: Paresh Ogden M.D.;  Location: [facility];  Service: Pain Management   • LUMBAR MEDIAL BRANCH BLOCKS Left 9/9/2020    Procedure: BLOCK, NERVE, SPINAL, LUMBAR, POSTERIOR RAMUS, MEDIAL BRANCH;  Surgeon: Paresh Ogden M.D.;  Location: [facility];  Service: Pain Management   • DE NJX AA&/STRD TFRML EPI LUMBAR/SACRAL 1 LEVEL Left 8/12/2020    Procedure: INJECTION, SPINE, LUMBOSACRAL, EPIDURAL, 1 LEVEL, TRANSFORAMINAL APPROACH;  Surgeon: Paresh Ogden M.D.;  Location: [facility];  Service: Pain Management   • DE NJX AA&/STRD TFRML EPI LUMBAR/SACRAL EA ADDL Left 8/12/2020    Procedure: INJECTION, SPINE, LUMBOSACRAL, EPIDURAL, TRANSFORAMINAL APPROACH;  Surgeon: Paresh Ogden M.D.;  Location: [facility];  Service: Pain Management   • LAMINOTOMY  1998   • ABDOMINAL HYSTERECTOMY TOTAL     • CARPAL TUNNEL RELEASE     • CHOLECYSTECTOMY         Family history   Family History   Problem Relation Age of Onset   • Cancer Mother    • Stroke Father         Heart attack   • Dementia Sister    • Stroke Brother         heart Attack   • Cancer Brother         Skin   • Diabetes Brother    • Kidney Disease Brother    • Cancer Brother    • Parkinson's Disease Sister    • No Known Problems Sister    • Diabetes Daughter    • Hypertension Daughter          Medications:   Current Outpatient Medications   Medication   • [START ON 12/23/2021] HYDROcodone-acetaminophen (NORCO) 5-325 MG Tab per tablet   • [START ON 12/23/2021] morphine ER (MS CONTIN) 15 MG Tab CR tablet   • [START ON 11/23/2021] morphine ER (MS CONTIN) 15 MG Tab CR tablet   • [START ON 11/23/2021] HYDROcodone-acetaminophen (NORCO) 5-325 MG Tab per tablet   • [START ON 10/24/2021] morphine ER (MS CONTIN) 15 MG Tab CR tablet   • [START ON 10/24/2021] HYDROcodone-acetaminophen (NORCO) 5-325 MG Tab per tablet   • ibuprofen (MOTRIN) 800 MG  Tab   • busPIRone (BUSPAR) 10 MG Tab tablet   • levoFLOXacin (LEVAQUIN) 750 MG tablet   • pseudoephedrine (SUDAFED) 30 MG Tab   • Cholecalciferol 1.25 MG (49578 UT) Tab   • metformin (GLUCOPHAGE) 1000 MG tablet   • Brexpiprazole (REXULTI) 2 MG Tab   • rosuvastatin (CRESTOR) 10 MG Tab   • gabapentin (NEURONTIN) 800 MG tablet   • lisinopril (PRINIVIL) 10 MG Tab   • clonazePAM (KLONOPIN) 0.5 MG Tab   • clonazePAM (KLONOPIN) 1 MG Tab   • Empagliflozin (JARDIANCE) 25 MG Tab   • fluticasone (FLONASE) 50 MCG/ACT nasal spray   • estradiol (ESTRACE) 1 MG Tab   • aspirin EC (ECOTRIN) 81 MG Tablet Delayed Response   • omeprazole (PRILOSEC) 40 MG delayed-release capsule   • metoprolol tartrate (LOPRESSOR) 25 MG Tab   • DULoxetine (CYMBALTA) 30 MG Cap DR Particles   • levothyroxine (SYNTHROID) 50 MCG Tab   • cyanocobalamin (VITAMIN B-12) 500 MCG Tab   • Naloxone (NARCAN) 4 MG/0.1ML Liquid   • Blood Glucose Meter Kit   • Lancets   • Alcohol Swabs   • Blood Glucose Test Strips     No current facility-administered medications for this visit.       Allergies:   No Known Allergies    Social Hx:   Social History     Socioeconomic History   • Marital status:      Spouse name: Not on file   • Number of children: Not on file   • Years of education: Not on file   • Highest education level: Not on file   Occupational History   • Not on file   Tobacco Use   • Smoking status: Former Smoker     Packs/day: 0.25     Types: Cigarettes     Quit date:      Years since quittin.8   • Smokeless tobacco: Never Used   Vaping Use   • Vaping Use: Never used   Substance and Sexual Activity   • Alcohol use: Not Currently     Comment: rare   • Drug use: Not Currently     Types: Marijuana   • Sexual activity: Not Currently   Other Topics Concern   •  Service No   • Blood Transfusions Yes   • Caffeine Concern No   • Occupational Exposure No   • Hobby Hazards No   • Sleep Concern Yes   • Stress Concern Yes   • Weight Concern Yes   •  "Special Diet No   • Back Care No   • Exercise Yes   • Bike Helmet No   • Seat Belt Yes   • Self-Exams Yes   Social History Narrative   • Not on file     Social Determinants of Health     Financial Resource Strain:    • Difficulty of Paying Living Expenses:    Food Insecurity:    • Worried About Running Out of Food in the Last Year:    • Ran Out of Food in the Last Year:    Transportation Needs:    • Lack of Transportation (Medical):    • Lack of Transportation (Non-Medical):    Physical Activity:    • Days of Exercise per Week:    • Minutes of Exercise per Session:    Stress:    • Feeling of Stress :    Social Connections:    • Frequency of Communication with Friends and Family:    • Frequency of Social Gatherings with Friends and Family:    • Attends [employer] Services:    • Active Member of Clubs or Organizations:    • Attends Club or Organization Meetings:    • Marital Status:    Intimate Partner Violence:    • Fear of Current or Ex-Partner:    • Emotionally Abused:    • Physically Abused:    • Sexually Abused:          EXAMINATION     Physical Exam:   Vitals: /76 (BP Location: Right arm, Patient Position: Sitting, BP Cuff Size: Small adult)   Pulse 81   Temp 36.4 °C (97.6 °F) (Temporal)   Ht 1.6 m (5' 3\")   Wt 85.9 kg (189 lb 6 oz)   SpO2 94%     Constitutional:   Body Habitus: Body mass index is 33.55 kg/m².  Cooperation: Fully cooperates with exam  Appearance: Well-groomed, well-nourished, not disheveled, no acute distress    Eyes: No scleral icterus, no proptosis     ENT -no obvious auditory deficits, wearing a facial mask    Skin -no rashes or lesions noted    Respiratory-  breathing comfortable on room air, no audible wheezing    Cardiovascular- no lower extremity edema is noted.     Psychiatric- alert and oriented ×3. Normal affect.     Gait - normal gait, no use of ambulatory device, antalgic.     Musculoskeletal -   Thoracic/Lumbar Spine/Sacral Spine/Hips   Inspection: no evidence of atrophy " in bilateral lower extremities throughout     ROM of the lumbar spine decreased.  Pain with extension and quadrant loading on the left    Left knee with small effusion.  No medial or lateral instability.  Gin's positive.  Lachman's negative    Neuro     Key points for the international standards for neurological classification of spinal cord injury (ISNCSCI) to light touch.     Dermatome R L   L2 2 2   L3 2 2   L4 2 2   L5 2 1   S1 2 1   S2 2 2       Motor Exam Lower Extremities    ? Myotome R L   Hip flexion L2 5 5   Knee extension L3 5 5   Ankle dorsiflexion L4 5 5   Toe extension L5 5 5   Ankle plantarflexion S1 5 5       Reflexes  ?  R L   Patella  2+ 2+   Achilles   2+ 1+       MEDICAL DECISION MAKING    Medical records review: see under HPI section.     DATA    Labs:   05/08/2021: UDS positive for morphine and metabolites, clonazepam and metabolites, hydrocodone and metabolites  08/18/2020: UDS positive for morphine and metabolites, clonazepam and metabolites, hydrocodone and metabolites  04/24/2020 UDS positive for morphine and metabolites, clonazepam  04/09/2020 Vitamin D, 25:  28L  04/09/2020 Vitamin B12: 217    Lab Results   Component Value Date/Time    SODIUM 141 05/04/2021 08:06 AM    POTASSIUM 4.0 05/04/2021 08:06 AM    CHLORIDE 105 05/04/2021 08:06 AM    CO2 26 05/04/2021 08:06 AM    ANION 10.0 05/04/2021 08:06 AM    GLUCOSE 134 (H) 05/04/2021 08:06 AM    BUN 7 (L) 05/04/2021 08:06 AM    CREATININE 0.58 05/04/2021 08:06 AM    CALCIUM 8.8 05/04/2021 08:06 AM    ASTSGOT 25 05/04/2021 08:06 AM    ALTSGPT 29 05/04/2021 08:06 AM    TBILIRUBIN 0.4 05/04/2021 08:06 AM    ALBUMIN 3.8 05/04/2021 08:06 AM    TOTPROTEIN 6.4 05/04/2021 08:06 AM    GLOBULIN 2.6 05/04/2021 08:06 AM    AGRATIO 1.5 05/04/2021 08:06 AM       No results found for: PROTHROMBTM, INR     Lab Results   Component Value Date/Time    WBC 7.5 05/04/2021 08:06 AM    RBC 4.73 05/04/2021 08:06 AM    HEMOGLOBIN 14.6 05/04/2021 08:06 AM     HEMATOCRIT 43.8 05/04/2021 08:06 AM    MCV 92.6 05/04/2021 08:06 AM    MCH 30.9 05/04/2021 08:06 AM    MCHC 33.3 (L) 05/04/2021 08:06 AM    MPV 10.6 05/04/2021 08:06 AM    NEUTSPOLYS 46.60 05/04/2021 08:06 AM    LYMPHOCYTES 39.80 05/04/2021 08:06 AM    MONOCYTES 9.30 05/04/2021 08:06 AM    EOSINOPHILS 3.70 05/04/2021 08:06 AM    BASOPHILS 0.50 05/04/2021 08:06 AM        Lab Results   Component Value Date/Time    HBA1C 7.3 (H) 05/04/2021 08:06 AM        Imaging: I personally reviewed following images, these are my reads:     MRI lumbar spine 05/05/2020  At L1-2, no central or foraminal stenosis  At L2-3, no central or foraminal stenosis  At L3-4, mild disc bulge and mild ligamentum flavum thickening.  No central canal stenosis. Borderline left foraminal stenosis. Schmorl's node.   At L4-5, diffuse disc bulge with mild ligamentum flavum thickening.  No central canal stenosis.  There is facet arthropathy, left greater than right. Mild foraminal stenosis bilaterally. S/P left L4/5 hemilaminectomy  At L5-S1, there is mild-borderline foraminal stenosis with facet arthropathy and mild disc bulge.  No central canal stenosis    Xray lumbar spine 05/05/2020  There is mild decreased joint space at L3-4 and L4-5.    Facet arthropathy is noted, greatest at L5-S1 bilaterally.  No significant motion on flexion and extension films    IMAGING radiology reads. I reviewed the following radiology reads    Xray abdomen 07/17/2020  IMPRESSION:     Nonspecific bowel gas pattern. No evidence small bowel obstruction.      MRI lumbar spine 05/05/2020  IMPRESSION:     1.  Caudal lumbar facet arthropathy left worse than right with no bony destruction to indicate septic or inflammatory arthropathy. This most likely is just degenerative  2.  Mild caudal lumbar foraminal stenoses  3.  No significant central stenosis.  4.  Left L4/5 hemilaminectomy                                                                                   Xray lumbar spine  05/05/2020     IMPRESSION:     1.  No acute lumbar compression fracture or subluxation.  2.  Posterior disc space narrowing at L4-5 and to lesser extent at L3-4.  3.  Bilateral facet arthropathy at L5-S1.                                                                                             Diagnosis   Visit Diagnoses     ICD-10-CM   1. S/P lumbar laminectomy  Z98.890   2. DDD (degenerative disc disease), lumbar  M51.36   3. Chronic pain of left knee  M25.562    G89.29         ASSESSMENT:  Grisel Mark 66 y.o. female seen for above     Grisel was seen today for follow-up.    Diagnoses and all orders for this visit:    S/P lumbar laminectomy  -     HYDROcodone-acetaminophen (NORCO) 5-325 MG Tab per tablet; Take 1 Tablet by mouth every 8 hours as needed (severe pain) for up to 30 days.  -     morphine ER (MS CONTIN) 15 MG Tab CR tablet; Take 1 Tablet by mouth every 12 hours for 30 days.  -     morphine ER (MS CONTIN) 15 MG Tab CR tablet; Take 1 Tablet by mouth every 12 hours for 30 days.  -     HYDROcodone-acetaminophen (NORCO) 5-325 MG Tab per tablet; Take 1 Tablet by mouth every 8 hours as needed (severe pain) for up to 30 days.  -     morphine ER (MS CONTIN) 15 MG Tab CR tablet; Take 1 Tablet by mouth every 12 hours for 30 days.  -     HYDROcodone-acetaminophen (NORCO) 5-325 MG Tab per tablet; Take 1 Tablet by mouth every 8 hours as needed (severe pain) for up to 30 days.  -     Consent for Opiate Prescription  -     Controlled Substance Treatment Agreement  -     Pain Management Screen  -     ibuprofen (MOTRIN) 800 MG Tab; Take 1 Tablet by mouth every 8 hours as needed for Moderate Pain or Inflammation for up to 30 days.    DDD (degenerative disc disease), lumbar  -     HYDROcodone-acetaminophen (NORCO) 5-325 MG Tab per tablet; Take 1 Tablet by mouth every 8 hours as needed (severe pain) for up to 30 days.  -     morphine ER (MS CONTIN) 15 MG Tab CR tablet; Take 1 Tablet by mouth every 12 hours for 30  days.  -     morphine ER (MS CONTIN) 15 MG Tab CR tablet; Take 1 Tablet by mouth every 12 hours for 30 days.  -     HYDROcodone-acetaminophen (NORCO) 5-325 MG Tab per tablet; Take 1 Tablet by mouth every 8 hours as needed (severe pain) for up to 30 days.  -     morphine ER (MS CONTIN) 15 MG Tab CR tablet; Take 1 Tablet by mouth every 12 hours for 30 days.  -     HYDROcodone-acetaminophen (NORCO) 5-325 MG Tab per tablet; Take 1 Tablet by mouth every 8 hours as needed (severe pain) for up to 30 days.  -     Consent for Opiate Prescription  -     Controlled Substance Treatment Agreement  -     Pain Management Screen  -     ibuprofen (MOTRIN) 800 MG Tab; Take 1 Tablet by mouth every 8 hours as needed for Moderate Pain or Inflammation for up to 30 days.    Chronic pain of left knee             1. Discussed that she will get the left knee xray.  Discussed possible left knee MRI, given the locking that has occurred.  2. Plan to get records from Dr. Villa.  Possible SCS trial in the future.  3. Discussed plan to proceed with left lumbar three through lumbar five medial branch radiofrequency ablation.  The risks benefits and alternatives to this procedure were discussed and the patient wishes to proceed with the procedure. Risks include but are not limited to damage to surrounding structures, infection, bleeding, worsening of pain which can be permanent, weakness which can be permanent. Benefits include pain relief, improved function. Alternatives includes not doing the procedure.    4. Continue gabapentin to 600mg four a day as tolerated.  5. Continue MS Contin 15mg po q12h and norco for breakthrough.  Discussed norco up to 2/day. No changes for now.  Repeat UDS.   reviewed. She is continuing to take chronic benzodiazepines.  Long-term goal of wean, coordinate with PCP.  Discussed prn use of ibuprofen for breakthrough and orthopedic pain.  Advised she take with food and goal of intermittent use.  6. Continue to  maintain regular bowel movements. Continue colase 100mg daily-bid and miralax prn for constipation.      Follow-up: Return for Hospital injection.    Thank you very much for asking me to participate in Grisel Mark's care.  Please contact me with any questions or concerns.    Please note that this dictation was created using voice recognition software. I have made every reasonable attempt to correct obvious errors but there may be errors of grammar and content that I may have overlooked prior to finalization of this note.      Paresh Ogden MD  Physical Medicine and Rehabilitation  Interventional Spine and Sports Physiatry  [facility]

## 2021-10-20 NOTE — H&P (VIEW-ONLY)
Follow-up patient note    Physiatry (physical medicine and  Rehabilitation), interventional spine and sports medicine    Date of Service: 10/19/2021    Chief complaint:   Chief Complaint   Patient presents with   • Follow-Up     Back pain       HISTORY    HPI: Grisel Mark 66 y.o. female who presents today for follow-up evaluation of low back and leg pain.     Left knee pain has been worse.  This interrupted our planned procedure, she was not able to reschedule the RFA.  The left knee continues to be a problem.  This left knee has been intermittently swelling.  The left knee locks on her.  She did not get her left knee xray from 05/13/2021.    Her low back and left leg pain continue.  She is wondering about SCS.  She reports that she has had visits with Dr. Villa, who cleared her.  Our most recent reports are from July 2021 with recommendation of a few more visits.  Will try to get updated notes    Gabapentin back to 600mg po qid.  She is starting to reduce her clonazepam by 0.5mg a day. Her pain medications: Duloxetine 90mg.  MS Contin 15mg q12h.  Norco 5/325 for breakthrough.  No side effects.    Bowel movements have been regular, using stool softener from [employer]. Maintaining regular bowel movements.      By history:  Her laminectomy was in 1998.  Previous injection on left L4 and L5 TFESI on 08/12/2020 helped with her leg pain.     Medical records review:  Dr. Francisco Olmos, plan to increase duloxetine 90mg daily, discontinue buspirone 20mg po bid, start buproprion XL 150mg daily, continue brexipiprazole 1mg qhs, hydroxyzine 25mg po tid prn, clonazepam 0.5mg daily prn    Review of records   Dr. Dheeraj De La Rosa:  08/20/2019 Right L3-4, L4-5, L5-S1 radiofrequency ablation    I reviewed the note from the referring provider Peter Bruce * dated 02/05/2020: She was seen to establish care, having just moved from [place].  She was seen for essential hypertension, mixed hyperlipidemia, DM2 in obese,  hypothyroidism, recurrent MDD in partial depression/anxiety, GERD without esophagitis, chronic bilateral low back pain with bilateral sciatica.  Medications associated with the above were written, related labs ordered including CBC, CMP, Hb A1c, lipids, microalbumin, TSH, free thryoxine, referral to ps\ychiatry, referral to GI for colonoscopy and referral to pain clinic.    Previous treatments:    Physical Therapy: Yes    Medications the patient is tried: Narcotics, gabapentin and muscle relaxers    Previous interventions: [facility] these included right lumbar radiofrequency procedures    Previous surgeries to relieve the above pain:  Surgery on October 28, 1998      ROS:   ENT: ear infection, treated with augmentin  CV: irregular heart, reports history of tachycardia  Psych: depression since 1995  Heme: anemia  Endo: hypothyroidism, diabetes    Red Flags ROS:   Fever, Chills, Sweats: Denies  Involuntary Weight Loss: Denies  Bladder Incontinence: Denies  Bowel Incontinence: Denies  Saddle Anesthesia: Denies    All other systems reviewed and negative.       PMHx:   Past Medical History:   Diagnosis Date   • Anxiety    • Arrhythmia    • Chronic back pain    • Constipation    • Depression    • Diabetes (HCC)    • GERD (gastroesophageal reflux disease)    • Hyperlipidemia    • Hypertension    • Thyroid disease        PSHx:   Past Surgical History:   Procedure Laterality Date   • LUMBAR MEDIAL BRANCH BLOCKS Left 7/21/2021    Procedure: LEFT lumbar three through lumbar five medial branch blocks;  Surgeon: Paresh Ogden M.D.;  [facility];  Service: Pain Management   • LUMBAR TRANSFORAMINAL EPIDURAL STEROID INJECTION Left 5/20/2021    Procedure: LEFT lumbar four and lumbar five transforaminal epidural steroid injection;  Surgeon: Paresh Ogden M.D.;  Location: [facility];  Service: Pain Management   • BLOCK EPIDURAL STEROID INJECTION Left  2/24/2021    Procedure: INJECTION, STEROID, EPIDURAL;  Surgeon: Paresh Ogden M.D.;  Location: [facility];  Service: Pain Management   • LUMBAR MEDIAL BRANCH BLOCKS Left 9/9/2020    Procedure: BLOCK, NERVE, SPINAL, LUMBAR, POSTERIOR RAMUS, MEDIAL BRANCH;  Surgeon: Paresh Ogden M.D.;  Location: [facility];  Service: Pain Management   • MI NJX AA&/STRD TFRML EPI LUMBAR/SACRAL 1 LEVEL Left 8/12/2020    Procedure: INJECTION, SPINE, LUMBOSACRAL, EPIDURAL, 1 LEVEL, TRANSFORAMINAL APPROACH;  Surgeon: Paresh Ogden M.D.;  Location: [facility];  Service: Pain Management   • MI NJX AA&/STRD TFRML EPI LUMBAR/SACRAL EA ADDL Left 8/12/2020    Procedure: INJECTION, SPINE, LUMBOSACRAL, EPIDURAL, TRANSFORAMINAL APPROACH;  Surgeon: Paresh Ogden M.D.;  Location: [facility];  Service: Pain Management   • LAMINOTOMY  1998   • ABDOMINAL HYSTERECTOMY TOTAL     • CARPAL TUNNEL RELEASE     • CHOLECYSTECTOMY         Family history   Family History   Problem Relation Age of Onset   • Cancer Mother    • Stroke Father         Heart attack   • Dementia Sister    • Stroke Brother         heart Attack   • Cancer Brother         Skin   • Diabetes Brother    • Kidney Disease Brother    • Cancer Brother    • Parkinson's Disease Sister    • No Known Problems Sister    • Diabetes Daughter    • Hypertension Daughter          Medications:   Current Outpatient Medications   Medication   • [START ON 12/23/2021] HYDROcodone-acetaminophen (NORCO) 5-325 MG Tab per tablet   • [START ON 12/23/2021] morphine ER (MS CONTIN) 15 MG Tab CR tablet   • [START ON 11/23/2021] morphine ER (MS CONTIN) 15 MG Tab CR tablet   • [START ON 11/23/2021] HYDROcodone-acetaminophen (NORCO) 5-325 MG Tab per tablet   • [START ON 10/24/2021] morphine ER (MS CONTIN) 15 MG Tab CR tablet   • [START ON 10/24/2021] HYDROcodone-acetaminophen (NORCO) 5-325 MG Tab per tablet   • ibuprofen (MOTRIN) 800 MG  Tab   • busPIRone (BUSPAR) 10 MG Tab tablet   • levoFLOXacin (LEVAQUIN) 750 MG tablet   • pseudoephedrine (SUDAFED) 30 MG Tab   • Cholecalciferol 1.25 MG (04959 UT) Tab   • metformin (GLUCOPHAGE) 1000 MG tablet   • Brexpiprazole (REXULTI) 2 MG Tab   • rosuvastatin (CRESTOR) 10 MG Tab   • gabapentin (NEURONTIN) 800 MG tablet   • lisinopril (PRINIVIL) 10 MG Tab   • clonazePAM (KLONOPIN) 0.5 MG Tab   • clonazePAM (KLONOPIN) 1 MG Tab   • Empagliflozin (JARDIANCE) 25 MG Tab   • fluticasone (FLONASE) 50 MCG/ACT nasal spray   • estradiol (ESTRACE) 1 MG Tab   • aspirin EC (ECOTRIN) 81 MG Tablet Delayed Response   • omeprazole (PRILOSEC) 40 MG delayed-release capsule   • metoprolol tartrate (LOPRESSOR) 25 MG Tab   • DULoxetine (CYMBALTA) 30 MG Cap DR Particles   • levothyroxine (SYNTHROID) 50 MCG Tab   • cyanocobalamin (VITAMIN B-12) 500 MCG Tab   • Naloxone (NARCAN) 4 MG/0.1ML Liquid   • Blood Glucose Meter Kit   • Lancets   • Alcohol Swabs   • Blood Glucose Test Strips     No current facility-administered medications for this visit.       Allergies:   No Known Allergies    Social Hx:   Social History     Socioeconomic History   • Marital status:      Spouse name: Not on file   • Number of children: Not on file   • Years of education: Not on file   • Highest education level: Not on file   Occupational History   • Not on file   Tobacco Use   • Smoking status: Former Smoker     Packs/day: 0.25     Types: Cigarettes     Quit date:      Years since quittin.8   • Smokeless tobacco: Never Used   Vaping Use   • Vaping Use: Never used   Substance and Sexual Activity   • Alcohol use: Not Currently     Comment: rare   • Drug use: Not Currently     Types: Marijuana   • Sexual activity: Not Currently   Other Topics Concern   •  Service No   • Blood Transfusions Yes   • Caffeine Concern No   • Occupational Exposure No   • Hobby Hazards No   • Sleep Concern Yes   • Stress Concern Yes   • Weight Concern Yes   •  "Special Diet No   • Back Care No   • Exercise Yes   • Bike Helmet No   • Seat Belt Yes   • Self-Exams Yes   Social History Narrative   • Not on file     Social Determinants of Health     Financial Resource Strain:    • Difficulty of Paying Living Expenses:    Food Insecurity:    • Worried About Running Out of Food in the Last Year:    • Ran Out of Food in the Last Year:    Transportation Needs:    • Lack of Transportation (Medical):    • Lack of Transportation (Non-Medical):    Physical Activity:    • Days of Exercise per Week:    • Minutes of Exercise per Session:    Stress:    • Feeling of Stress :    Social Connections:    • Frequency of Communication with Friends and Family:    • Frequency of Social Gatherings with Friends and Family:    • Attends [employer] Services:    • Active Member of Clubs or Organizations:    • Attends Club or Organization Meetings:    • Marital Status:    Intimate Partner Violence:    • Fear of Current or Ex-Partner:    • Emotionally Abused:    • Physically Abused:    • Sexually Abused:          EXAMINATION     Physical Exam:   Vitals: /76 (BP Location: Right arm, Patient Position: Sitting, BP Cuff Size: Small adult)   Pulse 81   Temp 36.4 °C (97.6 °F) (Temporal)   Ht 1.6 m (5' 3\")   Wt 85.9 kg (189 lb 6 oz)   SpO2 94%     Constitutional:   Body Habitus: Body mass index is 33.55 kg/m².  Cooperation: Fully cooperates with exam  Appearance: Well-groomed, well-nourished, not disheveled, no acute distress    Eyes: No scleral icterus, no proptosis     ENT -no obvious auditory deficits, wearing a facial mask    Skin -no rashes or lesions noted    Respiratory-  breathing comfortable on room air, no audible wheezing    Cardiovascular- no lower extremity edema is noted.     Psychiatric- alert and oriented ×3. Normal affect.     Gait - normal gait, no use of ambulatory device, antalgic.     Musculoskeletal -   Thoracic/Lumbar Spine/Sacral Spine/Hips   Inspection: no evidence of atrophy " in bilateral lower extremities throughout     ROM of the lumbar spine decreased.  Pain with extension and quadrant loading on the left    Left knee with small effusion.  No medial or lateral instability.  Gin's positive.  Lachman's negative    Neuro     Key points for the international standards for neurological classification of spinal cord injury (ISNCSCI) to light touch.     Dermatome R L   L2 2 2   L3 2 2   L4 2 2   L5 2 1   S1 2 1   S2 2 2       Motor Exam Lower Extremities    ? Myotome R L   Hip flexion L2 5 5   Knee extension L3 5 5   Ankle dorsiflexion L4 5 5   Toe extension L5 5 5   Ankle plantarflexion S1 5 5       Reflexes  ?  R L   Patella  2+ 2+   Achilles   2+ 1+       MEDICAL DECISION MAKING    Medical records review: see under HPI section.     DATA    Labs:   05/08/2021: UDS positive for morphine and metabolites, clonazepam and metabolites, hydrocodone and metabolites  08/18/2020: UDS positive for morphine and metabolites, clonazepam and metabolites, hydrocodone and metabolites  04/24/2020 UDS positive for morphine and metabolites, clonazepam  04/09/2020 Vitamin D, 25:  28L  04/09/2020 Vitamin B12: 217    Lab Results   Component Value Date/Time    SODIUM 141 05/04/2021 08:06 AM    POTASSIUM 4.0 05/04/2021 08:06 AM    CHLORIDE 105 05/04/2021 08:06 AM    CO2 26 05/04/2021 08:06 AM    ANION 10.0 05/04/2021 08:06 AM    GLUCOSE 134 (H) 05/04/2021 08:06 AM    BUN 7 (L) 05/04/2021 08:06 AM    CREATININE 0.58 05/04/2021 08:06 AM    CALCIUM 8.8 05/04/2021 08:06 AM    ASTSGOT 25 05/04/2021 08:06 AM    ALTSGPT 29 05/04/2021 08:06 AM    TBILIRUBIN 0.4 05/04/2021 08:06 AM    ALBUMIN 3.8 05/04/2021 08:06 AM    TOTPROTEIN 6.4 05/04/2021 08:06 AM    GLOBULIN 2.6 05/04/2021 08:06 AM    AGRATIO 1.5 05/04/2021 08:06 AM       No results found for: PROTHROMBTM, INR     Lab Results   Component Value Date/Time    WBC 7.5 05/04/2021 08:06 AM    RBC 4.73 05/04/2021 08:06 AM    HEMOGLOBIN 14.6 05/04/2021 08:06 AM     HEMATOCRIT 43.8 05/04/2021 08:06 AM    MCV 92.6 05/04/2021 08:06 AM    MCH 30.9 05/04/2021 08:06 AM    MCHC 33.3 (L) 05/04/2021 08:06 AM    MPV 10.6 05/04/2021 08:06 AM    NEUTSPOLYS 46.60 05/04/2021 08:06 AM    LYMPHOCYTES 39.80 05/04/2021 08:06 AM    MONOCYTES 9.30 05/04/2021 08:06 AM    EOSINOPHILS 3.70 05/04/2021 08:06 AM    BASOPHILS 0.50 05/04/2021 08:06 AM        Lab Results   Component Value Date/Time    HBA1C 7.3 (H) 05/04/2021 08:06 AM        Imaging: I personally reviewed following images, these are my reads:     MRI lumbar spine 05/05/2020  At L1-2, no central or foraminal stenosis  At L2-3, no central or foraminal stenosis  At L3-4, mild disc bulge and mild ligamentum flavum thickening.  No central canal stenosis. Borderline left foraminal stenosis. Schmorl's node.   At L4-5, diffuse disc bulge with mild ligamentum flavum thickening.  No central canal stenosis.  There is facet arthropathy, left greater than right. Mild foraminal stenosis bilaterally. S/P left L4/5 hemilaminectomy  At L5-S1, there is mild-borderline foraminal stenosis with facet arthropathy and mild disc bulge.  No central canal stenosis    Xray lumbar spine 05/05/2020  There is mild decreased joint space at L3-4 and L4-5.    Facet arthropathy is noted, greatest at L5-S1 bilaterally.  No significant motion on flexion and extension films    IMAGING radiology reads. I reviewed the following radiology reads    Xray abdomen 07/17/2020  IMPRESSION:     Nonspecific bowel gas pattern. No evidence small bowel obstruction.      MRI lumbar spine 05/05/2020  IMPRESSION:     1.  Caudal lumbar facet arthropathy left worse than right with no bony destruction to indicate septic or inflammatory arthropathy. This most likely is just degenerative  2.  Mild caudal lumbar foraminal stenoses  3.  No significant central stenosis.  4.  Left L4/5 hemilaminectomy                                                                                   Xray lumbar spine  05/05/2020     IMPRESSION:     1.  No acute lumbar compression fracture or subluxation.  2.  Posterior disc space narrowing at L4-5 and to lesser extent at L3-4.  3.  Bilateral facet arthropathy at L5-S1.                                                                                             Diagnosis   Visit Diagnoses     ICD-10-CM   1. S/P lumbar laminectomy  Z98.890   2. DDD (degenerative disc disease), lumbar  M51.36   3. Chronic pain of left knee  M25.562    G89.29         ASSESSMENT:  Grisel Mark 66 y.o. female seen for above     Grisel was seen today for follow-up.    Diagnoses and all orders for this visit:    S/P lumbar laminectomy  -     HYDROcodone-acetaminophen (NORCO) 5-325 MG Tab per tablet; Take 1 Tablet by mouth every 8 hours as needed (severe pain) for up to 30 days.  -     morphine ER (MS CONTIN) 15 MG Tab CR tablet; Take 1 Tablet by mouth every 12 hours for 30 days.  -     morphine ER (MS CONTIN) 15 MG Tab CR tablet; Take 1 Tablet by mouth every 12 hours for 30 days.  -     HYDROcodone-acetaminophen (NORCO) 5-325 MG Tab per tablet; Take 1 Tablet by mouth every 8 hours as needed (severe pain) for up to 30 days.  -     morphine ER (MS CONTIN) 15 MG Tab CR tablet; Take 1 Tablet by mouth every 12 hours for 30 days.  -     HYDROcodone-acetaminophen (NORCO) 5-325 MG Tab per tablet; Take 1 Tablet by mouth every 8 hours as needed (severe pain) for up to 30 days.  -     Consent for Opiate Prescription  -     Controlled Substance Treatment Agreement  -     Pain Management Screen  -     ibuprofen (MOTRIN) 800 MG Tab; Take 1 Tablet by mouth every 8 hours as needed for Moderate Pain or Inflammation for up to 30 days.    DDD (degenerative disc disease), lumbar  -     HYDROcodone-acetaminophen (NORCO) 5-325 MG Tab per tablet; Take 1 Tablet by mouth every 8 hours as needed (severe pain) for up to 30 days.  -     morphine ER (MS CONTIN) 15 MG Tab CR tablet; Take 1 Tablet by mouth every 12 hours for 30  days.  -     morphine ER (MS CONTIN) 15 MG Tab CR tablet; Take 1 Tablet by mouth every 12 hours for 30 days.  -     HYDROcodone-acetaminophen (NORCO) 5-325 MG Tab per tablet; Take 1 Tablet by mouth every 8 hours as needed (severe pain) for up to 30 days.  -     morphine ER (MS CONTIN) 15 MG Tab CR tablet; Take 1 Tablet by mouth every 12 hours for 30 days.  -     HYDROcodone-acetaminophen (NORCO) 5-325 MG Tab per tablet; Take 1 Tablet by mouth every 8 hours as needed (severe pain) for up to 30 days.  -     Consent for Opiate Prescription  -     Controlled Substance Treatment Agreement  -     Pain Management Screen  -     ibuprofen (MOTRIN) 800 MG Tab; Take 1 Tablet by mouth every 8 hours as needed for Moderate Pain or Inflammation for up to 30 days.    Chronic pain of left knee             1. Discussed that she will get the left knee xray.  Discussed possible left knee MRI, given the locking that has occurred.  2. Plan to get records from Dr. Villa.  Possible SCS trial in the future.  3. Discussed plan to proceed with left lumbar three through lumbar five medial branch radiofrequency ablation.  The risks benefits and alternatives to this procedure were discussed and the patient wishes to proceed with the procedure. Risks include but are not limited to damage to surrounding structures, infection, bleeding, worsening of pain which can be permanent, weakness which can be permanent. Benefits include pain relief, improved function. Alternatives includes not doing the procedure.    4. Continue gabapentin to 600mg four a day as tolerated.  5. Continue MS Contin 15mg po q12h and norco for breakthrough.  Discussed norco up to 2/day. No changes for now.  Repeat UDS.   reviewed. She is continuing to take chronic benzodiazepines.  Long-term goal of wean, coordinate with PCP.  Discussed prn use of ibuprofen for breakthrough and orthopedic pain.  Advised she take with food and goal of intermittent use.  6. Continue to  maintain regular bowel movements. Continue colase 100mg daily-bid and miralax prn for constipation.      Follow-up: Return for Hospital injection.    Thank you very much for asking me to participate in Grisel Mark's care.  Please contact me with any questions or concerns.    Please note that this dictation was created using voice recognition software. I have made every reasonable attempt to correct obvious errors but there may be errors of grammar and content that I may have overlooked prior to finalization of this note.      Paresh Ogden MD  Physical Medicine and Rehabilitation  Interventional Spine and Sports Physiatry  [facility]

## 2021-10-22 ENCOUNTER — NON-PROVIDER VISIT (OUTPATIENT)
Dept: MEDICAL GROUP | Facility: PHYSICIAN GROUP | Age: 66
End: 2021-10-22
Payer: MEDICARE

## 2021-10-22 DIAGNOSIS — M54.50 LUMBOSACRAL PAIN: ICD-10-CM

## 2021-10-22 DIAGNOSIS — N95.1 VASOMOTOR SYMPTOMS DUE TO MENOPAUSE: ICD-10-CM

## 2021-10-22 DIAGNOSIS — M47.816 LUMBAR SPONDYLOSIS: ICD-10-CM

## 2021-10-22 DIAGNOSIS — Z23 NEED FOR VACCINATION: ICD-10-CM

## 2021-10-22 PROCEDURE — G0008 ADMIN INFLUENZA VIRUS VAC: HCPCS | Performed by: NURSE PRACTITIONER

## 2021-10-22 PROCEDURE — 90662 IIV NO PRSV INCREASED AG IM: CPT | Performed by: NURSE PRACTITIONER

## 2021-10-22 NOTE — PROGRESS NOTES
"Grisel Mark is a 66 y.o. female here for a non-provider visit for:   FLU    Reason for immunization: Annual Flu Vaccine  Immunization records indicate need for vaccine: Yes, confirmed with Epic  Minimum interval has been met for this vaccine: Yes  ABN completed: Not Indicated    VIS Dated  08/06/2021 was given to patient: Yes  All IAC Questionnaire questions were answered \"No.\"    Patient tolerated injection and no adverse effects were observed or reported: Yes    Pt scheduled for next dose in series: Not Indicated  "

## 2021-10-24 ENCOUNTER — SLEEP STUDY (OUTPATIENT)
Dept: SLEEP MEDICINE | Facility: MEDICAL CENTER | Age: 66
End: 2021-10-24
Payer: MEDICARE

## 2021-10-24 DIAGNOSIS — G47.33 OSA (OBSTRUCTIVE SLEEP APNEA): ICD-10-CM

## 2021-10-25 ENCOUNTER — TELEPHONE (OUTPATIENT)
Dept: MEDICAL GROUP | Facility: PHYSICIAN GROUP | Age: 66
End: 2021-10-25

## 2021-10-25 NOTE — TELEPHONE ENCOUNTER
ESTABLISHED PATIENT PRE-VISIT PLANNING     Patient was NOT contacted to complete PVP.     Note: Patient will not be contacted if there is no indication to call.     1.  Reviewed notes from the last few office visits within the medical group: Yes    2.  If any orders were placed at last visit or intended to be done for this visit (i.e. 6 mos follow-up), do we have Results/Consult Notes?         •  Labs - Labs ordered, NOT completed. Patient advised to complete prior to next appointment.  Note: If patient appointment is for lab review and patient did not complete labs, check with provider if OK to reschedule patient until labs completed.       •  Imaging - Imaging ordered, NOT completed. Patient advised to complete prior to next appointment.       •  Referrals - No referrals were ordered at last office visit.    3. Is this appointment scheduled as a Hospital Follow-Up? No    4.  Immunizations were updated in Epic using Reconcile Outside Information activity? Yes    5.  Patient is due for the following Health Maintenance Topics:   Health Maintenance Due   Topic Date Due   • Annual Wellness Visit  Never done   • COLORECTAL CANCER SCREENING  Never done   • BONE DENSITY  Never done   • MAMMOGRAM  05/28/2021   • RETINAL SCREENING  08/31/2021   • DIABETES MONOFILAMENT / LE EXAM  10/09/2021   • A1C SCREENING  11/04/2021       6.  AHA (Pulse8) form printed for Provider? Yes

## 2021-10-27 NOTE — PROCEDURES
MONTAGE: Standard    STUDY TYPE: Diagnostic  RECORDING TECHNIQUE:   After the scalp was prepared, gold plated electrodes were applied to the scalp according to the International 10-20 System. EEG (electroencephalogram) was continuously monitored from the O1-M2, O2-M1, C3-M2, C4-M1, F3-M2, and F4-M1. EOGs (electrooculograms) were monitored by electrodes placed at the left and right outer canthi. Chin EMG (electromyogram) was monitored by electrodes placed on the mentalis and sub-mentalis muscles. Nasal and oral airflow were monitored using a triple port thermocouple as well as oronasal pressure transducer. Respiratory effort was measured by inductive plethysmography technology employing abdominal and thoracic belts. Blood oxygen saturation and pulse were monitored by pulse oximetry. Heart rhythm was monitored by surface electrocardiogram. Leg EMG was studied using surface electrodes placed on left and right anterior tibialis. A microphone was used to monitor tracheal sounds and snoring. Body position was monitored and documented by technician observation.     SCORING CRITERIA:   A modification of the AASM manual for scoring of sleep and associated events was used. Obstructive apneas were scored by cessation of airflow for at least 10 seconds with continuing respiratory effort. Central apneas were scored by cessation of airflow for at least 10 seconds with no respiratory effort. Hypopneas were scored by a 30% or more reduction in airflow for at least 10 seconds accompanied by arterial oxygen desaturation of 3% or an arousal. For CMS (Medicare) patients, per AASM rule 1B, hypopneas are scored by 30% with mild reduction in airflow for at least 10 seconds accompanied by arterial saturation decreased at 4%.    Study start time was 09:12:18 PM. Diagnostic recording time was 8h 44.0m with a total sleep time of 4h 37.0m resulting in a sleep efficiency of 52.86%%. Sleep latency from the start of the study was 203 minutes and  the latency from sleep to REM was 00 minutes. In total,30 arousals were scored for an arousal index of 6.5.    Respiratory:  There were a total of 7 apneas consisting of 4 obstructive apneas, 0 mixed apneas, and 3 central apneas. A total of 17 hypopneas were scored. The apnea index was 1.52 per hour   and the hypopnea index was 3.68 per hour resulting in an overall AHI of 5.20. AHI during rem was 0.0 and AHI while supine was 0.00.    Oximetry:  There was a mean oxygen saturation of 88.0%. The minimum oxygen saturation in NREM was 83.0 % and in REM was --. The patient spent 201.4 minutes of TST with SaO2 <88%.    Cardiac:  The highest heart rate seen while awake was 106 BPM while the highest heart rate during sleep was 87 BPM with an average sleeping heart rate of 80 BPM.    Limb Movements:  There were a total of 0 PLMs during sleep which resulted in a PLMS index of 0.0. Of these, 0 were associated with arousals which resulted in a PLMS arousal index of 0.0.    Impression:  1.  Mild OAS with AHI of 5.2/hr and O2 rios 83 %  2.  Sleep related hypoxia    Recommendations:  I recommend that the patient should return for a CPAP/BiPAP titration due to the severity of sleep-related hypoxia. In some cases alternative treatment options may be proven effective in resolving sleep apnea. These options include upper airway surgery, the use of a dental orthotic, weight loss orpositional therapy. Clinical correlation is required. In general patients with sleep apnea are advised to avoid alcohol, sedatives and not to operate a motor vehicle while drowsy.  Untreated sleep apnea increases the risk for cardiovascular and neurovascular disease.

## 2021-10-28 ENCOUNTER — PATIENT MESSAGE (OUTPATIENT)
Dept: MEDICAL GROUP | Facility: PHYSICIAN GROUP | Age: 66
End: 2021-10-28

## 2021-10-28 DIAGNOSIS — E11.69 DIABETES MELLITUS TYPE 2 IN OBESE: ICD-10-CM

## 2021-10-28 DIAGNOSIS — E66.9 DIABETES MELLITUS TYPE 2 IN OBESE: ICD-10-CM

## 2021-10-28 DIAGNOSIS — N95.1 VASOMOTOR SYMPTOMS DUE TO MENOPAUSE: ICD-10-CM

## 2021-10-28 RX ORDER — GABAPENTIN 800 MG/1
1200 TABLET ORAL 2 TIMES DAILY
Qty: 90 TABLET | Refills: 2 | Status: SHIPPED | OUTPATIENT
Start: 2021-10-28 | End: 2021-11-27

## 2021-10-28 NOTE — PATIENT COMMUNICATION
Received request via: Patient    Was the patient seen in the last year in this department? Yes    Does the patient have an active prescription (recently filled or refills available) for medication(s) requested? No     Requested Prescriptions     Pending Prescriptions Disp Refills   • estradiol (ESTRACE) 1 MG Tab 90 Tablet 3     Sig: Take 1 Tablet by mouth every day.   • levothyroxine (SYNTHROID) 50 MCG Tab 90 Tablet 3     Sig: Take 1 Tablet by mouth every morning on an empty stomach.   • Empagliflozin (JARDIANCE) 25 MG Tab 100 Tablet 3     Sig: Take 1 Tablet by mouth every day.

## 2021-10-28 NOTE — TELEPHONE ENCOUNTER
From: Grisel Mark  To: Nurse Practitioner Demi Farley  Sent: 10/28/2021 9:21 AM PDT  Subject: Meds    I need Jardiance ,Estradiol and Levothyroxine   Refilled today. Thank you so much

## 2021-10-29 NOTE — PROGRESS NOTES
Chief Complaint   Patient presents with   • Establish Care   • Medication Refill     clonazapam     Patient was seen for a total of 60 minutes face-to-face by myself, with more than half of the time spent coordinating care, discussing risks and benefits of treatment.          Subjective:     Grisel Mark is a 64 y.o. female presenting to establish care.    This is a very nice woman who recently moved from [place] who is now establishing care.  She does have a very extensive medical history and is currently in the process of finding specialty providers in the area.    Ear infection: Patient has had left ear pain for roughly 1 week, reports muffled hearing.  Does experience frequent sinus congestion usually due to allergies.  Has not tried any remedies for this.    Chronic constipation: Patient has long history of chronic constipation for which she takes over-the-counter stool softeners and Senokot.  She is due for screening colonoscopy, GI referral has already been placed.  She does admittedly not drink a lot of water.    Diabetes: Patient on metformin and Jardiance.  Updated A1c to be ordered.  Up-to-date on retinal screening, needs to establish with a local optometrist.  Due for monofilament exam.    Diabetic neuropathy: Chronic diabetic neuropathy, previously started on gabapentin 600 mg twice daily, reports mild relief.    Hypothyroidism: History of hypothyroidism, currently taking 50 mcg levothyroxine daily, due for TSH reflex T4.    Depression and anxiety: Chronic depression anxiety, waxing and waning in severity.  Unfortunately, patient has experienced several losses over the past year including her sister, nephew, niece.  She is set to establish with a local mental health care provider but this appointment is not until May.  Currently she is on duloxetine, Rexulti, clonazepam, hydroxyzine.    -Patient has been on clonazepam since 1995.  Does report that she previously was taken off of it and did  experience severe withdrawal symptoms.  Her doctor at the time believed that she did have a seizure and started her on Tegretol.  She has been on this medication since, she is aware that it is not could be taken long-term and is amenable to weaning but would like to do so safely.  She is currently set to run out within the next 2 weeks.  She would also like to wean off the Tegretol as she feels that this is unnecessary.    -Patient previously started on estradiol in an effort to improve depression.  She reports that this did not help.  She is still experiencing menopausal symptoms as well, so she feels she is not getting any benefit.  She is in is interested in stopping this medication.    Chronic pain: Patient had back surgery and has been experiencing chronic pain since.  Is on morphine 15 mg as needed as well as Flexeril for muscle spasms.  Previously referred to physiatry and has upcoming appointment.  She has had multiple nerve ablations and spine injections previously.  She is looking forward to reestablishing with a pain and spine specialist.    Overall, patient is in good spirits.  She has a complex medical history and so it is very frustrating and difficult to move to a new state and need to get established with new specialty and primary care providers.  She would like to reduce the amount of medications that she requires on a routine basis, but is very aware that many of them do serve a purpose that is necessary.    Health maintenance: Patient due for mammogram, colonoscopy, monofilament exam, baseline labs.  Because of the diabetes hepatitis B vaccine is also indicated, however we are going to give her the shingles vaccine and Tdap today so we will defer the hepatitis B series until she comes back.  I do not want to overwhelm her immune system out while there is a viral pandemic going on, but I do think that Tdap and shingles vaccine are critical to receive today.    Review of systems:      Denies chest  pain, shortness of breath, sore throat, difficulty swallowing, new cough, dizziness, severe headache, altered cognition, changes in bowel or bladder habits, decreased sensation, decreased strength, numbness or tingling, intolerable depression or anxiety, rash or skin concerns, changes in vision, fatigue, myalgias, painful or swollen lymph nodes.       Current Outpatient Medications:   •  lisinopril (PRINIVIL) 10 MG Tab, Take 1 Tab by mouth every day., Disp: 90 Tab, Rfl: 3  •  gabapentin (NEURONTIN) 600 MG tablet, Take 1 Tab by mouth 3 times a day., Disp: 180 Tab, Rfl: 2  •  carBAMazepine (TEGRETOL) 200 MG Tab, Take 1 Tab by mouth every day., Disp: 90 Tab, Rfl: 0  •  amoxicillin-clavulanate (AUGMENTIN) 875-125 MG Tab, Take 1 Tab by mouth 2 times a day., Disp: 20 Tab, Rfl: 0  •  simvastatin (ZOCOR) 40 MG Tab, , Disp: , Rfl:   •  metformin (GLUCOPHAGE) 1000 MG tablet, Take 1,000 mg by mouth 2 times a day, with meals., Disp: , Rfl:   •  omeprazole (PRILOSEC) 40 MG delayed-release capsule, Take 40 mg by mouth every day., Disp: , Rfl:   •  gemfibrozil (LOPID) 600 MG Tab, Take 600 mg by mouth 2 times a day., Disp: , Rfl:   •  clonazePAM (KLONOPIN) 1 MG Tab, Take 0.5 mg by mouth 2 times a day., Disp: , Rfl:   •  morphine (MS IR) 15 MG tablet, Take 15 mg by mouth every four hours as needed for Severe Pain., Disp: , Rfl:   •  levothyroxine (SYNTHROID) 50 MCG Tab, Take 50 mcg by mouth Every morning on an empty stomach., Disp: , Rfl:   •  busPIRone (BUSPAR) 10 MG Tab tablet, Take 10 mg by mouth 2 times a day., Disp: , Rfl:   •  DULoxetine (CYMBALTA) 20 MG Cap DR Particles, Take 20 mg by mouth every day., Disp: , Rfl:   •  Empagliflozin (JARDIANCE) 10 MG Tab, Take  by mouth., Disp: , Rfl:   •  Brexpiprazole (REXULTI) 1 MG Tab, Take  by mouth., Disp: , Rfl:   •  metoprolol (LOPRESSOR) 25 MG Tab, Take 25 mg by mouth 2 times a day., Disp: , Rfl:   •  estradiol (ESTRACE) 1 MG Tab, Take 1 mg by mouth every day., Disp: , Rfl:   •   "vitamin D, Ergocalciferol, (DRISDOL) 1.25 MG (22377 UT) Cap capsule, Take  by mouth every 7 days., Disp: , Rfl:   •  diclofenac EC (VOLTAREN) 75 MG Tablet Delayed Response, Take 75 mg by mouth 2 times a day., Disp: , Rfl:   •  cyclobenzaprine (FLEXERIL) 10 MG Tab, Take 10 mg by mouth 3 times a day as needed., Disp: , Rfl:   •  hydrOXYzine HCl (ATARAX) 25 MG Tab, Take 25 mg by mouth 3 times a day as needed for Itching., Disp: , Rfl:     Allergies, past medical history, past surgical history, family history, social history reviewed and updated    Objective:     Vitals: /70 (BP Location: Left arm, Patient Position: Sitting, BP Cuff Size: Adult)   Pulse 89   Temp 36.9 °C (98.4 °F) (Temporal)   Ht 1.6 m (5' 3\")   Wt 81.6 kg (180 lb)   SpO2 97%   BMI 31.89 kg/m²   General: Alert, cooperative, dressed appropriately for weather / situation  Eyes:Normocephalic.  EOMI, no icterus or pallor.  Conjunctivae clear without erythema / irritation.  ENT:  External ears developed; left TM visualized, bulging with erythematous borders, supratip fluid visualized behind TM.    Heart: Regular rate and rhythm.  S1 and S2 normal.  No murmurs auscultated;  Respiratory: Normal respiratory effort.  Clear to auscultation bilaterally.  Barrel chested AP ratio 2: 2  Abdomen: Rounded   skin: Visible skin intact, dry, without rash.  Musculoskeletal: Gait is normal.  Bilateral  strength strong equal.  Moves extremities freely and equally bilaterally  Neuro:  AAOx3 Visual tracking intact, no nystagmus;   Psych: Tearful when discussing frustrating process of establishing new care providers as well as recent family losses.  Judgement is good, memory is intact, grooming is appropriate.    Assessment/Plan:     Grisel was seen today for establish care and medication refill.    Diagnoses and all orders for this visit:    Patient is going to stop the estrogen replacement.  If she experiences intolerable vasomotor symptoms of menopause, we " discussed the potential to try black cohosh, but I would like to obtain a liver function test prior.    She is going to wean off of the Tegretol, aware to monitor for withdrawal symptoms and slow weaning process if needed.    Fasting labs to be obtained over the next week.    Discussed weaning off of senna due to dependence and melanosis that occurs in the colon.  Instead encouraged increased hydration, MiraLAX use, increased fiber intake, and considering adding a prebiotic/probiotic to daily regimen.    Essential hypertension  -     Lipid Profile; Future  -     lisinopril (PRINIVIL) 10 MG Tab; Take 1 Tab by mouth every day.    Mixed hyperlipidemia  -     Lipid Profile; Future    Non-recurrent acute suppurative otitis media of left ear without spontaneous rupture of tympanic membrane  -     amoxicillin-clavulanate (AUGMENTIN) 875-125 MG Tab; Take 1 Tab by mouth 2 times a day.    Diabetes mellitus type 2 in obese (HCC)  -     Comp Metabolic Panel; Future  -     HEMOGLOBIN A1C; Future  -     Lipid Profile; Future  -     MICROALBUMIN CREAT RATIO URINE; Future  -     gabapentin (NEURONTIN) 600 MG tablet; Take 1 Tab by mouth 3 times a day.    Hypothyroidism (acquired)  -     TSH WITH REFLEX TO FT4; Future    Recurrent major depressive disorder, in partial remission (HCC)  -     TSH WITH REFLEX TO FT4; Future  -     VITAMIN D,25 HYDROXY; Future    Gastroesophageal reflux disease without esophagitis  -     VITAMIN B12; Future  -     CBC WITH DIFFERENTIAL; Future  -     VITAMIN D,25 HYDROXY; Future    Anxiety: Due to risk of seizure and death with abrupt withdrawal, I am happy to give an interim prescription until patient is able to establish with mental health provider.  We did discuss the process of doing a controlled substance visit including obtaining all of the records from previous healthcare providers that were prescribing this medication, urine drug screen, controlled substance agreement.  Records have been  requested and we will have patient follow-up within the next 2 weeks for this.  She is already on the wait list to get into her mental health care provider sooner than appointment if possible  -     TSH WITH REFLEX TO FT4; Future  -     VITAMIN D,25 HYDROXY; Future    Encounter for screening mammogram for malignant neoplasm of breast  -     MA-SCREENING MAMMO BILAT W/TOMOSYNTHESIS W/CAD; Future    Need for hepatitis C screening test  -     HCV Scrn ( [id number] 1xLife); Future    Other chronic pain: Patient establishing with physiatry soon.  We did discuss weaning off Tegretol to avoid withdrawal symptoms.  Patient is going to start taking this every other day, if she begins to feel that she is withdrawing she can continue daily and just eliminate 2-3 doses per week as tolerated.  -     carBAMazepine (TEGRETOL) 200 MG Tab; Take 1 Tab by mouth every day.    Need for vaccination: Risk/benefits/alternatives reviewed, patient consented and tolerated well.    -     Shingrix Vaccine  -     Tdap Vaccine =>6YO IM          Return in about 2 weeks (around 2020) for Controlled Sub Refill.    Patient verbalized understanding and agreed to plan of care.  Encouraged to contact me with needs via [employer] or by phone if needed.      I have placed the above orders and discussed them with an approved delegating provider.  The MA is performing the below orders under the direction of Dr Walls.    Please note that this dictation was created using voice recognition software. I have made every reasonable attempt to correct obvious errors, but I expect that there are errors of grammar and possibly content that I did not discover before finalizing the note.   [id number]

## 2021-10-30 PROCEDURE — 95810 POLYSOM 6/> YRS 4/> PARAM: CPT | Performed by: FAMILY MEDICINE

## 2021-11-01 RX ORDER — EMPAGLIFLOZIN 25 MG/1
1 TABLET, FILM COATED ORAL
Qty: 100 TABLET | Refills: 3 | Status: SHIPPED | OUTPATIENT
Start: 2021-11-01 | End: 2022-12-06

## 2021-11-01 RX ORDER — ESTRADIOL 1 MG/1
1 TABLET ORAL
Qty: 90 TABLET | Refills: 3 | Status: SHIPPED | OUTPATIENT
Start: 2021-11-01 | End: 2022-09-28

## 2021-11-01 RX ORDER — LEVOTHYROXINE SODIUM 0.05 MG/1
50 TABLET ORAL
Qty: 90 TABLET | Refills: 3 | Status: SHIPPED | OUTPATIENT
Start: 2021-11-01 | End: 2022-09-19 | Stop reason: SDUPTHER

## 2021-11-02 ENCOUNTER — HOSPITAL ENCOUNTER (OUTPATIENT)
Dept: LAB | Facility: MEDICAL CENTER | Age: 66
End: 2021-11-02
Attending: NURSE PRACTITIONER
Payer: MEDICARE

## 2021-11-02 ENCOUNTER — OFFICE VISIT (OUTPATIENT)
Dept: MEDICAL GROUP | Facility: PHYSICIAN GROUP | Age: 66
End: 2021-11-02
Payer: MEDICARE

## 2021-11-02 VITALS
OXYGEN SATURATION: 94 % | HEIGHT: 63 IN | BODY MASS INDEX: 33.49 KG/M2 | TEMPERATURE: 97.7 F | DIASTOLIC BLOOD PRESSURE: 78 MMHG | SYSTOLIC BLOOD PRESSURE: 128 MMHG | HEART RATE: 87 BPM | RESPIRATION RATE: 16 BRPM | WEIGHT: 189 LBS

## 2021-11-02 DIAGNOSIS — F41.1 GENERALIZED ANXIETY DISORDER: ICD-10-CM

## 2021-11-02 DIAGNOSIS — Z12.12 ENCOUNTER FOR SCREENING FOR COLORECTAL MALIGNANT NEOPLASM: ICD-10-CM

## 2021-11-02 DIAGNOSIS — J06.9 UPPER RESPIRATORY TRACT INFECTION, UNSPECIFIED TYPE: ICD-10-CM

## 2021-11-02 DIAGNOSIS — R05.9 COUGH: ICD-10-CM

## 2021-11-02 DIAGNOSIS — Z12.11 ENCOUNTER FOR SCREENING FOR COLORECTAL MALIGNANT NEOPLASM: ICD-10-CM

## 2021-11-02 DIAGNOSIS — J40 BRONCHITIS: ICD-10-CM

## 2021-11-02 DIAGNOSIS — F41.8 SITUATIONAL ANXIETY: ICD-10-CM

## 2021-11-02 DIAGNOSIS — F13.20 BENZODIAZEPINE DEPENDENCE (HCC): ICD-10-CM

## 2021-11-02 PROCEDURE — 99214 OFFICE O/P EST MOD 30 MIN: CPT | Performed by: NURSE PRACTITIONER

## 2021-11-02 RX ORDER — ALBUTEROL SULFATE 90 UG/1
2 AEROSOL, METERED RESPIRATORY (INHALATION) EVERY 4 HOURS PRN
Qty: 1 EACH | Refills: 2 | Status: SHIPPED | OUTPATIENT
Start: 2021-11-02 | End: 2022-06-15 | Stop reason: SDUPTHER

## 2021-11-02 RX ORDER — CLONAZEPAM 0.5 MG/1
0.5 TABLET ORAL
Qty: 90 TABLET | Refills: 0 | Status: SHIPPED | OUTPATIENT
Start: 2021-11-30 | End: 2022-02-28

## 2021-11-02 RX ORDER — CLONAZEPAM 1 MG/1
1 TABLET ORAL EVERY MORNING
Qty: 90 TABLET | Refills: 0 | Status: SHIPPED | OUTPATIENT
Start: 2021-11-30 | End: 2022-02-03 | Stop reason: SDUPTHER

## 2021-11-02 RX ORDER — ESTRADIOL 1 MG/1
1 TABLET ORAL
Qty: 90 TABLET | Refills: 0 | OUTPATIENT
Start: 2021-11-02

## 2021-11-02 ASSESSMENT — FIBROSIS 4 INDEX: FIB4 SCORE: 1.63

## 2021-11-02 NOTE — ASSESSMENT & PLAN NOTE
This is a chronic issue.  Patient has been on Klonopin for many years.  Patient has been working on gradually tapering her dose.  At this time patient is taking 0.5 mg in the morning and 1 mg at night.  Discussed with patient tapering down, patient would like to wait till after the holidays.    We'll continue and refill Klonopin.    Obtained and reviewed patient utilization report from [facility] pharmacy database on 11/2/2021 8:52 AM  prior to writing prescription for controlled substance II, III or IV per [place] bill . Based on assessment of the report,my physical exam if necessary and the patient's health problem, the prescription is medically necessary.

## 2021-11-02 NOTE — ASSESSMENT & PLAN NOTE
Pt reports a recent URI with cough. She reports that the cough is productive. Denies any fever. She reports fatigue, nausea and vomiting, swelling of her face on halloween. She reports some wheezing and SOB.     Will order albuterol. Pt advised to take mucinex daily to help thin secretions. Pt to contact if not improved in 5-7 days.

## 2021-11-02 NOTE — PROGRESS NOTES
CC: follow up for sinusitis                                                                                                                                    HPI:   Grisel presents today with the following.    Problem   Cough   Generalized Anxiety Disorder   Situational Anxiety       Current Outpatient Medications   Medication Sig Dispense Refill   • [START ON 11/30/2021] clonazePAM (KLONOPIN) 0.5 MG Tab Take 1 Tablet by mouth at bedtime for 90 days. 90 Tablet 0   • [START ON 11/30/2021] clonazePAM (KLONOPIN) 1 MG Tab Take 1 Tablet by mouth every morning for 90 days. 90 Tablet 0   • albuterol 108 (90 Base) MCG/ACT Aero Soln inhalation aerosol Inhale 2 Puffs every four hours as needed for Shortness of Breath. 1 Each 2   • estradiol (ESTRACE) 1 MG Tab Take 1 Tablet by mouth every day. 90 Tablet 3   • levothyroxine (SYNTHROID) 50 MCG Tab Take 1 Tablet by mouth every morning on an empty stomach. 90 Tablet 3   • Empagliflozin (JARDIANCE) 25 MG Tab Take 1 Tablet by mouth every day. 100 Tablet 3   • gabapentin (NEURONTIN) 800 MG tablet Take 1.5 Tablets by mouth 2 times a day for 30 days. 90 Tablet 2   • [START ON 12/23/2021] HYDROcodone-acetaminophen (NORCO) 5-325 MG Tab per tablet Take 1 Tablet by mouth every 8 hours as needed (severe pain) for up to 30 days. 60 Tablet 0   • [START ON 11/23/2021] morphine ER (MS CONTIN) 15 MG Tab CR tablet Take 1 Tablet by mouth every 12 hours for 30 days. 60 Tablet 0   • [START ON 11/23/2021] HYDROcodone-acetaminophen (NORCO) 5-325 MG Tab per tablet Take 1 Tablet by mouth every 8 hours as needed (severe pain) for up to 30 days. 60 Tablet 0   • HYDROcodone-acetaminophen (NORCO) 5-325 MG Tab per tablet Take 1 Tablet by mouth every 8 hours as needed (severe pain) for up to 30 days. 60 Tablet 0   • ibuprofen (MOTRIN) 800 MG Tab Take 1 Tablet by mouth every 8 hours as needed for Moderate Pain or Inflammation for up to 30 days. 30 Tablet 1   • busPIRone (BUSPAR) 10 MG Tab tablet Take 1  Tablet by mouth 3 times a day. 270 Tablet 3   • levoFLOXacin (LEVAQUIN) 750 MG tablet Take 1 Tablet by mouth every day. Can stop at 5 days if symptoms resolved after 5 days 7 Tablet 0   • pseudoephedrine (SUDAFED) 30 MG Tab Take 1-2 Tablets by mouth every 6 hours as needed for Congestion (sinus pressure not relieved by other measures). 30 Tablet 0   • Blood Glucose Meter Kit Test blood sugar as recommended by provider. Abbott Freestyle Lite blood glucose monitoring kit. 1 Kit 0   • Lancets Use one Abbott [facility] Lite lancet to test blood sugar twice daily and PRN. 300 Each 3   • Alcohol Swabs Wipe site with prep pad prior to injection. 100 Each 3   • Cholecalciferol 1.25 MG (66568 UT) Tab Take 50,000 Units by mouth every 7 days. 12 Tablet 0   • metformin (GLUCOPHAGE) 1000 MG tablet TAKE 1 TABLET BY MOUTH TWICE DAILY WITH MEALS 200 Tablet 0   • Brexpiprazole (REXULTI) 2 MG Tab Take 2 mg by mouth every day. 90 Tablet 3   • rosuvastatin (CRESTOR) 10 MG Tab Take 1 tablet by mouth every evening. 100 tablet 3   • lisinopril (PRINIVIL) 10 MG Tab Take 1 tablet by mouth every day. 100 tablet 3   • clonazePAM (KLONOPIN) 0.5 MG Tab Take 1 tablet by mouth every morning for 90 days. 90 tablet 0   • clonazePAM (KLONOPIN) 1 MG Tab Take 1 tablet by mouth at bedtime for 90 days. 90 tablet 0   • fluticasone (FLONASE) 50 MCG/ACT nasal spray Administer 1 Spray into affected nostril(S) every day. 16 g 11   • aspirin EC (ECOTRIN) 81 MG Tablet Delayed Response Take 81 mg by mouth every day.     • omeprazole (PRILOSEC) 40 MG delayed-release capsule TAKE 1 CAPSULE BY MOUTH EVERY DAY 90 capsule 1   • metoprolol tartrate (LOPRESSOR) 25 MG Tab TAKE 1 TABLET BY MOUTH TWICE DAILY 180 tablet 1   • DULoxetine (CYMBALTA) 30 MG Cap DR Particles TAKE 3 CAPSULES BY MOUTH EVERY  capsule 1   • cyanocobalamin (VITAMIN B-12) 500 MCG Tab Take 500 mcg by mouth every day.     • Blood Glucose Test Strips Use one Abbott Freedom Lite strip to test blood  "sugar twice daily and PRN. 270 Each 3   • Naloxone (NARCAN) 4 MG/0.1ML Liquid One spray in one nostril for overdose and call 911. 1 Each 0     No current facility-administered medications for this visit.       Allergies as of 11/02/2021   • (No Known Allergies)        ROS:  Gen: no fevers/chills, no changes in weight  ENT: no sore throat, no hearing loss, no bloody nose  Pulm: no sob, no cough  CV: no chest pain, no palpitations  GI: no nausea/vomiting, no diarrhea  Neuro: no headaches, no numbness/tingling    /78 (BP Location: Right arm, Patient Position: Sitting, BP Cuff Size: Adult)   Pulse 87   Temp 36.5 °C (97.7 °F) (Temporal)   Resp 16   Ht 1.6 m (5' 3\")   Wt 85.7 kg (189 lb)   LMP  (LMP Unknown)   SpO2 94%   BMI 33.48 kg/m²     Physical Exam:  Gen:         Alert and oriented, No apparent distress.  Neck:        No Lymphadenopathy.   Lungs:     Clear to auscultation bilaterally. No wheezes, rales, or rhonchi.   CV:          Regular rate and rhythm. No murmurs, rubs or gallops.         Ext:          No clubbing, cyanosis, or peripheral edema.  Skin:  All visible skin intact without lesions.     Assessment and Plan:  66 y.o. female with the following issues.    1. Upper respiratory tract infection, unspecified type     2. Bronchitis     3. Cough     4. Encounter for screening for colorectal malignant neoplasm  Referral to GI for Colonoscopy   5. Situational anxiety  clonazePAM (KLONOPIN) 0.5 MG Tab    clonazePAM (KLONOPIN) 1 MG Tab   6. Benzodiazepine dependence (HCC)  clonazePAM (KLONOPIN) 0.5 MG Tab    clonazePAM (KLONOPIN) 1 MG Tab   7. Generalized anxiety disorder          Cough  Pt reports a recent URI with cough. She reports that the cough is productive. Denies any fever. She reports fatigue, nausea and vomiting, swelling of her face on halloween. She reports some wheezing and SOB.     Will order albuterol. Pt advised to take mucinex daily to help thin secretions. Pt to contact if not " improved in 5-7 days.         Generalized anxiety disorder  This is a chronic issue.  Patient has been on Klonopin for many years.  Patient has been working on gradually tapering her dose.  At this time patient is taking 0.5 mg in the morning and 1 mg at night.  Discussed with patient tapering down, patient would like to wait till after the holidays.    We'll continue and refill Klonopin.    Obtained and reviewed patient utilization report from [facility] pharmacy database on 11/2/2021 8:52 AM  prior to writing prescription for controlled substance II, III or IV per [place] bill . Based on assessment of the report,my physical exam if necessary and the patient's health problem, the prescription is medically necessary.       Return in about 3 months (around 2/2/2022) for follow up for chronic health condition.    I have placed the below orders and discussed them with an approved delegating provider.  The MA is performing the below orders under the direction of Dr. Walls.    Please note that this dictation was created using voice recognition software. I have worked with consultants from the vendor as well as technical experts from [employer] to optimize the interface. I have made every reasonable attempt to correct obvious errors, but I expect that there are errors of grammar and possibly content that I did not discover before finalizing the note.

## 2021-11-11 ENCOUNTER — HOSPITAL ENCOUNTER (OUTPATIENT)
Facility: REHABILITATION | Age: 66
End: 2021-11-11
Attending: PHYSICAL MEDICINE & REHABILITATION | Admitting: PHYSICAL MEDICINE & REHABILITATION
Payer: MEDICARE

## 2021-11-11 ENCOUNTER — APPOINTMENT (OUTPATIENT)
Dept: RADIOLOGY | Facility: REHABILITATION | Age: 66
End: 2021-11-11
Attending: PHYSICAL MEDICINE & REHABILITATION
Payer: MEDICARE

## 2021-11-11 VITALS
OXYGEN SATURATION: 94 % | SYSTOLIC BLOOD PRESSURE: 128 MMHG | TEMPERATURE: 97.8 F | BODY MASS INDEX: 33.98 KG/M2 | RESPIRATION RATE: 17 BRPM | HEIGHT: 63 IN | HEART RATE: 70 BPM | WEIGHT: 191.8 LBS | DIASTOLIC BLOOD PRESSURE: 76 MMHG

## 2021-11-11 DIAGNOSIS — E55.9 VITAMIN D DEFICIENCY: ICD-10-CM

## 2021-11-11 LAB — GLUCOSE BLD-MCNC: 160 MG/DL (ref 65–99)

## 2021-11-11 PROCEDURE — 700111 HCHG RX REV CODE 636 W/ 250 OVERRIDE (IP)

## 2021-11-11 PROCEDURE — 99152 MOD SED SAME PHYS/QHP 5/>YRS: CPT

## 2021-11-11 PROCEDURE — 700101 HCHG RX REV CODE 250

## 2021-11-11 PROCEDURE — 700117 HCHG RX CONTRAST REV CODE 255

## 2021-11-11 PROCEDURE — 82962 GLUCOSE BLOOD TEST: CPT

## 2021-11-11 PROCEDURE — 64636 DESTROY L/S FACET JNT ADDL: CPT

## 2021-11-11 PROCEDURE — 64635 DESTROY LUMB/SAC FACET JNT: CPT

## 2021-11-11 PROCEDURE — 99153 MOD SED SAME PHYS/QHP EA: CPT

## 2021-11-11 RX ORDER — MIDAZOLAM HYDROCHLORIDE 1 MG/ML
INJECTION INTRAMUSCULAR; INTRAVENOUS
Status: COMPLETED
Start: 2021-11-11 | End: 2021-11-11

## 2021-11-11 RX ORDER — LIDOCAINE HYDROCHLORIDE 20 MG/ML
INJECTION, SOLUTION EPIDURAL; INFILTRATION; INTRACAUDAL; PERINEURAL
Status: COMPLETED
Start: 2021-11-11 | End: 2021-11-11

## 2021-11-11 RX ORDER — LIDOCAINE HYDROCHLORIDE 10 MG/ML
INJECTION, SOLUTION EPIDURAL; INFILTRATION; INTRACAUDAL; PERINEURAL
Status: COMPLETED
Start: 2021-11-11 | End: 2021-11-11

## 2021-11-11 RX ORDER — DEXAMETHASONE SODIUM PHOSPHATE 10 MG/ML
INJECTION, SOLUTION INTRAMUSCULAR; INTRAVENOUS
Status: COMPLETED
Start: 2021-11-11 | End: 2021-11-11

## 2021-11-11 RX ADMIN — MIDAZOLAM HYDROCHLORIDE 1 MG: 1 INJECTION, SOLUTION INTRAMUSCULAR; INTRAVENOUS at 08:00

## 2021-11-11 RX ADMIN — DEXAMETHASONE SODIUM PHOSPHATE 10 MG: 10 INJECTION, SOLUTION INTRAMUSCULAR; INTRAVENOUS at 08:11

## 2021-11-11 RX ADMIN — FENTANYL CITRATE 12.5 MCG: 50 INJECTION, SOLUTION INTRAMUSCULAR; INTRAVENOUS at 08:23

## 2021-11-11 RX ADMIN — IOHEXOL 5 ML: 240 INJECTION, SOLUTION INTRATHECAL; INTRAVASCULAR; INTRAVENOUS; ORAL at 08:10

## 2021-11-11 RX ADMIN — LIDOCAINE HYDROCHLORIDE 5 ML: 20 INJECTION, SOLUTION EPIDURAL; INFILTRATION; INTRACAUDAL; PERINEURAL at 08:10

## 2021-11-11 RX ADMIN — LIDOCAINE HYDROCHLORIDE 5 ML: 10 INJECTION, SOLUTION EPIDURAL; INFILTRATION; INTRACAUDAL; PERINEURAL at 08:12

## 2021-11-11 RX ADMIN — FENTANYL CITRATE 25 MCG: 50 INJECTION, SOLUTION INTRAMUSCULAR; INTRAVENOUS at 08:00

## 2021-11-11 ASSESSMENT — PAIN DESCRIPTION - PAIN TYPE: TYPE: CHRONIC PAIN

## 2021-11-11 ASSESSMENT — FIBROSIS 4 INDEX: FIB4 SCORE: 1.63

## 2021-11-11 NOTE — PROGRESS NOTES
Handoff received from pre-procedure RN. Pre assessment complete with pertinent history reviewed (DM 2-FSBS 160 this am).  No allergies, stop bang = SWATHI with CPAP.  Pt positioned prone by CST, RN, XRT, ankles resting on pillow, hands resting on stool under head of procedure table.

## 2021-11-11 NOTE — OP REPORT
Date of Service: 11/11/2021     Physician/s: Paresh Ogden MD     Pre-operative Diagnosis: Lumbar spondylosis(M47.816), Lumbosacral pain(M54.50)     Post-operative Diagnosis: Lumbar spondylosis(M47.816), Lumbosacral pain(M54.40)     Procedure: Left L3, L4, L5 Medial Branch Radiofrequency Neuroablation/Rhizotomy     Description of procedure:     The risks, benefits, and alternatives of the procedure were reviewed and discussed with the patient.  Written informed consent was freely obtained. A pre-procedural time-out was conducted by the physician verifying patient’s identity, procedure to be performed, procedure site and side, and allergy verification. Appropriate equipment was determined to be in place for the procedure.      An IV was placed peripherally, the patient received 1mg IV Versed for anxiolysis, and 37.5mcg IV Fentanyl for pain control. The patient's vital signs were carefully monitored before, throughout, and after the procedure. Conscious sedation was administered under my supervision by nurse throughout the procedure.  Medication was administered at 08:00-08:36AM.     In the fluoroscopy suite the patient was placed in a prone position, and a pillow was placed underneath the level of the umbilicus. The skin was prepped and draped in the usual sterile fashion. The fluoroscope was placed over the low back at the appropriate angles, and the targets for needle/probe placement were marked. A 25g needle was placed into each of the markings at three levels, and approx 1cc of 1% Lidocaine was injected subcutaneously into the epidermal and dermal layers. The needle was removed.     A 18 gauge, 100 mm RFN cannula was then placed at the intersection of the transverse process and superior articular process at the left  L3, L4, and L5 medial branch and dorsal ramus levels on the right side, where the medial branch runs. The needle/probe tips were then verified by AP and lateral views. Once the needle/probe tips were  in the optimal positions for radiofrequency neurotomy, omnipaque was used to highlight the medial branch while the fluoroscope was running live and to rule out vascular uptake. Motor stimulation is used as an extra precaution to ensure the needle tips are off the lumbar nerve roots prior to each lesion.  Following negative aspiration, a mixture ratio 1cc of 2% Lidocaine.  After a wait period of approximately 2 minute, a radiofrequency lesion was then created at each level with a temperature of 80 degrees centigrade for 90 seconds. The needles were repositioned slightly and gadavist was again used to ensure no vascular uptake.  The fluoroscope was used to confirm position in the AP and lateral views.  Then, another radiofrequency lesion was then created at each level with a temperature of 80 degrees centigrade for 90 seconds.  Then, 0.33cc of dexamethasone (10mg/ml) was then injected at each location The cannulas were restyletted, and were then removed intact.       The patient's back was covered with a 4x4 gauze, the area was cleansed with sterile normal saline, and a dressing was applied. There were no complications noted, the patient remained hemodynamically stable, and the patient tolerated the procedure well.     Paresh Ogden MD  Physical Medicine and Rehabilitation  Interventional Spine and Sports Physiatry  [facility]     CPT: 10901, [id number], [id number], [id number]

## 2021-11-11 NOTE — INTERVAL H&P NOTE
H&P reviewed. The patient was examined and there are no changes to the H&P    Vitals were reviewed in the nursing notes    CV: RRR, Normal S1, S2  RESP: Clear to auscultation bilaterally    Continue with procedure as planned.    Paresh Ogden MD  Physical Medicine and Rehabilitation  Interventional Spine and Sports Physiatry  [facility]

## 2021-11-11 NOTE — PROGRESS NOTES
Verified patient name. Procedure  and site confirmed. Consent signed. H&P updated. Reviewed medication allergies and current medication in epic. Printed home care discharged , SWATHI education sheet information  protocol  , pre and post including infection  prevention verbal instruction given to patient and  she verbalized understanding.  Confirmed   waiting . Pertinent medical health  information reviewed in epic  ( HTN, type 2 DM, GERD, THALIA, SWATHI on CPAP,, hypothyroidism,  ) .  Off Aspirin and Motrin for one.  week. Patient denied taking blood thinner and anti-inflammatory medication. Blood sugar was 160 mg. / dl. Had Morphine  And Hydrocodone at 0530 AM today.  Dr. Ogden spoke and examined patient.

## 2021-11-11 NOTE — PROGRESS NOTES
Fluids and food tolerated well.  Reviewed  home care  and SWATHI protocol sheet information  instructions given and  she verbalized understanding. Dr. Ogden evaluated patient. Meets criteria for  discharged ambulatory without difficulty with designated .

## 2021-11-12 ENCOUNTER — TELEPHONE (OUTPATIENT)
Dept: PHYSICAL MEDICINE AND REHAB | Facility: MEDICAL CENTER | Age: 66
End: 2021-11-12

## 2021-11-12 NOTE — TELEPHONE ENCOUNTER
I called patient to follow up after her Special procedure Left lumbar three through lumbar five radiofrequency ablation and she stated she is doing ok and she is icing the area.

## 2021-11-23 ENCOUNTER — OFFICE VISIT (OUTPATIENT)
Dept: PHYSICAL MEDICINE AND REHAB | Facility: MEDICAL CENTER | Age: 66
End: 2021-11-23
Payer: MEDICARE

## 2021-11-23 VITALS
HEIGHT: 63 IN | HEART RATE: 80 BPM | OXYGEN SATURATION: 95 % | WEIGHT: 191.14 LBS | DIASTOLIC BLOOD PRESSURE: 80 MMHG | BODY MASS INDEX: 33.87 KG/M2 | TEMPERATURE: 97.1 F | SYSTOLIC BLOOD PRESSURE: 128 MMHG

## 2021-11-23 DIAGNOSIS — F11.90 CHRONIC, CONTINUOUS USE OF OPIOIDS: ICD-10-CM

## 2021-11-23 DIAGNOSIS — M54.50 LUMBOSACRAL PAIN: ICD-10-CM

## 2021-11-23 DIAGNOSIS — M47.816 LUMBAR SPONDYLOSIS: ICD-10-CM

## 2021-11-23 DIAGNOSIS — F13.20 BENZODIAZEPINE DEPENDENCE (HCC): ICD-10-CM

## 2021-11-23 DIAGNOSIS — Z98.890 S/P LUMBAR LAMINECTOMY: ICD-10-CM

## 2021-11-23 PROCEDURE — 99214 OFFICE O/P EST MOD 30 MIN: CPT | Performed by: PHYSICAL MEDICINE & REHABILITATION

## 2021-11-23 RX ORDER — CHOLECALCIFEROL (VITAMIN D3) 1250 MCG
CAPSULE ORAL
COMMUNITY
Start: 2021-11-11 | End: 2022-12-19

## 2021-11-23 ASSESSMENT — PATIENT HEALTH QUESTIONNAIRE - PHQ9
CLINICAL INTERPRETATION OF PHQ2 SCORE: 2
SUM OF ALL RESPONSES TO PHQ QUESTIONS 1-9: 4
5. POOR APPETITE OR OVEREATING: 1 - SEVERAL DAYS

## 2021-11-23 ASSESSMENT — FIBROSIS 4 INDEX: FIB4 SCORE: 1.63

## 2021-11-23 ASSESSMENT — PAIN SCALES - GENERAL: PAINLEVEL: 8=MODERATE-SEVERE PAIN

## 2021-11-23 NOTE — PROGRESS NOTES
Follow-up patient note    Physiatry (physical medicine and  Rehabilitation), interventional spine and sports medicine    Date of Service: 11/23/2021    Chief complaint:   Chief Complaint   Patient presents with   • Follow-Up     Back pain       HISTORY    HPI: Grisel Mark 66 y.o. female who presents today for follow-up evaluation of low back and leg pain.     Since the last visit, she returns after left L3, L4 and L5 medial branch radiofrequency ablation on 11/11/2021.  Her low back pain has been increased after the procedure, feeling of muscle tightness in the lower back.    Her radicular symptoms are ongoing.  She reports that she is hoping to get SCS trial for radicular symptoms.  We don't have information regarding clearance from Dr. Villa.  From what she reports, she had 2-3 visits after then 02/18/2021 visit.      Gabapentin back to 600mg po qid. Her pain medications: Duloxetine 90mg.  MS Contin 15mg q12h.  Norco 5/325 for breakthrough.  No side effects.  She is back to taking clonazepam 1mg at bedtime.    Bowel movements have been regular,  stool softener. Maintaining regular bowel movements.      By history:  Her laminectomy was in 1998.  Previous injection on left L4 and L5 TFESI on 08/12/2020 helped with her leg pain.     Medical records review:  Dr. Francisco Olmos, plan to increase duloxetine 90mg daily, discontinue buspirone 20mg po bid, start buproprion XL 150mg daily, continue brexipiprazole 1mg qhs, hydroxyzine 25mg po tid prn, clonazepam 0.5mg daily prn    Review of records   Dr. Dheeraj De La Rosa:  08/20/2019 Right L3-4, L4-5, L5-S1 radiofrequency ablation    I reviewed the note from the referring provider Peter Bruce * dated 02/05/2020: She was seen to establish care, having just moved from [place].  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  Medications associated with the above were written, related labs ordered including CBC, CMP, Hb A1c, lipids, microalbumin, TSH, free thryoxine, referral to ps\ychiatry, referral to GI for colonoscopy and referral to pain clinic.    Previous treatments:    Physical Therapy: Yes    Medications the patient is tried: Narcotics, gabapentin and muscle relaxers    Previous interventions: [facility] these included right lumbar radiofrequency procedures    Previous surgeries to relieve the above pain:  Surgery on October 28, 1998      ROS:   ENT: ear infection, treated with augmentin  CV: irregular heart, reports history of tachycardia  Psych: depression since 1995  Heme: anemia  Endo: hypothyroidism, diabetes    Red Flags ROS:   Fever, Chills, Sweats: Denies  Involuntary Weight Loss: Denies  Bladder Incontinence: Denies  Bowel Incontinence: Denies  Saddle Anesthesia: Denies    All other systems reviewed and negative.       PMHx:   Past Medical History:   Diagnosis Date   • Anxiety    • Arrhythmia    • Chronic back pain    • Constipation    • Depression    • Diabetes (HCC)    • GERD (gastroesophageal reflux disease)    • Hyperlipidemia    • Hypertension    • Thyroid disease        PSHx:   Past Surgical History:   Procedure Laterality Date   • RADIO FREQUENCY ABLATION ADDITIONAL LEVEL Left 11/11/2021    Procedure: LEFT lumbar three through lumbar five radiofrequency ablation;  Surgeon: Paresh Ogden M.D.;  [facility];  Service: Pain Management   • LUMBAR MEDIAL BRANCH BLOCKS Left 7/21/2021    Procedure: LEFT lumbar three through lumbar five medial branch blocks;  Surgeon: Paresh Ogden M.D.;  Location: [facility];  Service: Pain Management   • LUMBAR TRANSFORAMINAL EPIDURAL STEROID INJECTION Left 5/20/2021    Procedure: LEFT lumbar four and lumbar five transforaminal epidural steroid injection;  Surgeon:  Paresh Ogden M.D.;  Location: [facility];  Service: Pain Management   • BLOCK EPIDURAL STEROID INJECTION Left 2/24/2021    Procedure: INJECTION, STEROID, EPIDURAL;  Surgeon: Paresh Ogden M.D.;  Location: [facility];  Service: Pain Management   • LUMBAR MEDIAL BRANCH BLOCKS Left 9/9/2020    Procedure: BLOCK, NERVE, SPINAL, LUMBAR, POSTERIOR RAMUS, MEDIAL BRANCH;  Surgeon: Paresh Ogden M.D.;  Location: [facility];  Service: Pain Management   • DC NJX AA&/STRD TFRML EPI LUMBAR/SACRAL 1 LEVEL Left 8/12/2020    Procedure: INJECTION, SPINE, LUMBOSACRAL, EPIDURAL, 1 LEVEL, TRANSFORAMINAL APPROACH;  Surgeon: Paresh Ogden M.D.;  Location: [facility];  Service: Pain Management   • DC NJX AA&/STRD TFRML EPI LUMBAR/SACRAL EA ADDL Left 8/12/2020    Procedure: INJECTION, SPINE, LUMBOSACRAL, EPIDURAL, TRANSFORAMINAL APPROACH;  Surgeon: Paresh Ogden M.D.;  Location: [facility];  Service: Pain Management   • LAMINOTOMY  1998   • ABDOMINAL HYSTERECTOMY TOTAL     • CARPAL TUNNEL RELEASE     • CHOLECYSTECTOMY         Family history   Family History   Problem Relation Age of Onset   • Cancer Mother    • Stroke Father         Heart attack   • Dementia Sister    • Stroke Brother         heart Attack   • Cancer Brother         Skin   • Diabetes Brother    • Kidney Disease Brother    • Cancer Brother    • Parkinson's Disease Sister    • No Known Problems Sister    • Diabetes Daughter    • Hypertension Daughter          Medications:   Current Outpatient Medications   Medication   • Cholecalciferol 1.25 MG (61291 UT) Tab   • [START ON 11/30/2021] clonazePAM (KLONOPIN) 0.5 MG Tab   • [START ON 11/30/2021] clonazePAM (KLONOPIN) 1 MG Tab   • albuterol 108 (90 Base) MCG/ACT Aero Soln inhalation aerosol   • estradiol (ESTRACE) 1 MG Tab   • levothyroxine (SYNTHROID) 50 MCG Tab   • Empagliflozin (JARDIANCE) 25 MG Tab   • gabapentin  (NEURONTIN) 800 MG tablet   • [START ON 2021] HYDROcodone-acetaminophen (NORCO) 5-325 MG Tab per tablet   • morphine ER (MS CONTIN) 15 MG Tab CR tablet   • HYDROcodone-acetaminophen (NORCO) 5-325 MG Tab per tablet   • HYDROcodone-acetaminophen (NORCO) 5-325 MG Tab per tablet   • ibuprofen (MOTRIN) 800 MG Tab   • busPIRone (BUSPAR) 10 MG Tab tablet   • levoFLOXacin (LEVAQUIN) 750 MG tablet   • pseudoephedrine (SUDAFED) 30 MG Tab   • metformin (GLUCOPHAGE) 1000 MG tablet   • Brexpiprazole (REXULTI) 2 MG Tab   • rosuvastatin (CRESTOR) 10 MG Tab   • lisinopril (PRINIVIL) 10 MG Tab   • clonazePAM (KLONOPIN) 0.5 MG Tab   • clonazePAM (KLONOPIN) 1 MG Tab   • fluticasone (FLONASE) 50 MCG/ACT nasal spray   • aspirin EC (ECOTRIN) 81 MG Tablet Delayed Response   • omeprazole (PRILOSEC) 40 MG delayed-release capsule   • metoprolol tartrate (LOPRESSOR) 25 MG Tab   • DULoxetine (CYMBALTA) 30 MG Cap DR Particles   • cyanocobalamin (VITAMIN B-12) 500 MCG Tab   • Naloxone (NARCAN) 4 MG/0.1ML Liquid   • Cholecalciferol (VITAMIN D3) 1.25 MG (93252 UT) Cap   • Blood Glucose Meter Kit   • Lancets   • Alcohol Swabs   • Blood Glucose Test Strips     No current facility-administered medications for this visit.       Allergies:   No Known Allergies    Social Hx:   Social History     Socioeconomic History   • Marital status:      Spouse name: Not on file   • Number of children: Not on file   • Years of education: Not on file   • Highest education level: Not on file   Occupational History   • Not on file   Tobacco Use   • Smoking status: Former Smoker     Packs/day: 0.25     Types: Cigarettes     Quit date: 2014     Years since quittin.8   • Smokeless tobacco: Never Used   Vaping Use   • Vaping Use: Never used   Substance and Sexual Activity   • Alcohol use: Not Currently     Comment: rare   • Drug use: Not Currently     Types: Marijuana   • Sexual activity: Not Currently   Other Topics Concern   •  Service No   •  "Blood Transfusions Yes   • Caffeine Concern No   • Occupational Exposure No   • Hobby Hazards No   • Sleep Concern Yes   • Stress Concern Yes   • Weight Concern Yes   • Special Diet No   • Back Care No   • Exercise Yes   • Bike Helmet No   • Seat Belt Yes   • Self-Exams Yes   Social History Narrative   • Not on file     Social Determinants of Health     Financial Resource Strain:    • Difficulty of Paying Living Expenses: Not on file   Food Insecurity:    • Worried About Running Out of Food in the Last Year: Not on file   • Ran Out of Food in the Last Year: Not on file   Transportation Needs:    • Lack of Transportation (Medical): Not on file   • Lack of Transportation (Non-Medical): Not on file   Physical Activity:    • Days of Exercise per Week: Not on file   • Minutes of Exercise per Session: Not on file   Stress:    • Feeling of Stress : Not on file   Social Connections:    • Frequency of Communication with Friends and Family: Not on file   • Frequency of Social Gatherings with Friends and Family: Not on file   • Attends Sabianist Services: Not on file   • Active Member of Clubs or Organizations: Not on file   • Attends Club or Organization Meetings: Not on file   • Marital Status: Not on file   Intimate Partner Violence:    • Fear of Current or Ex-Partner: Not on file   • Emotionally Abused: Not on file   • Physically Abused: Not on file   • Sexually Abused: Not on file   Housing Stability:    • Unable to Pay for Housing in the Last Year: Not on file   • Number of Places Lived in the Last Year: Not on file   • Unstable Housing in the Last Year: Not on file         EXAMINATION     Physical Exam:   Vitals: /80 (BP Location: Right arm, Patient Position: Sitting, BP Cuff Size: Adult long)   Pulse 80   Temp 36.2 °C (97.1 °F) (Temporal)   Ht 1.6 m (5' 3\")   Wt 86.7 kg (191 lb 2.2 oz)   SpO2 95%     Constitutional:   Body Habitus: Body mass index is 33.86 kg/m².  Cooperation: Fully cooperates with " exam  Appearance: Well-groomed, well-nourished, not disheveled, no acute distress    Eyes: No scleral icterus, no proptosis     ENT -no obvious auditory deficits, wearing a facial mask    Skin -no rashes or lesions noted, no warmth or erythema was noted at the injection site    Respiratory-  breathing comfortable on room air, no audible wheezing    Cardiovascular- no lower extremity edema is noted.     Psychiatric- alert and oriented ×3. Normal affect.     Gait - normal gait, no use of ambulatory device, antalgic.     Musculoskeletal -   Thoracic/Lumbar Spine/Sacral Spine/Hips   Inspection: no evidence of atrophy in bilateral lower extremities throughout     ROM of the lumbar spine decreased.  Pain with extension and quadrant loading on the left    Palpation: tenderness to palpation lumbar paraspinals left greater than right, L3-5    Neuro     Key points for the international standards for neurological classification of spinal cord injury (ISNCSCI) to light touch.     Dermatome R L   L2 2 2   L3 2 2   L4 2 2   L5 2 1   S1 2 1   S2 2 2       Motor Exam Lower Extremities    ? Myotome R L   Hip flexion L2 5 5   Knee extension L3 5 5   Ankle dorsiflexion L4 5 5   Toe extension L5 5 5   Ankle plantarflexion S1 5 5       Reflexes  ?  R L   Patella  2+ 2+   Achilles   2+ 1+       MEDICAL DECISION MAKING    Medical records review: see under HPI section.     DATA    Labs:   10/20/2021: UDS positive for morphine and metabolites, clonzapem, low amount of THC noted  05/08/2021: UDS positive for morphine and metabolites, clonazepam and metabolites, hydrocodone and metabolites  08/18/2020: UDS positive for morphine and metabolites, clonazepam and metabolites, hydrocodone and metabolites  04/24/2020 UDS positive for morphine and metabolites, clonazepam  04/09/2020 Vitamin D, 25:  28L  04/09/2020 Vitamin B12: 217    Lab Results   Component Value Date/Time    SODIUM 141 05/04/2021 08:06 AM    POTASSIUM 4.0 05/04/2021 08:06 AM     CHLORIDE 105 05/04/2021 08:06 AM    CO2 26 05/04/2021 08:06 AM    ANION 10.0 05/04/2021 08:06 AM    GLUCOSE 134 (H) 05/04/2021 08:06 AM    BUN 7 (L) 05/04/2021 08:06 AM    CREATININE 0.58 05/04/2021 08:06 AM    CALCIUM 8.8 05/04/2021 08:06 AM    ASTSGOT 25 05/04/2021 08:06 AM    ALTSGPT 29 05/04/2021 08:06 AM    TBILIRUBIN 0.4 05/04/2021 08:06 AM    ALBUMIN 3.8 05/04/2021 08:06 AM    TOTPROTEIN 6.4 05/04/2021 08:06 AM    GLOBULIN 2.6 05/04/2021 08:06 AM    AGRATIO 1.5 05/04/2021 08:06 AM       No results found for: PROTHROMBTM, INR     Lab Results   Component Value Date/Time    WBC 7.5 05/04/2021 08:06 AM    RBC 4.73 05/04/2021 08:06 AM    HEMOGLOBIN 14.6 05/04/2021 08:06 AM    HEMATOCRIT 43.8 05/04/2021 08:06 AM    MCV 92.6 05/04/2021 08:06 AM    MCH 30.9 05/04/2021 08:06 AM    MCHC 33.3 (L) 05/04/2021 08:06 AM    MPV 10.6 05/04/2021 08:06 AM    NEUTSPOLYS 46.60 05/04/2021 08:06 AM    LYMPHOCYTES 39.80 05/04/2021 08:06 AM    MONOCYTES 9.30 05/04/2021 08:06 AM    EOSINOPHILS 3.70 05/04/2021 08:06 AM    BASOPHILS 0.50 05/04/2021 08:06 AM        Lab Results   Component Value Date/Time    HBA1C 7.3 (H) 05/04/2021 08:06 AM        Imaging: I personally reviewed following images, these are my reads:     MRI lumbar spine 05/05/2020  At L1-2, no central or foraminal stenosis  At L2-3, no central or foraminal stenosis  At L3-4, mild disc bulge and mild ligamentum flavum thickening.  No central canal stenosis. Borderline left foraminal stenosis. Schmorl's node.   At L4-5, diffuse disc bulge with mild ligamentum flavum thickening.  No central canal stenosis.  There is facet arthropathy, left greater than right. Mild foraminal stenosis bilaterally. S/P left L4/5 hemilaminectomy  At L5-S1, there is mild-borderline foraminal stenosis with facet arthropathy and mild disc bulge.  No central canal stenosis    Xray lumbar spine 05/05/2020  There is mild decreased joint space at L3-4 and L4-5.    Facet arthropathy is noted, greatest  at L5-S1 bilaterally.  No significant motion on flexion and extension films    IMAGING radiology reads. I reviewed the following radiology reads    Xray abdomen 07/17/2020  IMPRESSION:     Nonspecific bowel gas pattern. No evidence small bowel obstruction.      MRI lumbar spine 05/05/2020  IMPRESSION:     1.  Caudal lumbar facet arthropathy left worse than right with no bony destruction to indicate septic or inflammatory arthropathy. This most likely is just degenerative  2.  Mild caudal lumbar foraminal stenoses  3.  No significant central stenosis.  4.  Left L4/5 hemilaminectomy                                                                                   Xray lumbar spine 05/05/2020     IMPRESSION:     1.  No acute lumbar compression fracture or subluxation.  2.  Posterior disc space narrowing at L4-5 and to lesser extent at L3-4.  3.  Bilateral facet arthropathy at L5-S1.                                                                                             Diagnosis   Visit Diagnoses     ICD-10-CM   1. Lumbar spondylosis  M47.816   2. Lumbosacral pain  M54.50   3. S/P lumbar laminectomy  Z98.890   4. Benzodiazepine dependence (HCC)  F13.20   5. Chronic, continuous use of opioids  F11.90         ASSESSMENT:  Grisel Mark 66 y.o. female seen for above     Grisel was seen today for follow-up.    Diagnoses and all orders for this visit:    Lumbar spondylosis    Lumbosacral pain    S/P lumbar laminectomy    Benzodiazepine dependence (HCC)    Chronic, continuous use of opioids         1. Discussed that she has had FJNA and that the will likely take more time for the paraspinal pain resolve.  Discussed that she can take ibuprofen 400mg twice a day for a few days and decrease as able.  2. Plan to get records from Dr. Villa.  Possible SCS trial in the future.  Need to get update after 02/18/2021.  3. UDS noted to have very low amount of THC, plan to recheck in future.  4. Continue gabapentin to 600mg four a  day as tolerated.  5. Continue MS Contin 15mg po q12h and norco for breakthrough.  Previously refilled.  Discussed norco up to 2/day. No changes for now.   reviewed. She is continuing to take chronic benzodiazepines.   6. Continue to maintain regular bowel movements. Continue colase 100mg daily-bid and miralax prn for constipation.      Follow-up: Return in about 8 weeks (around 1/18/2022).    Thank you very much for asking me to participate in Grisel Mark's care.  Please contact me with any questions or concerns.    Please note that this dictation was created using voice recognition software. I have made every reasonable attempt to correct obvious errors but there may be errors of grammar and content that I may have overlooked prior to finalization of this note.      Paresh Ogden MD  Physical Medicine and Rehabilitation  Interventional Spine and Sports Physiatry  [facility]

## 2021-12-03 DIAGNOSIS — R20.2 PARESTHESIA: ICD-10-CM

## 2021-12-03 DIAGNOSIS — Z98.890 S/P LUMBAR LAMINECTOMY: ICD-10-CM

## 2021-12-03 RX ORDER — GABAPENTIN 600 MG/1
TABLET ORAL
Qty: 120 TABLET | Refills: 1 | OUTPATIENT
Start: 2021-12-03

## 2021-12-03 NOTE — TELEPHONE ENCOUNTER
I am not sure who is requesting this.  Is it the pharmacy?  She should have two more refills for the gabapentin 800mg 1.5 pills twice a day.

## 2021-12-03 NOTE — TELEPHONE ENCOUNTER
Was the patient seen in the last year in this department? Yes    Does patient have an active prescription for medications requested? No      Do they have a refill on file? No     If a controlled substance, is a new  on file? No     Received Request Via: Patient    If pharmacy requests, is the patient still taking the medication or medication discontinued? yes

## 2021-12-15 RX ORDER — OMEPRAZOLE 40 MG/1
40 CAPSULE, DELAYED RELEASE ORAL DAILY
Qty: 90 CAPSULE | Refills: 3 | Status: SHIPPED | OUTPATIENT
Start: 2021-12-15 | End: 2022-09-19 | Stop reason: SDUPTHER

## 2021-12-15 RX ORDER — DULOXETIN HYDROCHLORIDE 30 MG/1
90 CAPSULE, DELAYED RELEASE ORAL DAILY
Qty: 270 CAPSULE | Refills: 3 | Status: SHIPPED | OUTPATIENT
Start: 2021-12-15 | End: 2022-09-19 | Stop reason: SDUPTHER

## 2021-12-15 NOTE — PATIENT COMMUNICATION
Received request via: Patient    Was the patient seen in the last year in this department? Yes    Does the patient have an active prescription (recently filled or refills available) for medication(s) requested? No     Requested Prescriptions     Pending Prescriptions Disp Refills   • DULoxetine (CYMBALTA) 30 MG Cap DR Particles 270 Capsule 3     Sig: Take 3 Capsules by mouth every day.   • omeprazole (PRILOSEC) 40 MG delayed-release capsule 90 Capsule 3     Sig: Take 1 Capsule by mouth every day.     Refused Prescriptions Disp Refills   • Brexpiprazole 2 MG Tab 90 Tablet 0     Sig: Take 2 mg by mouth every day.     Refused By: BLANQUITA SU     Reason for Refusal: Request already responded to by other means (e.g. phone or fax)

## 2021-12-21 ENCOUNTER — TELEPHONE (OUTPATIENT)
Dept: SLEEP MEDICINE | Facility: MEDICAL CENTER | Age: 66
End: 2021-12-21

## 2021-12-21 NOTE — TELEPHONE ENCOUNTER
Pt called about her CPAP machine and if it was something that she needed and if she had to do another study. She wanted a call back to discuss the matter further.  # [phone]    Thank you

## 2021-12-22 DIAGNOSIS — E11.69 DIABETES MELLITUS TYPE 2 IN OBESE: ICD-10-CM

## 2021-12-22 DIAGNOSIS — E66.9 DIABETES MELLITUS TYPE 2 IN OBESE: ICD-10-CM

## 2021-12-22 NOTE — TELEPHONE ENCOUNTER
Called pt to inform her that the order for the new cpap machine will be faxed to [facility] home care with all the needed information. I informed the pt that she will be receiving a call from [facility] regarding the order within a few weeks but if she does not hear from them I advise the pt to give them a call right away. I also schedule the pt for 1st compliance check on 4/4/2022 with Aries JACOBO and informed the pt that she will need to be on the machine at 30-90 days for insurance purpose.

## 2021-12-22 NOTE — TELEPHONE ENCOUNTER
Per previous visit note, patient just needed a sleep study to qualify for new CPAP.  I did order auto CPAP and instruct the patient to call and schedule follow-up 3 months after receiving new device.. Please forward the order for auto CPAP to preferred home care.  Thanks

## 2021-12-22 NOTE — TELEPHONE ENCOUNTER
pt complete in lab SS on 10/24/2021 and does not have an apt schedule for the results, please advise.

## 2022-01-05 DIAGNOSIS — E78.2 MIXED HYPERLIPIDEMIA: ICD-10-CM

## 2022-01-05 RX ORDER — ROSUVASTATIN CALCIUM 10 MG/1
10 TABLET, COATED ORAL EVERY EVENING
Qty: 100 TABLET | Refills: 3 | Status: SHIPPED | OUTPATIENT
Start: 2022-01-05 | End: 2022-09-19 | Stop reason: SDUPTHER

## 2022-01-12 ENCOUNTER — APPOINTMENT (OUTPATIENT)
Dept: PHYSICAL MEDICINE AND REHAB | Facility: MEDICAL CENTER | Age: 67
End: 2022-01-12
Payer: MEDICARE

## 2022-01-18 ENCOUNTER — OFFICE VISIT (OUTPATIENT)
Dept: PHYSICAL MEDICINE AND REHAB | Facility: MEDICAL CENTER | Age: 67
End: 2022-01-18
Payer: MEDICARE

## 2022-01-18 VITALS
DIASTOLIC BLOOD PRESSURE: 80 MMHG | SYSTOLIC BLOOD PRESSURE: 120 MMHG | BODY MASS INDEX: 35 KG/M2 | HEIGHT: 63 IN | OXYGEN SATURATION: 96 % | RESPIRATION RATE: 18 BRPM | TEMPERATURE: 97.7 F | HEART RATE: 72 BPM | WEIGHT: 197.53 LBS

## 2022-01-18 DIAGNOSIS — Z98.890 S/P LUMBAR LAMINECTOMY: ICD-10-CM

## 2022-01-18 DIAGNOSIS — F11.90 CHRONIC, CONTINUOUS USE OF OPIOIDS: ICD-10-CM

## 2022-01-18 DIAGNOSIS — F33.1 DEPRESSION, MAJOR, RECURRENT, MODERATE (HCC): ICD-10-CM

## 2022-01-18 DIAGNOSIS — M51.36 DDD (DEGENERATIVE DISC DISEASE), LUMBAR: ICD-10-CM

## 2022-01-18 DIAGNOSIS — F13.20 BENZODIAZEPINE DEPENDENCE (HCC): ICD-10-CM

## 2022-01-18 DIAGNOSIS — M54.16 LEFT LUMBAR RADICULITIS: ICD-10-CM

## 2022-01-18 PROCEDURE — 99214 OFFICE O/P EST MOD 30 MIN: CPT | Performed by: PHYSICAL MEDICINE & REHABILITATION

## 2022-01-18 RX ORDER — MORPHINE SULFATE 15 MG/1
15 TABLET, FILM COATED, EXTENDED RELEASE ORAL EVERY 12 HOURS
Qty: 60 TABLET | Refills: 0 | Status: SHIPPED | OUTPATIENT
Start: 2022-02-21 | End: 2022-03-18 | Stop reason: SDUPTHER

## 2022-01-18 RX ORDER — MORPHINE SULFATE 15 MG/1
15 TABLET, FILM COATED, EXTENDED RELEASE ORAL EVERY 12 HOURS
COMMUNITY
Start: 2021-12-23 | End: 2022-01-18 | Stop reason: SDUPTHER

## 2022-01-18 RX ORDER — HYDROCODONE BITARTRATE AND ACETAMINOPHEN 5; 325 MG/1; MG/1
1 TABLET ORAL EVERY 8 HOURS PRN
Qty: 60 TABLET | Refills: 0 | Status: SHIPPED | OUTPATIENT
Start: 2022-01-22 | End: 2022-02-21

## 2022-01-18 RX ORDER — GABAPENTIN 800 MG/1
1200 TABLET ORAL 2 TIMES DAILY
COMMUNITY
Start: 2022-01-04 | End: 2022-01-18 | Stop reason: SDUPTHER

## 2022-01-18 RX ORDER — MORPHINE SULFATE 15 MG/1
15 TABLET, FILM COATED, EXTENDED RELEASE ORAL EVERY 12 HOURS
Qty: 60 TABLET | Refills: 0 | Status: SHIPPED | OUTPATIENT
Start: 2022-01-22 | End: 2022-02-21

## 2022-01-18 RX ORDER — GABAPENTIN 800 MG/1
1200 TABLET ORAL 2 TIMES DAILY
Qty: 90 TABLET | Refills: 2 | Status: SHIPPED | OUTPATIENT
Start: 2022-01-18 | End: 2022-04-19 | Stop reason: SDUPTHER

## 2022-01-18 RX ORDER — HYDROCODONE BITARTRATE AND ACETAMINOPHEN 5; 325 MG/1; MG/1
1 TABLET ORAL EVERY 8 HOURS PRN
Qty: 60 TABLET | Refills: 0 | Status: SHIPPED | OUTPATIENT
Start: 2022-02-21 | End: 2022-03-18 | Stop reason: SDUPTHER

## 2022-01-18 ASSESSMENT — FIBROSIS 4 INDEX: FIB4 SCORE: 1.63

## 2022-01-18 ASSESSMENT — PATIENT HEALTH QUESTIONNAIRE - PHQ9
5. POOR APPETITE OR OVEREATING: 1 - SEVERAL DAYS
CLINICAL INTERPRETATION OF PHQ2 SCORE: 3
SUM OF ALL RESPONSES TO PHQ QUESTIONS 1-9: 10

## 2022-01-18 NOTE — PROGRESS NOTES
Follow-up patient note    Physiatry (physical medicine and  Rehabilitation), interventional spine and sports medicine    Date of Service: 1/18/2022    Chief complaint:   Chief Complaint   Patient presents with   • Follow-Up     Lumbar spondylosis       HISTORY    HPI: Grisel Mark 66 y.o. female who presents today for follow-up evaluation of low back and leg pain.     Since last visit Chelo reports that she continues to have her usual low back pain.  She continues to have some spasms in her low back and radicular symptoms into the left leg and foot.  She also has been having some symptoms in her right foot.    We have not been able to obtain records from Dr. Stewart in view of his initial evaluation.  We discussed spinal cord stimulator trial for ongoing radicular symptoms pending clearance from psychology.    She has been taking gabapentin twice a day.  No side effects and she has been filling gabapentin 800mg taking 1.5 pills twice a day.  Her pain medications: Duloxetine 90mg.  MS Contin 15mg q12h.  Norco 5/325 for breakthrough.  No side effects.  She is back to taking clonazepam 1mg at bedtime, but this sounds like they are working on reducing this.    Bowel movements have been regular, stool softener. Maintaining regular bowel movements.  She recently had colonoscopy, using miralax helpful.  She has not been taking ibuprofen since her colonoscopy and leading up to that.  This has been about 2 weeks.    No falls.  She walks without assist device    By history:  Her laminectomy was in 1998.  Previous injection on left L4 and L5 TFESI on 08/12/2020 helped with her leg pain.     Medical records review:  Dr. Francisco Olmos, plan to increase duloxetine 90mg daily, discontinue buspirone 20mg po bid, start buproprion XL 150mg daily, continue brexipiprazole 1mg qhs, hydroxyzine 25mg po tid prn, clonazepam 0.5mg daily prn    Review of records   Dr. Dheeraj De La Rosa:  08/20/2019 Right L3-4, L4-5, L5-S1 radiofrequency  ablation    I reviewed the note from the referring provider Peter Bruce * dated 02/05/2020: She was seen to \[facility]"" care, having just moved from [place].  She was seen for essential hypertension, mixed hyperlipidemia, DM2 in obese, hypothyroidism, recurrent MDD in partial depression/anxiety, GERD without esophagitis, chronic bilateral low back pain with bilateral sciatica.  Medications associated with the above were written, related labs ordered including CBC, CMP, Hb A1c, lipids, microalbumin, TSH, free thryoxine, referral to ps\ychiatry, referral to GI for colonoscopy and referral to pain clinic.    Previous treatments:    Physical Therapy: Yes    Medications the patient is tried: Narcotics, gabapentin and muscle relaxers    Previous interventions: [facility] these included right lumbar radiofrequency procedures    Previous surgeries to relieve the above pain:  Surgery on October 28, 1998      ROS:   ENT: ear infection, treated with augmentin  CV: irregular heart, reports history of tachycardia  Psych: depression since 1995  Heme: anemia  Endo: hypothyroidism, diabetes    Red Flags ROS:   Fever, Chills, Sweats: Denies  Involuntary Weight Loss: Denies  Bladder Incontinence: Denies  Bowel Incontinence: Denies  Saddle Anesthesia: Denies    All other systems reviewed and negative.       PMHx:   Past Medical History:   Diagnosis Date   • Anxiety    • Arrhythmia    • Chronic back pain    • Constipation    • Depression    • Diabetes (HCC)    • GERD (gastroesophageal reflux disease)    • Hyperlipidemia    • Hypertension    • Thyroid disease        PSHx:   Past Surgical History:   Procedure Laterality Date   • RADIO FREQUENCY ABLATION ADDITIONAL LEVEL Left 11/11/2021    Procedure: LEFT lumbar three through lumbar five radiofrequency ablation;  Surgeon: Paresh Ogden M.D.;  [facility];  Service: Pain Management   • LUMBAR MEDIAL BRANCH BLOCKS Left  7/21/2021    Procedure: LEFT lumbar three through lumbar five medial branch blocks;  Surgeon: Paresh Ogden M.D.;  Location: [facility];  Service: Pain Management   • LUMBAR TRANSFORAMINAL EPIDURAL STEROID INJECTION Left 5/20/2021    Procedure: LEFT lumbar four and lumbar five transforaminal epidural steroid injection;  Surgeon: Paresh Ogden M.D.;  Location: [facility];  Service: Pain Management   • BLOCK EPIDURAL STEROID INJECTION Left 2/24/2021    Procedure: INJECTION, STEROID, EPIDURAL;  Surgeon: Paresh Ogden M.D.;  Location: [facility];  Service: Pain Management   • LUMBAR MEDIAL BRANCH BLOCKS Left 9/9/2020    Procedure: BLOCK, NERVE, SPINAL, LUMBAR, POSTERIOR RAMUS, MEDIAL BRANCH;  Surgeon: Paresh Ogden M.D.;  Location: [facility];  Service: Pain Management   • CT NJX AA&/STRD TFRML EPI LUMBAR/SACRAL 1 LEVEL Left 8/12/2020    Procedure: INJECTION, SPINE, LUMBOSACRAL, EPIDURAL, 1 LEVEL, TRANSFORAMINAL APPROACH;  Surgeon: Paresh Ogden M.D.;  Location: [facility];  Service: Pain Management   • CT NJX AA&/STRD TFRML EPI LUMBAR/SACRAL EA ADDL Left 8/12/2020    Procedure: INJECTION, SPINE, LUMBOSACRAL, EPIDURAL, TRANSFORAMINAL APPROACH;  Surgeon: Paresh Ogden M.D.;  Location: [facility];  Service: Pain Management   • LAMINOTOMY  1998   • ABDOMINAL HYSTERECTOMY TOTAL     • CARPAL TUNNEL RELEASE     • CHOLECYSTECTOMY         Family history   Family History   Problem Relation Age of Onset   • Cancer Mother    • Stroke Father         Heart attack   • Dementia Sister    • Stroke Brother         heart Attack   • Cancer Brother         Skin   • Diabetes Brother    • Kidney Disease Brother    • Cancer Brother    • Parkinson's Disease Sister    • No Known Problems Sister    • Diabetes Daughter    • Hypertension Daughter          Medications:   Current Outpatient Medications   Medication   •  gabapentin (NEURONTIN) 800 MG tablet   • [START ON 2022] HYDROcodone-acetaminophen (NORCO) 5-325 MG Tab per tablet   • [START ON 2022] morphine ER (MS CONTIN) 15 MG Tab CR tablet   • [START ON 2022] morphine ER (MS CONTIN) 15 MG Tab CR tablet   • [START ON 2022] HYDROcodone-acetaminophen (NORCO) 5-325 MG Tab per tablet   • rosuvastatin (CRESTOR) 10 MG Tab   • metoprolol tartrate (LOPRESSOR) 25 MG Tab   • metformin (GLUCOPHAGE) 1000 MG tablet   • DULoxetine (CYMBALTA) 30 MG Cap DR Particles   • omeprazole (PRILOSEC) 40 MG delayed-release capsule   • Cholecalciferol (VITAMIN D3) 1.25 MG (30736 UT) Cap   • clonazePAM (KLONOPIN) 0.5 MG Tab   • clonazePAM (KLONOPIN) 1 MG Tab   • albuterol 108 (90 Base) MCG/ACT Aero Soln inhalation aerosol   • estradiol (ESTRACE) 1 MG Tab   • levothyroxine (SYNTHROID) 50 MCG Tab   • Empagliflozin (JARDIANCE) 25 MG Tab   • busPIRone (BUSPAR) 10 MG Tab tablet   • levoFLOXacin (LEVAQUIN) 750 MG tablet   • pseudoephedrine (SUDAFED) 30 MG Tab   • Blood Glucose Meter Kit   • Lancets   • Alcohol Swabs   • Brexpiprazole (REXULTI) 2 MG Tab   • lisinopril (PRINIVIL) 10 MG Tab   • fluticasone (FLONASE) 50 MCG/ACT nasal spray   • aspirin EC (ECOTRIN) 81 MG Tablet Delayed Response   • cyanocobalamin (VITAMIN B-12) 500 MCG Tab   • Blood Glucose Test Strips   • Naloxone (NARCAN) 4 MG/0.1ML Liquid   • Cholecalciferol 1.25 MG (61554 UT) Tab     No current facility-administered medications for this visit.       Allergies:   No Known Allergies    Social Hx:   Social History     Socioeconomic History   • Marital status:      Spouse name: Not on file   • Number of children: Not on file   • Years of education: Not on file   • Highest education level: Not on file   Occupational History   • Not on file   Tobacco Use   • Smoking status: Former Smoker     Packs/day: 0.25     Types: Cigarettes     Quit date:      Years since quittin.0   • Smokeless tobacco: Never Used   Vaping  Use   • Vaping Use: Never used   Substance and Sexual Activity   • Alcohol use: Not Currently     Comment: rare   • Drug use: Not Currently     Types: Marijuana   • Sexual activity: Not Currently   Other Topics Concern   •  Service No   • Blood Transfusions Yes   • Caffeine Concern No   • Occupational Exposure No   • Hobby Hazards No   • Sleep Concern Yes   • Stress Concern Yes   • Weight Concern Yes   • Special Diet No   • Back Care No   • Exercise Yes   • Bike Helmet No   • Seat Belt Yes   • Self-Exams Yes   Social History Narrative   • Not on file     Social Determinants of Health     Financial Resource Strain:    • Difficulty of Paying Living Expenses: Not on file   Food Insecurity:    • Worried About Running Out of Food in the Last Year: Not on file   • Ran Out of Food in the Last Year: Not on file   Transportation Needs:    • Lack of Transportation (Medical): Not on file   • Lack of Transportation (Non-Medical): Not on file   Physical Activity:    • Days of Exercise per Week: Not on file   • Minutes of Exercise per Session: Not on file   Stress:    • Feeling of Stress : Not on file   Social Connections:    • Frequency of Communication with Friends and Family: Not on file   • Frequency of Social Gatherings with Friends and Family: Not on file   • Attends Cheondo[employer] Services: Not on file   • Active Member of Clubs or Organizations: Not on file   • Attends Club or Organization Meetings: Not on file   • Marital Status: Not on file   Intimate Partner Violence:    • Fear of Current or Ex-Partner: Not on file   • Emotionally Abused: Not on file   • Physically Abused: Not on file   • Sexually Abused: Not on file   Housing Stability:    • Unable to Pay for Housing in the Last Year: Not on file   • Number of Places Lived in the Last Year: Not on file   • Unstable Housing in the Last Year: Not on file         EXAMINATION     Physical Exam:   Vitals: /80 (BP Location: Right arm, Patient Position: Sitting, BP  "Cuff Size: Adult)   Pulse 72   Temp 36.5 °C (97.7 °F) (Temporal)   Resp 18   Ht 1.6 m (5' 3\")   Wt 89.6 kg (197 lb 8.5 oz)   SpO2 96%     Constitutional:   Body Habitus: Body mass index is 34.99 kg/m².  Cooperation: Fully cooperates with exam  Appearance: Well-groomed, well-nourished, not disheveled, no acute distress    Eyes: No scleral icterus, no proptosis     ENT -no obvious auditory deficits, wearing a facial mask    Skin -no rashes or lesions noted    Respiratory-  breathing comfortable on room air, no audible wheezing    Cardiovascular- no lower extremity edema is noted.     Psychiatric- alert and oriented ×3. Normal affect.     Gait - normal gait, no use of ambulatory device, antalgic.     Musculoskeletal -     Cervical spine  Functional range of motion of the cervical spine in flexion extension left and right rotation    Thoracic/Lumbar Spine/Sacral Spine/Hips   Inspection: no evidence of atrophy in bilateral lower extremities throughout     ROM of the lumbar spine decreased.     Palpation: tenderness to palpation lumbar paraspinals minimal    Neuro     No sensory or motor deficits in the upper extremities bilaterally    Key points for the international standards for neurological classification of spinal cord injury (ISNCSCI) to light touch.     Dermatome R L   L2 2 2   L3 2 2   L4 2 2   L5 2 1   S1 2 1   S2 2 2       Motor Exam Lower Extremities    ? Myotome R L   Hip flexion L2 5 5   Knee extension L3 5 5   Ankle dorsiflexion L4 5 5   Toe extension L5 5 5   Ankle plantarflexion S1 5 5       Reflexes  ?  R L   Patella  2+ 2+   Achilles   2+ 1+     No clonus bilaterally    MEDICAL DECISION MAKING    Medical records review: see under HPI section.     DATA    Labs:   10/20/2021: UDS positive for morphine and metabolites, clonzapem, low amount of THC noted  05/08/2021: UDS positive for morphine and metabolites, clonazepam and metabolites, hydrocodone and metabolites  08/18/2020: UDS positive for morphine " and metabolites, clonazepam and metabolites, hydrocodone and metabolites  04/24/2020 UDS positive for morphine and metabolites, clonazepam  04/09/2020 Vitamin D, 25:  28L  04/09/2020 Vitamin B12: 217    Lab Results   Component Value Date/Time    SODIUM 141 05/04/2021 08:06 AM    POTASSIUM 4.0 05/04/2021 08:06 AM    CHLORIDE 105 05/04/2021 08:06 AM    CO2 26 05/04/2021 08:06 AM    ANION 10.0 05/04/2021 08:06 AM    GLUCOSE 134 (H) 05/04/2021 08:06 AM    BUN 7 (L) 05/04/2021 08:06 AM    CREATININE 0.58 05/04/2021 08:06 AM    CALCIUM 8.8 05/04/2021 08:06 AM    ASTSGOT 25 05/04/2021 08:06 AM    ALTSGPT 29 05/04/2021 08:06 AM    TBILIRUBIN 0.4 05/04/2021 08:06 AM    ALBUMIN 3.8 05/04/2021 08:06 AM    TOTPROTEIN 6.4 05/04/2021 08:06 AM    GLOBULIN 2.6 05/04/2021 08:06 AM    AGRATIO 1.5 05/04/2021 08:06 AM       No results found for: PROTHROMBTM, INR     Lab Results   Component Value Date/Time    WBC 7.5 05/04/2021 08:06 AM    RBC 4.73 05/04/2021 08:06 AM    HEMOGLOBIN 14.6 05/04/2021 08:06 AM    HEMATOCRIT 43.8 05/04/2021 08:06 AM    MCV 92.6 05/04/2021 08:06 AM    MCH 30.9 05/04/2021 08:06 AM    MCHC 33.3 (L) 05/04/2021 08:06 AM    MPV 10.6 05/04/2021 08:06 AM    NEUTSPOLYS 46.60 05/04/2021 08:06 AM    LYMPHOCYTES 39.80 05/04/2021 08:06 AM    MONOCYTES 9.30 05/04/2021 08:06 AM    EOSINOPHILS 3.70 05/04/2021 08:06 AM    BASOPHILS 0.50 05/04/2021 08:06 AM        Lab Results   Component Value Date/Time    HBA1C 7.3 (H) 05/04/2021 08:06 AM        Imaging: I personally reviewed following images, these are my reads:     MRI lumbar spine 05/05/2020  At L1-2, no central or foraminal stenosis  At L2-3, no central or foraminal stenosis  At L3-4, mild disc bulge and mild ligamentum flavum thickening.  No central canal stenosis. Borderline left foraminal stenosis. Schmorl's node.   At L4-5, diffuse disc bulge with mild ligamentum flavum thickening.  No central canal stenosis.  There is facet arthropathy, left greater than right. Mild  foraminal stenosis bilaterally. S/P left L4/5 hemilaminectomy  At L5-S1, there is mild-borderline foraminal stenosis with facet arthropathy and mild disc bulge.  No central canal stenosis    Xray lumbar spine 05/05/2020  There is mild decreased joint space at L3-4 and L4-5.    Facet arthropathy is noted, greatest at L5-S1 bilaterally.  No significant motion on flexion and extension films    IMAGING radiology reads. I reviewed the following radiology reads    Xray abdomen 07/17/2020  IMPRESSION:     Nonspecific bowel gas pattern. No evidence small bowel obstruction.      MRI lumbar spine 05/05/2020  IMPRESSION:     1.  Caudal lumbar facet arthropathy left worse than right with no bony destruction to indicate septic or inflammatory arthropathy. This most likely is just degenerative  2.  Mild caudal lumbar foraminal stenoses  3.  No significant central stenosis.  4.  Left L4/5 hemilaminectomy                                                                                   Xray lumbar spine 05/05/2020     IMPRESSION:     1.  No acute lumbar compression fracture or subluxation.  2.  Posterior disc space narrowing at L4-5 and to lesser extent at L3-4.  3.  Bilateral facet arthropathy at L5-S1.                                                                                             Diagnosis   Visit Diagnoses     ICD-10-CM   1. S/P lumbar laminectomy  Z98.890   2. Left lumbar radiculitis  M54.16   3. DDD (degenerative disc disease), lumbar  M51.36   4. Depression, major, recurrent, moderate (HCC)  F33.1   5. Benzodiazepine dependence (HCC)  F13.20   6. Chronic, continuous use of opioids  F11.90         ASSESSMENT:  Grisel Mark 66 y.o. female seen for above     Grisel was seen today for follow-up.    Diagnoses and all orders for this visit:    S/P lumbar laminectomy  -     gabapentin (NEURONTIN) 800 MG tablet; Take 1.5 Tablets by mouth 2 times a day for 90 days.  -     HYDROcodone-acetaminophen (NORCO) 5-325 MG Tab  per tablet; Take 1 Tablet by mouth every 8 hours as needed (severe pain) for up to 30 days.  -     morphine ER (MS CONTIN) 15 MG Tab CR tablet; Take 1 Tablet by mouth every 12 hours for 30 days.  -     morphine ER (MS CONTIN) 15 MG Tab CR tablet; Take 1 Tablet by mouth every 12 hours for 30 days.  -     HYDROcodone-acetaminophen (NORCO) 5-325 MG Tab per tablet; Take 1 Tablet by mouth every 8 hours as needed (severe pain) for up to 30 days.  -     Pain Management Screen  -     Consent for Opiate Prescription  -     Controlled Substance Treatment Agreement    Left lumbar radiculitis    DDD (degenerative disc disease), lumbar  -     HYDROcodone-acetaminophen (NORCO) 5-325 MG Tab per tablet; Take 1 Tablet by mouth every 8 hours as needed (severe pain) for up to 30 days.  -     morphine ER (MS CONTIN) 15 MG Tab CR tablet; Take 1 Tablet by mouth every 12 hours for 30 days.  -     morphine ER (MS CONTIN) 15 MG Tab CR tablet; Take 1 Tablet by mouth every 12 hours for 30 days.  -     HYDROcodone-acetaminophen (NORCO) 5-325 MG Tab per tablet; Take 1 Tablet by mouth every 8 hours as needed (severe pain) for up to 30 days.  -     Pain Management Screen  -     Consent for Opiate Prescription  -     Controlled Substance Treatment Agreement    Depression, major, recurrent, moderate (HCC)  -     Patient has been identified as having a positive depression screening. Appropriate orders and counseling have been given.    Benzodiazepine dependence (HCC)    Chronic, continuous use of opioids         1. Discussed plan to continue gabapentin 1200 mg p.o. twice daily.  This seems to be well-tolerated.  2.  Continue duloxetine 90 mg daily  3. Plan to get records from Dr. Villa.  Possible SCS trial in the future.  Need to get update after 02/18/2021.  For now this is on hold.  4. Continue MS Contin 15mg po q12h and norco for breakthrough.  Previously refilled.  Discussed norco up to 2/day. No changes for now.   reviewed. She is  continuing to take chronic benzodiazepines.  She will work with her primary care provider on reducing this  5. Continue to maintain regular bowel movements. Continue colase 100mg daily-bid and miralax prn for constipation.  6.  Her neurologic exam is stable she will let me know if the symptoms in the right leg worsen.  We will consider diagnostic studies at that time if symptoms and exam warrant.    Follow-up: Return in about 2 months (around 3/18/2022).    Thank you very much for asking me to participate in Grisel Mark's care.  Please contact me with any questions or concerns.    Please note that this dictation was created using voice recognition software. I have made every reasonable attempt to correct obvious errors but there may be errors of grammar and content that I may have overlooked prior to finalization of this note.      Paresh Ogden MD  Physical Medicine and Rehabilitation  Interventional Spine and Sports Physiatry  [facility]

## 2022-01-19 ENCOUNTER — TELEPHONE (OUTPATIENT)
Dept: PHYSICAL MEDICINE AND REHAB | Facility: MEDICAL CENTER | Age: 67
End: 2022-01-19

## 2022-01-19 NOTE — TELEPHONE ENCOUNTER
I called patient and advised that we received the notes from Dr Shelby and also I told her she needs 1-2 more visits with Dr Shelby and she was agreed.

## 2022-01-25 ENCOUNTER — TELEPHONE (OUTPATIENT)
Dept: MEDICAL GROUP | Facility: PHYSICIAN GROUP | Age: 67
End: 2022-01-25

## 2022-01-25 NOTE — TELEPHONE ENCOUNTER
ESTABLISHED PATIENT PRE-VISIT PLANNING     Patient was NOT contacted to complete PVP. I tried to call Pt to remind her of Lab work, however there is only 1 number on file and when I call it the phone line is silent and does not ring at all.      Note: Patient will not be contacted if there is no indication to call.     1.  Reviewed notes from the last few office visits within the medical group: Yes    2.  If any orders were placed at last visit or intended to be done for this visit (i.e. 6 mos follow-up), do we have Results/Consult Notes?         •  Labs - Remind Pt at office visit.   Note: If patient appointment is for lab review and patient did not complete labs, check with provider if OK to reschedule patient until labs completed.       •  Imaging - Imaging was not ordered at last office visit.       •  Referrals - No referrals were ordered at last office visit.    3. Is this appointment scheduled as a Hospital Follow-Up? No    4.  Immunizations were updated in Epic using Reconcile Outside Information activity? Yes    5.  Patient is due for the following Health Maintenance Topics:   Health Maintenance Due   Topic Date Due   • Annual Wellness Visit  Never done   • COLORECTAL CANCER SCREENING  Never done   • BONE DENSITY  Never done   • MAMMOGRAM  05/28/2021   • RETINAL SCREENING  08/31/2021   • DIABETES MONOFILAMENT / LE EXAM  10/09/2021   • A1C SCREENING  11/04/2021   • COVID-19 Vaccine (3 - Booster for Moderna series) 12/27/2021       6.  AHA (Pulse8) form printed for Provider? Yes

## 2022-02-03 ENCOUNTER — TELEMEDICINE (OUTPATIENT)
Dept: MEDICAL GROUP | Facility: PHYSICIAN GROUP | Age: 67
End: 2022-02-03
Payer: MEDICARE

## 2022-02-03 VITALS — HEIGHT: 63 IN | WEIGHT: 187 LBS | TEMPERATURE: 97.5 F | BODY MASS INDEX: 33.13 KG/M2

## 2022-02-03 DIAGNOSIS — G25.81 RESTLESS LEG: ICD-10-CM

## 2022-02-03 DIAGNOSIS — F41.1 GENERALIZED ANXIETY DISORDER: ICD-10-CM

## 2022-02-03 DIAGNOSIS — F43.10 POSTTRAUMATIC STRESS DISORDER, DELAYED ONSET: ICD-10-CM

## 2022-02-03 DIAGNOSIS — F41.8 SITUATIONAL ANXIETY: ICD-10-CM

## 2022-02-03 DIAGNOSIS — F13.20 BENZODIAZEPINE DEPENDENCE (HCC): ICD-10-CM

## 2022-02-03 PROBLEM — H92.02 EAR PAIN, LEFT: Status: RESOLVED | Noted: 2021-09-23 | Resolved: 2022-02-03

## 2022-02-03 PROCEDURE — 99214 OFFICE O/P EST MOD 30 MIN: CPT | Mod: 95 | Performed by: NURSE PRACTITIONER

## 2022-02-03 RX ORDER — CYCLOBENZAPRINE HCL 5 MG
5 TABLET ORAL 2 TIMES DAILY PRN
Qty: 90 TABLET | Refills: 3 | Status: SHIPPED | OUTPATIENT
Start: 2022-02-03 | End: 2022-10-11 | Stop reason: SDUPTHER

## 2022-02-03 RX ORDER — CLONAZEPAM 0.5 MG/1
0.5 TABLET ORAL NIGHTLY
Qty: 90 TABLET | Refills: 0 | Status: SHIPPED | OUTPATIENT
Start: 2022-02-03 | End: 2022-04-12 | Stop reason: SDUPTHER

## 2022-02-03 RX ORDER — CLONAZEPAM 1 MG/1
1 TABLET ORAL EVERY MORNING
Qty: 90 TABLET | Refills: 0 | Status: SHIPPED | OUTPATIENT
Start: 2022-02-03 | End: 2022-04-12 | Stop reason: SDUPTHER

## 2022-02-03 ASSESSMENT — FIBROSIS 4 INDEX: FIB4 SCORE: 1.63

## 2022-02-03 NOTE — ASSESSMENT & PLAN NOTE
This is a new issue. Patient reports that her restless leg just started about 1 month ago. She reports that it is intermittent. It does keep her up at night.     Will send in a low dose of flexeril in to see if this will help.

## 2022-02-03 NOTE — PROGRESS NOTES
Virtual Visit: Established Patient   This visit was conducted via Zoom using secure and encrypted videoconferencing technology. The patient was in a private location in the state of [place].    The patient's identity was confirmed and verbal consent was obtained for this virtual visit.    Subjective:   CC: follow up for chronic health conditions     Grisel Mark is a 66 y.o. female presenting for evaluation and management of:    Problem   Restless Leg   Generalized Anxiety Disorder   Ear Pain, Left (Resolved)     ROS   Denies any recent fevers or chills. No nausea or vomiting. No chest pains or shortness of breath.     No Known Allergies    Current medicines (including changes today)  Current Outpatient Medications   Medication Sig Dispense Refill   • clonazePAM (KLONOPIN) 1 MG Tab Take 1 Tablet by mouth every morning for 90 days. 90 Tablet 0   • clonazePAM (KLONOPIN) 0.5 MG Tab Take 1 Tablet by mouth every evening for 90 days. 90 Tablet 0   • cyclobenzaprine (FLEXERIL) 5 mg tablet Take 1 Tablet by mouth 2 times a day as needed for Muscle Spasms (restless leg). 90 Tablet 3   • gabapentin (NEURONTIN) 800 MG tablet Take 1.5 Tablets by mouth 2 times a day for 90 days. 90 Tablet 2   • HYDROcodone-acetaminophen (NORCO) 5-325 MG Tab per tablet Take 1 Tablet by mouth every 8 hours as needed (severe pain) for up to 30 days. 60 Tablet 0   • morphine ER (MS CONTIN) 15 MG Tab CR tablet Take 1 Tablet by mouth every 12 hours for 30 days. 60 Tablet 0   • [START ON 2/21/2022] morphine ER (MS CONTIN) 15 MG Tab CR tablet Take 1 Tablet by mouth every 12 hours for 30 days. 60 Tablet 0   • [START ON 2/21/2022] HYDROcodone-acetaminophen (NORCO) 5-325 MG Tab per tablet Take 1 Tablet by mouth every 8 hours as needed (severe pain) for up to 30 days. 60 Tablet 0   • rosuvastatin (CRESTOR) 10 MG Tab Take 1 Tablet by mouth every evening. 100 Tablet 3   • metoprolol tartrate (LOPRESSOR) 25 MG Tab TAKE 1 TABLET BY MOUTH TWICE DAILY 180  Tablet 1   • metformin (GLUCOPHAGE) 1000 MG tablet TAKE 1 TABLET BY MOUTH TWICE DAILY WITH MEALS 200 Tablet 0   • DULoxetine (CYMBALTA) 30 MG Cap DR Particles Take 3 Capsules by mouth every day. 270 Capsule 3   • omeprazole (PRILOSEC) 40 MG delayed-release capsule Take 1 Capsule by mouth every day. 90 Capsule 3   • Cholecalciferol (VITAMIN D3) 1.25 MG (37334 UT) Cap TAKE ONE CAPSULE BY MOUTH EVERY 7 DAYS     • Cholecalciferol 1.25 MG (63646 UT) Tab Take 50,000 Units by mouth every 7 days. 12 Tablet 0   • clonazePAM (KLONOPIN) 0.5 MG Tab Take 1 Tablet by mouth at bedtime for 90 days. 90 Tablet 0   • albuterol 108 (90 Base) MCG/ACT Aero Soln inhalation aerosol Inhale 2 Puffs every four hours as needed for Shortness of Breath. 1 Each 2   • estradiol (ESTRACE) 1 MG Tab Take 1 Tablet by mouth every day. 90 Tablet 3   • levothyroxine (SYNTHROID) 50 MCG Tab Take 1 Tablet by mouth every morning on an empty stomach. 90 Tablet 3   • Empagliflozin (JARDIANCE) 25 MG Tab Take 1 Tablet by mouth every day. 100 Tablet 3   • busPIRone (BUSPAR) 10 MG Tab tablet Take 1 Tablet by mouth 3 times a day. 270 Tablet 3   • levoFLOXacin (LEVAQUIN) 750 MG tablet Take 1 Tablet by mouth every day. Can stop at 5 days if symptoms resolved after 5 days 7 Tablet 0   • pseudoephedrine (SUDAFED) 30 MG Tab Take 1-2 Tablets by mouth every 6 hours as needed for Congestion (sinus pressure not relieved by other measures). 30 Tablet 0   • Blood Glucose Meter Kit Test blood sugar as recommended by provider. Abbott Freestyle Lite blood glucose monitoring kit. 1 Kit 0   • Lancets Use one Abbott Cloquet Lite lancet to test blood sugar twice daily and PRN. 300 Each 3   • Alcohol Swabs Wipe site with prep pad prior to injection. 100 Each 3   • Brexpiprazole (REXULTI) 2 MG Tab Take 2 mg by mouth every day. 90 Tablet 3   • lisinopril (PRINIVIL) 10 MG Tab Take 1 tablet by mouth every day. 100 tablet 3   • fluticasone (FLONASE) 50 MCG/ACT nasal spray Administer 1  Spray into affected nostril(S) every day. 16 g 11   • aspirin EC (ECOTRIN) 81 MG Tablet Delayed Response Take 81 mg by mouth every day.     • cyanocobalamin (VITAMIN B-12) 500 MCG Tab Take 500 mcg by mouth every day.     • Blood Glucose Test Strips Use one Abbott Freedom Lite strip to test blood sugar twice daily and PRN. 270 Each 3   • Naloxone (NARCAN) 4 MG/0.1ML Liquid One spray in one nostril for overdose and call 911. 1 Each 0     No current facility-administered medications for this visit.       Patient Active Problem List    Diagnosis Date Noted   • Restless leg 02/03/2022   • Cough 11/02/2021   • Primary central sleep apnea 08/05/2021   • Opioid dependence in controlled environment ([facility]) 05/26/2021   • Benzodiazepine dependence ([facility]) 05/26/2021   • Generalized anxiety disorder 05/12/2020   • Posttraumatic stress disorder, delayed onset 05/12/2020   • Essential hypertension 02/05/2020   • Mixed hyperlipidemia 02/05/2020   • Type 2 diabetes mellitus with diabetic polyneuropathy, without long-term current use of insulin ([facility]) 02/05/2020   • Hypothyroidism (acquired) 02/05/2020   • Depression, major, recurrent, moderate ([facility]) 02/05/2020   • Gastroesophageal reflux disease without esophagitis 02/05/2020   • Situational anxiety 02/05/2020         She  has a past medical history of Anxiety, Arrhythmia, Chronic back pain, Constipation, Depression, Diabetes ([facility]), Ear pain, left (9/23/2021), GERD (gastroesophageal reflux disease), Hyperlipidemia, Hypertension, and Thyroid disease.  She  has a past surgical history that includes cholecystectomy; carpal tunnel release; abdominal hysterectomy total; pr njx aa&/strd tfrml epi lumbar/sacral 1 level (Left, 8/12/2020); pr njx aa&/strd tfrml epi lumbar/sacral ea addl (Left, 8/12/2020); lumbar medial branch blocks (Left, 9/9/2020); laminotomy (1998); block epidural steroid injection (Left, 2/24/2021); lumbar transforaminal epidural steroid injection (Left, 5/20/2021); lumbar  "medial branch blocks (Left, 7/21/2021); and radio frequency ablation additional level (Left, 11/11/2021).       Objective:   Temp 36.4 °C (97.5 °F) (Temporal)   Ht 1.6 m (5' 3\")   Wt 84.8 kg (187 lb)   LMP  (LMP Unknown)   BMI 33.13 kg/m²     Physical Exam:  Constitutional: Alert, no distress, well-groomed.  Skin: No rashes in visible areas.  Eye: Round. Conjunctiva clear, lids normal. No icterus.   ENMT: Lips pink without lesions, good dentition, moist mucous membranes. Phonation normal.  Neck: Moves freely without pain.  Respiratory: Unlabored respiratory effort, no cough or audible wheeze  Psych: Alert and oriented x3, normal affect and mood.       Assessment and Plan:   The following treatment plan was discussed:     1. Situational anxiety  clonazePAM (KLONOPIN) 1 MG Tab    clonazePAM (KLONOPIN) 0.5 MG Tab   2. Benzodiazepine dependence (HCC)  clonazePAM (KLONOPIN) 1 MG Tab    clonazePAM (KLONOPIN) 0.5 MG Tab   3. Generalized anxiety disorder  clonazePAM (KLONOPIN) 0.5 MG Tab   4. Posttraumatic stress disorder, delayed onset  clonazePAM (KLONOPIN) 0.5 MG Tab   5. Restless leg         Restless leg  This is a new issue. Patient reports that her restless leg just started about 1 month ago. She reports that it is intermittent. It does keep her up at night.     Will send in a low dose of flexeril in to see if this will help.         Generalized anxiety disorder  This is a chronic condition. Patient has been stable on klonopin for years. She is currently on 1 mg in the morning and 0.5 mg at night.     Obtained and reviewed patient utilization report from [facility] [place] [facility] pharmacy database on 2/3/2022 2:29 PM  prior to writing prescription for controlled substance II, III or IV per [place] bill . Based on assessment of the report,my physical exam if necessary and the patient's health problem, the prescription is medically necessary.     Will continue this for the patient and provide 90 days supply.     Follow-up: " Return in about 3 months (around 5/3/2022) for follow up for medicaitons and restless.    I have placed the below orders and discussed them with an approved delegating provider.  The MA is performing the below orders under the direction of Dr. Alexandra.    Please note that this dictation was created using voice recognition software. I have worked with consultants from the vendor as well as technical experts from [facility] to optimize the interface. I have made every reasonable attempt to correct obvious errors, but I expect that there are errors of grammar and possibly content that I did not discover before finalizing the note.

## 2022-02-03 NOTE — ASSESSMENT & PLAN NOTE
This is a chronic condition. Patient has been stable on klonopin for years. She is currently on 1 mg in the morning and 0.5 mg at night.     Obtained and reviewed patient utilization report from [facility] pharmacy database on 2/3/2022 2:29 PM  prior to writing prescription for controlled substance II, III or IV per [place] bill . Based on assessment of the report,my physical exam if necessary and the patient's health problem, the prescription is medically necessary.     Will continue this for the patient and provide 90 days supply.

## 2022-02-15 ENCOUNTER — PATIENT MESSAGE (OUTPATIENT)
Dept: HEALTH INFORMATION MANAGEMENT | Facility: OTHER | Age: 67
End: 2022-02-15
Payer: MEDICARE

## 2022-02-28 ENCOUNTER — HOSPITAL ENCOUNTER (OUTPATIENT)
Dept: LAB | Facility: MEDICAL CENTER | Age: 67
End: 2022-02-28
Attending: NURSE PRACTITIONER
Payer: MEDICARE

## 2022-02-28 DIAGNOSIS — E11.69 DIABETES MELLITUS TYPE 2 IN OBESE: ICD-10-CM

## 2022-02-28 DIAGNOSIS — E66.9 DIABETES MELLITUS TYPE 2 IN OBESE: ICD-10-CM

## 2022-02-28 DIAGNOSIS — E03.9 HYPOTHYROIDISM (ACQUIRED): ICD-10-CM

## 2022-02-28 LAB
CREAT UR-MCNC: 65.41 MG/DL
EST. AVERAGE GLUCOSE BLD GHB EST-MCNC: 177 MG/DL
HBA1C MFR BLD: 7.8 % (ref 4–5.6)
MICROALBUMIN UR-MCNC: 6.7 MG/DL
MICROALBUMIN/CREAT UR: 102 MG/G (ref 0–30)
T4 FREE SERPL-MCNC: 1.17 NG/DL (ref 0.93–1.7)
TSH SERPL DL<=0.005 MIU/L-ACNC: 2.9 UIU/ML (ref 0.38–5.33)

## 2022-02-28 PROCEDURE — 83036 HEMOGLOBIN GLYCOSYLATED A1C: CPT

## 2022-02-28 PROCEDURE — 82043 UR ALBUMIN QUANTITATIVE: CPT

## 2022-02-28 PROCEDURE — 84443 ASSAY THYROID STIM HORMONE: CPT

## 2022-02-28 PROCEDURE — 82570 ASSAY OF URINE CREATININE: CPT

## 2022-02-28 PROCEDURE — 84439 ASSAY OF FREE THYROXINE: CPT

## 2022-03-01 ENCOUNTER — TELEPHONE (OUTPATIENT)
Dept: SCHEDULING | Facility: IMAGING CENTER | Age: 67
End: 2022-03-01
Payer: MEDICARE

## 2022-03-03 NOTE — TELEPHONE ENCOUNTER
I have left a vm to the patient informed she must confirm status with [facility]. Order was placed and sent with records. Patient must contact DME and confirm all was received. I have resent order and all needed records to [facility]. Pt notified in message there is a delay on set ups do to the recent recall and shortage on machines.

## 2022-03-09 ENCOUNTER — TELEPHONE (OUTPATIENT)
Dept: PHYSICAL MEDICINE AND REHAB | Facility: MEDICAL CENTER | Age: 67
End: 2022-03-09
Payer: MEDICARE

## 2022-03-09 RX ORDER — GABAPENTIN 800 MG/1
1200 TABLET ORAL 2 TIMES DAILY
Qty: 90 TABLET | Refills: 2 | OUTPATIENT
Start: 2022-03-09 | End: 2022-04-08

## 2022-03-18 ENCOUNTER — OFFICE VISIT (OUTPATIENT)
Dept: PHYSICAL MEDICINE AND REHAB | Facility: MEDICAL CENTER | Age: 67
End: 2022-03-18
Payer: MEDICARE

## 2022-03-18 VITALS
BODY MASS INDEX: 34.8 KG/M2 | OXYGEN SATURATION: 94 % | HEIGHT: 63 IN | DIASTOLIC BLOOD PRESSURE: 70 MMHG | HEART RATE: 79 BPM | WEIGHT: 196.43 LBS | SYSTOLIC BLOOD PRESSURE: 128 MMHG | TEMPERATURE: 97.4 F

## 2022-03-18 DIAGNOSIS — M54.6 ACUTE LEFT-SIDED THORACIC BACK PAIN: ICD-10-CM

## 2022-03-18 DIAGNOSIS — Z79.899 MEDICATION MANAGEMENT: ICD-10-CM

## 2022-03-18 DIAGNOSIS — Z98.890 S/P LUMBAR LAMINECTOMY: ICD-10-CM

## 2022-03-18 DIAGNOSIS — M51.36 DDD (DEGENERATIVE DISC DISEASE), LUMBAR: ICD-10-CM

## 2022-03-18 DIAGNOSIS — E55.9 VITAMIN D DEFICIENCY: ICD-10-CM

## 2022-03-18 DIAGNOSIS — M54.16 LEFT LUMBAR RADICULITIS: ICD-10-CM

## 2022-03-18 PROCEDURE — 99214 OFFICE O/P EST MOD 30 MIN: CPT | Performed by: PHYSICAL MEDICINE & REHABILITATION

## 2022-03-18 RX ORDER — HYDROCODONE BITARTRATE AND ACETAMINOPHEN 5; 325 MG/1; MG/1
1 TABLET ORAL EVERY 8 HOURS PRN
Qty: 60 TABLET | Refills: 0 | Status: SHIPPED | OUTPATIENT
Start: 2022-03-18 | End: 2022-04-19 | Stop reason: SDUPTHER

## 2022-03-18 RX ORDER — MORPHINE SULFATE 15 MG/1
15 TABLET, FILM COATED, EXTENDED RELEASE ORAL EVERY 12 HOURS
Qty: 60 TABLET | Refills: 0 | Status: SHIPPED | OUTPATIENT
Start: 2022-03-18 | End: 2022-04-19 | Stop reason: SDUPTHER

## 2022-03-18 ASSESSMENT — PATIENT HEALTH QUESTIONNAIRE - PHQ9
5. POOR APPETITE OR OVEREATING: 0 - NOT AT ALL
SUM OF ALL RESPONSES TO PHQ QUESTIONS 1-9: 4
CLINICAL INTERPRETATION OF PHQ2 SCORE: 2

## 2022-03-18 ASSESSMENT — FIBROSIS 4 INDEX: FIB4 SCORE: 1.63

## 2022-03-18 ASSESSMENT — PAIN SCALES - GENERAL: PAINLEVEL: 8=MODERATE-SEVERE PAIN

## 2022-03-18 NOTE — PROGRESS NOTES
"Follow-up patient note    Physiatry (physical medicine and  Rehabilitation), interventional spine and sports medicine    Date of Service: 03/18/2022    Chief complaint:   Chief Complaint   Patient presents with   • Follow-Up     Back pain       HISTORY    HPI: Grisel Mark 66 y.o. female who presents today for follow-up evaluation of low back and leg pain.     Grisel had an episode of pain in her thoracic spine that radiated to the left side of her body around under her breast.  No trauma, no nausea or vomiting, denies other systemic symptoms.      Bowel movements are not regular.  She is taking miralax and this is intermittent.    She met with Dr. Villa.  They will have a few more visits.    Her low back and left leg pain continues.  This is consistent.  Her right foot seems to \"shake\" sometimes, usually when she is nervous and she is able to stop it.    Taking gabapentin twice a day.   Taking gabapentin 800mg taking 1.5 pills twice a day.  Her pain medications: Duloxetine 90mg.  MS Contin 15mg q12h.  Norco 5/325 for breakthrough, reports that she does not always take this, some days will take up to 3/day.   No side effects.  She is back to taking clonazepam 1mg at bedtime, but this sounds like they are working on reducing this.    No falls.  She walks without assist device    By history:  Her laminectomy was in 1998.  Previous injection on left L4 and L5 TFESI on 08/12/2020 helped with her leg pain.     Medical records review:  Dr. Francisco Olmos, plan to increase duloxetine 90mg daily, discontinue buspirone 20mg po bid, start buproprion XL 150mg daily, continue brexipiprazole 1mg qhs, hydroxyzine 25mg po tid prn, clonazepam 0.5mg daily prn    Review of records   Dr. Dheeraj De La Rosa:  08/20/2019 Right L3-4, L4-5, L5-S1 radiofrequency ablation    I reviewed the note from the referring provider Peter Bruce * dated 02/05/2020: She was seen to establish care, having just moved from [place].  She was " seen for essential hypertension, mixed hyperlipidemia, DM2 in obese, hypothyroidism, recurrent MDD in partial depression/anxiety, GERD without esophagitis, chronic bilateral low back pain with bilateral sciatica.  Medications associated with the above were written, related labs ordered including CBC, CMP, Hb A1c, lipids, microalbumin, TSH, free thryoxine, referral to ps\ychiatry, referral to GI for colonoscopy and referral to pain clinic.    Previous treatments:    Physical Therapy: Yes    Medications the patient is tried: Narcotics, gabapentin and muscle relaxers    Previous interventions: [facility] these included right lumbar radiofrequency procedures    Previous surgeries to relieve the above pain:  Surgery on October 28, 1998      ROS:   ENT: ear infection, treated with augmentin  CV: irregular heart, reports history of tachycardia  Psych: depression since 1995  Heme: anemia  Endo: hypothyroidism, diabetes    Red Flags ROS:   Fever, Chills, Sweats: Denies  Involuntary Weight Loss: Denies  Bladder Incontinence: Denies  Bowel Incontinence: Denies  Saddle Anesthesia: Denies    All other systems reviewed and negative.       PMHx:   Past Medical History:   Diagnosis Date   • Anxiety    • Arrhythmia    • Chronic back pain    • Constipation    • Depression    • Diabetes (HCC)    • Ear pain, left 9/23/2021   • GERD (gastroesophageal reflux disease)    • Hyperlipidemia    • Hypertension    • Thyroid disease        PSHx:   Past Surgical History:   Procedure Laterality Date   • RADIO FREQUENCY ABLATION ADDITIONAL LEVEL Left 11/11/2021    Procedure: LEFT lumbar three through lumbar five radiofrequency ablation;  Surgeon: Paresh Ogden M.D.;  [facility];  Service: Pain Management   • LUMBAR MEDIAL BRANCH BLOCKS Left 7/21/2021    Procedure: LEFT lumbar three through lumbar five medial branch blocks;  Surgeon: Paresh Ogden M.D.;  Location: [facility];  Service: Pain Management   • LUMBAR TRANSFORAMINAL EPIDURAL STEROID INJECTION Left 5/20/2021    Procedure: LEFT lumbar four and lumbar five transforaminal epidural steroid injection;  Surgeon: Paresh Ogden M.D.;  Location: [facility];  Service: Pain Management   • BLOCK EPIDURAL STEROID INJECTION Left 2/24/2021    Procedure: INJECTION, STEROID, EPIDURAL;  Surgeon: Paresh Ogden M.D.;  Location: [facility];  Service: Pain Management   • LUMBAR MEDIAL BRANCH BLOCKS Left 9/9/2020    Procedure: BLOCK, NERVE, SPINAL, LUMBAR, POSTERIOR RAMUS, MEDIAL BRANCH;  Surgeon: Paresh Ogden M.D.;  Location: [facility];  Service: Pain Management   • DC NJX AA&/STRD TFRML EPI LUMBAR/SACRAL 1 LEVEL Left 8/12/2020    Procedure: INJECTION, SPINE, LUMBOSACRAL, EPIDURAL, 1 LEVEL, TRANSFORAMINAL APPROACH;  Surgeon: Paresh Ogden M.D.;  Location: [facility];  Service: Pain Management   • DC NJX AA&/STRD TFRML EPI LUMBAR/SACRAL EA ADDL Left 8/12/2020    Procedure: INJECTION, SPINE, LUMBOSACRAL, EPIDURAL, TRANSFORAMINAL APPROACH;  Surgeon: Paresh Ogden M.D.;  Location: [facility];  Service: Pain Management   • LAMINOTOMY  1998   • ABDOMINAL HYSTERECTOMY TOTAL     • CARPAL TUNNEL RELEASE     • CHOLECYSTECTOMY         Family history   Family History   Problem Relation Age of Onset   • Cancer Mother    • Stroke Father         Heart attack   • Dementia Sister    • Stroke Brother         heart Attack   • Cancer Brother         Skin   • Diabetes Brother    • Kidney Disease Brother    • Cancer Brother    • Parkinson's Disease Sister    • No Known Problems Sister    • Diabetes Daughter    • Hypertension Daughter          Medications:   Current Outpatient Medications   Medication   • HYDROcodone-acetaminophen (NORCO) 5-325 MG Tab per tablet   • morphine ER (MS CONTIN) 15 MG Tab CR tablet   • clonazePAM (KLONOPIN) 1 MG Tab   •  clonazePAM (KLONOPIN) 0.5 MG Tab   • cyclobenzaprine (FLEXERIL) 5 mg tablet   • gabapentin (NEURONTIN) 800 MG tablet   • rosuvastatin (CRESTOR) 10 MG Tab   • metoprolol tartrate (LOPRESSOR) 25 MG Tab   • metformin (GLUCOPHAGE) 1000 MG tablet   • DULoxetine (CYMBALTA) 30 MG Cap DR Particles   • omeprazole (PRILOSEC) 40 MG delayed-release capsule   • Cholecalciferol (VITAMIN D3) 1.25 MG (23561 UT) Cap   • Cholecalciferol 1.25 MG (76005 UT) Tab   • albuterol 108 (90 Base) MCG/ACT Aero Soln inhalation aerosol   • estradiol (ESTRACE) 1 MG Tab   • levothyroxine (SYNTHROID) 50 MCG Tab   • Empagliflozin (JARDIANCE) 25 MG Tab   • busPIRone (BUSPAR) 10 MG Tab tablet   • pseudoephedrine (SUDAFED) 30 MG Tab   • Brexpiprazole (REXULTI) 2 MG Tab   • lisinopril (PRINIVIL) 10 MG Tab   • fluticasone (FLONASE) 50 MCG/ACT nasal spray   • aspirin EC (ECOTRIN) 81 MG Tablet Delayed Response   • cyanocobalamin (VITAMIN B-12) 500 MCG Tab   • Naloxone (NARCAN) 4 MG/0.1ML Liquid   • Blood Glucose Meter Kit   • Lancets   • Alcohol Swabs   • Blood Glucose Test Strips     No current facility-administered medications for this visit.       Allergies:   No Known Allergies    Social Hx:   Social History     Socioeconomic History   • Marital status:      Spouse name: Not on file   • Number of children: Not on file   • Years of education: Not on file   • Highest education level: Not on file   Occupational History   • Not on file   Tobacco Use   • Smoking status: Former Smoker     Packs/day: 0.25     Types: Cigarettes     Quit date:      Years since quittin.2   • Smokeless tobacco: Never Used   Vaping Use   • Vaping Use: Never used   Substance and Sexual Activity   • Alcohol use: Not Currently     Comment: rare   • Drug use: Not Currently     Types: Marijuana   • Sexual activity: Not Currently   Other Topics Concern   •  Service No   • Blood Transfusions Yes   • Caffeine Concern No   • Occupational Exposure No   • Hobby Hazards  "No   • Sleep Concern Yes   • Stress Concern Yes   • Weight Concern Yes   • Special Diet No   • Back Care No   • Exercise Yes   • Bike Helmet No   • Seat Belt Yes   • Self-Exams Yes   Social History Narrative   • Not on file     Social Determinants of Health     Financial Resource Strain: Not on file   Food Insecurity: Not on file   Transportation Needs: Not on file   Physical Activity: Not on file   Stress: Not on file   Social Connections: Not on file   Intimate Partner Violence: Not on file   Housing Stability: Not on file         EXAMINATION     Physical Exam:   Vitals: /70 (BP Location: Right arm, Patient Position: Sitting, BP Cuff Size: Small adult)   Pulse 79   Temp 36.3 °C (97.4 °F) (Temporal)   Ht 1.6 m (5' 3\")   Wt 89.1 kg (196 lb 6.9 oz)   SpO2 94%     Constitutional:   Body Habitus: Body mass index is 34.8 kg/m².  Cooperation: Fully cooperates with exam  Appearance: Well-groomed, well-nourished, not disheveled, no acute distress    Eyes: No scleral icterus, no proptosis     ENT -no obvious auditory deficits, wearing a facial mask    Skin -no rashes or lesions noted    Respiratory-  breathing comfortable on room air, no audible wheezing    Cardiovascular- no lower extremity edema is noted.     Psychiatric- alert and oriented ×3. Normal affect.     Gait - normal gait, no use of ambulatory device, antalgic.     Musculoskeletal -     Cervical spine  Functional range of motion of the cervical spine in flexion extension left and right rotation    Thoracic/Lumbar Spine/Sacral Spine/Hips   Inspection: no evidence of atrophy in bilateral lower extremities throughout     ROM of the lumbar spine decreased.     Palpation: tenderness to palpation lumbar paraspinals minimal, no tenderness over thoracic spine or percussive tenderness    Neuro     No sensory or motor deficits in the upper extremities bilaterally    Key points for the international standards for neurological classification of spinal cord injury " (ISNCSCI) to light touch.     Dermatome R L   L2 2 2   L3 2 2   L4 2 2   L5 2 1   S1 2 1   S2 2 2       Motor Exam Lower Extremities    ? Myotome R L   Hip flexion L2 5 5   Knee extension L3 5 5   Ankle dorsiflexion L4 5 5   Toe extension L5 5 5   Ankle plantarflexion S1 5 5       Reflexes  ?  R L   Patella  2+ 2+   Achilles   2+ 1+     No clonus bilaterally    MEDICAL DECISION MAKING    Medical records review: see under HPI section.     DATA    Labs:   01/18/2022: UDS positive for morphine and negative for metabolites, absent hydrocodone, positive for clonazepam  10/20/2021: UDS positive for morphine and metabolites, clonzapem, low amount of THC noted  05/08/2021: UDS positive for morphine and metabolites, clonazepam and metabolites, hydrocodone and metabolites  08/18/2020: UDS positive for morphine and metabolites, clonazepam and metabolites, hydrocodone and metabolites  04/24/2020 UDS positive for morphine and metabolites, clonazepam  04/09/2020 Vitamin D, 25:  28L  04/09/2020 Vitamin B12: 217    Lab Results   Component Value Date/Time    SODIUM 141 05/04/2021 08:06 AM    POTASSIUM 4.0 05/04/2021 08:06 AM    CHLORIDE 105 05/04/2021 08:06 AM    CO2 26 05/04/2021 08:06 AM    ANION 10.0 05/04/2021 08:06 AM    GLUCOSE 134 (H) 05/04/2021 08:06 AM    BUN 7 (L) 05/04/2021 08:06 AM    CREATININE 0.58 05/04/2021 08:06 AM    CALCIUM 8.8 05/04/2021 08:06 AM    ASTSGOT 25 05/04/2021 08:06 AM    ALTSGPT 29 05/04/2021 08:06 AM    TBILIRUBIN 0.4 05/04/2021 08:06 AM    ALBUMIN 3.8 05/04/2021 08:06 AM    TOTPROTEIN 6.4 05/04/2021 08:06 AM    GLOBULIN 2.6 05/04/2021 08:06 AM    AGRATIO 1.5 05/04/2021 08:06 AM       No results found for: PROTHROMBTM, INR     Lab Results   Component Value Date/Time    WBC 7.5 05/04/2021 08:06 AM    RBC 4.73 05/04/2021 08:06 AM    HEMOGLOBIN 14.6 05/04/2021 08:06 AM    HEMATOCRIT 43.8 05/04/2021 08:06 AM    MCV 92.6 05/04/2021 08:06 AM    MCH 30.9 05/04/2021 08:06 AM    MCHC 33.3 (L) 05/04/2021 08:06  AM    MPV 10.6 05/04/2021 08:06 AM    NEUTSPOLYS 46.60 05/04/2021 08:06 AM    LYMPHOCYTES 39.80 05/04/2021 08:06 AM    MONOCYTES 9.30 05/04/2021 08:06 AM    EOSINOPHILS 3.70 05/04/2021 08:06 AM    BASOPHILS 0.50 05/04/2021 08:06 AM        Lab Results   Component Value Date/Time    HBA1C 7.8 (H) 02/28/2022 10:32 AM        Imaging: I personally reviewed following images, these are my reads:     MRI lumbar spine 05/05/2020  At L1-2, no central or foraminal stenosis  At L2-3, no central or foraminal stenosis  At L3-4, mild disc bulge and mild ligamentum flavum thickening.  No central canal stenosis. Borderline left foraminal stenosis. Schmorl's node.   At L4-5, diffuse disc bulge with mild ligamentum flavum thickening.  No central canal stenosis.  There is facet arthropathy, left greater than right. Mild foraminal stenosis bilaterally. S/P left L4/5 hemilaminectomy  At L5-S1, there is mild-borderline foraminal stenosis with facet arthropathy and mild disc bulge.  No central canal stenosis    Xray lumbar spine 05/05/2020  There is mild decreased joint space at L3-4 and L4-5.    Facet arthropathy is noted, greatest at L5-S1 bilaterally.  No significant motion on flexion and extension films    IMAGING radiology reads. I reviewed the following radiology reads    Xray abdomen 07/17/2020  IMPRESSION:     Nonspecific bowel gas pattern. No evidence small bowel obstruction.      MRI lumbar spine 05/05/2020  IMPRESSION:     1.  Caudal lumbar facet arthropathy left worse than right with no bony destruction to indicate septic or inflammatory arthropathy. This most likely is just degenerative  2.  Mild caudal lumbar foraminal stenoses  3.  No significant central stenosis.  4.  Left L4/5 hemilaminectomy                                                                                   Xray lumbar spine 05/05/2020     IMPRESSION:     1.  No acute lumbar compression fracture or subluxation.  2.  Posterior disc space narrowing at L4-5  and to lesser extent at L3-4.  3.  Bilateral facet arthropathy at L5-S1.                                                                                             Diagnosis   Visit Diagnoses     ICD-10-CM   1. S/P lumbar laminectomy  Z98.890   2. DDD (degenerative disc disease), lumbar  M51.36   3. Left lumbar radiculitis  M54.16   4. Medication management  Z79.899   5. Vitamin D deficiency  E55.9   6. Acute left-sided thoracic back pain  M54.6         ASSESSMENT:  Grisel Mark 66 y.o. female seen for above     Grisel was seen today for follow-up.    Diagnoses and all orders for this visit:    S/P lumbar laminectomy  -     Pain Management Screen  -     HYDROcodone-acetaminophen (NORCO) 5-325 MG Tab per tablet; Take 1 Tablet by mouth every 8 hours as needed (severe pain) for up to 30 days.  -     morphine ER (MS CONTIN) 15 MG Tab CR tablet; Take 1 Tablet by mouth every 12 hours for 30 days.  -     Consent for Opiate Prescription  -     Controlled Substance Treatment Agreement    DDD (degenerative disc disease), lumbar  -     Pain Management Screen  -     HYDROcodone-acetaminophen (NORCO) 5-325 MG Tab per tablet; Take 1 Tablet by mouth every 8 hours as needed (severe pain) for up to 30 days.  -     morphine ER (MS CONTIN) 15 MG Tab CR tablet; Take 1 Tablet by mouth every 12 hours for 30 days.  -     Consent for Opiate Prescription  -     Controlled Substance Treatment Agreement    Left lumbar radiculitis    Medication management  -     Comp Metabolic Panel; Future    Vitamin D deficiency  -     VITAMIN D,25 HYDROXY; Future    Acute left-sided thoracic back pain  -     DX-THORACIC SPINE-WITH SWIMMERS VIEW; Future         1. Continue gabapentin 1200mg bid.  Continue duloxetine 90mg.  2. Reviewed records from Dr. Villa.  She will continue with visits with him.  3. Discussed concerns about diabetes control and weight gain, recommend follow-up with PCP.  4. Follow-up with PCP, chronic epigastric pain.    5. Consider  repeat EMG.  Hold for now.  6. Continue colace 100mg bid and miralax prn constipation.  7. Xray thoracic spine.  Episode of radicular symptoms have resolved.  8. Recheck UDS.  Reviewed that she reports sometimes taking norco more on some days and less on others.  Asked that she record medications.  9. Continue MS Contin 15mg po q12h and norco for breakthrough.  Discussed norco up to 2/day. No changes for now.   reviewed. She is continuing to take chronic benzodiazepines, slowly working on reducing it.  10. Recheck vitamin D    Follow-up: Return in about 4 weeks (around 4/15/2022).    Thank you very much for asking me to participate in Grisel Mark's care.  Please contact me with any questions or concerns.    Please note that this dictation was created using voice recognition software. I have made every reasonable attempt to correct obvious errors but there may be errors of grammar and content that I may have overlooked prior to finalization of this note.      Paresh Ogden MD  Physical Medicine and Rehabilitation  Interventional Spine and Sports Physiatry  [facility]

## 2022-03-28 DIAGNOSIS — E11.69 DIABETES MELLITUS TYPE 2 IN OBESE: ICD-10-CM

## 2022-03-28 DIAGNOSIS — E66.9 DIABETES MELLITUS TYPE 2 IN OBESE: ICD-10-CM

## 2022-04-12 ENCOUNTER — TELEMEDICINE (OUTPATIENT)
Dept: MEDICAL GROUP | Facility: PHYSICIAN GROUP | Age: 67
End: 2022-04-12
Payer: MEDICARE

## 2022-04-12 VITALS — HEIGHT: 63 IN | BODY MASS INDEX: 33.13 KG/M2 | WEIGHT: 187 LBS

## 2022-04-12 DIAGNOSIS — E11.42 TYPE 2 DIABETES MELLITUS WITH DIABETIC POLYNEUROPATHY, WITHOUT LONG-TERM CURRENT USE OF INSULIN (HCC): ICD-10-CM

## 2022-04-12 DIAGNOSIS — H92.02 CHRONIC EAR PAIN, LEFT: ICD-10-CM

## 2022-04-12 DIAGNOSIS — G89.29 CHRONIC EAR PAIN, LEFT: ICD-10-CM

## 2022-04-12 DIAGNOSIS — E11.69 DIABETES MELLITUS TYPE 2 IN OBESE: ICD-10-CM

## 2022-04-12 DIAGNOSIS — F13.20 BENZODIAZEPINE DEPENDENCE (HCC): ICD-10-CM

## 2022-04-12 DIAGNOSIS — F43.10 POSTTRAUMATIC STRESS DISORDER, DELAYED ONSET: ICD-10-CM

## 2022-04-12 DIAGNOSIS — E66.9 DIABETES MELLITUS TYPE 2 IN OBESE: ICD-10-CM

## 2022-04-12 DIAGNOSIS — F41.1 GENERALIZED ANXIETY DISORDER: ICD-10-CM

## 2022-04-12 DIAGNOSIS — F41.8 SITUATIONAL ANXIETY: ICD-10-CM

## 2022-04-12 PROCEDURE — 99214 OFFICE O/P EST MOD 30 MIN: CPT | Mod: 95 | Performed by: NURSE PRACTITIONER

## 2022-04-12 RX ORDER — METFORMIN HYDROCHLORIDE 500 MG/1
1000 TABLET, EXTENDED RELEASE ORAL 2 TIMES DAILY
Qty: 200 TABLET | Refills: 3 | Status: SHIPPED | OUTPATIENT
Start: 2022-04-12 | End: 2022-10-07

## 2022-04-12 RX ORDER — BUSPIRONE HYDROCHLORIDE 10 MG/1
10 TABLET ORAL 3 TIMES DAILY
Qty: 270 TABLET | Refills: 3 | Status: SHIPPED | OUTPATIENT
Start: 2022-04-12 | End: 2022-04-12

## 2022-04-12 RX ORDER — BUSPIRONE HYDROCHLORIDE 10 MG/1
20 TABLET ORAL 2 TIMES DAILY
Qty: 360 TABLET | Refills: 3 | Status: SHIPPED | OUTPATIENT
Start: 2022-04-12 | End: 2023-03-13

## 2022-04-12 RX ORDER — BREXPIPRAZOLE 2 MG/1
2 TABLET ORAL DAILY
Qty: 90 TABLET | Refills: 3 | Status: SHIPPED | OUTPATIENT
Start: 2022-04-12 | End: 2023-06-16

## 2022-04-12 RX ORDER — CLONAZEPAM 0.5 MG/1
0.5 TABLET ORAL NIGHTLY
Qty: 90 TABLET | Refills: 1 | Status: SHIPPED | OUTPATIENT
Start: 2022-05-05 | End: 2022-08-03

## 2022-04-12 RX ORDER — CLONAZEPAM 1 MG/1
1 TABLET ORAL EVERY MORNING
Qty: 90 TABLET | Refills: 1 | Status: SHIPPED | OUTPATIENT
Start: 2022-05-05 | End: 2022-08-03

## 2022-04-12 ASSESSMENT — FIBROSIS 4 INDEX: FIB4 SCORE: 1.63

## 2022-04-12 NOTE — ASSESSMENT & PLAN NOTE
This is a chronic stable condition. Patient takes clonazepam and buspar for this. She does states that she feels the buspar is not as effective as it has been in the past.     Will continue clonazepam and increase buspar.     Obtained and reviewed patient utilization report from [facility] pharmacy database on 4/12/2022 8:49 AM  prior to writing prescription for controlled substance II, III or IV per [place] bill . Based on assessment of the report,my physical exam if necessary and the patient's health problem, the prescription is medically necessary.

## 2022-04-12 NOTE — PROGRESS NOTES
Virtual Visit: Established Patient   This visit was conducted via Zoom using secure and encrypted videoconferencing technology. The patient was in a private location in the state of [place].    The patient's identity was confirmed and verbal consent was obtained for this virtual visit.    Subjective:   CC: follow up for leg pain    Grisel Mark is a 66 y.o. female presenting for evaluation and management of:    Problem   Chronic Ear Pain, Left   Generalized Anxiety Disorder   Type 2 Diabetes Mellitus With Diabetic Polyneuropathy, Without Long-Term Current Use of Insulin (Hcc)     ROS   Denies any recent fevers or chills. No nausea or vomiting. No chest pains or shortness of breath.     No Known Allergies    Current medicines (including changes today)  Current Outpatient Medications   Medication Sig Dispense Refill   • [START ON 5/5/2022] clonazePAM (KLONOPIN) 0.5 MG Tab Take 1 Tablet by mouth every evening for 90 days. 90 Tablet 1   • [START ON 5/5/2022] clonazePAM (KLONOPIN) 1 MG Tab Take 1 Tablet by mouth every morning for 90 days. 90 Tablet 1   • metFORMIN ER (GLUCOPHAGE XR) 500 MG TABLET SR 24 HR Take 2 Tablets by mouth 2 times a day. 200 Tablet 3   • metoprolol tartrate (LOPRESSOR) 25 MG Tab Take 1 Tablet by mouth 2 times a day. 180 Tablet 3   • Brexpiprazole (REXULTI) 2 MG Tab Take 2 mg by mouth every day. 90 Tablet 3   • busPIRone (BUSPAR) 10 MG Tab tablet Take 2 Tablets by mouth 2 times a day. 360 Tablet 3   • HYDROcodone-acetaminophen (NORCO) 5-325 MG Tab per tablet Take 1 Tablet by mouth every 8 hours as needed (severe pain) for up to 30 days. 60 Tablet 0   • morphine ER (MS CONTIN) 15 MG Tab CR tablet Take 1 Tablet by mouth every 12 hours for 30 days. 60 Tablet 0   • cyclobenzaprine (FLEXERIL) 5 mg tablet Take 1 Tablet by mouth 2 times a day as needed for Muscle Spasms (restless leg). 90 Tablet 3   • gabapentin (NEURONTIN) 800 MG tablet Take 1.5 Tablets by mouth 2 times a day for 90 days. 90 Tablet  2   • rosuvastatin (CRESTOR) 10 MG Tab Take 1 Tablet by mouth every evening. 100 Tablet 3   • DULoxetine (CYMBALTA) 30 MG Cap DR Particles Take 3 Capsules by mouth every day. 270 Capsule 3   • omeprazole (PRILOSEC) 40 MG delayed-release capsule Take 1 Capsule by mouth every day. 90 Capsule 3   • Cholecalciferol (VITAMIN D3) 1.25 MG (59151 UT) Cap TAKE ONE CAPSULE BY MOUTH EVERY 7 DAYS     • Cholecalciferol 1.25 MG (49612 UT) Tab Take 50,000 Units by mouth every 7 days. 12 Tablet 0   • albuterol 108 (90 Base) MCG/ACT Aero Soln inhalation aerosol Inhale 2 Puffs every four hours as needed for Shortness of Breath. 1 Each 2   • estradiol (ESTRACE) 1 MG Tab Take 1 Tablet by mouth every day. 90 Tablet 3   • levothyroxine (SYNTHROID) 50 MCG Tab Take 1 Tablet by mouth every morning on an empty stomach. 90 Tablet 3   • Empagliflozin (JARDIANCE) 25 MG Tab Take 1 Tablet by mouth every day. 100 Tablet 3   • pseudoephedrine (SUDAFED) 30 MG Tab Take 1-2 Tablets by mouth every 6 hours as needed for Congestion (sinus pressure not relieved by other measures). 30 Tablet 0   • Blood Glucose Meter Kit Test blood sugar as recommended by provider. Abbott Freestyle Lite blood glucose monitoring kit. 1 Kit 0   • Lancets Use one Abbott [place] Lite lancet to test blood sugar twice daily and PRN. 300 Each 3   • Alcohol Swabs Wipe site with prep pad prior to injection. 100 Each 3   • lisinopril (PRINIVIL) 10 MG Tab Take 1 tablet by mouth every day. 100 tablet 3   • fluticasone (FLONASE) 50 MCG/ACT nasal spray Administer 1 Spray into affected nostril(S) every day. 16 g 11   • aspirin EC (ECOTRIN) 81 MG Tablet Delayed Response Take 81 mg by mouth every day.     • cyanocobalamin (VITAMIN B-12) 500 MCG Tab Take 500 mcg by mouth every day.     • Blood Glucose Test Strips Use one Abbott Freedom Lite strip to test blood sugar twice daily and PRN. 270 Each 3   • Naloxone (NARCAN) 4 MG/0.1ML Liquid One spray in one nostril for overdose and call 911. 1  "Each 0     No current facility-administered medications for this visit.       Patient Active Problem List    Diagnosis Date Noted   • Restless leg 02/03/2022   • Cough 11/02/2021   • Chronic ear pain, left 09/23/2021   • Primary central sleep apnea 08/05/2021   • Opioid dependence in controlled environment ([facility]) 05/26/2021   • Benzodiazepine dependence ([facility]) 05/26/2021   • Generalized anxiety disorder 05/12/2020   • Posttraumatic stress disorder, delayed onset 05/12/2020   • Essential hypertension 02/05/2020   • Mixed hyperlipidemia 02/05/2020   • Type 2 diabetes mellitus with diabetic polyneuropathy, without long-term current use of insulin ([facility]) 02/05/2020   • Hypothyroidism (acquired) 02/05/2020   • Depression, major, recurrent, moderate ([facility]) 02/05/2020   • Gastroesophageal reflux disease without esophagitis 02/05/2020   • Situational anxiety 02/05/2020         She  has a past medical history of Anxiety, Arrhythmia, Chronic back pain, Constipation, Depression, Diabetes ([facility]), Ear pain, left (9/23/2021), GERD (gastroesophageal reflux disease), Hyperlipidemia, Hypertension, and Thyroid disease.  She  has a past surgical history that includes cholecystectomy; carpal tunnel release; abdominal hysterectomy total; pr njx aa&/strd tfrml epi lumbar/sacral 1 level (Left, 8/12/2020); pr njx aa&/strd tfrml epi lumbar/sacral ea addl (Left, 8/12/2020); lumbar medial branch blocks (Left, 9/9/2020); laminotomy (1998); block epidural steroid injection (Left, 2/24/2021); lumbar transforaminal epidural steroid injection (Left, 5/20/2021); lumbar medial branch blocks (Left, 7/21/2021); and radio frequency ablation additional level (Left, 11/11/2021).       Objective:   Ht 1.6 m (5' 3\")   Wt 84.8 kg (187 lb)   LMP  (LMP Unknown)   BMI 33.13 kg/m²     Physical Exam:  Constitutional: Alert, no distress, well-groomed.  Skin: No rashes in visible areas.  Eye: Round. Conjunctiva clear, lids normal. No icterus.   ENMT: Lips pink " without lesions, good dentition, moist mucous membranes. Phonation normal.  Neck: Moves freely without pain.  Respiratory: Unlabored respiratory effort, no cough or audible wheeze  Psych: Alert and oriented x3, normal affect and mood.       Assessment and Plan:   The following treatment plan was discussed:     1. Situational anxiety  clonazePAM (KLONOPIN) 0.5 MG Tab    clonazePAM (KLONOPIN) 1 MG Tab   2. Benzodiazepine dependence (HCC)  clonazePAM (KLONOPIN) 0.5 MG Tab    clonazePAM (KLONOPIN) 1 MG Tab   3. Generalized anxiety disorder  clonazePAM (KLONOPIN) 0.5 MG Tab    DISCONTINUED: busPIRone (BUSPAR) 10 MG Tab tablet   4. Posttraumatic stress disorder, delayed onset  clonazePAM (KLONOPIN) 0.5 MG Tab   5. Chronic ear pain, left  Referral to ENT   6. Diabetes mellitus type 2 in obese (HCC)     7. Type 2 diabetes mellitus with diabetic polyneuropathy, without long-term current use of insulin ([facility])         Chronic ear pain, left  This is a chronic issue. Patient reports left ear pain that keeps reoccurring over the last 6 months. She also reports that when the pain is worse she will also have swelling and tenderness below her ear.     She has been using flonase daily to try to help with this.     Will place referral to ENT.       Generalized anxiety disorder  This is a chronic stable condition. Patient takes clonazepam and buspar for this. She does states that she feels the buspar is not as effective as it has been in the past.     Will continue clonazepam and increase buspar.     Obtained and reviewed patient utilization report from [facility] pharmacy database on 4/12/2022 8:49 AM  prior to writing prescription for controlled substance II, III or IV per [place] bill . Based on assessment of the report,my physical exam if necessary and the patient's health problem, the prescription is medically necessary.       Type 2 diabetes mellitus with diabetic polyneuropathy, without long-term current use of insulin  (HCC)  This is a chronic condition. Patients A1c has slightly increased. Discussed switching her metformin to extended release. Patient is agreeable to this.     Continue jardiance. Start metformin ER 1000mg BID.             Follow-up: Return in about 5 months (around 9/12/2022) for follow up for anxiety .     I have placed the below orders and discussed them with an approved delegating provider.  The MA is performing the below orders under the direction of Dr. Alexandra.    Please note that this dictation was created using voice recognition software. I have worked with consultants from the vendor as well as technical experts from [email or web address] [employer] to optimize the interface. I have made every reasonable attempt to correct obvious errors, but I expect that there are errors of grammar and possibly content that I did not discover before finalizing the note.

## 2022-04-12 NOTE — ASSESSMENT & PLAN NOTE
This is a chronic issue. Patient reports left ear pain that keeps reoccurring over the last 6 months. She also reports that when the pain is worse she will also have swelling and tenderness below her ear.     She has been using flonase daily to try to help with this.     Will place referral to ENT.

## 2022-04-12 NOTE — ASSESSMENT & PLAN NOTE
This is a chronic condition. Patients A1c has slightly increased. Discussed switching her metformin to extended release. Patient is agreeable to this.     Continue jardiance. Start metformin ER 1000mg BID.

## 2022-04-15 ENCOUNTER — TELEPHONE (OUTPATIENT)
Dept: MEDICAL GROUP | Facility: PHYSICIAN GROUP | Age: 67
End: 2022-04-15
Payer: MEDICARE

## 2022-04-19 ENCOUNTER — OFFICE VISIT (OUTPATIENT)
Dept: PHYSICAL MEDICINE AND REHAB | Facility: MEDICAL CENTER | Age: 67
End: 2022-04-19
Payer: MEDICARE

## 2022-04-19 VITALS
BODY MASS INDEX: 34.18 KG/M2 | TEMPERATURE: 97.5 F | WEIGHT: 192.9 LBS | DIASTOLIC BLOOD PRESSURE: 72 MMHG | HEART RATE: 87 BPM | HEIGHT: 63 IN | SYSTOLIC BLOOD PRESSURE: 122 MMHG | OXYGEN SATURATION: 94 %

## 2022-04-19 DIAGNOSIS — M96.1 POSTLAMINECTOMY SYNDROME: ICD-10-CM

## 2022-04-19 DIAGNOSIS — M54.16 LEFT LUMBAR RADICULITIS: ICD-10-CM

## 2022-04-19 DIAGNOSIS — M51.36 DDD (DEGENERATIVE DISC DISEASE), LUMBAR: ICD-10-CM

## 2022-04-19 DIAGNOSIS — F11.90 CHRONIC, CONTINUOUS USE OF OPIOIDS: ICD-10-CM

## 2022-04-19 DIAGNOSIS — F13.20 BENZODIAZEPINE DEPENDENCE (HCC): ICD-10-CM

## 2022-04-19 PROCEDURE — 99214 OFFICE O/P EST MOD 30 MIN: CPT | Performed by: PHYSICAL MEDICINE & REHABILITATION

## 2022-04-19 RX ORDER — HYDROCODONE BITARTRATE AND ACETAMINOPHEN 5; 325 MG/1; MG/1
1 TABLET ORAL EVERY 8 HOURS PRN
Qty: 60 TABLET | Refills: 0 | Status: SHIPPED | OUTPATIENT
Start: 2022-05-19 | End: 2022-06-16 | Stop reason: SDUPTHER

## 2022-04-19 RX ORDER — HYDROCODONE BITARTRATE AND ACETAMINOPHEN 5; 325 MG/1; MG/1
1 TABLET ORAL EVERY 8 HOURS PRN
Qty: 60 TABLET | Refills: 0 | Status: SHIPPED | OUTPATIENT
Start: 2022-04-19 | End: 2022-05-19

## 2022-04-19 RX ORDER — GABAPENTIN 800 MG/1
1200 TABLET ORAL 2 TIMES DAILY
Qty: 90 TABLET | Refills: 2 | Status: SHIPPED | OUTPATIENT
Start: 2022-04-19 | End: 2022-06-16 | Stop reason: SDUPTHER

## 2022-04-19 RX ORDER — MORPHINE SULFATE 15 MG/1
15 TABLET, FILM COATED, EXTENDED RELEASE ORAL EVERY 12 HOURS
Qty: 60 TABLET | Refills: 0 | Status: SHIPPED | OUTPATIENT
Start: 2022-05-19 | End: 2022-06-16 | Stop reason: SDUPTHER

## 2022-04-19 RX ORDER — MORPHINE SULFATE 15 MG/1
15 TABLET, FILM COATED, EXTENDED RELEASE ORAL EVERY 12 HOURS
Qty: 60 TABLET | Refills: 0 | Status: SHIPPED | OUTPATIENT
Start: 2022-04-19 | End: 2022-05-19

## 2022-04-19 ASSESSMENT — PATIENT HEALTH QUESTIONNAIRE - PHQ9
SUM OF ALL RESPONSES TO PHQ QUESTIONS 1-9: 5
5. POOR APPETITE OR OVEREATING: 0 - NOT AT ALL
CLINICAL INTERPRETATION OF PHQ2 SCORE: 2

## 2022-04-19 ASSESSMENT — FIBROSIS 4 INDEX: FIB4 SCORE: 1.63

## 2022-04-19 ASSESSMENT — PAIN SCALES - GENERAL: PAINLEVEL: 9=SEVERE PAIN

## 2022-04-19 NOTE — PROGRESS NOTES
Follow-up patient note    Physiatry (physical medicine and  Rehabilitation), interventional spine and sports medicine    Date of Service: 04/19/2022    Chief complaint:   Chief Complaint   Patient presents with   • Follow-Up     Back pain       HISTORY    HPI: Grisel Mark 66 y.o. female who presents today for follow-up evaluation of low back and leg pain.     Grisel reports that she continues to have pain in the low back and left.  Pain radiates into the left leg.  Pain is a 9/10 on the NRS. No falls.  Injections have not provided long-term relief.      Thoracic pain that she reported at the last visit has resolved.    Takes gabapentin 800mg taking 1.5 pills twice a day.  Her pain medications: Duloxetine 90mg.  MS Contin 15mg q12h.  Norco 5/325 for breakthrough, reports that she does not always take this, some days will take up to 3/day.   No side effects.  She is back to taking clonazepam 0.5mg at bedtime and 1mg during the day    Bowel movements have been more regular, reports that she is eating better.  She is taking miralax prn, not taking miralax as regular.  Using stool softeners still.    She met with Dr. Villa, who approved trial for spinal cord stimulator    By history:  Her laminectomy was in 1998.  Previous injection on left L4 and L5 TFESI on 08/12/2020 helped with her leg pain.     Medical records review:  Dr. Francisco Olmos, plan to increase duloxetine 90mg daily, discontinue buspirone 20mg po bid, start buproprion XL 150mg daily, continue brexipiprazole 1mg qhs, hydroxyzine 25mg po tid prn, clonazepam 0.5mg daily prn    Review of records   Dr. Dheeraj De La Rosa:  08/20/2019 Right L3-4, L4-5, L5-S1 radiofrequency ablation    I reviewed the note from the referring provider Peter Bruce * dated 02/05/2020: She was seen to establish care, having just moved from [place].  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  Medications associated with the above were written, related labs ordered including CBC, CMP, Hb A1c, lipids, microalbumin, TSH, free thryoxine, referral to ps\ychiatry, referral to GI for colonoscopy and referral to pain clinic.    Previous treatments:    Physical Therapy: Yes    Medications the patient is tried: Narcotics, gabapentin and muscle relaxers    Previous interventions: [facility] [place] these included right lumbar radiofrequency procedures    Previous surgeries to relieve the above pain:  Surgery on October 28, 1998      ROS:   ENT: ear infection, treated with augmentin  CV: irregular heart, reports history of tachycardia  Psych: depression since 1995  Heme: anemia  Endo: hypothyroidism, diabetes    Red Flags ROS:   Fever, Chills, Sweats: Denies  Involuntary Weight Loss: Denies  Bladder Incontinence: Denies  Bowel Incontinence: Denies  Saddle Anesthesia: Denies    All other systems reviewed and negative.       PMHx:   Past Medical History:   Diagnosis Date   • Anxiety    • Arrhythmia    • Chronic back pain    • Constipation    • Depression    • Diabetes (HCC)    • Ear pain, left 9/23/2021   • GERD (gastroesophageal reflux disease)    • Hyperlipidemia    • Hypertension    • Thyroid disease        PSHx:   Past Surgical History:   Procedure Laterality Date   • RADIO FREQUENCY ABLATION ADDITIONAL LEVEL Left 11/11/2021    Procedure: LEFT lumbar three through lumbar five radiofrequency ablation;  Surgeon: Paresh Ogden M.D.;  [facility];  Service: Pain Management   • LUMBAR MEDIAL BRANCH BLOCKS Left 7/21/2021    Procedure: LEFT lumbar three through lumbar five medial branch blocks;  Surgeon: Paresh Ogden M.D.;  Location: [facility];  Service: Pain Management   • LUMBAR TRANSFORAMINAL EPIDURAL STEROID INJECTION Left 5/20/2021     Procedure: LEFT lumbar four and lumbar five transforaminal epidural steroid injection;  Surgeon: Parseh Ogden M.D.;  Location: [facility];  Service: Pain Management   • BLOCK EPIDURAL STEROID INJECTION Left 2/24/2021    Procedure: INJECTION, STEROID, EPIDURAL;  Surgeon: Paresh Ogden M.D.;  Location: [facility];  Service: Pain Management   • LUMBAR MEDIAL BRANCH BLOCKS Left 9/9/2020    Procedure: BLOCK, NERVE, SPINAL, LUMBAR, POSTERIOR RAMUS, MEDIAL BRANCH;  Surgeon: Paresh Ogden M.D.;  Location: [facility];  Service: Pain Management   • PA NJX AA&/STRD TFRML EPI LUMBAR/SACRAL 1 LEVEL Left 8/12/2020    Procedure: INJECTION, SPINE, LUMBOSACRAL, EPIDURAL, 1 LEVEL, TRANSFORAMINAL APPROACH;  Surgeon: Paresh Ogden M.D.;  Location: [facility];  Service: Pain Management   • PA NJX AA&/STRD TFRML EPI LUMBAR/SACRAL EA ADDL Left 8/12/2020    Procedure: INJECTION, SPINE, LUMBOSACRAL, EPIDURAL, TRANSFORAMINAL APPROACH;  Surgeon: Paresh Ogden M.D.;  Location: [facility];  Service: Pain Management   • LAMINOTOMY  1998   • ABDOMINAL HYSTERECTOMY TOTAL     • CARPAL TUNNEL RELEASE     • CHOLECYSTECTOMY         Family history   Family History   Problem Relation Age of Onset   • Cancer Mother    • Stroke Father         Heart attack   • Dementia Sister    • Stroke Brother         heart Attack   • Cancer Brother         Skin   • Diabetes Brother    • Kidney Disease Brother    • Cancer Brother    • Parkinson's Disease Sister    • No Known Problems Sister    • Diabetes Daughter    • Hypertension Daughter          Medications:   Current Outpatient Medications   Medication   • gabapentin (NEURONTIN) 800 MG tablet   • morphine ER (MS CONTIN) 15 MG Tab CR tablet   • HYDROcodone-acetaminophen (NORCO) 5-325 MG Tab per tablet   • [START ON 5/19/2022] morphine ER (MS CONTIN) 15 MG Tab CR tablet   • [START ON 5/19/2022]  HYDROcodone-acetaminophen (NORCO) 5-325 MG Tab per tablet   • [START ON 2022] clonazePAM (KLONOPIN) 0.5 MG Tab   • [START ON 2022] clonazePAM (KLONOPIN) 1 MG Tab   • metFORMIN ER (GLUCOPHAGE XR) 500 MG TABLET SR 24 HR   • metoprolol tartrate (LOPRESSOR) 25 MG Tab   • Brexpiprazole (REXULTI) 2 MG Tab   • busPIRone (BUSPAR) 10 MG Tab tablet   • cyclobenzaprine (FLEXERIL) 5 mg tablet   • rosuvastatin (CRESTOR) 10 MG Tab   • DULoxetine (CYMBALTA) 30 MG Cap DR Particles   • omeprazole (PRILOSEC) 40 MG delayed-release capsule   • Cholecalciferol (VITAMIN D3) 1.25 MG (12049 UT) Cap   • Cholecalciferol 1.25 MG (29816 UT) Tab   • albuterol 108 (90 Base) MCG/ACT Aero Soln inhalation aerosol   • estradiol (ESTRACE) 1 MG Tab   • levothyroxine (SYNTHROID) 50 MCG Tab   • Empagliflozin (JARDIANCE) 25 MG Tab   • pseudoephedrine (SUDAFED) 30 MG Tab   • Blood Glucose Meter Kit   • Lancets   • Alcohol Swabs   • lisinopril (PRINIVIL) 10 MG Tab   • fluticasone (FLONASE) 50 MCG/ACT nasal spray   • aspirin EC (ECOTRIN) 81 MG Tablet Delayed Response   • cyanocobalamin (VITAMIN B-12) 500 MCG Tab   • Blood Glucose Test Strips   • Naloxone (NARCAN) 4 MG/0.1ML Liquid     No current facility-administered medications for this visit.       Allergies:   No Known Allergies    Social Hx:   Social History     Socioeconomic History   • Marital status:      Spouse name: Not on file   • Number of children: Not on file   • Years of education: Not on file   • Highest education level: Not on file   Occupational History   • Not on file   Tobacco Use   • Smoking status: Former Smoker     Packs/day: 0.25     Types: Cigarettes     Quit date:      Years since quittin.3   • Smokeless tobacco: Never Used   Vaping Use   • Vaping Use: Never used   Substance and Sexual Activity   • Alcohol use: Not Currently     Comment: rare   • Drug use: Not Currently     Types: Marijuana   • Sexual activity: Not Currently   Other Topics Concern   •  " Service No   • Blood Transfusions Yes   • Caffeine Concern No   • Occupational Exposure No   • Hobby Hazards No   • Sleep Concern Yes   • Stress Concern Yes   • Weight Concern Yes   • Special Diet No   • Back Care No   • Exercise Yes   • Bike Helmet No   • Seat Belt Yes   • Self-Exams Yes   Social History Narrative   • Not on file     Social Determinants of Health     Financial Resource Strain: Not on file   Food Insecurity: Not on file   Transportation Needs: Not on file   Physical Activity: Not on file   Stress: Not on file   Social Connections: Not on file   Intimate Partner Violence: Not on file   Housing Stability: Not on file         EXAMINATION     Physical Exam:   Vitals: /72 (BP Location: Right arm, Patient Position: Sitting, BP Cuff Size: Adult)   Pulse 87   Temp 36.4 °C (97.5 °F) (Temporal)   Ht 1.6 m (5' 3\")   Wt 87.5 kg (192 lb 14.4 oz)   SpO2 94%     Constitutional:   Body Habitus: Body mass index is 34.17 kg/m².  Cooperation: Fully cooperates with exam  Appearance: Well-groomed, well-nourished, not disheveled, no acute distress    Eyes: No scleral icterus, no proptosis     ENT -no obvious auditory deficits, wearing a facial mask    Skin -no rashes or lesions noted    Respiratory-  breathing comfortable on room air, no audible wheezing    Cardiovascular- no lower extremity edema is noted.     Psychiatric- alert and oriented ×3. Normal affect.     Gait - normal gait, no use of ambulatory device, antalgic.     Musculoskeletal -     Thoracic/Lumbar Spine/Sacral Spine/Hips   Inspection: no evidence of atrophy in bilateral lower extremities throughout     ROM of the lumbar spine decreased.     Palpation: tenderness to palpation lumbar paraspinals minimal    Neuro     Key points for the international standards for neurological classification of spinal cord injury (ISNCSCI) to light touch.     Dermatome R L   L2 2 2   L3 2 2   L4 2 2   L5 2 1   S1 2 1   S2 2 2       Motor Exam Lower " Extremities    ? Myotome R L   Hip flexion L2 5 5   Knee extension L3 5 5   Ankle dorsiflexion L4 5 5   Toe extension L5 5 5   Ankle plantarflexion S1 5 5       Reflexes  ?  R L   Patella  2+ 2+   Achilles   2+ 1+     No clonus bilaterally    MEDICAL DECISION MAKING    Medical records review: see under HPI section.     DATA    Labs:   01/18/2022: UDS positive for morphine and negative for metabolites, absent hydrocodone, positive for clonazepam  10/20/2021: UDS positive for morphine and metabolites, clonzapem, low amount of THC noted  05/08/2021: UDS positive for morphine and metabolites, clonazepam and metabolites, hydrocodone and metabolites  08/18/2020: UDS positive for morphine and metabolites, clonazepam and metabolites, hydrocodone and metabolites  04/24/2020 UDS positive for morphine and metabolites, clonazepam  04/09/2020 Vitamin D, 25:  28L  04/09/2020 Vitamin B12: 217    Lab Results   Component Value Date/Time    SODIUM 141 05/04/2021 08:06 AM    POTASSIUM 4.0 05/04/2021 08:06 AM    CHLORIDE 105 05/04/2021 08:06 AM    CO2 26 05/04/2021 08:06 AM    ANION 10.0 05/04/2021 08:06 AM    GLUCOSE 134 (H) 05/04/2021 08:06 AM    BUN 7 (L) 05/04/2021 08:06 AM    CREATININE 0.58 05/04/2021 08:06 AM    CALCIUM 8.8 05/04/2021 08:06 AM    ASTSGOT 25 05/04/2021 08:06 AM    ALTSGPT 29 05/04/2021 08:06 AM    TBILIRUBIN 0.4 05/04/2021 08:06 AM    ALBUMIN 3.8 05/04/2021 08:06 AM    TOTPROTEIN 6.4 05/04/2021 08:06 AM    GLOBULIN 2.6 05/04/2021 08:06 AM    AGRATIO 1.5 05/04/2021 08:06 AM       No results found for: PROTHROMBTM, INR     Lab Results   Component Value Date/Time    WBC 7.5 05/04/2021 08:06 AM    RBC 4.73 05/04/2021 08:06 AM    HEMOGLOBIN 14.6 05/04/2021 08:06 AM    HEMATOCRIT 43.8 05/04/2021 08:06 AM    MCV 92.6 05/04/2021 08:06 AM    MCH 30.9 05/04/2021 08:06 AM    MCHC 33.3 (L) 05/04/2021 08:06 AM    MPV 10.6 05/04/2021 08:06 AM    NEUTSPOLYS 46.60 05/04/2021 08:06 AM    LYMPHOCYTES 39.80 05/04/2021 08:06 AM     MONOCYTES 9.30 05/04/2021 08:06 AM    EOSINOPHILS 3.70 05/04/2021 08:06 AM    BASOPHILS 0.50 05/04/2021 08:06 AM        Lab Results   Component Value Date/Time    HBA1C 7.8 (H) 02/28/2022 10:32 AM        Imaging: I personally reviewed following images, these are my reads:     MRI lumbar spine 05/05/2020  At L1-2, no central or foraminal stenosis  At L2-3, no central or foraminal stenosis  At L3-4, mild disc bulge and mild ligamentum flavum thickening.  No central canal stenosis. Borderline left foraminal stenosis. Schmorl's node.   At L4-5, diffuse disc bulge with mild ligamentum flavum thickening.  No central canal stenosis.  There is facet arthropathy, left greater than right. Mild foraminal stenosis bilaterally. S/P left L4/5 hemilaminectomy  At L5-S1, there is mild-borderline foraminal stenosis with facet arthropathy and mild disc bulge.  No central canal stenosis    Xray lumbar spine 05/05/2020  There is mild decreased joint space at L3-4 and L4-5.    Facet arthropathy is noted, greatest at L5-S1 bilaterally.  No significant motion on flexion and extension films    IMAGING radiology reads. I reviewed the following radiology reads    Xray abdomen 07/17/2020  IMPRESSION:     Nonspecific bowel gas pattern. No evidence small bowel obstruction.      MRI lumbar spine 05/05/2020  IMPRESSION:     1.  Caudal lumbar facet arthropathy left worse than right with no bony destruction to indicate septic or inflammatory arthropathy. This most likely is just degenerative  2.  Mild caudal lumbar foraminal stenoses  3.  No significant central stenosis.  4.  Left L4/5 hemilaminectomy                                                                                   Xray lumbar spine 05/05/2020     IMPRESSION:     1.  No acute lumbar compression fracture or subluxation.  2.  Posterior disc space narrowing at L4-5 and to lesser extent at L3-4.  3.  Bilateral facet arthropathy at L5-S1.                                                                                              Diagnosis   Visit Diagnoses     ICD-10-CM   1. Postlaminectomy syndrome  M96.1   2. Left lumbar radiculitis  M54.16   3. DDD (degenerative disc disease), lumbar  M51.36   4. Benzodiazepine dependence (HCC)  F13.20   5. Chronic, continuous use of opioids  F11.90         ASSESSMENT:  Grisel Mark 66 y.o. female seen for above     Grisel was seen today for follow-up.    Diagnoses and all orders for this visit:    Postlaminectomy syndrome  -     gabapentin (NEURONTIN) 800 MG tablet; Take 1.5 Tablets by mouth 2 times a day for 90 days.  -     morphine ER (MS CONTIN) 15 MG Tab CR tablet; Take 1 Tablet by mouth every 12 hours for 30 days.  -     HYDROcodone-acetaminophen (NORCO) 5-325 MG Tab per tablet; Take 1 Tablet by mouth every 8 hours as needed (severe pain) for up to 30 days.  -     morphine ER (MS CONTIN) 15 MG Tab CR tablet; Take 1 Tablet by mouth every 12 hours for 30 days.  -     HYDROcodone-acetaminophen (NORCO) 5-325 MG Tab per tablet; Take 1 Tablet by mouth every 8 hours as needed (severe pain) for up to 30 days.  -     Consent for Opiate Prescription  -     Controlled Substance Treatment Agreement  -     Referral to Pain Management    Left lumbar radiculitis  -     Referral to Pain Management    DDD (degenerative disc disease), lumbar  -     morphine ER (MS CONTIN) 15 MG Tab CR tablet; Take 1 Tablet by mouth every 12 hours for 30 days.  -     HYDROcodone-acetaminophen (NORCO) 5-325 MG Tab per tablet; Take 1 Tablet by mouth every 8 hours as needed (severe pain) for up to 30 days.  -     morphine ER (MS CONTIN) 15 MG Tab CR tablet; Take 1 Tablet by mouth every 12 hours for 30 days.  -     HYDROcodone-acetaminophen (NORCO) 5-325 MG Tab per tablet; Take 1 Tablet by mouth every 8 hours as needed (severe pain) for up to 30 days.  -     Consent for Opiate Prescription  -     Controlled Substance Treatment Agreement  -     Referral to Pain Management    Benzodiazepine  dependence (HCC)    Chronic, continuous use of opioids       1. She will get xray of thoracic spine done.  Check CMP and vitamin D 25-OH  2. Continue MS Contin 15mg po q12h and norco for breakthrough.  Discussed norco up to 2/day. No changes for now.   reviewed. She is continuing to take chronic benzodiazepines, slowly working on reducing it.  3. SCS trial discussed.  The risks benefits and alternatives to this procedure were discussed and the patient wishes to proceed with the procedure. Risks include but are not limited to damage to surrounding structures, infection, bleeding, worsening of pain which can be permanent, weakness which can be permanent. Benefits include pain relief, improved function. Alternatives includes not doing the procedure.  Reviewed Dr. Villa's visits and most recent note.  4. Reviewed most recent UDS.  5. Continue colace 100mg bid and miralax prn constipation.  6. Continue activity as tolerated.    Follow-up: Return in 2 months (on 6/19/2022) for Hospital injection & 2 months for medication refill.    Thank you very much for asking me to participate in Grisel Mark's care.  Please contact me with any questions or concerns.    Please note that this dictation was created using voice recognition software. I have made every reasonable attempt to correct obvious errors but there may be errors of grammar and content that I may have overlooked prior to finalization of this note.      Paresh Ogden MD  Physical Medicine and Rehabilitation  Interventional Spine and Sports Physiatry  [facility]

## 2022-04-19 NOTE — PATIENT INSTRUCTIONS
Your procedure will be at the [facility] special procedure suite.    [facility] [place] [address]       PRE-PROCEDURE INSTRUCTIONS  You may take your regular medications except:   · No Anti-inflammatories 5 days prior to your procedure. Anti-inflammatories include medicines such as  ibuprofen (Motrin, Advil), Excedrin, Naproxen (Aleve, Anaprox, Naprelan, Naprosyn), Celecoxib (Celebrex), Diclofenac (Voltaren-XR tab), and Meloxicam (Mobic).   · No Glucophage or Metformin 24 hours before your procedure. You may resume next day after your procedure.  · Call the physiatry office if you are taking or prescribed anti-biotics within five days of procedure.  · Please ask provider if you are taking any new diabetes medication.  · CONTINUE TAKING BLOOD PRESSURE MEDICATIONS AS PRESCRIBED.  · Pain medications will not be prescribed on the procedure day. Procedural pain medication may be used by your provider   · Call your doctor's office performing the procedure if you have a fever, chills, rash or new illness prior to your procedure    Anticoagulation/antiplatelet medications  No Blood thinning medications such as Coumadin or Plavix 5 days prior to procedure unless your doctor said to continue these medications. Call your doctor if a new medication is prescribed in this class.     Restrictions for eating before procedure:   · If you are getting procedural sedation, then do not eat to for 8 hours prior to procedure appointment time. Do not drink fluids for four hours prior to your procedure time.   · If you are not having procedural sedation, then Skip the meal prior to your procedure. If you have a morning procedure then skip breakfast. If you have an afternoon procedure then skip lunch.   · You may drink clear liquids up to 2 hours prior to your procedure  · You must have a  the day of procedure to accompany you home.      POST PROCEDURE INSTRUCTIONS   · No heavy lifting, strenuous bending or  strenuous exercise for 3 days after your procedure.  · No hot tubs, baths, swimming for 3 days after your procedure  · You can remove the bandage the day after the procedure.  · IF YOU RECEIVED A STEROID INJECTION. PLEASE NOTE THAT THERE MAY BE A DELAY FOR THE INJECTION TO START WORKING, THE DELAY MAY BE UP TO TWO WEEKS. IF YOU HAVE DIABETES, PLEASE NOTE THAT YOUR SUGAR LEVELS MAY BE ELEVATED FOR 1-2 DAYS AFTER A STEROID INJECTION.  THE STEROID MAY CAUSE TEMPORARY SYMPTOMS WHICH USUALLY RESOLVE ON THEIR OWN WITHIN 1 TO 2 DAYS INCLUDING FACIAL FLUSHING OR A FEELING OF WARMTH ON THE FACE, TEMPORARY INCREASES IN BLOOD SUGAR, INSOMNIA, INCREASED HUNGER  · IF YOU RECEIVED A DIAGNOSTIC PROCEDURE (SUCH AS A MEDIAL BRANCH BLOCK), PLEASE NOTE THAT WE DO EXPECT THIS INJECTION TO WEAR OFF.  IT IS IMPORTANT TO COMPLETE THE PAIN DIARY AND LIST THE PAIN SCORE ONLY FOR THE REGION WHERE THE PROCEDURE WAS AND BRING THIS TO YOUR FOLLOW UP VISIT.  · IF YOU RECEIVED A RADIOFREQUENCY PROCEDURE, THERE MAY BE SOME SORENESS AFTER THE PROCEDURE.  THIS IS NORMAL.  · IF YOU EXPERIENCE PROLONGED WEAKNESS LONGER THAN ONE DAY, BOWEL OR BLADDER INCONTINENCE THEN PLEASE CALL THE PHYSIATRY OFFICE.  · Your leg may feel heavy, weak and numb for up to 1-2 days. Be very careful walking.   ·  You may resume normal activities 3 days after procedure.

## 2022-05-05 ENCOUNTER — HOSPITAL ENCOUNTER (OUTPATIENT)
Dept: RADIOLOGY | Facility: MEDICAL CENTER | Age: 67
End: 2022-05-05
Attending: PHYSICAL MEDICINE & REHABILITATION
Payer: MEDICARE

## 2022-05-05 ENCOUNTER — HOSPITAL ENCOUNTER (OUTPATIENT)
Dept: LAB | Facility: MEDICAL CENTER | Age: 67
End: 2022-05-05
Attending: PHYSICAL MEDICINE & REHABILITATION
Payer: MEDICARE

## 2022-05-05 DIAGNOSIS — M54.6 ACUTE LEFT-SIDED THORACIC BACK PAIN: ICD-10-CM

## 2022-05-05 DIAGNOSIS — M25.562 LEFT KNEE PAIN, UNSPECIFIED CHRONICITY: ICD-10-CM

## 2022-05-05 DIAGNOSIS — E55.9 VITAMIN D DEFICIENCY: ICD-10-CM

## 2022-05-05 DIAGNOSIS — Z79.899 MEDICATION MANAGEMENT: ICD-10-CM

## 2022-05-05 LAB
25(OH)D3 SERPL-MCNC: 35 NG/ML (ref 30–100)
ALBUMIN SERPL BCP-MCNC: 4.4 G/DL (ref 3.2–4.9)
ALBUMIN/GLOB SERPL: 1.6 G/DL
ALP SERPL-CCNC: 70 U/L (ref 30–99)
ALT SERPL-CCNC: 28 U/L (ref 2–50)
ANION GAP SERPL CALC-SCNC: 11 MMOL/L (ref 7–16)
AST SERPL-CCNC: 28 U/L (ref 12–45)
BILIRUB SERPL-MCNC: 0.3 MG/DL (ref 0.1–1.5)
BUN SERPL-MCNC: 13 MG/DL (ref 8–22)
CALCIUM SERPL-MCNC: 9.8 MG/DL (ref 8.5–10.5)
CHLORIDE SERPL-SCNC: 104 MMOL/L (ref 96–112)
CO2 SERPL-SCNC: 24 MMOL/L (ref 20–33)
CREAT SERPL-MCNC: 0.7 MG/DL (ref 0.5–1.4)
GFR SERPLBLD CREATININE-BSD FMLA CKD-EPI: 95 ML/MIN/1.73 M 2
GLOBULIN SER CALC-MCNC: 2.7 G/DL (ref 1.9–3.5)
GLUCOSE SERPL-MCNC: 194 MG/DL (ref 65–99)
POTASSIUM SERPL-SCNC: 4.8 MMOL/L (ref 3.6–5.5)
PROT SERPL-MCNC: 7.1 G/DL (ref 6–8.2)
SODIUM SERPL-SCNC: 139 MMOL/L (ref 135–145)

## 2022-05-05 PROCEDURE — 36415 COLL VENOUS BLD VENIPUNCTURE: CPT

## 2022-05-05 PROCEDURE — 82306 VITAMIN D 25 HYDROXY: CPT

## 2022-05-05 PROCEDURE — 72072 X-RAY EXAM THORAC SPINE 3VWS: CPT

## 2022-05-05 PROCEDURE — 80053 COMPREHEN METABOLIC PANEL: CPT

## 2022-05-05 PROCEDURE — 73562 X-RAY EXAM OF KNEE 3: CPT | Mod: LT

## 2022-05-16 NOTE — TELEPHONE ENCOUNTER
Patient wants to know the status of her CPAP machine. Please reach out to her.    Thank you,  Saloni SOLARES   yes

## 2022-06-15 ENCOUNTER — PATIENT MESSAGE (OUTPATIENT)
Dept: MEDICAL GROUP | Facility: PHYSICIAN GROUP | Age: 67
End: 2022-06-15
Payer: MEDICARE

## 2022-06-16 ENCOUNTER — OFFICE VISIT (OUTPATIENT)
Dept: PHYSICAL MEDICINE AND REHAB | Facility: MEDICAL CENTER | Age: 67
End: 2022-06-16
Payer: MEDICARE

## 2022-06-16 VITALS
DIASTOLIC BLOOD PRESSURE: 72 MMHG | OXYGEN SATURATION: 93 % | BODY MASS INDEX: 34.14 KG/M2 | SYSTOLIC BLOOD PRESSURE: 138 MMHG | TEMPERATURE: 96.8 F | HEIGHT: 63 IN | HEART RATE: 67 BPM | WEIGHT: 192.68 LBS

## 2022-06-16 DIAGNOSIS — M54.16 LEFT LUMBAR RADICULITIS: ICD-10-CM

## 2022-06-16 DIAGNOSIS — M51.36 DDD (DEGENERATIVE DISC DISEASE), LUMBAR: ICD-10-CM

## 2022-06-16 DIAGNOSIS — F33.1 DEPRESSION, MAJOR, RECURRENT, MODERATE (HCC): ICD-10-CM

## 2022-06-16 DIAGNOSIS — M96.1 POSTLAMINECTOMY SYNDROME: ICD-10-CM

## 2022-06-16 PROCEDURE — 99214 OFFICE O/P EST MOD 30 MIN: CPT | Performed by: PHYSICAL MEDICINE & REHABILITATION

## 2022-06-16 RX ORDER — GABAPENTIN 800 MG/1
1200 TABLET ORAL 2 TIMES DAILY
Qty: 270 TABLET | Refills: 0 | Status: SHIPPED | OUTPATIENT
Start: 2022-07-18 | End: 2022-08-10 | Stop reason: SDUPTHER

## 2022-06-16 RX ORDER — MORPHINE SULFATE 15 MG/1
15 TABLET, FILM COATED, EXTENDED RELEASE ORAL EVERY 12 HOURS
Qty: 60 TABLET | Refills: 0 | Status: SHIPPED | OUTPATIENT
Start: 2022-07-18 | End: 2022-07-21 | Stop reason: SDUPTHER

## 2022-06-16 RX ORDER — HYDROCODONE BITARTRATE AND ACETAMINOPHEN 5; 325 MG/1; MG/1
1 TABLET ORAL EVERY 8 HOURS PRN
Qty: 60 TABLET | Refills: 0 | Status: SHIPPED | OUTPATIENT
Start: 2022-07-18 | End: 2022-08-10 | Stop reason: SDUPTHER

## 2022-06-16 RX ORDER — MORPHINE SULFATE 15 MG/1
15 TABLET, FILM COATED, EXTENDED RELEASE ORAL EVERY 12 HOURS
Qty: 60 TABLET | Refills: 0 | Status: SHIPPED | OUTPATIENT
Start: 2022-06-18 | End: 2022-07-18

## 2022-06-16 RX ORDER — HYDROCODONE BITARTRATE AND ACETAMINOPHEN 5; 325 MG/1; MG/1
1 TABLET ORAL EVERY 8 HOURS PRN
Qty: 60 TABLET | Refills: 0 | Status: SHIPPED | OUTPATIENT
Start: 2022-06-18 | End: 2022-07-18

## 2022-06-16 ASSESSMENT — PATIENT HEALTH QUESTIONNAIRE - PHQ9
5. POOR APPETITE OR OVEREATING: 3 - NEARLY EVERY DAY
CLINICAL INTERPRETATION OF PHQ2 SCORE: 2
SUM OF ALL RESPONSES TO PHQ QUESTIONS 1-9: 9

## 2022-06-16 ASSESSMENT — PAIN SCALES - GENERAL: PAINLEVEL: 7=MODERATE-SEVERE PAIN

## 2022-06-16 ASSESSMENT — FIBROSIS 4 INDEX: FIB4 SCORE: 1.89

## 2022-06-16 NOTE — PROGRESS NOTES
Follow-up patient note    Physiatry (physical medicine and  Rehabilitation), interventional spine and sports medicine    Date of Service: 06/16/2022    Chief complaint:   Chief Complaint   Patient presents with   • Follow-Up     Back pain       HISTORY    HPI: Grisel Mark 67 y.o. female who presents today for follow-up evaluation of low back and leg pain.     Chelo returns for follow-up.  She reports that her low back pain continues with radicular symptoms into the left leg with aching numbness burning pins-and-needles and stabbing.    She has been doing her morning stretches and has been doing some exercises later in the day.  Her walking is limited by pain.  She has not had any falls and is not using an assist device.    Takes gabapentin 800mg taking 1.5 pills twice a day.  Her pain medications: Duloxetine 90mg.  MS Contin 15mg q12h.  Norco 5/325 for breakthrough, reports that she does not always take this, some days will take up to 3/day.   No side effects.  She is back to taking clonazepam 0.5mg at bedtime and 1mg during the day    Bowel movements have been more regular, reports that she is eating better.  She is taking miralax prn, not taking miralax as regular.  Using stool softeners still.    She met with Dr. Villa, prior to last visit, who approved trial for spinal cord stimulator after therapy work that she had done with him.       By history:  Her laminectomy was in 1998.  Previous injection on left L4 and L5 TFESI on 08/12/2020 helped with her leg pain.     Medical records review:  Dr. Francisco Olmos, plan to increase duloxetine 90mg daily, discontinue buspirone 20mg po bid, start buproprion XL 150mg daily, continue brexipiprazole 1mg qhs, hydroxyzine 25mg po tid prn, clonazepam 0.5mg daily prn    Review of records   Dr. Dheeraj De La Rosa:  08/20/2019 Right L3-4, L4-5, L5-S1 radiofrequency ablation    I reviewed the note from the referring provider Peter Bruce * dated 02/05/2020: She was  seen to establish care, having just moved from [place].  She was seen for essential hypertension, mixed hyperlipidemia, DM2 in obese, hypothyroidism, recurrent MDD in partial depression/anxiety, GERD without esophagitis, chronic bilateral low back pain with bilateral sciatica.  Medications associated with the above were written, related labs ordered including CBC, CMP, Hb A1c, lipids, microalbumin, TSH, free thryoxine, referral to ps\ychiatry, referral to GI for colonoscopy and referral to pain clinic.    Previous treatments:    Physical Therapy: Yes    Medications the patient is tried: Narcotics, gabapentin and muscle relaxers    Previous interventions: [facility] [place] these included right lumbar radiofrequency procedures    Previous surgeries to relieve the above pain:  Surgery on October 28, 1998      ROS:   ENT: ear infection, treated with augmentin  CV: irregular heart, reports history of tachycardia  Psych: depression since 1995  Heme: anemia  Endo: hypothyroidism, diabetes    Red Flags ROS:   Fever, Chills, Sweats: Denies  Involuntary Weight Loss: Denies  Bladder Incontinence: Denies  Bowel Incontinence: Denies  Saddle Anesthesia: Denies    All other systems reviewed and negative.       PMHx:   Past Medical History:   Diagnosis Date   • Anxiety    • Arrhythmia    • Chronic back pain    • Constipation    • Depression    • Diabetes (HCC)    • Ear pain, left 9/23/2021   • GERD (gastroesophageal reflux disease)    • Hyperlipidemia    • Hypertension    • Thyroid disease        PSHx:   Past Surgical History:   Procedure Laterality Date   • RADIO FREQUENCY ABLATION ADDITIONAL LEVEL Left 11/11/2021    Procedure: LEFT lumbar three through lumbar five radiofrequency ablation;  Surgeon: Paresh Ogden M.D.;  [facility];  Service: Pain Management   • LUMBAR MEDIAL BRANCH BLOCKS Left 7/21/2021    Procedure: LEFT lumbar three through lumbar five medial branch blocks;   Surgeon: Paresh Ogden M.D.;  Location: [facility];  Service: Pain Management   • LUMBAR TRANSFORAMINAL EPIDURAL STEROID INJECTION Left 5/20/2021    Procedure: LEFT lumbar four and lumbar five transforaminal epidural steroid injection;  Surgeon: Paresh Ogden M.D.;  Location: [facility];  Service: Pain Management   • BLOCK EPIDURAL STEROID INJECTION Left 2/24/2021    Procedure: INJECTION, STEROID, EPIDURAL;  Surgeon: Paresh Ogden M.D.;  Location: [facility];  Service: Pain Management   • LUMBAR MEDIAL BRANCH BLOCKS Left 9/9/2020    Procedure: BLOCK, NERVE, SPINAL, LUMBAR, POSTERIOR RAMUS, MEDIAL BRANCH;  Surgeon: Paresh Ogden M.D.;  Location: [facility];  Service: Pain Management   • ME NJX AA&/STRD TFRML EPI LUMBAR/SACRAL 1 LEVEL Left 8/12/2020    Procedure: INJECTION, SPINE, LUMBOSACRAL, EPIDURAL, 1 LEVEL, TRANSFORAMINAL APPROACH;  Surgeon: Paresh Ogden M.D.;  Location: [facility];  Service: Pain Management   • ME NJX AA&/STRD TFRML EPI LUMBAR/SACRAL EA ADDL Left 8/12/2020    Procedure: INJECTION, SPINE, LUMBOSACRAL, EPIDURAL, TRANSFORAMINAL APPROACH;  Surgeon: Paresh Ogden M.D.;  Location: [facility];  Service: Pain Management   • LAMINOTOMY  1998   • ABDOMINAL HYSTERECTOMY TOTAL     • CARPAL TUNNEL RELEASE     • CHOLECYSTECTOMY         Family history   Family History   Problem Relation Age of Onset   • Cancer Mother    • Stroke Father         Heart attack   • Dementia Sister    • Stroke Brother         heart Attack   • Cancer Brother         Skin   • Diabetes Brother    • Kidney Disease Brother    • Cancer Brother    • Parkinson's Disease Sister    • No Known Problems Sister    • Diabetes Daughter    • Hypertension Daughter          Medications:   Current Outpatient Medications   Medication   • [START ON 7/18/2022] gabapentin (NEURONTIN) 800 MG tablet   • [START ON 6/18/2022] morphine  ER (MS CONTIN) 15 MG Tab CR tablet   • [START ON 2022] HYDROcodone-acetaminophen (NORCO) 5-325 MG Tab per tablet   • [START ON 2022] HYDROcodone-acetaminophen (NORCO) 5-325 MG Tab per tablet   • [START ON 2022] morphine ER (MS CONTIN) 15 MG Tab CR tablet   • clonazePAM (KLONOPIN) 0.5 MG Tab   • clonazePAM (KLONOPIN) 1 MG Tab   • metFORMIN ER (GLUCOPHAGE XR) 500 MG TABLET SR 24 HR   • metoprolol tartrate (LOPRESSOR) 25 MG Tab   • Brexpiprazole (REXULTI) 2 MG Tab   • busPIRone (BUSPAR) 10 MG Tab tablet   • cyclobenzaprine (FLEXERIL) 5 mg tablet   • rosuvastatin (CRESTOR) 10 MG Tab   • DULoxetine (CYMBALTA) 30 MG Cap DR Particles   • omeprazole (PRILOSEC) 40 MG delayed-release capsule   • Cholecalciferol (VITAMIN D3) 1.25 MG (95192 UT) Cap   • Cholecalciferol 1.25 MG (19874 UT) Tab   • albuterol 108 (90 Base) MCG/ACT Aero Soln inhalation aerosol   • estradiol (ESTRACE) 1 MG Tab   • levothyroxine (SYNTHROID) 50 MCG Tab   • Empagliflozin (JARDIANCE) 25 MG Tab   • pseudoephedrine (SUDAFED) 30 MG Tab   • Blood Glucose Meter Kit   • Lancets   • Alcohol Swabs   • lisinopril (PRINIVIL) 10 MG Tab   • fluticasone (FLONASE) 50 MCG/ACT nasal spray   • aspirin EC (ECOTRIN) 81 MG Tablet Delayed Response   • cyanocobalamin (VITAMIN B-12) 500 MCG Tab   • Blood Glucose Test Strips   • Naloxone (NARCAN) 4 MG/0.1ML Liquid     No current facility-administered medications for this visit.       Allergies:   No Known Allergies    Social Hx:   Social History     Socioeconomic History   • Marital status:      Spouse name: Not on file   • Number of children: Not on file   • Years of education: Not on file   • Highest education level: Not on file   Occupational History   • Not on file   Tobacco Use   • Smoking status: Former Smoker     Packs/day: 0.25     Types: Cigarettes     Quit date:      Years since quittin.4   • Smokeless tobacco: Never Used   Vaping Use   • Vaping Use: Never used   Substance and Sexual  "Activity   • Alcohol use: Not Currently     Comment: rare   • Drug use: Not Currently     Types: Marijuana   • Sexual activity: Not Currently   Other Topics Concern   •  Service No   • Blood Transfusions Yes   • Caffeine Concern No   • Occupational Exposure No   • Hobby Hazards No   • Sleep Concern Yes   • Stress Concern Yes   • Weight Concern Yes   • Special Diet No   • Back Care No   • Exercise Yes   • Bike Helmet No   • Seat Belt Yes   • Self-Exams Yes   Social History Narrative   • Not on file     Social Determinants of Health     Financial Resource Strain: Not on file   Food Insecurity: Not on file   Transportation Needs: Not on file   Physical Activity: Not on file   Stress: Not on file   Social Connections: Not on file   Intimate Partner Violence: Not on file   Housing Stability: Not on file         EXAMINATION     Physical Exam:   Vitals: /72 (BP Location: Right arm, Patient Position: Sitting, BP Cuff Size: Adult)   Pulse 67   Temp 36 °C (96.8 °F) (Temporal)   Ht 1.6 m (5' 3\")   Wt 87.4 kg (192 lb 10.9 oz)   SpO2 93%     Constitutional:   Body Habitus: Body mass index is 34.13 kg/m².  Cooperation: Fully cooperates with exam  Appearance: Well-groomed, well-nourished, not disheveled, no acute distress    Eyes: No scleral icterus, no proptosis     ENT -no obvious auditory deficits, wearing a facial mask    Skin -no rashes or lesions noted    Respiratory-  breathing comfortable on room air, no audible wheezing    Cardiovascular- no lower extremity edema is noted.     Psychiatric- alert and oriented ×3. Normal affect.     Gait - normal gait, no use of ambulatory device, antalgic.     Musculoskeletal -     Thoracic/Lumbar Spine/Sacral Spine/Hips   Inspection: no evidence of atrophy in bilateral lower extremities throughout     ROM of the lumbar spine decreased.     Palpation: tenderness to palpation lumbar paraspinals minimal    Neuro     Key points for the international standards for " neurological classification of spinal cord injury (ISNCSCI) to light touch.     Dermatome R L   L2 2 2   L3 2 2   L4 2 2   L5 2 1   S1 2 1   S2 2 2       Motor Exam Lower Extremities    ? Myotome R L   Hip flexion L2 5 5   Knee extension L3 5 5   Ankle dorsiflexion L4 5 5   Toe extension L5 5 5   Ankle plantarflexion S1 5 5       Reflexes  ?  R L   Patella  2+ 2+   Achilles   2+ 1+     No clonus bilaterally    MEDICAL DECISION MAKING    Medical records review: see under HPI section.     DATA    Labs:   03/18/2022: UDS positive for morphine and negative for metabolites, positive for hydrocodone, positive for clonazepam  01/18/2022: UDS positive for morphine and negative for metabolites, absent hydrocodone, positive for clonazepam  10/20/2021: UDS positive for morphine and metabolites, clonzapem, low amount of THC noted  05/08/2021: UDS positive for morphine and metabolites, clonazepam and metabolites, hydrocodone and metabolites  08/18/2020: UDS positive for morphine and metabolites, clonazepam and metabolites, hydrocodone and metabolites  04/24/2020 UDS positive for morphine and metabolites, clonazepam  04/09/2020 Vitamin D, 25:  28L  04/09/2020 Vitamin B12: 217    Lab Results   Component Value Date/Time    SODIUM 139 05/05/2022 12:18 PM    POTASSIUM 4.8 05/05/2022 12:18 PM    CHLORIDE 104 05/05/2022 12:18 PM    CO2 24 05/05/2022 12:18 PM    ANION 11.0 05/05/2022 12:18 PM    GLUCOSE 194 (H) 05/05/2022 12:18 PM    BUN 13 05/05/2022 12:18 PM    CREATININE 0.70 05/05/2022 12:18 PM    CALCIUM 9.8 05/05/2022 12:18 PM    ASTSGOT 28 05/05/2022 12:18 PM    ALTSGPT 28 05/05/2022 12:18 PM    TBILIRUBIN 0.3 05/05/2022 12:18 PM    ALBUMIN 4.4 05/05/2022 12:18 PM    TOTPROTEIN 7.1 05/05/2022 12:18 PM    GLOBULIN 2.7 05/05/2022 12:18 PM    AGRATIO 1.6 05/05/2022 12:18 PM       No results found for: PROTHROMBTM, INR     Lab Results   Component Value Date/Time    WBC 7.5 05/04/2021 08:06 AM    RBC 4.73 05/04/2021 08:06 AM     HEMOGLOBIN 14.6 05/04/2021 08:06 AM    HEMATOCRIT 43.8 05/04/2021 08:06 AM    MCV 92.6 05/04/2021 08:06 AM    MCH 30.9 05/04/2021 08:06 AM    MCHC 33.3 (L) 05/04/2021 08:06 AM    MPV 10.6 05/04/2021 08:06 AM    NEUTSPOLYS 46.60 05/04/2021 08:06 AM    LYMPHOCYTES 39.80 05/04/2021 08:06 AM    MONOCYTES 9.30 05/04/2021 08:06 AM    EOSINOPHILS 3.70 05/04/2021 08:06 AM    BASOPHILS 0.50 05/04/2021 08:06 AM        Lab Results   Component Value Date/Time    HBA1C 7.8 (H) 02/28/2022 10:32 AM        Imaging: I personally reviewed following images, these are my reads:     MRI lumbar spine 05/05/2020  At L1-2, no central or foraminal stenosis  At L2-3, no central or foraminal stenosis  At L3-4, mild disc bulge and mild ligamentum flavum thickening.  No central canal stenosis. Borderline left foraminal stenosis. Schmorl's node.   At L4-5, diffuse disc bulge with mild ligamentum flavum thickening.  No central canal stenosis.  There is facet arthropathy, left greater than right. Mild foraminal stenosis bilaterally. S/P left L4/5 hemilaminectomy  At L5-S1, there is mild-borderline foraminal stenosis with facet arthropathy and mild disc bulge.  No central canal stenosis    Xray lumbar spine 05/05/2020  There is mild decreased joint space at L3-4 and L4-5.    Facet arthropathy is noted, greatest at L5-S1 bilaterally.  No significant motion on flexion and extension films    IMAGING radiology reads. I reviewed the following radiology reads    Xray abdomen 07/17/2020  IMPRESSION:     Nonspecific bowel gas pattern. No evidence small bowel obstruction.      MRI lumbar spine 05/05/2020  IMPRESSION:     1.  Caudal lumbar facet arthropathy left worse than right with no bony destruction to indicate septic or inflammatory arthropathy. This most likely is just degenerative  2.  Mild caudal lumbar foraminal stenoses  3.  No significant central stenosis.  4.  Left L4/5 hemilaminectomy                                                                                    Xray lumbar spine 05/05/2020     IMPRESSION:     1.  No acute lumbar compression fracture or subluxation.  2.  Posterior disc space narrowing at L4-5 and to lesser extent at L3-4.  3.  Bilateral facet arthropathy at L5-S1.                                                                                             Diagnosis   Visit Diagnoses     ICD-10-CM   1. Postlaminectomy syndrome  M96.1   2. Left lumbar radiculitis  M54.16   3. DDD (degenerative disc disease), lumbar  M51.36   4. Depression, major, recurrent, moderate (HCC)  F33.1         ASSESSMENT:  Grisel Mark 67 y.o. female seen for above     Grisel was seen today for follow-up.    Diagnoses and all orders for this visit:    Postlaminectomy syndrome  -     gabapentin (NEURONTIN) 800 MG tablet; Take 1.5 Tablets by mouth 2 times a day for 90 days.  -     morphine ER (MS CONTIN) 15 MG Tab CR tablet; Take 1 Tablet by mouth every 12 hours for 30 days.  -     HYDROcodone-acetaminophen (NORCO) 5-325 MG Tab per tablet; Take 1 Tablet by mouth every 8 hours as needed (severe pain) for up to 30 days.  -     HYDROcodone-acetaminophen (NORCO) 5-325 MG Tab per tablet; Take 1 Tablet by mouth every 8 hours as needed (severe pain) for up to 30 days.  -     morphine ER (MS CONTIN) 15 MG Tab CR tablet; Take 1 Tablet by mouth every 12 hours for 30 days.  -     Consent for Opiate Prescription  -     Controlled Substance Treatment Agreement    Left lumbar radiculitis  -     gabapentin (NEURONTIN) 800 MG tablet; Take 1.5 Tablets by mouth 2 times a day for 90 days.    DDD (degenerative disc disease), lumbar  -     morphine ER (MS CONTIN) 15 MG Tab CR tablet; Take 1 Tablet by mouth every 12 hours for 30 days.  -     HYDROcodone-acetaminophen (NORCO) 5-325 MG Tab per tablet; Take 1 Tablet by mouth every 8 hours as needed (severe pain) for up to 30 days.  -     HYDROcodone-acetaminophen (NORCO) 5-325 MG Tab per tablet; Take 1 Tablet by mouth every 8 hours as  needed (severe pain) for up to 30 days.  -     morphine ER (MS CONTIN) 15 MG Tab CR tablet; Take 1 Tablet by mouth every 12 hours for 30 days.  -     Consent for Opiate Prescription  -     Controlled Substance Treatment Agreement    Depression, major, recurrent, moderate (HCC)         1.  We discussed that her insurance company did not approve spinal cord stimulator trial as they interpreted that the psychologist did not deem her a good candidate.  Rather than appeal this decision I let the insurance company know that I would ask her to continue to work with a psychologist and have discussed this with Dr. Villa.  While he had given approval for her to have the spinal cord stimulator trial I have discussed having her continue to work with a psychologist for a number of months.  If he is able to give a recommendation without reservation for spinal cord stimulator trial, I will request approval again.  2.  Reviewed current medications. Continue MS Contin 15mg po q12h and norco for breakthrough.  Discussed norco up to 2/day.   reviewed. She is continuing to take chronic benzodiazepines.  4. Continue colace 100mg bid and miralax prn constipation.  5. Continue activity as tolerated.  Encouraged her to continue with her home exercise program from physical therapy.  6.  Depression stable without suicidality.  She will continue to follow-up with her PCP for continued management.    Follow-up: Return in about 2 months (around 8/16/2022).    Thank you very much for asking me to participate in Grisel Mark's care.  Please contact me with any questions or concerns.    Please note that this dictation was created using voice recognition software. I have made every reasonable attempt to correct obvious errors but there may be errors of grammar and content that I may have overlooked prior to finalization of this note.      Paresh Ogden MD  Physical Medicine and Rehabilitation  Interventional Spine and Sports  Physiatry  [facility]

## 2022-06-21 RX ORDER — ALBUTEROL SULFATE 90 UG/1
2 AEROSOL, METERED RESPIRATORY (INHALATION) EVERY 4 HOURS PRN
Qty: 1 EACH | Refills: 0 | Status: SHIPPED | OUTPATIENT
Start: 2022-06-21 | End: 2022-12-19 | Stop reason: SDUPTHER

## 2022-07-11 DIAGNOSIS — F13.20 BENZODIAZEPINE DEPENDENCE (HCC): ICD-10-CM

## 2022-07-11 DIAGNOSIS — F41.8 SITUATIONAL ANXIETY: ICD-10-CM

## 2022-07-11 DIAGNOSIS — F41.1 GENERALIZED ANXIETY DISORDER: ICD-10-CM

## 2022-07-11 DIAGNOSIS — F43.10 POSTTRAUMATIC STRESS DISORDER, DELAYED ONSET: ICD-10-CM

## 2022-07-11 RX ORDER — CLONAZEPAM 1 MG/1
1 TABLET ORAL EVERY MORNING
Qty: 90 TABLET | Refills: 1 | OUTPATIENT
Start: 2022-07-11 | End: 2022-10-09

## 2022-07-11 RX ORDER — CLONAZEPAM 0.5 MG/1
0.5 TABLET ORAL NIGHTLY
Qty: 90 TABLET | Refills: 1 | OUTPATIENT
Start: 2022-07-11 | End: 2022-10-09

## 2022-07-19 ENCOUNTER — TELEPHONE (OUTPATIENT)
Dept: HEALTH INFORMATION MANAGEMENT | Facility: OTHER | Age: 67
End: 2022-07-19
Payer: MEDICARE

## 2022-07-20 ENCOUNTER — TELEPHONE (OUTPATIENT)
Dept: PHYSICAL MEDICINE AND REHAB | Facility: MEDICAL CENTER | Age: 67
End: 2022-07-20
Payer: MEDICARE

## 2022-07-20 NOTE — TELEPHONE ENCOUNTER
Patient called and she stated in the [facility] in [facility] they don't have in stock the Morphine ER 15 Mg and she is requesting to send the Rx to [facility] in [address] [facility], pharmacy added on her chart. Isidra,

## 2022-07-21 ENCOUNTER — TELEPHONE (OUTPATIENT)
Dept: PHYSICAL MEDICINE AND REHAB | Facility: MEDICAL CENTER | Age: 67
End: 2022-07-21
Payer: MEDICARE

## 2022-07-21 DIAGNOSIS — M51.36 DDD (DEGENERATIVE DISC DISEASE), LUMBAR: ICD-10-CM

## 2022-07-21 DIAGNOSIS — M96.1 POSTLAMINECTOMY SYNDROME: ICD-10-CM

## 2022-07-21 RX ORDER — MORPHINE SULFATE 15 MG/1
15 TABLET, FILM COATED, EXTENDED RELEASE ORAL EVERY 12 HOURS
Qty: 60 TABLET | Refills: 0 | Status: SHIPPED | OUTPATIENT
Start: 2022-07-21 | End: 2022-08-10 | Stop reason: SDUPTHER

## 2022-07-21 NOTE — TELEPHONE ENCOUNTER
Patient called today requesting to send the morphine ER to the [facility] in [facility]. Pharmacy already set on her chart.

## 2022-08-10 ENCOUNTER — OFFICE VISIT (OUTPATIENT)
Dept: PHYSICAL MEDICINE AND REHAB | Facility: MEDICAL CENTER | Age: 67
End: 2022-08-10
Payer: MEDICARE

## 2022-08-10 VITALS
DIASTOLIC BLOOD PRESSURE: 80 MMHG | OXYGEN SATURATION: 94 % | TEMPERATURE: 96.6 F | SYSTOLIC BLOOD PRESSURE: 124 MMHG | HEIGHT: 63 IN | BODY MASS INDEX: 33.31 KG/M2 | WEIGHT: 188 LBS | HEART RATE: 68 BPM

## 2022-08-10 DIAGNOSIS — M54.16 LEFT LUMBAR RADICULITIS: ICD-10-CM

## 2022-08-10 DIAGNOSIS — M96.1 POSTLAMINECTOMY SYNDROME: ICD-10-CM

## 2022-08-10 DIAGNOSIS — M51.36 DDD (DEGENERATIVE DISC DISEASE), LUMBAR: ICD-10-CM

## 2022-08-10 PROCEDURE — 99214 OFFICE O/P EST MOD 30 MIN: CPT | Performed by: PHYSICAL MEDICINE & REHABILITATION

## 2022-08-10 RX ORDER — HYDROCODONE BITARTRATE AND ACETAMINOPHEN 5; 325 MG/1; MG/1
1 TABLET ORAL EVERY 8 HOURS PRN
Qty: 60 TABLET | Refills: 0 | Status: SHIPPED | OUTPATIENT
Start: 2022-09-17 | End: 2022-10-07 | Stop reason: SDUPTHER

## 2022-08-10 RX ORDER — GABAPENTIN 800 MG/1
1200 TABLET ORAL 2 TIMES DAILY
Qty: 270 TABLET | Refills: 0 | Status: SHIPPED | OUTPATIENT
Start: 2022-08-10 | End: 2022-11-08

## 2022-08-10 RX ORDER — HYDROCODONE BITARTRATE AND ACETAMINOPHEN 5; 325 MG/1; MG/1
1 TABLET ORAL EVERY 8 HOURS PRN
Qty: 60 TABLET | Refills: 0 | Status: SHIPPED | OUTPATIENT
Start: 2022-08-18 | End: 2022-08-19 | Stop reason: SDUPTHER

## 2022-08-10 RX ORDER — MORPHINE SULFATE 15 MG/1
15 TABLET, FILM COATED, EXTENDED RELEASE ORAL EVERY 12 HOURS
Qty: 60 TABLET | Refills: 0 | Status: SHIPPED | OUTPATIENT
Start: 2022-08-20 | End: 2022-08-19 | Stop reason: SDUPTHER

## 2022-08-10 RX ORDER — MORPHINE SULFATE 15 MG/1
15 TABLET, FILM COATED, EXTENDED RELEASE ORAL EVERY 12 HOURS
Qty: 60 TABLET | Refills: 0 | Status: SHIPPED | OUTPATIENT
Start: 2022-09-19 | End: 2022-10-07 | Stop reason: SDUPTHER

## 2022-08-10 ASSESSMENT — FIBROSIS 4 INDEX: FIB4 SCORE: 1.89

## 2022-08-10 ASSESSMENT — PATIENT HEALTH QUESTIONNAIRE - PHQ9
5. POOR APPETITE OR OVEREATING: 2 - MORE THAN HALF THE DAYS
SUM OF ALL RESPONSES TO PHQ QUESTIONS 1-9: 5
CLINICAL INTERPRETATION OF PHQ2 SCORE: 1

## 2022-08-10 ASSESSMENT — PAIN SCALES - GENERAL: PAINLEVEL: 8=MODERATE-SEVERE PAIN

## 2022-08-10 NOTE — PROGRESS NOTES
Follow-up patient note    Physiatry (physical medicine and  Rehabilitation), interventional spine and sports medicine    Date of Service: 08/10/2022    Chief complaint:   Chief Complaint   Patient presents with    Follow-Up     Left leg pain       HISTORY    HPI: Grisel Mark 67 y.o. female who presents today for follow-up evaluation of low back and leg pain. She also reports that she has been having more arm pain.    She is upset that she was not approved for her spinal cord stimulator. She has not scheduled with Dr. Villa yet.    Radicular symptoms into the left leg with aching numbness burning pins-and-needles and stabbing.  Some spasms in the back persists.    She has been walking for up to 25 minutes daily.  Feeling good about that.  Continuing morning stretching.      Medications:  Takes gabapentin 800mg taking 1.5 pills twice a day.  Her pain medications: Duloxetine 90mg.  MS Contin 15mg q12h.  Norco 5/325 for breakthrough, reports that she does not always take this, some days will take up to 3/day.   No side effects reported.  She is back to taking clonazepam 0.5mg at bedtime and 1mg during the day    Bowel movements have been regular, reports that she is eating better.  Taking miralax prn, but has not taken recently.  No stool softeners lately.           By history:  Her laminectomy was in 1998.  Previous injection on left L4 and L5 TFESI on 08/12/2020 helped with her leg pain.     Medical records review:  Dr. Francisco Olmos, plan to increase duloxetine 90mg daily, discontinue buspirone 20mg po bid, start buproprion XL 150mg daily, continue brexipiprazole 1mg qhs, hydroxyzine 25mg po tid prn, clonazepam 0.5mg daily prn    Review of records   Dr. Dheeraj De La Rosa:  08/20/2019 Right L3-4, L4-5, L5-S1 radiofrequency ablation    I reviewed the note from the referring provider Peter Bruce * dated 02/05/2020: She was seen to establish care, having just moved from [place].  She was seen for  essential hypertension, mixed hyperlipidemia, DM2 in obese, hypothyroidism, recurrent MDD in partial depression/anxiety, GERD without esophagitis, chronic bilateral low back pain with bilateral sciatica.  Medications associated with the above were written, related labs ordered including CBC, CMP, Hb A1c, lipids, microalbumin, TSH, free thryoxine, referral to ps\ychiatry, referral to GI for colonoscopy and referral to pain clinic.    Previous treatments:    Physical Therapy: Yes    Medications the patient is tried: Narcotics, gabapentin and muscle relaxers    Previous interventions: [facility] these included right lumbar radiofrequency procedures    Previous surgeries to relieve the above pain:  Surgery on October 28, 1998      ROS:   ENT: ear infection, treated with augmentin  CV: irregular heart, reports history of tachycardia  Psych: depression since 1995  Heme: anemia  Endo: hypothyroidism, diabetes    Red Flags ROS:   Fever, Chills, Sweats: Denies  Involuntary Weight Loss: Denies  Bladder Incontinence: Denies  Bowel Incontinence: Denies  Saddle Anesthesia: Denies    All other systems reviewed and negative.       PMHx:   Past Medical History:   Diagnosis Date    Anxiety     Arrhythmia     Chronic back pain     Constipation     Depression     Diabetes (HCC)     Ear pain, left 9/23/2021    GERD (gastroesophageal reflux disease)     Hyperlipidemia     Hypertension     Thyroid disease        PSHx:   Past Surgical History:   Procedure Laterality Date    RADIO FREQUENCY ABLATION ADDITIONAL LEVEL Left 11/11/2021    Procedure: LEFT lumbar three through lumbar five radiofrequency ablation;  Surgeon: Paresh Ogden M.D.;  [facility];  Service: Pain Management    LUMBAR MEDIAL BRANCH BLOCKS Left 7/21/2021    Procedure: LEFT lumbar three through lumbar five medial branch blocks;  Surgeon: Paresh Ogden M.D.;  Location: [facility];  Service: Pain  Management    LUMBAR TRANSFORAMINAL EPIDURAL STEROID INJECTION Left 5/20/2021    Procedure: LEFT lumbar four and lumbar five transforaminal epidural steroid injection;  Surgeon: Paresh Ogden M.D.;  Location: [facility];  Service: Pain Management    BLOCK EPIDURAL STEROID INJECTION Left 2/24/2021    Procedure: INJECTION, STEROID, EPIDURAL;  Surgeon: Paresh Ogden M.D.;  Location: [facility];  Service: Pain Management    LUMBAR MEDIAL BRANCH BLOCKS Left 9/9/2020    Procedure: BLOCK, NERVE, SPINAL, LUMBAR, POSTERIOR RAMUS, MEDIAL BRANCH;  Surgeon: Paresh Ogden M.D.;  Location: [facility];  Service: Pain Management    OH NJX AA&/STRD TFRML EPI LUMBAR/SACRAL 1 LEVEL Left 8/12/2020    Procedure: INJECTION, SPINE, LUMBOSACRAL, EPIDURAL, 1 LEVEL, TRANSFORAMINAL APPROACH;  Surgeon: Paresh Ogden M.D.;  Location: [facility];  Service: Pain Management    OH NJX AA&/STRD TFRML EPI LUMBAR/SACRAL EA ADDL Left 8/12/2020    Procedure: INJECTION, SPINE, LUMBOSACRAL, EPIDURAL, TRANSFORAMINAL APPROACH;  Surgeon: Paresh Ogden M.D.;  Location: [facility];  Service: Pain Management    LAMINOTOMY  1998    ABDOMINAL HYSTERECTOMY TOTAL      CARPAL TUNNEL RELEASE      CHOLECYSTECTOMY         Family history   Family History   Problem Relation Age of Onset    Cancer Mother     Stroke Father         Heart attack    Dementia Sister     Stroke Brother         heart Attack    Cancer Brother         Skin    Diabetes Brother     Kidney Disease Brother     Cancer Brother     Parkinson's Disease Sister     No Known Problems Sister     Diabetes Daughter     Hypertension Daughter          Medications:   Current Outpatient Medications   Medication    gabapentin (NEURONTIN) 800 MG tablet    [START ON 8/18/2022] HYDROcodone-acetaminophen (NORCO) 5-325 MG Tab per tablet    [START ON 8/20/2022] morphine ER (MS CONTIN) 15 MG Tab CR tablet    [START ON  2022] morphine ER (MS CONTIN) 15 MG Tab CR tablet    [START ON 2022] HYDROcodone-acetaminophen (NORCO) 5-325 MG Tab per tablet    albuterol 108 (90 Base) MCG/ACT Aero Soln inhalation aerosol    metFORMIN ER (GLUCOPHAGE XR) 500 MG TABLET SR 24 HR    metoprolol tartrate (LOPRESSOR) 25 MG Tab    Brexpiprazole (REXULTI) 2 MG Tab    busPIRone (BUSPAR) 10 MG Tab tablet    cyclobenzaprine (FLEXERIL) 5 mg tablet    rosuvastatin (CRESTOR) 10 MG Tab    DULoxetine (CYMBALTA) 30 MG Cap DR Particles    omeprazole (PRILOSEC) 40 MG delayed-release capsule    Cholecalciferol (VITAMIN D3) 1.25 MG (44843 UT) Cap    Cholecalciferol 1.25 MG (12411 UT) Tab    estradiol (ESTRACE) 1 MG Tab    levothyroxine (SYNTHROID) 50 MCG Tab    Empagliflozin (JARDIANCE) 25 MG Tab    pseudoephedrine (SUDAFED) 30 MG Tab    lisinopril (PRINIVIL) 10 MG Tab    fluticasone (FLONASE) 50 MCG/ACT nasal spray    aspirin EC (ECOTRIN) 81 MG Tablet Delayed Response    cyanocobalamin (VITAMIN B-12) 500 MCG Tab    Naloxone (NARCAN) 4 MG/0.1ML Liquid    Blood Glucose Meter Kit    Lancets    Alcohol Swabs    Blood Glucose Test Strips     No current facility-administered medications for this visit.       Allergies:   No Known Allergies    Social Hx:   Social History     Socioeconomic History    Marital status:      Spouse name: Not on file    Number of children: Not on file    Years of education: Not on file    Highest education level: Not on file   Occupational History    Not on file   Tobacco Use    Smoking status: Former     Packs/day: 0.25     Types: Cigarettes     Quit date:      Years since quittin.6    Smokeless tobacco: Never   Vaping Use    Vaping Use: Never used   Substance and Sexual Activity    Alcohol use: Not Currently     Comment: rare    Drug use: Not Currently     Types: Marijuana    Sexual activity: Not Currently   Other Topics Concern     Service No    Blood Transfusions Yes    Caffeine Concern No    Occupational  "Exposure No    Hobby Hazards No    Sleep Concern Yes    Stress Concern Yes    Weight Concern Yes    Special Diet No    Back Care No    Exercise Yes    Bike Helmet No    Seat Belt Yes    Self-Exams Yes   Social History Narrative    Not on file     Social Determinants of Health     Financial Resource Strain: Not on file   Food Insecurity: Not on file   Transportation Needs: Not on file   Physical Activity: Not on file   Stress: Not on file   Social Connections: Not on file   Intimate Partner Violence: Not on file   Housing Stability: Not on file         EXAMINATION     Physical Exam:   Vitals: /80 (BP Location: Left arm, Patient Position: Sitting, BP Cuff Size: Adult long)   Pulse 68   Temp 35.9 °C (96.6 °F) (Temporal)   Ht 1.6 m (5' 3\")   Wt 85.3 kg (188 lb)   SpO2 94%     Constitutional:   Body Habitus: Body mass index is 33.3 kg/m².  Cooperation: Fully cooperates with exam  Appearance: Well-groomed, well-nourished, not disheveled, no acute distress    Eyes: No scleral icterus, no proptosis     ENT -no obvious auditory deficits, wearing a facial mask    Skin -no rashes or lesions noted    Respiratory-  breathing comfortable on room air, no audible wheezing    Cardiovascular- no lower extremity edema is noted.     Psychiatric- alert and oriented ×3. Normal affect.     Gait - normal gait, no use of ambulatory device, antalgic.     Musculoskeletal -     Thoracic/Lumbar Spine/Sacral Spine/Hips   Inspection: no evidence of atrophy in bilateral lower extremities throughout     ROM of the lumbar spine decreased.     Palpation: tenderness to palpation lumbar paraspinals minimal    Neuro     Key points for the international standards for neurological classification of spinal cord injury (ISNCSCI) to light touch.     Dermatome R L   L2 2 2   L3 2 2   L4 2 2   L5 2 1   S1 2 1   S2 2 2       Motor Exam Lower Extremities    ? Myotome R L   Hip flexion L2 5 5   Knee extension L3 5 5   Ankle dorsiflexion L4 5 5   Toe " extension L5 5 5   Ankle plantarflexion S1 5 5       Reflexes  ?  R L   Patella  2+ 2+   Achilles   2+ 1+     No clonus bilaterally    MEDICAL DECISION MAKING    Medical records review: see under HPI section.     DATA    Labs:   03/18/2022: UDS positive for morphine and negative for metabolites, positive for hydrocodone, positive for clonazepam  01/18/2022: UDS positive for morphine and negative for metabolites, absent hydrocodone, positive for clonazepam  10/20/2021: UDS positive for morphine and metabolites, clonzapem, low amount of THC noted  05/08/2021: UDS positive for morphine and metabolites, clonazepam and metabolites, hydrocodone and metabolites  08/18/2020: UDS positive for morphine and metabolites, clonazepam and metabolites, hydrocodone and metabolites  04/24/2020 UDS positive for morphine and metabolites, clonazepam  04/09/2020 Vitamin D, 25:  28L  04/09/2020 Vitamin B12: 217    Lab Results   Component Value Date/Time    SODIUM 139 05/05/2022 12:18 PM    POTASSIUM 4.8 05/05/2022 12:18 PM    CHLORIDE 104 05/05/2022 12:18 PM    CO2 24 05/05/2022 12:18 PM    ANION 11.0 05/05/2022 12:18 PM    GLUCOSE 194 (H) 05/05/2022 12:18 PM    BUN 13 05/05/2022 12:18 PM    CREATININE 0.70 05/05/2022 12:18 PM    CALCIUM 9.8 05/05/2022 12:18 PM    ASTSGOT 28 05/05/2022 12:18 PM    ALTSGPT 28 05/05/2022 12:18 PM    TBILIRUBIN 0.3 05/05/2022 12:18 PM    ALBUMIN 4.4 05/05/2022 12:18 PM    TOTPROTEIN 7.1 05/05/2022 12:18 PM    GLOBULIN 2.7 05/05/2022 12:18 PM    AGRATIO 1.6 05/05/2022 12:18 PM       No results found for: PROTHROMBTM, INR     Lab Results   Component Value Date/Time    WBC 7.5 05/04/2021 08:06 AM    RBC 4.73 05/04/2021 08:06 AM    HEMOGLOBIN 14.6 05/04/2021 08:06 AM    HEMATOCRIT 43.8 05/04/2021 08:06 AM    MCV 92.6 05/04/2021 08:06 AM    MCH 30.9 05/04/2021 08:06 AM    MCHC 33.3 (L) 05/04/2021 08:06 AM    MPV 10.6 05/04/2021 08:06 AM    NEUTSPOLYS 46.60 05/04/2021 08:06 AM    LYMPHOCYTES 39.80 05/04/2021 08:06  AM    MONOCYTES 9.30 05/04/2021 08:06 AM    EOSINOPHILS 3.70 05/04/2021 08:06 AM    BASOPHILS 0.50 05/04/2021 08:06 AM        Lab Results   Component Value Date/Time    HBA1C 7.8 (H) 02/28/2022 10:32 AM        Imaging: I personally reviewed following images, these are my reads:     MRI lumbar spine 05/05/2020  At L1-2, no central or foraminal stenosis  At L2-3, no central or foraminal stenosis  At L3-4, mild disc bulge and mild ligamentum flavum thickening.  No central canal stenosis. Borderline left foraminal stenosis. Schmorl's node.   At L4-5, diffuse disc bulge with mild ligamentum flavum thickening.  No central canal stenosis.  There is facet arthropathy, left greater than right. Mild foraminal stenosis bilaterally. S/P left L4/5 hemilaminectomy  At L5-S1, there is mild-borderline foraminal stenosis with facet arthropathy and mild disc bulge.  No central canal stenosis    Xray lumbar spine 05/05/2020  There is mild decreased joint space at L3-4 and L4-5.    Facet arthropathy is noted, greatest at L5-S1 bilaterally.  No significant motion on flexion and extension films    IMAGING radiology reads. I reviewed the following radiology reads    Xray abdomen 07/17/2020  IMPRESSION:     Nonspecific bowel gas pattern. No evidence small bowel obstruction.      MRI lumbar spine 05/05/2020  IMPRESSION:     1.  Caudal lumbar facet arthropathy left worse than right with no bony destruction to indicate septic or inflammatory arthropathy. This most likely is just degenerative  2.  Mild caudal lumbar foraminal stenoses  3.  No significant central stenosis.  4.  Left L4/5 hemilaminectomy                                                                                   Xray lumbar spine 05/05/2020     IMPRESSION:     1.  No acute lumbar compression fracture or subluxation.  2.  Posterior disc space narrowing at L4-5 and to lesser extent at L3-4.  3.  Bilateral facet arthropathy at L5-S1.                                                                                              Diagnosis   Visit Diagnoses     ICD-10-CM   1. Postlaminectomy syndrome  M96.1   2. Left lumbar radiculitis  M54.16   3. DDD (degenerative disc disease), lumbar  M51.36           ASSESSMENT:  Grisel Mark 67 y.o. female seen for above     Grisel was seen today for follow-up.    Diagnoses and all orders for this visit:    Postlaminectomy syndrome  -     gabapentin (NEURONTIN) 800 MG tablet; Take 1.5 Tablets by mouth 2 times a day for 90 days.  -     HYDROcodone-acetaminophen (NORCO) 5-325 MG Tab per tablet; Take 1 Tablet by mouth every 8 hours as needed (severe pain) for up to 30 days.  -     morphine ER (MS CONTIN) 15 MG Tab CR tablet; Take 1 Tablet by mouth every 12 hours for 30 days.  -     morphine ER (MS CONTIN) 15 MG Tab CR tablet; Take 1 Tablet by mouth every 12 hours for 30 days.  -     HYDROcodone-acetaminophen (NORCO) 5-325 MG Tab per tablet; Take 1 Tablet by mouth every 8 hours as needed (severe pain) for up to 30 days.  -     Pain Management Screen  -     Consent for Opiate Prescription  -     Controlled Substance Treatment Agreement    Left lumbar radiculitis  -     gabapentin (NEURONTIN) 800 MG tablet; Take 1.5 Tablets by mouth 2 times a day for 90 days.    DDD (degenerative disc disease), lumbar  -     HYDROcodone-acetaminophen (NORCO) 5-325 MG Tab per tablet; Take 1 Tablet by mouth every 8 hours as needed (severe pain) for up to 30 days.  -     morphine ER (MS CONTIN) 15 MG Tab CR tablet; Take 1 Tablet by mouth every 12 hours for 30 days.  -     morphine ER (MS CONTIN) 15 MG Tab CR tablet; Take 1 Tablet by mouth every 12 hours for 30 days.  -     HYDROcodone-acetaminophen (NORCO) 5-325 MG Tab per tablet; Take 1 Tablet by mouth every 8 hours as needed (severe pain) for up to 30 days.  -     Pain Management Screen  -     Consent for Opiate Prescription  -     Controlled Substance Treatment Agreement       Discussed plans for her to follow-up with   [facility].  We discussed her disappointment that the spinal cord stimulator was not approved but I have encouraged her to follow-up with Dr. Villa and we can reconsider this in the future.  Reviewed home exercise program.  Discussed that she has used THC and that with the multiple other medications that she is on that this is not advised.  She seems to indicate that she has been under more stress and have more anxiety lately.  Anticipate that this will be on her UDS.  We discussed that she will stop using this.  Reviewed current medications.  Plan to continue with MS Contin 15 mg every 12 hours.  Continue norco up to 2/day.   reviewed. She is continuing to take chronic benzodiazepines.  A good long-term goal will be to slowly wean her benzodiazepines and to work on nonpharmacologic management of her anxiety.  Continue colace 100mg bid and miralax prn constipation.  6.   Continue activity as tolerated.  Encouraged home program from physical    therapy.  She has increased her walking activity      Follow-up: Return in about 2 months (around 10/10/2022).    Thank you very much for asking me to participate in Grisel Mark's care.  Please contact me with any questions or concerns.    Please note that this dictation was created using voice recognition software. I have made every reasonable attempt to correct obvious errors but there may be errors of grammar and content that I may have overlooked prior to finalization of this note.      Paresh Ogden MD  Physical Medicine and Rehabilitation  Interventional Spine and Sports Physiatry  [facility]

## 2022-08-19 DIAGNOSIS — M51.36 DDD (DEGENERATIVE DISC DISEASE), LUMBAR: ICD-10-CM

## 2022-08-19 DIAGNOSIS — M96.1 POSTLAMINECTOMY SYNDROME: ICD-10-CM

## 2022-08-19 RX ORDER — HYDROCODONE BITARTRATE AND ACETAMINOPHEN 5; 325 MG/1; MG/1
1 TABLET ORAL EVERY 8 HOURS PRN
Qty: 60 TABLET | Refills: 0 | Status: SHIPPED | OUTPATIENT
Start: 2022-08-19 | End: 2022-10-07 | Stop reason: SDUPTHER

## 2022-08-19 RX ORDER — MORPHINE SULFATE 15 MG/1
15 TABLET, FILM COATED, EXTENDED RELEASE ORAL EVERY 12 HOURS
Qty: 60 TABLET | Refills: 0 | Status: SHIPPED | OUTPATIENT
Start: 2022-08-20 | End: 2022-10-07 | Stop reason: SDUPTHER

## 2022-08-19 NOTE — TELEPHONE ENCOUNTER
Was the patient seen in the last year in this department? Yes    Does patient have an active prescription for medications requested? No      Do they have a refill on file? No     If a controlled substance, is a new  on file? No     Received Request Via: Patient    If pharmacy requests, is the patient still taking the medication or medication discontinued?

## 2022-08-19 NOTE — TELEPHONE ENCOUNTER
Received refill request for norco and morphine which was previously prescribed by patient's pain provider Dr. Paresh Ogden who is currently out of the office. I am the covering provider for Dr. Ogden.    Patient's previous pharmacy has indicated they do not have medications in stock. Per patient request, will re-prescribe medication to a different pharmacy which apparently has the medications in stock.    Plan to continue patient's narcotic prescriptions:  morphine ER 15mg Q12H, quantity 60 tablets for 30 day supply x 2   norco 5-325mg Q8H PRN, quantity 60 tablets for 30 day supply x 2    Will send these orders to pharmacy.     reviewed 08/19/22.  CSA on file. Most recent UDS 3/24/22, as expected.      Pt to follow up with her primary pain provider Dr. Ogden as scheduled on 10/7/22.    Aliya Collins MD  Interventional Pain and Spine  Physical Medicine and Rehabilitation  [facility]

## 2022-08-19 NOTE — PROGRESS NOTES
Received refill request for norco and morphine which was previously prescribed by patient's pain provider Dr. Paresh Ogden who is currently out of the office. I am the covering provider for Dr. Ogden and reviewed most recent progress note by Dr. Ogden dated 8/10/22.     Patient's previous pharmacy has indicated they do not have the patient's necessary pain medications in stock. Per patient request, will re-prescribe medication to a different pharmacy which apparently has the medications in stock.    Plan to continue patient's narcotic prescriptions:  - morphine ER 15mg Q12H, quantity 60 tablets for 30 day supply, start date 8/20/22 and end date 9/19/22   - norco 5-325mg Q8H PRN, quantity 60 tablets for 30 day supply, start date 8/19/22 and end date 9/18/22   - I have electronically sent these orders to pharmacy. Per chart review, patient has existing prescriptions for morphine ER 15mg Q12H starting 9/19 and norco 5-325mg Q8H PRN 9/17 to last until her follow up visit with Dr. Ogden on 10/7/22.     reviewed 08/19/22.  CSA on file. Most recent UDS 3/24/22, as expected.      Pt to follow up with her primary pain provider Dr. Ogden as scheduled on 10/7/22.    Aliya Collins MD  Interventional Pain and Spine  Physical Medicine and Rehabilitation  [facility]

## 2022-09-03 DIAGNOSIS — I10 ESSENTIAL HYPERTENSION: ICD-10-CM

## 2022-09-06 RX ORDER — LISINOPRIL 10 MG/1
10 TABLET ORAL DAILY
Qty: 100 TABLET | Refills: 3 | Status: SHIPPED | OUTPATIENT
Start: 2022-09-06 | End: 2023-03-13 | Stop reason: SDUPTHER

## 2022-09-19 ENCOUNTER — OFFICE VISIT (OUTPATIENT)
Dept: MEDICAL GROUP | Facility: PHYSICIAN GROUP | Age: 67
End: 2022-09-19
Payer: MEDICARE

## 2022-09-19 VITALS
HEIGHT: 63 IN | SYSTOLIC BLOOD PRESSURE: 122 MMHG | BODY MASS INDEX: 32.28 KG/M2 | HEART RATE: 73 BPM | OXYGEN SATURATION: 96 % | DIASTOLIC BLOOD PRESSURE: 72 MMHG | WEIGHT: 182.2 LBS | TEMPERATURE: 98.2 F

## 2022-09-19 DIAGNOSIS — E03.9 HYPOTHYROIDISM (ACQUIRED): ICD-10-CM

## 2022-09-19 DIAGNOSIS — F33.1 DEPRESSION, MAJOR, RECURRENT, MODERATE (HCC): ICD-10-CM

## 2022-09-19 DIAGNOSIS — E78.2 MIXED HYPERLIPIDEMIA: ICD-10-CM

## 2022-09-19 DIAGNOSIS — K21.9 GASTROESOPHAGEAL REFLUX DISEASE WITHOUT ESOPHAGITIS: ICD-10-CM

## 2022-09-19 DIAGNOSIS — F41.8 SITUATIONAL ANXIETY: ICD-10-CM

## 2022-09-19 DIAGNOSIS — F43.10 POSTTRAUMATIC STRESS DISORDER, DELAYED ONSET: ICD-10-CM

## 2022-09-19 DIAGNOSIS — I10 ESSENTIAL HYPERTENSION: ICD-10-CM

## 2022-09-19 DIAGNOSIS — E11.42 TYPE 2 DIABETES MELLITUS WITH DIABETIC POLYNEUROPATHY, WITHOUT LONG-TERM CURRENT USE OF INSULIN (HCC): ICD-10-CM

## 2022-09-19 DIAGNOSIS — F41.1 GENERALIZED ANXIETY DISORDER: ICD-10-CM

## 2022-09-19 DIAGNOSIS — F11.20 OPIOID DEPENDENCE IN CONTROLLED ENVIRONMENT (HCC): ICD-10-CM

## 2022-09-19 LAB
HBA1C MFR BLD: 6.9 % (ref 0–5.6)
INT CON NEG: NEGATIVE
INT CON POS: POSITIVE

## 2022-09-19 PROCEDURE — 83036 HEMOGLOBIN GLYCOSYLATED A1C: CPT | Performed by: NURSE PRACTITIONER

## 2022-09-19 PROCEDURE — 99214 OFFICE O/P EST MOD 30 MIN: CPT | Performed by: NURSE PRACTITIONER

## 2022-09-19 RX ORDER — CLONAZEPAM 0.5 MG/1
0.5 TABLET ORAL NIGHTLY
Qty: 90 TABLET | Refills: 0 | Status: SHIPPED | OUTPATIENT
Start: 2022-10-09 | End: 2022-10-11 | Stop reason: SDUPTHER

## 2022-09-19 RX ORDER — LEVOTHYROXINE SODIUM 0.05 MG/1
50 TABLET ORAL
Qty: 90 TABLET | Refills: 3 | Status: SHIPPED | OUTPATIENT
Start: 2022-09-19 | End: 2023-07-11 | Stop reason: SDUPTHER

## 2022-09-19 RX ORDER — CLONAZEPAM 1 MG/1
1 TABLET ORAL DAILY
Qty: 90 TABLET | Refills: 0 | Status: SHIPPED | OUTPATIENT
Start: 2022-09-19 | End: 2022-10-11 | Stop reason: SDUPTHER

## 2022-09-19 RX ORDER — DULOXETIN HYDROCHLORIDE 30 MG/1
90 CAPSULE, DELAYED RELEASE ORAL DAILY
Qty: 270 CAPSULE | Refills: 3 | Status: SHIPPED | OUTPATIENT
Start: 2022-09-19 | End: 2023-07-11 | Stop reason: SDUPTHER

## 2022-09-19 RX ORDER — ROSUVASTATIN CALCIUM 10 MG/1
10 TABLET, COATED ORAL EVERY EVENING
Qty: 100 TABLET | Refills: 3 | Status: SHIPPED | OUTPATIENT
Start: 2022-09-19 | End: 2023-07-11 | Stop reason: SDUPTHER

## 2022-09-19 RX ORDER — OMEPRAZOLE 40 MG/1
40 CAPSULE, DELAYED RELEASE ORAL DAILY
Qty: 90 CAPSULE | Refills: 3 | Status: SHIPPED | OUTPATIENT
Start: 2022-09-19 | End: 2023-03-28

## 2022-09-19 ASSESSMENT — FIBROSIS 4 INDEX: FIB4 SCORE: 1.89

## 2022-09-19 NOTE — PROGRESS NOTES
CC: follow up for diabetes                                                                                                                                      HPI:   Grisel presents today with the following.    Problem   Opioid Dependence in Controlled Environment (Hcc)   Generalized Anxiety Disorder   Essential Hypertension   Type 2 Diabetes Mellitus With Diabetic Polyneuropathy, Without Long-Term Current Use of Insulin (Hcc)   Hypothyroidism (Acquired)       Current Outpatient Medications   Medication Sig Dispense Refill    levothyroxine (SYNTHROID) 50 MCG Tab Take 1 Tablet by mouth every morning on an empty stomach. 90 Tablet 3    rosuvastatin (CRESTOR) 10 MG Tab Take 1 Tablet by mouth every evening. 100 Tablet 3    omeprazole (PRILOSEC) 40 MG delayed-release capsule Take 1 Capsule by mouth every day. 90 Capsule 3    DULoxetine (CYMBALTA) 30 MG Cap DR Particles Take 3 Capsules by mouth every day. 270 Capsule 3    [START ON 10/9/2022] clonazePAM (KLONOPIN) 0.5 MG Tab Take 1 Tablet by mouth every evening for 90 days. Indications: Feeling Anxious 90 Tablet 0    clonazePAM (KLONOPIN) 1 MG Tab Take 1 Tablet by mouth every day for 90 days. Indications: Feeling Anxious 90 Tablet 0    lisinopril (PRINIVIL) 10 MG Tab TAKE 1 TABLET BY MOUTH EVERY  Tablet 3    morphine ER (MS CONTIN) 15 MG Tab CR tablet Take 1 Tablet by mouth every 12 hours for 30 days. 60 Tablet 0    gabapentin (NEURONTIN) 800 MG tablet Take 1.5 Tablets by mouth 2 times a day for 90 days. 270 Tablet 0    morphine ER (MS CONTIN) 15 MG Tab CR tablet Take 1 Tablet by mouth every 12 hours for 30 days. 60 Tablet 0    HYDROcodone-acetaminophen (NORCO) 5-325 MG Tab per tablet Take 1 Tablet by mouth every 8 hours as needed (severe pain) for up to 30 days. 60 Tablet 0    albuterol 108 (90 Base) MCG/ACT Aero Soln inhalation aerosol Inhale 2 Puffs every four hours as needed for Shortness of Breath. 1 Each 0    metFORMIN ER (GLUCOPHAGE XR) 500 MG TABLET SR 24  HR Take 2 Tablets by mouth 2 times a day. 200 Tablet 3    metoprolol tartrate (LOPRESSOR) 25 MG Tab Take 1 Tablet by mouth 2 times a day. 180 Tablet 3    Brexpiprazole (REXULTI) 2 MG Tab Take 2 mg by mouth every day. 90 Tablet 3    busPIRone (BUSPAR) 10 MG Tab tablet Take 2 Tablets by mouth 2 times a day. 360 Tablet 3    cyclobenzaprine (FLEXERIL) 5 mg tablet Take 1 Tablet by mouth 2 times a day as needed for Muscle Spasms (restless leg). 90 Tablet 3    Cholecalciferol (VITAMIN D3) 1.25 MG (78476 UT) Cap TAKE ONE CAPSULE BY MOUTH EVERY 7 DAYS      Cholecalciferol 1.25 MG (09453 UT) Tab Take 50,000 Units by mouth every 7 days. 12 Tablet 0    estradiol (ESTRACE) 1 MG Tab Take 1 Tablet by mouth every day. 90 Tablet 3    Empagliflozin (JARDIANCE) 25 MG Tab Take 1 Tablet by mouth every day. 100 Tablet 3    pseudoephedrine (SUDAFED) 30 MG Tab Take 1-2 Tablets by mouth every 6 hours as needed for Congestion (sinus pressure not relieved by other measures). 30 Tablet 0    Blood Glucose Meter Kit Test blood sugar as recommended by provider. Abbott Freestyle Lite blood glucose monitoring kit. 1 Kit 0    Lancets Use one Abbott Kaibeto Lite lancet to test blood sugar twice daily and PRN. 300 Each 3    Alcohol Swabs Wipe site with prep pad prior to injection. 100 Each 3    fluticasone (FLONASE) 50 MCG/ACT nasal spray Administer 1 Spray into affected nostril(S) every day. 16 g 11    aspirin EC (ECOTRIN) 81 MG Tablet Delayed Response Take 81 mg by mouth every day.      cyanocobalamin (VITAMIN B-12) 500 MCG Tab Take 500 mcg by mouth every day.      Blood Glucose Test Strips Use one Abbott Freedom Lite strip to test blood sugar twice daily and PRN. 270 Each 3    Naloxone (NARCAN) 4 MG/0.1ML Liquid One spray in one nostril for overdose and call 911. 1 Each 0     No current facility-administered medications for this visit.       Allergies as of 09/19/2022    (No Known Allergies)        ROS:  All systems negative expect as addressed in  "assessment and plan.     /72   Pulse 73   Temp 36.8 °C (98.2 °F) (Temporal)   Ht 1.6 m (5' 3\")   Wt 82.6 kg (182 lb 3.2 oz)   LMP  (LMP Unknown)   SpO2 96%   BMI 32.28 kg/m²     Physical Exam:  Gen:         Alert and oriented, No apparent distress.  Neck:        No Lymphadenopathy.   Lungs:     Clear to auscultation bilaterally. No wheezes, rales, or rhonchi.   CV:          Regular rate and rhythm. No murmurs, rubs or gallops.         Ext:          No clubbing, cyanosis, or peripheral edema.  Skin:  All visible skin intact without lesions.       Assessment and Plan:  67 y.o. female with the following issues.    1. Essential hypertension        2. Opioid dependence in controlled environment ([facility])        3. Generalized anxiety disorder  clonazePAM (KLONOPIN) 0.5 MG Tab    clonazePAM (KLONOPIN) 1 MG Tab      4. Hypothyroidism (acquired)  levothyroxine (SYNTHROID) 50 MCG Tab      5. Type 2 diabetes mellitus with diabetic polyneuropathy, without long-term current use of insulin ([facility])  POCT Hemoglobin A1C      6. Mixed hyperlipidemia  rosuvastatin (CRESTOR) 10 MG Tab      7. Gastroesophageal reflux disease without esophagitis  omeprazole (PRILOSEC) 40 MG delayed-release capsule      8. Depression, major, recurrent, moderate ([facility])  DULoxetine (CYMBALTA) 30 MG Cap DR Particles      9. Situational anxiety  clonazePAM (KLONOPIN) 0.5 MG Tab    clonazePAM (KLONOPIN) 1 MG Tab      10. Posttraumatic stress disorder, delayed onset  clonazePAM (KLONOPIN) 0.5 MG Tab    clonazePAM (KLONOPIN) 1 MG Tab           Essential hypertension  This is a chronic stable condition. Patient is stable on lisinopril.     Continue lisinopril.       Opioid dependence in controlled environment ([facility])  This is a chronic stable condition. She follows with Dr. Ogden for pain management.     Continue to follow with dr. Ogden.     Generalized anxiety disorder  ClearedThis is a chronic condition. Patient reports that she had increased family " stress which as been contributing to her anxiety.  She is stable on BuSpar, Rexulti, and clonazepam daily.  Patient has been on benzodiazepines for many years.  Patient has been able to decrease her dose and at this time takes 1 mg of clonazepam in the morning and point 5 at night.    Obtained and reviewed patient utilization report from [facility] pharmacy database on 9/19/2022 12:31 PM  prior to writing prescription for controlled substance II, III or IV per [place] bill . Based on assessment of the report,my physical exam if necessary and the patient's health problem, the prescription is medically necessary.     Continue buspar and rexulti.  Continue clonazepam.    Hypothyroidism (acquired)  This is a chronic stable condition. Patient is stable on levothyroxine.     Continue levothyroxine.     Type 2 diabetes mellitus with diabetic polyneuropathy, without long-term current use of insulin (HCC)  This is a chronic stable condition. Patient is stable on metformin and jardiance. Patient cabral stest a few times a week and her blood sugars have been running in the 120s.     Will check A1c in office today. Her A1c in office is 6.9%.    Monofilament testing with a 10 gram force: sensation intact: decreased bilaterally  Visual Inspection: Feet without maceration, ulcers, fissures.  Pedal pulses: intact bilaterally    Return in about 3 months (around 12/19/2022) for follow up for anxiety.    I have placed the below orders and discussed them with an approved delegating provider.  The MA is performing the below orders under the direction of Dr. thompson.    Please note that this dictation was created using voice recognition software. I have worked with consultants from the vendor as well as technical experts from [facility] to optimize the interface. I have made every reasonable attempt to correct obvious errors, but I expect that there are errors of grammar and possibly content that I did not discover before  finalizing the note.

## 2022-09-19 NOTE — ASSESSMENT & PLAN NOTE
This is a chronic stable condition. Patient is stable on levothyroxine.     Continue levothyroxine.

## 2022-09-19 NOTE — ASSESSMENT & PLAN NOTE
This is a chronic stable condition. Patient is stable on metformin and jardiance. Patient cabral stest a few times a week and her blood sugars have been running in the 120s.     Will check A1c in office today. Her A1c in office is 6.9%.    Monofilament testing with a 10 gram force: sensation intact: decreased bilaterally  Visual Inspection: Feet without maceration, ulcers, fissures.  Pedal pulses: intact bilaterally

## 2022-09-19 NOTE — ASSESSMENT & PLAN NOTE
ClearedThis is a chronic condition. Patient reports that she had increased family stress which as been contributing to her anxiety.  She is stable on BuSpar, Rexulti, and clonazepam daily.  Patient has been on benzodiazepines for many years.  Patient has been able to decrease her dose and at this time takes 1 mg of clonazepam in the morning and point 5 at night.    Obtained and reviewed patient utilization report from [facility] pharmacy database on 9/19/2022 12:31 PM  prior to writing prescription for controlled substance II, III or IV per [place] bill . Based on assessment of the report,my physical exam if necessary and the patient's health problem, the prescription is medically necessary.     Continue buspar and rexulti.  Continue clonazepam.

## 2022-09-19 NOTE — ASSESSMENT & PLAN NOTE
This is a chronic stable condition. She follows with Dr. Ogden for pain management.     Continue to follow with dr. Ogden.

## 2022-09-27 DIAGNOSIS — N95.1 VASOMOTOR SYMPTOMS DUE TO MENOPAUSE: ICD-10-CM

## 2022-09-28 RX ORDER — ESTRADIOL 1 MG/1
1 TABLET ORAL
Qty: 90 TABLET | Refills: 3 | Status: SHIPPED | OUTPATIENT
Start: 2022-09-28 | End: 2023-03-13 | Stop reason: SDUPTHER

## 2022-10-07 ENCOUNTER — OFFICE VISIT (OUTPATIENT)
Dept: PHYSICAL MEDICINE AND REHAB | Facility: MEDICAL CENTER | Age: 67
End: 2022-10-07
Payer: MEDICARE

## 2022-10-07 VITALS
OXYGEN SATURATION: 95 % | TEMPERATURE: 97 F | BODY MASS INDEX: 31.1 KG/M2 | SYSTOLIC BLOOD PRESSURE: 122 MMHG | HEIGHT: 63 IN | HEART RATE: 81 BPM | DIASTOLIC BLOOD PRESSURE: 64 MMHG | WEIGHT: 175.5 LBS

## 2022-10-07 DIAGNOSIS — F13.20 BENZODIAZEPINE DEPENDENCE (HCC): ICD-10-CM

## 2022-10-07 DIAGNOSIS — F33.1 DEPRESSION, MAJOR, RECURRENT, MODERATE (HCC): ICD-10-CM

## 2022-10-07 DIAGNOSIS — M25.551 BILATERAL HIP PAIN: ICD-10-CM

## 2022-10-07 DIAGNOSIS — M51.36 DDD (DEGENERATIVE DISC DISEASE), LUMBAR: ICD-10-CM

## 2022-10-07 DIAGNOSIS — M25.552 BILATERAL HIP PAIN: ICD-10-CM

## 2022-10-07 DIAGNOSIS — M54.16 LEFT LUMBAR RADICULITIS: ICD-10-CM

## 2022-10-07 DIAGNOSIS — M96.1 POSTLAMINECTOMY SYNDROME: ICD-10-CM

## 2022-10-07 DIAGNOSIS — Z98.890 S/P LUMBAR LAMINECTOMY: ICD-10-CM

## 2022-10-07 DIAGNOSIS — F11.90 CHRONIC, CONTINUOUS USE OF OPIOIDS: ICD-10-CM

## 2022-10-07 PROCEDURE — 99214 OFFICE O/P EST MOD 30 MIN: CPT | Performed by: PHYSICAL MEDICINE & REHABILITATION

## 2022-10-07 RX ORDER — HYDROCODONE BITARTRATE AND ACETAMINOPHEN 5; 325 MG/1; MG/1
1 TABLET ORAL EVERY 8 HOURS PRN
Qty: 60 TABLET | Refills: 0 | Status: ON HOLD | OUTPATIENT
Start: 2022-10-19 | End: 2022-10-31

## 2022-10-07 RX ORDER — MORPHINE SULFATE 15 MG/1
15 TABLET, FILM COATED, EXTENDED RELEASE ORAL EVERY 12 HOURS
Qty: 60 TABLET | Refills: 0 | Status: SHIPPED | OUTPATIENT
Start: 2022-10-19 | End: 2022-11-21 | Stop reason: SDUPTHER

## 2022-10-07 RX ORDER — MELOXICAM 7.5 MG/1
7.5 TABLET ORAL
Qty: 20 TABLET | Refills: 1 | Status: ON HOLD | OUTPATIENT
Start: 2022-10-07 | End: 2022-10-31

## 2022-10-07 RX ORDER — NALOXONE HYDROCHLORIDE 4 MG/.1ML
SPRAY NASAL
Qty: 1 EACH | Refills: 0 | Status: SHIPPED | OUTPATIENT
Start: 2022-10-07 | End: 2022-10-11

## 2022-10-07 RX ORDER — HYDROCODONE BITARTRATE AND ACETAMINOPHEN 5; 325 MG/1; MG/1
1 TABLET ORAL EVERY 8 HOURS PRN
Qty: 60 TABLET | Refills: 0 | Status: SHIPPED | OUTPATIENT
Start: 2022-11-18 | End: 2022-11-21 | Stop reason: SDUPTHER

## 2022-10-07 RX ORDER — MORPHINE SULFATE 15 MG/1
15 TABLET, FILM COATED, EXTENDED RELEASE ORAL EVERY 12 HOURS
Qty: 60 TABLET | Refills: 0 | Status: ON HOLD | OUTPATIENT
Start: 2022-11-18 | End: 2022-10-31

## 2022-10-07 ASSESSMENT — PAIN SCALES - GENERAL: PAINLEVEL: 8=MODERATE-SEVERE PAIN

## 2022-10-07 ASSESSMENT — PATIENT HEALTH QUESTIONNAIRE - PHQ9
SUM OF ALL RESPONSES TO PHQ QUESTIONS 1-9: 10
5. POOR APPETITE OR OVEREATING: 3 - NEARLY EVERY DAY
CLINICAL INTERPRETATION OF PHQ2 SCORE: 4

## 2022-10-07 ASSESSMENT — FIBROSIS 4 INDEX: FIB4 SCORE: 1.89

## 2022-10-07 NOTE — H&P (VIEW-ONLY)
Follow-up patient note    Physiatry (physical medicine and  Rehabilitation), interventional spine and sports medicine    Date of Service: 10/07/2022    Chief complaint:   Chief Complaint   Patient presents with    Follow-Up     Lower back and legs pain       HISTORY    HPI: Grisel Mark 67 y.o. female who presents today for follow-up evaluation of low back and leg pain.     She has still not been able to see Dr. Villa.      Her low back and leg pain continues.  Seems like she is having more symptoms in both legs now.  Left leg pain is still the worst.    She reports that she has filled narcan at [facility] and has two of these.      She has been taking norco TID, which is more than prescribed.  Seems to have more aching pain in the afternoon.  Reports that she takes tylenol, but this does not help her.    Reports that she is walking more, 2000 steps/day.    She has been walking for up to 25 minutes daily.  Feeling good about that.  Continuing morning stretching.      Medications:  Takes gabapentin 800mg taking 1.5 pills twice a day.  Her pain medications: Duloxetine 90mg.  MS Contin 15mg q12h.  Norco 5/325 for breakthrough, reports that she does not always take this, some days will take up to 3/day.   No side effects reported.  She is back to taking clonazepam 0.5mg at bedtime and 1mg during the day    Bowel movements have been regular, reports that she is eating better.  Taking miralax prn, but has not taken recently.  No stool softeners lately.  Unchanged         By history:  Her laminectomy was in 1998.  Previous injection on left L4 and L5 TFESI on 08/12/2020 helped with her leg pain.     Medical records review:  Dr. Francisco Olmos, plan to increase duloxetine 90mg daily, discontinue buspirone 20mg po bid, start buproprion XL 150mg daily, continue brexipiprazole 1mg qhs, hydroxyzine 25mg po tid prn, clonazepam 0.5mg daily prn    Review of records   Dr. Dheeraj De La Rosa:  08/20/2019 Right L3-4, L4-5, L5-S1  radiofrequency ablation    I reviewed the note from the referring provider Peter Bruce * dated 02/05/2020: She was seen to establish care, having just moved from [place].  She was seen for essential hypertension, mixed hyperlipidemia, DM2 in obese, hypothyroidism, recurrent MDD in partial depression/anxiety, GERD without esophagitis, chronic bilateral low back pain with bilateral sciatica.  Medications associated with the above were written, related labs ordered including CBC, CMP, Hb A1c, lipids, microalbumin, TSH, free thryoxine, referral to ps\ychiatry, referral to GI for colonoscopy and referral to pain clinic.    Previous treatments:    Physical Therapy: Yes    Medications the patient is tried: Narcotics, gabapentin and muscle relaxers    Previous interventions: [facility] these included right lumbar radiofrequency procedures    Previous surgeries to relieve the above pain:  Surgery on October 28, 1998      ROS:   ENT: ear infection, treated with augmentin  CV: irregular heart, reports history of tachycardia  Psych: depression since 1995  Heme: anemia  Endo: hypothyroidism, diabetes    Red Flags ROS:   Fever, Chills, Sweats: Denies  Involuntary Weight Loss: Denies  Bladder Incontinence: Denies  Bowel Incontinence: Denies  Saddle Anesthesia: Denies    All other systems reviewed and negative.       PMHx:   Past Medical History:   Diagnosis Date    Anxiety     Arrhythmia     Chronic back pain     Constipation     Depression     Diabetes (HCC)     Ear pain, left 9/23/2021    GERD (gastroesophageal reflux disease)     Hyperlipidemia     Hypertension     Thyroid disease        PSHx:   Past Surgical History:   Procedure Laterality Date    RADIO FREQUENCY ABLATION ADDITIONAL LEVEL Left 11/11/2021    Procedure: LEFT lumbar three through lumbar five radiofrequency ablation;  Surgeon: Paresh Ogden M.D.;  [facility];  Service: Pain Management     LUMBAR MEDIAL BRANCH BLOCKS Left 7/21/2021    Procedure: LEFT lumbar three through lumbar five medial branch blocks;  Surgeon: Paresh Ogden M.D.;  Location: [facility];  Service: Pain Management    LUMBAR TRANSFORAMINAL EPIDURAL STEROID INJECTION Left 5/20/2021    Procedure: LEFT lumbar four and lumbar five transforaminal epidural steroid injection;  Surgeon: Paresh Ogden M.D.;  Location: [facility];  Service: Pain Management    BLOCK EPIDURAL STEROID INJECTION Left 2/24/2021    Procedure: INJECTION, STEROID, EPIDURAL;  Surgeon: Paresh Ogden M.D.;  Location: [facility];  Service: Pain Management    LUMBAR MEDIAL BRANCH BLOCKS Left 9/9/2020    Procedure: BLOCK, NERVE, SPINAL, LUMBAR, POSTERIOR RAMUS, MEDIAL BRANCH;  Surgeon: Paresh Ogden M.D.;  Location: [facility];  Service: Pain Management    MT NJX AA&/STRD TFRML EPI LUMBAR/SACRAL 1 LEVEL Left 8/12/2020    Procedure: INJECTION, SPINE, LUMBOSACRAL, EPIDURAL, 1 LEVEL, TRANSFORAMINAL APPROACH;  Surgeon: Paresh Ogden M.D.;  Location: [facility];  Service: Pain Management    MT NJX AA&/STRD TFRML EPI LUMBAR/SACRAL EA ADDL Left 8/12/2020    Procedure: INJECTION, SPINE, LUMBOSACRAL, EPIDURAL, TRANSFORAMINAL APPROACH;  Surgeon: Paresh Ogden M.D.;  Location: [facility];  Service: Pain Management    LAMINOTOMY  1998    ABDOMINAL HYSTERECTOMY TOTAL      CARPAL TUNNEL RELEASE      CHOLECYSTECTOMY         Family history   Family History   Problem Relation Age of Onset    Cancer Mother     Stroke Father         Heart attack    Dementia Sister     Stroke Brother         heart Attack    Cancer Brother         Skin    Diabetes Brother     Kidney Disease Brother     Cancer Brother     Parkinson's Disease Sister     No Known Problems Sister     Diabetes Daughter     Hypertension Daughter          Medications:   Current Outpatient Medications    Medication    Naloxone (NARCAN) 4 MG/0.1ML Liquid    [START ON 10/19/2022] morphine ER (MS CONTIN) 15 MG Tab CR tablet    [START ON 2022] morphine ER (MS CONTIN) 15 MG Tab CR tablet    [START ON 2022] HYDROcodone-acetaminophen (NORCO) 5-325 MG Tab per tablet    [START ON 10/19/2022] HYDROcodone-acetaminophen (NORCO) 5-325 MG Tab per tablet    meloxicam (MOBIC) 7.5 MG Tab    estradiol (ESTRACE) 1 MG Tab    levothyroxine (SYNTHROID) 50 MCG Tab    rosuvastatin (CRESTOR) 10 MG Tab    omeprazole (PRILOSEC) 40 MG delayed-release capsule    DULoxetine (CYMBALTA) 30 MG Cap DR Particles    [START ON 10/9/2022] clonazePAM (KLONOPIN) 0.5 MG Tab    clonazePAM (KLONOPIN) 1 MG Tab    lisinopril (PRINIVIL) 10 MG Tab    gabapentin (NEURONTIN) 800 MG tablet    albuterol 108 (90 Base) MCG/ACT Aero Soln inhalation aerosol    metoprolol tartrate (LOPRESSOR) 25 MG Tab    Brexpiprazole (REXULTI) 2 MG Tab    busPIRone (BUSPAR) 10 MG Tab tablet    cyclobenzaprine (FLEXERIL) 5 mg tablet    Cholecalciferol (VITAMIN D3) 1.25 MG (87129 UT) Cap    Cholecalciferol 1.25 MG (53175 UT) Tab    Empagliflozin (JARDIANCE) 25 MG Tab    pseudoephedrine (SUDAFED) 30 MG Tab    fluticasone (FLONASE) 50 MCG/ACT nasal spray    aspirin EC (ECOTRIN) 81 MG Tablet Delayed Response    cyanocobalamin (VITAMIN B-12) 500 MCG Tab    metformin (GLUCOPHAGE) 1000 MG tablet    Blood Glucose Meter Kit    Lancets    Alcohol Swabs    Blood Glucose Test Strips     No current facility-administered medications for this visit.       Allergies:   No Known Allergies    Social Hx:   Social History     Socioeconomic History    Marital status:      Spouse name: Not on file    Number of children: Not on file    Years of education: Not on file    Highest education level: Not on file   Occupational History    Not on file   Tobacco Use    Smoking status: Every Day     Packs/day: 0.25     Types: Cigarettes     Last attempt to quit: 2014     Years since quittin.7  "   Smokeless tobacco: Never   Vaping Use    Vaping Use: Never used   Substance and Sexual Activity    Alcohol use: Not Currently     Comment: rare    Drug use: Not Currently     Types: Marijuana    Sexual activity: Not Currently   Other Topics Concern     Service No    Blood Transfusions Yes    Caffeine Concern No    Occupational Exposure No    Hobby Hazards No    Sleep Concern Yes    Stress Concern Yes    Weight Concern Yes    Special Diet No    Back Care No    Exercise Yes    Bike Helmet No    Seat Belt Yes    Self-Exams Yes   Social History Narrative    Not on file     Social Determinants of Health     Financial Resource Strain: Not on file   Food Insecurity: Not on file   Transportation Needs: Not on file   Physical Activity: Not on file   Stress: Not on file   Social Connections: Not on file   Intimate Partner Violence: Not on file   Housing Stability: Not on file         EXAMINATION     Physical Exam:   Vitals: /64 (BP Location: Right arm, Patient Position: Sitting, BP Cuff Size: Adult long)   Pulse 81   Temp 36.1 °C (97 °F) (Temporal)   Ht 1.6 m (5' 3\")   Wt 79.6 kg (175 lb 8 oz)   SpO2 95%     Constitutional:   Body Habitus: Body mass index is 31.09 kg/m².  Cooperation: Fully cooperates with exam  Appearance: Well-groomed, well-nourished, not disheveled, no acute distress    Eyes: No scleral icterus, no proptosis     ENT -no obvious auditory deficits, wearing a facial mask    Skin -no rashes or lesions noted    Respiratory-  breathing comfortable on room air, no audible wheezing    Cardiovascular- no lower extremity edema is noted.     Psychiatric- alert and oriented ×3. Normal affect.     Gait - normal gait, no use of ambulatory device, antalgic.     Musculoskeletal -     Thoracic/Lumbar Spine/Sacral Spine/Hips   Inspection: no evidence of atrophy in bilateral lower extremities throughout     ROM of the lumbar spine decreased.     Palpation: tenderness to palpation lumbar paraspinals " minimal    Neuro     Key points for the international standards for neurological classification of spinal cord injury (ISNCSCI) to light touch.     Dermatome R L   L2 2 2   L3 2 2   L4 2 2   L5 2 1   S1 2 1   S2 2 2       Motor Exam Lower Extremities    ? Myotome R L   Hip flexion L2 5 5   Knee extension L3 5 5   Ankle dorsiflexion L4 5 5   Toe extension L5 5 5   Ankle plantarflexion S1 5 5       Reflexes  ?  R L   Patella  2+ 2+   Achilles   2+ 1+     No clonus bilaterally    MEDICAL DECISION MAKING    Medical records review: see under HPI section.     DATA    Labs:   08/10/2022: UDS positive for morphine and negative for metabolites, positive for hydrocodone, positive for clonazepam  03/18/2022: UDS positive for morphine and negative for metabolites, positive for hydrocodone, positive for clonazepam  01/18/2022: UDS positive for morphine and negative for metabolites, absent hydrocodone, positive for clonazepam  10/20/2021: UDS positive for morphine and metabolites, clonzapem, low amount of THC noted  05/08/2021: UDS positive for morphine and metabolites, clonazepam and metabolites, hydrocodone and metabolites  08/18/2020: UDS positive for morphine and metabolites, clonazepam and metabolites, hydrocodone and metabolites  04/24/2020 UDS positive for morphine and metabolites, clonazepam  04/09/2020 Vitamin D, 25:  28L  04/09/2020 Vitamin B12: 217    Lab Results   Component Value Date/Time    SODIUM 139 05/05/2022 12:18 PM    POTASSIUM 4.8 05/05/2022 12:18 PM    CHLORIDE 104 05/05/2022 12:18 PM    CO2 24 05/05/2022 12:18 PM    ANION 11.0 05/05/2022 12:18 PM    GLUCOSE 194 (H) 05/05/2022 12:18 PM    BUN 13 05/05/2022 12:18 PM    CREATININE 0.70 05/05/2022 12:18 PM    CALCIUM 9.8 05/05/2022 12:18 PM    ASTSGOT 28 05/05/2022 12:18 PM    ALTSGPT 28 05/05/2022 12:18 PM    TBILIRUBIN 0.3 05/05/2022 12:18 PM    ALBUMIN 4.4 05/05/2022 12:18 PM    TOTPROTEIN 7.1 05/05/2022 12:18 PM    GLOBULIN 2.7 05/05/2022 12:18 PM     AGRATIO 1.6 05/05/2022 12:18 PM       No results found for: PROTHROMBTM, INR     Lab Results   Component Value Date/Time    WBC 7.5 05/04/2021 08:06 AM    RBC 4.73 05/04/2021 08:06 AM    HEMOGLOBIN 14.6 05/04/2021 08:06 AM    HEMATOCRIT 43.8 05/04/2021 08:06 AM    MCV 92.6 05/04/2021 08:06 AM    MCH 30.9 05/04/2021 08:06 AM    MCHC 33.3 (L) 05/04/2021 08:06 AM    MPV 10.6 05/04/2021 08:06 AM    NEUTSPOLYS 46.60 05/04/2021 08:06 AM    LYMPHOCYTES 39.80 05/04/2021 08:06 AM    MONOCYTES 9.30 05/04/2021 08:06 AM    EOSINOPHILS 3.70 05/04/2021 08:06 AM    BASOPHILS 0.50 05/04/2021 08:06 AM        Lab Results   Component Value Date/Time    HBA1C 6.9 (A) 09/19/2022 09:00 AM        Imaging: I personally reviewed following images, these are my reads:     MRI lumbar spine 05/05/2020  At L1-2, no central or foraminal stenosis  At L2-3, no central or foraminal stenosis  At L3-4, mild disc bulge and mild ligamentum flavum thickening.  No central canal stenosis. Borderline left foraminal stenosis. Schmorl's node.   At L4-5, diffuse disc bulge with mild ligamentum flavum thickening.  No central canal stenosis.  There is facet arthropathy, left greater than right. Mild foraminal stenosis bilaterally. S/P left L4/5 hemilaminectomy  At L5-S1, there is mild-borderline foraminal stenosis with facet arthropathy and mild disc bulge.  No central canal stenosis    Xray lumbar spine 05/05/2020  There is mild decreased joint space at L3-4 and L4-5.    Facet arthropathy is noted, greatest at L5-S1 bilaterally.  No significant motion on flexion and extension films    IMAGING radiology reads. I reviewed the following radiology reads    Xray abdomen 07/17/2020  IMPRESSION:     Nonspecific bowel gas pattern. No evidence small bowel obstruction.      MRI lumbar spine 05/05/2020  IMPRESSION:     1.  Caudal lumbar facet arthropathy left worse than right with no bony destruction to indicate septic or inflammatory arthropathy. This most likely is just  degenerative  2.  Mild caudal lumbar foraminal stenoses  3.  No significant central stenosis.  4.  Left L4/5 hemilaminectomy                                                                                   Xray lumbar spine 05/05/2020     IMPRESSION:     1.  No acute lumbar compression fracture or subluxation.  2.  Posterior disc space narrowing at L4-5 and to lesser extent at L3-4.  3.  Bilateral facet arthropathy at L5-S1.                                                                                             Diagnosis   Visit Diagnoses     ICD-10-CM   1. Postlaminectomy syndrome  M96.1   2. Left lumbar radiculitis  M54.16   3. Depression, major, recurrent, moderate (HCC)  F33.1   4. Benzodiazepine dependence (HCC)  F13.20   5. Chronic, continuous use of opioids  F11.90   6. S/P lumbar laminectomy  Z98.890   7. DDD (degenerative disc disease), lumbar  M51.36   8. Bilateral hip pain  M25.551    M25.552             ASSESSMENT:  Grisel Mark 67 y.o. female seen for above     Grisel was seen today for follow-up.    Diagnoses and all orders for this visit:    Postlaminectomy syndrome  -     morphine ER (MS CONTIN) 15 MG Tab CR tablet; Take 1 Tablet by mouth every 12 hours for 30 days.  -     morphine ER (MS CONTIN) 15 MG Tab CR tablet; Take 1 Tablet by mouth every 12 hours for 30 days.  -     HYDROcodone-acetaminophen (NORCO) 5-325 MG Tab per tablet; Take 1 Tablet by mouth every 8 hours as needed (severe pain) for up to 30 days.  -     HYDROcodone-acetaminophen (NORCO) 5-325 MG Tab per tablet; Take 1 Tablet by mouth every 8 hours as needed (severe pain) for up to 30 days.  -     meloxicam (MOBIC) 7.5 MG Tab; Take 1 Tablet by mouth 1 time a day as needed for Severe Pain for up to 30 days.  -     Referral to Pain Clinic  -     Consent for Opiate Prescription  -     Controlled Substance Treatment Agreement    Left lumbar radiculitis  -     Referral to Pain Clinic    Depression, major, recurrent, moderate (HCC)  -      Patient has been identified as having a positive depression screening. Appropriate orders and counseling have been given.    Benzodiazepine dependence (HCC)    Chronic, continuous use of opioids    S/P lumbar laminectomy  -     Naloxone (NARCAN) 4 MG/0.1ML Liquid; One spray in one nostril for overdose and call 911.    DDD (degenerative disc disease), lumbar  -     Naloxone (NARCAN) 4 MG/0.1ML Liquid; One spray in one nostril for overdose and call 911.  -     morphine ER (MS CONTIN) 15 MG Tab CR tablet; Take 1 Tablet by mouth every 12 hours for 30 days.  -     morphine ER (MS CONTIN) 15 MG Tab CR tablet; Take 1 Tablet by mouth every 12 hours for 30 days.  -     HYDROcodone-acetaminophen (NORCO) 5-325 MG Tab per tablet; Take 1 Tablet by mouth every 8 hours as needed (severe pain) for up to 30 days.  -     HYDROcodone-acetaminophen (NORCO) 5-325 MG Tab per tablet; Take 1 Tablet by mouth every 8 hours as needed (severe pain) for up to 30 days.  -     meloxicam (MOBIC) 7.5 MG Tab; Take 1 Tablet by mouth 1 time a day as needed for Severe Pain for up to 30 days.  -     Consent for Opiate Prescription  -     Controlled Substance Treatment Agreement    Bilateral hip pain  -     DX-HIP-BILATERAL-WITH PELVIS-3/4 VIEWS; Future      Encouraged her to follow-up with Dr. Villa.  She continues to be disappointment that she has not been approved for the SCS trial.  Discussed holding off on increasing opioid dose.   reviewed.  Continue MS Contin 15mg twice a day.  Continue norco no more than 2/day.  She has narcan at home.  Sending new referral as insurance has sent a letter. She will take in other script to discuss whether or not she needs a refill with pharmacist.  Trial meloxicam prn.  Discussed caution, avoid taking more norco than prescribed.  Discuss need for baby aspirin with PCP.    Continue to maintain regular bowel movements.  Not taking stool softeners regularly.  Encouraged her to continue walking program and weight  loss efforts.  6.   Discussed treatment options outside of SCS trial.  Plan for caudal epidural steroid injection with catheter.  The risks benefits and alternatives to this procedure were discussed and the patient wishes to proceed with the procedure. Risks include but are not limited to damage to surrounding structures, infection, bleeding, worsening of pain which can be permanent, weakness which can be permanent. Benefits include pain relief, improved function. Alternatives includes not doing the procedure.  Stop meloxicam and ASA five days prior to procedure.  She does not report history of stroke or heart attack, prophylaxis only.  7.    She is working on getting a [place] license, has ordered her birth certificate, she reports.  8.    Xrays of the hips ordered      Follow-up: Return for Hospital injection.    Thank you very much for asking me to participate in Grisel Mark's care.  Please contact me with any questions or concerns.    Please note that this dictation was created using voice recognition software. I have made every reasonable attempt to correct obvious errors but there may be errors of grammar and content that I may have overlooked prior to finalization of this note.      Paresh Ogden MD  Physical Medicine and Rehabilitation  Interventional Spine and Sports Physiatry  [facility]

## 2022-10-07 NOTE — PATIENT INSTRUCTIONS
Your procedure will be at the [facility] special procedure suite.    [facility] [place] [address]       PRE-PROCEDURE INSTRUCTIONS  You may take your regular medications except:   No Anti-inflammatories 5 days prior to your procedure. Anti-inflammatories include medicines such as  ibuprofen (Motrin, Advil), Excedrin, Naproxen (Aleve, Anaprox, Naprelan, Naprosyn), Celecoxib (Celebrex), Diclofenac (Voltaren-XR tab), and Meloxicam (Mobic).   No Glucophage or Metformin 24 hours before your procedure. You may resume next day after your procedure.  Call the physiatry office if you are taking or prescribed anti-biotics within five days of procedure.  Please ask provider if you are taking any new diabetes medication.  CONTINUE TAKING BLOOD PRESSURE MEDICATIONS AS PRESCRIBED.  Pain medications will not be prescribed on the procedure day. Procedural pain medication may be used by your provider   Call your doctor's office performing the procedure if you have a fever, chills, rash or new illness prior to your procedure    Anticoagulation/antiplatelet medications  No Blood thinning medications such as Coumadin or Plavix 5 days prior to procedure unless your doctor said to continue these medications. Call your doctor if a new medication is prescribed in this class.     Restrictions for eating before procedure:   If you are getting procedural sedation, then do not eat to for 8 hours prior to procedure appointment time. Do not drink fluids for four hours prior to your procedure time.   If you are not having procedural sedation, then Skip the meal prior to your procedure. If you have a morning procedure then skip breakfast. If you have an afternoon procedure then skip lunch.   You may drink clear liquids up to 2 hours prior to your procedure  You must have a  the day of procedure to accompany you home.      POST PROCEDURE INSTRUCTIONS   No heavy lifting, strenuous bending or strenuous exercise for 3 days  after your procedure.  No hot tubs, baths, swimming for 3 days after your procedure  You can remove the bandage the day after the procedure.  IF YOU RECEIVED A STEROID INJECTION. PLEASE NOTE THAT THERE MAY BE A DELAY FOR THE INJECTION TO START WORKING, THE DELAY MAY BE UP TO TWO WEEKS. IF YOU HAVE DIABETES, PLEASE NOTE THAT YOUR SUGAR LEVELS MAY BE ELEVATED FOR 1-2 DAYS AFTER A STEROID INJECTION.  THE STEROID MAY CAUSE TEMPORARY SYMPTOMS WHICH USUALLY RESOLVE ON THEIR OWN WITHIN 1 TO 2 DAYS INCLUDING FACIAL FLUSHING OR A FEELING OF WARMTH ON THE FACE, TEMPORARY INCREASES IN BLOOD SUGAR, INSOMNIA, INCREASED HUNGER  IF YOU RECEIVED A DIAGNOSTIC PROCEDURE (SUCH AS A MEDIAL BRANCH BLOCK), PLEASE NOTE THAT WE DO EXPECT THIS INJECTION TO WEAR OFF.  IT IS IMPORTANT TO COMPLETE THE PAIN DIARY AND LIST THE PAIN SCORE ONLY FOR THE REGION WHERE THE PROCEDURE WAS AND BRING THIS TO YOUR FOLLOW UP VISIT.  IF YOU RECEIVED A RADIOFREQUENCY PROCEDURE, THERE MAY BE SOME SORENESS AFTER THE PROCEDURE.  THIS IS NORMAL.  IF YOU EXPERIENCE PROLONGED WEAKNESS LONGER THAN ONE DAY, BOWEL OR BLADDER INCONTINENCE THEN PLEASE CALL THE PHYSIATRY OFFICE.  Your leg may feel heavy, weak and numb for up to 1-2 days. Be very careful walking.    You may resume normal activities 3 days after procedure.

## 2022-10-07 NOTE — PROGRESS NOTES
Follow-up patient note    Physiatry (physical medicine and  Rehabilitation), interventional spine and sports medicine    Date of Service: 10/07/2022    Chief complaint:   Chief Complaint   Patient presents with    Follow-Up     Lower back and legs pain       HISTORY    HPI: Grisel Mark 67 y.o. female who presents today for follow-up evaluation of low back and leg pain.     She has still not been able to see Dr. Villa.      Her low back and leg pain continues.  Seems like she is having more symptoms in both legs now.  Left leg pain is still the worst.    She reports that she has filled narcan at [facility] and has two of these.      She has been taking norco TID, which is more than prescribed.  Seems to have more aching pain in the afternoon.  Reports that she takes tylenol, but this does not help her.    Reports that she is walking more, 2000 steps/day.    She has been walking for up to 25 minutes daily.  Feeling good about that.  Continuing morning stretching.      Medications:  Takes gabapentin 800mg taking 1.5 pills twice a day.  Her pain medications: Duloxetine 90mg.  MS Contin 15mg q12h.  Norco 5/325 for breakthrough, reports that she does not always take this, some days will take up to 3/day.   No side effects reported.  She is back to taking clonazepam 0.5mg at bedtime and 1mg during the day    Bowel movements have been regular, reports that she is eating better.  Taking miralax prn, but has not taken recently.  No stool softeners lately.  Unchanged         By history:  Her laminectomy was in 1998.  Previous injection on left L4 and L5 TFESI on 08/12/2020 helped with her leg pain.     Medical records review:  Dr. Francisco Olmos, plan to increase duloxetine 90mg daily, discontinue buspirone 20mg po bid, start buproprion XL 150mg daily, continue brexipiprazole 1mg qhs, hydroxyzine 25mg po tid prn, clonazepam 0.5mg daily prn    Review of records   Dr. Dheeraj De La Rosa:  08/20/2019 Right L3-4, L4-5, L5-S1  radiofrequency ablation    I reviewed the note from the referring provider Peter Bruce * dated 02/05/2020: She was seen to establish care, having just moved from [place].  She was seen for essential hypertension, mixed hyperlipidemia, DM2 in obese, hypothyroidism, recurrent MDD in partial depression/anxiety, GERD without esophagitis, chronic bilateral low back pain with bilateral sciatica.  Medications associated with the above were written, related labs ordered including CBC, CMP, Hb A1c, lipids, microalbumin, TSH, free thryoxine, referral to ps\ychiatry, referral to GI for colonoscopy and referral to pain clinic.    Previous treatments:    Physical Therapy: Yes    Medications the patient is tried: Narcotics, gabapentin and muscle relaxers    Previous interventions: [facility] these included right lumbar radiofrequency procedures    Previous surgeries to relieve the above pain:  Surgery on October 28, 1998      ROS:   ENT: ear infection, treated with augmentin  CV: irregular heart, reports history of tachycardia  Psych: depression since 1995  Heme: anemia  Endo: hypothyroidism, diabetes    Red Flags ROS:   Fever, Chills, Sweats: Denies  Involuntary Weight Loss: Denies  Bladder Incontinence: Denies  Bowel Incontinence: Denies  Saddle Anesthesia: Denies    All other systems reviewed and negative.       PMHx:   Past Medical History:   Diagnosis Date    Anxiety     Arrhythmia     Chronic back pain     Constipation     Depression     Diabetes (HCC)     Ear pain, left 9/23/2021    GERD (gastroesophageal reflux disease)     Hyperlipidemia     Hypertension     Thyroid disease        PSHx:   Past Surgical History:   Procedure Laterality Date    RADIO FREQUENCY ABLATION ADDITIONAL LEVEL Left 11/11/2021    Procedure: LEFT lumbar three through lumbar five radiofrequency ablation;  Surgeon: Paresh Ogden M.D.;  [facility];  Service: Pain Management     LUMBAR MEDIAL BRANCH BLOCKS Left 7/21/2021    Procedure: LEFT lumbar three through lumbar five medial branch blocks;  Surgeon: Paresh Ogden M.D.;  Location: [facility];  Service: Pain Management    LUMBAR TRANSFORAMINAL EPIDURAL STEROID INJECTION Left 5/20/2021    Procedure: LEFT lumbar four and lumbar five transforaminal epidural steroid injection;  Surgeon: Paresh Ogden M.D.;  Location: [facility];  Service: Pain Management    BLOCK EPIDURAL STEROID INJECTION Left 2/24/2021    Procedure: INJECTION, STEROID, EPIDURAL;  Surgeon: Paresh Ogden M.D.;  Location: [facility];  Service: Pain Management    LUMBAR MEDIAL BRANCH BLOCKS Left 9/9/2020    Procedure: BLOCK, NERVE, SPINAL, LUMBAR, POSTERIOR RAMUS, MEDIAL BRANCH;  Surgeon: Paresh Ogden M.D.;  Location: [facility];  Service: Pain Management    UT NJX AA&/STRD TFRML EPI LUMBAR/SACRAL 1 LEVEL Left 8/12/2020    Procedure: INJECTION, SPINE, LUMBOSACRAL, EPIDURAL, 1 LEVEL, TRANSFORAMINAL APPROACH;  Surgeon: Paresh Ogden M.D.;  Location: [facility];  Service: Pain Management    UT NJX AA&/STRD TFRML EPI LUMBAR/SACRAL EA ADDL Left 8/12/2020    Procedure: INJECTION, SPINE, LUMBOSACRAL, EPIDURAL, TRANSFORAMINAL APPROACH;  Surgeon: Paresh Ogden M.D.;  Location: [facility];  Service: Pain Management    LAMINOTOMY  1998    ABDOMINAL HYSTERECTOMY TOTAL      CARPAL TUNNEL RELEASE      CHOLECYSTECTOMY         Family history   Family History   Problem Relation Age of Onset    Cancer Mother     Stroke Father         Heart attack    Dementia Sister     Stroke Brother         heart Attack    Cancer Brother         Skin    Diabetes Brother     Kidney Disease Brother     Cancer Brother     Parkinson's Disease Sister     No Known Problems Sister     Diabetes Daughter     Hypertension Daughter          Medications:   Current Outpatient Medications    Medication    Naloxone (NARCAN) 4 MG/0.1ML Liquid    [START ON 10/19/2022] morphine ER (MS CONTIN) 15 MG Tab CR tablet    [START ON 2022] morphine ER (MS CONTIN) 15 MG Tab CR tablet    [START ON 2022] HYDROcodone-acetaminophen (NORCO) 5-325 MG Tab per tablet    [START ON 10/19/2022] HYDROcodone-acetaminophen (NORCO) 5-325 MG Tab per tablet    meloxicam (MOBIC) 7.5 MG Tab    estradiol (ESTRACE) 1 MG Tab    levothyroxine (SYNTHROID) 50 MCG Tab    rosuvastatin (CRESTOR) 10 MG Tab    omeprazole (PRILOSEC) 40 MG delayed-release capsule    DULoxetine (CYMBALTA) 30 MG Cap DR Particles    [START ON 10/9/2022] clonazePAM (KLONOPIN) 0.5 MG Tab    clonazePAM (KLONOPIN) 1 MG Tab    lisinopril (PRINIVIL) 10 MG Tab    gabapentin (NEURONTIN) 800 MG tablet    albuterol 108 (90 Base) MCG/ACT Aero Soln inhalation aerosol    metoprolol tartrate (LOPRESSOR) 25 MG Tab    Brexpiprazole (REXULTI) 2 MG Tab    busPIRone (BUSPAR) 10 MG Tab tablet    cyclobenzaprine (FLEXERIL) 5 mg tablet    Cholecalciferol (VITAMIN D3) 1.25 MG (58814 UT) Cap    Cholecalciferol 1.25 MG (20698 UT) Tab    Empagliflozin (JARDIANCE) 25 MG Tab    pseudoephedrine (SUDAFED) 30 MG Tab    fluticasone (FLONASE) 50 MCG/ACT nasal spray    aspirin EC (ECOTRIN) 81 MG Tablet Delayed Response    cyanocobalamin (VITAMIN B-12) 500 MCG Tab    metformin (GLUCOPHAGE) 1000 MG tablet    Blood Glucose Meter Kit    Lancets    Alcohol Swabs    Blood Glucose Test Strips     No current facility-administered medications for this visit.       Allergies:   No Known Allergies    Social Hx:   Social History     Socioeconomic History    Marital status:      Spouse name: Not on file    Number of children: Not on file    Years of education: Not on file    Highest education level: Not on file   Occupational History    Not on file   Tobacco Use    Smoking status: Every Day     Packs/day: 0.25     Types: Cigarettes     Last attempt to quit: 2014     Years since quittin.7  "   Smokeless tobacco: Never   Vaping Use    Vaping Use: Never used   Substance and Sexual Activity    Alcohol use: Not Currently     Comment: rare    Drug use: Not Currently     Types: Marijuana    Sexual activity: Not Currently   Other Topics Concern     Service No    Blood Transfusions Yes    Caffeine Concern No    Occupational Exposure No    Hobby Hazards No    Sleep Concern Yes    Stress Concern Yes    Weight Concern Yes    Special Diet No    Back Care No    Exercise Yes    Bike Helmet No    Seat Belt Yes    Self-Exams Yes   Social History Narrative    Not on file     Social Determinants of Health     Financial Resource Strain: Not on file   Food Insecurity: Not on file   Transportation Needs: Not on file   Physical Activity: Not on file   Stress: Not on file   Social Connections: Not on file   Intimate Partner Violence: Not on file   Housing Stability: Not on file         EXAMINATION     Physical Exam:   Vitals: /64 (BP Location: Right arm, Patient Position: Sitting, BP Cuff Size: Adult long)   Pulse 81   Temp 36.1 °C (97 °F) (Temporal)   Ht 1.6 m (5' 3\")   Wt 79.6 kg (175 lb 8 oz)   SpO2 95%     Constitutional:   Body Habitus: Body mass index is 31.09 kg/m².  Cooperation: Fully cooperates with exam  Appearance: Well-groomed, well-nourished, not disheveled, no acute distress    Eyes: No scleral icterus, no proptosis     ENT -no obvious auditory deficits, wearing a facial mask    Skin -no rashes or lesions noted    Respiratory-  breathing comfortable on room air, no audible wheezing    Cardiovascular- no lower extremity edema is noted.     Psychiatric- alert and oriented ×3. Normal affect.     Gait - normal gait, no use of ambulatory device, antalgic.     Musculoskeletal -     Thoracic/Lumbar Spine/Sacral Spine/Hips   Inspection: no evidence of atrophy in bilateral lower extremities throughout     ROM of the lumbar spine decreased.     Palpation: tenderness to palpation lumbar paraspinals " minimal    Neuro     Key points for the international standards for neurological classification of spinal cord injury (ISNCSCI) to light touch.     Dermatome R L   L2 2 2   L3 2 2   L4 2 2   L5 2 1   S1 2 1   S2 2 2       Motor Exam Lower Extremities    ? Myotome R L   Hip flexion L2 5 5   Knee extension L3 5 5   Ankle dorsiflexion L4 5 5   Toe extension L5 5 5   Ankle plantarflexion S1 5 5       Reflexes  ?  R L   Patella  2+ 2+   Achilles   2+ 1+     No clonus bilaterally    MEDICAL DECISION MAKING    Medical records review: see under HPI section.     DATA    Labs:   08/10/2022: UDS positive for morphine and negative for metabolites, positive for hydrocodone, positive for clonazepam  03/18/2022: UDS positive for morphine and negative for metabolites, positive for hydrocodone, positive for clonazepam  01/18/2022: UDS positive for morphine and negative for metabolites, absent hydrocodone, positive for clonazepam  10/20/2021: UDS positive for morphine and metabolites, clonzapem, low amount of THC noted  05/08/2021: UDS positive for morphine and metabolites, clonazepam and metabolites, hydrocodone and metabolites  08/18/2020: UDS positive for morphine and metabolites, clonazepam and metabolites, hydrocodone and metabolites  04/24/2020 UDS positive for morphine and metabolites, clonazepam  04/09/2020 Vitamin D, 25:  28L  04/09/2020 Vitamin B12: 217    Lab Results   Component Value Date/Time    SODIUM 139 05/05/2022 12:18 PM    POTASSIUM 4.8 05/05/2022 12:18 PM    CHLORIDE 104 05/05/2022 12:18 PM    CO2 24 05/05/2022 12:18 PM    ANION 11.0 05/05/2022 12:18 PM    GLUCOSE 194 (H) 05/05/2022 12:18 PM    BUN 13 05/05/2022 12:18 PM    CREATININE 0.70 05/05/2022 12:18 PM    CALCIUM 9.8 05/05/2022 12:18 PM    ASTSGOT 28 05/05/2022 12:18 PM    ALTSGPT 28 05/05/2022 12:18 PM    TBILIRUBIN 0.3 05/05/2022 12:18 PM    ALBUMIN 4.4 05/05/2022 12:18 PM    TOTPROTEIN 7.1 05/05/2022 12:18 PM    GLOBULIN 2.7 05/05/2022 12:18 PM     AGRATIO 1.6 05/05/2022 12:18 PM       No results found for: PROTHROMBTM, INR     Lab Results   Component Value Date/Time    WBC 7.5 05/04/2021 08:06 AM    RBC 4.73 05/04/2021 08:06 AM    HEMOGLOBIN 14.6 05/04/2021 08:06 AM    HEMATOCRIT 43.8 05/04/2021 08:06 AM    MCV 92.6 05/04/2021 08:06 AM    MCH 30.9 05/04/2021 08:06 AM    MCHC 33.3 (L) 05/04/2021 08:06 AM    MPV 10.6 05/04/2021 08:06 AM    NEUTSPOLYS 46.60 05/04/2021 08:06 AM    LYMPHOCYTES 39.80 05/04/2021 08:06 AM    MONOCYTES 9.30 05/04/2021 08:06 AM    EOSINOPHILS 3.70 05/04/2021 08:06 AM    BASOPHILS 0.50 05/04/2021 08:06 AM        Lab Results   Component Value Date/Time    HBA1C 6.9 (A) 09/19/2022 09:00 AM        Imaging: I personally reviewed following images, these are my reads:     MRI lumbar spine 05/05/2020  At L1-2, no central or foraminal stenosis  At L2-3, no central or foraminal stenosis  At L3-4, mild disc bulge and mild ligamentum flavum thickening.  No central canal stenosis. Borderline left foraminal stenosis. Schmorl's node.   At L4-5, diffuse disc bulge with mild ligamentum flavum thickening.  No central canal stenosis.  There is facet arthropathy, left greater than right. Mild foraminal stenosis bilaterally. S/P left L4/5 hemilaminectomy  At L5-S1, there is mild-borderline foraminal stenosis with facet arthropathy and mild disc bulge.  No central canal stenosis    Xray lumbar spine 05/05/2020  There is mild decreased joint space at L3-4 and L4-5.    Facet arthropathy is noted, greatest at L5-S1 bilaterally.  No significant motion on flexion and extension films    IMAGING radiology reads. I reviewed the following radiology reads    Xray abdomen 07/17/2020  IMPRESSION:     Nonspecific bowel gas pattern. No evidence small bowel obstruction.      MRI lumbar spine 05/05/2020  IMPRESSION:     1.  Caudal lumbar facet arthropathy left worse than right with no bony destruction to indicate septic or inflammatory arthropathy. This most likely is just  degenerative  2.  Mild caudal lumbar foraminal stenoses  3.  No significant central stenosis.  4.  Left L4/5 hemilaminectomy                                                                                   Xray lumbar spine 05/05/2020     IMPRESSION:     1.  No acute lumbar compression fracture or subluxation.  2.  Posterior disc space narrowing at L4-5 and to lesser extent at L3-4.  3.  Bilateral facet arthropathy at L5-S1.                                                                                             Diagnosis   Visit Diagnoses     ICD-10-CM   1. Postlaminectomy syndrome  M96.1   2. Left lumbar radiculitis  M54.16   3. Depression, major, recurrent, moderate (HCC)  F33.1   4. Benzodiazepine dependence (HCC)  F13.20   5. Chronic, continuous use of opioids  F11.90   6. S/P lumbar laminectomy  Z98.890   7. DDD (degenerative disc disease), lumbar  M51.36   8. Bilateral hip pain  M25.551    M25.552             ASSESSMENT:  Grisel Mark 67 y.o. female seen for above     Grisel was seen today for follow-up.    Diagnoses and all orders for this visit:    Postlaminectomy syndrome  -     morphine ER (MS CONTIN) 15 MG Tab CR tablet; Take 1 Tablet by mouth every 12 hours for 30 days.  -     morphine ER (MS CONTIN) 15 MG Tab CR tablet; Take 1 Tablet by mouth every 12 hours for 30 days.  -     HYDROcodone-acetaminophen (NORCO) 5-325 MG Tab per tablet; Take 1 Tablet by mouth every 8 hours as needed (severe pain) for up to 30 days.  -     HYDROcodone-acetaminophen (NORCO) 5-325 MG Tab per tablet; Take 1 Tablet by mouth every 8 hours as needed (severe pain) for up to 30 days.  -     meloxicam (MOBIC) 7.5 MG Tab; Take 1 Tablet by mouth 1 time a day as needed for Severe Pain for up to 30 days.  -     Referral to Pain Clinic  -     Consent for Opiate Prescription  -     Controlled Substance Treatment Agreement    Left lumbar radiculitis  -     Referral to Pain Clinic    Depression, major, recurrent, moderate (HCC)  -      Patient has been identified as having a positive depression screening. Appropriate orders and counseling have been given.    Benzodiazepine dependence (HCC)    Chronic, continuous use of opioids    S/P lumbar laminectomy  -     Naloxone (NARCAN) 4 MG/0.1ML Liquid; One spray in one nostril for overdose and call 911.    DDD (degenerative disc disease), lumbar  -     Naloxone (NARCAN) 4 MG/0.1ML Liquid; One spray in one nostril for overdose and call 911.  -     morphine ER (MS CONTIN) 15 MG Tab CR tablet; Take 1 Tablet by mouth every 12 hours for 30 days.  -     morphine ER (MS CONTIN) 15 MG Tab CR tablet; Take 1 Tablet by mouth every 12 hours for 30 days.  -     HYDROcodone-acetaminophen (NORCO) 5-325 MG Tab per tablet; Take 1 Tablet by mouth every 8 hours as needed (severe pain) for up to 30 days.  -     HYDROcodone-acetaminophen (NORCO) 5-325 MG Tab per tablet; Take 1 Tablet by mouth every 8 hours as needed (severe pain) for up to 30 days.  -     meloxicam (MOBIC) 7.5 MG Tab; Take 1 Tablet by mouth 1 time a day as needed for Severe Pain for up to 30 days.  -     Consent for Opiate Prescription  -     Controlled Substance Treatment Agreement    Bilateral hip pain  -     DX-HIP-BILATERAL-WITH PELVIS-3/4 VIEWS; Future      Encouraged her to follow-up with Dr. Villa.  She continues to be disappointment that she has not been approved for the SCS trial.  Discussed holding off on increasing opioid dose.   reviewed.  Continue MS Contin 15mg twice a day.  Continue norco no more than 2/day.  She has narcan at home.  Sending new referral as insurance has sent a letter. She will take in other script to discuss whether or not she needs a refill with pharmacist.  Trial meloxicam prn.  Discussed caution, avoid taking more norco than prescribed.  Discuss need for baby aspirin with PCP.    Continue to maintain regular bowel movements.  Not taking stool softeners regularly.  Encouraged her to continue walking program and weight  loss efforts.  6.   Discussed treatment options outside of SCS trial.  Plan for caudal epidural steroid injection with catheter.  The risks benefits and alternatives to this procedure were discussed and the patient wishes to proceed with the procedure. Risks include but are not limited to damage to surrounding structures, infection, bleeding, worsening of pain which can be permanent, weakness which can be permanent. Benefits include pain relief, improved function. Alternatives includes not doing the procedure.  Stop meloxicam and ASA five days prior to procedure.  She does not report history of stroke or heart attack, prophylaxis only.  7.    She is working on getting a [place] license, has ordered her birth certificate, she reports.  8.    Xrays of the hips ordered      Follow-up: Return for Hospital injection.    Thank you very much for asking me to participate in Grisel Mark's care.  Please contact me with any questions or concerns.    Please note that this dictation was created using voice recognition software. I have made every reasonable attempt to correct obvious errors but there may be errors of grammar and content that I may have overlooked prior to finalization of this note.      Paresh Ogden MD  Physical Medicine and Rehabilitation  Interventional Spine and Sports Physiatry  [facility]

## 2022-10-11 ENCOUNTER — APPOINTMENT (OUTPATIENT)
Dept: RADIOLOGY | Facility: IMAGING CENTER | Age: 67
End: 2022-10-11
Attending: NURSE PRACTITIONER
Payer: MEDICARE

## 2022-10-11 ENCOUNTER — OFFICE VISIT (OUTPATIENT)
Dept: MEDICAL GROUP | Facility: PHYSICIAN GROUP | Age: 67
End: 2022-10-11
Payer: MEDICARE

## 2022-10-11 ENCOUNTER — APPOINTMENT (OUTPATIENT)
Dept: RADIOLOGY | Facility: IMAGING CENTER | Age: 67
End: 2022-10-11
Attending: PHYSICAL MEDICINE & REHABILITATION
Payer: MEDICARE

## 2022-10-11 VITALS
HEIGHT: 63 IN | TEMPERATURE: 97.2 F | DIASTOLIC BLOOD PRESSURE: 70 MMHG | HEART RATE: 71 BPM | OXYGEN SATURATION: 93 % | BODY MASS INDEX: 32.37 KG/M2 | SYSTOLIC BLOOD PRESSURE: 118 MMHG | WEIGHT: 182.7 LBS

## 2022-10-11 DIAGNOSIS — Z23 NEED FOR VACCINATION: ICD-10-CM

## 2022-10-11 DIAGNOSIS — M25.511 CHRONIC RIGHT SHOULDER PAIN: ICD-10-CM

## 2022-10-11 DIAGNOSIS — M25.551 BILATERAL HIP PAIN: ICD-10-CM

## 2022-10-11 DIAGNOSIS — G89.29 CHRONIC RIGHT SHOULDER PAIN: ICD-10-CM

## 2022-10-11 DIAGNOSIS — Z72.0 TOBACCO USE: ICD-10-CM

## 2022-10-11 DIAGNOSIS — F43.10 POSTTRAUMATIC STRESS DISORDER, DELAYED ONSET: ICD-10-CM

## 2022-10-11 DIAGNOSIS — F41.1 GENERALIZED ANXIETY DISORDER: ICD-10-CM

## 2022-10-11 DIAGNOSIS — F13.20 BENZODIAZEPINE DEPENDENCE (HCC): ICD-10-CM

## 2022-10-11 DIAGNOSIS — M25.552 BILATERAL HIP PAIN: ICD-10-CM

## 2022-10-11 DIAGNOSIS — F41.8 SITUATIONAL ANXIETY: ICD-10-CM

## 2022-10-11 PROCEDURE — 73522 X-RAY EXAM HIPS BI 3-4 VIEWS: CPT | Mod: TC | Performed by: PHYSICIAN ASSISTANT

## 2022-10-11 PROCEDURE — 90662 IIV NO PRSV INCREASED AG IM: CPT | Performed by: NURSE PRACTITIONER

## 2022-10-11 PROCEDURE — 73030 X-RAY EXAM OF SHOULDER: CPT | Mod: TC,RT | Performed by: PHYSICIAN ASSISTANT

## 2022-10-11 PROCEDURE — G0008 ADMIN INFLUENZA VIRUS VAC: HCPCS | Performed by: NURSE PRACTITIONER

## 2022-10-11 PROCEDURE — 99214 OFFICE O/P EST MOD 30 MIN: CPT | Mod: 25 | Performed by: NURSE PRACTITIONER

## 2022-10-11 RX ORDER — CYCLOBENZAPRINE HCL 5 MG
5 TABLET ORAL 2 TIMES DAILY PRN
Qty: 90 TABLET | Refills: 3 | Status: SHIPPED | OUTPATIENT
Start: 2022-10-11 | End: 2023-07-07 | Stop reason: SDUPTHER

## 2022-10-11 RX ORDER — CLONAZEPAM 0.5 MG/1
0.5 TABLET ORAL NIGHTLY
Qty: 90 TABLET | Refills: 0 | Status: SHIPPED | OUTPATIENT
Start: 2022-10-11 | End: 2022-12-19 | Stop reason: SDUPTHER

## 2022-10-11 RX ORDER — CLONAZEPAM 1 MG/1
1 TABLET ORAL DAILY
Qty: 90 TABLET | Refills: 0 | Status: SHIPPED | OUTPATIENT
Start: 2022-10-11 | End: 2022-12-19 | Stop reason: SDUPTHER

## 2022-10-11 RX ORDER — NICOTINE 21 MG/24HR
1 PATCH, TRANSDERMAL 24 HOURS TRANSDERMAL EVERY 24 HOURS
Qty: 30 PATCH | Refills: 2 | Status: SHIPPED | OUTPATIENT
Start: 2022-10-11 | End: 2022-12-19

## 2022-10-11 ASSESSMENT — FIBROSIS 4 INDEX: FIB4 SCORE: 1.89

## 2022-10-11 NOTE — ASSESSMENT & PLAN NOTE
This is a chronic condition. Patient reports several months on right shoulder pain. This has worsened and now radiates down to her hand. She reports a popping feeling with certain movements. She denies any trauma or falls. She is unable to raise her arm above shoulder.     Will order xray. Will place referral to orthopedics.

## 2022-10-11 NOTE — ASSESSMENT & PLAN NOTE
Patient reports that her clonazepam was sent to a different pharmacy last time due to availability of the medication.  Patient is requesting for clonazepam to be sent to her usual pharmacy.    Will cancel previous prescription and send it to patient's current pharmacy.    Obtained and reviewed patient utilization report from [facility] pharmacy database on 10/11/2022 8:42 AM  prior to writing prescription for controlled substance II, III or IV per [place] bill . Based on assessment of the report,my physical exam if necessary and the patient's health problem, the prescription is medically necessary.

## 2022-10-11 NOTE — PROGRESS NOTES
Follow-Up, Arm Pain (Right shoulder and arm pain, especially when pt raises her arm./Pt states pain started about 2 months ago.), Medication Management (Pt wants to know what dose to take for her Metformin.//Pt states her back doctor wants to know if she needs to take her aspirin. ), and Urinary Retention (Pt states she started have urinary leakage starting about a year ago, has gotten worse. )                                                                                                                                         HPI:   Grisel presents today with the following.    Problem   Chronic Right Shoulder Pain   Tobacco Use   Benzodiazepine Dependence (Hcc)       Current Outpatient Medications   Medication Sig Dispense Refill    metformin (GLUCOPHAGE) 1000 MG tablet Take 1 Tablet by mouth 2 times a day with meals. 180 Tablet 3    cyclobenzaprine (FLEXERIL) 5 mg tablet Take 1 Tablet by mouth 2 times a day as needed for Muscle Spasms (restless leg). 90 Tablet 3    clonazePAM (KLONOPIN) 0.5 MG Tab Take 1 Tablet by mouth every evening for 90 days. Indications: Feeling Anxious 90 Tablet 0    clonazePAM (KLONOPIN) 1 MG Tab Take 1 Tablet by mouth every day for 90 days. Indications: Feeling Anxious 90 Tablet 0    nicotine (NICODERM) 14 MG/24HR PATCH 24 HR Place 1 Patch on the skin every 24 hours. 30 Patch 2    [START ON 10/19/2022] morphine ER (MS CONTIN) 15 MG Tab CR tablet Take 1 Tablet by mouth every 12 hours for 30 days. 60 Tablet 0    [START ON 11/18/2022] morphine ER (MS CONTIN) 15 MG Tab CR tablet Take 1 Tablet by mouth every 12 hours for 30 days. 60 Tablet 0    [START ON 10/19/2022] HYDROcodone-acetaminophen (NORCO) 5-325 MG Tab per tablet Take 1 Tablet by mouth every 8 hours as needed (severe pain) for up to 30 days. 60 Tablet 0    meloxicam (MOBIC) 7.5 MG Tab Take 1 Tablet by mouth 1 time a day as needed for Severe Pain for up to 30 days. 20 Tablet 1    estradiol (ESTRACE) 1 MG Tab TAKE 1 TABLET BY MOUTH  EVERY DAY 90 Tablet 3    levothyroxine (SYNTHROID) 50 MCG Tab Take 1 Tablet by mouth every morning on an empty stomach. 90 Tablet 3    rosuvastatin (CRESTOR) 10 MG Tab Take 1 Tablet by mouth every evening. 100 Tablet 3    omeprazole (PRILOSEC) 40 MG delayed-release capsule Take 1 Capsule by mouth every day. 90 Capsule 3    DULoxetine (CYMBALTA) 30 MG Cap DR Particles Take 3 Capsules by mouth every day. 270 Capsule 3    lisinopril (PRINIVIL) 10 MG Tab TAKE 1 TABLET BY MOUTH EVERY  Tablet 3    gabapentin (NEURONTIN) 800 MG tablet Take 1.5 Tablets by mouth 2 times a day for 90 days. 270 Tablet 0    albuterol 108 (90 Base) MCG/ACT Aero Soln inhalation aerosol Inhale 2 Puffs every four hours as needed for Shortness of Breath. 1 Each 0    metoprolol tartrate (LOPRESSOR) 25 MG Tab Take 1 Tablet by mouth 2 times a day. 180 Tablet 3    Brexpiprazole (REXULTI) 2 MG Tab Take 2 mg by mouth every day. 90 Tablet 3    busPIRone (BUSPAR) 10 MG Tab tablet Take 2 Tablets by mouth 2 times a day. 360 Tablet 3    Cholecalciferol (VITAMIN D3) 1.25 MG (75992 UT) Cap TAKE ONE CAPSULE BY MOUTH EVERY 7 DAYS      Cholecalciferol 1.25 MG (06226 UT) Tab Take 50,000 Units by mouth every 7 days. 12 Tablet 0    Empagliflozin (JARDIANCE) 25 MG Tab Take 1 Tablet by mouth every day. 100 Tablet 3    pseudoephedrine (SUDAFED) 30 MG Tab Take 1-2 Tablets by mouth every 6 hours as needed for Congestion (sinus pressure not relieved by other measures). 30 Tablet 0    Blood Glucose Meter Kit Test blood sugar as recommended by provider. Abbott Freestyle Lite blood glucose monitoring kit. 1 Kit 0    Lancets Use one Abbott [place] Lite lancet to test blood sugar twice daily and PRN. 300 Each 3    Alcohol Swabs Wipe site with prep pad prior to injection. 100 Each 3    fluticasone (FLONASE) 50 MCG/ACT nasal spray Administer 1 Spray into affected nostril(S) every day. 16 g 11    aspirin EC (ECOTRIN) 81 MG Tablet Delayed Response Take 81 mg by mouth every  "day.      cyanocobalamin (VITAMIN B-12) 500 MCG Tab Take 500 mcg by mouth every day.      Blood Glucose Test Strips Use one Abbott Freedom Lite strip to test blood sugar twice daily and PRN. 270 Each 3    [START ON 11/18/2022] HYDROcodone-acetaminophen (NORCO) 5-325 MG Tab per tablet Take 1 Tablet by mouth every 8 hours as needed (severe pain) for up to 30 days. 60 Tablet 0     No current facility-administered medications for this visit.       Allergies as of 10/11/2022    (No Known Allergies)        ROS:  All systems negative expect as addressed in assessment and plan.     /70 (BP Location: Right arm, Patient Position: Sitting, BP Cuff Size: Large adult)   Pulse 71   Temp 36.2 °C (97.2 °F) (Temporal)   Ht 1.6 m (5' 3\") Comment: pt stated  Wt 82.9 kg (182 lb 11.2 oz)   LMP  (LMP Unknown)   SpO2 93%   BMI 32.36 kg/m²     Physical Exam:    Physical Exam  Constitutional:       Appearance: Normal appearance.   HENT:      Head: Normocephalic and atraumatic.   Eyes:      Pupils: Pupils are equal, round, and reactive to light.   Musculoskeletal:      Right shoulder: Tenderness and crepitus present. Decreased range of motion. Decreased strength.   Neurological:      Mental Status: She is alert.         Assessment and Plan:  67 y.o. female with the following issues.    1. Need for vaccination  Influenza Vaccine, High Dose (65+ Only)      2. Chronic right shoulder pain  Referral to Orthopedics    DX-SHOULDER 2+ RIGHT      3. Generalized anxiety disorder  clonazePAM (KLONOPIN) 0.5 MG Tab    clonazePAM (KLONOPIN) 1 MG Tab      4. Situational anxiety  clonazePAM (KLONOPIN) 0.5 MG Tab    clonazePAM (KLONOPIN) 1 MG Tab      5. Posttraumatic stress disorder, delayed onset  clonazePAM (KLONOPIN) 0.5 MG Tab    clonazePAM (KLONOPIN) 1 MG Tab      6. Tobacco use        7. Benzodiazepine dependence (HCC)             Chronic right shoulder pain  This is a chronic condition. Patient reports several months on right shoulder " pain. This has worsened and now radiates down to her hand. She reports a popping feeling with certain movements. She denies any trauma or falls. She is unable to raise her arm above shoulder.     Will order xray. Will place referral to orthopedics.         Tobacco use  Patient reports that with increased pain in stress recently she has started smoking again.  She says she smokes about 10 cigarettes a day.  She would like to quit.  Patient is requesting nicotine patches.    Smoking cessation counseling provided.  Will order nicotine patches to patient's pharmacy.    Benzodiazepine dependence (HCC)  Patient reports that her clonazepam was sent to a different pharmacy last time due to availability of the medication.  Patient is requesting for clonazepam to be sent to her usual pharmacy.    Will cancel previous prescription and send it to patient's current pharmacy.    Obtained and reviewed patient utilization report from [facility] pharmacy database on 10/11/2022 8:42 AM  prior to writing prescription for controlled substance II, III or IV per [place] bill . Based on assessment of the report,my physical exam if necessary and the patient's health problem, the prescription is medically necessary.       Return in about 2 months (around 12/11/2022) for Follow-up for anxiety..      I have placed the below orders and discussed them with an approved delegating provider.  The MA is performing the below orders under the direction of Dr. Rodriguez.    Please note that this dictation was created using voice recognition software. I have worked with consultants from the vendor as well as technical experts from [facility] to optimize the interface. I have made every reasonable attempt to correct obvious errors, but I expect that there are errors of grammar and possibly content that I did not discover before finalizing the note.

## 2022-10-11 NOTE — ASSESSMENT & PLAN NOTE
Patient reports that with increased pain in stress recently she has started smoking again.  She says she smokes about 10 cigarettes a day.  She would like to quit.  Patient is requesting nicotine patches.    Smoking cessation counseling provided.  Will order nicotine patches to patient's pharmacy.

## 2022-10-12 ENCOUNTER — APPOINTMENT (OUTPATIENT)
Dept: RADIOLOGY | Facility: MEDICAL CENTER | Age: 67
End: 2022-10-12
Attending: NURSE PRACTITIONER
Payer: MEDICARE

## 2022-10-19 ENCOUNTER — HOSPITAL ENCOUNTER (OUTPATIENT)
Dept: RADIOLOGY | Facility: MEDICAL CENTER | Age: 67
End: 2022-10-19
Attending: NURSE PRACTITIONER
Payer: MEDICARE

## 2022-10-19 DIAGNOSIS — Z12.31 VISIT FOR SCREENING MAMMOGRAM: ICD-10-CM

## 2022-10-19 PROCEDURE — 77063 BREAST TOMOSYNTHESIS BI: CPT

## 2022-10-20 ENCOUNTER — TELEPHONE (OUTPATIENT)
Dept: PHYSICAL MEDICINE AND REHAB | Facility: MEDICAL CENTER | Age: 67
End: 2022-10-20
Payer: MEDICARE

## 2022-10-31 ENCOUNTER — HOSPITAL ENCOUNTER (OUTPATIENT)
Facility: REHABILITATION | Age: 67
End: 2022-10-31
Attending: PHYSICAL MEDICINE & REHABILITATION | Admitting: PHYSICAL MEDICINE & REHABILITATION
Payer: MEDICARE

## 2022-10-31 ENCOUNTER — APPOINTMENT (OUTPATIENT)
Dept: RADIOLOGY | Facility: REHABILITATION | Age: 67
End: 2022-10-31
Attending: PHYSICAL MEDICINE & REHABILITATION
Payer: MEDICARE

## 2022-10-31 VITALS
DIASTOLIC BLOOD PRESSURE: 67 MMHG | SYSTOLIC BLOOD PRESSURE: 117 MMHG | BODY MASS INDEX: 32.97 KG/M2 | WEIGHT: 186.07 LBS | HEIGHT: 63 IN | HEART RATE: 54 BPM | RESPIRATION RATE: 18 BRPM | OXYGEN SATURATION: 95 % | TEMPERATURE: 97.2 F

## 2022-10-31 LAB — GLUCOSE BLD STRIP.AUTO-MCNC: 167 MG/DL (ref 65–99)

## 2022-10-31 PROCEDURE — 700101 HCHG RX REV CODE 250

## 2022-10-31 PROCEDURE — 700111 HCHG RX REV CODE 636 W/ 250 OVERRIDE (IP)

## 2022-10-31 PROCEDURE — 62323 NJX INTERLAMINAR LMBR/SAC: CPT

## 2022-10-31 PROCEDURE — 82962 GLUCOSE BLOOD TEST: CPT

## 2022-10-31 PROCEDURE — 700117 HCHG RX CONTRAST REV CODE 255

## 2022-10-31 RX ORDER — DEXAMETHASONE SODIUM PHOSPHATE 10 MG/ML
INJECTION, SOLUTION INTRAMUSCULAR; INTRAVENOUS
Status: COMPLETED
Start: 2022-10-31 | End: 2022-10-31

## 2022-10-31 RX ORDER — LIDOCAINE HYDROCHLORIDE 20 MG/ML
INJECTION, SOLUTION EPIDURAL; INFILTRATION; INTRACAUDAL; PERINEURAL
Status: COMPLETED
Start: 2022-10-31 | End: 2022-10-31

## 2022-10-31 RX ADMIN — IOHEXOL 10 ML: 240 INJECTION, SOLUTION INTRATHECAL; INTRAVASCULAR; INTRAVENOUS; ORAL at 14:52

## 2022-10-31 RX ADMIN — LIDOCAINE HYDROCHLORIDE 10 ML: 20 INJECTION, SOLUTION EPIDURAL; INFILTRATION; INTRACAUDAL at 14:52

## 2022-10-31 RX ADMIN — DEXAMETHASONE SODIUM PHOSPHATE 10 MG: 10 INJECTION INTRAMUSCULAR; INTRAVENOUS at 14:52

## 2022-10-31 ASSESSMENT — PAIN DESCRIPTION - PAIN TYPE
TYPE: CHRONIC PAIN
TYPE: CHRONIC PAIN

## 2022-10-31 ASSESSMENT — FIBROSIS 4 INDEX: FIB4 SCORE: 1.89

## 2022-10-31 NOTE — INTERVAL H&P NOTE
"H&P reviewed. The patient was examined and there are no changes to the H&P      /71   Pulse (!) 57   Temp 36.2 °C (97.2 °F) (Skin)   Resp 16   Ht 1.6 m (5' 3\")   Wt 84.4 kg (186 lb 1.1 oz)   SpO2 93%     CV: RRR, Normal S1, S2  RESP: Clear to auscultation bilaterally    Continue with procedure as planned.    Paresh Ogden MD  Physical Medicine and Rehabilitation  Interventional Spine and Sports Physiatry  [facility]      "

## 2022-10-31 NOTE — OP REPORT
Pre-operative Diagnosis:   M96.1 (ICD-10-CM) - Postlaminectomy syndrome, not elsewhere classified   M54.16 (ICD-10-CM) - Radiculopathy, lumbar region         Post-operative Diagnosis:  M96.1 (ICD-10-CM) - Postlaminectomy syndrome, not elsewhere classified   M54.16 (ICD-10-CM) - Radiculopathy, lumbar region           Procedure: Fluoroscopically-guided Caudal epidural steroid injection with cathether     Blood loss: None     Description of procedure:  The risks, benefits, and alternatives of the procedure were reviewed and discussed with the patient.  Written informed consent was freely obtained. A pre-procedural time-out was conducted by the nurse verifying patient’s identity, procedure to be performed, procedure site and side, and allergy verification. Appropriate equipment was determined to be in place for the procedure.      No sedation was used for this procedure.      The patient was placed in a prone position and fluoroscope was used to assist in guidance.  The skin was prepped and draped in usual sterile technique.   Then, using a 25g 1.5 inch needle was used to inject 1cc of 1% lidocaine without epinephrine to numb superficial tissues. An 18g Tuohy needle was then used to access the caudal canal via the sacral hiatus and advanced into the epidural space.  Omnipaque was injected to confirm location in the lateral position.  Following that, a catheter was inserted and advanced under fluoroscopy in the AP position to the top third of the fifth lumbar vertebrae.  Omnipaque was injected to confirm location in the AP position.      Following negative aspiration, 2cc of 2% lidocaine without epinephrine, 1cc of dexamethasone (10mg/cc), and 2cc of sterile normal saline was slowly injected into the caudal epidural space.  The patient tolerated the procedure well, the needle and catheter were removed intact. The patient's skin was wiped with a 4x4 gauze, the area was cleansed with alcohol prep, and a bandaid was  applied. There were no complications noted.      The patient was discharged in good condition after meeting discharge criteria and was given discharge instructions.     Paresh Ogden MD  Physical Medicine and Rehabilitation  Interventional Spine and Sports Physiatry  [facility]       CPT: [id number]

## 2022-10-31 NOTE — PROGRESS NOTES
Dr. Ogden in to see patient, questions answered.  Discharge instructions given with pt understanding.    Dr. Ogden in to see patient, discharge orders received.  Taking food and fluids well, no nausea.  Steady gait.

## 2022-11-01 ENCOUNTER — TELEPHONE (OUTPATIENT)
Dept: PHYSICAL MEDICINE AND REHAB | Facility: MEDICAL CENTER | Age: 67
End: 2022-11-01
Payer: MEDICARE

## 2022-11-01 NOTE — TELEPHONE ENCOUNTER
I spoke to Grisel Mark regarding special procedure Caudal epidural steroid injection with catheter that was done with Dr. Ogden on 10/31/2022 and patient reported she is doing good and she is icing the area.

## 2022-11-21 ENCOUNTER — OFFICE VISIT (OUTPATIENT)
Dept: PHYSICAL MEDICINE AND REHAB | Facility: MEDICAL CENTER | Age: 67
End: 2022-11-21
Payer: MEDICARE

## 2022-11-21 VITALS
DIASTOLIC BLOOD PRESSURE: 68 MMHG | BODY MASS INDEX: 32.71 KG/M2 | TEMPERATURE: 96.6 F | HEART RATE: 60 BPM | WEIGHT: 184.6 LBS | OXYGEN SATURATION: 95 % | HEIGHT: 63 IN | SYSTOLIC BLOOD PRESSURE: 130 MMHG

## 2022-11-21 DIAGNOSIS — M54.16 LEFT LUMBAR RADICULITIS: ICD-10-CM

## 2022-11-21 DIAGNOSIS — M96.1 POSTLAMINECTOMY SYNDROME: ICD-10-CM

## 2022-11-21 DIAGNOSIS — M16.0 PRIMARY OSTEOARTHRITIS OF BOTH HIPS: ICD-10-CM

## 2022-11-21 DIAGNOSIS — M51.36 DDD (DEGENERATIVE DISC DISEASE), LUMBAR: ICD-10-CM

## 2022-11-21 PROCEDURE — 99214 OFFICE O/P EST MOD 30 MIN: CPT | Performed by: PHYSICAL MEDICINE & REHABILITATION

## 2022-11-21 RX ORDER — MORPHINE SULFATE 15 MG/1
15 TABLET, FILM COATED, EXTENDED RELEASE ORAL EVERY 12 HOURS
Qty: 60 TABLET | Refills: 0 | Status: SHIPPED | OUTPATIENT
Start: 2022-12-19 | End: 2023-01-18

## 2022-11-21 RX ORDER — HYDROCODONE BITARTRATE AND ACETAMINOPHEN 5; 325 MG/1; MG/1
1 TABLET ORAL EVERY 8 HOURS PRN
Qty: 60 TABLET | Refills: 0 | Status: SHIPPED | OUTPATIENT
Start: 2022-12-19 | End: 2023-01-18

## 2022-11-21 RX ORDER — HYDROCODONE BITARTRATE AND ACETAMINOPHEN 5; 325 MG/1; MG/1
1 TABLET ORAL EVERY 8 HOURS PRN
Qty: 60 TABLET | Refills: 0 | Status: SHIPPED | OUTPATIENT
Start: 2023-01-18 | End: 2023-02-17

## 2022-11-21 RX ORDER — MORPHINE SULFATE 15 MG/1
15 TABLET, FILM COATED, EXTENDED RELEASE ORAL EVERY 12 HOURS
Qty: 60 TABLET | Refills: 0 | Status: SHIPPED | OUTPATIENT
Start: 2023-01-18 | End: 2023-02-17

## 2022-11-21 ASSESSMENT — PAIN SCALES - GENERAL: PAINLEVEL: 6=MODERATE PAIN

## 2022-11-21 ASSESSMENT — PATIENT HEALTH QUESTIONNAIRE - PHQ9
CLINICAL INTERPRETATION OF PHQ2 SCORE: 2
5. POOR APPETITE OR OVEREATING: 1 - SEVERAL DAYS
SUM OF ALL RESPONSES TO PHQ QUESTIONS 1-9: 5

## 2022-11-21 ASSESSMENT — FIBROSIS 4 INDEX: FIB4 SCORE: 1.89

## 2022-11-21 NOTE — PROGRESS NOTES
Follow-up patient note    Physiatry (physical medicine and  Rehabilitation), interventional spine and sports medicine    Date of Service: 10/07/2022    Chief complaint:   Chief Complaint   Patient presents with    Follow-Up     Back pain       HISTORY    HPI: Grisel Mark 67 y.o. female who presents today for follow-up evaluation of low back and leg pain.     Grisel reports that she has had significant provement in her low back and left leg pain.  She does still have some symptoms in the leg but they are less severe than prior to the caudal epidural steroid injection with catheter on October 31, 2022    She reports that she has filled narcan at [facility] and has two of these.  She carries 1 in her purse and has been at home.    She is stable with her current medications.  Continuing activity as tolerated at home.    Medications:  Takes gabapentin 800mg taking 1.5 pills twice a day.  Her pain medications: Duloxetine 90mg.  MS Contin 15mg q12h.  Norco 5/325 for breakthrough, reports that she does not always take this, some days will take up to 3/day.   No side effects reported.  She is back to taking clonazepam 0.5mg at bedtime and 1mg during the day    Bowel movements have been regular, reports that she is eating better.  Taking miralax prn, but has not taken recently.  No stool softeners lately.  Unchanged         By history:  Her laminectomy was in 1998.  Previous injection on left L4 and L5 TFESI on 08/12/2020 helped with her leg pain.     Medical records review:  Dr. Francisco Olmos, plan to increase duloxetine 90mg daily, discontinue buspirone 20mg po bid, start buproprion XL 150mg daily, continue brexipiprazole 1mg qhs, hydroxyzine 25mg po tid prn, clonazepam 0.5mg daily prn    Review of records   Dr. Dheeraj De La Rosa:  08/20/2019 Right L3-4, L4-5, L5-S1 radiofrequency ablation    I reviewed the note from the referring provider Peter Bruce * dated 02/05/2020: She was seen to establish care, having  just moved from [place].  She was seen for essential hypertension, mixed hyperlipidemia, DM2 in obese, hypothyroidism, recurrent MDD in partial depression/anxiety, GERD without esophagitis, chronic bilateral low back pain with bilateral sciatica.  Medications associated with the above were written, related labs ordered including CBC, CMP, Hb A1c, lipids, microalbumin, TSH, free thryoxine, referral to ps\ychiatry, referral to GI for colonoscopy and referral to pain clinic.    Previous treatments:    Physical Therapy: Yes    Medications the patient is tried: Narcotics, gabapentin and muscle relaxers    Previous interventions: [facility] these included right lumbar radiofrequency procedures    Previous surgeries to relieve the above pain:  Surgery on October 28, 1998      ROS:   ENT: ear infection, treated with augmentin  CV: irregular heart, reports history of tachycardia  Psych: depression since 1995  Heme: anemia  Endo: hypothyroidism, diabetes    Red Flags ROS:   Fever, Chills, Sweats: Denies  Involuntary Weight Loss: Denies  Bladder Incontinence: Denies  Bowel Incontinence: Denies  Saddle Anesthesia: Denies    All other systems reviewed and negative.       PMHx:   Past Medical History:   Diagnosis Date    Anxiety     Arrhythmia     Chronic back pain     Constipation     Depression     Diabetes (HCC)     Ear pain, left 9/23/2021    GERD (gastroesophageal reflux disease)     Hyperlipidemia     Hypertension     Thyroid disease        PSHx:   Past Surgical History:   Procedure Laterality Date    [place] INJ LUMBAR/SACRAL,W/ IMAGING N/A 10/31/2022    Procedure: Caudal epidural steroid injection with catheter;  Surgeon: Paresh Ogden M.D.;  [facility];  Service: Pain Management    RADIO FREQUENCY ABLATION ADDITIONAL LEVEL Left 11/11/2021    Procedure: LEFT lumbar three through lumbar five radiofrequency ablation;  Surgeon: Paresh Ogden M.D.;  Location: [facility];  Service: Pain Management    LUMBAR MEDIAL BRANCH BLOCKS Left 7/21/2021    Procedure: LEFT lumbar three through lumbar five medial branch blocks;  Surgeon: Paresh Ogden M.D.;  Location: [facility];  Service: Pain Management    LUMBAR TRANSFORAMINAL EPIDURAL STEROID INJECTION Left 5/20/2021    Procedure: LEFT lumbar four and lumbar five transforaminal epidural steroid injection;  Surgeon: Paresh Ogden M.D.;  Location: [facility];  Service: Pain Management    BLOCK EPIDURAL STEROID INJECTION Left 2/24/2021    Procedure: INJECTION, STEROID, EPIDURAL;  Surgeon: Paresh Ogden M.D.;  Location: [facility];  Service: Pain Management    LUMBAR MEDIAL BRANCH BLOCKS Left 9/9/2020    Procedure: BLOCK, NERVE, SPINAL, LUMBAR, POSTERIOR RAMUS, MEDIAL BRANCH;  Surgeon: Paresh Ogden M.D.;  Location: [facility];  Service: Pain Management    [place] NJX AA&/STRD TFRML EPI LUMBAR/SACRAL 1 LEVEL Left 8/12/2020    Procedure: INJECTION, SPINE, LUMBOSACRAL, EPIDURAL, 1 LEVEL, TRANSFORAMINAL APPROACH;  Surgeon: Paresh Ogden M.D.;  Location: [facility];  Service: Pain Management    [place] NJX AA&/STRD TFRML EPI LUMBAR/SACRAL EA ADDL Left 8/12/2020    Procedure: INJECTION, SPINE, LUMBOSACRAL, EPIDURAL, TRANSFORAMINAL APPROACH;  Surgeon: Paresh Ogden M.D.;  Location: [facility];  Service: Pain Management    LAMINOTOMY  1998    ABDOMINAL HYSTERECTOMY TOTAL      CARPAL TUNNEL RELEASE      CHOLECYSTECTOMY         Family history   Family History   Problem Relation Age of Onset    Cancer Mother     Stroke Father         Heart attack    Dementia Sister     Stroke Brother         heart Attack    Cancer Brother         Skin    Diabetes Brother     Kidney Disease Brother     Cancer Brother     Parkinson's Disease Sister     No Known Problems Sister     Diabetes Daughter     Hypertension Daughter           Medications:   Current Outpatient Medications   Medication    [START ON 2022] morphine ER (MS CONTIN) 15 MG Tab CR tablet    [START ON 2022] HYDROcodone-acetaminophen (NORCO) 5-325 MG Tab per tablet    [START ON 2023] morphine ER (MS CONTIN) 15 MG Tab CR tablet    [START ON 2023] HYDROcodone-acetaminophen (NORCO) 5-325 MG Tab per tablet    meloxicam (MOBIC) 15 MG tablet    metoprolol tartrate (LOPRESSOR) 25 MG Tab    metformin (GLUCOPHAGE) 1000 MG tablet    cyclobenzaprine (FLEXERIL) 5 mg tablet    clonazePAM (KLONOPIN) 0.5 MG Tab    clonazePAM (KLONOPIN) 1 MG Tab    nicotine (NICODERM) 14 MG/24HR PATCH 24 HR    estradiol (ESTRACE) 1 MG Tab    levothyroxine (SYNTHROID) 50 MCG Tab    rosuvastatin (CRESTOR) 10 MG Tab    omeprazole (PRILOSEC) 40 MG delayed-release capsule    DULoxetine (CYMBALTA) 30 MG Cap DR Particles    lisinopril (PRINIVIL) 10 MG Tab    albuterol 108 (90 Base) MCG/ACT Aero Soln inhalation aerosol    Brexpiprazole (REXULTI) 2 MG Tab    busPIRone (BUSPAR) 10 MG Tab tablet    Cholecalciferol (VITAMIN D3) 1.25 MG (25598 UT) Cap    Cholecalciferol 1.25 MG (98910 UT) Tab    Empagliflozin (JARDIANCE) 25 MG Tab    pseudoephedrine (SUDAFED) 30 MG Tab    fluticasone (FLONASE) 50 MCG/ACT nasal spray    aspirin EC (ECOTRIN) 81 MG Tablet Delayed Response    cyanocobalamin (VITAMIN B-12) 500 MCG Tab    Blood Glucose Meter Kit    Lancets    Alcohol Swabs    Blood Glucose Test Strips     No current facility-administered medications for this visit.       Allergies:   No Known Allergies    Social Hx:   Social History     Socioeconomic History    Marital status:      Spouse name: Not on file    Number of children: Not on file    Years of education: Not on file    Highest education level: Not on file   Occupational History    Not on file   Tobacco Use    Smoking status: Every Day     Packs/day: 0.25     Types: Cigarettes     Last attempt to quit:      Years since quittin.8  "   Smokeless tobacco: Never   Vaping Use    Vaping Use: Never used   Substance and Sexual Activity    Alcohol use: Not Currently     Comment: rare    Drug use: Not Currently     Types: Marijuana    Sexual activity: Not Currently   Other Topics Concern     Service No    Blood Transfusions Yes    Caffeine Concern No    Occupational Exposure No    Hobby Hazards No    Sleep Concern Yes    Stress Concern Yes    Weight Concern Yes    Special Diet No    Back Care No    Exercise Yes    Bike Helmet No    Seat Belt Yes    Self-Exams Yes   Social History Narrative    Not on file     Social Determinants of Health     Financial Resource Strain: Not on file   Food Insecurity: Not on file   Transportation Needs: Not on file   Physical Activity: Not on file   Stress: Not on file   Social Connections: Not on file   Intimate Partner Violence: Not on file   Housing Stability: Not on file         EXAMINATION     Physical Exam:   Vitals: /68 (BP Location: Right arm, Patient Position: Sitting, BP Cuff Size: Adult long)   Pulse 60   Temp 35.9 °C (96.6 °F) (Temporal)   Ht 1.6 m (5' 3\")   Wt 83.7 kg (184 lb 9.6 oz)   SpO2 95%     Constitutional:   Body Habitus: Body mass index is 32.7 kg/m².  Cooperation: Fully cooperates with exam  Appearance: Well-groomed, well-nourished, not disheveled, no acute distress    Eyes: No scleral icterus, no proptosis     ENT -no obvious auditory deficits, wearing a facial mask    Skin -no rashes or lesions noted, no warmth or erythema was noted at the injection site    Respiratory-  breathing comfortable on room air, no audible wheezing    Cardiovascular- no lower extremity edema is noted.     Psychiatric- alert and oriented ×3. Normal affect.     Gait - normal gait, no use of ambulatory device, nonantalgic    Musculoskeletal -     Thoracic/Lumbar Spine/Sacral Spine/Hips   Inspection: no evidence of atrophy in bilateral lower extremities throughout     ROM of the lumbar spine decreased. "     Palpation: tenderness to palpation lumbar paraspinals minimal    Neuro     Key points for the international standards for neurological classification of spinal cord injury (ISNCSCI) to light touch.     Dermatome R L   L2 2 2   L3 2 2   L4 2 2   L5 2 1   S1 2 1   S2 2 2       Motor Exam Lower Extremities    ? Myotome R L   Hip flexion L2 5 5   Knee extension L3 5 5   Ankle dorsiflexion L4 5 5   Toe extension L5 5 5   Ankle plantarflexion S1 5 5       Reflexes  ?  R L   Patella  2+ 2+   Achilles   2+ 1+     No clonus bilaterally    MEDICAL DECISION MAKING    Medical records review: see under HPI section.     DATA    Labs:   08/10/2022: UDS positive for morphine and negative for metabolites, positive for hydrocodone, positive for clonazepam  03/18/2022: UDS positive for morphine and negative for metabolites, positive for hydrocodone, positive for clonazepam  01/18/2022: UDS positive for morphine and negative for metabolites, absent hydrocodone, positive for clonazepam  10/20/2021: UDS positive for morphine and metabolites, clonzapem, low amount of THC noted  05/08/2021: UDS positive for morphine and metabolites, clonazepam and metabolites, hydrocodone and metabolites  08/18/2020: UDS positive for morphine and metabolites, clonazepam and metabolites, hydrocodone and metabolites  04/24/2020 UDS positive for morphine and metabolites, clonazepam  04/09/2020 Vitamin D, 25:  28L  04/09/2020 Vitamin B12: 217    Lab Results   Component Value Date/Time    SODIUM 139 05/05/2022 12:18 PM    POTASSIUM 4.8 05/05/2022 12:18 PM    CHLORIDE 104 05/05/2022 12:18 PM    CO2 24 05/05/2022 12:18 PM    ANION 11.0 05/05/2022 12:18 PM    GLUCOSE 194 (H) 05/05/2022 12:18 PM    BUN 13 05/05/2022 12:18 PM    CREATININE 0.70 05/05/2022 12:18 PM    CALCIUM 9.8 05/05/2022 12:18 PM    ASTSGOT 28 05/05/2022 12:18 PM    ALTSGPT 28 05/05/2022 12:18 PM    TBILIRUBIN 0.3 05/05/2022 12:18 PM    ALBUMIN 4.4 05/05/2022 12:18 PM    TOTPROTEIN 7.1  05/05/2022 12:18 PM    GLOBULIN 2.7 05/05/2022 12:18 PM    AGRATIO 1.6 05/05/2022 12:18 PM       No results found for: PROTHROMBTM, INR     Lab Results   Component Value Date/Time    WBC 7.5 05/04/2021 08:06 AM    RBC 4.73 05/04/2021 08:06 AM    HEMOGLOBIN 14.6 05/04/2021 08:06 AM    HEMATOCRIT 43.8 05/04/2021 08:06 AM    MCV 92.6 05/04/2021 08:06 AM    MCH 30.9 05/04/2021 08:06 AM    MCHC 33.3 (L) 05/04/2021 08:06 AM    MPV 10.6 05/04/2021 08:06 AM    NEUTSPOLYS 46.60 05/04/2021 08:06 AM    LYMPHOCYTES 39.80 05/04/2021 08:06 AM    MONOCYTES 9.30 05/04/2021 08:06 AM    EOSINOPHILS 3.70 05/04/2021 08:06 AM    BASOPHILS 0.50 05/04/2021 08:06 AM        Lab Results   Component Value Date/Time    HBA1C 6.9 (A) 09/19/2022 09:00 AM        Imaging: I personally reviewed following images, these are my reads:     MRI lumbar spine 05/05/2020  At L1-2, no central or foraminal stenosis  At L2-3, no central or foraminal stenosis  At L3-4, mild disc bulge and mild ligamentum flavum thickening.  No central canal stenosis. Borderline left foraminal stenosis. Schmorl's node.   At L4-5, diffuse disc bulge with mild ligamentum flavum thickening.  No central canal stenosis.  There is facet arthropathy, left greater than right. Mild foraminal stenosis bilaterally. S/P left L4/5 hemilaminectomy  At L5-S1, there is mild-borderline foraminal stenosis with facet arthropathy and mild disc bulge.  No central canal stenosis    Xray lumbar spine 05/05/2020  There is mild decreased joint space at L3-4 and L4-5.    Facet arthropathy is noted, greatest at L5-S1 bilaterally.  No significant motion on flexion and extension films    IMAGING radiology reads. I reviewed the following radiology reads    Xray abdomen 07/17/2020  IMPRESSION:     Nonspecific bowel gas pattern. No evidence small bowel obstruction.      MRI lumbar spine 05/05/2020  IMPRESSION:     1.  Caudal lumbar facet arthropathy left worse than right with no bony destruction to indicate  septic or inflammatory arthropathy. This most likely is just degenerative  2.  Mild caudal lumbar foraminal stenoses  3.  No significant central stenosis.  4.  Left L4/5 hemilaminectomy                                                                                   Xray lumbar spine 05/05/2020     IMPRESSION:     1.  No acute lumbar compression fracture or subluxation.  2.  Posterior disc space narrowing at L4-5 and to lesser extent at L3-4.  3.  Bilateral facet arthropathy at L5-S1.                                                                                             Diagnosis   Visit Diagnoses     ICD-10-CM   1. Postlaminectomy syndrome  M96.1   2. Left lumbar radiculitis  M54.16   3. Primary osteoarthritis of both hips  M16.0   4. DDD (degenerative disc disease), lumbar  M51.36             ASSESSMENT:  Grisel Mark 67 y.o. female seen for above     Grisel was seen today for follow-up.    Diagnoses and all orders for this visit:    Postlaminectomy syndrome  -     morphine ER (MS CONTIN) 15 MG Tab CR tablet; Take 1 Tablet by mouth every 12 hours for 30 days.  -     HYDROcodone-acetaminophen (NORCO) 5-325 MG Tab per tablet; Take 1 Tablet by mouth every 8 hours as needed (severe pain) for up to 30 days.  -     morphine ER (MS CONTIN) 15 MG Tab CR tablet; Take 1 Tablet by mouth every 12 hours for 30 days.  -     HYDROcodone-acetaminophen (NORCO) 5-325 MG Tab per tablet; Take 1 Tablet by mouth every 8 hours as needed (severe pain) for up to 30 days.  -     Consent for Opiate Prescription  -     Controlled Substance Treatment Agreement    Left lumbar radiculitis    Primary osteoarthritis of both hips    DDD (degenerative disc disease), lumbar  -     morphine ER (MS CONTIN) 15 MG Tab CR tablet; Take 1 Tablet by mouth every 12 hours for 30 days.  -     HYDROcodone-acetaminophen (NORCO) 5-325 MG Tab per tablet; Take 1 Tablet by mouth every 8 hours as needed (severe pain) for up to 30 days.  -     morphine ER (MS  CONTIN) 15 MG Tab CR tablet; Take 1 Tablet by mouth every 12 hours for 30 days.  -     HYDROcodone-acetaminophen (NORCO) 5-325 MG Tab per tablet; Take 1 Tablet by mouth every 8 hours as needed (severe pain) for up to 30 days.  -     Consent for Opiate Prescription  -     Controlled Substance Treatment Agreement    Discussed that she has had good response to the caudal epidural steroid injection with catheter.  Hopefully this will give longer lasting relief and discussed that we could consider repeating this up to 3-4 times a year if needed.   reviewed.  Most recent UDS consistent.  Continue MS Contin 15mg twice a day.  Continue norco no more than 2/day.  She has narcan at home and 1 that she carries in her purse.  Bowel movements are regular without regular use of stool softeners.  Discussed that we will place referral to [facility] for behavioral health counseling.  She may have the benefit with another provider.      Follow-up: Return in about 12 weeks (around 2/13/2023).    Thank you very much for asking me to participate in Grisel Mark's care.  Please contact me with any questions or concerns.    Please note that this dictation was created using voice recognition software. I have made every reasonable attempt to correct obvious errors but there may be errors of grammar and content that I may have overlooked prior to finalization of this note.      Paresh Ogden MD  Physical Medicine and Rehabilitation  Interventional Spine and Sports Physiatry  [facility]

## 2022-12-05 DIAGNOSIS — E66.9 DIABETES MELLITUS TYPE 2 IN OBESE: ICD-10-CM

## 2022-12-05 DIAGNOSIS — E11.69 DIABETES MELLITUS TYPE 2 IN OBESE: ICD-10-CM

## 2022-12-06 RX ORDER — METFORMIN HYDROCHLORIDE 500 MG/1
TABLET, EXTENDED RELEASE ORAL
Qty: 200 TABLET | Refills: 0 | Status: SHIPPED | OUTPATIENT
Start: 2022-12-06 | End: 2022-12-19

## 2022-12-06 RX ORDER — EMPAGLIFLOZIN 25 MG/1
1 TABLET, FILM COATED ORAL
Qty: 100 TABLET | Refills: 0 | Status: SHIPPED | OUTPATIENT
Start: 2022-12-06 | End: 2022-12-19 | Stop reason: SDUPTHER

## 2022-12-06 NOTE — TELEPHONE ENCOUNTER
Received request via: Pharmacy    Was the patient seen in the last year in this department? Yes    Does the patient have an active prescription (recently filled or refills available) for medication(s) requested? No    Does the patient have long-term Plus and need 100 day supply (blood pressure, diabetes and cholesterol meds only)? Yes, quantity updated to 100 days

## 2022-12-07 ENCOUNTER — APPOINTMENT (OUTPATIENT)
Dept: PHYSICAL MEDICINE AND REHAB | Facility: MEDICAL CENTER | Age: 67
End: 2022-12-07
Payer: MEDICARE

## 2022-12-07 RX ORDER — EMPAGLIFLOZIN 25 MG/1
1 TABLET, FILM COATED ORAL
Qty: 100 TABLET | Refills: 3 | OUTPATIENT
Start: 2022-12-07

## 2022-12-19 ENCOUNTER — TELEMEDICINE (OUTPATIENT)
Dept: MEDICAL GROUP | Facility: PHYSICIAN GROUP | Age: 67
End: 2022-12-19
Payer: MEDICARE

## 2022-12-19 VITALS — BODY MASS INDEX: 32.6 KG/M2 | HEIGHT: 63 IN | WEIGHT: 184 LBS

## 2022-12-19 DIAGNOSIS — F41.1 GENERALIZED ANXIETY DISORDER: ICD-10-CM

## 2022-12-19 DIAGNOSIS — E66.9 DIABETES MELLITUS TYPE 2 IN OBESE: ICD-10-CM

## 2022-12-19 DIAGNOSIS — E11.69 DIABETES MELLITUS TYPE 2 IN OBESE: ICD-10-CM

## 2022-12-19 DIAGNOSIS — F41.8 SITUATIONAL ANXIETY: ICD-10-CM

## 2022-12-19 DIAGNOSIS — E11.42 TYPE 2 DIABETES MELLITUS WITH DIABETIC POLYNEUROPATHY, WITHOUT LONG-TERM CURRENT USE OF INSULIN (HCC): ICD-10-CM

## 2022-12-19 DIAGNOSIS — N39.3 STRESS BLADDER INCONTINENCE, FEMALE: ICD-10-CM

## 2022-12-19 DIAGNOSIS — E78.2 MIXED HYPERLIPIDEMIA: ICD-10-CM

## 2022-12-19 DIAGNOSIS — E03.9 HYPOTHYROIDISM (ACQUIRED): ICD-10-CM

## 2022-12-19 DIAGNOSIS — E55.9 VITAMIN D DEFICIENCY: ICD-10-CM

## 2022-12-19 DIAGNOSIS — F43.10 POSTTRAUMATIC STRESS DISORDER, DELAYED ONSET: ICD-10-CM

## 2022-12-19 PROCEDURE — 99214 OFFICE O/P EST MOD 30 MIN: CPT | Mod: 95 | Performed by: NURSE PRACTITIONER

## 2022-12-19 RX ORDER — ALBUTEROL SULFATE 90 UG/1
2 AEROSOL, METERED RESPIRATORY (INHALATION) EVERY 4 HOURS PRN
Qty: 1 EACH | Refills: 0 | Status: SHIPPED | OUTPATIENT
Start: 2022-12-19 | End: 2023-01-20

## 2022-12-19 RX ORDER — CLONAZEPAM 1 MG/1
1 TABLET ORAL EVERY MORNING
Qty: 90 TABLET | Refills: 0 | Status: SHIPPED | OUTPATIENT
Start: 2023-01-10 | End: 2023-04-10

## 2022-12-19 RX ORDER — CLONAZEPAM 0.5 MG/1
0.5 TABLET ORAL NIGHTLY
Qty: 90 TABLET | Refills: 0 | Status: SHIPPED | OUTPATIENT
Start: 2023-01-10 | End: 2023-04-10

## 2022-12-19 RX ORDER — METFORMIN HYDROCHLORIDE 500 MG/1
500 TABLET, EXTENDED RELEASE ORAL DAILY
Qty: 200 TABLET | Refills: 3 | Status: SHIPPED | OUTPATIENT
Start: 2022-12-19 | End: 2023-01-12 | Stop reason: SDUPTHER

## 2022-12-19 RX ORDER — EMPAGLIFLOZIN 25 MG/1
1 TABLET, FILM COATED ORAL
Qty: 100 TABLET | Refills: 3 | Status: SHIPPED | OUTPATIENT
Start: 2022-12-19 | End: 2023-03-13 | Stop reason: SDUPTHER

## 2022-12-19 RX ORDER — GABAPENTIN 800 MG/1
1200 TABLET ORAL 2 TIMES DAILY
COMMUNITY
Start: 2022-11-19 | End: 2023-02-21 | Stop reason: SDUPTHER

## 2022-12-19 RX ORDER — TROSPIUM CHLORIDE 20 MG/1
20 TABLET, FILM COATED ORAL
Qty: 90 TABLET | Refills: 5 | Status: SHIPPED | OUTPATIENT
Start: 2022-12-19 | End: 2023-03-13

## 2022-12-19 ASSESSMENT — FIBROSIS 4 INDEX: FIB4 SCORE: 1.89

## 2022-12-19 NOTE — PROGRESS NOTES
Virtual Visit: Established Patient   This visit was conducted via Zoom using secure and encrypted videoconferencing technology. The patient was in a private location in the state of [place].    The patient's identity was confirmed and verbal consent was obtained for this virtual visit.    Subjective:   CC: Follow-up for anxiety    Grisel Mark is a 67 y.o. female presenting for evaluation and management of:    Problem   Stress Bladder Incontinence, Female   Generalized Anxiety Disorder   Type 2 Diabetes Mellitus With Diabetic Polyneuropathy, Without Long-Term Current Use of Insulin (Hcc)   Situational Anxiety       ROS   Denies any recent fevers or chills. No nausea or vomiting. No chest pains or shortness of breath.     No Known Allergies    Current medicines (including changes today)  Current Outpatient Medications   Medication Sig Dispense Refill    gabapentin (NEURONTIN) 800 MG tablet Take 1,200 mg by mouth 2 times a day.      metFORMIN ER (GLUCOPHAGE XR) 500 MG TABLET SR 24 HR Take 1 Tablet by mouth every day. 200 Tablet 3    Empagliflozin (JARDIANCE) 25 MG Tab Take 1 Tablet by mouth every day. 100 Tablet 3    metoprolol tartrate (LOPRESSOR) 25 MG Tab Take 1 Tablet by mouth 2 times a day. 200 Tablet 3    [START ON 1/10/2023] clonazePAM (KLONOPIN) 0.5 MG Tab Take 1 Tablet by mouth every evening for 90 days. Indications: Feeling Anxious 90 Tablet 0    [START ON 1/10/2023] clonazePAM (KLONOPIN) 1 MG Tab Take 1 Tablet by mouth every morning for 90 days. Indications: Feeling Anxious 90 Tablet 0    trospium (SANCTURA) 20 MG Tab Take 1 Tablet by mouth 3 times a day before meals. 90 Tablet 5    morphine ER (MS CONTIN) 15 MG Tab CR tablet Take 1 Tablet by mouth every 12 hours for 30 days. 60 Tablet 0    HYDROcodone-acetaminophen (NORCO) 5-325 MG Tab per tablet Take 1 Tablet by mouth every 8 hours as needed (severe pain) for up to 30 days. 60 Tablet 0    [START ON 1/18/2023] morphine ER (MS CONTIN) 15 MG Tab CR  tablet Take 1 Tablet by mouth every 12 hours for 30 days. 60 Tablet 0    [START ON 1/18/2023] HYDROcodone-acetaminophen (NORCO) 5-325 MG Tab per tablet Take 1 Tablet by mouth every 8 hours as needed (severe pain) for up to 30 days. 60 Tablet 0    meloxicam (MOBIC) 15 MG tablet TAKE 1 TABLET BY MOUTH EVERY DAY 90 Tablet 0    cyclobenzaprine (FLEXERIL) 5 mg tablet Take 1 Tablet by mouth 2 times a day as needed for Muscle Spasms (restless leg). 90 Tablet 3    estradiol (ESTRACE) 1 MG Tab TAKE 1 TABLET BY MOUTH EVERY DAY 90 Tablet 3    levothyroxine (SYNTHROID) 50 MCG Tab Take 1 Tablet by mouth every morning on an empty stomach. 90 Tablet 3    rosuvastatin (CRESTOR) 10 MG Tab Take 1 Tablet by mouth every evening. 100 Tablet 3    omeprazole (PRILOSEC) 40 MG delayed-release capsule Take 1 Capsule by mouth every day. 90 Capsule 3    DULoxetine (CYMBALTA) 30 MG Cap DR Particles Take 3 Capsules by mouth every day. 270 Capsule 3    lisinopril (PRINIVIL) 10 MG Tab TAKE 1 TABLET BY MOUTH EVERY  Tablet 3    albuterol 108 (90 Base) MCG/ACT Aero Soln inhalation aerosol Inhale 2 Puffs every four hours as needed for Shortness of Breath. 1 Each 0    Brexpiprazole (REXULTI) 2 MG Tab Take 2 mg by mouth every day. 90 Tablet 3    busPIRone (BUSPAR) 10 MG Tab tablet Take 2 Tablets by mouth 2 times a day. 360 Tablet 3    pseudoephedrine (SUDAFED) 30 MG Tab Take 1-2 Tablets by mouth every 6 hours as needed for Congestion (sinus pressure not relieved by other measures). 30 Tablet 0    Blood Glucose Meter Kit Test blood sugar as recommended by provider. Abbott Freestyle Lite blood glucose monitoring kit. 1 Kit 0    Lancets Use one Abbott [place] Lite lancet to test blood sugar twice daily and PRN. 300 Each 3    Alcohol Swabs Wipe site with prep pad prior to injection. 100 Each 3    fluticasone (FLONASE) 50 MCG/ACT nasal spray Administer 1 Spray into affected nostril(S) every day. 16 g 11    aspirin EC (ECOTRIN) 81 MG Tablet Delayed  Response Take 81 mg by mouth every day.      cyanocobalamin (VITAMIN B-12) 500 MCG Tab Take 500 mcg by mouth every day.      Blood Glucose Test Strips Use one Abbott Freedom Lite strip to test blood sugar twice daily and PRN. 270 Each 3     No current facility-administered medications for this visit.       Patient Active Problem List    Diagnosis Date Noted    Stress bladder incontinence, female 12/19/2022    Chronic right shoulder pain 10/11/2022    Tobacco use 10/11/2022    Restless leg 02/03/2022    Cough 11/02/2021    Chronic ear pain, left 09/23/2021    Primary central sleep apnea 08/05/2021    Opioid dependence in controlled environment ([facility]) 05/26/2021    Benzodiazepine dependence ([facility]) 05/26/2021    Generalized anxiety disorder 05/12/2020    Posttraumatic stress disorder, delayed onset 05/12/2020    Essential hypertension 02/05/2020    Mixed hyperlipidemia 02/05/2020    Type 2 diabetes mellitus with diabetic polyneuropathy, without long-term current use of insulin ([facility]) 02/05/2020    Hypothyroidism (acquired) 02/05/2020    Depression, major, recurrent, moderate ([facility]) 02/05/2020    Gastroesophageal reflux disease without esophagitis 02/05/2020    Situational anxiety 02/05/2020         She  has a past medical history of Anxiety, Arrhythmia, Chronic back pain, Constipation, Depression, Diabetes (HCC), Ear pain, left (9/23/2021), GERD (gastroesophageal reflux disease), Hyperlipidemia, Hypertension, and Thyroid disease.  She  has a past surgical history that includes cholecystectomy; carpal tunnel release; abdominal hysterectomy total; pr njx aa&/strd tfrml epi lumbar/sacral 1 level (Left, 8/12/2020); pr njx aa&/strd tfrml epi lumbar/sacral ea addl (Left, 8/12/2020); lumbar medial branch blocks (Left, 9/9/2020); laminotomy (1998); block epidural steroid injection (Left, 2/24/2021); lumbar transforaminal epidural steroid injection (Left, 5/20/2021); lumbar medial branch blocks (Left, 7/21/2021); radio frequency  "ablation additional level (Left, 11/11/2021); and pr inj lumbar/sacral,w/ imaging (N/A, 10/31/2022).       Objective:   Ht 1.6 m (5' 3\")   Wt 83.5 kg (184 lb)   LMP  (LMP Unknown)   BMI 32.59 kg/m²     Physical Exam:  Constitutional: Alert, no distress, well-groomed.  Skin: No rashes in visible areas.  Eye: Round. Conjunctiva clear, lids normal. No icterus.   ENMT: Lips pink without lesions, good dentition, moist mucous membranes. Phonation normal.  Neck: Moves freely without pain.  Respiratory: Unlabored respiratory effort, no cough or audible wheeze  Psych: Alert and oriented x3, normal affect and mood.       Assessment and Plan:   The following treatment plan was discussed:     1. Stress bladder incontinence, female  CBC WITHOUT DIFFERENTIAL      2. Situational anxiety  clonazePAM (KLONOPIN) 0.5 MG Tab    clonazePAM (KLONOPIN) 1 MG Tab      3. Diabetes mellitus type 2 in obese (HCC)  Empagliflozin (JARDIANCE) 25 MG Tab    Continue medication regimen, follow-up in 3 months for A1c reevaluation      4. Generalized anxiety disorder  clonazePAM (KLONOPIN) 0.5 MG Tab    clonazePAM (KLONOPIN) 1 MG Tab      5. Posttraumatic stress disorder, delayed onset  clonazePAM (KLONOPIN) 0.5 MG Tab    clonazePAM (KLONOPIN) 1 MG Tab      6. Hypothyroidism (acquired)  TSH    FREE THYROXINE    CBC WITHOUT DIFFERENTIAL      7. Type 2 diabetes mellitus with diabetic polyneuropathy, without long-term current use of insulin (HCC)  HEMOGLOBIN A1C    Comp Metabolic Panel    CBC WITHOUT DIFFERENTIAL      8. Mixed hyperlipidemia  Lipid Profile      9. Vitamin D deficiency  VITAMIN D,25 HYDROXY (DEFICIENCY)    CBC WITHOUT DIFFERENTIAL          Stress bladder incontinence, female  This is a chronic uncontrolled condition.  Patient reports that she has stress incontinence especially with lifting and certain daily activities.  Patient has never taken medication to before to help with this.  She has just been using pads as needed.    Discussed " medication options.  Due to the side effects of oxybutynin will prescribe trospium instead.    Will start trospium 20 mg 3 times daily.    Situational anxiety  This is a chronic condition.     Obtained and reviewed patient utilization report from [facility] pharmacy database on 12/19/2022 8:27 AM  prior to writing prescription for controlled substance II, III or IV per [place] bill . Based on assessment of the report,my physical exam if necessary and the patient's health problem, the prescription is medically necessary.         Type 2 diabetes mellitus with diabetic polyneuropathy, without long-term current use of insulin (HCC)  This is a chronic stable condition.  Patient is stable on Jardiance 25 mg as well as metformin 1000 mg extended release daily.  Patient's last A1c in September was 6.9%.    Continue Jardiance 25 mg daily and metformin 1000 mg extended release daily.    Generalized anxiety disorder  This is a chronic stable condition.  Patient has combination of generalized anxiety disorder with situational anxiety.  Patient reports that she recently lost another family member.  Patient has been using clonazepam 1 mg every morning and clonazepam 0.5 mg nightly.  Patient reports that this helps control her anxiety very well.    Obtained and reviewed patient utilization report from [facility] pharmacy database on 12/19/2022 8:42 AM  prior to writing prescription for controlled substance II, III or IV per [place] bill . Based on assessment of the report,my physical exam if necessary and the patient's health problem, the prescription is medically necessary.     Continue clonazepam 1 mg every morning and clonazepam 0.5 mg nightly.      Follow-up: Return in about 3 months (around 3/19/2023) for follow up for anxiety.         I have placed the below orders and discussed them with an approved delegating provider.  The MA is performing the below orders under the direction of Dr. Alexandra.    Please note that  this dictation was created using voice recognition software. I have worked with consultants from the vendor as well as technical experts from [facility] to optimize the interface. I have made every reasonable attempt to correct obvious errors, but I expect that there are errors of grammar and possibly content that I did not discover before finalizing the note.

## 2022-12-19 NOTE — ASSESSMENT & PLAN NOTE
This is a chronic stable condition.  Patient has combination of generalized anxiety disorder with situational anxiety.  Patient reports that she recently lost another family member.  Patient has been using clonazepam 1 mg every morning and clonazepam 0.5 mg nightly.  Patient reports that this helps control her anxiety very well.    Obtained and reviewed patient utilization report from [facility] pharmacy database on 12/19/2022 8:42 AM  prior to writing prescription for controlled substance II, III or IV per [place] bill . Based on assessment of the report,my physical exam if necessary and the patient's health problem, the prescription is medically necessary.     Continue clonazepam 1 mg every morning and clonazepam 0.5 mg nightly.

## 2022-12-19 NOTE — ASSESSMENT & PLAN NOTE
This is a chronic uncontrolled condition.  Patient reports that she has stress incontinence especially with lifting and certain daily activities.  Patient has never taken medication to before to help with this.  She has just been using pads as needed.    Discussed medication options.  Due to the side effects of oxybutynin will prescribe trospium instead.    Will start trospium 20 mg 3 times daily.

## 2022-12-19 NOTE — LETTER
[facility]  Demi Farley A.P.R.N.  [address]  [place] [address]  Fax: [phone]   Authorization for Release/Disclosure of   Protected Health Information   Name: NASH SOMMER : 1955 SSN: [id number]   Address: [address] [place] [address] [facility] [address]  [place] [address] Phone:    [phone] (home)    I authorize the entity listed below to release/disclose the PHI below to:   [facility]/Demi Farley, A.P.R.N. and Demi Farley A.P.R.N.   Provider or Entity Name:  [facility]   Address   City, [facility], Zip:               [address] [place] [address]   Phone:  [phone]      Fax:      [phone]        Reason for request: continuity of care   Information to be released:    [ X ] LAST COLONOSCOPY,  including any PATH REPORT and follow-up  [ X ] LAST FIT/COLOGUARD RESULT [  ] LAST DEXA  [  ] LAST MAMMOGRAM  [  ] LAST PAP  [  ] LAST LABS [  ] RETINA EXAM REPORT  [  ] IMMUNIZATION RECORDS  [  ] Release all info      [  ] Check here and initial the line next to each item to release ALL health information INCLUDING  _____ Care and treatment for drug and / or alcohol abuse  _____ HIV testing, infection status, or AIDS  _____ Genetic Testing    DATES OF SERVICE OR TIME PERIOD TO BE DISCLOSED: _____________  I understand and acknowledge that:  * This Authorization may be revoked at any time by you in writing, except if your health information has already been used or disclosed.  * Your health information that will be used or disclosed as a result of you signing this authorization could be re-disclosed by the recipient. If this occurs, your re-disclosed health information may no longer be protected by State or Federal laws.  * You may refuse to sign this Authorization. Your refusal will not affect your ability to obtain treatment.  * This Authorization becomes effective upon signing and will  on (date) __________.      If no date is indicated, this  Authorization will  one (1) year from the signature date.    Name: Grisel Mark  Continuity of care  Date:     2022       PLEASE FAX REQUESTED RECORDS BACK TO: [phone]

## 2022-12-19 NOTE — ASSESSMENT & PLAN NOTE
This is a chronic condition.     Obtained and reviewed patient utilization report from [facility] pharmacy database on 12/19/2022 8:27 AM  prior to writing prescription for controlled substance II, III or IV per [place] bill . Based on assessment of the report,my physical exam if necessary and the patient's health problem, the prescription is medically necessary.

## 2022-12-19 NOTE — ASSESSMENT & PLAN NOTE
This is a chronic stable condition.  Patient is stable on Jardiance 25 mg as well as metformin 1000 mg extended release daily.  Patient's last A1c in September was 6.9%.    Continue Jardiance 25 mg daily and metformin 1000 mg extended release daily.

## 2022-12-19 NOTE — LETTER
[facility]  Demi ANGÉLICA Farley A.P.R.N.  [address]  [place] [address]  Fax: [phone]   Authorization for Release/Disclosure of   Protected Health Information   Name: NASH SOMMER : 1955 SSN: [id number]   Address: [address] [place] [address] [address]  [place] [address] Phone:    [phone] (home)    I authorize the entity listed below to release/disclose the PHI below to:   [facility]/Demi ANGÉLICA Farley, A.P.R.N. and LUPE Medrano.P.R.N.   Provider or Entity Name:  [facility]    Address   City, State, Zip   Phone:      Fax:     Reason for request: continuity of care   Information to be released:    [  ] LAST COLONOSCOPY,  including any PATH REPORT and follow-up  [  ] LAST FIT/COLOGUARD RESULT [  ] LAST DEXA  [  ] LAST MAMMOGRAM  [  ] LAST PAP  [  ] LAST LABS [ x ] RETINA EXAM REPORT  [  ] IMMUNIZATION RECORDS  [  x] Release all info      [  ] Check here and initial the line next to each item to release ALL health information INCLUDING  _____ Care and treatment for drug and / or alcohol abuse  _____ HIV testing, infection status, or AIDS  _____ Genetic Testing    DATES OF SERVICE OR TIME PERIOD TO BE DISCLOSED: _____________  I understand and acknowledge that:  * This Authorization may be revoked at any time by you in writing, except if your health information has already been used or disclosed.  * Your health information that will be used or disclosed as a result of you signing this authorization could be re-disclosed by the recipient. If this occurs, your re-disclosed health information may no longer be protected by State or Federal laws.  * You may refuse to sign this Authorization. Your refusal will not affect your ability to obtain treatment.  * This Authorization becomes effective upon signing and will  on (date) __________.      If no date is indicated, this Authorization will  one (1) year from the signature date.    Name: Nash Rodney  Jas    Continuity of care    Date:     12/19/2022       PLEASE FAX REQUESTED RECORDS BACK TO: [phone]

## 2023-01-02 ENCOUNTER — PATIENT MESSAGE (OUTPATIENT)
Dept: MEDICAL GROUP | Facility: PHYSICIAN GROUP | Age: 68
End: 2023-01-02
Payer: MEDICARE

## 2023-01-02 DIAGNOSIS — G89.29 CHRONIC PAIN OF LEFT KNEE: ICD-10-CM

## 2023-01-02 DIAGNOSIS — M25.562 CHRONIC PAIN OF LEFT KNEE: ICD-10-CM

## 2023-01-04 ENCOUNTER — OFFICE VISIT (OUTPATIENT)
Dept: MEDICAL GROUP | Facility: PHYSICIAN GROUP | Age: 68
End: 2023-01-04
Payer: MEDICARE

## 2023-01-04 VITALS
OXYGEN SATURATION: 93 % | TEMPERATURE: 98 F | WEIGHT: 184 LBS | HEIGHT: 63 IN | DIASTOLIC BLOOD PRESSURE: 72 MMHG | SYSTOLIC BLOOD PRESSURE: 122 MMHG | RESPIRATION RATE: 16 BRPM | HEART RATE: 65 BPM | BODY MASS INDEX: 32.6 KG/M2

## 2023-01-04 DIAGNOSIS — Z78.0 ENCOUNTER FOR OSTEOPOROSIS SCREENING IN ASYMPTOMATIC POSTMENOPAUSAL PATIENT: ICD-10-CM

## 2023-01-04 DIAGNOSIS — Z13.820 ENCOUNTER FOR OSTEOPOROSIS SCREENING IN ASYMPTOMATIC POSTMENOPAUSAL PATIENT: ICD-10-CM

## 2023-01-04 DIAGNOSIS — M25.562 CHRONIC PAIN OF LEFT KNEE: ICD-10-CM

## 2023-01-04 DIAGNOSIS — G89.29 CHRONIC PAIN OF LEFT KNEE: ICD-10-CM

## 2023-01-04 PROCEDURE — 20610 DRAIN/INJ JOINT/BURSA W/O US: CPT | Mod: LT | Performed by: NURSE PRACTITIONER

## 2023-01-04 PROCEDURE — 99214 OFFICE O/P EST MOD 30 MIN: CPT | Mod: 25 | Performed by: NURSE PRACTITIONER

## 2023-01-04 RX ORDER — TRIAMCINOLONE ACETONIDE 40 MG/ML
40 INJECTION, SUSPENSION INTRA-ARTICULAR; INTRAMUSCULAR ONCE
Status: COMPLETED | OUTPATIENT
Start: 2023-01-04 | End: 2023-01-04

## 2023-01-04 RX ADMIN — TRIAMCINOLONE ACETONIDE 40 MG: 40 INJECTION, SUSPENSION INTRA-ARTICULAR; INTRAMUSCULAR at 14:25

## 2023-01-04 ASSESSMENT — PATIENT HEALTH QUESTIONNAIRE - PHQ9
5. POOR APPETITE OR OVEREATING: 0 - NOT AT ALL
CLINICAL INTERPRETATION OF PHQ2 SCORE: 2
SUM OF ALL RESPONSES TO PHQ QUESTIONS 1-9: 6

## 2023-01-04 ASSESSMENT — FIBROSIS 4 INDEX: FIB4 SCORE: 1.89

## 2023-01-04 NOTE — ASSESSMENT & PLAN NOTE
This is a chronic condition. Patient reports that her pain has been worsening. She has been having instability on her knee as well as locking up on her. She uses a cane to walk. She had an xray done in May that showed tricompartmental osteoarthritis.     Will order MRI to evaluate knee joint.     Patient declines referral to orthopedics at this time as she is not wanting surgery. Patient would like a cortisone injection in her knee today.     Written consent obtained. Will proceed with procedure.

## 2023-01-04 NOTE — PROGRESS NOTES
Chief Complaint   Patient presents with    Knee Pain     L, on going, X ray 05/22, popping sound, px 10, tylenol not helping, hurts to bend                                                                                                                                        HPI:   Grisel presents today with the following.    Problem   Chronic Pain of Left Knee       Current Outpatient Medications   Medication Sig Dispense Refill    gabapentin (NEURONTIN) 800 MG tablet Take 1,200 mg by mouth 2 times a day.      metFORMIN ER (GLUCOPHAGE XR) 500 MG TABLET SR 24 HR Take 1 Tablet by mouth every day. 200 Tablet 3    Empagliflozin (JARDIANCE) 25 MG Tab Take 1 Tablet by mouth every day. 100 Tablet 3    metoprolol tartrate (LOPRESSOR) 25 MG Tab Take 1 Tablet by mouth 2 times a day. 200 Tablet 3    [START ON 1/10/2023] clonazePAM (KLONOPIN) 0.5 MG Tab Take 1 Tablet by mouth every evening for 90 days. Indications: Feeling Anxious 90 Tablet 0    [START ON 1/10/2023] clonazePAM (KLONOPIN) 1 MG Tab Take 1 Tablet by mouth every morning for 90 days. Indications: Feeling Anxious 90 Tablet 0    trospium (SANCTURA) 20 MG Tab Take 1 Tablet by mouth 3 times a day before meals. 90 Tablet 5    albuterol 108 (90 Base) MCG/ACT Aero Soln inhalation aerosol Inhale 2 Puffs every four hours as needed for Shortness of Breath. 1 Each 0    morphine ER (MS CONTIN) 15 MG Tab CR tablet Take 1 Tablet by mouth every 12 hours for 30 days. 60 Tablet 0    HYDROcodone-acetaminophen (NORCO) 5-325 MG Tab per tablet Take 1 Tablet by mouth every 8 hours as needed (severe pain) for up to 30 days. 60 Tablet 0    [START ON 1/18/2023] morphine ER (MS CONTIN) 15 MG Tab CR tablet Take 1 Tablet by mouth every 12 hours for 30 days. 60 Tablet 0    [START ON 1/18/2023] HYDROcodone-acetaminophen (NORCO) 5-325 MG Tab per tablet Take 1 Tablet by mouth every 8 hours as needed (severe pain) for up to 30 days. 60 Tablet 0    meloxicam (MOBIC) 15 MG tablet TAKE 1 TABLET BY  MOUTH EVERY DAY 90 Tablet 0    cyclobenzaprine (FLEXERIL) 5 mg tablet Take 1 Tablet by mouth 2 times a day as needed for Muscle Spasms (restless leg). 90 Tablet 3    estradiol (ESTRACE) 1 MG Tab TAKE 1 TABLET BY MOUTH EVERY DAY 90 Tablet 3    levothyroxine (SYNTHROID) 50 MCG Tab Take 1 Tablet by mouth every morning on an empty stomach. 90 Tablet 3    rosuvastatin (CRESTOR) 10 MG Tab Take 1 Tablet by mouth every evening. 100 Tablet 3    omeprazole (PRILOSEC) 40 MG delayed-release capsule Take 1 Capsule by mouth every day. 90 Capsule 3    DULoxetine (CYMBALTA) 30 MG Cap DR Particles Take 3 Capsules by mouth every day. 270 Capsule 3    lisinopril (PRINIVIL) 10 MG Tab TAKE 1 TABLET BY MOUTH EVERY  Tablet 3    Brexpiprazole (REXULTI) 2 MG Tab Take 2 mg by mouth every day. 90 Tablet 3    busPIRone (BUSPAR) 10 MG Tab tablet Take 2 Tablets by mouth 2 times a day. 360 Tablet 3    pseudoephedrine (SUDAFED) 30 MG Tab Take 1-2 Tablets by mouth every 6 hours as needed for Congestion (sinus pressure not relieved by other measures). 30 Tablet 0    Blood Glucose Meter Kit Test blood sugar as recommended by provider. Abbott Freestyle Lite blood glucose monitoring kit. 1 Kit 0    Lancets Use one Abbott [place] Lite lancet to test blood sugar twice daily and PRN. 300 Each 3    Alcohol Swabs Wipe site with prep pad prior to injection. 100 Each 3    fluticasone (FLONASE) 50 MCG/ACT nasal spray Administer 1 Spray into affected nostril(S) every day. 16 g 11    aspirin EC (ECOTRIN) 81 MG Tablet Delayed Response Take 81 mg by mouth every day.      cyanocobalamin (VITAMIN B-12) 500 MCG Tab Take 500 mcg by mouth every day.      Blood Glucose Test Strips Use one Abbott Freedom Lite strip to test blood sugar twice daily and PRN. 270 Each 3     No current facility-administered medications for this visit.       Allergies as of 01/04/2023    (No Known Allergies)        ROS:  All systems negative expect as addressed in assessment and plan.  "    /72 (BP Location: Left arm, Patient Position: Sitting, BP Cuff Size: Adult)   Pulse 65   Temp 36.7 °C (98 °F) (Temporal)   Resp 16   Ht 1.6 m (5' 3\")   Wt 83.5 kg (184 lb)   LMP  (LMP Unknown)   SpO2 93%   BMI 32.59 kg/m²       Physical Exam  Vitals reviewed.   Constitutional:       Appearance: Normal appearance.   HENT:      Head: Normocephalic and atraumatic.      Mouth/Throat:      Mouth: Mucous membranes are moist.   Eyes:      Extraocular Movements: Extraocular movements intact.      Conjunctiva/sclera: Conjunctivae normal.   Pulmonary:      Effort: Pulmonary effort is normal.   Musculoskeletal:      Cervical back: Normal range of motion.      Left knee: Swelling and effusion present. Decreased range of motion. Tenderness present.   Skin:     General: Skin is warm and dry.   Neurological:      General: No focal deficit present.      Mental Status: She is alert and oriented to person, place, and time.   Psychiatric:         Mood and Affect: Mood normal.         Behavior: Behavior normal.         Thought Content: Thought content normal.       Assessment and Plan:  67 y.o. female with the following issues.    1. Encounter for osteoporosis screening in asymptomatic postmenopausal patient  DS-BONE DENSITY STUDY (DEXA)      2. Chronic pain of left knee  Consent for all Surgical, Special Diagnostic or Therapeutic Procedures    Joint Inj - LG: knee, L knee           Chronic pain of left knee  This is a chronic condition. Patient reports that her pain has been worsening. She has been having instability on her knee as well as locking up on her. She uses a cane to walk. She had an xray done in May that showed tricompartmental osteoarthritis.     Will order MRI to evaluate knee joint.     Patient declines referral to orthopedics at this time as she is not wanting surgery. Patient would like a cortisone injection in her knee today.     Written consent obtained. Will proceed with procedure.       Return in " about 3 months (around 4/4/2023) for follow up for chronic health conditions.      I have placed the below orders and discussed them with an approved delegating provider.  The MA is performing the below orders under the direction of Dr. Rodriguez.    Please note that this dictation was created using voice recognition software. I have worked with consultants from the vendor as well as technical experts from [facility] to optimize the interface. I have made every reasonable attempt to correct obvious errors, but I expect that there are errors of grammar and possibly content that I did not discover before finalizing the note.

## 2023-01-04 NOTE — PROCEDURES
"  Joint Inj - LG: knee, L knee on 1/4/2023 1:59 PM  Indications: pain  Details: (27G 1.5\") needle, anterolateral approach  Medications: (4 mL lidocaine 1% with triamcinolone 40mg )  Outcome: tolerated well, no immediate complications    Joint space palpated laterally to patella. Site marked, prepped with chlorhexidine. 5 mLs injected into joint space without resistance. Patient tolerated procedure well. Discussed   Procedure, treatment alternatives, risks and benefits explained, specific risks discussed. Consent was given by the patient. Immediately prior to procedure a time out was called to verify the correct patient, procedure, equipment, support staff and site/side marked as required. Patient was prepped and draped in the usual sterile fashion.             "

## 2023-01-09 ENCOUNTER — APPOINTMENT (OUTPATIENT)
Dept: RADIOLOGY | Facility: MEDICAL CENTER | Age: 68
End: 2023-01-09
Attending: NURSE PRACTITIONER
Payer: MEDICARE

## 2023-01-10 ENCOUNTER — APPOINTMENT (OUTPATIENT)
Dept: RADIOLOGY | Facility: MEDICAL CENTER | Age: 68
End: 2023-01-10
Attending: NURSE PRACTITIONER
Payer: MEDICARE

## 2023-01-12 ENCOUNTER — APPOINTMENT (OUTPATIENT)
Dept: RADIOLOGY | Facility: MEDICAL CENTER | Age: 68
End: 2023-01-12
Attending: NURSE PRACTITIONER
Payer: MEDICARE

## 2023-01-12 DIAGNOSIS — G89.29 CHRONIC PAIN OF LEFT KNEE: ICD-10-CM

## 2023-01-12 DIAGNOSIS — M25.562 CHRONIC PAIN OF LEFT KNEE: ICD-10-CM

## 2023-01-12 PROCEDURE — 73721 MRI JNT OF LWR EXTRE W/O DYE: CPT | Mod: LT

## 2023-01-12 NOTE — TELEPHONE ENCOUNTER
Received request via: Patient    Was the patient seen in the last year in this department? Yes    Does the patient have an active prescription (recently filled or refills available) for medication(s) requested? No    Does the patient have retirement Plus and need 100 day supply (blood pressure, diabetes and cholesterol meds only)? Yes, quantity updated to 100 days

## 2023-01-13 RX ORDER — METFORMIN HYDROCHLORIDE 500 MG/1
500 TABLET, EXTENDED RELEASE ORAL 2 TIMES DAILY
Qty: 400 TABLET | Refills: 0 | Status: SHIPPED | OUTPATIENT
Start: 2023-01-13 | End: 2023-07-11 | Stop reason: SDUPTHER

## 2023-01-20 RX ORDER — ALBUTEROL SULFATE 90 UG/1
AEROSOL, METERED RESPIRATORY (INHALATION)
Qty: 8.5 G | Refills: 5 | Status: SHIPPED | OUTPATIENT
Start: 2023-01-20 | End: 2023-03-28 | Stop reason: SDUPTHER

## 2023-01-20 NOTE — TELEPHONE ENCOUNTER
Received request via: Pharmacy    Was the patient seen in the last year in this department? Yes    Does the patient have an active prescription (recently filled or refills available) for medication(s) requested? No    Does the patient have halfway Plus and need 100 day supply (blood pressure, diabetes and cholesterol meds only)? Medication is not for cholesterol, blood pressure or diabetes

## 2023-02-13 ENCOUNTER — OFFICE VISIT (OUTPATIENT)
Dept: MEDICAL GROUP | Facility: PHYSICIAN GROUP | Age: 68
End: 2023-02-13
Payer: MEDICARE

## 2023-02-13 VITALS
HEIGHT: 63 IN | OXYGEN SATURATION: 93 % | BODY MASS INDEX: 32.6 KG/M2 | WEIGHT: 184 LBS | RESPIRATION RATE: 16 BRPM | SYSTOLIC BLOOD PRESSURE: 134 MMHG | TEMPERATURE: 98 F | HEART RATE: 65 BPM | DIASTOLIC BLOOD PRESSURE: 80 MMHG

## 2023-02-13 DIAGNOSIS — G89.29 CHRONIC EAR PAIN, LEFT: ICD-10-CM

## 2023-02-13 DIAGNOSIS — J01.40 ACUTE NON-RECURRENT PANSINUSITIS: ICD-10-CM

## 2023-02-13 DIAGNOSIS — H92.02 CHRONIC EAR PAIN, LEFT: ICD-10-CM

## 2023-02-13 PROCEDURE — 99214 OFFICE O/P EST MOD 30 MIN: CPT | Performed by: NURSE PRACTITIONER

## 2023-02-13 RX ORDER — AMOXICILLIN AND CLAVULANATE POTASSIUM 875; 125 MG/1; MG/1
1 TABLET, FILM COATED ORAL 2 TIMES DAILY
Qty: 20 TABLET | Refills: 0 | Status: SHIPPED | OUTPATIENT
Start: 2023-02-13 | End: 2023-02-23

## 2023-02-13 ASSESSMENT — FIBROSIS 4 INDEX: FIB4 SCORE: 1.89

## 2023-02-14 NOTE — PROGRESS NOTES
Chief Complaint   Patient presents with    Otalgia     Since last visit, L,     Headache     L side, feels like a sinus infection, no cough,                                                                                                                                        HPI:   Grisel presents today with the following.    Problem   Acute Non-Recurrent Pansinusitis   Chronic Ear Pain, Left       Current Outpatient Medications   Medication Sig Dispense Refill    amoxicillin-clavulanate (AUGMENTIN) 875-125 MG Tab Take 1 Tablet by mouth 2 times a day for 10 days. 20 Tablet 0    meloxicam (MOBIC) 15 MG tablet TAKE 1 TABLET BY MOUTH EVERY DAY 90 Tablet 0    albuterol 108 (90 Base) MCG/ACT Aero Soln inhalation aerosol INHALE 2 PUFFS BY MOUTH EVERY 4 HOURS AS NEEDED FOR SHORTNESS OF BREATH 8.5 g 5    metFORMIN ER (GLUCOPHAGE XR) 500 MG TABLET SR 24 HR Take 1 Tablet by mouth 2 times a day. 400 Tablet 0    gabapentin (NEURONTIN) 800 MG tablet Take 1,200 mg by mouth 2 times a day.      Empagliflozin (JARDIANCE) 25 MG Tab Take 1 Tablet by mouth every day. 100 Tablet 3    metoprolol tartrate (LOPRESSOR) 25 MG Tab Take 1 Tablet by mouth 2 times a day. 200 Tablet 3    clonazePAM (KLONOPIN) 0.5 MG Tab Take 1 Tablet by mouth every evening for 90 days. Indications: Feeling Anxious 90 Tablet 0    clonazePAM (KLONOPIN) 1 MG Tab Take 1 Tablet by mouth every morning for 90 days. Indications: Feeling Anxious 90 Tablet 0    trospium (SANCTURA) 20 MG Tab Take 1 Tablet by mouth 3 times a day before meals. 90 Tablet 5    morphine ER (MS CONTIN) 15 MG Tab CR tablet Take 1 Tablet by mouth every 12 hours for 30 days. 60 Tablet 0    HYDROcodone-acetaminophen (NORCO) 5-325 MG Tab per tablet Take 1 Tablet by mouth every 8 hours as needed (severe pain) for up to 30 days. 60 Tablet 0    cyclobenzaprine (FLEXERIL) 5 mg tablet Take 1 Tablet by mouth 2 times a day as needed for Muscle Spasms (restless leg). 90 Tablet 3    estradiol (ESTRACE) 1 MG  "Tab TAKE 1 TABLET BY MOUTH EVERY DAY 90 Tablet 3    levothyroxine (SYNTHROID) 50 MCG Tab Take 1 Tablet by mouth every morning on an empty stomach. 90 Tablet 3    rosuvastatin (CRESTOR) 10 MG Tab Take 1 Tablet by mouth every evening. 100 Tablet 3    omeprazole (PRILOSEC) 40 MG delayed-release capsule Take 1 Capsule by mouth every day. 90 Capsule 3    DULoxetine (CYMBALTA) 30 MG Cap DR Particles Take 3 Capsules by mouth every day. 270 Capsule 3    lisinopril (PRINIVIL) 10 MG Tab TAKE 1 TABLET BY MOUTH EVERY  Tablet 3    Brexpiprazole (REXULTI) 2 MG Tab Take 2 mg by mouth every day. 90 Tablet 3    busPIRone (BUSPAR) 10 MG Tab tablet Take 2 Tablets by mouth 2 times a day. 360 Tablet 3    pseudoephedrine (SUDAFED) 30 MG Tab Take 1-2 Tablets by mouth every 6 hours as needed for Congestion (sinus pressure not relieved by other measures). 30 Tablet 0    Blood Glucose Meter Kit Test blood sugar as recommended by provider. Abbott Freestyle Lite blood glucose monitoring kit. 1 Kit 0    Lancets Use one Abbott York Lite lancet to test blood sugar twice daily and PRN. 300 Each 3    Alcohol Swabs Wipe site with prep pad prior to injection. 100 Each 3    fluticasone (FLONASE) 50 MCG/ACT nasal spray Administer 1 Spray into affected nostril(S) every day. 16 g 11    aspirin EC (ECOTRIN) 81 MG Tablet Delayed Response Take 81 mg by mouth every day.      cyanocobalamin (VITAMIN B-12) 500 MCG Tab Take 500 mcg by mouth every day.      Blood Glucose Test Strips Use one Abbott Freedom Lite strip to test blood sugar twice daily and PRN. 270 Each 3     No current facility-administered medications for this visit.       Allergies as of 02/13/2023    (No Known Allergies)        ROS:  All systems negative expect as addressed in assessment and plan.     /80 (BP Location: Right arm, Patient Position: Sitting, BP Cuff Size: Adult)   Pulse 65   Temp 36.7 °C (98 °F) (Temporal)   Resp 16   Ht 1.6 m (5' 3\")   Wt 83.5 kg (184 lb)   " LMP  (LMP Unknown)   SpO2 93%   BMI 32.59 kg/m²       Physical Exam  Vitals reviewed.   Constitutional:       Appearance: Normal appearance.   HENT:      Head: Normocephalic and atraumatic.      Right Ear: A middle ear effusion is present.      Left Ear: A middle ear effusion is present.      Nose:      Right Turbinates: Swollen.      Left Turbinates: Enlarged and swollen.      Left Sinus: Maxillary sinus tenderness and frontal sinus tenderness present.      Mouth/Throat:      Mouth: Mucous membranes are moist.   Eyes:      Extraocular Movements: Extraocular movements intact.      Conjunctiva/sclera: Conjunctivae normal.   Pulmonary:      Effort: Pulmonary effort is normal.      Breath sounds: Normal breath sounds and air entry.   Musculoskeletal:         General: Normal range of motion.      Cervical back: Normal range of motion.   Skin:     General: Skin is warm and dry.   Neurological:      General: No focal deficit present.      Mental Status: She is alert and oriented to person, place, and time.   Psychiatric:         Mood and Affect: Mood normal.         Behavior: Behavior normal.         Thought Content: Thought content normal.         Assessment and Plan:  67 y.o. female with the following issues.    1. Chronic ear pain, left  amoxicillin-clavulanate (AUGMENTIN) 875-125 MG Tab      2. Acute non-recurrent pansinusitis  amoxicillin-clavulanate (AUGMENTIN) 875-125 MG Tab           Chronic ear pain, left  Patient reports increasing ear pain for the last month. She had a viral infection at that time. She reports continued cough as well as her ear pain. She reports daily headache as well. She reports drainage from the ears as well. She states that her cough is sometimes productive. She denies fever.     TMs normal with middle ear effusion on the left. Patient with auricular lymphadenopathy of the left. Maxillary and front sinus pain on the left.     Patient advised of significant ear effusion on the left.   Patient does also have a sinus infection as well.  We will treat patient with Augmentin for the sinus infection.  Patient advised to continue using her Flonase to help with the middle ear effusion.  Patient to follow-up in 5 to 7 days if no improvement in her ear pain.      Return if symptoms worsen or fail to improve.      I have placed the below orders and discussed them with an approved delegating provider.  The MA is performing the below orders under the direction of Dr. Alexandra.    Please note that this dictation was created using voice recognition software. I have worked with consultants from the vendor as well as technical experts from [facility] to optimize the interface. I have made every reasonable attempt to correct obvious errors, but I expect that there are errors of grammar and possibly content that I did not discover before finalizing the note.

## 2023-02-14 NOTE — ASSESSMENT & PLAN NOTE
Patient reports increasing ear pain for the last month. She had a viral infection at that time. She reports continued cough as well as her ear pain. She reports daily headache as well. She reports drainage from the ears as well. She states that her cough is sometimes productive. She denies fever.     TMs normal with middle ear effusion on the left. Patient with auricular lymphadenopathy of the left. Maxillary and front sinus pain on the left.     Patient advised of significant ear effusion on the left.  Patient does also have a sinus infection as well.  We will treat patient with Augmentin for the sinus infection.  Patient advised to continue using her Flonase to help with the middle ear effusion.  Patient to follow-up in 5 to 7 days if no improvement in her ear pain.

## 2023-02-21 ENCOUNTER — OFFICE VISIT (OUTPATIENT)
Dept: PHYSICAL MEDICINE AND REHAB | Facility: MEDICAL CENTER | Age: 68
End: 2023-02-21
Payer: MEDICARE

## 2023-02-21 VITALS
WEIGHT: 193 LBS | TEMPERATURE: 96.9 F | OXYGEN SATURATION: 91 % | HEART RATE: 68 BPM | SYSTOLIC BLOOD PRESSURE: 124 MMHG | HEIGHT: 63 IN | BODY MASS INDEX: 34.2 KG/M2 | DIASTOLIC BLOOD PRESSURE: 60 MMHG

## 2023-02-21 DIAGNOSIS — M51.36 DDD (DEGENERATIVE DISC DISEASE), LUMBAR: ICD-10-CM

## 2023-02-21 DIAGNOSIS — M54.16 LEFT LUMBAR RADICULITIS: ICD-10-CM

## 2023-02-21 DIAGNOSIS — F13.20 BENZODIAZEPINE DEPENDENCE (HCC): ICD-10-CM

## 2023-02-21 DIAGNOSIS — F11.90 CHRONIC, CONTINUOUS USE OF OPIOIDS: ICD-10-CM

## 2023-02-21 DIAGNOSIS — E66.9 OBESITY (BMI 30-39.9): ICD-10-CM

## 2023-02-21 DIAGNOSIS — M96.1 POSTLAMINECTOMY SYNDROME: ICD-10-CM

## 2023-02-21 PROCEDURE — 99214 OFFICE O/P EST MOD 30 MIN: CPT | Performed by: PHYSICAL MEDICINE & REHABILITATION

## 2023-02-21 RX ORDER — HYDROCODONE BITARTRATE AND ACETAMINOPHEN 5; 325 MG/1; MG/1
1 TABLET ORAL EVERY 8 HOURS PRN
Qty: 60 TABLET | Refills: 0 | Status: SHIPPED | OUTPATIENT
Start: 2023-02-21 | End: 2023-03-15 | Stop reason: SDUPTHER

## 2023-02-21 RX ORDER — GABAPENTIN 800 MG/1
1200 TABLET ORAL 2 TIMES DAILY
Qty: 270 TABLET | Refills: 1 | Status: SHIPPED | OUTPATIENT
Start: 2023-02-21 | End: 2023-06-22 | Stop reason: SDUPTHER

## 2023-02-21 RX ORDER — MORPHINE SULFATE 15 MG/1
15 TABLET, FILM COATED, EXTENDED RELEASE ORAL EVERY 12 HOURS
Qty: 60 TABLET | Refills: 0 | Status: SHIPPED | OUTPATIENT
Start: 2023-02-21 | End: 2023-03-15 | Stop reason: SDUPTHER

## 2023-02-21 ASSESSMENT — PATIENT HEALTH QUESTIONNAIRE - PHQ9
SUM OF ALL RESPONSES TO PHQ QUESTIONS 1-9: 4
5. POOR APPETITE OR OVEREATING: 1 - SEVERAL DAYS
CLINICAL INTERPRETATION OF PHQ2 SCORE: 2

## 2023-02-21 ASSESSMENT — PAIN SCALES - GENERAL: PAINLEVEL: 8=MODERATE-SEVERE PAIN

## 2023-02-21 ASSESSMENT — FIBROSIS 4 INDEX: FIB4 SCORE: 1.89

## 2023-02-21 NOTE — PATIENT INSTRUCTIONS
Your procedure will be at the [facility] special procedure suite.    [facility] [place] [address]       PRE-PROCEDURE INSTRUCTIONS  You may take your regular medications except:   No Anti-inflammatories 5 days prior to your procedure. Anti-inflammatories include medicines such as  ibuprofen (Motrin, Advil), Excedrin, Naproxen (Aleve, Anaprox, Naprelan, Naprosyn), Celecoxib (Celebrex), Diclofenac (Voltaren-XR tab), and Meloxicam (Mobic).   No Glucophage or Metformin 24 hours before your procedure. You may resume next day after your procedure.  Call the physiatry office if you are taking or prescribed anti-biotics within five days of procedure.  Please ask provider if you are taking any new diabetes medication.  CONTINUE TAKING BLOOD PRESSURE MEDICATIONS AS PRESCRIBED.  Pain medications will not be prescribed on the procedure day. Procedural pain medication may be used by your provider   Call your doctor's office performing the procedure if you have a fever, chills, rash or new illness prior to your procedure    Anticoagulation/antiplatelet medications  No Blood thinning medications such as Coumadin or Plavix 5 days prior to procedure unless your doctor said to continue these medications. Call your doctor if a new medication is prescribed in this class.     Restrictions for eating before procedure:   If you are getting procedural sedation, then do not eat to for 8 hours prior to procedure appointment time. Do not drink fluids for four hours prior to your procedure time.   If you are not having procedural sedation, then Skip the meal prior to your procedure. If you have a morning procedure then skip breakfast. If you have an afternoon procedure then skip lunch.   You may drink clear liquids up to 2 hours prior to your procedure  You must have a  the day of procedure to accompany you home.      POST PROCEDURE INSTRUCTIONS   No heavy lifting, strenuous bending or strenuous exercise for 3 days  after your procedure.  No hot tubs, baths, swimming for 3 days after your procedure  You can remove the bandage the day after the procedure.  IF YOU RECEIVED A STEROID INJECTION. PLEASE NOTE THAT THERE MAY BE A DELAY FOR THE INJECTION TO START WORKING, THE DELAY MAY BE UP TO TWO WEEKS. IF YOU HAVE DIABETES, PLEASE NOTE THAT YOUR SUGAR LEVELS MAY BE ELEVATED FOR 1-2 DAYS AFTER A STEROID INJECTION.  THE STEROID MAY CAUSE TEMPORARY SYMPTOMS WHICH USUALLY RESOLVE ON THEIR OWN WITHIN 1 TO 2 DAYS INCLUDING FACIAL FLUSHING OR A FEELING OF WARMTH ON THE FACE, TEMPORARY INCREASES IN BLOOD SUGAR, INSOMNIA, INCREASED HUNGER  IF YOU RECEIVED A DIAGNOSTIC PROCEDURE (SUCH AS A MEDIAL BRANCH BLOCK), PLEASE NOTE THAT WE DO EXPECT THIS INJECTION TO WEAR OFF.  IT IS IMPORTANT TO COMPLETE THE PAIN DIARY AND LIST THE PAIN SCORE ONLY FOR THE REGION WHERE THE PROCEDURE WAS AND BRING THIS TO YOUR FOLLOW UP VISIT.  IF YOU RECEIVED A RADIOFREQUENCY PROCEDURE, THERE MAY BE SOME SORENESS AFTER THE PROCEDURE.  THIS IS NORMAL.  IF YOU EXPERIENCE PROLONGED WEAKNESS LONGER THAN ONE DAY, BOWEL OR BLADDER INCONTINENCE THEN PLEASE CALL THE PHYSIATRY OFFICE.  Your leg may feel heavy, weak and numb for up to 1-2 days. Be very careful walking.    You may resume normal activities 3 days after procedure.

## 2023-02-21 NOTE — PROGRESS NOTES
Follow-up patient note    Physiatry (physical medicine and  Rehabilitation), interventional spine and sports medicine    Date of Service: see Epic for DOS    Chief complaint:   Chief Complaint   Patient presents with    Follow-Up     Back pain       HISTORY    HPI: Grisel Mark 67 y.o. female who presents today for follow-up evaluation of low back and leg pain.     Since the last visit, she has been out of her medications for a few days, the appointment was scheduled too late.    Injection with caudal did help and pain is returning to baseline now.  This was October 31, 2022 and she felt that this helped more than injections usually do.  At least a month before she started to note gradual worsening of pain to the baseline that she is at now.  Overall, symptoms are in the same distribution, pain in the low back and left leg.      Since our last visit, she had a left knee issue and got an injection.  Her PCP sent referral to Orthopedics. They have discussed viscosupplementation per patient.    She is stable with her current medications.  Continuing activity as tolerated at home, much more limited due to episode of left knee pain.    By history:  Patient has narcan.  She carries 1 in her purse and has been at home.    Medications:  Takes gabapentin 800mg taking 1.5 pills twice a day.  Her pain medications: Duloxetine 90mg.  MS Contin 15mg q12h.  Norco 5/325 for breakthrough, reports that she does not always take this, some days will take up to 3/day.   No side effects reported.  She is back to taking clonazepam 0.5mg at bedtime and 1mg during the day    Bowel movements have been regular, reports that she is eating better.  Taking miralax prn, but has not taken recently.  No stool softeners lately.  Unchanged         By history:  Her laminectomy was in 1998.  Previous injection on left L4 and L5 TFESI on 08/12/2020 helped with her leg pain.     Medical records review:  Dr. Francisco Olmos, plan to increase  duloxetine 90mg daily, discontinue buspirone 20mg po bid, start buproprion XL 150mg daily, continue brexipiprazole 1mg qhs, hydroxyzine 25mg po tid prn, clonazepam 0.5mg daily prn    Review of records   Dr. Dheeraj De La Rosa:  08/20/2019 Right L3-4, L4-5, L5-S1 radiofrequency ablation    I reviewed the note from the referring provider Peter Bruce * dated 02/05/2020: She was seen to establish care, having just moved from [place].  She was seen for essential hypertension, mixed hyperlipidemia, DM2 in obese, hypothyroidism, recurrent MDD in partial depression/anxiety, GERD without esophagitis, chronic bilateral low back pain with bilateral sciatica.  Medications associated with the above were written, related labs ordered including CBC, CMP, Hb A1c, lipids, microalbumin, TSH, free thryoxine, referral to ps\ychiatry, referral to GI for colonoscopy and referral to pain clinic.    Previous treatments:    Physical Therapy: Yes    Medications the patient is tried: Narcotics, gabapentin and muscle relaxers    Previous interventions: [facility] these included right lumbar radiofrequency procedures    Previous surgeries to relieve the above pain:  Surgery on October 28, 1998      ROS:   ENT: ear infection, treated with augmentin  CV: irregular heart, reports history of tachycardia  Psych: depression since 1995  Heme: anemia  Endo: hypothyroidism, diabetes    Red Flags ROS:   Fever, Chills, Sweats: Denies  Involuntary Weight Loss: Denies  Bladder Incontinence: Denies  Bowel Incontinence: Denies  Saddle Anesthesia: Denies    All other systems reviewed and negative.       PMHx:   Past Medical History:   Diagnosis Date    Anxiety     Arrhythmia     Chronic back pain     Constipation     Depression     Diabetes (HCC)     Ear pain, left 9/23/2021    GERD (gastroesophageal reflux disease)     Hyperlipidemia     Hypertension     Thyroid disease        PSHx:   Past Surgical History:   Procedure  Laterality Date    IN INJ LUMBAR/SACRAL,W/ IMAGING N/A 10/31/2022    Procedure: Caudal epidural steroid injection with catheter;  Surgeon: Paresh Ogden M.D.;  [facility];  Service: Pain Management    RADIO FREQUENCY ABLATION ADDITIONAL LEVEL Left 11/11/2021    Procedure: LEFT lumbar three through lumbar five radiofrequency ablation;  Surgeon: Paresh Ogden M.D.;  Location: [facility];  Service: Pain Management    LUMBAR MEDIAL BRANCH BLOCKS Left 7/21/2021    Procedure: LEFT lumbar three through lumbar five medial branch blocks;  Surgeon: Paresh Ogden M.D.;  Location: [facility];  Service: Pain Management    LUMBAR TRANSFORAMINAL EPIDURAL STEROID INJECTION Left 5/20/2021    Procedure: LEFT lumbar four and lumbar five transforaminal epidural steroid injection;  Surgeon: Paresh Ogden M.D.;  Location: [facility];  Service: Pain Management    BLOCK EPIDURAL STEROID INJECTION Left 2/24/2021    Procedure: INJECTION, STEROID, EPIDURAL;  Surgeon: Paresh Ogden M.D.;  Location: [facility];  Service: Pain Management    LUMBAR MEDIAL BRANCH BLOCKS Left 9/9/2020    Procedure: BLOCK, NERVE, SPINAL, LUMBAR, POSTERIOR RAMUS, MEDIAL BRANCH;  Surgeon: Paresh Ogden M.D.;  Location: [facility];  Service: Pain Management    IN NJX AA&/STRD TFRML EPI LUMBAR/SACRAL 1 LEVEL Left 8/12/2020    Procedure: INJECTION, SPINE, LUMBOSACRAL, EPIDURAL, 1 LEVEL, TRANSFORAMINAL APPROACH;  Surgeon: Paresh Ogden M.D.;  Location: [facility];  Service: Pain Management    IN NJX AA&/STRD TFRML EPI LUMBAR/SACRAL EA ADDL Left 8/12/2020    Procedure: INJECTION, SPINE, LUMBOSACRAL, EPIDURAL, TRANSFORAMINAL APPROACH;  Surgeon: Paresh Ogden M.D.;  Location: [facility];  Service: Pain Management    LAMINOTOMY  1998    ABDOMINAL HYSTERECTOMY TOTAL      CARPAL TUNNEL RELEASE       CHOLECYSTECTOMY         Family history   Family History   Problem Relation Age of Onset    Cancer Mother     Stroke Father         Heart attack    Dementia Sister     Stroke Brother         heart Attack    Cancer Brother         Skin    Diabetes Brother     Kidney Disease Brother     Cancer Brother     Parkinson's Disease Sister     No Known Problems Sister     Diabetes Daughter     Hypertension Daughter          Medications:   Current Outpatient Medications   Medication    gabapentin (NEURONTIN) 800 MG tablet    morphine ER (MS CONTIN) 15 MG Tab CR tablet    HYDROcodone-acetaminophen (NORCO) 5-325 MG Tab per tablet    amoxicillin-clavulanate (AUGMENTIN) 875-125 MG Tab    meloxicam (MOBIC) 15 MG tablet    albuterol 108 (90 Base) MCG/ACT Aero Soln inhalation aerosol    metFORMIN ER (GLUCOPHAGE XR) 500 MG TABLET SR 24 HR    Empagliflozin (JARDIANCE) 25 MG Tab    metoprolol tartrate (LOPRESSOR) 25 MG Tab    clonazePAM (KLONOPIN) 0.5 MG Tab    clonazePAM (KLONOPIN) 1 MG Tab    trospium (SANCTURA) 20 MG Tab    cyclobenzaprine (FLEXERIL) 5 mg tablet    estradiol (ESTRACE) 1 MG Tab    levothyroxine (SYNTHROID) 50 MCG Tab    rosuvastatin (CRESTOR) 10 MG Tab    omeprazole (PRILOSEC) 40 MG delayed-release capsule    DULoxetine (CYMBALTA) 30 MG Cap DR Particles    lisinopril (PRINIVIL) 10 MG Tab    Brexpiprazole (REXULTI) 2 MG Tab    busPIRone (BUSPAR) 10 MG Tab tablet    pseudoephedrine (SUDAFED) 30 MG Tab    Alcohol Swabs    fluticasone (FLONASE) 50 MCG/ACT nasal spray    aspirin EC (ECOTRIN) 81 MG Tablet Delayed Response    cyanocobalamin (VITAMIN B-12) 500 MCG Tab    Blood Glucose Meter Kit    Lancets    Blood Glucose Test Strips     No current facility-administered medications for this visit.       Allergies:   No Known Allergies    Social Hx:   Social History     Socioeconomic History    Marital status:      Spouse name: Not on file    Number of children: Not on file    Years of education: Not on file    Highest  "education level: Not on file   Occupational History    Not on file   Tobacco Use    Smoking status: Former     Packs/day: 0.25     Types: Cigarettes     Quit date:      Years since quittin.1    Smokeless tobacco: Never   Vaping Use    Vaping Use: Never used   Substance and Sexual Activity    Alcohol use: Not Currently     Comment: rare    Drug use: Not Currently     Types: Marijuana    Sexual activity: Not Currently   Other Topics Concern     Service No    Blood Transfusions Yes    Caffeine Concern No    Occupational Exposure No    Hobby Hazards No    Sleep Concern Yes    Stress Concern Yes    Weight Concern Yes    Special Diet No    Back Care No    Exercise Yes    Bike Helmet No    Seat Belt Yes    Self-Exams Yes   Social History Narrative    Not on file     Social Determinants of Health     Financial Resource Strain: Not on file   Food Insecurity: Not on file   Transportation Needs: Not on file   Physical Activity: Not on file   Stress: Not on file   Social Connections: Not on file   Intimate Partner Violence: Not on file   Housing Stability: Not on file         EXAMINATION     Physical Exam:   Vitals: /60 (BP Location: Right arm, Patient Position: Sitting, BP Cuff Size: Adult long)   Pulse 68   Temp 36.1 °C (96.9 °F) (Temporal)   Ht 1.6 m (5' 3\")   Wt 87.5 kg (193 lb)   SpO2 91%     Constitutional:   Body Habitus: Body mass index is 34.19 kg/m².  Cooperation: Fully cooperates with exam  Appearance: Well-groomed, well-nourished, not disheveled, no acute distress    Eyes: No scleral icterus, no proptosis     ENT -no obvious auditory deficits, wearing a facial mask    Skin -no rashes or lesions noted, no warmth or erythema was noted at the injection site    Respiratory-  breathing comfortable on room air, no audible wheezing    Cardiovascular- no lower extremity edema is noted.     Psychiatric- alert and oriented ×3. Normal affect.     Gait - mildly antalgic, using single point cane.  No " loss of balance    Musculoskeletal -     Thoracic/Lumbar Spine/Sacral Spine/Hips   Inspection: no evidence of atrophy in bilateral lower extremities throughout     ROM of the lumbar spine decreased.     Palpation: tenderness to palpation lumbar paraspinals minimal    Neuro     Key points for the international standards for neurological classification of spinal cord injury (ISNCSCI) to light touch.     Dermatome R L   L2 2 2   L3 2 2   L4 2 1   L5 1 1   S1 2 1   S2 2 2       Motor Exam Lower Extremities    ? Myotome R L   Hip flexion L2 5 5   Knee extension L3 5 5   Ankle dorsiflexion L4 5 5   Toe extension L5 5 5   Ankle plantarflexion S1 5 5       Reflexes  ?  R L   Patella  2+ 2+   Achilles   2+ 1+     No clonus bilaterally    MEDICAL DECISION MAKING    Medical records review: see under HPI section.     DATA    Labs:   08/10/2022: UDS positive for morphine and negative for metabolites, positive for hydrocodone, positive for clonazepam  03/18/2022: UDS positive for morphine and negative for metabolites, positive for hydrocodone, positive for clonazepam  01/18/2022: UDS positive for morphine and negative for metabolites, absent hydrocodone, positive for clonazepam  10/20/2021: UDS positive for morphine and metabolites, clonzapem, low amount of THC noted  05/08/2021: UDS positive for morphine and metabolites, clonazepam and metabolites, hydrocodone and metabolites  08/18/2020: UDS positive for morphine and metabolites, clonazepam and metabolites, hydrocodone and metabolites  04/24/2020 UDS positive for morphine and metabolites, clonazepam  04/09/2020 Vitamin D, 25:  28L  04/09/2020 Vitamin B12: 217    Lab Results   Component Value Date/Time    SODIUM 139 05/05/2022 12:18 PM    POTASSIUM 4.8 05/05/2022 12:18 PM    CHLORIDE 104 05/05/2022 12:18 PM    CO2 24 05/05/2022 12:18 PM    ANION 11.0 05/05/2022 12:18 PM    GLUCOSE 194 (H) 05/05/2022 12:18 PM    BUN 13 05/05/2022 12:18 PM    CREATININE 0.70 05/05/2022 12:18 PM     CALCIUM 9.8 05/05/2022 12:18 PM    ASTSGOT 28 05/05/2022 12:18 PM    ALTSGPT 28 05/05/2022 12:18 PM    TBILIRUBIN 0.3 05/05/2022 12:18 PM    ALBUMIN 4.4 05/05/2022 12:18 PM    TOTPROTEIN 7.1 05/05/2022 12:18 PM    GLOBULIN 2.7 05/05/2022 12:18 PM    AGRATIO 1.6 05/05/2022 12:18 PM       No results found for: PROTHROMBTM, INR     Lab Results   Component Value Date/Time    WBC 7.5 05/04/2021 08:06 AM    RBC 4.73 05/04/2021 08:06 AM    HEMOGLOBIN 14.6 05/04/2021 08:06 AM    HEMATOCRIT 43.8 05/04/2021 08:06 AM    MCV 92.6 05/04/2021 08:06 AM    MCH 30.9 05/04/2021 08:06 AM    MCHC 33.3 (L) 05/04/2021 08:06 AM    MPV 10.6 05/04/2021 08:06 AM    NEUTSPOLYS 46.60 05/04/2021 08:06 AM    LYMPHOCYTES 39.80 05/04/2021 08:06 AM    MONOCYTES 9.30 05/04/2021 08:06 AM    EOSINOPHILS 3.70 05/04/2021 08:06 AM    BASOPHILS 0.50 05/04/2021 08:06 AM        Lab Results   Component Value Date/Time    HBA1C 6.9 (A) 09/19/2022 09:00 AM        Imaging: I personally reviewed following images, these are my reads:     MRI lumbar spine 05/05/2020  At L1-2, no central or foraminal stenosis  At L2-3, no central or foraminal stenosis  At L3-4, mild disc bulge and mild ligamentum flavum thickening.  No central canal stenosis. Borderline left foraminal stenosis. Schmorl's node.   At L4-5, diffuse disc bulge with mild ligamentum flavum thickening.  No central canal stenosis.  There is facet arthropathy, left greater than right. Mild foraminal stenosis bilaterally. S/P left L4/5 hemilaminectomy  At L5-S1, there is mild-borderline foraminal stenosis with facet arthropathy and mild disc bulge.  No central canal stenosis    Xray lumbar spine 05/05/2020  There is mild decreased joint space at L3-4 and L4-5.    Facet arthropathy is noted, greatest at L5-S1 bilaterally.  No significant motion on flexion and extension films    IMAGING radiology reads. I reviewed the following radiology reads    Xray abdomen 07/17/2020  IMPRESSION:     Nonspecific bowel gas  pattern. No evidence small bowel obstruction.      MRI lumbar spine 05/05/2020  IMPRESSION:     1.  Caudal lumbar facet arthropathy left worse than right with no bony destruction to indicate septic or inflammatory arthropathy. This most likely is just degenerative  2.  Mild caudal lumbar foraminal stenoses  3.  No significant central stenosis.  4.  Left L4/5 hemilaminectomy                                                                                   Xray lumbar spine 05/05/2020     IMPRESSION:     1.  No acute lumbar compression fracture or subluxation.  2.  Posterior disc space narrowing at L4-5 and to lesser extent at L3-4.  3.  Bilateral facet arthropathy at L5-S1.                                                                                             Diagnosis   Visit Diagnoses     ICD-10-CM   1. Postlaminectomy syndrome  M96.1   2. Left lumbar radiculitis  M54.16   3. DDD (degenerative disc disease), lumbar  M51.36   4. Benzodiazepine dependence (HCC)  F13.20   5. Chronic, continuous use of opioids  F11.90   6. Obesity (BMI 30-39.9)  E66.9               ASSESSMENT:  Grisel Mark 67 y.o. female seen for above     Grisel was seen today for follow-up.    Diagnoses and all orders for this visit:    Postlaminectomy syndrome  -     gabapentin (NEURONTIN) 800 MG tablet; Take 1.5 Tablets by mouth 2 times a day for 180 days.  -     morphine ER (MS CONTIN) 15 MG Tab CR tablet; Take 1 Tablet by mouth every 12 hours for 30 days.  -     HYDROcodone-acetaminophen (NORCO) 5-325 MG Tab per tablet; Take 1 Tablet by mouth every 8 hours as needed (severe pain) for up to 30 days.  -     Referral to Pain Clinic  -     Referral to Behavioral Health  -     URINE DRUG SCREEN W/CONF (SEND OUT); Future  -     Consent for Opiate Prescription  -     Controlled Substance Treatment Agreement    Left lumbar radiculitis  -     gabapentin (NEURONTIN) 800 MG tablet; Take 1.5 Tablets by mouth 2 times a day for 180 days.  -     morphine  ER (MS CONTIN) 15 MG Tab CR tablet; Take 1 Tablet by mouth every 12 hours for 30 days.  -     HYDROcodone-acetaminophen (NORCO) 5-325 MG Tab per tablet; Take 1 Tablet by mouth every 8 hours as needed (severe pain) for up to 30 days.  -     Referral to Pain Clinic  -     Referral to Behavioral Health  -     Consent for Opiate Prescription  -     Controlled Substance Treatment Agreement    DDD (degenerative disc disease), lumbar    Benzodiazepine dependence (HCC)  -     URINE DRUG SCREEN W/CONF (SEND OUT); Future    Chronic, continuous use of opioids  -     URINE DRUG SCREEN W/CONF (SEND OUT); Future    Obesity (BMI 30-39.9)  -     Patient identified as having weight management issue.  Appropriate orders and counseling given.      Discussed that she did well for a few months after caudal epidural steroid injection.  She would like to have this repeated.  Discussed plan for repeat caudal epidural steroid injection with catheter. The risks benefits and alternatives to this procedure were discussed and the patient wishes to proceed with the procedure. Risks include but are not limited to damage to surrounding structures, infection, bleeding, worsening of pain which can be permanent, weakness which can be permanent. Benefits include pain relief, improved function. Alternatives includes not doing the procedure.     reviewed.  Recheck UDS, order given.  Continue MS Contin 15mg twice a day.  Continue norco no more than 2/day.  She has narcan at home and 1 that she carries in her purse.  Scripts given for the next month.  She is also on chronic benzodiazepines.  This has been stable with her opioids.  No changes to medications.  Continue to monitor.  Consents obtained.  New referral placed for behavioral health for behavioral health counseling.        Follow-up: Return for Hospital injection.    Thank you very much for asking me to participate in Grisel Mark's care.  Please contact me with any questions or  concerns.    Please note that this dictation was created using voice recognition software. I have made every reasonable attempt to correct obvious errors but there may be errors of grammar and content that I may have overlooked prior to finalization of this note.      Paresh Ogden MD  Physical Medicine and Rehabilitation  Interventional Spine and Sports Physiatry  [facility]

## 2023-03-13 ENCOUNTER — TELEMEDICINE (OUTPATIENT)
Dept: MEDICAL GROUP | Facility: PHYSICIAN GROUP | Age: 68
End: 2023-03-13
Payer: MEDICARE

## 2023-03-13 VITALS — HEIGHT: 63 IN | WEIGHT: 186 LBS | BODY MASS INDEX: 32.96 KG/M2

## 2023-03-13 DIAGNOSIS — F41.1 GENERALIZED ANXIETY DISORDER: ICD-10-CM

## 2023-03-13 DIAGNOSIS — E11.42 TYPE 2 DIABETES MELLITUS WITH DIABETIC POLYNEUROPATHY, WITHOUT LONG-TERM CURRENT USE OF INSULIN (HCC): ICD-10-CM

## 2023-03-13 DIAGNOSIS — F13.20 BENZODIAZEPINE DEPENDENCE (HCC): ICD-10-CM

## 2023-03-13 DIAGNOSIS — F43.10 POSTTRAUMATIC STRESS DISORDER, DELAYED ONSET: ICD-10-CM

## 2023-03-13 DIAGNOSIS — F41.8 SITUATIONAL ANXIETY: ICD-10-CM

## 2023-03-13 DIAGNOSIS — E11.69 DIABETES MELLITUS TYPE 2 IN OBESE: ICD-10-CM

## 2023-03-13 DIAGNOSIS — I10 ESSENTIAL HYPERTENSION: ICD-10-CM

## 2023-03-13 DIAGNOSIS — E66.9 DIABETES MELLITUS TYPE 2 IN OBESE: ICD-10-CM

## 2023-03-13 DIAGNOSIS — N95.1 VASOMOTOR SYMPTOMS DUE TO MENOPAUSE: ICD-10-CM

## 2023-03-13 PROCEDURE — 99214 OFFICE O/P EST MOD 30 MIN: CPT | Mod: 95 | Performed by: NURSE PRACTITIONER

## 2023-03-13 RX ORDER — LISINOPRIL 10 MG/1
10 TABLET ORAL DAILY
Qty: 100 TABLET | Refills: 3 | Status: SHIPPED | OUTPATIENT
Start: 2023-03-13 | End: 2023-09-22 | Stop reason: SDUPTHER

## 2023-03-13 RX ORDER — ESTRADIOL 1 MG/1
1 TABLET ORAL
Qty: 90 TABLET | Refills: 3 | Status: SHIPPED | OUTPATIENT
Start: 2023-03-13 | End: 2024-03-19 | Stop reason: SDUPTHER

## 2023-03-13 RX ORDER — BUSPIRONE HYDROCHLORIDE 30 MG/1
30 TABLET ORAL DAILY
Qty: 180 TABLET | Refills: 3 | Status: SHIPPED | OUTPATIENT
Start: 2023-03-13 | End: 2023-08-16 | Stop reason: SDUPTHER

## 2023-03-13 RX ORDER — CLONAZEPAM 0.5 MG/1
0.5 TABLET ORAL NIGHTLY
Qty: 90 TABLET | Refills: 0 | Status: SHIPPED | OUTPATIENT
Start: 2023-04-07 | End: 2023-07-06

## 2023-03-13 RX ORDER — EMPAGLIFLOZIN 25 MG/1
1 TABLET, FILM COATED ORAL
Qty: 100 TABLET | Refills: 3 | Status: SHIPPED | OUTPATIENT
Start: 2023-03-13 | End: 2023-09-22 | Stop reason: SDUPTHER

## 2023-03-13 RX ORDER — CLONAZEPAM 1 MG/1
1 TABLET ORAL DAILY
Qty: 90 TABLET | Refills: 0 | Status: SHIPPED | OUTPATIENT
Start: 2023-04-07 | End: 2023-07-06

## 2023-03-13 ASSESSMENT — FIBROSIS 4 INDEX: FIB4 SCORE: 1.89

## 2023-03-13 NOTE — PROGRESS NOTES
Virtual Visit: Established Patient   This visit was conducted via Zoom using secure and encrypted videoconferencing technology. The patient was in a private location in the state of [place].    The patient's identity was confirmed and verbal consent was obtained for this virtual visit.    Subjective:   CC: follow up for anxiety and acute shoulder    Grisel Mark is a 67 y.o. female presenting for evaluation and management of:    Problem   Generalized Anxiety Disorder   Type 2 Diabetes Mellitus With Diabetic Polyneuropathy, Without Long-Term Current Use of Insulin (Hcc)   Situational Anxiety       ROS   Denies any recent fevers or chills. No nausea or vomiting. No chest pains or shortness of breath.     No Known Allergies    Current medicines (including changes today)  Current Outpatient Medications   Medication Sig Dispense Refill    busPIRone (BUSPAR) 30 MG tablet Take 1 Tablet by mouth every day. 180 Tablet 3    Empagliflozin (JARDIANCE) 25 MG Tab Take 1 Tablet by mouth every day. 100 Tablet 3    estradiol (ESTRACE) 1 MG Tab Take 1 Tablet by mouth every day. 90 Tablet 3    lisinopril (PRINIVIL) 10 MG Tab Take 1 Tablet by mouth every day. 100 Tablet 3    [START ON 4/7/2023] clonazePAM (KLONOPIN) 0.5 MG Tab Take 1 Tablet by mouth every evening for 90 days. Indications: Feeling Anxious, Panic Disorder 90 Tablet 0    [START ON 4/7/2023] clonazePAM (KLONOPIN) 1 MG Tab Take 1 Tablet by mouth every day for 90 days. Indications: Feeling Anxious, Panic Disorder 90 Tablet 0    gabapentin (NEURONTIN) 800 MG tablet Take 1.5 Tablets by mouth 2 times a day for 180 days. 270 Tablet 1    morphine ER (MS CONTIN) 15 MG Tab CR tablet Take 1 Tablet by mouth every 12 hours for 30 days. 60 Tablet 0    HYDROcodone-acetaminophen (NORCO) 5-325 MG Tab per tablet Take 1 Tablet by mouth every 8 hours as needed (severe pain) for up to 30 days. 60 Tablet 0    meloxicam (MOBIC) 15 MG tablet TAKE 1 TABLET BY MOUTH EVERY DAY 90 Tablet 0     albuterol 108 (90 Base) MCG/ACT Aero Soln inhalation aerosol INHALE 2 PUFFS BY MOUTH EVERY 4 HOURS AS NEEDED FOR SHORTNESS OF BREATH 8.5 g 5    metFORMIN ER (GLUCOPHAGE XR) 500 MG TABLET SR 24 HR Take 1 Tablet by mouth 2 times a day. 400 Tablet 0    metoprolol tartrate (LOPRESSOR) 25 MG Tab Take 1 Tablet by mouth 2 times a day. 200 Tablet 3    clonazePAM (KLONOPIN) 0.5 MG Tab Take 1 Tablet by mouth every evening for 90 days. Indications: Feeling Anxious 90 Tablet 0    clonazePAM (KLONOPIN) 1 MG Tab Take 1 Tablet by mouth every morning for 90 days. Indications: Feeling Anxious 90 Tablet 0    cyclobenzaprine (FLEXERIL) 5 mg tablet Take 1 Tablet by mouth 2 times a day as needed for Muscle Spasms (restless leg). 90 Tablet 3    levothyroxine (SYNTHROID) 50 MCG Tab Take 1 Tablet by mouth every morning on an empty stomach. 90 Tablet 3    rosuvastatin (CRESTOR) 10 MG Tab Take 1 Tablet by mouth every evening. 100 Tablet 3    omeprazole (PRILOSEC) 40 MG delayed-release capsule Take 1 Capsule by mouth every day. 90 Capsule 3    DULoxetine (CYMBALTA) 30 MG Cap DR Particles Take 3 Capsules by mouth every day. 270 Capsule 3    Brexpiprazole (REXULTI) 2 MG Tab Take 2 mg by mouth every day. 90 Tablet 3    pseudoephedrine (SUDAFED) 30 MG Tab Take 1-2 Tablets by mouth every 6 hours as needed for Congestion (sinus pressure not relieved by other measures). 30 Tablet 0    Blood Glucose Meter Kit Test blood sugar as recommended by provider. Abbott Freestyle Lite blood glucose monitoring kit. 1 Kit 0    Lancets Use one Abbott Bluejacket Lite lancet to test blood sugar twice daily and PRN. 300 Each 3    Alcohol Swabs Wipe site with prep pad prior to injection. 100 Each 3    fluticasone (FLONASE) 50 MCG/ACT nasal spray Administer 1 Spray into affected nostril(S) every day. 16 g 11    aspirin EC (ECOTRIN) 81 MG Tablet Delayed Response Take 81 mg by mouth every day.      cyanocobalamin (VITAMIN B-12) 500 MCG Tab Take 500 mcg by mouth every day.       Blood Glucose Test Strips Use one Abbott Freedom Lite strip to test blood sugar twice daily and PRN. 270 Each 3     No current facility-administered medications for this visit.       Patient Active Problem List    Diagnosis Date Noted    Obesity (BMI 30-39.9) 02/21/2023    Acute non-recurrent pansinusitis 02/13/2023    Chronic pain of left knee 01/04/2023    Stress bladder incontinence, female 12/19/2022    Chronic right shoulder pain 10/11/2022    Tobacco use 10/11/2022    Restless leg 02/03/2022    Cough 11/02/2021    Chronic ear pain, left 09/23/2021    Primary central sleep apnea 08/05/2021    Opioid dependence in controlled environment ([facility]) 05/26/2021    Benzodiazepine dependence ([facility]) 05/26/2021    Generalized anxiety disorder 05/12/2020    Posttraumatic stress disorder, delayed onset 05/12/2020    Essential hypertension 02/05/2020    Mixed hyperlipidemia 02/05/2020    Type 2 diabetes mellitus with diabetic polyneuropathy, without long-term current use of insulin (HCC) 02/05/2020    Hypothyroidism (acquired) 02/05/2020    Depression, major, recurrent, moderate (HCC) 02/05/2020    Gastroesophageal reflux disease without esophagitis 02/05/2020    Situational anxiety 02/05/2020         She  has a past medical history of Anxiety, Arrhythmia, Chronic back pain, Constipation, Depression, Diabetes (HCC), Ear pain, left (9/23/2021), GERD (gastroesophageal reflux disease), Hyperlipidemia, Hypertension, and Thyroid disease.  She  has a past surgical history that includes cholecystectomy; carpal tunnel release; abdominal hysterectomy total; pr njx aa&/strd tfrml epi lumbar/sacral 1 level (Left, 8/12/2020); pr njx aa&/strd tfrml epi lumbar/sacral ea addl (Left, 8/12/2020); lumbar medial branch blocks (Left, 9/9/2020); laminotomy (1998); block epidural steroid injection (Left, 2/24/2021); lumbar transforaminal epidural steroid injection (Left, 5/20/2021); lumbar medial branch blocks (Left, 7/21/2021); radio frequency  "ablation additional level (Left, 11/11/2021); and pr inj lumbar/sacral,w/ imaging (N/A, 10/31/2022).       Objective:   Ht 1.6 m (5' 3\") Comment: patient reporeted  Wt 84.4 kg (186 lb) Comment: patient reported  LMP  (LMP Unknown)   BMI 32.95 kg/m²     Physical Exam:  Constitutional: Alert, no distress, well-groomed.  Skin: No rashes in visible areas.  Eye: Round. Conjunctiva clear, lids normal. No icterus.   ENMT: Lips pink without lesions, good dentition, moist mucous membranes. Phonation normal.  Neck: Moves freely without pain.  Respiratory: Unlabored respiratory effort, no cough or audible wheeze  Psych: Alert and oriented x3, normal affect and mood.       Assessment and Plan:   The following treatment plan was discussed:     1. Diabetes mellitus type 2 in obese (HCC)  Empagliflozin (JARDIANCE) 25 MG Tab    Continue medication regimen, follow-up in 3 months for A1c reevaluation      2. Vasomotor symptoms due to menopause  estradiol (ESTRACE) 1 MG Tab      3. Essential hypertension  lisinopril (PRINIVIL) 10 MG Tab      4. Situational anxiety        5. Benzodiazepine dependence (HCC)  clonazePAM (KLONOPIN) 0.5 MG Tab    clonazePAM (KLONOPIN) 1 MG Tab      6. Generalized anxiety disorder  clonazePAM (KLONOPIN) 0.5 MG Tab    clonazePAM (KLONOPIN) 1 MG Tab      7. Posttraumatic stress disorder, delayed onset  clonazePAM (KLONOPIN) 0.5 MG Tab    clonazePAM (KLONOPIN) 1 MG Tab      8. Type 2 diabetes mellitus with diabetic polyneuropathy, without long-term current use of insulin (HCC)            Situational anxiety  This is chronic condition.  Over the last several months patient has had an increase in situational anxiety due to social and financial situations.    Generalized anxiety disorder  This is a chronic condition. Patient reports that her anxiety has increased over the last several months due to social and financial constraints as the patients vehicle is not running. Patient is currently on clonazepam 1mg " every morning and clonazepam 0.5mg nightly she is also on buspar 20mg BID.     Discussed different options. Patient agrees that an increase in her clonazepam would not be beneficial.     Will increase buspar to 30mg BID. Patient to continue clonazepam 1mg every morning and 0.5mg nightly.     Obtained and reviewed patient utilization report from [facility] pharmacy database on 3/13/2023 9:03 AM  prior to writing prescription for controlled substance II, III or IV per [place] bill . Based on assessment of the report,my physical exam if necessary and the patient's health problem, the prescription is medically necessary.             Type 2 diabetes mellitus with diabetic polyneuropathy, without long-term current use of insulin (HCC)  This is a chronic condition.  Patient is stable on Jardiance 25 mg daily and metformin  mg twice daily.  Patient does have labs that she has not been able to complete yet.    Patient to continue Jardiance 25 mg daily and metformin  mg twice daily.        Follow-up: Return in about 3 months (around 6/13/2023) for follow up for anxiety.      I have placed the below orders and discussed them with an approved delegating provider.  The MA is performing the below orders under the direction of Dr. Rodriguez.    Please note that this dictation was created using voice recognition software. I have worked with consultants from the vendor as well as technical experts from [facility] [employer] to optimize the interface. I have made every reasonable attempt to correct obvious errors, but I expect that there are errors of grammar and possibly content that I did not discover before finalizing the note.

## 2023-03-13 NOTE — ASSESSMENT & PLAN NOTE
This is a chronic condition.  Patient is stable on Jardiance 25 mg daily and metformin  mg twice daily.  Patient does have labs that she has not been able to complete yet.    Patient to continue Jardiance 25 mg daily and metformin  mg twice daily.

## 2023-03-13 NOTE — ASSESSMENT & PLAN NOTE
This is a chronic condition. Patient reports that her anxiety has increased over the last several months due to social and financial constraints as the patients vehicle is not running. Patient is currently on clonazepam 1mg every morning and clonazepam 0.5mg nightly she is also on buspar 20mg BID.     Discussed different options. Patient agrees that an increase in her clonazepam would not be beneficial.     Will increase buspar to 30mg BID. Patient to continue clonazepam 1mg every morning and 0.5mg nightly.     Obtained and reviewed patient utilization report from [facility] [place] [facility] pharmacy database on 3/13/2023 9:03 AM  prior to writing prescription for controlled substance II, III or IV per [place] bill . Based on assessment of the report,my physical exam if necessary and the patient's health problem, the prescription is medically necessary.

## 2023-03-13 NOTE — ASSESSMENT & PLAN NOTE
This is chronic condition.  Over the last several months patient has had an increase in situational anxiety due to social and financial situations.

## 2023-03-15 ENCOUNTER — OFFICE VISIT (OUTPATIENT)
Dept: PHYSICAL MEDICINE AND REHAB | Facility: MEDICAL CENTER | Age: 68
End: 2023-03-15
Payer: MEDICARE

## 2023-03-15 VITALS
OXYGEN SATURATION: 93 % | WEIGHT: 188.8 LBS | SYSTOLIC BLOOD PRESSURE: 112 MMHG | TEMPERATURE: 95.8 F | DIASTOLIC BLOOD PRESSURE: 56 MMHG | BODY MASS INDEX: 33.45 KG/M2 | HEART RATE: 75 BPM | HEIGHT: 63 IN

## 2023-03-15 DIAGNOSIS — M54.2 CERVICALGIA: ICD-10-CM

## 2023-03-15 DIAGNOSIS — M51.36 DDD (DEGENERATIVE DISC DISEASE), LUMBAR: ICD-10-CM

## 2023-03-15 DIAGNOSIS — F13.20 BENZODIAZEPINE DEPENDENCE (HCC): ICD-10-CM

## 2023-03-15 DIAGNOSIS — M54.16 LEFT LUMBAR RADICULITIS: ICD-10-CM

## 2023-03-15 DIAGNOSIS — M96.1 POSTLAMINECTOMY SYNDROME: ICD-10-CM

## 2023-03-15 DIAGNOSIS — E66.9 OBESITY (BMI 30-39.9): ICD-10-CM

## 2023-03-15 DIAGNOSIS — M25.511 ACUTE PAIN OF RIGHT SHOULDER: ICD-10-CM

## 2023-03-15 DIAGNOSIS — F11.90 CHRONIC, CONTINUOUS USE OF OPIOIDS: ICD-10-CM

## 2023-03-15 PROCEDURE — 99214 OFFICE O/P EST MOD 30 MIN: CPT | Performed by: PHYSICAL MEDICINE & REHABILITATION

## 2023-03-15 RX ORDER — NALOXONE HYDROCHLORIDE 4 MG/.1ML
SPRAY NASAL
Qty: 1 EACH | Refills: 1 | Status: SHIPPED | OUTPATIENT
Start: 2023-03-15

## 2023-03-15 RX ORDER — MORPHINE SULFATE 15 MG/1
15 TABLET, FILM COATED, EXTENDED RELEASE ORAL EVERY 12 HOURS
Qty: 60 TABLET | Refills: 0 | Status: SHIPPED | OUTPATIENT
Start: 2023-03-23 | End: 2023-04-14 | Stop reason: SDUPTHER

## 2023-03-15 RX ORDER — HYDROCODONE BITARTRATE AND ACETAMINOPHEN 5; 325 MG/1; MG/1
1 TABLET ORAL EVERY 8 HOURS PRN
Qty: 60 TABLET | Refills: 0 | Status: SHIPPED | OUTPATIENT
Start: 2023-03-23 | End: 2023-04-14 | Stop reason: SDUPTHER

## 2023-03-15 ASSESSMENT — PAIN SCALES - GENERAL: PAINLEVEL: 9=SEVERE PAIN

## 2023-03-15 ASSESSMENT — PATIENT HEALTH QUESTIONNAIRE - PHQ9: CLINICAL INTERPRETATION OF PHQ2 SCORE: 0

## 2023-03-15 ASSESSMENT — FIBROSIS 4 INDEX: FIB4 SCORE: 1.89

## 2023-03-15 NOTE — H&P (VIEW-ONLY)
Follow-up patient note    Physiatry (physical medicine and  Rehabilitation), interventional spine and sports medicine    Date of Service: see Epic for DOS    Chief complaint:   Chief Complaint   Patient presents with    Follow-Up     Postlaminectomy syndrome       HISTORY    HPI: Grisel Mark 67 y.o. female who presents today for follow-up evaluation of low back and leg pain.     Grisel reports that she has had more pain.  Low back and bilateral leg pain, left greater that right.    She reports that she has been having pain in her right shoulder.  No particular neck pain.  No event started the symptoms.  No paresthesias in the right upper extremity.     Her pain has been worse and she reports that she took gummies about 3.5 weeks ago, recognized that she should not be taking these.  Reports that it was helpful, but she stopped taking them.    Medications are unchanged.  See below.    By history:  Patient has narcan.  She carries 1 in her purse and has been at home.    Medications:  Takes gabapentin 800mg taking 1.5 pills twice a day.  Her pain medications: Duloxetine 90mg.  MS Contin 15mg q12h.  Norco 5/325 for breakthrough, reports that she does not always take this, some days will take up to 3/day.   No side effects reported.  She is back to taking clonazepam 0.5mg at bedtime and 1mg during the day    Bowel movements have been regular, reports that she is eating better.  Taking miralax prn, but has not taken recently.  No stool softeners lately.  Unchanged         By history:  Her laminectomy was in 1998.  Previous injection on left L4 and L5 TFESI on 08/12/2020 helped with her leg pain.     Medical records review:  Dr. Francisco Olmos, plan to increase duloxetine 90mg daily, discontinue buspirone 20mg po bid, start buproprion XL 150mg daily, continue brexipiprazole 1mg qhs, hydroxyzine 25mg po tid prn, clonazepam 0.5mg daily prn    Review of records   Dr. Dheeraj De La Rosa:  08/20/2019 Right L3-4, L4-5, L5-S1  radiofrequency ablation    I reviewed the note from the referring provider Peter Bruce * dated 02/05/2020: She was seen to establish care, having just moved from [place].  She was seen for essential hypertension, mixed hyperlipidemia, DM2 in obese, hypothyroidism, recurrent MDD in partial depression/anxiety, GERD without esophagitis, chronic bilateral low back pain with bilateral sciatica.  Medications associated with the above were written, related labs ordered including CBC, CMP, Hb A1c, lipids, microalbumin, TSH, free thryoxine, referral to ps\ychiatry, referral to GI for colonoscopy and referral to pain clinic.    Previous treatments:    Physical Therapy: Yes    Medications the patient is tried: Narcotics, gabapentin and muscle relaxers    Previous interventions: [facility] [place] at [facility] these included right lumbar radiofrequency procedures    Previous surgeries to relieve the above pain:  Surgery on October 28, 1998      ROS:   ENT: ear infection, treated with augmentin  CV: irregular heart, reports history of tachycardia  Psych: depression since 1995  Heme: anemia  Endo: hypothyroidism, diabetes    Red Flags ROS:   Fever, Chills, Sweats: Denies  Involuntary Weight Loss: Denies  Bladder Incontinence: Denies  Bowel Incontinence: Denies  Saddle Anesthesia: Denies    All other systems reviewed and negative.       PMHx:   Past Medical History:   Diagnosis Date    Anxiety     Arrhythmia     Chronic back pain     Constipation     Depression     Diabetes (HCC)     Ear pain, left 9/23/2021    GERD (gastroesophageal reflux disease)     Hyperlipidemia     Hypertension     Thyroid disease        PSHx:   Past Surgical History:   Procedure Laterality Date    HI INJ LUMBAR/SACRAL,W/ IMAGING N/A 10/31/2022    Procedure: Caudal epidural steroid injection with catheter;  Surgeon: Paresh Ogden M.D.;  [facility];  Service: Pain Management    RADIO FREQUENCY ABLATION  ADDITIONAL LEVEL Left 11/11/2021    Procedure: LEFT lumbar three through lumbar five radiofrequency ablation;  Surgeon: Paresh Ogden M.D.;  Location: [facility];  Service: Pain Management    LUMBAR MEDIAL BRANCH BLOCKS Left 7/21/2021    Procedure: LEFT lumbar three through lumbar five medial branch blocks;  Surgeon: Paresh Ogden M.D.;  Location: [facility];  Service: Pain Management    LUMBAR TRANSFORAMINAL EPIDURAL STEROID INJECTION Left 5/20/2021    Procedure: LEFT lumbar four and lumbar five transforaminal epidural steroid injection;  Surgeon: Paresh Ogden M.D.;  Location: [facility];  Service: Pain Management    BLOCK EPIDURAL STEROID INJECTION Left 2/24/2021    Procedure: INJECTION, STEROID, EPIDURAL;  Surgeon: Paresh Ogden M.D.;  Location: [facility];  Service: Pain Management    LUMBAR MEDIAL BRANCH BLOCKS Left 9/9/2020    Procedure: BLOCK, NERVE, SPINAL, LUMBAR, POSTERIOR RAMUS, MEDIAL BRANCH;  Surgeon: Paresh Ogden M.D.;  Location: [facility];  Service: Pain Management    WI NJX AA&/STRD TFRML EPI LUMBAR/SACRAL 1 LEVEL Left 8/12/2020    Procedure: INJECTION, SPINE, LUMBOSACRAL, EPIDURAL, 1 LEVEL, TRANSFORAMINAL APPROACH;  Surgeon: Paresh Ogden M.D.;  Location: [facility];  Service: Pain Management    WI NJX AA&/STRD TFRML EPI LUMBAR/SACRAL EA ADDL Left 8/12/2020    Procedure: INJECTION, SPINE, LUMBOSACRAL, EPIDURAL, TRANSFORAMINAL APPROACH;  Surgeon: Paresh Ogden M.D.;  Location: [facility];  Service: Pain Management    LAMINOTOMY  1998    ABDOMINAL HYSTERECTOMY TOTAL      CARPAL TUNNEL RELEASE      CHOLECYSTECTOMY         Family history   Family History   Problem Relation Age of Onset    Cancer Mother     Stroke Father         Heart attack    Dementia Sister     Stroke Brother         heart Attack    Cancer Brother         Skin    Diabetes Brother      Kidney Disease Brother     Cancer Brother     Parkinson's Disease Sister     No Known Problems Sister     Diabetes Daughter     Hypertension Daughter          Medications:   Current Outpatient Medications   Medication    [START ON 3/23/2023] morphine ER (MS CONTIN) 15 MG Tab CR tablet    [START ON 3/23/2023] HYDROcodone-acetaminophen (NORCO) 5-325 MG Tab per tablet    Naloxone (NARCAN) 4 MG/0.1ML Liquid    busPIRone (BUSPAR) 30 MG tablet    Empagliflozin (JARDIANCE) 25 MG Tab    estradiol (ESTRACE) 1 MG Tab    lisinopril (PRINIVIL) 10 MG Tab    [START ON 2023] clonazePAM (KLONOPIN) 0.5 MG Tab    [START ON 2023] clonazePAM (KLONOPIN) 1 MG Tab    gabapentin (NEURONTIN) 800 MG tablet    meloxicam (MOBIC) 15 MG tablet    albuterol 108 (90 Base) MCG/ACT Aero Soln inhalation aerosol    metFORMIN ER (GLUCOPHAGE XR) 500 MG TABLET SR 24 HR    metoprolol tartrate (LOPRESSOR) 25 MG Tab    clonazePAM (KLONOPIN) 0.5 MG Tab    clonazePAM (KLONOPIN) 1 MG Tab    cyclobenzaprine (FLEXERIL) 5 mg tablet    levothyroxine (SYNTHROID) 50 MCG Tab    rosuvastatin (CRESTOR) 10 MG Tab    omeprazole (PRILOSEC) 40 MG delayed-release capsule    DULoxetine (CYMBALTA) 30 MG Cap DR Particles    pseudoephedrine (SUDAFED) 30 MG Tab    Blood Glucose Meter Kit    Lancets    Alcohol Swabs    fluticasone (FLONASE) 50 MCG/ACT nasal spray    cyanocobalamin (VITAMIN B-12) 500 MCG Tab    Blood Glucose Test Strips    Brexpiprazole (REXULTI) 2 MG Tab     No current facility-administered medications for this visit.       Allergies:   No Known Allergies    Social Hx:   Social History     Socioeconomic History    Marital status:      Spouse name: Not on file    Number of children: Not on file    Years of education: Not on file    Highest education level: Not on file   Occupational History    Not on file   Tobacco Use    Smoking status: Every Day     Packs/day: 0.25     Types: Cigarettes     Last attempt to quit:      Years since quittin.2  "   Smokeless tobacco: Never   Vaping Use    Vaping Use: Never used   Substance and Sexual Activity    Alcohol use: Not Currently     Comment: rare    Drug use: Not Currently     Types: Marijuana    Sexual activity: Not Currently   Other Topics Concern     Service No    Blood Transfusions Yes    Caffeine Concern No    Occupational Exposure No    Hobby Hazards No    Sleep Concern Yes    Stress Concern Yes    Weight Concern Yes    Special Diet No    Back Care No    Exercise Yes    Bike Helmet No    Seat Belt Yes    Self-Exams Yes   Social History Narrative    Not on file     Social Determinants of Health     Financial Resource Strain: Not on file   Food Insecurity: Not on file   Transportation Needs: Not on file   Physical Activity: Not on file   Stress: Not on file   Social Connections: Not on file   Intimate Partner Violence: Not on file   Housing Stability: Not on file         EXAMINATION     Physical Exam:   Vitals: /56 (BP Location: Left arm, Patient Position: Sitting, BP Cuff Size: Adult)   Pulse 75   Temp (!) 35.4 °C (95.8 °F) (Temporal)   Ht 1.6 m (5' 3\")   Wt 85.6 kg (188 lb 12.8 oz)   SpO2 93%     Constitutional:   Body Habitus: Body mass index is 33.44 kg/m².  Cooperation: Fully cooperates with exam  Appearance: Well-groomed, well-nourished, not disheveled, no acute distress    Eyes: No scleral icterus, no proptosis     ENT -no obvious auditory deficits, wearing a facial mask    Skin -no rashes or lesions noted    Respiratory-  breathing comfortable on room air, no audible wheezing    Cardiovascular- no lower extremity edema is noted.     Psychiatric- alert and oriented ×3. Normal affect.     Gait - mildly antalgic, using single point cane.  No loss of balance    Musculoskeletal -     Cervical spine  Functional ROM of the cervical spine.  Spurling's on the right causes pain that radiates to the right, negative on the left  Functional ROM of the left shoulder.  Right shoulder with " functional ROM, pain in the last 30 degrees of abduction.  Hawkin's test is positive on the right.  Functional internal ROM.  Neer's is negative bilaterally  Empty can test is negative bilaterally      Thoracic/Lumbar Spine/Sacral Spine/Hips   Inspection: no evidence of atrophy in bilateral lower extremities throughout     ROM of the lumbar spine decreased.     Palpation: tenderness to palpation lumbar paraspinals minimal    Neuro     Key points for the international standards for neurological classification of spinal cord injury (ISNCSCI) to light touch.     No sensory deficits in C5-T2 bilaterally    Dermatome R L   L2 2 2   L3 2 2   L4 2 1   L5 1 1   S1 2 1   S2 2 2     Motor Exam Upper Extremities    No focal motor deficits in C5-T1 bilaterally    Motor Exam Lower Extremities    ? Myotome R L   Hip flexion L2 5 5   Knee extension L3 5 5   Ankle dorsiflexion L4 5 5   Toe extension L5 5 5   Ankle plantarflexion S1 5 5       Reflexes  ?  R L   Patella  2+ 2+   Achilles   2+ 1+     No clonus bilaterally    MEDICAL DECISION MAKING    Medical records review: see under HPI section.     DATA    Labs:   08/10/2022: UDS positive for morphine and negative for metabolites, positive for hydrocodone, positive for clonazepam  03/18/2022: UDS positive for morphine and negative for metabolites, positive for hydrocodone, positive for clonazepam  01/18/2022: UDS positive for morphine and negative for metabolites, absent hydrocodone, positive for clonazepam  10/20/2021: UDS positive for morphine and metabolites, clonzapem, low amount of THC noted  05/08/2021: UDS positive for morphine and metabolites, clonazepam and metabolites, hydrocodone and metabolites  08/18/2020: UDS positive for morphine and metabolites, clonazepam and metabolites, hydrocodone and metabolites  04/24/2020 UDS positive for morphine and metabolites, clonazepam  04/09/2020 Vitamin D, 25:  28L  04/09/2020 Vitamin B12: 217    Lab Results   Component Value  Date/Time    SODIUM 139 05/05/2022 12:18 PM    POTASSIUM 4.8 05/05/2022 12:18 PM    CHLORIDE 104 05/05/2022 12:18 PM    CO2 24 05/05/2022 12:18 PM    ANION 11.0 05/05/2022 12:18 PM    GLUCOSE 194 (H) 05/05/2022 12:18 PM    BUN 13 05/05/2022 12:18 PM    CREATININE 0.70 05/05/2022 12:18 PM    CALCIUM 9.8 05/05/2022 12:18 PM    ASTSGOT 28 05/05/2022 12:18 PM    ALTSGPT 28 05/05/2022 12:18 PM    TBILIRUBIN 0.3 05/05/2022 12:18 PM    ALBUMIN 4.4 05/05/2022 12:18 PM    TOTPROTEIN 7.1 05/05/2022 12:18 PM    GLOBULIN 2.7 05/05/2022 12:18 PM    AGRATIO 1.6 05/05/2022 12:18 PM       No results found for: PROTHROMBTM, INR     Lab Results   Component Value Date/Time    WBC 7.5 05/04/2021 08:06 AM    RBC 4.73 05/04/2021 08:06 AM    HEMOGLOBIN 14.6 05/04/2021 08:06 AM    HEMATOCRIT 43.8 05/04/2021 08:06 AM    MCV 92.6 05/04/2021 08:06 AM    MCH 30.9 05/04/2021 08:06 AM    MCHC 33.3 (L) 05/04/2021 08:06 AM    MPV 10.6 05/04/2021 08:06 AM    NEUTSPOLYS 46.60 05/04/2021 08:06 AM    LYMPHOCYTES 39.80 05/04/2021 08:06 AM    MONOCYTES 9.30 05/04/2021 08:06 AM    EOSINOPHILS 3.70 05/04/2021 08:06 AM    BASOPHILS 0.50 05/04/2021 08:06 AM        Lab Results   Component Value Date/Time    HBA1C 6.9 (A) 09/19/2022 09:00 AM        Imaging: I personally reviewed following images, these are my reads:     MRI lumbar spine 05/05/2020  At L1-2, no central or foraminal stenosis  At L2-3, no central or foraminal stenosis  At L3-4, mild disc bulge and mild ligamentum flavum thickening.  No central canal stenosis. Borderline left foraminal stenosis. Schmorl's node.   At L4-5, diffuse disc bulge with mild ligamentum flavum thickening.  No central canal stenosis.  There is facet arthropathy, left greater than right. Mild foraminal stenosis bilaterally. S/P left L4/5 hemilaminectomy  At L5-S1, there is mild-borderline foraminal stenosis with facet arthropathy and mild disc bulge.  No central canal stenosis    Xray lumbar spine 05/05/2020  There is mild  decreased joint space at L3-4 and L4-5.    Facet arthropathy is noted, greatest at L5-S1 bilaterally.  No significant motion on flexion and extension films    IMAGING radiology reads. I reviewed the following radiology reads    Xray abdomen 07/17/2020  IMPRESSION:     Nonspecific bowel gas pattern. No evidence small bowel obstruction.      MRI lumbar spine 05/05/2020  IMPRESSION:     1.  Caudal lumbar facet arthropathy left worse than right with no bony destruction to indicate septic or inflammatory arthropathy. This most likely is just degenerative  2.  Mild caudal lumbar foraminal stenoses  3.  No significant central stenosis.  4.  Left L4/5 hemilaminectomy                                                                                   Xray lumbar spine 05/05/2020     IMPRESSION:     1.  No acute lumbar compression fracture or subluxation.  2.  Posterior disc space narrowing at L4-5 and to lesser extent at L3-4.  3.  Bilateral facet arthropathy at L5-S1.                                                                                             Diagnosis   Visit Diagnoses     ICD-10-CM   1. Postlaminectomy syndrome  M96.1   2. DDD (degenerative disc disease), lumbar  M51.36   3. Benzodiazepine dependence (HCC)  F13.20   4. Chronic, continuous use of opioids  F11.90   5. Obesity (BMI 30-39.9)  E66.9   6. Cervicalgia  M54.2   7. Acute pain of right shoulder  M25.511   8. Left lumbar radiculitis  M54.16                 ASSESSMENT:  Grisel Mark 67 y.o. female seen for above     Grisel was seen today for follow-up.    Diagnoses and all orders for this visit:    Postlaminectomy syndrome  -     morphine ER (MS CONTIN) 15 MG Tab CR tablet; Take 1 Tablet by mouth every 12 hours for 30 days.  -     HYDROcodone-acetaminophen (NORCO) 5-325 MG Tab per tablet; Take 1 Tablet by mouth every 8 hours as needed (severe pain) for up to 30 days.  -     Controlled Substance Treatment Agreement    DDD (degenerative disc disease),  lumbar    Benzodiazepine dependence (HCC)    Chronic, continuous use of opioids  -     Pain Management Screen  -     Naloxone (NARCAN) 4 MG/0.1ML Liquid; One spray in one nostril for overdose and call 911.    Obesity (BMI 30-39.9)    Cervicalgia  -     DX-CERVICAL SPINE-2 OR 3 VIEWS; Future    Acute pain of right shoulder  -     DX-SHOULDER 2+ LEFT; Future    Left lumbar radiculitis  -     morphine ER (MS CONTIN) 15 MG Tab CR tablet; Take 1 Tablet by mouth every 12 hours for 30 days.  -     HYDROcodone-acetaminophen (NORCO) 5-325 MG Tab per tablet; Take 1 Tablet by mouth every 8 hours as needed (severe pain) for up to 30 days.  -     Consent for Opiate Prescription  -     Controlled Substance Treatment Agreement        She reports that due to transportation issues, she was not able to get the UDS done.  Ordered today.  Discussed that she will not use gummies given the combination of medications that she is taking that includes opioids and chronic benzodiazepines.   reviewed.  Medications reviewed.  Plan to refill for the next month.  Consents obtained.  Plan for repeat caudal epidural steroid injection.  The risks benefits and alternatives to this procedure were discussed and the patient wishes to proceed with the procedure. Risks include but are not limited to damage to surrounding structures, infection, bleeding, worsening of pain which can be permanent, weakness which can be permanent. Benefits include pain relief, improved function. Alternatives includes not doing the procedure.    Reprinted referral for behavioral health.  She will call and follow-up with this.  Discussed getting xrays of the cervical spine and right shoulder.  We reviewed exercises to maintain ROM of the right shoulder.  She declines physical therapy for now, we will revisit this if symptoms persist and due to transportation issues.      Follow-up: Return for Hospital injection.    Thank you very much for asking me to participate in Grisel  Jas's care.  Please contact me with any questions or concerns.    Please note that this dictation was created using voice recognition software. I have made every reasonable attempt to correct obvious errors but there may be errors of grammar and content that I may have overlooked prior to finalization of this note.      Paresh Ogden MD  Physical Medicine and Rehabilitation  Interventional Spine and Sports Physiatry  [facility]

## 2023-03-15 NOTE — PROGRESS NOTES
Follow-up patient note    Physiatry (physical medicine and  Rehabilitation), interventional spine and sports medicine    Date of Service: see Epic for DOS    Chief complaint:   Chief Complaint   Patient presents with    Follow-Up     Postlaminectomy syndrome       HISTORY    HPI: Grisel Mark 67 y.o. female who presents today for follow-up evaluation of low back and leg pain.     Grisel reports that she has had more pain.  Low back and bilateral leg pain, left greater that right.    She reports that she has been having pain in her right shoulder.  No particular neck pain.  No event started the symptoms.  No paresthesias in the right upper extremity.     Her pain has been worse and she reports that she took gummies about 3.5 weeks ago, recognized that she should not be taking these.  Reports that it was helpful, but she stopped taking them.    Medications are unchanged.  See below.    By history:  Patient has narcan.  She carries 1 in her purse and has been at home.    Medications:  Takes gabapentin 800mg taking 1.5 pills twice a day.  Her pain medications: Duloxetine 90mg.  MS Contin 15mg q12h.  Norco 5/325 for breakthrough, reports that she does not always take this, some days will take up to 3/day.   No side effects reported.  She is back to taking clonazepam 0.5mg at bedtime and 1mg during the day    Bowel movements have been regular, reports that she is eating better.  Taking miralax prn, but has not taken recently.  No stool softeners lately.  Unchanged         By history:  Her laminectomy was in 1998.  Previous injection on left L4 and L5 TFESI on 08/12/2020 helped with her leg pain.     Medical records review:  Dr. Francisco Olmos, plan to increase duloxetine 90mg daily, discontinue buspirone 20mg po bid, start buproprion XL 150mg daily, continue brexipiprazole 1mg qhs, hydroxyzine 25mg po tid prn, clonazepam 0.5mg daily prn    Review of records   Dr. Dheeraj De La Rosa:  08/20/2019 Right L3-4, L4-5, L5-S1  radiofrequency ablation    I reviewed the note from the referring provider Peter Bruce * dated 02/05/2020: She was seen to establish care, having just moved from [place].  She was seen for essential hypertension, mixed hyperlipidemia, DM2 in obese, hypothyroidism, recurrent MDD in partial depression/anxiety, GERD without esophagitis, chronic bilateral low back pain with bilateral sciatica.  Medications associated with the above were written, related labs ordered including CBC, CMP, Hb A1c, lipids, microalbumin, TSH, free thryoxine, referral to ps\ychiatry, referral to GI for colonoscopy and referral to pain clinic.    Previous treatments:    Physical Therapy: Yes    Medications the patient is tried: Narcotics, gabapentin and muscle relaxers    Previous interventions: [facility] these included right lumbar radiofrequency procedures    Previous surgeries to relieve the above pain:  Surgery on October 28, 1998      ROS:   ENT: ear infection, treated with augmentin  CV: irregular heart, reports history of tachycardia  Psych: depression since 1995  Heme: anemia  Endo: hypothyroidism, diabetes    Red Flags ROS:   Fever, Chills, Sweats: Denies  Involuntary Weight Loss: Denies  Bladder Incontinence: Denies  Bowel Incontinence: Denies  Saddle Anesthesia: Denies    All other systems reviewed and negative.       PMHx:   Past Medical History:   Diagnosis Date    Anxiety     Arrhythmia     Chronic back pain     Constipation     Depression     Diabetes (HCC)     Ear pain, left 9/23/2021    GERD (gastroesophageal reflux disease)     Hyperlipidemia     Hypertension     Thyroid disease        PSHx:   Past Surgical History:   Procedure Laterality Date    NM INJ LUMBAR/SACRAL,W/ IMAGING N/A 10/31/2022    Procedure: Caudal epidural steroid injection with catheter;  Surgeon: Paresh Ogden M.D.;  [facility];  Service: Pain Management    RADIO FREQUENCY ABLATION  ADDITIONAL LEVEL Left 11/11/2021    Procedure: LEFT lumbar three through lumbar five radiofrequency ablation;  Surgeon: Paresh Ogden M.D.;  Location: [facility];  Service: Pain Management    LUMBAR MEDIAL BRANCH BLOCKS Left 7/21/2021    Procedure: LEFT lumbar three through lumbar five medial branch blocks;  Surgeon: Paresh Ogden M.D.;  Location: [facility];  Service: Pain Management    LUMBAR TRANSFORAMINAL EPIDURAL STEROID INJECTION Left 5/20/2021    Procedure: LEFT lumbar four and lumbar five transforaminal epidural steroid injection;  Surgeon: Paresh Ogden M.D.;  Location: [facility];  Service: Pain Management    BLOCK EPIDURAL STEROID INJECTION Left 2/24/2021    Procedure: INJECTION, STEROID, EPIDURAL;  Surgeon: Paresh Ogden M.D.;  Location: [facility];  Service: Pain Management    LUMBAR MEDIAL BRANCH BLOCKS Left 9/9/2020    Procedure: BLOCK, NERVE, SPINAL, LUMBAR, POSTERIOR RAMUS, MEDIAL BRANCH;  Surgeon: Paresh Ogden M.D.;  Location: [facility];  Service: Pain Management    MT NJX AA&/STRD TFRML EPI LUMBAR/SACRAL 1 LEVEL Left 8/12/2020    Procedure: INJECTION, SPINE, LUMBOSACRAL, EPIDURAL, 1 LEVEL, TRANSFORAMINAL APPROACH;  Surgeon: Paresh Ogden M.D.;  Location: [facility];  Service: Pain Management    MT NJX AA&/STRD TFRML EPI LUMBAR/SACRAL EA ADDL Left 8/12/2020    Procedure: INJECTION, SPINE, LUMBOSACRAL, EPIDURAL, TRANSFORAMINAL APPROACH;  Surgeon: Paresh Ogden M.D.;  Location: [facility];  Service: Pain Management    LAMINOTOMY  1998    ABDOMINAL HYSTERECTOMY TOTAL      CARPAL TUNNEL RELEASE      CHOLECYSTECTOMY         Family history   Family History   Problem Relation Age of Onset    Cancer Mother     Stroke Father         Heart attack    Dementia Sister     Stroke Brother         heart Attack    Cancer Brother         Skin    Diabetes Brother      Kidney Disease Brother     Cancer Brother     Parkinson's Disease Sister     No Known Problems Sister     Diabetes Daughter     Hypertension Daughter          Medications:   Current Outpatient Medications   Medication    [START ON 3/23/2023] morphine ER (MS CONTIN) 15 MG Tab CR tablet    [START ON 3/23/2023] HYDROcodone-acetaminophen (NORCO) 5-325 MG Tab per tablet    Naloxone (NARCAN) 4 MG/0.1ML Liquid    busPIRone (BUSPAR) 30 MG tablet    Empagliflozin (JARDIANCE) 25 MG Tab    estradiol (ESTRACE) 1 MG Tab    lisinopril (PRINIVIL) 10 MG Tab    [START ON 2023] clonazePAM (KLONOPIN) 0.5 MG Tab    [START ON 2023] clonazePAM (KLONOPIN) 1 MG Tab    gabapentin (NEURONTIN) 800 MG tablet    meloxicam (MOBIC) 15 MG tablet    albuterol 108 (90 Base) MCG/ACT Aero Soln inhalation aerosol    metFORMIN ER (GLUCOPHAGE XR) 500 MG TABLET SR 24 HR    metoprolol tartrate (LOPRESSOR) 25 MG Tab    clonazePAM (KLONOPIN) 0.5 MG Tab    clonazePAM (KLONOPIN) 1 MG Tab    cyclobenzaprine (FLEXERIL) 5 mg tablet    levothyroxine (SYNTHROID) 50 MCG Tab    rosuvastatin (CRESTOR) 10 MG Tab    omeprazole (PRILOSEC) 40 MG delayed-release capsule    DULoxetine (CYMBALTA) 30 MG Cap DR Particles    pseudoephedrine (SUDAFED) 30 MG Tab    Blood Glucose Meter Kit    Lancets    Alcohol Swabs    fluticasone (FLONASE) 50 MCG/ACT nasal spray    cyanocobalamin (VITAMIN B-12) 500 MCG Tab    Blood Glucose Test Strips    Brexpiprazole (REXULTI) 2 MG Tab     No current facility-administered medications for this visit.       Allergies:   No Known Allergies    Social Hx:   Social History     Socioeconomic History    Marital status:      Spouse name: Not on file    Number of children: Not on file    Years of education: Not on file    Highest education level: Not on file   Occupational History    Not on file   Tobacco Use    Smoking status: Every Day     Packs/day: 0.25     Types: Cigarettes     Last attempt to quit:      Years since quittin.2  "   Smokeless tobacco: Never   Vaping Use    Vaping Use: Never used   Substance and Sexual Activity    Alcohol use: Not Currently     Comment: rare    Drug use: Not Currently     Types: Marijuana    Sexual activity: Not Currently   Other Topics Concern     Service No    Blood Transfusions Yes    Caffeine Concern No    Occupational Exposure No    Hobby Hazards No    Sleep Concern Yes    Stress Concern Yes    Weight Concern Yes    Special Diet No    Back Care No    Exercise Yes    Bike Helmet No    Seat Belt Yes    Self-Exams Yes   Social History Narrative    Not on file     Social Determinants of Health     Financial Resource Strain: Not on file   Food Insecurity: Not on file   Transportation Needs: Not on file   Physical Activity: Not on file   Stress: Not on file   Social Connections: Not on file   Intimate Partner Violence: Not on file   Housing Stability: Not on file         EXAMINATION     Physical Exam:   Vitals: /56 (BP Location: Left arm, Patient Position: Sitting, BP Cuff Size: Adult)   Pulse 75   Temp (!) 35.4 °C (95.8 °F) (Temporal)   Ht 1.6 m (5' 3\")   Wt 85.6 kg (188 lb 12.8 oz)   SpO2 93%     Constitutional:   Body Habitus: Body mass index is 33.44 kg/m².  Cooperation: Fully cooperates with exam  Appearance: Well-groomed, well-nourished, not disheveled, no acute distress    Eyes: No scleral icterus, no proptosis     ENT -no obvious auditory deficits, wearing a facial mask    Skin -no rashes or lesions noted    Respiratory-  breathing comfortable on room air, no audible wheezing    Cardiovascular- no lower extremity edema is noted.     Psychiatric- alert and oriented ×3. Normal affect.     Gait - mildly antalgic, using single point cane.  No loss of balance    Musculoskeletal -     Cervical spine  Functional ROM of the cervical spine.  Spurling's on the right causes pain that radiates to the right, negative on the left  Functional ROM of the left shoulder.  Right shoulder with " functional ROM, pain in the last 30 degrees of abduction.  Hawkin's test is positive on the right.  Functional internal ROM.  Neer's is negative bilaterally  Empty can test is negative bilaterally      Thoracic/Lumbar Spine/Sacral Spine/Hips   Inspection: no evidence of atrophy in bilateral lower extremities throughout     ROM of the lumbar spine decreased.     Palpation: tenderness to palpation lumbar paraspinals minimal    Neuro     Key points for the international standards for neurological classification of spinal cord injury (ISNCSCI) to light touch.     No sensory deficits in C5-T2 bilaterally    Dermatome R L   L2 2 2   L3 2 2   L4 2 1   L5 1 1   S1 2 1   S2 2 2     Motor Exam Upper Extremities    No focal motor deficits in C5-T1 bilaterally    Motor Exam Lower Extremities    ? Myotome R L   Hip flexion L2 5 5   Knee extension L3 5 5   Ankle dorsiflexion L4 5 5   Toe extension L5 5 5   Ankle plantarflexion S1 5 5       Reflexes  ?  R L   Patella  2+ 2+   Achilles   2+ 1+     No clonus bilaterally    MEDICAL DECISION MAKING    Medical records review: see under HPI section.     DATA    Labs:   08/10/2022: UDS positive for morphine and negative for metabolites, positive for hydrocodone, positive for clonazepam  03/18/2022: UDS positive for morphine and negative for metabolites, positive for hydrocodone, positive for clonazepam  01/18/2022: UDS positive for morphine and negative for metabolites, absent hydrocodone, positive for clonazepam  10/20/2021: UDS positive for morphine and metabolites, clonzapem, low amount of THC noted  05/08/2021: UDS positive for morphine and metabolites, clonazepam and metabolites, hydrocodone and metabolites  08/18/2020: UDS positive for morphine and metabolites, clonazepam and metabolites, hydrocodone and metabolites  04/24/2020 UDS positive for morphine and metabolites, clonazepam  04/09/2020 Vitamin D, 25:  28L  04/09/2020 Vitamin B12: 217    Lab Results   Component Value  Date/Time    SODIUM 139 05/05/2022 12:18 PM    POTASSIUM 4.8 05/05/2022 12:18 PM    CHLORIDE 104 05/05/2022 12:18 PM    CO2 24 05/05/2022 12:18 PM    ANION 11.0 05/05/2022 12:18 PM    GLUCOSE 194 (H) 05/05/2022 12:18 PM    BUN 13 05/05/2022 12:18 PM    CREATININE 0.70 05/05/2022 12:18 PM    CALCIUM 9.8 05/05/2022 12:18 PM    ASTSGOT 28 05/05/2022 12:18 PM    ALTSGPT 28 05/05/2022 12:18 PM    TBILIRUBIN 0.3 05/05/2022 12:18 PM    ALBUMIN 4.4 05/05/2022 12:18 PM    TOTPROTEIN 7.1 05/05/2022 12:18 PM    GLOBULIN 2.7 05/05/2022 12:18 PM    AGRATIO 1.6 05/05/2022 12:18 PM       No results found for: PROTHROMBTM, INR     Lab Results   Component Value Date/Time    WBC 7.5 05/04/2021 08:06 AM    RBC 4.73 05/04/2021 08:06 AM    HEMOGLOBIN 14.6 05/04/2021 08:06 AM    HEMATOCRIT 43.8 05/04/2021 08:06 AM    MCV 92.6 05/04/2021 08:06 AM    MCH 30.9 05/04/2021 08:06 AM    MCHC 33.3 (L) 05/04/2021 08:06 AM    MPV 10.6 05/04/2021 08:06 AM    NEUTSPOLYS 46.60 05/04/2021 08:06 AM    LYMPHOCYTES 39.80 05/04/2021 08:06 AM    MONOCYTES 9.30 05/04/2021 08:06 AM    EOSINOPHILS 3.70 05/04/2021 08:06 AM    BASOPHILS 0.50 05/04/2021 08:06 AM        Lab Results   Component Value Date/Time    HBA1C 6.9 (A) 09/19/2022 09:00 AM        Imaging: I personally reviewed following images, these are my reads:     MRI lumbar spine 05/05/2020  At L1-2, no central or foraminal stenosis  At L2-3, no central or foraminal stenosis  At L3-4, mild disc bulge and mild ligamentum flavum thickening.  No central canal stenosis. Borderline left foraminal stenosis. Schmorl's node.   At L4-5, diffuse disc bulge with mild ligamentum flavum thickening.  No central canal stenosis.  There is facet arthropathy, left greater than right. Mild foraminal stenosis bilaterally. S/P left L4/5 hemilaminectomy  At L5-S1, there is mild-borderline foraminal stenosis with facet arthropathy and mild disc bulge.  No central canal stenosis    Xray lumbar spine 05/05/2020  There is mild  decreased joint space at L3-4 and L4-5.    Facet arthropathy is noted, greatest at L5-S1 bilaterally.  No significant motion on flexion and extension films    IMAGING radiology reads. I reviewed the following radiology reads    Xray abdomen 07/17/2020  IMPRESSION:     Nonspecific bowel gas pattern. No evidence small bowel obstruction.      MRI lumbar spine 05/05/2020  IMPRESSION:     1.  Caudal lumbar facet arthropathy left worse than right with no bony destruction to indicate septic or inflammatory arthropathy. This most likely is just degenerative  2.  Mild caudal lumbar foraminal stenoses  3.  No significant central stenosis.  4.  Left L4/5 hemilaminectomy                                                                                   Xray lumbar spine 05/05/2020     IMPRESSION:     1.  No acute lumbar compression fracture or subluxation.  2.  Posterior disc space narrowing at L4-5 and to lesser extent at L3-4.  3.  Bilateral facet arthropathy at L5-S1.                                                                                             Diagnosis   Visit Diagnoses     ICD-10-CM   1. Postlaminectomy syndrome  M96.1   2. DDD (degenerative disc disease), lumbar  M51.36   3. Benzodiazepine dependence (HCC)  F13.20   4. Chronic, continuous use of opioids  F11.90   5. Obesity (BMI 30-39.9)  E66.9   6. Cervicalgia  M54.2   7. Acute pain of right shoulder  M25.511   8. Left lumbar radiculitis  M54.16                 ASSESSMENT:  Grisel Mark 67 y.o. female seen for above     Grisel was seen today for follow-up.    Diagnoses and all orders for this visit:    Postlaminectomy syndrome  -     morphine ER (MS CONTIN) 15 MG Tab CR tablet; Take 1 Tablet by mouth every 12 hours for 30 days.  -     HYDROcodone-acetaminophen (NORCO) 5-325 MG Tab per tablet; Take 1 Tablet by mouth every 8 hours as needed (severe pain) for up to 30 days.  -     Controlled Substance Treatment Agreement    DDD (degenerative disc disease),  lumbar    Benzodiazepine dependence (HCC)    Chronic, continuous use of opioids  -     Pain Management Screen  -     Naloxone (NARCAN) 4 MG/0.1ML Liquid; One spray in one nostril for overdose and call 911.    Obesity (BMI 30-39.9)    Cervicalgia  -     DX-CERVICAL SPINE-2 OR 3 VIEWS; Future    Acute pain of right shoulder  -     DX-SHOULDER 2+ LEFT; Future    Left lumbar radiculitis  -     morphine ER (MS CONTIN) 15 MG Tab CR tablet; Take 1 Tablet by mouth every 12 hours for 30 days.  -     HYDROcodone-acetaminophen (NORCO) 5-325 MG Tab per tablet; Take 1 Tablet by mouth every 8 hours as needed (severe pain) for up to 30 days.  -     Consent for Opiate Prescription  -     Controlled Substance Treatment Agreement        She reports that due to transportation issues, she was not able to get the UDS done.  Ordered today.  Discussed that she will not use gummies given the combination of medications that she is taking that includes opioids and chronic benzodiazepines.   reviewed.  Medications reviewed.  Plan to refill for the next month.  Consents obtained.  Plan for repeat caudal epidural steroid injection.  The risks benefits and alternatives to this procedure were discussed and the patient wishes to proceed with the procedure. Risks include but are not limited to damage to surrounding structures, infection, bleeding, worsening of pain which can be permanent, weakness which can be permanent. Benefits include pain relief, improved function. Alternatives includes not doing the procedure.    Reprinted referral for behavioral health.  She will call and follow-up with this.  Discussed getting xrays of the cervical spine and right shoulder.  We reviewed exercises to maintain ROM of the right shoulder.  She declines physical therapy for now, we will revisit this if symptoms persist and due to transportation issues.      Follow-up: Return for Hospital injection.    Thank you very much for asking me to participate in Grisel  Jas's care.  Please contact me with any questions or concerns.    Please note that this dictation was created using voice recognition software. I have made every reasonable attempt to correct obvious errors but there may be errors of grammar and content that I may have overlooked prior to finalization of this note.      Paresh Ogden MD  Physical Medicine and Rehabilitation  Interventional Spine and Sports Physiatry  [facility]

## 2023-03-17 ENCOUNTER — APPOINTMENT (OUTPATIENT)
Dept: RADIOLOGY | Facility: MEDICAL CENTER | Age: 68
End: 2023-03-17
Attending: NURSE PRACTITIONER
Payer: MEDICARE

## 2023-03-21 ENCOUNTER — TELEPHONE (OUTPATIENT)
Dept: PHYSICAL MEDICINE AND REHAB | Facility: MEDICAL CENTER | Age: 68
End: 2023-03-21
Payer: MEDICARE

## 2023-03-21 ENCOUNTER — APPOINTMENT (OUTPATIENT)
Dept: RADIOLOGY | Facility: MEDICAL CENTER | Age: 68
End: 2023-03-21
Attending: NURSE PRACTITIONER
Payer: MEDICARE

## 2023-03-21 NOTE — TELEPHONE ENCOUNTER
VOICEMAIL  1. Caller Name: Grisel Ronel Davidson                      Call Back Number: [phone] (home)     2. Message: Patient wants to know time of procedure on thurs    3. Patient approves office to leave a detailed voicemail/MyChart message: N\A

## 2023-03-23 ENCOUNTER — HOSPITAL ENCOUNTER (OUTPATIENT)
Facility: REHABILITATION | Age: 68
End: 2023-03-23
Attending: PHYSICAL MEDICINE & REHABILITATION | Admitting: PHYSICAL MEDICINE & REHABILITATION
Payer: MEDICARE

## 2023-03-23 ENCOUNTER — APPOINTMENT (OUTPATIENT)
Dept: RADIOLOGY | Facility: REHABILITATION | Age: 68
End: 2023-03-23
Attending: PHYSICAL MEDICINE & REHABILITATION
Payer: MEDICARE

## 2023-03-23 VITALS
SYSTOLIC BLOOD PRESSURE: 135 MMHG | OXYGEN SATURATION: 94 % | WEIGHT: 191.14 LBS | BODY MASS INDEX: 33.86 KG/M2 | RESPIRATION RATE: 16 BRPM | TEMPERATURE: 97.6 F | DIASTOLIC BLOOD PRESSURE: 76 MMHG | HEART RATE: 69 BPM

## 2023-03-23 PROCEDURE — 700117 HCHG RX CONTRAST REV CODE 255

## 2023-03-23 PROCEDURE — 700111 HCHG RX REV CODE 636 W/ 250 OVERRIDE (IP)

## 2023-03-23 PROCEDURE — 700101 HCHG RX REV CODE 250

## 2023-03-23 PROCEDURE — 62323 NJX INTERLAMINAR LMBR/SAC: CPT

## 2023-03-23 RX ORDER — DEXAMETHASONE SODIUM PHOSPHATE 10 MG/ML
INJECTION, SOLUTION INTRAMUSCULAR; INTRAVENOUS
Status: COMPLETED
Start: 2023-03-23 | End: 2023-03-23

## 2023-03-23 RX ORDER — LIDOCAINE HYDROCHLORIDE 20 MG/ML
INJECTION, SOLUTION EPIDURAL; INFILTRATION; INTRACAUDAL; PERINEURAL
Status: COMPLETED
Start: 2023-03-23 | End: 2023-03-23

## 2023-03-23 RX ADMIN — IOHEXOL 10 ML: 240 INJECTION, SOLUTION INTRATHECAL; INTRAVASCULAR; INTRAVENOUS; ORAL at 08:37

## 2023-03-23 RX ADMIN — LIDOCAINE HYDROCHLORIDE 5 ML: 20 INJECTION, SOLUTION EPIDURAL; INFILTRATION; INTRACAUDAL at 08:40

## 2023-03-23 RX ADMIN — DEXAMETHASONE SODIUM PHOSPHATE 10 MG: 10 INJECTION INTRAMUSCULAR; INTRAVENOUS at 08:37

## 2023-03-23 ASSESSMENT — PAIN DESCRIPTION - PAIN TYPE: TYPE: CHRONIC PAIN

## 2023-03-23 ASSESSMENT — FIBROSIS 4 INDEX: FIB4 SCORE: 1.89

## 2023-03-23 NOTE — INTERVAL H&P NOTE
H&P reviewed. The patient was examined and there are no changes to the H&P      /74   Pulse 69   Temp 36.4 °C (97.6 °F) (Temporal)   Resp 16   Wt 86.7 kg (191 lb 2.2 oz)   SpO2 93%     CV: RRR, Normal S1, S2  RESP: Clear to auscultation bilaterally    Continue with procedure as planned.    Paresh Ogden MD  Physical Medicine and Rehabilitation  Interventional Spine and Sports Physiatry  [facility]

## 2023-03-23 NOTE — OP REPORT
Pre-operative Diagnosis:    M96.1 (ICD-10-CM) - Postlaminectomy syndrome, not elsewhere classified   M54.16 (ICD-10-CM) - Radiculopathy, lumbar region       Post-operative Diagnosis:    M96.1 (ICD-10-CM) - Postlaminectomy syndrome, not elsewhere classified   M54.16 (ICD-10-CM) - Radiculopathy, lumbar region       Procedure: Fluoroscopically-guided Caudal epidural steroid injection with cathether     Blood loss: None     Description of procedure:  The risks, benefits, and alternatives of the procedure were reviewed and discussed with the patient.  Written informed consent was freely obtained. A pre-procedural time-out was conducted by the nurse verifying patient’s identity, procedure to be performed, procedure site and side, and allergy verification. Appropriate equipment was determined to be in place for the procedure.      No sedation was used for this procedure.      The patient was placed in a prone position and fluoroscope was used to assist in guidance.  The skin was prepped and draped in usual sterile technique.   Then, using a 25g 1.5 inch needle was used to inject 1cc of 1% lidocaine without epinephrine to numb superficial tissues. An 18g Tuohy needle was then used to access the caudal canal via the sacral hiatus and advanced into the epidural space.  Omnipaque was injected to confirm location in the lateral position.  Following that, a catheter was inserted and advanced under fluoroscopy in the AP position to the bottom of the fifth lumbar vertebrae.       Following negative aspiration, 2 cc of 2% lidocaine without epinephrine, 1 cc of dexamethasone (10mg/cc), and 2 cc of sterile normal saline was slowly injected into the caudal epidural space.  The patient tolerated the procedure well, the needle and catheter were removed intact. The patient's skin was wiped with a 4x4 gauze, the area was cleansed with alcohol prep, and a bandaid was applied. There were no complications noted.      The patient was  discharged in good condition after meeting discharge criteria and was given discharge instructions.     Paresh Ogden MD  Physical Medicine and Rehabilitation  Interventional Spine and Sports Physiatry  [facility]     CPT: [id number]

## 2023-03-23 NOTE — PROGRESS NOTES
Pt received to recovery area with report from procedure room RN Tana.  VSS.  Ice compress applied to the affected area.  No swelling noted, dressing CDI.  Pt tolerates PO fluids and snack without difficulty.  Dr. Ogden evaluated patient.  Meets criteria for discharge.  Pt ambulatory without difficulty.  She denies any weakness or numbness in legs.  Ok'd to take uber by Dr. Ogden, her daughter is waiting at home and will be with her today.    [id number] Pt discharged via uber.

## 2023-03-28 ENCOUNTER — TELEMEDICINE (OUTPATIENT)
Dept: MEDICAL GROUP | Facility: PHYSICIAN GROUP | Age: 68
End: 2023-03-28
Payer: MEDICARE

## 2023-03-28 VITALS — HEIGHT: 63 IN | BODY MASS INDEX: 32.6 KG/M2 | WEIGHT: 184 LBS

## 2023-03-28 DIAGNOSIS — K21.9 GASTROESOPHAGEAL REFLUX DISEASE WITHOUT ESOPHAGITIS: ICD-10-CM

## 2023-03-28 DIAGNOSIS — J32.9 CHRONIC SINUSITIS, UNSPECIFIED LOCATION: ICD-10-CM

## 2023-03-28 DIAGNOSIS — G89.29 CHRONIC EAR PAIN, LEFT: ICD-10-CM

## 2023-03-28 DIAGNOSIS — H92.02 CHRONIC EAR PAIN, LEFT: ICD-10-CM

## 2023-03-28 PROCEDURE — 99214 OFFICE O/P EST MOD 30 MIN: CPT | Mod: 95 | Performed by: NURSE PRACTITIONER

## 2023-03-28 RX ORDER — ALBUTEROL SULFATE 90 UG/1
2 AEROSOL, METERED RESPIRATORY (INHALATION) EVERY 4 HOURS PRN
Qty: 8.5 G | Refills: 5 | Status: SHIPPED | OUTPATIENT
Start: 2023-03-28 | End: 2024-02-14 | Stop reason: SDUPTHER

## 2023-03-28 RX ORDER — PANTOPRAZOLE SODIUM 40 MG/1
40 TABLET, DELAYED RELEASE ORAL DAILY
Qty: 90 TABLET | Refills: 1 | Status: SHIPPED | OUTPATIENT
Start: 2023-03-28 | End: 2023-09-15

## 2023-03-28 ASSESSMENT — FIBROSIS 4 INDEX: FIB4 SCORE: 1.89

## 2023-03-28 NOTE — ASSESSMENT & PLAN NOTE
This is a chronic condition. Patient has been on omeprazole 40mg daily for years. She reports that the last few weeks she has been having regurgitation of her food. She reports that she has been having food that she tries to swallow but will come back up. She denies food getting stuck. She has had one episode of vomiting.     She has seen GI in the past.     Discussed switching her PPi from omeprazole to protonix.     Will discontinue omeprazole and switch to protonix 40mg daily. Patient to call and schedule with GI.

## 2023-03-28 NOTE — PROGRESS NOTES
Virtual Visit: Established Patient   This visit was conducted via Zoom using secure and encrypted videoconferencing technology. The patient was in a private location in the state of [place].    The patient's identity was confirmed and verbal consent was obtained for this virtual visit.    Subjective:   CC: medical refills, dysphagia/regurgitation of food    Grisel Mark is a 67 y.o. female presenting for evaluation and management of:    Problem   Chronic Ear Pain, Left   Gastroesophageal Reflux Disease Without Esophagitis     ROS   Denies any recent fevers or chills. No nausea or vomiting. No chest pains or shortness of breath.     No Known Allergies    Current medicines (including changes today)  Current Outpatient Medications   Medication Sig Dispense Refill    pantoprazole (PROTONIX) 40 MG Tablet Delayed Response Take 1 Tablet by mouth every day. 90 Tablet 1    albuterol 108 (90 Base) MCG/ACT Aero Soln inhalation aerosol Inhale 2 Puffs every four hours as needed for Shortness of Breath. 8.5 g 5    morphine ER (MS CONTIN) 15 MG Tab CR tablet Take 1 Tablet by mouth every 12 hours for 30 days. 60 Tablet 0    HYDROcodone-acetaminophen (NORCO) 5-325 MG Tab per tablet Take 1 Tablet by mouth every 8 hours as needed (severe pain) for up to 30 days. 60 Tablet 0    Naloxone (NARCAN) 4 MG/0.1ML Liquid One spray in one nostril for overdose and call 911. 1 Each 1    busPIRone (BUSPAR) 30 MG tablet Take 1 Tablet by mouth every day. 180 Tablet 3    Empagliflozin (JARDIANCE) 25 MG Tab Take 1 Tablet by mouth every day. 100 Tablet 3    estradiol (ESTRACE) 1 MG Tab Take 1 Tablet by mouth every day. 90 Tablet 3    lisinopril (PRINIVIL) 10 MG Tab Take 1 Tablet by mouth every day. 100 Tablet 3    [START ON 4/7/2023] clonazePAM (KLONOPIN) 0.5 MG Tab Take 1 Tablet by mouth every evening for 90 days. Indications: Feeling Anxious, Panic Disorder 90 Tablet 0    [START ON 4/7/2023] clonazePAM (KLONOPIN) 1 MG Tab Take 1 Tablet by mouth  every day for 90 days. Indications: Feeling Anxious, Panic Disorder 90 Tablet 0    gabapentin (NEURONTIN) 800 MG tablet Take 1.5 Tablets by mouth 2 times a day for 180 days. 270 Tablet 1    meloxicam (MOBIC) 15 MG tablet TAKE 1 TABLET BY MOUTH EVERY DAY 90 Tablet 0    metFORMIN ER (GLUCOPHAGE XR) 500 MG TABLET SR 24 HR Take 1 Tablet by mouth 2 times a day. 400 Tablet 0    metoprolol tartrate (LOPRESSOR) 25 MG Tab Take 1 Tablet by mouth 2 times a day. 200 Tablet 3    clonazePAM (KLONOPIN) 0.5 MG Tab Take 1 Tablet by mouth every evening for 90 days. Indications: Feeling Anxious 90 Tablet 0    clonazePAM (KLONOPIN) 1 MG Tab Take 1 Tablet by mouth every morning for 90 days. Indications: Feeling Anxious 90 Tablet 0    cyclobenzaprine (FLEXERIL) 5 mg tablet Take 1 Tablet by mouth 2 times a day as needed for Muscle Spasms (restless leg). 90 Tablet 3    levothyroxine (SYNTHROID) 50 MCG Tab Take 1 Tablet by mouth every morning on an empty stomach. 90 Tablet 3    rosuvastatin (CRESTOR) 10 MG Tab Take 1 Tablet by mouth every evening. 100 Tablet 3    DULoxetine (CYMBALTA) 30 MG Cap DR Particles Take 3 Capsules by mouth every day. 270 Capsule 3    Brexpiprazole (REXULTI) 2 MG Tab Take 2 mg by mouth every day. 90 Tablet 3    pseudoephedrine (SUDAFED) 30 MG Tab Take 1-2 Tablets by mouth every 6 hours as needed for Congestion (sinus pressure not relieved by other measures). 30 Tablet 0    Blood Glucose Meter Kit Test blood sugar as recommended by provider. Abbott Freestyle Lite blood glucose monitoring kit. 1 Kit 0    Lancets Use one Abbott [place] Lite lancet to test blood sugar twice daily and PRN. 300 Each 3    Alcohol Swabs Wipe site with prep pad prior to injection. 100 Each 3    fluticasone (FLONASE) 50 MCG/ACT nasal spray Administer 1 Spray into affected nostril(S) every day. 16 g 11    cyanocobalamin (VITAMIN B-12) 500 MCG Tab Take 500 mcg by mouth every day.      Blood Glucose Test Strips Use one Abbott Freedom Lite strip  to test blood sugar twice daily and PRN. 270 Each 3     No current facility-administered medications for this visit.       Patient Active Problem List    Diagnosis Date Noted    Obesity (BMI 30-39.9) 02/21/2023    Acute non-recurrent pansinusitis 02/13/2023    Chronic pain of left knee 01/04/2023    Stress bladder incontinence, female 12/19/2022    Chronic right shoulder pain 10/11/2022    Tobacco use 10/11/2022    Restless leg 02/03/2022    Cough 11/02/2021    Chronic ear pain, left 09/23/2021    Primary central sleep apnea 08/05/2021    Opioid dependence in controlled environment ([facility]) 05/26/2021    Benzodiazepine dependence ([facility]) 05/26/2021    Generalized anxiety disorder 05/12/2020    Posttraumatic stress disorder, delayed onset 05/12/2020    Essential hypertension 02/05/2020    Mixed hyperlipidemia 02/05/2020    Type 2 diabetes mellitus with diabetic polyneuropathy, without long-term current use of insulin ([facility]) 02/05/2020    Hypothyroidism (acquired) 02/05/2020    Depression, major, recurrent, moderate (HCC) 02/05/2020    Gastroesophageal reflux disease without esophagitis 02/05/2020    Situational anxiety 02/05/2020         She  has a past medical history of Anxiety, Arrhythmia, Chronic back pain, Constipation, Depression, Diabetes (HCC), Ear pain, left (9/23/2021), GERD (gastroesophageal reflux disease), Hyperlipidemia, Hypertension, and Thyroid disease.  She  has a past surgical history that includes cholecystectomy; carpal tunnel release; abdominal hysterectomy total; pr njx aa&/strd tfrml epi lumbar/sacral 1 level (Left, 8/12/2020); pr njx aa&/strd tfrml epi lumbar/sacral ea addl (Left, 8/12/2020); lumbar medial branch blocks (Left, 9/9/2020); laminotomy (1998); block epidural steroid injection (Left, 2/24/2021); lumbar transforaminal epidural steroid injection (Left, 5/20/2021); lumbar medial branch blocks (Left, 7/21/2021); radio frequency ablation additional level (Left, 11/11/2021); pr inj  "lumbar/sacral,w/ imaging (N/A, 10/31/2022); and pr inj lumbar/sacral,w/ imaging (N/A, 3/23/2023).       Objective:   Ht 1.6 m (5' 3\") Comment: patient reported  Wt 83.5 kg (184 lb) Comment: patient reported  LMP  (LMP Unknown)   BMI 32.59 kg/m²     Physical Exam:  Constitutional: Alert, no distress, well-groomed.  Skin: No rashes in visible areas.  Eye: Round. Conjunctiva clear, lids normal. No icterus.   ENMT: Lips pink without lesions, good dentition, moist mucous membranes. Phonation normal.  Neck: Moves freely without pain.  Respiratory: Unlabored respiratory effort, no cough or audible wheeze  Psych: Alert and oriented x3, normal affect and mood.       Assessment and Plan:   The following treatment plan was discussed:     1. Gastroesophageal reflux disease without esophagitis        2. Chronic ear pain, left  Referral to ENT      3. Chronic sinusitis, unspecified location  Referral to ENT          Gastroesophageal reflux disease without esophagitis  This is a chronic condition. Patient has been on omeprazole 40mg daily for years. She reports that the last few weeks she has been having regurgitation of her food. She reports that she has been having food that she tries to swallow but will come back up. She denies food getting stuck. She has had one episode of vomiting.     She has seen GI in the past.     Discussed switching her PPi from omeprazole to protonix.     Will discontinue omeprazole and switch to protonix 40mg daily. Patient to call and schedule with GI.     Chronic ear pain, left  This is a chronic condition. Patient reports that she has continued to have bilateral ear pain despite using flonase daily. Patient also reports continued congestion.     Will place referral to ENT.       Follow-up: No follow-ups on file.         I have placed the below orders and discussed them with an approved delegating provider.  The MA is performing the below orders under the direction of Dr. Rodriguez.    Please note " that this dictation was created using voice recognition software. I have worked with consultants from the vendor as well as technical experts from [facility] to optimize the interface. I have made every reasonable attempt to correct obvious errors, but I expect that there are errors of grammar and possibly content that I did not discover before finalizing the note.

## 2023-03-28 NOTE — ASSESSMENT & PLAN NOTE
This is a chronic condition. Patient reports that she has continued to have bilateral ear pain despite using flonase daily. Patient also reports continued congestion.     Will place referral to ENT.

## 2023-04-03 ENCOUNTER — TELEPHONE (OUTPATIENT)
Dept: RADIOLOGY | Facility: IMAGING CENTER | Age: 68
End: 2023-04-03

## 2023-04-03 ENCOUNTER — OFFICE VISIT (OUTPATIENT)
Dept: MEDICAL GROUP | Facility: PHYSICIAN GROUP | Age: 68
End: 2023-04-03
Payer: MEDICARE

## 2023-04-03 ENCOUNTER — APPOINTMENT (OUTPATIENT)
Dept: RADIOLOGY | Facility: IMAGING CENTER | Age: 68
End: 2023-04-03
Attending: ORTHOPAEDIC SURGERY
Payer: MEDICARE

## 2023-04-03 ENCOUNTER — APPOINTMENT (OUTPATIENT)
Dept: RADIOLOGY | Facility: IMAGING CENTER | Age: 68
End: 2023-04-03
Attending: PHYSICAL MEDICINE & REHABILITATION
Payer: MEDICARE

## 2023-04-03 VITALS
SYSTOLIC BLOOD PRESSURE: 122 MMHG | OXYGEN SATURATION: 91 % | WEIGHT: 189 LBS | HEIGHT: 63 IN | HEART RATE: 65 BPM | BODY MASS INDEX: 33.49 KG/M2 | DIASTOLIC BLOOD PRESSURE: 70 MMHG | TEMPERATURE: 98.6 F

## 2023-04-03 DIAGNOSIS — B35.1 ONYCHOMYCOSIS: ICD-10-CM

## 2023-04-03 DIAGNOSIS — M54.2 CERVICALGIA: ICD-10-CM

## 2023-04-03 DIAGNOSIS — M25.511 ACUTE PAIN OF RIGHT SHOULDER: ICD-10-CM

## 2023-04-03 DIAGNOSIS — M25.562 LEFT KNEE PAIN, UNSPECIFIED CHRONICITY: ICD-10-CM

## 2023-04-03 PROCEDURE — 99213 OFFICE O/P EST LOW 20 MIN: CPT | Performed by: NURSE PRACTITIONER

## 2023-04-03 PROCEDURE — 72040 X-RAY EXAM NECK SPINE 2-3 VW: CPT | Mod: TC | Performed by: PHYSICAL MEDICINE & REHABILITATION

## 2023-04-03 ASSESSMENT — FIBROSIS 4 INDEX: FIB4 SCORE: 1.89

## 2023-04-03 NOTE — ASSESSMENT & PLAN NOTE
This is a chronic condition. Patient has been using a topical tolfante 1%. She has been having some splitting of her nail.     There is no sign of infection. The nail is split. The nail matrix is intact. Patient does have improvement in the nail fungus from the last assessment.     Patient to continue topical treatment. Patient educated to monitor for signs of infection and diabetic nail care.

## 2023-04-03 NOTE — PROGRESS NOTES
Chief Complaint   Patient presents with    Nail Problem     Nail fungus R toe                                                                                                                                        HPI:   Grisel presents today with the following.    Problem   Onychomycosis       Current Outpatient Medications   Medication Sig Dispense Refill    pantoprazole (PROTONIX) 40 MG Tablet Delayed Response Take 1 Tablet by mouth every day. 90 Tablet 1    albuterol 108 (90 Base) MCG/ACT Aero Soln inhalation aerosol Inhale 2 Puffs every four hours as needed for Shortness of Breath. 8.5 g 5    morphine ER (MS CONTIN) 15 MG Tab CR tablet Take 1 Tablet by mouth every 12 hours for 30 days. 60 Tablet 0    HYDROcodone-acetaminophen (NORCO) 5-325 MG Tab per tablet Take 1 Tablet by mouth every 8 hours as needed (severe pain) for up to 30 days. 60 Tablet 0    Naloxone (NARCAN) 4 MG/0.1ML Liquid One spray in one nostril for overdose and call 911. 1 Each 1    busPIRone (BUSPAR) 30 MG tablet Take 1 Tablet by mouth every day. 180 Tablet 3    Empagliflozin (JARDIANCE) 25 MG Tab Take 1 Tablet by mouth every day. 100 Tablet 3    estradiol (ESTRACE) 1 MG Tab Take 1 Tablet by mouth every day. 90 Tablet 3    lisinopril (PRINIVIL) 10 MG Tab Take 1 Tablet by mouth every day. 100 Tablet 3    [START ON 4/7/2023] clonazePAM (KLONOPIN) 0.5 MG Tab Take 1 Tablet by mouth every evening for 90 days. Indications: Feeling Anxious, Panic Disorder 90 Tablet 0    [START ON 4/7/2023] clonazePAM (KLONOPIN) 1 MG Tab Take 1 Tablet by mouth every day for 90 days. Indications: Feeling Anxious, Panic Disorder 90 Tablet 0    gabapentin (NEURONTIN) 800 MG tablet Take 1.5 Tablets by mouth 2 times a day for 180 days. 270 Tablet 1    meloxicam (MOBIC) 15 MG tablet TAKE 1 TABLET BY MOUTH EVERY DAY 90 Tablet 0    metFORMIN ER (GLUCOPHAGE XR) 500 MG TABLET SR 24 HR Take 1 Tablet by mouth 2 times a day. 400 Tablet 0    metoprolol tartrate (LOPRESSOR) 25 MG Tab  "Take 1 Tablet by mouth 2 times a day. 200 Tablet 3    clonazePAM (KLONOPIN) 0.5 MG Tab Take 1 Tablet by mouth every evening for 90 days. Indications: Feeling Anxious 90 Tablet 0    clonazePAM (KLONOPIN) 1 MG Tab Take 1 Tablet by mouth every morning for 90 days. Indications: Feeling Anxious 90 Tablet 0    cyclobenzaprine (FLEXERIL) 5 mg tablet Take 1 Tablet by mouth 2 times a day as needed for Muscle Spasms (restless leg). 90 Tablet 3    levothyroxine (SYNTHROID) 50 MCG Tab Take 1 Tablet by mouth every morning on an empty stomach. 90 Tablet 3    rosuvastatin (CRESTOR) 10 MG Tab Take 1 Tablet by mouth every evening. 100 Tablet 3    DULoxetine (CYMBALTA) 30 MG Cap DR Particles Take 3 Capsules by mouth every day. 270 Capsule 3    Brexpiprazole (REXULTI) 2 MG Tab Take 2 mg by mouth every day. 90 Tablet 3    pseudoephedrine (SUDAFED) 30 MG Tab Take 1-2 Tablets by mouth every 6 hours as needed for Congestion (sinus pressure not relieved by other measures). 30 Tablet 0    Blood Glucose Meter Kit Test blood sugar as recommended by provider. Abbott Freestyle Lite blood glucose monitoring kit. 1 Kit 0    Lancets Use one Abbott [place] Lite lancet to test blood sugar twice daily and PRN. 300 Each 3    Alcohol Swabs Wipe site with prep pad prior to injection. 100 Each 3    fluticasone (FLONASE) 50 MCG/ACT nasal spray Administer 1 Spray into affected nostril(S) every day. 16 g 11    cyanocobalamin (VITAMIN B-12) 500 MCG Tab Take 500 mcg by mouth every day.      Blood Glucose Test Strips Use one Abbott Freedom Lite strip to test blood sugar twice daily and PRN. 270 Each 3     No current facility-administered medications for this visit.       Allergies as of 04/03/2023    (No Known Allergies)        ROS:  All systems negative expect as addressed in assessment and plan.     /70 (BP Location: Right arm, Patient Position: Sitting, BP Cuff Size: Adult)   Pulse 65   Temp 37 °C (98.6 °F) (Temporal)   Ht 1.6 m (5' 3\")   Wt 85.7 " kg (189 lb)   LMP  (LMP Unknown)   SpO2 91%   BMI 33.48 kg/m²       Physical Exam  Vitals reviewed.   Constitutional:       Appearance: Normal appearance.   HENT:      Head: Normocephalic and atraumatic.      Mouth/Throat:      Mouth: Mucous membranes are moist.   Eyes:      Extraocular Movements: Extraocular movements intact.      Conjunctiva/sclera: Conjunctivae normal.   Pulmonary:      Effort: Pulmonary effort is normal.   Musculoskeletal:         General: Normal range of motion.      Cervical back: Normal range of motion.        Feet:    Feet:      Right foot:      Skin integrity: Dry skin present. No skin breakdown, erythema, warmth, callus or fissure.      Toenail Condition: Right toenails are abnormally thick. Fungal disease present.  Skin:     General: Skin is warm and dry.   Neurological:      General: No focal deficit present.      Mental Status: She is alert and oriented to person, place, and time.   Psychiatric:         Mood and Affect: Mood normal.         Behavior: Behavior normal.         Thought Content: Thought content normal.         Assessment and Plan:  67 y.o. female with the following issues.    1. Onychomycosis             Onychomycosis  This is a chronic condition. Patient has been using a topical tolfante 1%. She has been having some splitting of her nail.     There is no sign of infection. The nail is split. The nail matrix is intact. Patient does have improvement in the nail fungus from the last assessment.     Patient to continue topical treatment. Patient educated to monitor for signs of infection and diabetic nail care.       Return if symptoms worsen or fail to improve.      I have placed the below orders and discussed them with an approved delegating provider.  The MA is performing the below orders under the direction of Dr. park.    Please note that this dictation was created using voice recognition software. I have worked with consultants from the vendor as well as technical  experts from [facility] to optimize the interface. I have made every reasonable attempt to correct obvious errors, but I expect that there are errors of grammar and possibly content that I did not discover before finalizing the note.

## 2023-04-03 NOTE — TELEPHONE ENCOUNTER
Good Afternoon:      Today, Grisel presented for x-rays on her cervical spine, knee and shoulder.  I was able to perform the cervical spine exam, however the other orders were for a LEFT knee and LEFT shoulder, which the patient said was in error.  Grisel confirmed to me that the concerns were on her RIGHT shoulder and RIGHT knee.      I suggested that Grisel give your office a call to clear up any confusion and obtain the correct orders.    Grisel was very kind and gracious about the confusion.  I expect you will hear from her soon.  If you have any questions, please give me a call [phone].    Aixa Small  X-ray Tech

## 2023-04-07 ENCOUNTER — APPOINTMENT (OUTPATIENT)
Dept: PHYSICAL MEDICINE AND REHAB | Facility: MEDICAL CENTER | Age: 68
End: 2023-04-07
Payer: MEDICARE

## 2023-04-12 ENCOUNTER — TELEPHONE (OUTPATIENT)
Dept: PHYSICAL MEDICINE AND REHAB | Facility: MEDICAL CENTER | Age: 68
End: 2023-04-12
Payer: MEDICARE

## 2023-04-12 NOTE — TELEPHONE ENCOUNTER
VOICEMAIL  1. Caller Name: Grisel Ronel Davidson                        Call Back Number: [phone] (home)       2. Message: patient missed appointment due to being sick and would like to reschedule    3. Patient approves office to leave a detailed voicemail/MyChart message: N\A

## 2023-04-14 ENCOUNTER — OFFICE VISIT (OUTPATIENT)
Dept: PHYSICAL MEDICINE AND REHAB | Facility: MEDICAL CENTER | Age: 68
End: 2023-04-14
Payer: MEDICARE

## 2023-04-14 VITALS
BODY MASS INDEX: 34.32 KG/M2 | OXYGEN SATURATION: 95 % | HEIGHT: 63 IN | SYSTOLIC BLOOD PRESSURE: 122 MMHG | TEMPERATURE: 97.7 F | WEIGHT: 193.7 LBS | HEART RATE: 74 BPM | DIASTOLIC BLOOD PRESSURE: 76 MMHG | RESPIRATION RATE: 16 BRPM

## 2023-04-14 DIAGNOSIS — M75.51 BURSITIS OF RIGHT SHOULDER: ICD-10-CM

## 2023-04-14 DIAGNOSIS — F11.90 CHRONIC, CONTINUOUS USE OF OPIOIDS: ICD-10-CM

## 2023-04-14 DIAGNOSIS — M96.1 POSTLAMINECTOMY SYNDROME: ICD-10-CM

## 2023-04-14 DIAGNOSIS — M54.16 LEFT LUMBAR RADICULITIS: ICD-10-CM

## 2023-04-14 DIAGNOSIS — M25.511 RIGHT SHOULDER PAIN, UNSPECIFIED CHRONICITY: ICD-10-CM

## 2023-04-14 DIAGNOSIS — F13.20 BENZODIAZEPINE DEPENDENCE (HCC): ICD-10-CM

## 2023-04-14 PROCEDURE — 99214 OFFICE O/P EST MOD 30 MIN: CPT | Performed by: PHYSICAL MEDICINE & REHABILITATION

## 2023-04-14 RX ORDER — HYDROCODONE BITARTRATE AND ACETAMINOPHEN 5; 325 MG/1; MG/1
1 TABLET ORAL EVERY 8 HOURS PRN
Qty: 60 TABLET | Refills: 0 | Status: SHIPPED | OUTPATIENT
Start: 2023-04-22 | End: 2023-05-12 | Stop reason: SDUPTHER

## 2023-04-14 RX ORDER — MORPHINE SULFATE 15 MG/1
15 TABLET, FILM COATED, EXTENDED RELEASE ORAL EVERY 12 HOURS
Qty: 60 TABLET | Refills: 0 | Status: SHIPPED | OUTPATIENT
Start: 2023-04-22 | End: 2023-05-12 | Stop reason: SDUPTHER

## 2023-04-14 ASSESSMENT — PATIENT HEALTH QUESTIONNAIRE - PHQ9
CLINICAL INTERPRETATION OF PHQ2 SCORE: 1
5. POOR APPETITE OR OVEREATING: 2 - MORE THAN HALF THE DAYS
SUM OF ALL RESPONSES TO PHQ QUESTIONS 1-9: 9

## 2023-04-14 ASSESSMENT — FIBROSIS 4 INDEX: FIB4 SCORE: 1.89

## 2023-04-14 NOTE — PROGRESS NOTES
Follow-up patient note    Physiatry (physical medicine and  Rehabilitation), interventional spine and sports medicine    Date of Service: see Epic for DOS    Chief complaint:   Chief Complaint   Patient presents with    Follow-Up     Postlaminectomy syndrome       HISTORY    HPI: Grisel Mark 67 y.o. female who presents today for follow-up evaluation of low back and leg pain.     Grisel reports that her right shoulder continues to be painful.  She is doing her home program and this helps maintain her ROM.  She has had PT in the past.    She continues to have low back pain with aching pain in the back, but improved since caudal epidural on 03/23/2023.  Left leg pain is significantly better.  Numbness in the right great toe.    Medications are stable.  She reports that she has been having more constipation.  Taking miralax at bedtime, but this has not been as helpful.     Medications are unchanged.  See below.    By history:  Patient has narcan.  She carries 1 in her purse and has been at home.    Medications:  Takes gabapentin 800mg taking 1.5 pills twice a day.  Her pain medications: Duloxetine 90mg.  MS Contin 15mg q12h.  Norco 5/325 for breakthrough, reports that she does not always take this, some days will take up to 3/day.   No side effects reported.  She is back to taking clonazepam 0.5mg at bedtime and 1mg during the day      By history:  Her laminectomy was in 1998.  Previous injection on left L4 and L5 TFESI on 08/12/2020 helped with her leg pain.     Medical records review:  Dr. Francisco Olmos, plan to increase duloxetine 90mg daily, discontinue buspirone 20mg po bid, start buproprion XL 150mg daily, continue brexipiprazole 1mg qhs, hydroxyzine 25mg po tid prn, clonazepam 0.5mg daily prn    Review of records   Dr. Dheeraj De La Rosa:  08/20/2019 Right L3-4, L4-5, L5-S1 radiofrequency ablation    I reviewed the note from the referring provider Peter Bruce * dated 02/05/2020: She was seen to  establish care, having just moved from [place].  She was seen for essential hypertension, mixed hyperlipidemia, DM2 in obese, hypothyroidism, recurrent MDD in partial depression/anxiety, GERD without esophagitis, chronic bilateral low back pain with bilateral sciatica.  Medications associated with the above were written, related labs ordered including CBC, CMP, Hb A1c, lipids, microalbumin, TSH, free thryoxine, referral to ps\ychiatry, referral to GI for colonoscopy and referral to pain clinic.    Previous treatments:    Physical Therapy: Yes    Medications the patient is tried: Narcotics, gabapentin and muscle relaxers    Previous interventions: [facility] these included right lumbar radiofrequency procedures    Previous surgeries to relieve the above pain:  Surgery on October 28, 1998      ROS:   ENT: ear infection, treated with augmentin  CV: irregular heart, reports history of tachycardia  Psych: depression since 1995  Heme: anemia  Endo: hypothyroidism, diabetes    Red Flags ROS:   Fever, Chills, Sweats: Denies  Involuntary Weight Loss: Denies  Bladder Incontinence: Denies  Bowel Incontinence: Denies  Saddle Anesthesia: Denies    All other systems reviewed and negative.       PMHx:   Past Medical History:   Diagnosis Date    Anxiety     Arrhythmia     Chronic back pain     Constipation     Depression     Diabetes (HCC)     Ear pain, left 9/23/2021    GERD (gastroesophageal reflux disease)     Hyperlipidemia     Hypertension     Thyroid disease        PSHx:   Past Surgical History:   Procedure Laterality Date    SC INJ LUMBAR/SACRAL,W/ IMAGING N/A 3/23/2023    Procedure: Caudal epidural steroid injection with catheter;  Surgeon: Paresh Ogden M.D.;  [facility];  Service: Pain Management    SC INJ LUMBAR/SACRAL,W/ IMAGING N/A 10/31/2022    Procedure: Caudal epidural steroid injection with catheter;  Surgeon: Paresh Ogden M.D.;  Location: [facility];  Service: Pain Management    RADIO FREQUENCY ABLATION ADDITIONAL LEVEL Left 11/11/2021    Procedure: LEFT lumbar three through lumbar five radiofrequency ablation;  Surgeon: Paresh Ogden M.D.;  Location: [facility];  Service: Pain Management    LUMBAR MEDIAL BRANCH BLOCKS Left 7/21/2021    Procedure: LEFT lumbar three through lumbar five medial branch blocks;  Surgeon: Paresh Ogden M.D.;  Location: [facility];  Service: Pain Management    LUMBAR TRANSFORAMINAL EPIDURAL STEROID INJECTION Left 5/20/2021    Procedure: LEFT lumbar four and lumbar five transforaminal epidural steroid injection;  Surgeon: Paresh Ogden M.D.;  Location: [facility];  Service: Pain Management    BLOCK EPIDURAL STEROID INJECTION Left 2/24/2021    Procedure: INJECTION, STEROID, EPIDURAL;  Surgeon: Paresh Ogden M.D.;  Location: [facility];  Service: Pain Management    LUMBAR MEDIAL BRANCH BLOCKS Left 9/9/2020    Procedure: BLOCK, NERVE, SPINAL, LUMBAR, POSTERIOR RAMUS, MEDIAL BRANCH;  Surgeon: Paresh Ogden M.D.;  Location: [facility];  Service: Pain Management    LA NJX AA&/STRD TFRML EPI LUMBAR/SACRAL 1 LEVEL Left 8/12/2020    Procedure: INJECTION, SPINE, LUMBOSACRAL, EPIDURAL, 1 LEVEL, TRANSFORAMINAL APPROACH;  Surgeon: Paresh Ogden M.D.;  Location: [facility];  Service: Pain Management    LA NJX AA&/STRD TFRML EPI LUMBAR/SACRAL EA ADDL Left 8/12/2020    Procedure: INJECTION, SPINE, LUMBOSACRAL, EPIDURAL, TRANSFORAMINAL APPROACH;  Surgeon: Paresh Ogden M.D.;  Location: [facility];  Service: Pain Management    LAMINOTOMY  1998    ABDOMINAL HYSTERECTOMY TOTAL      CARPAL TUNNEL RELEASE      CHOLECYSTECTOMY         Family history   Family History   Problem Relation Age of Onset    Cancer Mother     Stroke Father         Heart attack    Dementia Sister     Stroke Brother          heart Attack    Cancer Brother         Skin    Diabetes Brother     Kidney Disease Brother     Cancer Brother     Parkinson's Disease Sister     No Known Problems Sister     Diabetes Daughter     Hypertension Daughter          Medications:   Current Outpatient Medications   Medication    [START ON 2023] morphine ER (MS CONTIN) 15 MG Tab CR tablet    [START ON 2023] HYDROcodone-acetaminophen (NORCO) 5-325 MG Tab per tablet    pantoprazole (PROTONIX) 40 MG Tablet Delayed Response    albuterol 108 (90 Base) MCG/ACT Aero Soln inhalation aerosol    Naloxone (NARCAN) 4 MG/0.1ML Liquid    busPIRone (BUSPAR) 30 MG tablet    Empagliflozin (JARDIANCE) 25 MG Tab    estradiol (ESTRACE) 1 MG Tab    lisinopril (PRINIVIL) 10 MG Tab    clonazePAM (KLONOPIN) 0.5 MG Tab    clonazePAM (KLONOPIN) 1 MG Tab    gabapentin (NEURONTIN) 800 MG tablet    meloxicam (MOBIC) 15 MG tablet    metFORMIN ER (GLUCOPHAGE XR) 500 MG TABLET SR 24 HR    metoprolol tartrate (LOPRESSOR) 25 MG Tab    cyclobenzaprine (FLEXERIL) 5 mg tablet    levothyroxine (SYNTHROID) 50 MCG Tab    rosuvastatin (CRESTOR) 10 MG Tab    DULoxetine (CYMBALTA) 30 MG Cap DR Particles    Brexpiprazole (REXULTI) 2 MG Tab    pseudoephedrine (SUDAFED) 30 MG Tab    Blood Glucose Meter Kit    Lancets    Alcohol Swabs    fluticasone (FLONASE) 50 MCG/ACT nasal spray    cyanocobalamin (VITAMIN B-12) 500 MCG Tab    Blood Glucose Test Strips     No current facility-administered medications for this visit.       Allergies:   No Known Allergies    Social Hx:   Social History     Socioeconomic History    Marital status:      Spouse name: Not on file    Number of children: Not on file    Years of education: Not on file    Highest education level: Not on file   Occupational History    Not on file   Tobacco Use    Smoking status: Every Day     Packs/day: 0.25     Types: Cigarettes     Last attempt to quit: 2014     Years since quittin.2    Smokeless tobacco: Never  "  Vaping Use    Vaping Use: Never used   Substance and Sexual Activity    Alcohol use: Not Currently    Drug use: Not Currently     Types: Marijuana    Sexual activity: Not Currently   Other Topics Concern     Service No    Blood Transfusions Yes    Caffeine Concern No    Occupational Exposure No    Hobby Hazards No    Sleep Concern Yes    Stress Concern Yes    Weight Concern Yes    Special Diet No    Back Care No    Exercise Yes    Bike Helmet No    Seat Belt Yes    Self-Exams Yes   Social History Narrative    Not on file     Social Determinants of Health     Financial Resource Strain: Not on file   Food Insecurity: Not on file   Transportation Needs: Not on file   Physical Activity: Not on file   Stress: Not on file   Social Connections: Not on file   Intimate Partner Violence: Not on file   Housing Stability: Not on file         EXAMINATION     Physical Exam:   Vitals: /76 (BP Location: Right arm, Patient Position: Sitting, BP Cuff Size: Adult)   Pulse 74   Temp 36.5 °C (97.7 °F) (Temporal)   Resp 16   Ht 1.6 m (5' 3\")   Wt 87.9 kg (193 lb 11.2 oz)   SpO2 95%     Constitutional:   Body Habitus: Body mass index is 34.31 kg/m².  Cooperation: Fully cooperates with exam  Appearance: Well-groomed, well-nourished, not disheveled, no acute distress    Eyes: No scleral icterus, no proptosis     ENT -no obvious auditory deficits, wearing a facial mask    Skin -no rashes or lesions noted, no warmth or erythema was noted at the injection site    Respiratory-  breathing comfortable on room air, no audible wheezing    Cardiovascular- no lower extremity edema is noted.     Psychiatric- alert and oriented ×3. Normal affect.     Gait - mildly antalgic, no assist device.  No loss of balance    Musculoskeletal -     Cervical spine  Functional ROM of the cervical spine.  Spurling's on the right causes pain that radiates to the right, negative on the left  Functional ROM of the left shoulder.  Right shoulder with " functional ROM, pain in the last 30 degrees of abduction.  Hawkin's test is positive on the right.  Functional internal ROM.  Neer's is negative bilaterally  Empty can test is negative bilaterally      Thoracic/Lumbar Spine/Sacral Spine/Hips   Inspection: no evidence of atrophy in bilateral lower extremities throughout     ROM of the lumbar spine decreased.     Palpation: tenderness to palpation lumbar paraspinals minimal    Neuro     Key points for the international standards for neurological classification of spinal cord injury (ISNCSCI) to light touch.     No sensory deficits in C5-T2 bilaterally    Dermatome R L   L2 2 2   L3 2 2   L4 2 1   L5 1 1   S1 2 1   S2 2 2     Motor Exam Upper Extremities    No focal motor deficits in C5-T1 bilaterally    Motor Exam Lower Extremities    ? Myotome R L   Hip flexion L2 5 5   Knee extension L3 5 5   Ankle dorsiflexion L4 5 5   Toe extension L5 5 5   Ankle plantarflexion S1 5 5       Reflexes  ?  R L   Patella  2+ 2+   Achilles   2+ 1+     No clonus bilaterally    MEDICAL DECISION MAKING    Medical records review: see under HPI section.     DATA    Labs:   03/15/2023: UDS positive for morphine, hydrocodone metabolites, positive for clonazepam, THC  08/10/2022: UDS positive for morphine and negative for metabolites, positive for hydrocodone, positive for clonazepam  03/18/2022: UDS positive for morphine and negative for metabolites, positive for hydrocodone, positive for clonazepam  01/18/2022: UDS positive for morphine and negative for metabolites, absent hydrocodone, positive for clonazepam  10/20/2021: UDS positive for morphine and metabolites, clonzapem, low amount of THC noted  05/08/2021: UDS positive for morphine and metabolites, clonazepam and metabolites, hydrocodone and metabolites  08/18/2020: UDS positive for morphine and metabolites, clonazepam and metabolites, hydrocodone and metabolites  04/24/2020 UDS positive for morphine and metabolites,  clonazepam  04/09/2020 Vitamin D, 25:  28L  04/09/2020 Vitamin B12: 217    Lab Results   Component Value Date/Time    SODIUM 139 05/05/2022 12:18 PM    POTASSIUM 4.8 05/05/2022 12:18 PM    CHLORIDE 104 05/05/2022 12:18 PM    CO2 24 05/05/2022 12:18 PM    ANION 11.0 05/05/2022 12:18 PM    GLUCOSE 194 (H) 05/05/2022 12:18 PM    BUN 13 05/05/2022 12:18 PM    CREATININE 0.70 05/05/2022 12:18 PM    CALCIUM 9.8 05/05/2022 12:18 PM    ASTSGOT 28 05/05/2022 12:18 PM    ALTSGPT 28 05/05/2022 12:18 PM    TBILIRUBIN 0.3 05/05/2022 12:18 PM    ALBUMIN 4.4 05/05/2022 12:18 PM    TOTPROTEIN 7.1 05/05/2022 12:18 PM    GLOBULIN 2.7 05/05/2022 12:18 PM    AGRATIO 1.6 05/05/2022 12:18 PM       No results found for: PROTHROMBTM, INR     Lab Results   Component Value Date/Time    WBC 7.5 05/04/2021 08:06 AM    RBC 4.73 05/04/2021 08:06 AM    HEMOGLOBIN 14.6 05/04/2021 08:06 AM    HEMATOCRIT 43.8 05/04/2021 08:06 AM    MCV 92.6 05/04/2021 08:06 AM    MCH 30.9 05/04/2021 08:06 AM    MCHC 33.3 (L) 05/04/2021 08:06 AM    MPV 10.6 05/04/2021 08:06 AM    NEUTSPOLYS 46.60 05/04/2021 08:06 AM    LYMPHOCYTES 39.80 05/04/2021 08:06 AM    MONOCYTES 9.30 05/04/2021 08:06 AM    EOSINOPHILS 3.70 05/04/2021 08:06 AM    BASOPHILS 0.50 05/04/2021 08:06 AM        Lab Results   Component Value Date/Time    HBA1C 6.9 (A) 09/19/2022 09:00 AM        Imaging: I personally reviewed following images, these are my reads:     MRI lumbar spine 05/05/2020  At L1-2, no central or foraminal stenosis  At L2-3, no central or foraminal stenosis  At L3-4, mild disc bulge and mild ligamentum flavum thickening.  No central canal stenosis. Borderline left foraminal stenosis. Schmorl's node.   At L4-5, diffuse disc bulge with mild ligamentum flavum thickening.  No central canal stenosis.  There is facet arthropathy, left greater than right. Mild foraminal stenosis bilaterally. S/P left L4/5 hemilaminectomy  At L5-S1, there is mild-borderline foraminal stenosis with facet  arthropathy and mild disc bulge.  No central canal stenosis    Xray lumbar spine 05/05/2020  There is mild decreased joint space at L3-4 and L4-5.    Facet arthropathy is noted, greatest at L5-S1 bilaterally.  No significant motion on flexion and extension films    IMAGING radiology reads. I reviewed the following radiology reads    Xray abdomen 07/17/2020  IMPRESSION:     Nonspecific bowel gas pattern. No evidence small bowel obstruction.      MRI lumbar spine 05/05/2020  IMPRESSION:     1.  Caudal lumbar facet arthropathy left worse than right with no bony destruction to indicate septic or inflammatory arthropathy. This most likely is just degenerative  2.  Mild caudal lumbar foraminal stenoses  3.  No significant central stenosis.  4.  Left L4/5 hemilaminectomy                                                                                   Xray lumbar spine 05/05/2020     IMPRESSION:     1.  No acute lumbar compression fracture or subluxation.  2.  Posterior disc space narrowing at L4-5 and to lesser extent at L3-4.  3.  Bilateral facet arthropathy at L5-S1.                                                                                             Diagnosis   Visit Diagnoses     ICD-10-CM   1. Postlaminectomy syndrome  M96.1   2. Left lumbar radiculitis  M54.16   3. Right shoulder pain, unspecified chronicity  M25.511   4. Bursitis of right shoulder  M75.51   5. Chronic, continuous use of opioids  F11.90   6. Benzodiazepine dependence (HCC)  F13.20                   ASSESSMENT:  Grisel Mark 67 y.o. female seen for above     Grisel was seen today for follow-up.    Diagnoses and all orders for this visit:    Postlaminectomy syndrome  -     morphine ER (MS CONTIN) 15 MG Tab CR tablet; Take 1 Tablet by mouth every 12 hours for 30 days.  -     HYDROcodone-acetaminophen (NORCO) 5-325 MG Tab per tablet; Take 1 Tablet by mouth every 8 hours as needed (severe pain) for up to 30 days.  -     Consent for Opiate  Prescription  -     Controlled Substance Treatment Agreement  -     Pain Management Screen    Left lumbar radiculitis  -     morphine ER (MS CONTIN) 15 MG Tab CR tablet; Take 1 Tablet by mouth every 12 hours for 30 days.  -     HYDROcodone-acetaminophen (NORCO) 5-325 MG Tab per tablet; Take 1 Tablet by mouth every 8 hours as needed (severe pain) for up to 30 days.  -     Consent for Opiate Prescription  -     Controlled Substance Treatment Agreement    Right shoulder pain, unspecified chronicity  -     Referral to Pain Clinic    Bursitis of right shoulder  -     Referral to Pain Clinic    Chronic, continuous use of opioids  -     Pain Management Screen    Benzodiazepine dependence (HCC)  -     Pain Management Screen        Discussed that she has stopped using THC gummies, this was noted at last visit and was positive on UDS.  Positive for other medications as expected.  Plan to continue to monitor.  Script given for next month of MS Contin and norco.   reviewed.  She continues to take clonazepam.  Increase miralax to maintain regular bowel movements.  She is doing well after caudal epidural.  Hold off on SCS for now.  Discussed right subacromial bursa injection.  The risks benefits and alternatives to this procedure were discussed and the patient wishes to proceed with the procedure. Risks include but are not limited to damage to surrounding structures, infection, bleeding, worsening of pain which can be permanent, weakness which can be permanent. Benefits include pain relief, improved function. Alternatives includes not doing the procedure.        Follow-up: Return in about 1 week (around 4/21/2023).    Thank you very much for asking me to participate in Grisel Mark's care.  Please contact me with any questions or concerns.    Please note that this dictation was created using voice recognition software. I have made every reasonable attempt to correct obvious errors but there may be errors of grammar and  content that I may have overlooked prior to finalization of this note.      Paresh Ogden MD  Physical Medicine and Rehabilitation  Interventional Spine and Sports Physiatry  [facility]

## 2023-04-20 ENCOUNTER — HOSPITAL ENCOUNTER (OUTPATIENT)
Dept: RADIOLOGY | Facility: MEDICAL CENTER | Age: 68
End: 2023-04-20
Attending: NURSE PRACTITIONER
Payer: MEDICARE

## 2023-04-20 DIAGNOSIS — Z13.820 ENCOUNTER FOR OSTEOPOROSIS SCREENING IN ASYMPTOMATIC POSTMENOPAUSAL PATIENT: ICD-10-CM

## 2023-04-20 DIAGNOSIS — Z78.0 ENCOUNTER FOR OSTEOPOROSIS SCREENING IN ASYMPTOMATIC POSTMENOPAUSAL PATIENT: ICD-10-CM

## 2023-04-20 PROCEDURE — 77080 DXA BONE DENSITY AXIAL: CPT

## 2023-04-21 ENCOUNTER — OFFICE VISIT (OUTPATIENT)
Dept: PHYSICAL MEDICINE AND REHAB | Facility: MEDICAL CENTER | Age: 68
End: 2023-04-21
Payer: MEDICARE

## 2023-04-21 VITALS
BODY MASS INDEX: 34.2 KG/M2 | HEIGHT: 63 IN | TEMPERATURE: 96 F | HEART RATE: 58 BPM | DIASTOLIC BLOOD PRESSURE: 62 MMHG | WEIGHT: 193 LBS | OXYGEN SATURATION: 94 % | SYSTOLIC BLOOD PRESSURE: 120 MMHG

## 2023-04-21 DIAGNOSIS — M75.51 BURSITIS OF RIGHT SHOULDER: ICD-10-CM

## 2023-04-21 DIAGNOSIS — M25.511 RIGHT SHOULDER PAIN, UNSPECIFIED CHRONICITY: ICD-10-CM

## 2023-04-21 DIAGNOSIS — M54.6 THORACIC SPINE PAIN: ICD-10-CM

## 2023-04-21 PROCEDURE — 20611 DRAIN/INJ JOINT/BURSA W/US: CPT | Mod: RT | Performed by: PHYSICAL MEDICINE & REHABILITATION

## 2023-04-21 RX ORDER — DEXAMETHASONE SODIUM PHOSPHATE 4 MG/ML
4 INJECTION, SOLUTION INTRA-ARTICULAR; INTRALESIONAL; INTRAMUSCULAR; INTRAVENOUS; SOFT TISSUE ONCE
Status: COMPLETED | OUTPATIENT
Start: 2023-04-21 | End: 2023-04-21

## 2023-04-21 RX ADMIN — DEXAMETHASONE SODIUM PHOSPHATE 4 MG: 4 INJECTION, SOLUTION INTRA-ARTICULAR; INTRALESIONAL; INTRAMUSCULAR; INTRAVENOUS; SOFT TISSUE at 16:09

## 2023-04-21 ASSESSMENT — PATIENT HEALTH QUESTIONNAIRE - PHQ9
SUM OF ALL RESPONSES TO PHQ QUESTIONS 1-9: 7
5. POOR APPETITE OR OVEREATING: 2 - MORE THAN HALF THE DAYS
CLINICAL INTERPRETATION OF PHQ2 SCORE: 2

## 2023-04-21 ASSESSMENT — PAIN SCALES - GENERAL: PAINLEVEL: 9=SEVERE PAIN

## 2023-04-21 ASSESSMENT — FIBROSIS 4 INDEX: FIB4 SCORE: 1.89

## 2023-04-23 NOTE — PROCEDURES
Date of Service: 4/21/2023    Physician/s: Paresh Ogden MD    Pre-operative Diagnosis:  (M25.511) Right shoulder pain, unspecified chronicity  (M75.51) Bursitis of right shoulder       Post-operative Diagnosis:   (M25.511) Right shoulder pain, unspecified chronicity  (M75.51) Bursitis of right shoulder     Procedure: Right shoulder subacromial bursa injection ultrasound-guided     Description of procedure:    The risks, benefits, and alternatives of the procedure were reviewed and discussed with the patient.  Written informed consent was freely obtained. A pre-procedural time-out was conducted by the physician verifying patient’s identity, procedure to be performed, procedure site and side, and allergy verification. Appropriate equipment was determined to be in place for the procedure.      No sedation was used for this procedure.     In the office suite the patient was placed in seated position with her  shoulder exposed. The ultrasound probe was placed over the  lateral aspect of the head of the humerus, and the rotator cuff and humeral head were identified. The skin was prepped and draped in the usual sterile fashion. A 25g 3.5 inch needle was placed into skin via a advanced under ultrasound guidance, in-plane approach, into the subdeltoid bursa. Following negative aspiration, approx 5mL of 2ml of 1% lidocaine and 2ml of normal saline, 1mL 4mg/ml of dexamethasone was then injected, and the needle was subsequently removed intact. The patient's shoulder was wiped with a 4x4 gauze, the area was cleansed with alcohol prep, and a bandaid was applied. There were no complications noted.     Paresh Ogden MD  Physical Medicine and Rehabilitation  Interventional Spine and Sports Physiatry  [facility]

## 2023-05-12 ENCOUNTER — OFFICE VISIT (OUTPATIENT)
Dept: PHYSICAL MEDICINE AND REHAB | Facility: MEDICAL CENTER | Age: 68
End: 2023-05-12
Payer: MEDICARE

## 2023-05-12 VITALS
HEART RATE: 92 BPM | TEMPERATURE: 96.6 F | HEIGHT: 63 IN | DIASTOLIC BLOOD PRESSURE: 62 MMHG | SYSTOLIC BLOOD PRESSURE: 118 MMHG | WEIGHT: 191 LBS | OXYGEN SATURATION: 94 % | BODY MASS INDEX: 33.84 KG/M2

## 2023-05-12 DIAGNOSIS — M25.511 RIGHT SHOULDER PAIN, UNSPECIFIED CHRONICITY: ICD-10-CM

## 2023-05-12 DIAGNOSIS — M96.1 POSTLAMINECTOMY SYNDROME: ICD-10-CM

## 2023-05-12 DIAGNOSIS — F11.90 CHRONIC, CONTINUOUS USE OF OPIOIDS: ICD-10-CM

## 2023-05-12 DIAGNOSIS — M54.16 LEFT LUMBAR RADICULITIS: ICD-10-CM

## 2023-05-12 PROCEDURE — 1125F AMNT PAIN NOTED PAIN PRSNT: CPT | Performed by: PHYSICAL MEDICINE & REHABILITATION

## 2023-05-12 PROCEDURE — 3074F SYST BP LT 130 MM HG: CPT | Performed by: PHYSICAL MEDICINE & REHABILITATION

## 2023-05-12 PROCEDURE — 3078F DIAST BP <80 MM HG: CPT | Performed by: PHYSICAL MEDICINE & REHABILITATION

## 2023-05-12 PROCEDURE — 99214 OFFICE O/P EST MOD 30 MIN: CPT | Performed by: PHYSICAL MEDICINE & REHABILITATION

## 2023-05-12 RX ORDER — HYDROCODONE BITARTRATE AND ACETAMINOPHEN 5; 325 MG/1; MG/1
1 TABLET ORAL EVERY 8 HOURS PRN
Qty: 60 TABLET | Refills: 0 | Status: SHIPPED | OUTPATIENT
Start: 2023-05-21 | End: 2023-06-20

## 2023-05-12 RX ORDER — MORPHINE SULFATE 15 MG/1
15 TABLET, FILM COATED, EXTENDED RELEASE ORAL EVERY 12 HOURS
Qty: 60 TABLET | Refills: 0 | Status: SHIPPED | OUTPATIENT
Start: 2023-05-21 | End: 2023-06-20

## 2023-05-12 ASSESSMENT — PATIENT HEALTH QUESTIONNAIRE - PHQ9
CLINICAL INTERPRETATION OF PHQ2 SCORE: 4
SUM OF ALL RESPONSES TO PHQ QUESTIONS 1-9: 6
5. POOR APPETITE OR OVEREATING: 0 - NOT AT ALL

## 2023-05-12 ASSESSMENT — PAIN SCALES - GENERAL: PAINLEVEL: 8=MODERATE-SEVERE PAIN

## 2023-05-12 NOTE — PROGRESS NOTES
Follow-up patient note    Physiatry (physical medicine and  Rehabilitation), interventional spine and sports medicine    Date of Service: see Epic for DOS    Chief complaint:   Chief Complaint   Patient presents with    Follow-Up       HISTORY    HPI: Grisel Mark 68 y.o. female who presents today for follow-up evaluation of low back and leg pain.     Grisel reports that her right shoulder continues to be painful, did not get significant relief from injection 04/21/2023.  She is doing her home program and this helps maintain her ROM.  She has had PT in the past.    She continues to have low back pain with aching pain in the back, but improved since caudal epidural on 03/23/2023.  Left leg pain is significantly better.  Numbness in the right great toe.    Medications are stable.  She reports that she has been having more constipation.  Taking miralax at bedtime, but this has not been as helpful.     By history:  Patient has narcan.  She carries 1 in her purse and has been at home.    Medications:  Takes gabapentin 800mg taking 1.5 pills twice a day.  Her pain medications: Duloxetine 90mg.  MS Contin 15mg q12h.  Norco 5/325 for breakthrough, reports that she does not always take this, some days will take up to 3/day.   No side effects reported.  She is back to taking clonazepam 0.5mg at bedtime and 1mg during the day      By history:  Her laminectomy was in 1998.  Previous injection on left L4 and L5 TFESI on 08/12/2020 helped with her leg pain.     Medical records review:  Dr. Francisco Olmos, plan to increase duloxetine 90mg daily, discontinue buspirone 20mg po bid, start buproprion XL 150mg daily, continue brexipiprazole 1mg qhs, hydroxyzine 25mg po tid prn, clonazepam 0.5mg daily prn    Review of records   Dr. Dheeraj De La Rosa:  08/20/2019 Right L3-4, L4-5, L5-S1 radiofrequency ablation    I reviewed the note from the referring provider Peter Bruce * dated 02/05/2020: She was seen to establish care,  having just moved from [place].  She was seen for essential hypertension, mixed hyperlipidemia, DM2 in obese, hypothyroidism, recurrent MDD in partial depression/anxiety, GERD without esophagitis, chronic bilateral low back pain with bilateral sciatica.  Medications associated with the above were written, related labs ordered including CBC, CMP, Hb A1c, lipids, microalbumin, TSH, free thryoxine, referral to ps\ychiatry, referral to GI for colonoscopy and referral to pain clinic.    Previous treatments:    Physical Therapy: Yes    Medications the patient is tried: Narcotics, gabapentin and muscle relaxers    Previous interventions: [facility] [place] [facility] [employer] [facility] these included right lumbar radiofrequency procedures    Previous surgeries to relieve the above pain:  Surgery on October 28, 1998      ROS:   ENT: ear infection, treated with augmentin  CV: irregular heart, reports history of tachycardia  Psych: depression since 1995  Heme: anemia  Endo: hypothyroidism, diabetes    Red Flags ROS:   Fever, Chills, Sweats: Denies  Involuntary Weight Loss: Denies  Bladder Incontinence: Denies  Bowel Incontinence: Denies  Saddle Anesthesia: Denies    All other systems reviewed and negative.       PMHx:   Past Medical History:   Diagnosis Date    Anxiety     Arrhythmia     Chronic back pain     Constipation     Depression     Diabetes (HCC)     Ear pain, left 9/23/2021    GERD (gastroesophageal reflux disease)     Hyperlipidemia     Hypertension     Thyroid disease        PSHx:   Past Surgical History:   Procedure Laterality Date    RI INJ LUMBAR/SACRAL,W/ IMAGING N/A 3/23/2023    Procedure: Caudal epidural steroid injection with catheter;  Surgeon: Paresh Ogden M.D.;  [facility];  Service: Pain Management    RI INJ LUMBAR/SACRAL,W/ IMAGING N/A 10/31/2022    Procedure: Caudal epidural steroid injection with catheter;  Surgeon: Paresh Ogden M.D.;  Location[facility];  Service: Pain Management    RADIO FREQUENCY ABLATION ADDITIONAL LEVEL Left 11/11/2021    Procedure: LEFT lumbar three through lumbar five radiofrequency ablation;  Surgeon: Paresh Ogden M.D.;  Location: [facility];  Service: Pain Management    LUMBAR MEDIAL BRANCH BLOCKS Left 7/21/2021    Procedure: LEFT lumbar three through lumbar five medial branch blocks;  Surgeon: Paresh Ogden M.D.;  Location: [facility];  Service: Pain Management    LUMBAR TRANSFORAMINAL EPIDURAL STEROID INJECTION Left 5/20/2021    Procedure: LEFT lumbar four and lumbar five transforaminal epidural steroid injection;  Surgeon: Paresh Ogden M.D.;  Location: [facility];  Service: Pain Management    BLOCK EPIDURAL STEROID INJECTION Left 2/24/2021    Procedure: INJECTION, STEROID, EPIDURAL;  Surgeon: Paresh Ogden M.D.;  Location: [facility];  Service: Pain Management    LUMBAR MEDIAL BRANCH BLOCKS Left 9/9/2020    Procedure: BLOCK, NERVE, SPINAL, LUMBAR, POSTERIOR RAMUS, MEDIAL BRANCH;  Surgeon: Paresh Ogden M.D.;  Location: [facility];  Service: Pain Management    OH NJX AA&/STRD TFRML EPI LUMBAR/SACRAL 1 LEVEL Left 8/12/2020    Procedure: INJECTION, SPINE, LUMBOSACRAL, EPIDURAL, 1 LEVEL, TRANSFORAMINAL APPROACH;  Surgeon: Paresh Ogden M.D.;  Location: [facility];  Service: Pain Management    OH NJX AA&/STRD TFRML EPI LUMBAR/SACRAL EA ADDL Left 8/12/2020    Procedure: INJECTION, SPINE, LUMBOSACRAL, EPIDURAL, TRANSFORAMINAL APPROACH;  Surgeon: Paresh Ogden M.D.;  Location: [facility];  Service: Pain Management    LAMINOTOMY  1998    ABDOMINAL HYSTERECTOMY TOTAL      CARPAL TUNNEL RELEASE      CHOLECYSTECTOMY         Family history   Family History   Problem Relation Age of Onset    Cancer Mother     Stroke Father         Heart attack    Dementia Sister     Stroke Brother         heart  Attack    Cancer Brother         Skin    Diabetes Brother     Kidney Disease Brother     Cancer Brother     Parkinson's Disease Sister     No Known Problems Sister     Diabetes Daughter     Hypertension Daughter          Medications:   Current Outpatient Medications   Medication    [START ON 2023] HYDROcodone-acetaminophen (NORCO) 5-325 MG Tab per tablet    [START ON 2023] morphine ER (MS CONTIN) 15 MG Tab CR tablet    pantoprazole (PROTONIX) 40 MG Tablet Delayed Response    albuterol 108 (90 Base) MCG/ACT Aero Soln inhalation aerosol    Naloxone (NARCAN) 4 MG/0.1ML Liquid    busPIRone (BUSPAR) 30 MG tablet    Empagliflozin (JARDIANCE) 25 MG Tab    estradiol (ESTRACE) 1 MG Tab    lisinopril (PRINIVIL) 10 MG Tab    clonazePAM (KLONOPIN) 0.5 MG Tab    clonazePAM (KLONOPIN) 1 MG Tab    gabapentin (NEURONTIN) 800 MG tablet    metFORMIN ER (GLUCOPHAGE XR) 500 MG TABLET SR 24 HR    metoprolol tartrate (LOPRESSOR) 25 MG Tab    cyclobenzaprine (FLEXERIL) 5 mg tablet    levothyroxine (SYNTHROID) 50 MCG Tab    rosuvastatin (CRESTOR) 10 MG Tab    DULoxetine (CYMBALTA) 30 MG Cap DR Particles    Brexpiprazole (REXULTI) 2 MG Tab    pseudoephedrine (SUDAFED) 30 MG Tab    Blood Glucose Meter Kit    Lancets    Alcohol Swabs    fluticasone (FLONASE) 50 MCG/ACT nasal spray    cyanocobalamin (VITAMIN B-12) 500 MCG Tab    Blood Glucose Test Strips     No current facility-administered medications for this visit.       Allergies:   No Known Allergies    Social Hx:   Social History     Socioeconomic History    Marital status:      Spouse name: Not on file    Number of children: Not on file    Years of education: Not on file    Highest education level: Not on file   Occupational History    Not on file   Tobacco Use    Smoking status: Every Day     Packs/day: 0.25     Types: Cigarettes     Last attempt to quit: 2014     Years since quittin.3    Smokeless tobacco: Never   Vaping Use    Vaping Use: Never used   Substance  "and Sexual Activity    Alcohol use: Not Currently    Drug use: Not Currently     Types: Marijuana    Sexual activity: Not Currently   Other Topics Concern     Service No    Blood Transfusions Yes    Caffeine Concern No    Occupational Exposure No    Hobby Hazards No    Sleep Concern Yes    Stress Concern Yes    Weight Concern Yes    Special Diet No    Back Care No    Exercise Yes    Bike Helmet No    Seat Belt Yes    Self-Exams Yes   Social History Narrative    Not on file     Social Determinants of Health     Financial Resource Strain: Not on file   Food Insecurity: Not on file   Transportation Needs: Not on file   Physical Activity: Not on file   Stress: Not on file   Social Connections: Not on file   Intimate Partner Violence: Not on file   Housing Stability: Not on file         EXAMINATION     Physical Exam:   Vitals: /62 (BP Location: Right arm, Patient Position: Sitting, BP Cuff Size: Adult)   Pulse 92   Temp 35.9 °C (96.6 °F) (Temporal)   Ht 1.6 m (5' 3\")   Wt 86.6 kg (191 lb)   SpO2 94%     Constitutional:   Body Habitus: Body mass index is 33.83 kg/m².  Cooperation: Fully cooperates with exam  Appearance: Well-groomed, well-nourished, not disheveled, no acute distress    Eyes: No scleral icterus, no proptosis     ENT -no obvious auditory deficits, wearing a facial mask    Skin -no rashes or lesions noted, no warmth or erythema was noted at the injection site    Respiratory-  breathing comfortable on room air, no audible wheezing    Cardiovascular- no lower extremity edema is noted.     Psychiatric- alert and oriented ×3. Normal affect.     Gait - mildly antalgic, no assist device.  No loss of balance    Musculoskeletal -     Cervical spine  Functional ROM of the cervical spine.    Thoracic/Lumbar Spine/Sacral Spine/Hips   Inspection: no evidence of atrophy in bilateral lower extremities throughout     ROM of the lumbar spine decreased.     Palpation: tenderness to palpation lumbar " paraspinals minimal    Neuro     Key points for the international standards for neurological classification of spinal cord injury (ISNCSCI) to light touch.     Dermatome R L   L2 2 2   L3 2 2   L4 2 1   L5 1 1   S1 2 1   S2 2 2       Motor Exam Lower Extremities    ? Myotome R L   Hip flexion L2 5 5   Knee extension L3 5 5   Ankle dorsiflexion L4 5 5   Toe extension L5 5 5   Ankle plantarflexion S1 5 5       Reflexes  ?  R L   Patella  2+ 2+   Achilles   2+ 1+     No clonus bilaterally    MEDICAL DECISION MAKING    Medical records review: see under HPI section.     DATA    Labs:   03/15/2023: UDS positive for morphine, hydrocodone metabolites, positive for clonazepam, THC  08/10/2022: UDS positive for morphine and negative for metabolites, positive for hydrocodone, positive for clonazepam  03/18/2022: UDS positive for morphine and negative for metabolites, positive for hydrocodone, positive for clonazepam  01/18/2022: UDS positive for morphine and negative for metabolites, absent hydrocodone, positive for clonazepam  10/20/2021: UDS positive for morphine and metabolites, clonzapem, low amount of THC noted  05/08/2021: UDS positive for morphine and metabolites, clonazepam and metabolites, hydrocodone and metabolites  08/18/2020: UDS positive for morphine and metabolites, clonazepam and metabolites, hydrocodone and metabolites  04/24/2020 UDS positive for morphine and metabolites, clonazepam  04/09/2020 Vitamin D, 25:  28L  04/09/2020 Vitamin B12: 217    Lab Results   Component Value Date/Time    SODIUM 139 05/05/2022 12:18 PM    POTASSIUM 4.8 05/05/2022 12:18 PM    CHLORIDE 104 05/05/2022 12:18 PM    CO2 24 05/05/2022 12:18 PM    ANION 11.0 05/05/2022 12:18 PM    GLUCOSE 194 (H) 05/05/2022 12:18 PM    BUN 13 05/05/2022 12:18 PM    CREATININE 0.70 05/05/2022 12:18 PM    CALCIUM 9.8 05/05/2022 12:18 PM    ASTSGOT 28 05/05/2022 12:18 PM    ALTSGPT 28 05/05/2022 12:18 PM    TBILIRUBIN 0.3 05/05/2022 12:18 PM    ALBUMIN  4.4 05/05/2022 12:18 PM    TOTPROTEIN 7.1 05/05/2022 12:18 PM    GLOBULIN 2.7 05/05/2022 12:18 PM    AGRATIO 1.6 05/05/2022 12:18 PM       No results found for: PROTHROMBTM, INR     Lab Results   Component Value Date/Time    WBC 7.5 05/04/2021 08:06 AM    RBC 4.73 05/04/2021 08:06 AM    HEMOGLOBIN 14.6 05/04/2021 08:06 AM    HEMATOCRIT 43.8 05/04/2021 08:06 AM    MCV 92.6 05/04/2021 08:06 AM    MCH 30.9 05/04/2021 08:06 AM    MCHC 33.3 (L) 05/04/2021 08:06 AM    MPV 10.6 05/04/2021 08:06 AM    NEUTSPOLYS 46.60 05/04/2021 08:06 AM    LYMPHOCYTES 39.80 05/04/2021 08:06 AM    MONOCYTES 9.30 05/04/2021 08:06 AM    EOSINOPHILS 3.70 05/04/2021 08:06 AM    BASOPHILS 0.50 05/04/2021 08:06 AM        Lab Results   Component Value Date/Time    HBA1C 6.9 (A) 09/19/2022 09:00 AM        Imaging: I personally reviewed following images, these are my reads:     MRI lumbar spine 05/05/2020  At L1-2, no central or foraminal stenosis  At L2-3, no central or foraminal stenosis  At L3-4, mild disc bulge and mild ligamentum flavum thickening.  No central canal stenosis. Borderline left foraminal stenosis. Schmorl's node.   At L4-5, diffuse disc bulge with mild ligamentum flavum thickening.  No central canal stenosis.  There is facet arthropathy, left greater than right. Mild foraminal stenosis bilaterally. S/P left L4/5 hemilaminectomy  At L5-S1, there is mild-borderline foraminal stenosis with facet arthropathy and mild disc bulge.  No central canal stenosis    Xray lumbar spine 05/05/2020  There is mild decreased joint space at L3-4 and L4-5.    Facet arthropathy is noted, greatest at L5-S1 bilaterally.  No significant motion on flexion and extension films    IMAGING radiology reads. I reviewed the following radiology reads    Xray abdomen 07/17/2020  IMPRESSION:     Nonspecific bowel gas pattern. No evidence small bowel obstruction.      MRI lumbar spine 05/05/2020  IMPRESSION:     1.  Caudal lumbar facet arthropathy left worse than  right with no bony destruction to indicate septic or inflammatory arthropathy. This most likely is just degenerative  2.  Mild caudal lumbar foraminal stenoses  3.  No significant central stenosis.  4.  Left L4/5 hemilaminectomy                                                                                   Xray lumbar spine 05/05/2020     IMPRESSION:     1.  No acute lumbar compression fracture or subluxation.  2.  Posterior disc space narrowing at L4-5 and to lesser extent at L3-4.  3.  Bilateral facet arthropathy at L5-S1.                                                                                             Diagnosis   Visit Diagnoses     ICD-10-CM   1. Postlaminectomy syndrome  M96.1   2. Left lumbar radiculitis  M54.16   3. Chronic, continuous use of opioids  F11.90   4. Right shoulder pain, unspecified chronicity  M25.511             ASSESSMENT:  Grisel Mark 68 y.o. female seen for above     Grisel was seen today for follow-up.    Diagnoses and all orders for this visit:    Postlaminectomy syndrome  -     HYDROcodone-acetaminophen (NORCO) 5-325 MG Tab per tablet; Take 1 Tablet by mouth every 8 hours as needed (severe pain) for up to 30 days.  -     morphine ER (MS CONTIN) 15 MG Tab CR tablet; Take 1 Tablet by mouth every 12 hours for 30 days.    Left lumbar radiculitis  -     HYDROcodone-acetaminophen (NORCO) 5-325 MG Tab per tablet; Take 1 Tablet by mouth every 8 hours as needed (severe pain) for up to 30 days.  -     morphine ER (MS CONTIN) 15 MG Tab CR tablet; Take 1 Tablet by mouth every 12 hours for 30 days.    Chronic, continuous use of opioids  -     URINE DRUG SCREEN W/CONF (SEND OUT); Future    Right shoulder pain, unspecified chronicity        There was a lab error and pain management screen 04/14/2023 without available results.  Ordered UDS to be done as outpatient.   reviewed.  Plan to continue MS Contin 15mg po q12h and norco 5/325 prn.  Script given for the next month.  Plan to  repeat UDS.  She denies continued use of THC.  She does take clonazepam for THALIA and PTSD.  Increase miralax to maintain regular bowel movements.  She is doing well after caudal epidural.  Hold off on further treatment of right shoulder for now.  Continue activity as tolerated.      Follow-up: Return in about 4 weeks (around 6/9/2023).    Thank you very much for asking me to participate in Grisel Mark's care.  Please contact me with any questions or concerns.    Please note that this dictation was created using voice recognition software. I have made every reasonable attempt to correct obvious errors but there may be errors of grammar and content that I may have overlooked prior to finalization of this note.      Paresh Ogden MD  Physical Medicine and Rehabilitation  Interventional Spine and Sports Physiatry  [facility]

## 2023-05-22 ENCOUNTER — HOSPITAL ENCOUNTER (OUTPATIENT)
Dept: LAB | Facility: MEDICAL CENTER | Age: 68
End: 2023-05-22
Attending: PHYSICAL MEDICINE & REHABILITATION
Payer: MEDICARE

## 2023-05-22 ENCOUNTER — HOSPITAL ENCOUNTER (OUTPATIENT)
Dept: LAB | Facility: MEDICAL CENTER | Age: 68
End: 2023-05-22
Attending: NURSE PRACTITIONER
Payer: MEDICARE

## 2023-05-22 DIAGNOSIS — M96.1 POSTLAMINECTOMY SYNDROME: ICD-10-CM

## 2023-05-22 DIAGNOSIS — N39.3 STRESS BLADDER INCONTINENCE, FEMALE: ICD-10-CM

## 2023-05-22 DIAGNOSIS — F11.90 CHRONIC, CONTINUOUS USE OF OPIOIDS: ICD-10-CM

## 2023-05-22 DIAGNOSIS — E11.42 TYPE 2 DIABETES MELLITUS WITH DIABETIC POLYNEUROPATHY, WITHOUT LONG-TERM CURRENT USE OF INSULIN (HCC): ICD-10-CM

## 2023-05-22 DIAGNOSIS — E03.9 HYPOTHYROIDISM (ACQUIRED): ICD-10-CM

## 2023-05-22 DIAGNOSIS — F13.20 BENZODIAZEPINE DEPENDENCE (HCC): ICD-10-CM

## 2023-05-22 DIAGNOSIS — E55.9 VITAMIN D DEFICIENCY: ICD-10-CM

## 2023-05-22 LAB
25(OH)D3 SERPL-MCNC: 28 NG/ML (ref 30–100)
ERYTHROCYTE [DISTWIDTH] IN BLOOD BY AUTOMATED COUNT: 43.8 FL (ref 35.9–50)
EST. AVERAGE GLUCOSE BLD GHB EST-MCNC: 169 MG/DL
HBA1C MFR BLD: 7.5 % (ref 4–5.6)
HCT VFR BLD AUTO: 46.1 % (ref 37–47)
HGB BLD-MCNC: 15.4 G/DL (ref 12–16)
MCH RBC QN AUTO: 31 PG (ref 27–33)
MCHC RBC AUTO-ENTMCNC: 33.4 G/DL (ref 32.2–35.5)
MCV RBC AUTO: 92.8 FL (ref 81.4–97.8)
PLATELET # BLD AUTO: 219 K/UL (ref 164–446)
PMV BLD AUTO: 12.4 FL (ref 9–12.9)
RBC # BLD AUTO: 4.97 M/UL (ref 4.2–5.4)
T4 FREE SERPL-MCNC: 1.28 NG/DL (ref 0.93–1.7)
TSH SERPL DL<=0.005 MIU/L-ACNC: 1.57 UIU/ML (ref 0.38–5.33)
WBC # BLD AUTO: 8.2 K/UL (ref 4.8–10.8)

## 2023-05-22 PROCEDURE — G0481 DRUG TEST DEF 8-14 CLASSES: HCPCS

## 2023-05-22 PROCEDURE — 82306 VITAMIN D 25 HYDROXY: CPT

## 2023-05-22 PROCEDURE — 80307 DRUG TEST PRSMV CHEM ANLYZR: CPT

## 2023-05-22 PROCEDURE — 84439 ASSAY OF FREE THYROXINE: CPT

## 2023-05-22 PROCEDURE — G0480 DRUG TEST DEF 1-7 CLASSES: HCPCS

## 2023-05-22 PROCEDURE — 84443 ASSAY THYROID STIM HORMONE: CPT

## 2023-05-22 PROCEDURE — 85027 COMPLETE CBC AUTOMATED: CPT

## 2023-05-22 PROCEDURE — 83036 HEMOGLOBIN GLYCOSYLATED A1C: CPT

## 2023-05-22 PROCEDURE — 36415 COLL VENOUS BLD VENIPUNCTURE: CPT

## 2023-05-24 LAB
AMPHET CTO UR CFM-MCNC: NEGATIVE NG/ML
BARBITURATES CTO UR CFM-MCNC: NEGATIVE NG/ML
BENZODIAZ CTO UR CFM-MCNC: NEGATIVE NG/ML
CANNABINOIDS CTO UR CFM-MCNC: NEGATIVE NG/ML
COCAINE CTO UR CFM-MCNC: NEGATIVE NG/ML
DRUG COMMENT 753798: NORMAL
METHADONE CTO UR CFM-MCNC: NEGATIVE NG/ML
OPIATES CTO UR CFM-MCNC: POSITIVE NG/ML
PCP CTO UR CFM-MCNC: NEGATIVE NG/ML
PROPOXYPH CTO UR CFM-MCNC: NEGATIVE NG/ML

## 2023-05-26 LAB
1OH-MIDAZOLAM UR QL SCN: NOT DETECTED
6MAM UR CFM-MCNC: <10 NG/ML
6MAM UR QL: NOT DETECTED
7AMINOCLONAZEPAM UR QL: PRESENT
A-OH ALPRAZ UR QL: NOT DETECTED
ALPRAZ UR QL: NOT DETECTED
AMPHET UR QL SCN: NOT DETECTED
ANNOTATION COMMENT IMP: NORMAL
ANNOTATION COMMENT IMP: NORMAL
BARBITURATES UR QL: NOT DETECTED
BUPRENORPHINE UR QL: NOT DETECTED
BZE UR QL: NOT DETECTED
CARBOXYTHC UR QL: NOT DETECTED
CARISOPRODOL UR QL: NOT DETECTED
CLONAZEPAM UR QL: NOT DETECTED
CODEINE UR CFM-MCNC: <20 NG/ML
CODEINE UR QL: NOT DETECTED
DIAZEPAM UR QL: NOT DETECTED
ETHYL GLUCURONIDE UR QL: NOT DETECTED
FENTANYL UR QL: NOT DETECTED
GABAPENTIN UR QL: PRESENT
HYDROCODONE UR CFM-MCNC: <20 NG/ML
HYDROCODONE UR QL: NOT DETECTED
HYDROMORPHONE UR CFM-MCNC: <20 NG/ML
HYDROMORPHONE UR QL: PRESENT
LORAZEPAM UR QL: NOT DETECTED
MDA UR QL: NOT DETECTED
MDEA UR QL: NOT DETECTED
MDMA UR QL: NOT DETECTED
MEPERIDINE UR QL: NOT DETECTED
METHADONE UR QL: NOT DETECTED
METHAMPHET UR QL: NOT DETECTED
MIDAZOLAM UR QL SCN: NOT DETECTED
MORPHINE UR CFM-MCNC: 576 NG/ML
MORPHINE UR QL: PRESENT
NALOXONE UR QL SCN: NOT DETECTED
NORBUPRENORPHINE UR QL CFM: NOT DETECTED
NORDIAZEPAM UR QL: NOT DETECTED
NORFENTANYL UR QL: NOT DETECTED
NORHYDROCODONE UR CFM-MCNC: <20 NG/ML
NORHYDROCODONE UR QL CFM: NOT DETECTED
NOROXYCODONE UR CFM-MCNC: <20 NG/ML
NOROXYCODONE UR QL CFM: NOT DETECTED
NOROXYMORPH CO100 Q0458: NOT DETECTED
OPIATES UR NOROXYM Q0836: <20 NG/ML
OXAZEPAM UR QL: NOT DETECTED
OXYCODONE UR CFM-MCNC: <20 NG/ML
OXYCODONE UR QL: NOT DETECTED
OXYMORPHONE UR CFM-MCNC: <20 NG/ML
OXYMORPHONE UR QL: NOT DETECTED
PATHOLOGY STUDY: NORMAL
PCP UR QL: NOT DETECTED
PHENTERMINE UR QL: NOT DETECTED
PPAA UR QL: NOT DETECTED
PREGABALIN UR QL SCN: NOT DETECTED
SERVICE CMNT-IMP: NORMAL
TAPENADOL OSULF CO200 Q0473: NOT DETECTED
TAPENTADOL UR QL SCN: NOT DETECTED
TEMAZEPAM UR QL: NOT DETECTED
TRAMADOL UR QL: NOT DETECTED
ZOLPIDEM PHENYL-4-CARB UR QL SCN: NOT DETECTED
ZOLPIDEM UR QL: NOT DETECTED

## 2023-06-07 ENCOUNTER — TELEPHONE (OUTPATIENT)
Dept: HEALTH INFORMATION MANAGEMENT | Facility: OTHER | Age: 68
End: 2023-06-07
Payer: MEDICARE

## 2023-06-16 RX ORDER — BREXPIPRAZOLE 2 MG/1
TABLET ORAL
Qty: 90 TABLET | Refills: 3 | Status: SHIPPED | OUTPATIENT
Start: 2023-06-16 | End: 2023-07-11 | Stop reason: SDUPTHER

## 2023-06-21 ENCOUNTER — OFFICE VISIT (OUTPATIENT)
Dept: MEDICAL GROUP | Facility: PHYSICIAN GROUP | Age: 68
End: 2023-06-21
Payer: MEDICARE

## 2023-06-21 ENCOUNTER — HOSPITAL ENCOUNTER (OUTPATIENT)
Dept: LAB | Facility: MEDICAL CENTER | Age: 68
End: 2023-06-21
Attending: NURSE PRACTITIONER
Payer: MEDICARE

## 2023-06-21 VITALS
WEIGHT: 191 LBS | HEIGHT: 63 IN | OXYGEN SATURATION: 94 % | RESPIRATION RATE: 16 BRPM | DIASTOLIC BLOOD PRESSURE: 80 MMHG | BODY MASS INDEX: 33.84 KG/M2 | SYSTOLIC BLOOD PRESSURE: 126 MMHG | TEMPERATURE: 96.6 F | HEART RATE: 69 BPM

## 2023-06-21 DIAGNOSIS — F43.10 POSTTRAUMATIC STRESS DISORDER, DELAYED ONSET: ICD-10-CM

## 2023-06-21 DIAGNOSIS — F41.1 GENERALIZED ANXIETY DISORDER: ICD-10-CM

## 2023-06-21 DIAGNOSIS — G89.29 CHRONIC PAIN OF LEFT KNEE: ICD-10-CM

## 2023-06-21 DIAGNOSIS — E78.2 MIXED HYPERLIPIDEMIA: ICD-10-CM

## 2023-06-21 DIAGNOSIS — M25.562 CHRONIC PAIN OF LEFT KNEE: ICD-10-CM

## 2023-06-21 DIAGNOSIS — E11.42 TYPE 2 DIABETES MELLITUS WITH DIABETIC POLYNEUROPATHY, WITHOUT LONG-TERM CURRENT USE OF INSULIN (HCC): ICD-10-CM

## 2023-06-21 DIAGNOSIS — F41.8 SITUATIONAL ANXIETY: ICD-10-CM

## 2023-06-21 LAB
ALBUMIN SERPL BCP-MCNC: 4.3 G/DL (ref 3.2–4.9)
ALBUMIN/GLOB SERPL: 1.4 G/DL
ALP SERPL-CCNC: 80 U/L (ref 30–99)
ALT SERPL-CCNC: 27 U/L (ref 2–50)
ANION GAP SERPL CALC-SCNC: 12 MMOL/L (ref 7–16)
AST SERPL-CCNC: 32 U/L (ref 12–45)
BILIRUB SERPL-MCNC: 0.5 MG/DL (ref 0.1–1.5)
BUN SERPL-MCNC: 13 MG/DL (ref 8–22)
CALCIUM ALBUM COR SERPL-MCNC: 9.5 MG/DL (ref 8.5–10.5)
CALCIUM SERPL-MCNC: 9.7 MG/DL (ref 8.5–10.5)
CHLORIDE SERPL-SCNC: 102 MMOL/L (ref 96–112)
CHOLEST SERPL-MCNC: 136 MG/DL (ref 100–199)
CO2 SERPL-SCNC: 25 MMOL/L (ref 20–33)
CREAT SERPL-MCNC: 0.56 MG/DL (ref 0.5–1.4)
CREAT UR-MCNC: 36.75 MG/DL
GFR SERPLBLD CREATININE-BSD FMLA CKD-EPI: 99 ML/MIN/1.73 M 2
GLOBULIN SER CALC-MCNC: 3 G/DL (ref 1.9–3.5)
GLUCOSE SERPL-MCNC: 148 MG/DL (ref 65–99)
HDLC SERPL-MCNC: 36 MG/DL
LDLC SERPL CALC-MCNC: 64 MG/DL
MICROALBUMIN UR-MCNC: 14.5 MG/DL
MICROALBUMIN/CREAT UR: 395 MG/G (ref 0–30)
POTASSIUM SERPL-SCNC: 4.2 MMOL/L (ref 3.6–5.5)
PROT SERPL-MCNC: 7.3 G/DL (ref 6–8.2)
SODIUM SERPL-SCNC: 139 MMOL/L (ref 135–145)
TRIGL SERPL-MCNC: 180 MG/DL (ref 0–149)

## 2023-06-21 PROCEDURE — 99214 OFFICE O/P EST MOD 30 MIN: CPT | Performed by: NURSE PRACTITIONER

## 2023-06-21 PROCEDURE — 82570 ASSAY OF URINE CREATININE: CPT

## 2023-06-21 PROCEDURE — 3079F DIAST BP 80-89 MM HG: CPT | Performed by: NURSE PRACTITIONER

## 2023-06-21 PROCEDURE — 82043 UR ALBUMIN QUANTITATIVE: CPT

## 2023-06-21 PROCEDURE — 80053 COMPREHEN METABOLIC PANEL: CPT

## 2023-06-21 PROCEDURE — 36415 COLL VENOUS BLD VENIPUNCTURE: CPT

## 2023-06-21 PROCEDURE — 3074F SYST BP LT 130 MM HG: CPT | Performed by: NURSE PRACTITIONER

## 2023-06-21 PROCEDURE — 80061 LIPID PANEL: CPT

## 2023-06-21 RX ORDER — CLONAZEPAM 0.5 MG/1
0.5 TABLET ORAL NIGHTLY
Qty: 90 TABLET | Refills: 0 | Status: SHIPPED | OUTPATIENT
Start: 2023-07-07 | End: 2023-09-22 | Stop reason: SDUPTHER

## 2023-06-21 RX ORDER — CLONAZEPAM 1 MG/1
1 TABLET ORAL EVERY MORNING
Qty: 90 TABLET | Refills: 0 | Status: SHIPPED | OUTPATIENT
Start: 2023-07-07 | End: 2023-09-22 | Stop reason: SDUPTHER

## 2023-06-21 RX ORDER — PRAZOSIN HYDROCHLORIDE 1 MG/1
1 CAPSULE ORAL NIGHTLY
Qty: 90 CAPSULE | Refills: 3 | Status: SHIPPED | OUTPATIENT
Start: 2023-06-21 | End: 2023-09-22

## 2023-06-21 ASSESSMENT — FIBROSIS 4 INDEX: FIB4 SCORE: 1.64

## 2023-06-21 NOTE — PROGRESS NOTES
Chief Complaint   Patient presents with    Other     Fallow up nail infection   Cortizone shot left knee    Medication Refill     clonazePAM                                                                                                                                        HPI:   Grisel presents today with the following.    Problem   Chronic Pain of Left Knee   Generalized Anxiety Disorder   Posttraumatic Stress Disorder, Delayed Onset   Type 2 Diabetes Mellitus With Diabetic Polyneuropathy, Without Long-Term Current Use of Insulin (Hcc)       Current Outpatient Medications   Medication Sig Dispense Refill    Semaglutide,0.25 or 0.5MG/DOS, 2 MG/3ML Solution Pen-injector Inject 0.5 mg under the skin every 7 days. 3 mL 11    [START ON 7/7/2023] clonazePAM (KLONOPIN) 0.5 MG Tab Take 1 Tablet by mouth every evening for 90 days. Indications: Feeling Anxious, Panic Disorder 90 Tablet 0    [START ON 7/7/2023] clonazePAM (KLONOPIN) 1 MG Tab Take 1 Tablet by mouth every morning for 90 days. Indications: Feeling Anxious, Panic Disorder 90 Tablet 0    prazosin (MINIPRESS) 1 MG Cap Take 1 Capsule by mouth every evening. 90 Capsule 3    REXULTI 2 MG Tab TAKE 1 TABLET BY MOUTH EVERY DAY 90 Tablet 3    pantoprazole (PROTONIX) 40 MG Tablet Delayed Response Take 1 Tablet by mouth every day. 90 Tablet 1    albuterol 108 (90 Base) MCG/ACT Aero Soln inhalation aerosol Inhale 2 Puffs every four hours as needed for Shortness of Breath. 8.5 g 5    Naloxone (NARCAN) 4 MG/0.1ML Liquid One spray in one nostril for overdose and call 911. 1 Each 1    busPIRone (BUSPAR) 30 MG tablet Take 1 Tablet by mouth every day. 180 Tablet 3    Empagliflozin (JARDIANCE) 25 MG Tab Take 1 Tablet by mouth every day. 100 Tablet 3    estradiol (ESTRACE) 1 MG Tab Take 1 Tablet by mouth every day. 90 Tablet 3    lisinopril (PRINIVIL) 10 MG Tab Take 1 Tablet by mouth every day. 100 Tablet 3    clonazePAM (KLONOPIN) 0.5 MG Tab Take 1 Tablet by mouth every evening  for 90 days. Indications: Feeling Anxious, Panic Disorder 90 Tablet 0    clonazePAM (KLONOPIN) 1 MG Tab Take 1 Tablet by mouth every day for 90 days. Indications: Feeling Anxious, Panic Disorder 90 Tablet 0    gabapentin (NEURONTIN) 800 MG tablet Take 1.5 Tablets by mouth 2 times a day for 180 days. 270 Tablet 1    metFORMIN ER (GLUCOPHAGE XR) 500 MG TABLET SR 24 HR Take 1 Tablet by mouth 2 times a day. 400 Tablet 0    metoprolol tartrate (LOPRESSOR) 25 MG Tab Take 1 Tablet by mouth 2 times a day. 200 Tablet 3    cyclobenzaprine (FLEXERIL) 5 mg tablet Take 1 Tablet by mouth 2 times a day as needed for Muscle Spasms (restless leg). 90 Tablet 3    levothyroxine (SYNTHROID) 50 MCG Tab Take 1 Tablet by mouth every morning on an empty stomach. 90 Tablet 3    rosuvastatin (CRESTOR) 10 MG Tab Take 1 Tablet by mouth every evening. 100 Tablet 3    DULoxetine (CYMBALTA) 30 MG Cap DR Particles Take 3 Capsules by mouth every day. 270 Capsule 3    pseudoephedrine (SUDAFED) 30 MG Tab Take 1-2 Tablets by mouth every 6 hours as needed for Congestion (sinus pressure not relieved by other measures). 30 Tablet 0    Blood Glucose Meter Kit Test blood sugar as recommended by provider. Abbott Freestyle Lite blood glucose monitoring kit. 1 Kit 0    Lancets Use one Abbott T[place]on Lite lancet to test blood sugar twice daily and PRN. 300 Each 3    Alcohol Swabs Wipe site with prep pad prior to injection. 100 Each 3    fluticasone (FLONASE) 50 MCG/ACT nasal spray Administer 1 Spray into affected nostril(S) every day. 16 g 11    cyanocobalamin (VITAMIN B-12) 500 MCG Tab Take 500 mcg by mouth every day.      Blood Glucose Test Strips Use one Abbott Freedom Lite strip to test blood sugar twice daily and PRN. 270 Each 3     No current facility-administered medications for this visit.       Allergies as of 06/21/2023    (No Known Allergies)        ROS:  All systems negative expect as addressed in assessment and plan.     /80 (BP Location:  "Left arm, Patient Position: Sitting, BP Cuff Size: Adult)   Pulse 69   Temp 35.9 °C (96.6 °F) (Temporal)   Resp 16   Ht 1.6 m (5' 3\")   Wt 86.6 kg (191 lb)   LMP  (LMP Unknown)   SpO2 94%   BMI 33.83 kg/m²       Physical Exam  Vitals reviewed.   Constitutional:       Appearance: Normal appearance.   HENT:      Head: Normocephalic and atraumatic.      Mouth/Throat:      Mouth: Mucous membranes are moist.   Eyes:      Extraocular Movements: Extraocular movements intact.      Conjunctiva/sclera: Conjunctivae normal.   Pulmonary:      Effort: Pulmonary effort is normal.   Musculoskeletal:         General: Normal range of motion.      Cervical back: Normal range of motion.   Skin:     General: Skin is warm and dry.      Findings: Abrasion present.      Comments: Left knee   Neurological:      General: No focal deficit present.      Mental Status: She is alert and oriented to person, place, and time.   Psychiatric:         Mood and Affect: Mood normal.         Behavior: Behavior normal.         Thought Content: Thought content normal.           Assessment and Plan:  68 y.o. female with the following issues.    1. Type 2 diabetes mellitus with diabetic polyneuropathy, without long-term current use of insulin (HCC)  Microalbumin Creat Ratio Urine - Lab Collect    Semaglutide,0.25 or 0.5MG/DOS, 2 MG/3ML Solution Pen-injector    DISCONTINUED: Semaglutide,0.25 or 0.5MG/DOS, 2 MG/3ML Solution Pen-injector      2. Chronic pain of left knee        3. Generalized anxiety disorder  clonazePAM (KLONOPIN) 0.5 MG Tab    clonazePAM (KLONOPIN) 1 MG Tab      4. Posttraumatic stress disorder, delayed onset  clonazePAM (KLONOPIN) 0.5 MG Tab    clonazePAM (KLONOPIN) 1 MG Tab      5. Situational anxiety  clonazePAM (KLONOPIN) 0.5 MG Tab    clonazePAM (KLONOPIN) 1 MG Tab           Chronic pain of left knee  Chronic stable. Patient had a kenalog injection in her knee about 3 months ago. She reports that she has not had to use her cane " since the injection. She did have a fall recently where she scraped her knee.     Discussed risk of infection with wound if an injection was provided today. Will give injection in knee when wound has healed.         Type 2 diabetes mellitus with diabetic polyneuropathy, without long-term current use of insulin ([facility])  Chronic stable. Patient did have an increased in her A1c to 7.5%. She is currently on metformin and jardiance. She denies any adverse effects.     Continue jardiance 25mg daily and metformin 500mg BID. Will start ozempic to help with glycemic control.     Generalized anxiety disorder  Chronic stable. Patient is stable on clonazepam 1mg in the morning and 0.5mg in the evening. She has been on this medication for several years to help with her anxiety and PTSD. She does reports that she continues to have nightmares and night terrors. She does also use a CPAP for sleep apnea.     Obtained and reviewed patient utilization report from [facility] pharmacy database on 6/21/2023 1:11 PM  prior to writing prescription for controlled substance II, III or IV per [place] bill . Based on assessment of the report,my physical exam if necessary and the patient's health problem, the prescription is medically necessary.     Continue clonazepam 1mg every morning and 0.5mg nightly. Will start prazosin 1mg nightly to help with nightmares.     Posttraumatic stress disorder, delayed onset  Chronic condition. Patient reports that she has been having nightmares and night terrors most nights. She states last night she woke up with a night terror and ripped of her CPAP. She was not able to fall back asleep.     Start prazosin 1mg nightly.       Return in about 3 months (around 9/21/2023) for Chronic Health Conditions.      Please note that this dictation was created using voice recognition software. I have worked with consultants from the vendor as well as technical experts from [employer] to optimize the interface. I  have made every reasonable attempt to correct obvious errors, but I expect that there are errors of grammar and possibly content that I did not discover before finalizing the note.

## 2023-06-21 NOTE — ASSESSMENT & PLAN NOTE
Chronic condition. Patient reports that she has been having nightmares and night terrors most nights. She states last night she woke up with a night terror and ripped of her CPAP. She was not able to fall back asleep.     Start prazosin 1mg nightly.

## 2023-06-21 NOTE — ASSESSMENT & PLAN NOTE
Chronic stable. Patient is stable on clonazepam 1mg in the morning and 0.5mg in the evening. She has been on this medication for several years to help with her anxiety and PTSD. She does reports that she continues to have nightmares and night terrors. She does also use a CPAP for sleep apnea.     Obtained and reviewed patient utilization report from [facility] pharmacy database on 6/21/2023 1:11 PM  prior to writing prescription for controlled substance II, III or IV per [place] bill . Based on assessment of the report,my physical exam if necessary and the patient's health problem, the prescription is medically necessary.     Continue clonazepam 1mg every morning and 0.5mg nightly. Will start prazosin 1mg nightly to help with nightmares.

## 2023-06-21 NOTE — ASSESSMENT & PLAN NOTE
Chronic stable. Patient did have an increased in her A1c to 7.5%. She is currently on metformin and jardiance. She denies any adverse effects.     Continue jardiance 25mg daily and metformin 500mg BID. Will start ozempic to help with glycemic control.

## 2023-06-21 NOTE — ASSESSMENT & PLAN NOTE
Chronic stable. Patient had a kenalog injection in her knee about 3 months ago. She reports that she has not had to use her cane since the injection. She did have a fall recently where she scraped her knee.     Discussed risk of infection with wound if an injection was provided today. Will give injection in knee when wound has healed.

## 2023-06-22 ENCOUNTER — OFFICE VISIT (OUTPATIENT)
Dept: PHYSICAL MEDICINE AND REHAB | Facility: MEDICAL CENTER | Age: 68
End: 2023-06-22
Payer: MEDICARE

## 2023-06-22 VITALS
DIASTOLIC BLOOD PRESSURE: 68 MMHG | BODY MASS INDEX: 33.84 KG/M2 | WEIGHT: 191 LBS | SYSTOLIC BLOOD PRESSURE: 102 MMHG | HEART RATE: 77 BPM | OXYGEN SATURATION: 90 % | HEIGHT: 63 IN | TEMPERATURE: 97.3 F

## 2023-06-22 DIAGNOSIS — F11.90 CHRONIC, CONTINUOUS USE OF OPIOIDS: ICD-10-CM

## 2023-06-22 DIAGNOSIS — M96.1 POSTLAMINECTOMY SYNDROME: ICD-10-CM

## 2023-06-22 DIAGNOSIS — M25.511 RIGHT SHOULDER PAIN, UNSPECIFIED CHRONICITY: ICD-10-CM

## 2023-06-22 DIAGNOSIS — M54.16 LEFT LUMBAR RADICULITIS: ICD-10-CM

## 2023-06-22 DIAGNOSIS — F13.20 BENZODIAZEPINE DEPENDENCE (HCC): ICD-10-CM

## 2023-06-22 PROCEDURE — 3074F SYST BP LT 130 MM HG: CPT | Performed by: PHYSICAL MEDICINE & REHABILITATION

## 2023-06-22 PROCEDURE — 1125F AMNT PAIN NOTED PAIN PRSNT: CPT | Performed by: PHYSICAL MEDICINE & REHABILITATION

## 2023-06-22 PROCEDURE — 3078F DIAST BP <80 MM HG: CPT | Performed by: PHYSICAL MEDICINE & REHABILITATION

## 2023-06-22 PROCEDURE — 99214 OFFICE O/P EST MOD 30 MIN: CPT | Performed by: PHYSICAL MEDICINE & REHABILITATION

## 2023-06-22 RX ORDER — MORPHINE SULFATE 15 MG/1
15 TABLET, FILM COATED, EXTENDED RELEASE ORAL EVERY 12 HOURS
Qty: 60 TABLET | Refills: 0 | Status: SHIPPED | OUTPATIENT
Start: 2023-06-22 | End: 2023-07-22

## 2023-06-22 RX ORDER — HYDROCODONE BITARTRATE AND ACETAMINOPHEN 5; 325 MG/1; MG/1
1 TABLET ORAL EVERY 8 HOURS PRN
Qty: 60 TABLET | Refills: 0 | Status: SHIPPED | OUTPATIENT
Start: 2023-07-22 | End: 2023-08-21

## 2023-06-22 RX ORDER — GABAPENTIN 800 MG/1
1200 TABLET ORAL 2 TIMES DAILY
Qty: 270 TABLET | Refills: 1 | Status: SHIPPED | OUTPATIENT
Start: 2023-06-22 | End: 2023-09-22 | Stop reason: SDUPTHER

## 2023-06-22 RX ORDER — HYDROCODONE BITARTRATE AND ACETAMINOPHEN 5; 325 MG/1; MG/1
1 TABLET ORAL EVERY 8 HOURS PRN
Qty: 60 TABLET | Refills: 0 | Status: SHIPPED | OUTPATIENT
Start: 2023-06-22 | End: 2023-07-22

## 2023-06-22 RX ORDER — MORPHINE SULFATE 15 MG/1
15 TABLET, FILM COATED, EXTENDED RELEASE ORAL EVERY 12 HOURS
Qty: 60 TABLET | Refills: 0 | Status: SHIPPED | OUTPATIENT
Start: 2023-08-21 | End: 2023-09-20

## 2023-06-22 RX ORDER — MORPHINE SULFATE 15 MG/1
15 TABLET, FILM COATED, EXTENDED RELEASE ORAL EVERY 12 HOURS
Qty: 60 TABLET | Refills: 0 | Status: SHIPPED | OUTPATIENT
Start: 2023-07-22 | End: 2023-08-21

## 2023-06-22 RX ORDER — HYDROCODONE BITARTRATE AND ACETAMINOPHEN 5; 325 MG/1; MG/1
1 TABLET ORAL EVERY 8 HOURS PRN
Qty: 60 TABLET | Refills: 0 | Status: SHIPPED | OUTPATIENT
Start: 2023-08-21 | End: 2023-09-20

## 2023-06-22 ASSESSMENT — FIBROSIS 4 INDEX: FIB4 SCORE: 1.91

## 2023-06-22 ASSESSMENT — PATIENT HEALTH QUESTIONNAIRE - PHQ9
SUM OF ALL RESPONSES TO PHQ QUESTIONS 1-9: 6
CLINICAL INTERPRETATION OF PHQ2 SCORE: 1
5. POOR APPETITE OR OVEREATING: 2 - MORE THAN HALF THE DAYS

## 2023-06-22 ASSESSMENT — PAIN SCALES - GENERAL: PAINLEVEL: 9=SEVERE PAIN

## 2023-06-22 NOTE — H&P (VIEW-ONLY)
Follow-up patient note    Physiatry (physical medicine and  Rehabilitation), interventional spine and sports medicine    Date of Service: see Epic for DOS    Chief complaint:   Chief Complaint   Patient presents with    Follow-Up     Med check       HISTORY    HPI: Grisel Mark 68 y.o. female who presents today for follow-up evaluation of low back and leg pain.     Grisel reports that her low back and left leg pain are worse.  She had had significant improvement after caudal epidural steroid injection on 03/23/2023.    Reports that her right shoulder is painful, continues to work on ROM and home program from previous PT.    Medications are stable.  Taking miralax at bedtime.  Taking MS Contin 15mg po q12h and norco 5/325 twice a day.    By history:  Patient has narcan.  She carries 1 in her purse and has been at home.    Medications:  Takes gabapentin 800mg taking 1.5 pills twice a day.  Her pain medications: Duloxetine 90mg.  MS Contin 15mg q12h.  Norco 5/325 for breakthrough, reports that she does not always take this, some days will take up to 3/day.   No side effects reported.  She is back to taking clonazepam 0.5mg at bedtime and 1mg during the day      By history:  Her laminectomy was in 1998.  Previous injection on left L4 and L5 TFESI on 08/12/2020 helped with her leg pain.     Medical records review:  Dr. Francisco Olmos, plan to increase duloxetine 90mg daily, discontinue buspirone 20mg po bid, start buproprion XL 150mg daily, continue brexipiprazole 1mg qhs, hydroxyzine 25mg po tid prn, clonazepam 0.5mg daily prn    Review of records   Dr. Dheeraj De La Rosa:  08/20/2019 Right L3-4, L4-5, L5-S1 radiofrequency ablation    I reviewed the note from the referring provider Peter Bruce * dated 02/05/2020: She was seen to establish care, having just moved from [place].  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  Medications associated with the above were written, related labs ordered including CBC, CMP, Hb A1c, lipids, microalbumin, TSH, free thryoxine, referral to ps\ychiatry, referral to GI for colonoscopy and referral to pain clinic.    Previous treatments:    Physical Therapy: Yes    Medications the patient is tried: Narcotics, gabapentin and muscle relaxers    Previous interventions: [facility] these included right lumbar radiofrequency procedures    Previous surgeries to relieve the above pain:  Surgery on October 28, 1998      ROS:   ENT: ear infection, treated with augmentin  CV: irregular heart, reports history of tachycardia  Psych: depression since 1995  Heme: anemia  Endo: hypothyroidism, diabetes    Red Flags ROS:   Fever, Chills, Sweats: Denies  Involuntary Weight Loss: Denies  Bladder Incontinence: Denies  Bowel Incontinence: Denies  Saddle Anesthesia: Denies    All other systems reviewed and negative.       PMHx:   Past Medical History:   Diagnosis Date    Anxiety     Arrhythmia     Chronic back pain     Constipation     Depression     Diabetes (HCC)     Ear pain, left 9/23/2021    GERD (gastroesophageal reflux disease)     Hyperlipidemia     Hypertension     Thyroid disease        PSHx:   Past Surgical History:   Procedure Laterality Date    WI INJ LUMBAR/SACRAL,W/ IMAGING N/A 3/23/2023    Procedure: Caudal epidural steroid injection with catheter;  Surgeon: Paresh Ogden M.D.;  [facility];  Service: Pain Management    WI INJ LUMBAR/SACRAL,W/ IMAGING N/A 10/31/2022    Procedure: Caudal epidural steroid injection with catheter;  Surgeon: Paresh Ogden M.D.;  Location: [facility];  Service: Pain Management    RADIO FREQUENCY ABLATION ADDITIONAL LEVEL Left 11/11/2021    Procedure: LEFT lumbar three through lumbar five  radiofrequency ablation;  Surgeon: Paresh Ogden M.D.;  Location: [facility];  Service: Pain Management    LUMBAR MEDIAL BRANCH BLOCKS Left 7/21/2021    Procedure: LEFT lumbar three through lumbar five medial branch blocks;  Surgeon: Paresh Ogden M.D.;  Location: [facility];  Service: Pain Management    LUMBAR TRANSFORAMINAL EPIDURAL STEROID INJECTION Left 5/20/2021    Procedure: LEFT lumbar four and lumbar five transforaminal epidural steroid injection;  Surgeon: Paresh Ogden M.D.;  Location: [facility];  Service: Pain Management    BLOCK EPIDURAL STEROID INJECTION Left 2/24/2021    Procedure: INJECTION, STEROID, EPIDURAL;  Surgeon: Paresh Ogden M.D.;  Location: [facility];  Service: Pain Management    LUMBAR MEDIAL BRANCH BLOCKS Left 9/9/2020    Procedure: BLOCK, NERVE, SPINAL, LUMBAR, POSTERIOR RAMUS, MEDIAL BRANCH;  Surgeon: Paresh Ogden M.D.;  Location: [facility];  Service: Pain Management    NH NJX AA&/STRD TFRML EPI LUMBAR/SACRAL 1 LEVEL Left 8/12/2020    Procedure: INJECTION, SPINE, LUMBOSACRAL, EPIDURAL, 1 LEVEL, TRANSFORAMINAL APPROACH;  Surgeon: Paresh Ogden M.D.;  Location: [facility];  Service: Pain Management    NH NJX AA&/STRD TFRML EPI LUMBAR/SACRAL EA ADDL Left 8/12/2020    Procedure: INJECTION, SPINE, LUMBOSACRAL, EPIDURAL, TRANSFORAMINAL APPROACH;  Surgeon: Paresh Ogden M.D.;  Location: [facility];  Service: Pain Management    LAMINOTOMY  1998    ABDOMINAL HYSTERECTOMY TOTAL      CARPAL TUNNEL RELEASE      CHOLECYSTECTOMY         Family history   Family History   Problem Relation Age of Onset    Cancer Mother     Stroke Father         Heart attack    Dementia Sister     Stroke Brother         heart Attack    Cancer Brother         Skin    Diabetes Brother     Kidney Disease Brother     Cancer Brother     Parkinson's Disease Sister     No Known  Problems Sister     Diabetes Daughter     Hypertension Daughter          Medications:   Current Outpatient Medications   Medication    gabapentin (NEURONTIN) 800 MG tablet    morphine ER (MS CONTIN) 15 MG Tab CR tablet    HYDROcodone-acetaminophen (NORCO) 5-325 MG Tab per tablet    [START ON 7/22/2023] morphine ER (MS CONTIN) 15 MG Tab CR tablet    [START ON 8/21/2023] morphine ER (MS CONTIN) 15 MG Tab CR tablet    [START ON 7/22/2023] HYDROcodone-acetaminophen (NORCO) 5-325 MG Tab per tablet    [START ON 8/21/2023] HYDROcodone-acetaminophen (NORCO) 5-325 MG Tab per tablet    Semaglutide,0.25 or 0.5MG/DOS, 2 MG/3ML Solution Pen-injector    prazosin (MINIPRESS) 1 MG Cap    REXULTI 2 MG Tab    pantoprazole (PROTONIX) 40 MG Tablet Delayed Response    albuterol 108 (90 Base) MCG/ACT Aero Soln inhalation aerosol    Naloxone (NARCAN) 4 MG/0.1ML Liquid    busPIRone (BUSPAR) 30 MG tablet    Empagliflozin (JARDIANCE) 25 MG Tab    estradiol (ESTRACE) 1 MG Tab    lisinopril (PRINIVIL) 10 MG Tab    clonazePAM (KLONOPIN) 0.5 MG Tab    clonazePAM (KLONOPIN) 1 MG Tab    metFORMIN ER (GLUCOPHAGE XR) 500 MG TABLET SR 24 HR    metoprolol tartrate (LOPRESSOR) 25 MG Tab    cyclobenzaprine (FLEXERIL) 5 mg tablet    levothyroxine (SYNTHROID) 50 MCG Tab    rosuvastatin (CRESTOR) 10 MG Tab    DULoxetine (CYMBALTA) 30 MG Cap DR Particles    pseudoephedrine (SUDAFED) 30 MG Tab    Blood Glucose Meter Kit    Lancets    Alcohol Swabs    fluticasone (FLONASE) 50 MCG/ACT nasal spray    cyanocobalamin (VITAMIN B-12) 500 MCG Tab    Blood Glucose Test Strips    [START ON 7/7/2023] clonazePAM (KLONOPIN) 0.5 MG Tab    [START ON 7/7/2023] clonazePAM (KLONOPIN) 1 MG Tab     No current facility-administered medications for this visit.       Allergies:   No Known Allergies    Social Hx:   Social History     Socioeconomic History    Marital status:      Spouse name: Not on file    Number of children: Not on file    Years of education: Not on file  "   Highest education level: Not on file   Occupational History    Not on file   Tobacco Use    Smoking status: Every Day     Packs/day: 0.25     Types: Cigarettes     Last attempt to quit: 2014     Years since quittin.4    Smokeless tobacco: Never   Vaping Use    Vaping Use: Never used   Substance and Sexual Activity    Alcohol use: Not Currently    Drug use: Not Currently     Types: Marijuana    Sexual activity: Not Currently   Other Topics Concern     Service No    Blood Transfusions Yes    Caffeine Concern No    Occupational Exposure No    Hobby Hazards No    Sleep Concern Yes    Stress Concern Yes    Weight Concern Yes    Special Diet No    Back Care No    Exercise Yes    Bike Helmet No    Seat Belt Yes    Self-Exams Yes   Social History Narrative    Not on file     Social Determinants of Health     Financial Resource Strain: Not on file   Food Insecurity: Not on file   Transportation Needs: Not on file   Physical Activity: Not on file   Stress: Not on file   Social Connections: Not on file   Intimate Partner Violence: Not on file   Housing Stability: Not on file         EXAMINATION     Physical Exam:   Vitals: /68 (BP Location: Right arm, Patient Position: Sitting, BP Cuff Size: Large adult)   Pulse 77   Temp 36.3 °C (97.3 °F) (Temporal)   Ht 1.6 m (5' 3\")   Wt 86.6 kg (191 lb)   SpO2 90%     Constitutional:   Body Habitus: Body mass index is 33.83 kg/m².  Cooperation: Fully cooperates with exam  Appearance: Well-groomed, well-nourished, not disheveled, no acute distress    Eyes: No scleral icterus, no proptosis     ENT -no obvious auditory deficits, wearing a facial mask    Skin -no rashes or lesions noted    Respiratory-  breathing comfortable on room air, no audible wheezing    Cardiovascular- no lower extremity edema is noted.     Psychiatric- alert and oriented ×3. Normal affect.     Gait - mildly antalgic, no assist device.  No loss of balance    Musculoskeletal -     Cervical " spine  Functional ROM of the cervical spine.    Thoracic/Lumbar Spine/Sacral Spine/Hips   Inspection: no evidence of atrophy in bilateral lower extremities throughout     ROM of the lumbar spine, decreased     Neuro     Key points for the international standards for neurological classification of spinal cord injury (ISNCSCI) to light touch.     Dermatome R L   L2 2 2   L3 2 2   L4 2 1   L5 1 1   S1 2 1   S2 2 2       Motor Exam Lower Extremities    ? Myotome R L   Hip flexion L2 5 5   Knee extension L3 5 5   Ankle dorsiflexion L4 5 5   Toe extension L5 5 5   Ankle plantarflexion S1 5 5       Reflexes  ?  R L   Patella  2+ 2+   Achilles   2+ 1+     No clonus bilaterally    MEDICAL DECISION MAKING    Medical records review: see under HPI section.     DATA    Labs:   05/26/2023: UDS positive for morphine, hydrocodone metabolites, positive for clonazepam  03/15/2023: UDS positive for morphine, hydrocodone metabolites, positive for clonazepam, THC  08/10/2022: UDS positive for morphine and negative for metabolites, positive for hydrocodone, positive for clonazepam  03/18/2022: UDS positive for morphine and negative for metabolites, positive for hydrocodone, positive for clonazepam  01/18/2022: UDS positive for morphine and negative for metabolites, absent hydrocodone, positive for clonazepam  10/20/2021: UDS positive for morphine and metabolites, clonzapem, low amount of THC noted  05/08/2021: UDS positive for morphine and metabolites, clonazepam and metabolites, hydrocodone and metabolites  08/18/2020: UDS positive for morphine and metabolites, clonazepam and metabolites, hydrocodone and metabolites  04/24/2020 UDS positive for morphine and metabolites, clonazepam  04/09/2020 Vitamin D, 25:  28L  04/09/2020 Vitamin B12: 217    Lab Results   Component Value Date/Time    SODIUM 139 06/21/2023 01:50 PM    POTASSIUM 4.2 06/21/2023 01:50 PM    CHLORIDE 102 06/21/2023 01:50 PM    CO2 25 06/21/2023 01:50 PM    ANION 12.0  06/21/2023 01:50 PM    GLUCOSE 148 (H) 06/21/2023 01:50 PM    BUN 13 06/21/2023 01:50 PM    CREATININE 0.56 06/21/2023 01:50 PM    CALCIUM 9.7 06/21/2023 01:50 PM    ASTSGOT 32 06/21/2023 01:50 PM    ALTSGPT 27 06/21/2023 01:50 PM    TBILIRUBIN 0.5 06/21/2023 01:50 PM    ALBUMIN 4.3 06/21/2023 01:50 PM    TOTPROTEIN 7.3 06/21/2023 01:50 PM    GLOBULIN 3.0 06/21/2023 01:50 PM    AGRATIO 1.4 06/21/2023 01:50 PM       No results found for: PROTHROMBTM, INR     Lab Results   Component Value Date/Time    WBC 8.2 05/22/2023 02:44 PM    RBC 4.97 05/22/2023 02:44 PM    HEMOGLOBIN 15.4 05/22/2023 02:44 PM    HEMATOCRIT 46.1 05/22/2023 02:44 PM    MCV 92.8 05/22/2023 02:44 PM    MCH 31.0 05/22/2023 02:44 PM    MCHC 33.4 05/22/2023 02:44 PM    MPV 12.4 05/22/2023 02:44 PM    NEUTSPOLYS 46.60 05/04/2021 08:06 AM    LYMPHOCYTES 39.80 05/04/2021 08:06 AM    MONOCYTES 9.30 05/04/2021 08:06 AM    EOSINOPHILS 3.70 05/04/2021 08:06 AM    BASOPHILS 0.50 05/04/2021 08:06 AM        Lab Results   Component Value Date/Time    HBA1C 7.5 (H) 05/22/2023 02:44 PM        Imaging: I personally reviewed following images, these are my reads:     MRI lumbar spine 05/05/2020  At L1-2, no central or foraminal stenosis  At L2-3, no central or foraminal stenosis  At L3-4, mild disc bulge and mild ligamentum flavum thickening.  No central canal stenosis. Borderline left foraminal stenosis. Schmorl's node.   At L4-5, diffuse disc bulge with mild ligamentum flavum thickening.  No central canal stenosis.  There is facet arthropathy, left greater than right. Mild foraminal stenosis bilaterally. S/P left L4/5 hemilaminectomy  At L5-S1, there is mild-borderline foraminal stenosis with facet arthropathy and mild disc bulge.  No central canal stenosis    Xray lumbar spine 05/05/2020  There is mild decreased joint space at L3-4 and L4-5.    Facet arthropathy is noted, greatest at L5-S1 bilaterally.  No significant motion on flexion and extension  films    IMAGING radiology reads. I reviewed the following radiology reads    Xray abdomen 07/17/2020  IMPRESSION:     Nonspecific bowel gas pattern. No evidence small bowel obstruction.      MRI lumbar spine 05/05/2020  IMPRESSION:     1.  Caudal lumbar facet arthropathy left worse than right with no bony destruction to indicate septic or inflammatory arthropathy. This most likely is just degenerative  2.  Mild caudal lumbar foraminal stenoses  3.  No significant central stenosis.  4.  Left L4/5 hemilaminectomy                                                                                   Xray lumbar spine 05/05/2020     IMPRESSION:     1.  No acute lumbar compression fracture or subluxation.  2.  Posterior disc space narrowing at L4-5 and to lesser extent at L3-4.  3.  Bilateral facet arthropathy at L5-S1.                                                                                             Diagnosis   Visit Diagnoses     ICD-10-CM   1. Postlaminectomy syndrome  M96.1   2. Left lumbar radiculitis  M54.16   3. Chronic, continuous use of opioids  F11.90   4. Benzodiazepine dependence (HCC)  F13.20   5. Right shoulder pain, unspecified chronicity  M25.511               ASSESSMENT:  Grisel Mark 68 y.o. female seen for above     Grisel was seen today for follow-up.    Diagnoses and all orders for this visit:    Postlaminectomy syndrome  -     Referral to Pain Clinic  -     gabapentin (NEURONTIN) 800 MG tablet; Take 1.5 Tablets by mouth 2 times a day for 180 days.  -     morphine ER (MS CONTIN) 15 MG Tab CR tablet; Take 1 Tablet by mouth every 12 hours for 30 days.  -     HYDROcodone-acetaminophen (NORCO) 5-325 MG Tab per tablet; Take 1 Tablet by mouth every 8 hours as needed (breakthrough pain) for up to 30 days.  -     morphine ER (MS CONTIN) 15 MG Tab CR tablet; Take 1 Tablet by mouth every 12 hours for 30 days.  -     morphine ER (MS CONTIN) 15 MG Tab CR tablet; Take 1 Tablet by mouth every 12 hours for  30 days.  -     HYDROcodone-acetaminophen (NORCO) 5-325 MG Tab per tablet; Take 1 Tablet by mouth every 8 hours as needed (breakthrough pain) for up to 30 days.  -     HYDROcodone-acetaminophen (NORCO) 5-325 MG Tab per tablet; Take 1 Tablet by mouth every 8 hours as needed (breakthrough pain) for up to 30 days.  -     Consent for Opiate Prescription  -     Controlled Substance Treatment Agreement    Left lumbar radiculitis  -     Referral to Pain Clinic  -     gabapentin (NEURONTIN) 800 MG tablet; Take 1.5 Tablets by mouth 2 times a day for 180 days.  -     morphine ER (MS CONTIN) 15 MG Tab CR tablet; Take 1 Tablet by mouth every 12 hours for 30 days.  -     HYDROcodone-acetaminophen (NORCO) 5-325 MG Tab per tablet; Take 1 Tablet by mouth every 8 hours as needed (breakthrough pain) for up to 30 days.  -     morphine ER (MS CONTIN) 15 MG Tab CR tablet; Take 1 Tablet by mouth every 12 hours for 30 days.  -     morphine ER (MS CONTIN) 15 MG Tab CR tablet; Take 1 Tablet by mouth every 12 hours for 30 days.  -     HYDROcodone-acetaminophen (NORCO) 5-325 MG Tab per tablet; Take 1 Tablet by mouth every 8 hours as needed (breakthrough pain) for up to 30 days.  -     HYDROcodone-acetaminophen (NORCO) 5-325 MG Tab per tablet; Take 1 Tablet by mouth every 8 hours as needed (breakthrough pain) for up to 30 days.  -     Consent for Opiate Prescription  -     Controlled Substance Treatment Agreement    Chronic, continuous use of opioids  -     morphine ER (MS CONTIN) 15 MG Tab CR tablet; Take 1 Tablet by mouth every 12 hours for 30 days.  -     HYDROcodone-acetaminophen (NORCO) 5-325 MG Tab per tablet; Take 1 Tablet by mouth every 8 hours as needed (breakthrough pain) for up to 30 days.  -     morphine ER (MS CONTIN) 15 MG Tab CR tablet; Take 1 Tablet by mouth every 12 hours for 30 days.  -     morphine ER (MS CONTIN) 15 MG Tab CR tablet; Take 1 Tablet by mouth every 12 hours for 30 days.  -     HYDROcodone-acetaminophen (NORCO)  5-325 MG Tab per tablet; Take 1 Tablet by mouth every 8 hours as needed (breakthrough pain) for up to 30 days.  -     HYDROcodone-acetaminophen (NORCO) 5-325 MG Tab per tablet; Take 1 Tablet by mouth every 8 hours as needed (breakthrough pain) for up to 30 days.  -     Consent for Opiate Prescription  -     Controlled Substance Treatment Agreement    Benzodiazepine dependence (HCC)    Right shoulder pain, unspecified chronicity        Discussed that her pain is worse again.  She has had relief from caudal epidural.  Plan to repeat caudal epidural. The risks benefits and alternatives to this procedure were discussed and the patient wishes to proceed with the procedure. Risks include but are not limited to damage to surrounding structures, infection, bleeding, worsening of pain which can be permanent, weakness which can be permanent. Benefits include pain relief, improved function. Alternatives includes not doing the procedure.    Reviewed that we had considered SCS, but have decided against this option.  She still feels that this is the best option.  Continue home program to help manage right shoulder.  Most recent UDS reviewed.   reviewed.  Plan to continue current medications.  She has been stable on MS Contin 15mg po q12h and norco 5/325 twice a day for breakthrough pain. Scripts given for the next three months.  She has been stable on this and klonopin for THALIA and PTSD, written by her PCP.  Plan for her PCP to assume management of her medications.  Continue activity as tolerated.      Follow-up: Return for Hospital injection.    Thank you very much for asking me to participate in Grisel Mark's care.  Please contact me with any questions or concerns.    Please note that this dictation was created using voice recognition software. I have made every reasonable attempt to correct obvious errors but there may be errors of grammar and content that I may have overlooked prior to finalization of this  note.      Paresh Ogden MD  Physical Medicine and Rehabilitation  Interventional Spine and Sports Physiatry  [facility]

## 2023-06-22 NOTE — PROGRESS NOTES
Follow-up patient note    Physiatry (physical medicine and  Rehabilitation), interventional spine and sports medicine    Date of Service: see Epic for DOS    Chief complaint:   Chief Complaint   Patient presents with    Follow-Up     Med check       HISTORY    HPI: Grisel Mark 68 y.o. female who presents today for follow-up evaluation of low back and leg pain.     Grisel reports that her low back and left leg pain are worse.  She had had significant improvement after caudal epidural steroid injection on 03/23/2023.    Reports that her right shoulder is painful, continues to work on ROM and home program from previous PT.    Medications are stable.  Taking miralax at bedtime.  Taking MS Contin 15mg po q12h and norco 5/325 twice a day.    By history:  Patient has narcan.  She carries 1 in her purse and has been at home.    Medications:  Takes gabapentin 800mg taking 1.5 pills twice a day.  Her pain medications: Duloxetine 90mg.  MS Contin 15mg q12h.  Norco 5/325 for breakthrough, reports that she does not always take this, some days will take up to 3/day.   No side effects reported.  She is back to taking clonazepam 0.5mg at bedtime and 1mg during the day      By history:  Her laminectomy was in 1998.  Previous injection on left L4 and L5 TFESI on 08/12/2020 helped with her leg pain.     Medical records review:  Dr. Francisco Olmos, plan to increase duloxetine 90mg daily, discontinue buspirone 20mg po bid, start buproprion XL 150mg daily, continue brexipiprazole 1mg qhs, hydroxyzine 25mg po tid prn, clonazepam 0.5mg daily prn    Review of records   Dr. Dheeraj De La Rosa:  08/20/2019 Right L3-4, L4-5, L5-S1 radiofrequency ablation    I reviewed the note from the referring provider Peter Bruce * dated 02/05/2020: She was seen to establish care, having just moved from [place].  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  Medications associated with the above were written, related labs ordered including CBC, CMP, Hb A1c, lipids, microalbumin, TSH, free thryoxine, referral to ps\ychiatry, referral to GI for colonoscopy and referral to pain clinic.    Previous treatments:    Physical Therapy: Yes    Medications the patient is tried: Narcotics, gabapentin and muscle relaxers    Previous interventions: [facility] these included right lumbar radiofrequency procedures    Previous surgeries to relieve the above pain:  Surgery on October 28, 1998      ROS:   ENT: ear infection, treated with augmentin  CV: irregular heart, reports history of tachycardia  Psych: depression since 1995  Heme: anemia  Endo: hypothyroidism, diabetes    Red Flags ROS:   Fever, Chills, Sweats: Denies  Involuntary Weight Loss: Denies  Bladder Incontinence: Denies  Bowel Incontinence: Denies  Saddle Anesthesia: Denies    All other systems reviewed and negative.       PMHx:   Past Medical History:   Diagnosis Date    Anxiety     Arrhythmia     Chronic back pain     Constipation     Depression     Diabetes (HCC)     Ear pain, left 9/23/2021    GERD (gastroesophageal reflux disease)     Hyperlipidemia     Hypertension     Thyroid disease        PSHx:   Past Surgical History:   Procedure Laterality Date    TN INJ LUMBAR/SACRAL,W/ IMAGING N/A 3/23/2023    Procedure: Caudal epidural steroid injection with catheter;  Surgeon: Paersh Ogden M.D.;  [facility];  Service: Pain Management    TN INJ LUMBAR/SACRAL,W/ IMAGING N/A 10/31/2022    Procedure: Caudal epidural steroid injection with catheter;  Surgeon: Paresh Ogden M.D.;  Location: [facility];  Service: Pain Management    RADIO FREQUENCY ABLATION ADDITIONAL LEVEL Left 11/11/2021    Procedure: LEFT lumbar three through lumbar five  radiofrequency ablation;  Surgeon: Paresh Ogden M.D.;  Location: [facility];  Service: Pain Management    LUMBAR MEDIAL BRANCH BLOCKS Left 7/21/2021    Procedure: LEFT lumbar three through lumbar five medial branch blocks;  Surgeon: Paresh Ogden M.D.;  Location: [facility];  Service: Pain Management    LUMBAR TRANSFORAMINAL EPIDURAL STEROID INJECTION Left 5/20/2021    Procedure: LEFT lumbar four and lumbar five transforaminal epidural steroid injection;  Surgeon: Paresh Ogden M.D.;  Location: [facility];  Service: Pain Management    BLOCK EPIDURAL STEROID INJECTION Left 2/24/2021    Procedure: INJECTION, STEROID, EPIDURAL;  Surgeon: Paresh Ogden M.D.;  Location: [facility];  Service: Pain Management    LUMBAR MEDIAL BRANCH BLOCKS Left 9/9/2020    Procedure: BLOCK, NERVE, SPINAL, LUMBAR, POSTERIOR RAMUS, MEDIAL BRANCH;  Surgeon: Paresh Ogden M.D.;  Location: [facility];  Service: Pain Management    SD NJX AA&/STRD TFRML EPI LUMBAR/SACRAL 1 LEVEL Left 8/12/2020    Procedure: INJECTION, SPINE, LUMBOSACRAL, EPIDURAL, 1 LEVEL, TRANSFORAMINAL APPROACH;  Surgeon: Paresh Ogden M.D.;  Location: [facility];  Service: Pain Management    SD NJX AA&/STRD TFRML EPI LUMBAR/SACRAL EA ADDL Left 8/12/2020    Procedure: INJECTION, SPINE, LUMBOSACRAL, EPIDURAL, TRANSFORAMINAL APPROACH;  Surgeon: Paresh Ogden M.D.;  Location: [facility];  Service: Pain Management    LAMINOTOMY  1998    ABDOMINAL HYSTERECTOMY TOTAL      CARPAL TUNNEL RELEASE      CHOLECYSTECTOMY         Family history   Family History   Problem Relation Age of Onset    Cancer Mother     Stroke Father         Heart attack    Dementia Sister     Stroke Brother         heart Attack    Cancer Brother         Skin    Diabetes Brother     Kidney Disease Brother     Cancer Brother     Parkinson's Disease Sister     No Known  Problems Sister     Diabetes Daughter     Hypertension Daughter          Medications:   Current Outpatient Medications   Medication    gabapentin (NEURONTIN) 800 MG tablet    morphine ER (MS CONTIN) 15 MG Tab CR tablet    HYDROcodone-acetaminophen (NORCO) 5-325 MG Tab per tablet    [START ON 7/22/2023] morphine ER (MS CONTIN) 15 MG Tab CR tablet    [START ON 8/21/2023] morphine ER (MS CONTIN) 15 MG Tab CR tablet    [START ON 7/22/2023] HYDROcodone-acetaminophen (NORCO) 5-325 MG Tab per tablet    [START ON 8/21/2023] HYDROcodone-acetaminophen (NORCO) 5-325 MG Tab per tablet    Semaglutide,0.25 or 0.5MG/DOS, 2 MG/3ML Solution Pen-injector    prazosin (MINIPRESS) 1 MG Cap    REXULTI 2 MG Tab    pantoprazole (PROTONIX) 40 MG Tablet Delayed Response    albuterol 108 (90 Base) MCG/ACT Aero Soln inhalation aerosol    Naloxone (NARCAN) 4 MG/0.1ML Liquid    busPIRone (BUSPAR) 30 MG tablet    Empagliflozin (JARDIANCE) 25 MG Tab    estradiol (ESTRACE) 1 MG Tab    lisinopril (PRINIVIL) 10 MG Tab    clonazePAM (KLONOPIN) 0.5 MG Tab    clonazePAM (KLONOPIN) 1 MG Tab    metFORMIN ER (GLUCOPHAGE XR) 500 MG TABLET SR 24 HR    metoprolol tartrate (LOPRESSOR) 25 MG Tab    cyclobenzaprine (FLEXERIL) 5 mg tablet    levothyroxine (SYNTHROID) 50 MCG Tab    rosuvastatin (CRESTOR) 10 MG Tab    DULoxetine (CYMBALTA) 30 MG Cap DR Particles    pseudoephedrine (SUDAFED) 30 MG Tab    Blood Glucose Meter Kit    Lancets    Alcohol Swabs    fluticasone (FLONASE) 50 MCG/ACT nasal spray    cyanocobalamin (VITAMIN B-12) 500 MCG Tab    Blood Glucose Test Strips    [START ON 7/7/2023] clonazePAM (KLONOPIN) 0.5 MG Tab    [START ON 7/7/2023] clonazePAM (KLONOPIN) 1 MG Tab     No current facility-administered medications for this visit.       Allergies:   No Known Allergies    Social Hx:   Social History     Socioeconomic History    Marital status:      Spouse name: Not on file    Number of children: Not on file    Years of education: Not on file  "   Highest education level: Not on file   Occupational History    Not on file   Tobacco Use    Smoking status: Every Day     Packs/day: 0.25     Types: Cigarettes     Last attempt to quit: 2014     Years since quittin.4    Smokeless tobacco: Never   Vaping Use    Vaping Use: Never used   Substance and Sexual Activity    Alcohol use: Not Currently    Drug use: Not Currently     Types: Marijuana    Sexual activity: Not Currently   Other Topics Concern     Service No    Blood Transfusions Yes    Caffeine Concern No    Occupational Exposure No    Hobby Hazards No    Sleep Concern Yes    Stress Concern Yes    Weight Concern Yes    Special Diet No    Back Care No    Exercise Yes    Bike Helmet No    Seat Belt Yes    Self-Exams Yes   Social History Narrative    Not on file     Social Determinants of Health     Financial Resource Strain: Not on file   Food Insecurity: Not on file   Transportation Needs: Not on file   Physical Activity: Not on file   Stress: Not on file   Social Connections: Not on file   Intimate Partner Violence: Not on file   Housing Stability: Not on file         EXAMINATION     Physical Exam:   Vitals: /68 (BP Location: Right arm, Patient Position: Sitting, BP Cuff Size: Large adult)   Pulse 77   Temp 36.3 °C (97.3 °F) (Temporal)   Ht 1.6 m (5' 3\")   Wt 86.6 kg (191 lb)   SpO2 90%     Constitutional:   Body Habitus: Body mass index is 33.83 kg/m².  Cooperation: Fully cooperates with exam  Appearance: Well-groomed, well-nourished, not disheveled, no acute distress    Eyes: No scleral icterus, no proptosis     ENT -no obvious auditory deficits, wearing a facial mask    Skin -no rashes or lesions noted    Respiratory-  breathing comfortable on room air, no audible wheezing    Cardiovascular- no lower extremity edema is noted.     Psychiatric- alert and oriented ×3. Normal affect.     Gait - mildly antalgic, no assist device.  No loss of balance    Musculoskeletal -     Cervical " spine  Functional ROM of the cervical spine.    Thoracic/Lumbar Spine/Sacral Spine/Hips   Inspection: no evidence of atrophy in bilateral lower extremities throughout     ROM of the lumbar spine, decreased     Neuro     Key points for the international standards for neurological classification of spinal cord injury (ISNCSCI) to light touch.     Dermatome R L   L2 2 2   L3 2 2   L4 2 1   L5 1 1   S1 2 1   S2 2 2       Motor Exam Lower Extremities    ? Myotome R L   Hip flexion L2 5 5   Knee extension L3 5 5   Ankle dorsiflexion L4 5 5   Toe extension L5 5 5   Ankle plantarflexion S1 5 5       Reflexes  ?  R L   Patella  2+ 2+   Achilles   2+ 1+     No clonus bilaterally    MEDICAL DECISION MAKING    Medical records review: see under HPI section.     DATA    Labs:   05/26/2023: UDS positive for morphine, hydrocodone metabolites, positive for clonazepam  03/15/2023: UDS positive for morphine, hydrocodone metabolites, positive for clonazepam, THC  08/10/2022: UDS positive for morphine and negative for metabolites, positive for hydrocodone, positive for clonazepam  03/18/2022: UDS positive for morphine and negative for metabolites, positive for hydrocodone, positive for clonazepam  01/18/2022: UDS positive for morphine and negative for metabolites, absent hydrocodone, positive for clonazepam  10/20/2021: UDS positive for morphine and metabolites, clonzapem, low amount of THC noted  05/08/2021: UDS positive for morphine and metabolites, clonazepam and metabolites, hydrocodone and metabolites  08/18/2020: UDS positive for morphine and metabolites, clonazepam and metabolites, hydrocodone and metabolites  04/24/2020 UDS positive for morphine and metabolites, clonazepam  04/09/2020 Vitamin D, 25:  28L  04/09/2020 Vitamin B12: 217    Lab Results   Component Value Date/Time    SODIUM 139 06/21/2023 01:50 PM    POTASSIUM 4.2 06/21/2023 01:50 PM    CHLORIDE 102 06/21/2023 01:50 PM    CO2 25 06/21/2023 01:50 PM    ANION 12.0  06/21/2023 01:50 PM    GLUCOSE 148 (H) 06/21/2023 01:50 PM    BUN 13 06/21/2023 01:50 PM    CREATININE 0.56 06/21/2023 01:50 PM    CALCIUM 9.7 06/21/2023 01:50 PM    ASTSGOT 32 06/21/2023 01:50 PM    ALTSGPT 27 06/21/2023 01:50 PM    TBILIRUBIN 0.5 06/21/2023 01:50 PM    ALBUMIN 4.3 06/21/2023 01:50 PM    TOTPROTEIN 7.3 06/21/2023 01:50 PM    GLOBULIN 3.0 06/21/2023 01:50 PM    AGRATIO 1.4 06/21/2023 01:50 PM       No results found for: PROTHROMBTM, INR     Lab Results   Component Value Date/Time    WBC 8.2 05/22/2023 02:44 PM    RBC 4.97 05/22/2023 02:44 PM    HEMOGLOBIN 15.4 05/22/2023 02:44 PM    HEMATOCRIT 46.1 05/22/2023 02:44 PM    MCV 92.8 05/22/2023 02:44 PM    MCH 31.0 05/22/2023 02:44 PM    MCHC 33.4 05/22/2023 02:44 PM    MPV 12.4 05/22/2023 02:44 PM    NEUTSPOLYS 46.60 05/04/2021 08:06 AM    LYMPHOCYTES 39.80 05/04/2021 08:06 AM    MONOCYTES 9.30 05/04/2021 08:06 AM    EOSINOPHILS 3.70 05/04/2021 08:06 AM    BASOPHILS 0.50 05/04/2021 08:06 AM        Lab Results   Component Value Date/Time    HBA1C 7.5 (H) 05/22/2023 02:44 PM        Imaging: I personally reviewed following images, these are my reads:     MRI lumbar spine 05/05/2020  At L1-2, no central or foraminal stenosis  At L2-3, no central or foraminal stenosis  At L3-4, mild disc bulge and mild ligamentum flavum thickening.  No central canal stenosis. Borderline left foraminal stenosis. Schmorl's node.   At L4-5, diffuse disc bulge with mild ligamentum flavum thickening.  No central canal stenosis.  There is facet arthropathy, left greater than right. Mild foraminal stenosis bilaterally. S/P left L4/5 hemilaminectomy  At L5-S1, there is mild-borderline foraminal stenosis with facet arthropathy and mild disc bulge.  No central canal stenosis    Xray lumbar spine 05/05/2020  There is mild decreased joint space at L3-4 and L4-5.    Facet arthropathy is noted, greatest at L5-S1 bilaterally.  No significant motion on flexion and extension  films    IMAGING radiology reads. I reviewed the following radiology reads    Xray abdomen 07/17/2020  IMPRESSION:     Nonspecific bowel gas pattern. No evidence small bowel obstruction.      MRI lumbar spine 05/05/2020  IMPRESSION:     1.  Caudal lumbar facet arthropathy left worse than right with no bony destruction to indicate septic or inflammatory arthropathy. This most likely is just degenerative  2.  Mild caudal lumbar foraminal stenoses  3.  No significant central stenosis.  4.  Left L4/5 hemilaminectomy                                                                                   Xray lumbar spine 05/05/2020     IMPRESSION:     1.  No acute lumbar compression fracture or subluxation.  2.  Posterior disc space narrowing at L4-5 and to lesser extent at L3-4.  3.  Bilateral facet arthropathy at L5-S1.                                                                                             Diagnosis   Visit Diagnoses     ICD-10-CM   1. Postlaminectomy syndrome  M96.1   2. Left lumbar radiculitis  M54.16   3. Chronic, continuous use of opioids  F11.90   4. Benzodiazepine dependence (HCC)  F13.20   5. Right shoulder pain, unspecified chronicity  M25.511               ASSESSMENT:  Grisel Mark 68 y.o. female seen for above     Grisel was seen today for follow-up.    Diagnoses and all orders for this visit:    Postlaminectomy syndrome  -     Referral to Pain Clinic  -     gabapentin (NEURONTIN) 800 MG tablet; Take 1.5 Tablets by mouth 2 times a day for 180 days.  -     morphine ER (MS CONTIN) 15 MG Tab CR tablet; Take 1 Tablet by mouth every 12 hours for 30 days.  -     HYDROcodone-acetaminophen (NORCO) 5-325 MG Tab per tablet; Take 1 Tablet by mouth every 8 hours as needed (breakthrough pain) for up to 30 days.  -     morphine ER (MS CONTIN) 15 MG Tab CR tablet; Take 1 Tablet by mouth every 12 hours for 30 days.  -     morphine ER (MS CONTIN) 15 MG Tab CR tablet; Take 1 Tablet by mouth every 12 hours for  30 days.  -     HYDROcodone-acetaminophen (NORCO) 5-325 MG Tab per tablet; Take 1 Tablet by mouth every 8 hours as needed (breakthrough pain) for up to 30 days.  -     HYDROcodone-acetaminophen (NORCO) 5-325 MG Tab per tablet; Take 1 Tablet by mouth every 8 hours as needed (breakthrough pain) for up to 30 days.  -     Consent for Opiate Prescription  -     Controlled Substance Treatment Agreement    Left lumbar radiculitis  -     Referral to Pain Clinic  -     gabapentin (NEURONTIN) 800 MG tablet; Take 1.5 Tablets by mouth 2 times a day for 180 days.  -     morphine ER (MS CONTIN) 15 MG Tab CR tablet; Take 1 Tablet by mouth every 12 hours for 30 days.  -     HYDROcodone-acetaminophen (NORCO) 5-325 MG Tab per tablet; Take 1 Tablet by mouth every 8 hours as needed (breakthrough pain) for up to 30 days.  -     morphine ER (MS CONTIN) 15 MG Tab CR tablet; Take 1 Tablet by mouth every 12 hours for 30 days.  -     morphine ER (MS CONTIN) 15 MG Tab CR tablet; Take 1 Tablet by mouth every 12 hours for 30 days.  -     HYDROcodone-acetaminophen (NORCO) 5-325 MG Tab per tablet; Take 1 Tablet by mouth every 8 hours as needed (breakthrough pain) for up to 30 days.  -     HYDROcodone-acetaminophen (NORCO) 5-325 MG Tab per tablet; Take 1 Tablet by mouth every 8 hours as needed (breakthrough pain) for up to 30 days.  -     Consent for Opiate Prescription  -     Controlled Substance Treatment Agreement    Chronic, continuous use of opioids  -     morphine ER (MS CONTIN) 15 MG Tab CR tablet; Take 1 Tablet by mouth every 12 hours for 30 days.  -     HYDROcodone-acetaminophen (NORCO) 5-325 MG Tab per tablet; Take 1 Tablet by mouth every 8 hours as needed (breakthrough pain) for up to 30 days.  -     morphine ER (MS CONTIN) 15 MG Tab CR tablet; Take 1 Tablet by mouth every 12 hours for 30 days.  -     morphine ER (MS CONTIN) 15 MG Tab CR tablet; Take 1 Tablet by mouth every 12 hours for 30 days.  -     HYDROcodone-acetaminophen (NORCO)  5-325 MG Tab per tablet; Take 1 Tablet by mouth every 8 hours as needed (breakthrough pain) for up to 30 days.  -     HYDROcodone-acetaminophen (NORCO) 5-325 MG Tab per tablet; Take 1 Tablet by mouth every 8 hours as needed (breakthrough pain) for up to 30 days.  -     Consent for Opiate Prescription  -     Controlled Substance Treatment Agreement    Benzodiazepine dependence (HCC)    Right shoulder pain, unspecified chronicity        Discussed that her pain is worse again.  She has had relief from caudal epidural.  Plan to repeat caudal epidural. The risks benefits and alternatives to this procedure were discussed and the patient wishes to proceed with the procedure. Risks include but are not limited to damage to surrounding structures, infection, bleeding, worsening of pain which can be permanent, weakness which can be permanent. Benefits include pain relief, improved function. Alternatives includes not doing the procedure.    Reviewed that we had considered SCS, but have decided against this option.  She still feels that this is the best option.  Continue home program to help manage right shoulder.  Most recent UDS reviewed.   reviewed.  Plan to continue current medications.  She has been stable on MS Contin 15mg po q12h and norco 5/325 twice a day for breakthrough pain. Scripts given for the next three months.  She has been stable on this and klonopin for THALIA and PTSD, written by her PCP.  Plan for her PCP to assume management of her medications.  Continue activity as tolerated.      Follow-up: Return for Hospital injection.    Thank you very much for asking me to participate in Grisel Mark's care.  Please contact me with any questions or concerns.    Please note that this dictation was created using voice recognition software. I have made every reasonable attempt to correct obvious errors but there may be errors of grammar and content that I may have overlooked prior to finalization of this  note.      Paresh Ogden MD  Physical Medicine and Rehabilitation  Interventional Spine and Sports Physiatry  [facility]

## 2023-06-22 NOTE — PATIENT INSTRUCTIONS
Your procedure will be at the [facility] special procedure suite.    [facility] [place] [address]       PRE-PROCEDURE INSTRUCTIONS  You may take your regular medications except:   No Anti-inflammatories 5 days prior to your procedure. Anti-inflammatories include medicines such as  ibuprofen (Motrin, Advil), Excedrin, Naproxen (Aleve, Anaprox, Naprelan, Naprosyn), Celecoxib (Celebrex), Diclofenac (Voltaren-XR tab), and Meloxicam (Mobic).   No Glucophage or Metformin 24 hours before your procedure. You may resume next day after your procedure.  Call the physiatry office if you are taking or prescribed anti-biotics within five days of procedure.  Please ask provider if you are taking any new diabetes medication.  CONTINUE TAKING BLOOD PRESSURE MEDICATIONS AS PRESCRIBED.  Pain medications will not be prescribed on the procedure day. Procedural pain medication may be used by your provider   Call your doctor's office performing the procedure if you have a fever, chills, rash or new illness prior to your procedure    Anticoagulation/antiplatelet medications  No Blood thinning medications such as Coumadin or Plavix 5 days prior to procedure unless your doctor said to continue these medications. Call your doctor if a new medication is prescribed in this class.     Restrictions for eating before procedure:   If you are getting procedural sedation, then do not eat to for 8 hours prior to procedure appointment time. Do not drink fluids for four hours prior to your procedure time.   If you are not having procedural sedation, then Skip the meal prior to your procedure. If you have a morning procedure then skip breakfast. If you have an afternoon procedure then skip lunch.   You may drink clear liquids up to 2 hours prior to your procedure  You must have a  the day of procedure to accompany you home.      POST PROCEDURE INSTRUCTIONS   No heavy lifting, strenuous bending or strenuous exercise for 3 days  after your procedure.  No hot tubs, baths, swimming for 3 days after your procedure  You can remove the bandage the day after the procedure.  IF YOU RECEIVED A STEROID INJECTION. PLEASE NOTE THAT THERE MAY BE A DELAY FOR THE INJECTION TO START WORKING, THE DELAY MAY BE UP TO TWO WEEKS. IF YOU HAVE DIABETES, PLEASE NOTE THAT YOUR SUGAR LEVELS MAY BE ELEVATED FOR 1-2 DAYS AFTER A STEROID INJECTION.  THE STEROID MAY CAUSE TEMPORARY SYMPTOMS WHICH USUALLY RESOLVE ON THEIR OWN WITHIN 1 TO 2 DAYS INCLUDING FACIAL FLUSHING OR A FEELING OF WARMTH ON THE FACE, TEMPORARY INCREASES IN BLOOD SUGAR, INSOMNIA, INCREASED HUNGER  IF YOU RECEIVED A DIAGNOSTIC PROCEDURE (SUCH AS A MEDIAL BRANCH BLOCK), PLEASE NOTE THAT WE DO EXPECT THIS INJECTION TO WEAR OFF.  IT IS IMPORTANT TO COMPLETE THE PAIN DIARY AND LIST THE PAIN SCORE ONLY FOR THE REGION WHERE THE PROCEDURE WAS AND BRING THIS TO YOUR FOLLOW UP VISIT.  IF YOU RECEIVED A RADIOFREQUENCY PROCEDURE, THERE MAY BE SOME SORENESS AFTER THE PROCEDURE.  THIS IS NORMAL.  IF YOU EXPERIENCE PROLONGED WEAKNESS LONGER THAN ONE DAY, BOWEL OR BLADDER INCONTINENCE THEN PLEASE CALL THE PHYSIATRY OFFICE.  Your leg may feel heavy, weak and numb for up to 1-2 days. Be very careful walking.    You may resume normal activities 3 days after procedure.

## 2023-07-07 NOTE — TELEPHONE ENCOUNTER
"Received request via: Pharmacy    Was the patient seen in the last year in this department? Yes    Does the patient have an active prescription (recently filled or refills available) for medication(s) requested? No    Does the patient have longterm Plus and need 100 day supply (blood pressure, diabetes and cholesterol meds only)? Patient does not have SCP    \"Potential drug cocktail opioid/benzodiazepine/muscle relaxant combination.  Please advise if therapy is appropriate.-Message from pharmacy on right fax.  "

## 2023-07-10 RX ORDER — CYCLOBENZAPRINE HCL 5 MG
5 TABLET ORAL 2 TIMES DAILY PRN
Qty: 90 TABLET | Refills: 3 | Status: SHIPPED | OUTPATIENT
Start: 2023-07-10 | End: 2023-09-22 | Stop reason: SDUPTHER

## 2023-07-11 ENCOUNTER — OFFICE VISIT (OUTPATIENT)
Dept: MEDICAL GROUP | Facility: PHYSICIAN GROUP | Age: 68
End: 2023-07-11
Payer: MEDICARE

## 2023-07-11 VITALS
OXYGEN SATURATION: 96 % | TEMPERATURE: 97.5 F | HEART RATE: 87 BPM | WEIGHT: 191.2 LBS | DIASTOLIC BLOOD PRESSURE: 80 MMHG | BODY MASS INDEX: 33.88 KG/M2 | RESPIRATION RATE: 16 BRPM | SYSTOLIC BLOOD PRESSURE: 122 MMHG | HEIGHT: 63 IN

## 2023-07-11 DIAGNOSIS — E03.9 HYPOTHYROIDISM (ACQUIRED): ICD-10-CM

## 2023-07-11 DIAGNOSIS — E78.2 MIXED HYPERLIPIDEMIA: ICD-10-CM

## 2023-07-11 DIAGNOSIS — M25.562 CHRONIC PAIN OF LEFT KNEE: ICD-10-CM

## 2023-07-11 DIAGNOSIS — G89.29 CHRONIC PAIN OF LEFT KNEE: ICD-10-CM

## 2023-07-11 DIAGNOSIS — F33.1 DEPRESSION, MAJOR, RECURRENT, MODERATE (HCC): ICD-10-CM

## 2023-07-11 PROCEDURE — 20610 DRAIN/INJ JOINT/BURSA W/O US: CPT | Mod: LT | Performed by: NURSE PRACTITIONER

## 2023-07-11 PROCEDURE — 99213 OFFICE O/P EST LOW 20 MIN: CPT | Mod: 25 | Performed by: NURSE PRACTITIONER

## 2023-07-11 PROCEDURE — 3079F DIAST BP 80-89 MM HG: CPT | Performed by: NURSE PRACTITIONER

## 2023-07-11 PROCEDURE — 3074F SYST BP LT 130 MM HG: CPT | Performed by: NURSE PRACTITIONER

## 2023-07-11 RX ORDER — DULOXETIN HYDROCHLORIDE 30 MG/1
90 CAPSULE, DELAYED RELEASE ORAL DAILY
Qty: 270 CAPSULE | Refills: 3 | Status: SHIPPED | OUTPATIENT
Start: 2023-07-11 | End: 2023-09-22 | Stop reason: SDUPTHER

## 2023-07-11 RX ORDER — TRIAMCINOLONE ACETONIDE 40 MG/ML
40 INJECTION, SUSPENSION INTRA-ARTICULAR; INTRAMUSCULAR ONCE
Status: COMPLETED | OUTPATIENT
Start: 2023-07-11 | End: 2023-07-11

## 2023-07-11 RX ORDER — LEVOTHYROXINE SODIUM 0.05 MG/1
50 TABLET ORAL
Qty: 90 TABLET | Refills: 3 | Status: SHIPPED | OUTPATIENT
Start: 2023-07-11 | End: 2023-09-22 | Stop reason: SDUPTHER

## 2023-07-11 RX ORDER — ROSUVASTATIN CALCIUM 10 MG/1
10 TABLET, COATED ORAL EVERY EVENING
Qty: 100 TABLET | Refills: 3 | Status: SHIPPED | OUTPATIENT
Start: 2023-07-11 | End: 2023-09-22 | Stop reason: SDUPTHER

## 2023-07-11 RX ORDER — METFORMIN HYDROCHLORIDE 500 MG/1
500 TABLET, EXTENDED RELEASE ORAL 2 TIMES DAILY
Qty: 400 TABLET | Refills: 0 | Status: SHIPPED | OUTPATIENT
Start: 2023-07-11 | End: 2023-09-22 | Stop reason: SDUPTHER

## 2023-07-11 RX ORDER — BREXPIPRAZOLE 2 MG/1
1 TABLET ORAL
Qty: 90 TABLET | Refills: 3 | Status: SHIPPED | OUTPATIENT
Start: 2023-07-11 | End: 2023-09-22 | Stop reason: SDUPTHER

## 2023-07-11 RX ADMIN — TRIAMCINOLONE ACETONIDE 40 MG: 40 INJECTION, SUSPENSION INTRA-ARTICULAR; INTRAMUSCULAR at 10:50

## 2023-07-11 ASSESSMENT — FIBROSIS 4 INDEX: FIB4 SCORE: 1.91

## 2023-07-11 NOTE — PROCEDURES
Joint Inj - LG: knee, L knee on 7/11/2023 10:50 AM  Indications: pain and joint swelling  Details: 22 G needle, anterolateral approach  Medications: (Triamcinolone 40 mg with 4 mls lidocaine 1%)  Outcome: tolerated well, no immediate complications  Procedure, treatment alternatives, risks and benefits explained, specific risks discussed. Consent was given by the patient. Patient was prepped and draped in the usual sterile fashion.

## 2023-07-11 NOTE — PROGRESS NOTES
Chief Complaint   Patient presents with    Injections     Left knee Cortizone shot     Medication Refill                                                                                                                                       HPI:   Grisel presents today with the following.    Problem   Chronic Pain of Left Knee       Current Outpatient Medications   Medication Sig Dispense Refill    rosuvastatin (CRESTOR) 10 MG Tab Take 1 Tablet by mouth every evening. 100 Tablet 3    Brexpiprazole (REXULTI) 2 MG Tab Take 1 Tablet by mouth every day. 90 Tablet 3    metFORMIN ER (GLUCOPHAGE XR) 500 MG TABLET SR 24 HR Take 1 Tablet by mouth 2 times a day. 400 Tablet 0    levothyroxine (SYNTHROID) 50 MCG Tab Take 1 Tablet by mouth every morning on an empty stomach. 90 Tablet 3    DULoxetine (CYMBALTA) 30 MG Cap DR Particles Take 3 Capsules by mouth every day. 270 Capsule 3    cyclobenzaprine (FLEXERIL) 5 mg tablet Take 1 Tablet by mouth 2 times a day as needed for Muscle Spasms (restless leg). 90 Tablet 3    gabapentin (NEURONTIN) 800 MG tablet Take 1.5 Tablets by mouth 2 times a day for 180 days. 270 Tablet 1    morphine ER (MS CONTIN) 15 MG Tab CR tablet Take 1 Tablet by mouth every 12 hours for 30 days. 60 Tablet 0    HYDROcodone-acetaminophen (NORCO) 5-325 MG Tab per tablet Take 1 Tablet by mouth every 8 hours as needed (breakthrough pain) for up to 30 days. 60 Tablet 0    [START ON 7/22/2023] morphine ER (MS CONTIN) 15 MG Tab CR tablet Take 1 Tablet by mouth every 12 hours for 30 days. 60 Tablet 0    [START ON 8/21/2023] morphine ER (MS CONTIN) 15 MG Tab CR tablet Take 1 Tablet by mouth every 12 hours for 30 days. 60 Tablet 0    [START ON 7/22/2023] HYDROcodone-acetaminophen (NORCO) 5-325 MG Tab per tablet Take 1 Tablet by mouth every 8 hours as needed (breakthrough pain) for up to 30 days. 60 Tablet 0    [START ON 8/21/2023] HYDROcodone-acetaminophen (NORCO) 5-325 MG Tab per tablet Take 1 Tablet by mouth every 8  Vascular Access consult- Patient found to have 3 functional PIVs and had even had an IO placed overnight. TC to [facility], to verify patient needs- recommend CVC for Levophed if unable to titrate off; noted a decrease in dosage over past 1.5 hours. hours as needed (breakthrough pain) for up to 30 days. 60 Tablet 0    Semaglutide,0.25 or 0.5MG/DOS, 2 MG/3ML Solution Pen-injector Inject 0.5 mg under the skin every 7 days. 3 mL 11    clonazePAM (KLONOPIN) 0.5 MG Tab Take 1 Tablet by mouth every evening for 90 days. Indications: Feeling Anxious, Panic Disorder (Patient not taking: Reported on 6/22/2023) 90 Tablet 0    clonazePAM (KLONOPIN) 1 MG Tab Take 1 Tablet by mouth every morning for 90 days. Indications: Feeling Anxious, Panic Disorder (Patient not taking: Reported on 6/22/2023) 90 Tablet 0    prazosin (MINIPRESS) 1 MG Cap Take 1 Capsule by mouth every evening. 90 Capsule 3    pantoprazole (PROTONIX) 40 MG Tablet Delayed Response Take 1 Tablet by mouth every day. 90 Tablet 1    albuterol 108 (90 Base) MCG/ACT Aero Soln inhalation aerosol Inhale 2 Puffs every four hours as needed for Shortness of Breath. 8.5 g 5    Naloxone (NARCAN) 4 MG/0.1ML Liquid One spray in one nostril for overdose and call 911. 1 Each 1    busPIRone (BUSPAR) 30 MG tablet Take 1 Tablet by mouth every day. 180 Tablet 3    Empagliflozin (JARDIANCE) 25 MG Tab Take 1 Tablet by mouth every day. 100 Tablet 3    estradiol (ESTRACE) 1 MG Tab Take 1 Tablet by mouth every day. 90 Tablet 3    lisinopril (PRINIVIL) 10 MG Tab Take 1 Tablet by mouth every day. 100 Tablet 3    metoprolol tartrate (LOPRESSOR) 25 MG Tab Take 1 Tablet by mouth 2 times a day. 200 Tablet 3    pseudoephedrine (SUDAFED) 30 MG Tab Take 1-2 Tablets by mouth every 6 hours as needed for Congestion (sinus pressure not relieved by other measures). 30 Tablet 0    Blood Glucose Meter Kit Test blood sugar as recommended by provider. Abbott Freestyle Lite blood glucose monitoring kit. 1 Kit 0    Lancets Use one Abbott [place] Lite lancet to test blood sugar twice daily and PRN. 300 Each 3    Alcohol Swabs Wipe site with prep pad prior to injection. 100 Each 3    fluticasone (FLONASE) 50 MCG/ACT nasal spray Administer 1 Spray into  "affected nostril(S) every day. 16 g 11    cyanocobalamin (VITAMIN B-12) 500 MCG Tab Take 500 mcg by mouth every day.      Blood Glucose Test Strips Use one Abbott Freedom Lite strip to test blood sugar twice daily and PRN. 270 Each 3     No current facility-administered medications for this visit.       Allergies as of 07/11/2023    (No Known Allergies)        ROS:  All systems negative expect as addressed in assessment and plan.     /80 (BP Location: Left arm, Patient Position: Sitting, BP Cuff Size: Adult)   Pulse 87   Temp 36.4 °C (97.5 °F) (Temporal)   Resp 16   Ht 1.6 m (5' 3\")   Wt 86.7 kg (191 lb 3.2 oz)   LMP  (LMP Unknown)   SpO2 96%   BMI 33.87 kg/m²       Physical Exam  Vitals reviewed.   Constitutional:       Appearance: Normal appearance.   HENT:      Head: Normocephalic and atraumatic.      Mouth/Throat:      Mouth: Mucous membranes are moist.   Eyes:      Extraocular Movements: Extraocular movements intact.      Conjunctiva/sclera: Conjunctivae normal.   Pulmonary:      Effort: Pulmonary effort is normal.   Musculoskeletal:      Cervical back: Normal range of motion.      Left knee: Swelling, effusion and crepitus present. Decreased range of motion. Tenderness present over the medial joint line and lateral joint line.   Skin:     General: Skin is warm and dry.   Neurological:      General: No focal deficit present.      Mental Status: She is alert and oriented to person, place, and time.   Psychiatric:         Mood and Affect: Mood normal.         Behavior: Behavior normal.         Thought Content: Thought content normal.           Assessment and Plan:  68 y.o. female with the following issues.    1. Mixed hyperlipidemia  rosuvastatin (CRESTOR) 10 MG Tab      2. Hypothyroidism (acquired)  levothyroxine (SYNTHROID) 50 MCG Tab      3. Depression, major, recurrent, moderate (HCC)  DULoxetine (CYMBALTA) 30 MG Cap DR Particles      4. Chronic pain of left knee  Consent for all Surgical, " Special Diagnostic or Therapeutic Procedures    DX-KNEE COMPLETE 4+ LEFT    Joint Inj - LG: knee, L knee           Chronic pain of left knee  Chronic.  Patient is here today for intra-articular knee injection of the left knee.  Patient reports increasing swelling and pain.  Patient did also have a fall approximately 2 weeks ago with abrasion to her left knee.  The abrasion has healed appropriately at this time.    Written consent obtained.  Patient wishes to proceed with injection.      Return in about 2 months (around 9/11/2023) for Chronic Health Conditions, Chronic pain.      Please note that this dictation was created using voice recognition software. I have worked with consultants from the vendor as well as technical experts from [employer] [facility] [facility] [employer] to optimize the interface. I have made every reasonable attempt to correct obvious errors, but I expect that there are errors of grammar and possibly content that I did not discover before finalizing the note.

## 2023-07-11 NOTE — ASSESSMENT & PLAN NOTE
Chronic.  Patient is here today for intra-articular knee injection of the left knee.  Patient reports increasing swelling and pain.  Patient did also have a fall approximately 2 weeks ago with abrasion to her left knee.  The abrasion has healed appropriately at this time.    Written consent obtained.  Patient wishes to proceed with injection.

## 2023-07-13 ENCOUNTER — APPOINTMENT (OUTPATIENT)
Dept: RADIOLOGY | Facility: IMAGING CENTER | Age: 68
End: 2023-07-13
Attending: NURSE PRACTITIONER
Payer: MEDICARE

## 2023-07-14 ENCOUNTER — HOSPITAL ENCOUNTER (OUTPATIENT)
Facility: REHABILITATION | Age: 68
End: 2023-07-14
Attending: PHYSICAL MEDICINE & REHABILITATION | Admitting: PHYSICAL MEDICINE & REHABILITATION
Payer: MEDICARE

## 2023-07-14 ENCOUNTER — APPOINTMENT (OUTPATIENT)
Dept: RADIOLOGY | Facility: REHABILITATION | Age: 68
End: 2023-07-14
Attending: PHYSICAL MEDICINE & REHABILITATION
Payer: MEDICARE

## 2023-07-14 VITALS
RESPIRATION RATE: 16 BRPM | HEART RATE: 85 BPM | DIASTOLIC BLOOD PRESSURE: 93 MMHG | HEIGHT: 63 IN | TEMPERATURE: 97.9 F | OXYGEN SATURATION: 95 % | WEIGHT: 192.24 LBS | BODY MASS INDEX: 34.06 KG/M2 | SYSTOLIC BLOOD PRESSURE: 136 MMHG

## 2023-07-14 LAB
GLUCOSE BLD STRIP.AUTO-MCNC: 209 MG/DL (ref 65–99)
GLUCOSE BLD STRIP.AUTO-MCNC: 230 MG/DL (ref 65–99)

## 2023-07-14 PROCEDURE — 700101 HCHG RX REV CODE 250

## 2023-07-14 PROCEDURE — 700117 HCHG RX CONTRAST REV CODE 255

## 2023-07-14 PROCEDURE — 82962 GLUCOSE BLOOD TEST: CPT | Mod: 91

## 2023-07-14 PROCEDURE — 700111 HCHG RX REV CODE 636 W/ 250 OVERRIDE (IP): Mod: JZ

## 2023-07-14 PROCEDURE — 62323 NJX INTERLAMINAR LMBR/SAC: CPT

## 2023-07-14 RX ORDER — DEXAMETHASONE SODIUM PHOSPHATE 10 MG/ML
INJECTION, SOLUTION INTRAMUSCULAR; INTRAVENOUS
Status: COMPLETED
Start: 2023-07-14 | End: 2023-07-14

## 2023-07-14 RX ORDER — LIDOCAINE HYDROCHLORIDE 20 MG/ML
INJECTION, SOLUTION EPIDURAL; INFILTRATION; INTRACAUDAL; PERINEURAL
Status: COMPLETED
Start: 2023-07-14 | End: 2023-07-14

## 2023-07-14 RX ADMIN — IOHEXOL 3 ML: 240 INJECTION, SOLUTION INTRATHECAL; INTRAVASCULAR; INTRAVENOUS; ORAL at 10:55

## 2023-07-14 RX ADMIN — LIDOCAINE HYDROCHLORIDE 5 ML: 20 INJECTION, SOLUTION EPIDURAL; INFILTRATION; INTRACAUDAL at 10:54

## 2023-07-14 RX ADMIN — DEXAMETHASONE SODIUM PHOSPHATE 10 MG: 10 INJECTION, SOLUTION INTRAMUSCULAR; INTRAVENOUS at 10:55

## 2023-07-14 ASSESSMENT — PAIN DESCRIPTION - PAIN TYPE: TYPE: CHRONIC PAIN

## 2023-07-14 ASSESSMENT — FIBROSIS 4 INDEX: FIB4 SCORE: 1.91

## 2023-07-14 NOTE — PROGRESS NOTES
Admitting assessment completed w 2 identifiers. Pt stated procedure. Medical Hx, meds +allergies reviewed +systems: respiratory,cardiac, anticoagulants+ antibiotic therapy, renal, surgeries, pain, falls, implants,diabetes, hepatitis. Confirmed Pt understanding of DC Instructions. Infection prevention including COVID precautions reviewed. Pt has  waiting.   FBS this AM is 230.  Informed.  Retest of FBS per Dr request @ 5988 is 209.  aware.

## 2023-07-14 NOTE — INTERVAL H&P NOTE
"H&P reviewed. The patient was examined and there are no changes to the H&P      /80   Pulse 90   Temp 36.6 °C (97.9 °F) (Temporal)   Resp 16   Ht 1.6 m (5' 3\")   Wt 87.2 kg (192 lb 3.9 oz)   SpO2 93%     CV: RRR, Normal S1, S2  RESP: Clear to auscultation bilaterally    Continue with procedure. Discussed close monitoring of blood sugars.    Paresh Ogden MD  Physical Medicine and Rehabilitation  Interventional Spine and Sports Physiatry  [facility]      "

## 2023-07-14 NOTE — OP REPORT
Pre-operative Diagnosis:    M96.1 (ICD-10-CM) - Postlaminectomy syndrome   M54.16 (ICD-10-CM) - Left lumbar radiculitis        Post-operative Diagnosis:     M96.1 (ICD-10-CM) - Postlaminectomy syndrome   M54.16 (ICD-10-CM) - Left lumbar radiculitis        Procedure: Fluoroscopically-guided Caudal epidural steroid injection with cathether     Blood loss: None     Description of procedure:  The risks, benefits, and alternatives of the procedure were reviewed and discussed with the patient.  Written informed consent was freely obtained. A pre-procedural time-out was conducted by the nurse verifying patient’s identity, procedure to be performed, procedure site and side, and allergy verification. Appropriate equipment was determined to be in place for the procedure.      No sedation was used for this procedure.      The patient was placed in a prone position and fluoroscope was used to assist in guidance.  The skin was prepped and draped in usual sterile technique.   Then, using a 25g 1.5 inch needle was used to inject 1cc of 1% lidocaine without epinephrine to numb superficial tissues. An 18g Tuohy needle was then used to access the caudal canal via the sacral hiatus and advanced into the epidural space.  Omnipaque was injected to confirm location in the lateral position.  Following that, a catheter was inserted and advanced under fluoroscopy in the AP position to the middle of the fifth lumbar vertebrae, slightly left.    Following negative aspiration, 2cc of 2% lidocaine without epinephrine, 1cc of dexamethasone (10mg/cc), and 2cc of sterile normal saline was slowly injected into the caudal epidural space.  The patient tolerated the procedure well, the needle and catheter were removed intact. The patient's skin was wiped with a 4x4 gauze, the area was cleansed with alcohol prep, and a bandaid was applied. There were no complications noted.      The patient was discharged in good condition after meeting discharge  criteria and was given discharge instructions.     Paresh Ogden MD  Physical Medicine and Rehabilitation  Interventional Spine and Sports Physiatry  [facility]     CPT: [id number]

## 2023-07-14 NOTE — PROGRESS NOTES
Discharge instructions reviewed again with reinforcement of infection prevention tips; ie:hand washing, covering cough,site  observation, Social distancing and mask use.   Taking fluids w/o difficulty. Dressing clean dry intact. Patient stating slight numbness in left foot. Dr amanda. Instructed to use cane with assistance when home.   DC to  via ambulatory slowly.

## 2023-07-18 ENCOUNTER — OFFICE VISIT (OUTPATIENT)
Dept: MEDICAL GROUP | Facility: PHYSICIAN GROUP | Age: 68
End: 2023-07-18
Payer: MEDICARE

## 2023-07-18 VITALS
SYSTOLIC BLOOD PRESSURE: 98 MMHG | DIASTOLIC BLOOD PRESSURE: 60 MMHG | WEIGHT: 190 LBS | HEIGHT: 63 IN | OXYGEN SATURATION: 92 % | RESPIRATION RATE: 20 BRPM | HEART RATE: 71 BPM | TEMPERATURE: 97.1 F | BODY MASS INDEX: 33.66 KG/M2

## 2023-07-18 DIAGNOSIS — M79.671 PAIN OF RIGHT HEEL: ICD-10-CM

## 2023-07-18 DIAGNOSIS — E11.42 TYPE 2 DIABETES MELLITUS WITH DIABETIC POLYNEUROPATHY, WITHOUT LONG-TERM CURRENT USE OF INSULIN (HCC): ICD-10-CM

## 2023-07-18 DIAGNOSIS — J06.9 UPPER RESPIRATORY TRACT INFECTION, UNSPECIFIED TYPE: ICD-10-CM

## 2023-07-18 PROBLEM — Z72.0 TOBACCO USE: Status: RESOLVED | Noted: 2022-10-11 | Resolved: 2023-07-18

## 2023-07-18 PROCEDURE — 3074F SYST BP LT 130 MM HG: CPT | Performed by: STUDENT IN AN ORGANIZED HEALTH CARE EDUCATION/TRAINING PROGRAM

## 2023-07-18 PROCEDURE — 3078F DIAST BP <80 MM HG: CPT | Performed by: STUDENT IN AN ORGANIZED HEALTH CARE EDUCATION/TRAINING PROGRAM

## 2023-07-18 PROCEDURE — 99214 OFFICE O/P EST MOD 30 MIN: CPT | Performed by: STUDENT IN AN ORGANIZED HEALTH CARE EDUCATION/TRAINING PROGRAM

## 2023-07-18 RX ORDER — BENZONATATE 100 MG/1
100-200 CAPSULE ORAL 3 TIMES DAILY PRN
Qty: 60 CAPSULE | Refills: 0 | Status: SHIPPED | OUTPATIENT
Start: 2023-07-18 | End: 2024-03-25

## 2023-07-18 RX ORDER — IPRATROPIUM BROMIDE 42 UG/1
2 SPRAY, METERED NASAL 4 TIMES DAILY
Qty: 15 ML | Refills: 0 | Status: SHIPPED | OUTPATIENT
Start: 2023-07-18

## 2023-07-18 ASSESSMENT — ENCOUNTER SYMPTOMS
ABDOMINAL PAIN: 0
SINUS PAIN: 0
DIARRHEA: 0
FEVER: 1
HEMOPTYSIS: 0
WHEEZING: 1
COUGH: 1
SORE THROAT: 1
VOMITING: 0
BACK PAIN: 1
CHILLS: 0
HEADACHES: 0
SPUTUM PRODUCTION: 1
PALPITATIONS: 0
SHORTNESS OF BREATH: 0
MYALGIAS: 1
NAUSEA: 0

## 2023-07-18 ASSESSMENT — FIBROSIS 4 INDEX: FIB4 SCORE: 1.91

## 2023-07-18 NOTE — PROGRESS NOTES
Subjective:     Chief Complaint   Patient presents with    Nasal Congestion     Chest; 1x wk     Cough     Productive; 1x wks    Foot Problem     Heel pain on right     HPI:   Grisel presents today with cold symptoms and right heel pain.    Patient reports nasal congestion, rhinorrhea, postnasal drip and productive cough for the past week.  Denies any shortness of breath.  States that the cough is keeping her up at night.  Reports clear sputum production without hemoptysis.  States that she does note some wheezing on occasion and did try albuterol which was not helpful.  Has only taken regular Mucinex for her symptoms which does not seem to be helping.  States that she smoked for only 3 weeks and has recently quit, denies tobacco use history otherwise.  Denies history of chronic lung disease. Felt feverish without chills a few days ago, did not check her temperature.  Has sore throat on occasion which has improved and denies currently.  Reports that her main complaint is the rhinorrhea and cough.  Does endorse recent sick contact approximately 3 weeks ago with similar symptoms.    Reports recent right heel pain after what she suspects was stepping on something.  Has not been worsening.  Wanted someone to take a look at it because she cannot see her foot very well.  Not sure what she stepped on.    Needs refills of her testing strips for diabetes.    Review of Systems   Constitutional:  Positive for fever and malaise/fatigue. Negative for chills.   HENT:  Positive for congestion, ear pain (right with blowing nose), nosebleeds (once) and sore throat. Negative for sinus pain.    Respiratory:  Positive for cough, sputum production and wheezing. Negative for hemoptysis and shortness of breath.    Cardiovascular:  Negative for chest pain, palpitations and leg swelling.   Gastrointestinal:  Negative for abdominal pain, diarrhea, nausea and vomiting.   Musculoskeletal:  Positive for back pain and myalgias.   Neurological:   "Negative for headaches.     Objective:     Exam:  BP 98/60 (BP Location: Left arm, Patient Position: Sitting, BP Cuff Size: Adult)   Pulse 71   Temp 36.2 °C (97.1 °F) (Temporal)   Resp 20   Ht 1.6 m (5' 3\")   Wt 86.2 kg (190 lb)   LMP  (LMP Unknown)   SpO2 92%   BMI 33.66 kg/m²  Body mass index is 33.66 kg/m².    Physical Exam  Vitals reviewed.   Constitutional:       General: She is not in acute distress.     Appearance: Normal appearance. She is obese. She is not ill-appearing.   HENT:      Head: Normocephalic and atraumatic.      Right Ear: Tympanic membrane, ear canal and external ear normal.      Left Ear: Tympanic membrane, ear canal and external ear normal.      Nose: No congestion or rhinorrhea.      Right Turbinates: Not pale.      Left Turbinates: Not pale.      Right Sinus: No maxillary sinus tenderness or frontal sinus tenderness.      Left Sinus: No maxillary sinus tenderness or frontal sinus tenderness.      Mouth/Throat:      Mouth: Mucous membranes are moist.      Pharynx: No oropharyngeal exudate or posterior oropharyngeal erythema (Minimal cobblestoning.  Tonsils surgically absent.).   Eyes:      Conjunctiva/sclera: Conjunctivae normal.   Cardiovascular:      Rate and Rhythm: Normal rate and regular rhythm.      Heart sounds: Normal heart sounds.   Pulmonary:      Effort: Pulmonary effort is normal. No respiratory distress.      Breath sounds: Normal breath sounds. No wheezing, rhonchi or rales.   Musculoskeletal:      Cervical back: Normal range of motion and neck supple. No rigidity or tenderness.      Right lower leg: No edema.      Left lower leg: No edema.   Lymphadenopathy:      Cervical: No cervical adenopathy.   Skin:     General: Skin is warm and dry.      Coloration: Skin is not jaundiced.      Findings: Wound (tiny approx 1-2mm scab on right heel, no obvious foreign body, mild tenderness.  No surrounding erythema or discharge.  No increased warmth.  Heels are dry bilaterally.) " present.   Neurological:      General: No focal deficit present.      Mental Status: She is alert and oriented to person, place, and time.   Psychiatric:         Mood and Affect: Mood normal.         Behavior: Behavior normal.         Thought Content: Thought content normal.       Labs: Reviewed from 5/22/2023    Assessment & Plan:     68 y.o. female with the following -     1. Upper respiratory tract infection, unspecified type  This is an acute condition without evidence of bacterial infection.  Declines viral testing which is reasonable given that its been greater than 5 days since onset of symptoms.  Discussed symptom management with hydration, humidification, Vicks VapoRub, over-the-counter cough medicine/analgesics if needed (can switch Mucinex for Robitussin-DM if she would like something more for cough). Provided with Tessalon pearls also for cough and ipratropium for rhinorrhea. Gave return precautions.    - benzonatate (TESSALON) 100 MG Cap; Take 1-2 Capsules by mouth 3 times a day as needed for Cough.  Dispense: 60 Capsule; Refill: 0  - ipratropium (ATROVENT) 0.06 % Solution; Administer 2 Sprays into affected nostril(S) 4 times a day.  Dispense: 15 mL; Refill: 0    2. Pain of right heel  Acute, no evidence of foreign body or infection.  Patient to monitor for skin changes and worsening, keep clean and could consider warm soapy water soaks.    3. Type 2 diabetes mellitus with diabetic polyneuropathy, without long-term current use of insulin (HCC)  Chronic, uncontrolled with last A1c worse at 7.5%.  Patient continue medication(s) as below with plan to follow-up with her PCP at the end of August for repeat A1c as Ozempic was started last visit.    - Blood Glucose Test Strips; Use one Abbott Freedom Lite strip to test blood sugar twice daily and PRN.  Dispense: 270 Strip; Refill: 0    metFORMIN ER (GLUCOPHAGE XR) 500 MG TABLET SR 24 HR, Take 1 Tablet by mouth 2 times a day., Disp: 400 Tablet, Rfl: 0     Semaglutide,0.25 or 0.5MG/DOS, 2 MG/3ML Solution Pen-injector, Inject 0.5 mg under the skin every 7 days., Disp: 3 mL, Rfl: 11    Empagliflozin (JARDIANCE) 25 MG Tab, Take 1 Tablet by mouth every day., Disp: 100 Tablet, Rfl: 3    I spent a total of 34 minutes with record review, exam, communication with the patient, and documentation of this encounter.    Return if symptoms worsen or fail to improve.    Please note that this dictation was created using voice recognition software. I have made every reasonable attempt to correct obvious errors, but I expect that there are errors of grammar and possibly content that I did not discover before finalizing the note.

## 2023-08-01 ENCOUNTER — OFFICE VISIT (OUTPATIENT)
Dept: PHYSICAL MEDICINE AND REHAB | Facility: MEDICAL CENTER | Age: 68
End: 2023-08-01
Payer: MEDICARE

## 2023-08-01 VITALS
WEIGHT: 188 LBS | SYSTOLIC BLOOD PRESSURE: 100 MMHG | TEMPERATURE: 96.8 F | OXYGEN SATURATION: 90 % | BODY MASS INDEX: 33.31 KG/M2 | HEIGHT: 63 IN | DIASTOLIC BLOOD PRESSURE: 60 MMHG | HEART RATE: 70 BPM

## 2023-08-01 DIAGNOSIS — F13.20 BENZODIAZEPINE DEPENDENCE (HCC): ICD-10-CM

## 2023-08-01 DIAGNOSIS — M96.1 POSTLAMINECTOMY SYNDROME: ICD-10-CM

## 2023-08-01 DIAGNOSIS — M51.36 DDD (DEGENERATIVE DISC DISEASE), LUMBAR: ICD-10-CM

## 2023-08-01 DIAGNOSIS — Z87.891 HISTORY OF TOBACCO ABUSE: ICD-10-CM

## 2023-08-01 DIAGNOSIS — M54.16 LEFT LUMBAR RADICULITIS: ICD-10-CM

## 2023-08-01 DIAGNOSIS — F11.90 CHRONIC, CONTINUOUS USE OF OPIOIDS: ICD-10-CM

## 2023-08-01 PROCEDURE — 99214 OFFICE O/P EST MOD 30 MIN: CPT | Performed by: PHYSICAL MEDICINE & REHABILITATION

## 2023-08-01 PROCEDURE — 3078F DIAST BP <80 MM HG: CPT | Performed by: PHYSICAL MEDICINE & REHABILITATION

## 2023-08-01 PROCEDURE — 1125F AMNT PAIN NOTED PAIN PRSNT: CPT | Performed by: PHYSICAL MEDICINE & REHABILITATION

## 2023-08-01 PROCEDURE — 3074F SYST BP LT 130 MM HG: CPT | Performed by: PHYSICAL MEDICINE & REHABILITATION

## 2023-08-01 ASSESSMENT — PATIENT HEALTH QUESTIONNAIRE - PHQ9
SUM OF ALL RESPONSES TO PHQ QUESTIONS 1-9: 7
CLINICAL INTERPRETATION OF PHQ2 SCORE: 1
5. POOR APPETITE OR OVEREATING: 2 - MORE THAN HALF THE DAYS

## 2023-08-01 ASSESSMENT — PAIN SCALES - GENERAL: PAINLEVEL: 8=MODERATE-SEVERE PAIN

## 2023-08-01 ASSESSMENT — FIBROSIS 4 INDEX: FIB4 SCORE: 1.91

## 2023-08-01 NOTE — PROGRESS NOTES
"Follow-up patient note    Physiatry (physical medicine and  Rehabilitation), interventional spine and sports medicine    Date of Service: see Epic for DOS    Chief complaint:   Chief Complaint   Patient presents with    Follow-Up     Postlaminectomy syndrome       HISTORY    HPI: Grisel Mark 68 y.o. female who presents today for follow-up evaluation of low back and leg pain.     Grisel returns for follow-up.  Reports that her low back feels \"fantastic\" after injection on 07/14/2023.  She continues to have pain in her left leg.  Pain is overall about 30% better.    Reports that she has been doing home program from PT.  No changes in bowel or bladder function.  Long-standing, intermittent urinary leakage.    Medications are unchanged.  Taking miralax at bedtime.  Taking MS Contin 15mg po q12h and norco 5/325 twice a day.      By history:  Patient has narcan.  She carries 1 in her purse and has been at home.    Medications:  Takes gabapentin 800mg taking 1.5 pills twice a day.  Her pain medications: Duloxetine 90mg.  MS Contin 15mg q12h.  Norco 5/325 for breakthrough, reports that she does not always take this, some days will take up to 3/day.   No side effects reported.  She is back to taking clonazepam 0.5mg at bedtime and 1mg during the day      By history:  Her laminectomy was in 1998.  Previous injection on left L4 and L5 TFESI on 08/12/2020 helped with her leg pain.     Medical records review:  Dr. Francisco Olmos, plan to increase duloxetine 90mg daily, discontinue buspirone 20mg po bid, start buproprion XL 150mg daily, continue brexipiprazole 1mg qhs, hydroxyzine 25mg po tid prn, clonazepam 0.5mg daily prn    Review of records   Dr. Dheeraj De La Rosa:  08/20/2019 Right L3-4, L4-5, L5-S1 radiofrequency ablation    I reviewed the note from the referring provider Peter Bruce * dated 02/05/2020: She was seen to establish care, having just moved from [place].  She was seen for essential hypertension, " mixed hyperlipidemia, DM2 in obese, hypothyroidism, recurrent MDD in partial depression/anxiety, GERD without esophagitis, chronic bilateral low back pain with bilateral sciatica.  Medications associated with the above were written, related labs ordered including CBC, CMP, Hb A1c, lipids, microalbumin, TSH, free thryoxine, referral to ps\ychiatry, referral to GI for colonoscopy and referral to pain clinic.    Previous treatments:    Physical Therapy: Yes    Medications the patient is tried: Narcotics, gabapentin and muscle relaxers    Previous interventions: [facility] these included right lumbar radiofrequency procedures    Previous surgeries to relieve the above pain:  Surgery on October 28, 1998      ROS:   ENT: ear infection, treated with augmentin  CV: irregular heart, reports history of tachycardia  Psych: depression since 1995  Heme: anemia  Endo: hypothyroidism, diabetes    Red Flags ROS:   Fever, Chills, Sweats: Denies  Involuntary Weight Loss: Denies  Bladder Incontinence: Denies  Bowel Incontinence: Denies  Saddle Anesthesia: Denies    All other systems reviewed and negative.       PMHx:   Past Medical History:   Diagnosis Date    Anxiety     Arrhythmia     Chronic back pain     Constipation     Depression     Diabetes (HCC)     Ear pain, left 9/23/2021    GERD (gastroesophageal reflux disease)     Hyperlipidemia     Hypertension     Thyroid disease     Tobacco use 10/11/2022       PSHx:   Past Surgical History:   Procedure Laterality Date    MD INJ LUMBAR/SACRAL,W/ IMAGING N/A 7/14/2023    Procedure: Caudal epidural steroid injection with catheter;  Surgeon: Paresh Ogden M.D.;  [facility];  Service: Pain Management    MD INJ LUMBAR/SACRAL,W/ IMAGING N/A 3/23/2023    Procedure: Caudal epidural steroid injection with catheter;  Surgeon: Paresh Ogden M.D.;  Location: [facility];  Service: Pain Management    MD INJ  LUMBAR/SACRAL,W/ IMAGING N/A 10/31/2022    Procedure: Caudal epidural steroid injection with catheter;  Surgeon: Paresh Ogden M.D.;  Location: [facility];  Service: Pain Management    RADIO FREQUENCY ABLATION ADDITIONAL LEVEL Left 11/11/2021    Procedure: LEFT lumbar three through lumbar five radiofrequency ablation;  Surgeon: Paresh Ogden M.D.;  Location: [facility];  Service: Pain Management    LUMBAR MEDIAL BRANCH BLOCKS Left 7/21/2021    Procedure: LEFT lumbar three through lumbar five medial branch blocks;  Surgeon: Paresh Ogden M.D.;  [facility];  Service: Pain Management    LUMBAR TRANSFORAMINAL EPIDURAL STEROID INJECTION Left 5/20/2021    Procedure: LEFT lumbar four and lumbar five transforaminal epidural steroid injection;  Surgeon: Paresh Ogden M.D.;  [facility];  Service: Pain Management    BLOCK EPIDURAL STEROID INJECTION Left 2/24/2021    Procedure: INJECTION, STEROID, EPIDURAL;  Surgeon: Paresh Ogden M.D.;  Location: [facility];  Service: Pain Management    LUMBAR MEDIAL BRANCH BLOCKS Left 9/9/2020    Procedure: BLOCK, NERVE, SPINAL, LUMBAR, POSTERIOR RAMUS, MEDIAL BRANCH;  Surgeon: Paresh Ogden M.D.;  Location: [facility];  Service: Pain Management    NC NJX AA&/STRD TFRML EPI LUMBAR/SACRAL 1 LEVEL Left 8/12/2020    Procedure: INJECTION, SPINE, LUMBOSACRAL, EPIDURAL, 1 LEVEL, TRANSFORAMINAL APPROACH;  Surgeon: Paresh Ogden M.D.;  [facility];  Service: Pain Management    NC NJX AA&/STRD TFRML EPI LUMBAR/SACRAL EA ADDL Left 8/12/2020    Procedure: INJECTION, SPINE, LUMBOSACRAL, EPIDURAL, TRANSFORAMINAL APPROACH;  Surgeon: Paresh Ogden M.D.;  [facility];  Service: Pain Management    LAMINOTOMY  1998    ABDOMINAL HYSTERECTOMY TOTAL      CARPAL TUNNEL RELEASE      CHOLECYSTECTOMY         Family  history   Family History   Problem Relation Age of Onset    Cancer Mother     Stroke Father         Heart attack    Dementia Sister     Stroke Brother         heart Attack    Cancer Brother         Skin    Diabetes Brother     Kidney Disease Brother     Cancer Brother     Parkinson's Disease Sister     No Known Problems Sister     Diabetes Daughter     Hypertension Daughter          Medications:   Current Outpatient Medications   Medication    benzonatate (TESSALON) 100 MG Cap    ipratropium (ATROVENT) 0.06 % Solution    Blood Glucose Test Strips    rosuvastatin (CRESTOR) 10 MG Tab    Brexpiprazole (REXULTI) 2 MG Tab    metFORMIN ER (GLUCOPHAGE XR) 500 MG TABLET SR 24 HR    levothyroxine (SYNTHROID) 50 MCG Tab    DULoxetine (CYMBALTA) 30 MG Cap DR Particles    cyclobenzaprine (FLEXERIL) 5 mg tablet    gabapentin (NEURONTIN) 800 MG tablet    morphine ER (MS CONTIN) 15 MG Tab CR tablet    [START ON 8/21/2023] morphine ER (MS CONTIN) 15 MG Tab CR tablet    HYDROcodone-acetaminophen (NORCO) 5-325 MG Tab per tablet    [START ON 8/21/2023] HYDROcodone-acetaminophen (NORCO) 5-325 MG Tab per tablet    Semaglutide,0.25 or 0.5MG/DOS, 2 MG/3ML Solution Pen-injector    clonazePAM (KLONOPIN) 0.5 MG Tab    clonazePAM (KLONOPIN) 1 MG Tab    prazosin (MINIPRESS) 1 MG Cap    pantoprazole (PROTONIX) 40 MG Tablet Delayed Response    albuterol 108 (90 Base) MCG/ACT Aero Soln inhalation aerosol    Naloxone (NARCAN) 4 MG/0.1ML Liquid    busPIRone (BUSPAR) 30 MG tablet    Empagliflozin (JARDIANCE) 25 MG Tab    estradiol (ESTRACE) 1 MG Tab    lisinopril (PRINIVIL) 10 MG Tab    metoprolol tartrate (LOPRESSOR) 25 MG Tab    pseudoephedrine (SUDAFED) 30 MG Tab    Blood Glucose Meter Kit    Lancets    Alcohol Swabs    fluticasone (FLONASE) 50 MCG/ACT nasal spray    cyanocobalamin (VITAMIN B-12) 500 MCG Tab     No current facility-administered medications for this visit.       Allergies:   No Known Allergies    Social Hx:   Social History  "    Socioeconomic History    Marital status:      Spouse name: Not on file    Number of children: Not on file    Years of education: Not on file    Highest education level: Not on file   Occupational History    Not on file   Tobacco Use    Smoking status: Every Day     Packs/day: 0.25     Types: Cigarettes     Last attempt to quit:      Years since quittin.5    Smokeless tobacco: Never   Vaping Use    Vaping Use: Never used   Substance and Sexual Activity    Alcohol use: Not Currently    Drug use: Not Currently    Sexual activity: Not Currently   Other Topics Concern     Service No    Blood Transfusions Yes    Caffeine Concern No    Occupational Exposure No    Hobby Hazards No    Sleep Concern Yes    Stress Concern Yes    Weight Concern Yes    Special Diet No    Back Care No    Exercise Yes    Bike Helmet No    Seat Belt Yes    Self-Exams Yes   Social History Narrative    Not on file     Social Determinants of Health     Financial Resource Strain: Not on file   Food Insecurity: Not on file   Transportation Needs: Not on file   Physical Activity: Not on file   Stress: Not on file   Social Connections: Not on file   Intimate Partner Violence: Not on file   Housing Stability: Not on file         EXAMINATION     Physical Exam:   Vitals: /60 (BP Location: Right arm, Patient Position: Sitting, BP Cuff Size: Large adult)   Pulse 70   Temp 36 °C (96.8 °F) (Temporal)   Ht 1.6 m (5' 3\")   Wt 85.3 kg (188 lb)   SpO2 90%     Constitutional:   Body Habitus: Body mass index is 33.3 kg/m².  Cooperation: Fully cooperates with exam  Appearance: Well-groomed, well-nourished, not disheveled, no acute distress    Eyes: No scleral icterus, no proptosis     ENT -no obvious auditory deficits, no facial asymmetry    Skin -no rashes or lesions noted, no warmth or erythema was noted at the injection site      Respiratory-  breathing comfortable on room air, no audible wheezing    Cardiovascular- no lower " extremity edema is noted.     Psychiatric- alert and oriented ×3. Normal affect.     Gait - mildly antalgic, no assist device.  No loss of balance    Musculoskeletal -     Cervical spine  Functional ROM of the cervical spine.    Thoracic/Lumbar Spine/Sacral Spine/Hips   Inspection: no evidence of atrophy in bilateral lower extremities throughout     ROM of the lumbar spine, decreased     Neuro     Key points for the international standards for neurological classification of spinal cord injury (ISNCSCI) to light touch.     Dermatome R L   L2 2 2   L3 2 2   L4 2 1   L5 2 1   S1 2 1   S2 2 2       Motor Exam Lower Extremities    ? Myotome R L   Hip flexion L2 5 5   Knee extension L3 5 5   Ankle dorsiflexion L4 5 5   Toe extension L5 5 5   Ankle plantarflexion S1 5 5       Reflexes  ?  R L   Patella  2+ 2+   Achilles   2+ 1+     No clonus bilaterally    MEDICAL DECISION MAKING    Medical records review: see under HPI section.     DATA    Labs:   05/26/2023: UDS positive for morphine, hydrocodone metabolites, positive for clonazepam  03/15/2023: UDS positive for morphine, hydrocodone metabolites, positive for clonazepam, THC  08/10/2022: UDS positive for morphine and negative for metabolites, positive for hydrocodone, positive for clonazepam  03/18/2022: UDS positive for morphine and negative for metabolites, positive for hydrocodone, positive for clonazepam  01/18/2022: UDS positive for morphine and negative for metabolites, absent hydrocodone, positive for clonazepam  10/20/2021: UDS positive for morphine and metabolites, clonzapem, low amount of THC noted  05/08/2021: UDS positive for morphine and metabolites, clonazepam and metabolites, hydrocodone and metabolites  08/18/2020: UDS positive for morphine and metabolites, clonazepam and metabolites, hydrocodone and metabolites  04/24/2020 UDS positive for morphine and metabolites, clonazepam  04/09/2020 Vitamin D, 25:  28L  04/09/2020 Vitamin B12: 217    Lab Results    Component Value Date/Time    SODIUM 139 06/21/2023 01:50 PM    POTASSIUM 4.2 06/21/2023 01:50 PM    CHLORIDE 102 06/21/2023 01:50 PM    CO2 25 06/21/2023 01:50 PM    ANION 12.0 06/21/2023 01:50 PM    GLUCOSE 148 (H) 06/21/2023 01:50 PM    BUN 13 06/21/2023 01:50 PM    CREATININE 0.56 06/21/2023 01:50 PM    CALCIUM 9.7 06/21/2023 01:50 PM    ASTSGOT 32 06/21/2023 01:50 PM    ALTSGPT 27 06/21/2023 01:50 PM    TBILIRUBIN 0.5 06/21/2023 01:50 PM    ALBUMIN 4.3 06/21/2023 01:50 PM    TOTPROTEIN 7.3 06/21/2023 01:50 PM    GLOBULIN 3.0 06/21/2023 01:50 PM    AGRATIO 1.4 06/21/2023 01:50 PM       No results found for: PROTHROMBTM, INR     Lab Results   Component Value Date/Time    WBC 8.2 05/22/2023 02:44 PM    RBC 4.97 05/22/2023 02:44 PM    HEMOGLOBIN 15.4 05/22/2023 02:44 PM    HEMATOCRIT 46.1 05/22/2023 02:44 PM    MCV 92.8 05/22/2023 02:44 PM    MCH 31.0 05/22/2023 02:44 PM    MCHC 33.4 05/22/2023 02:44 PM    MPV 12.4 05/22/2023 02:44 PM    NEUTSPOLYS 46.60 05/04/2021 08:06 AM    LYMPHOCYTES 39.80 05/04/2021 08:06 AM    MONOCYTES 9.30 05/04/2021 08:06 AM    EOSINOPHILS 3.70 05/04/2021 08:06 AM    BASOPHILS 0.50 05/04/2021 08:06 AM        Lab Results   Component Value Date/Time    HBA1C 7.5 (H) 05/22/2023 02:44 PM        Imaging: I personally reviewed following images, these are my reads:     MRI lumbar spine 05/05/2020  At L1-2, no central or foraminal stenosis  At L2-3, no central or foraminal stenosis  At L3-4, mild disc bulge and mild ligamentum flavum thickening.  No central canal stenosis. Borderline left foraminal stenosis. Schmorl's node.   At L4-5, diffuse disc bulge with mild ligamentum flavum thickening.  No central canal stenosis.  There is facet arthropathy, left greater than right. Mild foraminal stenosis bilaterally. S/P left L4/5 hemilaminectomy  At L5-S1, there is mild-borderline foraminal stenosis with facet arthropathy and mild disc bulge.  No central canal stenosis    Xray lumbar spine  05/05/2020  There is mild decreased joint space at L3-4 and L4-5.    Facet arthropathy is noted, greatest at L5-S1 bilaterally.  No significant motion on flexion and extension films    IMAGING radiology reads. I reviewed the following radiology reads    Xray abdomen 07/17/2020  IMPRESSION:     Nonspecific bowel gas pattern. No evidence small bowel obstruction.      MRI lumbar spine 05/05/2020  IMPRESSION:     1.  Caudal lumbar facet arthropathy left worse than right with no bony destruction to indicate septic or inflammatory arthropathy. This most likely is just degenerative  2.  Mild caudal lumbar foraminal stenoses  3.  No significant central stenosis.  4.  Left L4/5 hemilaminectomy                                                                                   Xray lumbar spine 05/05/2020     IMPRESSION:     1.  No acute lumbar compression fracture or subluxation.  2.  Posterior disc space narrowing at L4-5 and to lesser extent at L3-4.  3.  Bilateral facet arthropathy at L5-S1.                                                                                             Diagnosis   Visit Diagnoses     ICD-10-CM   1. Postlaminectomy syndrome  M96.1   2. Left lumbar radiculitis  M54.16   3. Chronic, continuous use of opioids  F11.90   4. Benzodiazepine dependence (HCC)  F13.20   5. DDD (degenerative disc disease), lumbar  M51.36   6. History of tobacco abuse  Z87.891                 ASSESSMENT:  Grisel Mark 68 y.o. female seen for above     Grisel was seen today for follow-up.    Diagnoses and all orders for this visit:    Postlaminectomy syndrome    Left lumbar radiculitis    Chronic, continuous use of opioids    Benzodiazepine dependence (HCC)    DDD (degenerative disc disease), lumbar    History of tobacco abuse        Discussed that she has had significant improvement in her low back pain after topical steroid injection.  She does continue to have some radicular symptoms in the left leg.  We discussed a  spinal cord stimulator trial as an option in the past.  If she would like to pursue repeat psychological screen she will contact the office.  Continue home exercise program for her right shoulder.  The pain is manageable   reviewed.  Most recent UDS reviewed.  She has been on a stable dose of MS Contin 50 mg p.o. every 12 hours and Norco 5/325 twice a day.  Prescription given previously.  She has been stable on klonopin for THALIA and PTSD, written by her PCP.  Plan for her PCP to assume management of her medications.  Anticipate that she will return to the office if she would like to consider possible spinal cord stimulator trial or if symptoms become unmanageable with current medications.  Continue activity as tolerated.  Encouraged her to continue with smoking cessation.  Spent less than 3 minutes.      Follow-up: Return if symptoms worsen or fail to improve.    Thank you very much for asking me to participate in Grisel Mark's care.  Please contact me with any questions or concerns.    Please note that this dictation was created using voice recognition software. I have made every reasonable attempt to correct obvious errors but there may be errors of grammar and content that I may have overlooked prior to finalization of this note.      Paresh Ogden MD  Physical Medicine and Rehabilitation  Interventional Spine and Sports Physiatry  [facility]     CC: AKILAH Gunter

## 2023-08-18 RX ORDER — BUSPIRONE HYDROCHLORIDE 30 MG/1
30 TABLET ORAL DAILY
Qty: 180 TABLET | Refills: 3 | Status: SHIPPED | OUTPATIENT
Start: 2023-08-18 | End: 2023-09-22 | Stop reason: SDUPTHER

## 2023-09-08 ENCOUNTER — APPOINTMENT (OUTPATIENT)
Dept: MEDICAL GROUP | Facility: PHYSICIAN GROUP | Age: 68
End: 2023-09-08
Payer: MEDICARE

## 2023-09-15 RX ORDER — PANTOPRAZOLE SODIUM 40 MG/1
40 TABLET, DELAYED RELEASE ORAL DAILY
Qty: 90 TABLET | Refills: 1 | Status: SHIPPED | OUTPATIENT
Start: 2023-09-15 | End: 2024-02-27 | Stop reason: SDUPTHER

## 2023-09-22 ENCOUNTER — OFFICE VISIT (OUTPATIENT)
Dept: MEDICAL GROUP | Facility: PHYSICIAN GROUP | Age: 68
End: 2023-09-22
Payer: MEDICARE

## 2023-09-22 VITALS
WEIGHT: 188.6 LBS | SYSTOLIC BLOOD PRESSURE: 116 MMHG | HEIGHT: 63 IN | HEART RATE: 69 BPM | RESPIRATION RATE: 16 BRPM | TEMPERATURE: 97.3 F | OXYGEN SATURATION: 94 % | BODY MASS INDEX: 33.42 KG/M2 | DIASTOLIC BLOOD PRESSURE: 70 MMHG

## 2023-09-22 DIAGNOSIS — M25.562 CHRONIC PAIN OF LEFT KNEE: ICD-10-CM

## 2023-09-22 DIAGNOSIS — M54.41 CHRONIC BILATERAL LOW BACK PAIN WITH BILATERAL SCIATICA: ICD-10-CM

## 2023-09-22 DIAGNOSIS — N95.1 VASOMOTOR SYMPTOMS DUE TO MENOPAUSE: ICD-10-CM

## 2023-09-22 DIAGNOSIS — E11.69 DIABETES MELLITUS TYPE 2 IN OBESE: ICD-10-CM

## 2023-09-22 DIAGNOSIS — E03.9 HYPOTHYROIDISM (ACQUIRED): ICD-10-CM

## 2023-09-22 DIAGNOSIS — G89.29 CHRONIC PAIN OF LEFT KNEE: ICD-10-CM

## 2023-09-22 DIAGNOSIS — E78.2 MIXED HYPERLIPIDEMIA: ICD-10-CM

## 2023-09-22 DIAGNOSIS — E11.69 HYPERLIPIDEMIA DUE TO TYPE 2 DIABETES MELLITUS (HCC): ICD-10-CM

## 2023-09-22 DIAGNOSIS — G89.29 CHRONIC BILATERAL LOW BACK PAIN WITH BILATERAL SCIATICA: ICD-10-CM

## 2023-09-22 DIAGNOSIS — I15.2 HYPERTENSION ASSOCIATED WITH DIABETES (HCC): ICD-10-CM

## 2023-09-22 DIAGNOSIS — F13.20 BENZODIAZEPINE DEPENDENCE (HCC): ICD-10-CM

## 2023-09-22 DIAGNOSIS — F43.10 POSTTRAUMATIC STRESS DISORDER, DELAYED ONSET: ICD-10-CM

## 2023-09-22 DIAGNOSIS — M54.42 CHRONIC BILATERAL LOW BACK PAIN WITH BILATERAL SCIATICA: ICD-10-CM

## 2023-09-22 DIAGNOSIS — F41.8 SITUATIONAL ANXIETY: ICD-10-CM

## 2023-09-22 DIAGNOSIS — M54.16 LEFT LUMBAR RADICULITIS: ICD-10-CM

## 2023-09-22 DIAGNOSIS — M96.1 POSTLAMINECTOMY SYNDROME: ICD-10-CM

## 2023-09-22 DIAGNOSIS — F41.1 GENERALIZED ANXIETY DISORDER: ICD-10-CM

## 2023-09-22 DIAGNOSIS — F33.1 DEPRESSION, MAJOR, RECURRENT, MODERATE (HCC): ICD-10-CM

## 2023-09-22 DIAGNOSIS — E78.5 HYPERLIPIDEMIA DUE TO TYPE 2 DIABETES MELLITUS (HCC): ICD-10-CM

## 2023-09-22 DIAGNOSIS — E11.59 HYPERTENSION ASSOCIATED WITH DIABETES (HCC): ICD-10-CM

## 2023-09-22 DIAGNOSIS — E11.42 TYPE 2 DIABETES MELLITUS WITH DIABETIC POLYNEUROPATHY, WITHOUT LONG-TERM CURRENT USE OF INSULIN (HCC): ICD-10-CM

## 2023-09-22 DIAGNOSIS — E66.9 DIABETES MELLITUS TYPE 2 IN OBESE: ICD-10-CM

## 2023-09-22 DIAGNOSIS — I10 ESSENTIAL HYPERTENSION: ICD-10-CM

## 2023-09-22 PROBLEM — B35.1 ONYCHOMYCOSIS: Status: RESOLVED | Noted: 2023-04-03 | Resolved: 2023-09-22

## 2023-09-22 PROCEDURE — 99214 OFFICE O/P EST MOD 30 MIN: CPT | Mod: 25 | Performed by: NURSE PRACTITIONER

## 2023-09-22 PROCEDURE — 3078F DIAST BP <80 MM HG: CPT | Performed by: NURSE PRACTITIONER

## 2023-09-22 PROCEDURE — 3074F SYST BP LT 130 MM HG: CPT | Performed by: NURSE PRACTITIONER

## 2023-09-22 PROCEDURE — 20610 DRAIN/INJ JOINT/BURSA W/O US: CPT | Mod: LT | Performed by: NURSE PRACTITIONER

## 2023-09-22 PROCEDURE — 1125F AMNT PAIN NOTED PAIN PRSNT: CPT | Performed by: NURSE PRACTITIONER

## 2023-09-22 RX ORDER — EMPAGLIFLOZIN 25 MG/1
1 TABLET, FILM COATED ORAL
Qty: 100 TABLET | Refills: 3 | Status: SHIPPED | OUTPATIENT
Start: 2023-09-22

## 2023-09-22 RX ORDER — BUSPIRONE HYDROCHLORIDE 30 MG/1
30 TABLET ORAL DAILY
Qty: 180 TABLET | Refills: 3 | Status: SHIPPED | OUTPATIENT
Start: 2023-09-22

## 2023-09-22 RX ORDER — CYCLOBENZAPRINE HCL 5 MG
5 TABLET ORAL 2 TIMES DAILY PRN
Qty: 90 TABLET | Refills: 3 | Status: SHIPPED | OUTPATIENT
Start: 2023-09-22

## 2023-09-22 RX ORDER — METFORMIN HYDROCHLORIDE 500 MG/1
500 TABLET, EXTENDED RELEASE ORAL 2 TIMES DAILY
Qty: 400 TABLET | Refills: 0 | Status: SHIPPED | OUTPATIENT
Start: 2023-09-22

## 2023-09-22 RX ORDER — ESTRADIOL 0.5 MG/1
0.5 TABLET ORAL DAILY
Qty: 90 TABLET | Refills: 3 | Status: SHIPPED | OUTPATIENT
Start: 2023-09-22

## 2023-09-22 RX ORDER — HYDROCODONE BITARTRATE AND ACETAMINOPHEN 7.5; 325 MG/1; MG/1
1 TABLET ORAL EVERY 8 HOURS PRN
Qty: 70 TABLET | Refills: 0 | Status: SHIPPED | OUTPATIENT
Start: 2023-11-23 | End: 2023-12-23

## 2023-09-22 RX ORDER — CLONAZEPAM 1 MG/1
1 TABLET ORAL EVERY MORNING
Qty: 90 TABLET | Refills: 0 | Status: SHIPPED | OUTPATIENT
Start: 2023-10-06 | End: 2023-12-12 | Stop reason: SDUPTHER

## 2023-09-22 RX ORDER — TRIAMCINOLONE ACETONIDE 40 MG/ML
40 INJECTION, SUSPENSION INTRA-ARTICULAR; INTRAMUSCULAR ONCE
Status: COMPLETED | OUTPATIENT
Start: 2023-09-22 | End: 2023-09-22

## 2023-09-22 RX ORDER — HYDROCODONE BITARTRATE AND ACETAMINOPHEN 7.5; 325 MG/1; MG/1
1 TABLET ORAL EVERY 8 HOURS PRN
Qty: 70 TABLET | Refills: 0 | Status: SHIPPED | OUTPATIENT
Start: 2023-09-22 | End: 2023-10-22

## 2023-09-22 RX ORDER — LISINOPRIL 10 MG/1
10 TABLET ORAL DAILY
Qty: 100 TABLET | Refills: 3 | Status: SHIPPED | OUTPATIENT
Start: 2023-09-22

## 2023-09-22 RX ORDER — HYDROCODONE BITARTRATE AND ACETAMINOPHEN 7.5; 325 MG/1; MG/1
1 TABLET ORAL EVERY 8 HOURS PRN
Qty: 70 TABLET | Refills: 0 | Status: SHIPPED | OUTPATIENT
Start: 2023-10-23 | End: 2023-11-22

## 2023-09-22 RX ORDER — METOPROLOL SUCCINATE 25 MG/1
25 TABLET, EXTENDED RELEASE ORAL DAILY
Qty: 90 TABLET | Refills: 3 | Status: SHIPPED | OUTPATIENT
Start: 2023-09-22

## 2023-09-22 RX ORDER — BREXPIPRAZOLE 2 MG/1
1 TABLET ORAL
Qty: 90 TABLET | Refills: 3 | Status: SHIPPED | OUTPATIENT
Start: 2023-09-22

## 2023-09-22 RX ORDER — CLONAZEPAM 0.5 MG/1
0.5 TABLET ORAL NIGHTLY
Qty: 90 TABLET | Refills: 0 | Status: SHIPPED | OUTPATIENT
Start: 2023-10-06 | End: 2023-12-12 | Stop reason: SDUPTHER

## 2023-09-22 RX ORDER — LEVOTHYROXINE SODIUM 0.05 MG/1
50 TABLET ORAL
Qty: 90 TABLET | Refills: 3 | Status: SHIPPED | OUTPATIENT
Start: 2023-09-22

## 2023-09-22 RX ORDER — ROSUVASTATIN CALCIUM 10 MG/1
10 TABLET, COATED ORAL EVERY EVENING
Qty: 100 TABLET | Refills: 3 | Status: SHIPPED | OUTPATIENT
Start: 2023-09-22

## 2023-09-22 RX ORDER — GABAPENTIN 800 MG/1
1200 TABLET ORAL 2 TIMES DAILY
Qty: 270 TABLET | Refills: 3 | Status: SHIPPED | OUTPATIENT
Start: 2023-09-22

## 2023-09-22 RX ORDER — DULOXETIN HYDROCHLORIDE 30 MG/1
90 CAPSULE, DELAYED RELEASE ORAL DAILY
Qty: 270 CAPSULE | Refills: 3 | Status: SHIPPED | OUTPATIENT
Start: 2023-09-22

## 2023-09-22 RX ADMIN — TRIAMCINOLONE ACETONIDE 40 MG: 40 INJECTION, SUSPENSION INTRA-ARTICULAR; INTRAMUSCULAR at 08:45

## 2023-09-22 ASSESSMENT — PAIN SCALES - GENERAL: PAINLEVEL: 8=MODERATE-SEVERE PAIN

## 2023-09-22 ASSESSMENT — FIBROSIS 4 INDEX: FIB4 SCORE: 1.91

## 2023-09-22 NOTE — PROGRESS NOTES
Chief Complaint   Patient presents with    Medication Refill     Pain management                                                                                                                                        HPI:   Grisel presents today with the following.    Problem   Postlaminectomy Syndrome   Chronic Bilateral Low Back Pain With Bilateral Sciatica   Chronic Pain of Left Knee   Opioid Dependence in Controlled Environment ([facility])   Benzodiazepine Dependence ([facility])   Generalized Anxiety Disorder   Posttraumatic Stress Disorder, Delayed Onset   Hypertension Associated With Diabetes (Hcc)   Hyperlipidemia Due to Type 2 Diabetes Mellitus (Hcc)   Type 2 Diabetes Mellitus With Diabetic Polyneuropathy, Without Long-Term Current Use of Insulin (Hcc)   Onychomycosis (Resolved)       Current Outpatient Medications   Medication Sig Dispense Refill    Brexpiprazole (REXULTI) 2 MG Tab Take 1 Tablet by mouth every day. 90 Tablet 3    busPIRone (BUSPAR) 30 MG tablet Take 1 Tablet by mouth every day. 180 Tablet 3    cyclobenzaprine (FLEXERIL) 5 mg tablet Take 1 Tablet by mouth 2 times a day as needed for Muscle Spasms (restless leg). 90 Tablet 3    DULoxetine (CYMBALTA) 30 MG Cap DR Particles Take 3 Capsules by mouth every day. 270 Capsule 3    Empagliflozin (JARDIANCE) 25 MG Tab Take 1 Tablet by mouth every day. 100 Tablet 3    estradiol (ESTRACE) 0.5 MG tablet Take 1 Tablet by mouth every day. 90 Tablet 3    gabapentin (NEURONTIN) 800 MG tablet Take 1.5 Tablets by mouth 2 times a day. 270 Tablet 3    levothyroxine (SYNTHROID) 50 MCG Tab Take 1 Tablet by mouth every morning on an empty stomach. 90 Tablet 3    lisinopril (PRINIVIL) 10 MG Tab Take 1 Tablet by mouth every day. 100 Tablet 3    metFORMIN ER (GLUCOPHAGE XR) 500 MG TABLET SR 24 HR Take 1 Tablet by mouth 2 times a day. 400 Tablet 0    metoprolol SR (TOPROL XL) 25 MG TABLET SR 24 HR Take 1 Tablet by mouth every day. 90 Tablet 3    rosuvastatin (CRESTOR) 10 MG Tab  Take 1 Tablet by mouth every evening. 100 Tablet 3    Semaglutide, 1 MG/DOSE, 4 MG/3ML Solution Pen-injector Inject 1 mg under the skin every 7 days. 9 mL 3    [START ON 10/6/2023] clonazePAM (KLONOPIN) 0.5 MG Tab Take 1 Tablet by mouth every evening for 90 days. Indications: Feeling Anxious, Panic Disorder 90 Tablet 0    [START ON 10/6/2023] clonazePAM (KLONOPIN) 1 MG Tab Take 1 Tablet by mouth every morning for 90 days. Indications: Feeling Anxious, Panic Disorder 90 Tablet 0    Morphine Sulfate ER 15 MG Tablet Extended Release 12 hour Abuse-Deterrent Take 15 mg by mouth 2 times a day for 30 days. 60 Tablet 0    [START ON 11/23/2023] Morphine Sulfate ER 15 MG Tablet Extended Release 12 hour Abuse-Deterrent Take 15 mg by mouth 2 times a day for 30 days. 60 Tablet 0    [START ON 10/23/2023] Morphine Sulfate ER 15 MG Tablet Extended Release 12 hour Abuse-Deterrent Take 15 mg by mouth 2 times a day for 30 days. 60 Tablet 0    [START ON 11/23/2023] HYDROcodone-acetaminophen (NORCO) 7.5-325 MG tab Take 1 Tablet by mouth every 8 hours as needed for Severe Pain or Moderate Pain for up to 30 days. 70 Tablet 0    [START ON 10/23/2023] HYDROcodone-acetaminophen (NORCO) 7.5-325 MG tab Take 1 Tablet by mouth every 8 hours as needed for Severe Pain or Moderate Pain for up to 30 days. 70 Tablet 0    HYDROcodone-acetaminophen (NORCO) 7.5-325 MG tab Take 1 Tablet by mouth every 8 hours as needed for Severe Pain or Moderate Pain for up to 30 days. 70 Tablet 0    pantoprazole (PROTONIX) 40 MG Tablet Delayed Response TAKE 1 TABLET BY MOUTH EVERY DAY 90 Tablet 1    benzonatate (TESSALON) 100 MG Cap Take 1-2 Capsules by mouth 3 times a day as needed for Cough. 60 Capsule 0    ipratropium (ATROVENT) 0.06 % Solution Administer 2 Sprays into affected nostril(S) 4 times a day. 15 mL 0    Blood Glucose Test Strips Use one Abbott Freedom Lite strip to test blood sugar twice daily and PRN. 270 Strip 0    albuterol 108 (90 Base) MCG/ACT Aero  "Soln inhalation aerosol Inhale 2 Puffs every four hours as needed for Shortness of Breath. 8.5 g 5    Naloxone (NARCAN) 4 MG/0.1ML Liquid One spray in one nostril for overdose and call 911. 1 Each 1    estradiol (ESTRACE) 1 MG Tab Take 1 Tablet by mouth every day. 90 Tablet 3    pseudoephedrine (SUDAFED) 30 MG Tab Take 1-2 Tablets by mouth every 6 hours as needed for Congestion (sinus pressure not relieved by other measures). 30 Tablet 0    Blood Glucose Meter Kit Test blood sugar as recommended by provider. Abbott Freestyle Lite blood glucose monitoring kit. 1 Kit 0    Lancets Use one Abbott Adams Lite lancet to test blood sugar twice daily and PRN. 300 Each 3    Alcohol Swabs Wipe site with prep pad prior to injection. 100 Each 3    fluticasone (FLONASE) 50 MCG/ACT nasal spray Administer 1 Spray into affected nostril(S) every day. 16 g 11    cyanocobalamin (VITAMIN B-12) 500 MCG Tab Take 500 mcg by mouth every day.       Current Facility-Administered Medications   Medication Dose Route Frequency Provider Last Rate Last Admin    triamcinolone acetonide (Kenalog-40) injection 40 mg  40 mg Intra-articular Once Demi Farley, A.P.R.N.           Allergies as of 09/22/2023    (No Known Allergies)        ROS:  All systems negative expect as addressed in assessment and plan.     /70 (BP Location: Left arm, Patient Position: Sitting, BP Cuff Size: Adult)   Pulse 69   Temp 36.3 °C (97.3 °F) (Temporal)   Resp 16   Ht 1.6 m (5' 3\")   Wt 85.5 kg (188 lb 9.6 oz)   LMP  (LMP Unknown)   SpO2 94%   BMI 33.41 kg/m²     Physical Exam:    Physical Exam  Vitals reviewed.   Constitutional:       Appearance: Normal appearance.   HENT:      Head: Normocephalic and atraumatic.      Mouth/Throat:      Mouth: Mucous membranes are moist.   Eyes:      Extraocular Movements: Extraocular movements intact.      Conjunctiva/sclera: Conjunctivae normal.   Pulmonary:      Effort: Pulmonary effort is normal.   Musculoskeletal:    "      General: Normal range of motion.      Cervical back: Normal range of motion.   Skin:     General: Skin is warm and dry.   Neurological:      General: No focal deficit present.      Mental Status: She is alert and oriented to person, place, and time.   Psychiatric:         Mood and Affect: Mood normal.         Behavior: Behavior normal.         Thought Content: Thought content normal.           Assessment and Plan:  68 y.o. female with the following issues.    1. Depression, major, recurrent, moderate ([facility])  Brexpiprazole (REXULTI) 2 MG Tab    DULoxetine (CYMBALTA) 30 MG Cap DR Particles      2. Diabetes mellitus type 2 in obese ([facility])  Diabetic Monofilament Lower Extremity Exam    Empagliflozin (JARDIANCE) 25 MG Tab    metFORMIN ER (GLUCOPHAGE XR) 500 MG TABLET SR 24 HR    Semaglutide, 1 MG/DOSE, 4 MG/3ML Solution Pen-injector    Continue medication regimen, follow-up in 3 months for A1c reevaluation      3. Vasomotor symptoms due to menopause  estradiol (ESTRACE) 0.5 MG tablet      4. Postlaminectomy syndrome  cyclobenzaprine (FLEXERIL) 5 mg tablet    gabapentin (NEURONTIN) 800 MG tablet    Morphine Sulfate ER 15 MG Tablet Extended Release 12 hour Abuse-Deterrent    Morphine Sulfate ER 15 MG Tablet Extended Release 12 hour Abuse-Deterrent    Morphine Sulfate ER 15 MG Tablet Extended Release 12 hour Abuse-Deterrent    HYDROcodone-acetaminophen (NORCO) 7.5-325 MG tab    HYDROcodone-acetaminophen (NORCO) 7.5-325 MG tab    HYDROcodone-acetaminophen (NORCO) 7.5-325 MG tab    Consent for all Surgical, Special Diagnostic or Therapeutic Procedures    Controlled Substance Treatment Agreement      5. Left lumbar radiculitis  gabapentin (NEURONTIN) 800 MG tablet    Morphine Sulfate ER 15 MG Tablet Extended Release 12 hour Abuse-Deterrent    Morphine Sulfate ER 15 MG Tablet Extended Release 12 hour Abuse-Deterrent    Morphine Sulfate ER 15 MG Tablet Extended Release 12 hour Abuse-Deterrent    HYDROcodone-acetaminophen  (NORCO) 7.5-325 MG tab    HYDROcodone-acetaminophen (NORCO) 7.5-325 MG tab    HYDROcodone-acetaminophen (NORCO) 7.5-325 MG tab    Consent for all Surgical, Special Diagnostic or Therapeutic Procedures    Controlled Substance Treatment Agreement      6. Hypothyroidism (acquired)  levothyroxine (SYNTHROID) 50 MCG Tab      7. Essential hypertension  lisinopril (PRINIVIL) 10 MG Tab    metoprolol SR (TOPROL XL) 25 MG TABLET SR 24 HR      8. Mixed hyperlipidemia  rosuvastatin (CRESTOR) 10 MG Tab      9. Situational anxiety  busPIRone (BUSPAR) 30 MG tablet    clonazePAM (KLONOPIN) 0.5 MG Tab    clonazePAM (KLONOPIN) 1 MG Tab      10. Hypertension associated with diabetes ([facility])        11. Hyperlipidemia due to type 2 diabetes mellitus ([facility])        12. Type 2 diabetes mellitus with diabetic polyneuropathy, without long-term current use of insulin ([facility])        13. Generalized anxiety disorder  clonazePAM (KLONOPIN) 0.5 MG Tab    clonazePAM (KLONOPIN) 1 MG Tab      14. Posttraumatic stress disorder, delayed onset  clonazePAM (KLONOPIN) 0.5 MG Tab    clonazePAM (KLONOPIN) 1 MG Tab      15. Chronic pain of left knee  triamcinolone acetonide (Kenalog-40) injection 40 mg      16. Benzodiazepine dependence ([facility])        17. Chronic bilateral low back pain with bilateral sciatica             Type 2 diabetes mellitus with diabetic polyneuropathy, without long-term current use of insulin ([facility])  Chornic stable.     Patient is tolerating semaglutide well.     Increase semaglutide to 1mg weekly. Continue metformin Er 1000mg daily.     Monofilament testing with a 10 gram force: sensation intact: decreased bilaterally  Visual Inspection: Feet without maceration, ulcers, fissures.  Pedal pulses: intact bilaterally      Benzodiazepine dependence (HCC)  Chronic stable condition.  Patient has been on clonazepam twice daily for several years to help manage her anxiety and PTSD.    Continue clonazepam 1 mg every morning and clonazepam 0.5 mg  nightly.    Generalized anxiety disorder  Chronic stable condition.  Patient's anxiety is well controlled with twice daily clonazepam as well as daily buspirone.    Continue clonazepam and buspirone 30 mg daily.    Posttraumatic stress disorder, delayed onset  Chronic stable.  Patient's PTSD is well controlled with medications.  Patient was taking prazosin at night to help manage night terrors.  However, patient reports that this was no longer effective so she recently stopped it.    Discontinue prazosin.    Opioid dependence in controlled environment (HCC)  Chronic stable.  Patient's chronic pain is managed with extended release morphine as well as Norco as needed for breakthrough pain.    Updated pain contract signed today in office as I am taking over her opioid prescription.    Patient's last UDS was May 22, 2023.    Chronic pain of left knee  Chronic unchanged.  Patient reports that her knee pain has been slightly worsening.  Her last steroid injection in her left knee was in May of this year.  Patient also manages her chronic pain with extended release morphine as well as Norco as needed for breakthrough pain.      We will refill patient's opioid prescriptions.  We will also provide patient with Kenalog joint injection in office today.    Chronic bilateral low back pain with bilateral sciatica  Chronic unchanged condition.  Patient has had chronic back pain for years.  Patient has undergone surgery prior however patient developed postlaminectomy syndrome.  Patient continues to have chronic back pain and periodic sciatica.  Patient manages her pain with extended release morphine and Norco for breakthrough pain.  She reports that recently her back pain has been slightly worse as well as her left knee pain.  She reports that this has resulted in developing worsening left hip pain as well.    Patient to continue morphine ER 15 mg twice daily.  Will increase patient's Norco from 5-325 mg to 7.5-325 mg every 8 hours  as needed for pain.     reviewed during visit.  Patient is up-to-date on UDS.  New contract signed today in office.      Return in about 3 months (around 12/22/2023).      Please note that this dictation was created using voice recognition software. I have worked with consultants from the vendor as well as technical experts from [facility] to optimize the interface. I have made every reasonable attempt to correct obvious errors, but I expect that there are errors of grammar and possibly content that I did not discover before finalizing the note.

## 2023-09-22 NOTE — ASSESSMENT & PLAN NOTE
Chronic stable condition.  Patient has been on clonazepam twice daily for several years to help manage her anxiety and PTSD.    Continue clonazepam 1 mg every morning and clonazepam 0.5 mg nightly.

## 2023-09-22 NOTE — ASSESSMENT & PLAN NOTE
Chronic unchanged.  Patient reports that her knee pain has been slightly worsening.  Her last steroid injection in her left knee was in May of this year.  Patient also manages her chronic pain with extended release morphine as well as Norco as needed for breakthrough pain.      We will refill patient's opioid prescriptions.  We will also provide patient with Kenalog joint injection in office today.

## 2023-09-22 NOTE — ASSESSMENT & PLAN NOTE
Chronic unchanged condition.  Patient has had chronic back pain for years.  Patient has undergone surgery prior however patient developed postlaminectomy syndrome.  Patient continues to have chronic back pain and periodic sciatica.  Patient manages her pain with extended release morphine and Norco for breakthrough pain.  She reports that recently her back pain has been slightly worse as well as her left knee pain.  She reports that this has resulted in developing worsening left hip pain as well.    Patient to continue morphine ER 15 mg twice daily.  Will increase patient's Norco from 5-325 mg to 7.5-325 mg every 8 hours as needed for pain.     reviewed during visit.  Patient is up-to-date on UDS.  New contract signed today in office.

## 2023-09-22 NOTE — ASSESSMENT & PLAN NOTE
Chornic stable.     Patient is tolerating semaglutide well.     Increase semaglutide to 1mg weekly. Continue metformin Er 1000mg daily.     Monofilament testing with a 10 gram force: sensation intact: decreased bilaterally  Visual Inspection: Feet without maceration, ulcers, fissures.  Pedal pulses: intact bilaterally

## 2023-09-22 NOTE — ASSESSMENT & PLAN NOTE
Chronic stable condition.  Patient's anxiety is well controlled with twice daily clonazepam as well as daily buspirone.    Continue clonazepam and buspirone 30 mg daily.   Quality 110: Preventive Care And Screening: Influenza Immunization: Influenza Immunization Administered during Influenza season Detail Level: Detailed

## 2023-09-22 NOTE — ASSESSMENT & PLAN NOTE
Chronic stable.  Patient's chronic pain is managed with extended release morphine as well as Norco as needed for breakthrough pain.    Updated pain contract signed today in office as I am taking over her opioid prescription.    Patient's last UDS was May 22, 2023.

## 2023-09-22 NOTE — ASSESSMENT & PLAN NOTE
Chronic stable.  Patient's PTSD is well controlled with medications.  Patient was taking prazosin at night to help manage night terrors.  However, patient reports that this was no longer effective so she recently stopped it.    Discontinue prazosin.

## 2023-09-22 NOTE — PROCEDURES
Joint Inj - LG: knee, L knee on 9/22/2023 8:45 AM  Indications: pain  Details: 22 G needle, anterolateral approach  Medications: (Triamcinolone 40mg with 4mL lidocaine 1%)  Outcome: tolerated well, no immediate complications  Procedure, treatment alternatives, risks and benefits explained, specific risks discussed. Immediately prior to procedure a time out was called to verify the correct patient, procedure, equipment, support staff and site/side marked as required. Patient was prepped and draped in the usual sterile fashion.

## 2023-09-28 ENCOUNTER — APPOINTMENT (OUTPATIENT)
Dept: MEDICAL GROUP | Facility: PHYSICIAN GROUP | Age: 68
End: 2023-09-28
Payer: MEDICARE

## 2023-12-12 ENCOUNTER — OFFICE VISIT (OUTPATIENT)
Dept: MEDICAL GROUP | Facility: PHYSICIAN GROUP | Age: 68
End: 2023-12-12
Payer: MEDICARE

## 2023-12-12 VITALS
RESPIRATION RATE: 16 BRPM | BODY MASS INDEX: 32.43 KG/M2 | HEART RATE: 76 BPM | TEMPERATURE: 97.3 F | SYSTOLIC BLOOD PRESSURE: 118 MMHG | DIASTOLIC BLOOD PRESSURE: 72 MMHG | HEIGHT: 63 IN | WEIGHT: 183 LBS | OXYGEN SATURATION: 93 %

## 2023-12-12 DIAGNOSIS — G89.29 CHRONIC BILATERAL LOW BACK PAIN WITH BILATERAL SCIATICA: ICD-10-CM

## 2023-12-12 DIAGNOSIS — G89.29 CHRONIC PAIN OF LEFT KNEE: ICD-10-CM

## 2023-12-12 DIAGNOSIS — M54.42 CHRONIC BILATERAL LOW BACK PAIN WITH BILATERAL SCIATICA: ICD-10-CM

## 2023-12-12 DIAGNOSIS — F41.1 GENERALIZED ANXIETY DISORDER: ICD-10-CM

## 2023-12-12 DIAGNOSIS — R41.89 BRAIN FOG: ICD-10-CM

## 2023-12-12 DIAGNOSIS — M54.41 CHRONIC BILATERAL LOW BACK PAIN WITH BILATERAL SCIATICA: ICD-10-CM

## 2023-12-12 DIAGNOSIS — F41.8 SITUATIONAL ANXIETY: ICD-10-CM

## 2023-12-12 DIAGNOSIS — M25.562 CHRONIC PAIN OF LEFT KNEE: ICD-10-CM

## 2023-12-12 DIAGNOSIS — F13.20 BENZODIAZEPINE DEPENDENCE (HCC): ICD-10-CM

## 2023-12-12 DIAGNOSIS — Z23 NEED FOR VACCINATION: ICD-10-CM

## 2023-12-12 DIAGNOSIS — F43.10 POSTTRAUMATIC STRESS DISORDER, DELAYED ONSET: ICD-10-CM

## 2023-12-12 PROCEDURE — 90662 IIV NO PRSV INCREASED AG IM: CPT | Performed by: NURSE PRACTITIONER

## 2023-12-12 PROCEDURE — 99214 OFFICE O/P EST MOD 30 MIN: CPT | Mod: 25 | Performed by: NURSE PRACTITIONER

## 2023-12-12 PROCEDURE — 3074F SYST BP LT 130 MM HG: CPT | Performed by: NURSE PRACTITIONER

## 2023-12-12 PROCEDURE — 20610 DRAIN/INJ JOINT/BURSA W/O US: CPT | Mod: LT | Performed by: NURSE PRACTITIONER

## 2023-12-12 PROCEDURE — G0008 ADMIN INFLUENZA VIRUS VAC: HCPCS | Performed by: NURSE PRACTITIONER

## 2023-12-12 PROCEDURE — 3078F DIAST BP <80 MM HG: CPT | Performed by: NURSE PRACTITIONER

## 2023-12-12 RX ORDER — CLONAZEPAM 0.5 MG/1
0.5 TABLET ORAL NIGHTLY
Qty: 90 TABLET | Refills: 0 | Status: SHIPPED | OUTPATIENT
Start: 2024-01-04 | End: 2024-03-18 | Stop reason: SDUPTHER

## 2023-12-12 RX ORDER — CLONAZEPAM 1 MG/1
1 TABLET ORAL EVERY MORNING
Qty: 90 TABLET | Refills: 0 | Status: SHIPPED | OUTPATIENT
Start: 2024-01-04 | End: 2024-03-18 | Stop reason: SDUPTHER

## 2023-12-12 RX ORDER — MORPHINE SULFATE 15 MG/1
15 TABLET, FILM COATED, EXTENDED RELEASE ORAL EVERY 12 HOURS
Qty: 60 TABLET | Refills: 0 | Status: SHIPPED | OUTPATIENT
Start: 2024-02-24 | End: 2024-03-18 | Stop reason: SDUPTHER

## 2023-12-12 RX ORDER — MORPHINE SULFATE 15 MG/1
15 TABLET, FILM COATED, EXTENDED RELEASE ORAL EVERY 12 HOURS
Qty: 60 TABLET | Refills: 0 | Status: SHIPPED | OUTPATIENT
Start: 2024-01-24 | End: 2024-02-23

## 2023-12-12 RX ORDER — HYDROCODONE BITARTRATE AND ACETAMINOPHEN 7.5; 325 MG/1; MG/1
1 TABLET ORAL EVERY 8 HOURS PRN
Qty: 70 TABLET | Refills: 0 | Status: SHIPPED | OUTPATIENT
Start: 2023-12-24 | End: 2024-01-23

## 2023-12-12 RX ORDER — HYDROCODONE BITARTRATE AND ACETAMINOPHEN 7.5; 325 MG/1; MG/1
1 TABLET ORAL EVERY 8 HOURS PRN
Qty: 70 TABLET | Refills: 0 | Status: SHIPPED | OUTPATIENT
Start: 2024-02-24 | End: 2024-03-18

## 2023-12-12 RX ORDER — MORPHINE SULFATE 15 MG/1
15 TABLET, FILM COATED, EXTENDED RELEASE ORAL EVERY 12 HOURS
Qty: 60 TABLET | Refills: 0 | Status: SHIPPED | OUTPATIENT
Start: 2023-12-24 | End: 2024-01-23

## 2023-12-12 RX ORDER — TRIAMCINOLONE ACETONIDE 40 MG/ML
40 INJECTION, SUSPENSION INTRA-ARTICULAR; INTRAMUSCULAR ONCE
Status: COMPLETED | OUTPATIENT
Start: 2023-12-12 | End: 2023-12-12

## 2023-12-12 RX ORDER — HYDROCODONE BITARTRATE AND ACETAMINOPHEN 7.5; 325 MG/1; MG/1
1 TABLET ORAL EVERY 8 HOURS PRN
Qty: 70 TABLET | Refills: 0 | Status: SHIPPED | OUTPATIENT
Start: 2024-01-24 | End: 2024-02-23

## 2023-12-12 RX ADMIN — TRIAMCINOLONE ACETONIDE 40 MG: 40 INJECTION, SUSPENSION INTRA-ARTICULAR; INTRAMUSCULAR at 15:21

## 2023-12-12 ASSESSMENT — FIBROSIS 4 INDEX: FIB4 SCORE: 1.91

## 2023-12-12 NOTE — PROGRESS NOTES
Chief Complaint   Patient presents with    Joint Injection     Left knee                                                                                                                                       HPI:   Grisel presents today with the following.    Problem   Brain Fog   Chronic Bilateral Low Back Pain With Bilateral Sciatica   Chronic Pain of Left Knee   Benzodiazepine Dependence (Hcc)   Posttraumatic Stress Disorder, Delayed Onset       Current Outpatient Medications   Medication Sig Dispense Refill    [START ON 1/4/2024] clonazePAM (KLONOPIN) 0.5 MG Tab Take 1 Tablet by mouth every evening for 90 days. Indications: Feeling Anxious, Panic Disorder 90 Tablet 0    [START ON 1/4/2024] clonazePAM (KLONOPIN) 1 MG Tab Take 1 Tablet by mouth every morning for 90 days. Indications: Feeling Anxious, Panic Disorder 90 Tablet 0    [START ON 12/24/2023] HYDROcodone-acetaminophen (NORCO) 7.5-325 MG tab Take 1 Tablet by mouth every 8 hours as needed for Moderate Pain or Severe Pain (breakthrough pain) for up to 30 days. 70 Tablet 0    [START ON 1/24/2024] HYDROcodone-acetaminophen (NORCO) 7.5-325 MG tab Take 1 Tablet by mouth every 8 hours as needed for Severe Pain or Moderate Pain for up to 30 days. 70 Tablet 0    [START ON 2/24/2024] HYDROcodone-acetaminophen (NORCO) 7.5-325 MG tab Take 1 Tablet by mouth every 8 hours as needed for Severe Pain or Moderate Pain for up to 30 days. 70 Tablet 0    [START ON 12/24/2023] morphine ER (MS CONTIN) 15 MG Tab CR tablet Take 1 Tablet by mouth every 12 hours for 30 days. 60 Tablet 0    [START ON 1/24/2024] morphine ER (MS CONTIN) 15 MG Tab CR tablet Take 1 Tablet by mouth every 12 hours for 30 days. 60 Tablet 0    [START ON 2/24/2024] morphine ER (MS CONTIN) 15 MG Tab CR tablet Take 1 Tablet by mouth every 12 hours for 30 days. 60 Tablet 0    Brexpiprazole (REXULTI) 2 MG Tab Take 1 Tablet by mouth every day. 90 Tablet 3    busPIRone (BUSPAR) 30 MG tablet Take 1 Tablet by mouth  every day. 180 Tablet 3    cyclobenzaprine (FLEXERIL) 5 mg tablet Take 1 Tablet by mouth 2 times a day as needed for Muscle Spasms (restless leg). 90 Tablet 3    DULoxetine (CYMBALTA) 30 MG Cap DR Particles Take 3 Capsules by mouth every day. 270 Capsule 3    Empagliflozin (JARDIANCE) 25 MG Tab Take 1 Tablet by mouth every day. 100 Tablet 3    estradiol (ESTRACE) 0.5 MG tablet Take 1 Tablet by mouth every day. 90 Tablet 3    gabapentin (NEURONTIN) 800 MG tablet Take 1.5 Tablets by mouth 2 times a day. 270 Tablet 3    levothyroxine (SYNTHROID) 50 MCG Tab Take 1 Tablet by mouth every morning on an empty stomach. 90 Tablet 3    lisinopril (PRINIVIL) 10 MG Tab Take 1 Tablet by mouth every day. 100 Tablet 3    metFORMIN ER (GLUCOPHAGE XR) 500 MG TABLET SR 24 HR Take 1 Tablet by mouth 2 times a day. 400 Tablet 0    metoprolol SR (TOPROL XL) 25 MG TABLET SR 24 HR Take 1 Tablet by mouth every day. 90 Tablet 3    rosuvastatin (CRESTOR) 10 MG Tab Take 1 Tablet by mouth every evening. 100 Tablet 3    Semaglutide, 1 MG/DOSE, 4 MG/3ML Solution Pen-injector Inject 1 mg under the skin every 7 days. 9 mL 3    Morphine Sulfate ER 15 MG Tablet Extended Release 12 hour Abuse-Deterrent Take 15 mg by mouth 2 times a day for 30 days. 60 Tablet 0    HYDROcodone-acetaminophen (NORCO) 7.5-325 MG tab Take 1 Tablet by mouth every 8 hours as needed for Severe Pain or Moderate Pain for up to 30 days. 70 Tablet 0    pantoprazole (PROTONIX) 40 MG Tablet Delayed Response TAKE 1 TABLET BY MOUTH EVERY DAY 90 Tablet 1    benzonatate (TESSALON) 100 MG Cap Take 1-2 Capsules by mouth 3 times a day as needed for Cough. 60 Capsule 0    ipratropium (ATROVENT) 0.06 % Solution Administer 2 Sprays into affected nostril(S) 4 times a day. 15 mL 0    Blood Glucose Test Strips Use one Abbott Freedom Lite strip to test blood sugar twice daily and PRN. 270 Strip 0    albuterol 108 (90 Base) MCG/ACT Aero Soln inhalation aerosol Inhale 2 Puffs every four hours as  "needed for Shortness of Breath. 8.5 g 5    Naloxone (NARCAN) 4 MG/0.1ML Liquid One spray in one nostril for overdose and call 911. 1 Each 1    estradiol (ESTRACE) 1 MG Tab Take 1 Tablet by mouth every day. 90 Tablet 3    pseudoephedrine (SUDAFED) 30 MG Tab Take 1-2 Tablets by mouth every 6 hours as needed for Congestion (sinus pressure not relieved by other measures). 30 Tablet 0    Blood Glucose Meter Kit Test blood sugar as recommended by provider. Abbott Freestyle Lite blood glucose monitoring kit. 1 Kit 0    Lancets Use one Abbott [place] Lite lancet to test blood sugar twice daily and PRN. 300 Each 3    Alcohol Swabs Wipe site with prep pad prior to injection. 100 Each 3    fluticasone (FLONASE) 50 MCG/ACT nasal spray Administer 1 Spray into affected nostril(S) every day. 16 g 11    cyanocobalamin (VITAMIN B-12) 500 MCG Tab Take 500 mcg by mouth every day.       No current facility-administered medications for this visit.       Allergies as of 12/12/2023    (No Known Allergies)        ROS:  All systems negative expect as addressed in assessment and plan.     /72 (BP Location: Left arm, Patient Position: Sitting)   Pulse 76   Temp 36.3 °C (97.3 °F) (Temporal)   Resp 16   Ht 1.6 m (5' 3\")   Wt 83 kg (183 lb)   LMP  (LMP Unknown)   SpO2 93%   BMI 32.42 kg/m²       Physical Exam  Musculoskeletal:      Left knee: Swelling and effusion present. Tenderness present.       Assessment and Plan:  68 y.o. female with the following issues.    1. Need for vaccination  INFLUENZA VACCINE, HIGH DOSE (65+ ONLY)      2. Chronic pain of left knee  Consent for all Surgical, Special Diagnostic or Therapeutic Procedures    HYDROcodone-acetaminophen (NORCO) 7.5-325 MG tab    HYDROcodone-acetaminophen (NORCO) 7.5-325 MG tab    HYDROcodone-acetaminophen (NORCO) 7.5-325 MG tab    morphine ER (MS CONTIN) 15 MG Tab CR tablet    morphine ER (MS CONTIN) 15 MG Tab CR tablet    morphine ER (MS CONTIN) 15 MG Tab CR tablet    Joint " Inj - LG: knee, L knee      3. Chronic bilateral low back pain with bilateral sciatica  HYDROcodone-acetaminophen (NORCO) 7.5-325 MG tab    HYDROcodone-acetaminophen (NORCO) 7.5-325 MG tab    HYDROcodone-acetaminophen (NORCO) 7.5-325 MG tab    morphine ER (MS CONTIN) 15 MG Tab CR tablet    morphine ER (MS CONTIN) 15 MG Tab CR tablet    morphine ER (MS CONTIN) 15 MG Tab CR tablet      4. Benzodiazepine dependence (HCC)        5. Situational anxiety  clonazePAM (KLONOPIN) 0.5 MG Tab    clonazePAM (KLONOPIN) 1 MG Tab      6. Generalized anxiety disorder  clonazePAM (KLONOPIN) 0.5 MG Tab    clonazePAM (KLONOPIN) 1 MG Tab      7. Posttraumatic stress disorder, delayed onset  clonazePAM (KLONOPIN) 0.5 MG Tab    clonazePAM (KLONOPIN) 1 MG Tab      8. Brain fog             Chronic bilateral low back pain with bilateral sciatica  Chronic unstable.    Patient reports that her back pain has been worsening over the last several weeks.  She also reports increase in urinary leakage especially when her back is in severe pain.    Patient manages her back pain with morphine ER 15 mg twice daily.  She also has Norco 7.5-325 1 tablet every 8 hours as needed for breakthrough pain.    Patient advised to call and schedule with Dr. Caldwell as she may benefit from an epidural or an injection in her back.    Obtained and reviewed patient utilization report from [facility] pharmacy database on 12/12/2023 3:28 PM  prior to writing prescription for controlled substance II, III or IV per [place] bill . Based on assessment of the report,my physical exam if necessary and the patient's health problem, the prescription is medically necessary.       Chronic pain of left knee  Chronic unstable.    Patient reports that about 1 to 2 weeks ago her knee started hurting considerably.  She did have a triamcinolone injection 3 months ago.  Discussed risks and benefits of triamcinolone injection.  Patient verbalizes understanding.    Written consent  obtained.    Will perform Kenalog injection in office today.    Benzodiazepine dependence (HCC)  Chronic stable.    Patient has been on chronic clonazepam twice daily for several years to help manage her anxiety and PTSD.  Patient is tolerating this well and denies any adverse effects.     reviewed at time of visit.    Continue clonazepam 1 mg every morning and clonazepam 0.5 mg nightly.    Posttraumatic stress disorder, delayed onset  Chronic stable.    Patient takes clonazepam 1 mg in the morning and 1.5 mg in the evening to help manage her anxiety and PTSD.  Patient reports that her anxiety has been well-controlled as well as her PTSD.    Continue clonazepam 1 mg in the morning and clonazepam 0.5 mg at night.    PDMP reviewed at time of visit.    Brain fog  Patient reports that she has been very forgetful lately.  She states that she cannot remember a lot of her appointments and has missed some of them.    Discussed with patient possible causes of brain fog.    Patient reports that she has not been sleeping well due to her back pain worsening.    Patient to continue to follow with pain management and myself for her back pain.  Patient advised to try and get as much rest as possible.      Return in about 3 months (around 3/12/2024) for Chronic pain.      Please note that this dictation was created using voice recognition software. I have worked with consultants from the vendor as well as technical experts from [employer] to optimize the interface. I have made every reasonable attempt to correct obvious errors, but I expect that there are errors of grammar and possibly content that I did not discover before finalizing the note.

## 2023-12-12 NOTE — ASSESSMENT & PLAN NOTE
Chronic unstable.    Patient reports that about 1 to 2 weeks ago her knee started hurting considerably.  She did have a triamcinolone injection 3 months ago.  Discussed risks and benefits of triamcinolone injection.  Patient verbalizes understanding.    Written consent obtained.    Will perform Kenalog injection in office today.

## 2023-12-12 NOTE — ASSESSMENT & PLAN NOTE
Chronic stable.    Patient takes clonazepam 1 mg in the morning and 1.5 mg in the evening to help manage her anxiety and PTSD.  Patient reports that her anxiety has been well-controlled as well as her PTSD.    Continue clonazepam 1 mg in the morning and clonazepam 0.5 mg at night.    PDMP reviewed at time of visit.

## 2023-12-12 NOTE — ASSESSMENT & PLAN NOTE
Chronic stable.    Patient has been on chronic clonazepam twice daily for several years to help manage her anxiety and PTSD.  Patient is tolerating this well and denies any adverse effects.     reviewed at time of visit.    Continue clonazepam 1 mg every morning and clonazepam 0.5 mg nightly.

## 2023-12-12 NOTE — ASSESSMENT & PLAN NOTE
Patient reports that she has been very forgetful lately.  She states that she cannot remember a lot of her appointments and has missed some of them.    Discussed with patient possible causes of brain fog.    Patient reports that she has not been sleeping well due to her back pain worsening.    Patient to continue to follow with pain management and myself for her back pain.  Patient advised to try and get as much rest as possible.

## 2023-12-12 NOTE — ASSESSMENT & PLAN NOTE
Chronic unstable.    Patient reports that her back pain has been worsening over the last several weeks.  She also reports increase in urinary leakage especially when her back is in severe pain.    Patient manages her back pain with morphine ER 15 mg twice daily.  She also has Norco 7.5-325 1 tablet every 8 hours as needed for breakthrough pain.    Patient advised to call and schedule with Dr. Caldwell as she may benefit from an epidural or an injection in her back.    Obtained and reviewed patient utilization report from [facility] pharmacy database on 12/12/2023 3:28 PM  prior to writing prescription for controlled substance II, III or IV per [place] bill . Based on assessment of the report,my physical exam if necessary and the patient's health problem, the prescription is medically necessary.

## 2023-12-12 NOTE — PROCEDURES
Joint Inj - LG: knee, L knee on 12/12/2023 3:39 PM  Indications: pain and joint swelling  Details: 22 G needle, anterolateral approach  Medications: (Triamcinolone 40 mg with 4 mL lidocaine 1%)  Aspirate: 0 mL  Outcome: tolerated well, no immediate complications  Procedure, treatment alternatives, risks and benefits explained, specific risks discussed. Consent was given by the patient. Immediately prior to procedure a time out was called to verify the correct patient, procedure, equipment, support staff and site/side marked as required. Patient was prepped and draped in the usual sterile fashion.

## 2024-02-14 ENCOUNTER — OFFICE VISIT (OUTPATIENT)
Dept: MEDICAL GROUP | Facility: PHYSICIAN GROUP | Age: 69
End: 2024-02-14
Payer: MEDICARE

## 2024-02-14 VITALS
OXYGEN SATURATION: 95 % | BODY MASS INDEX: 30.65 KG/M2 | HEART RATE: 89 BPM | RESPIRATION RATE: 16 BRPM | DIASTOLIC BLOOD PRESSURE: 70 MMHG | SYSTOLIC BLOOD PRESSURE: 130 MMHG | TEMPERATURE: 99 F | HEIGHT: 63 IN | WEIGHT: 173 LBS

## 2024-02-14 DIAGNOSIS — J06.9 VIRAL UPPER RESPIRATORY TRACT INFECTION: ICD-10-CM

## 2024-02-14 DIAGNOSIS — R05.1 ACUTE COUGH: ICD-10-CM

## 2024-02-14 DIAGNOSIS — R50.9 FEVER, UNSPECIFIED FEVER CAUSE: ICD-10-CM

## 2024-02-14 DIAGNOSIS — R68.89 FLU-LIKE SYMPTOMS: ICD-10-CM

## 2024-02-14 PROBLEM — J01.40 ACUTE NON-RECURRENT PANSINUSITIS: Status: RESOLVED | Noted: 2023-02-13 | Resolved: 2024-02-14

## 2024-02-14 LAB
FLUAV RNA SPEC QL NAA+PROBE: NEGATIVE
FLUBV RNA SPEC QL NAA+PROBE: NEGATIVE
RSV RNA SPEC QL NAA+PROBE: NEGATIVE
SARS-COV-2 RNA RESP QL NAA+PROBE: NEGATIVE

## 2024-02-14 PROCEDURE — 3075F SYST BP GE 130 - 139MM HG: CPT | Performed by: NURSE PRACTITIONER

## 2024-02-14 PROCEDURE — 99213 OFFICE O/P EST LOW 20 MIN: CPT | Performed by: NURSE PRACTITIONER

## 2024-02-14 PROCEDURE — 0241U POCT CEPHEID COV-2, FLU A/B, RSV - PCR: CPT | Performed by: NURSE PRACTITIONER

## 2024-02-14 PROCEDURE — 3078F DIAST BP <80 MM HG: CPT | Performed by: NURSE PRACTITIONER

## 2024-02-14 RX ORDER — ALBUTEROL SULFATE 90 UG/1
2 AEROSOL, METERED RESPIRATORY (INHALATION) EVERY 4 HOURS PRN
Qty: 8.5 G | Refills: 5 | Status: SHIPPED | OUTPATIENT
Start: 2024-02-14

## 2024-02-14 RX ORDER — AZITHROMYCIN 250 MG/1
TABLET, FILM COATED ORAL
Qty: 6 TABLET | Refills: 0 | Status: SHIPPED | OUTPATIENT
Start: 2024-02-14 | End: 2024-02-19

## 2024-02-14 ASSESSMENT — PATIENT HEALTH QUESTIONNAIRE - PHQ9
SUM OF ALL RESPONSES TO PHQ QUESTIONS 1-9: 9
5. POOR APPETITE OR OVEREATING: 0 - NOT AT ALL
CLINICAL INTERPRETATION OF PHQ2 SCORE: 6

## 2024-02-14 ASSESSMENT — FIBROSIS 4 INDEX: FIB4 SCORE: 1.91

## 2024-02-15 NOTE — ASSESSMENT & PLAN NOTE
Patient reports having resp symptoms for the last 10 days. She reports that it started with body aches, cough, congestion, fever. Her last fever was Saturday. She continues to have a cough, congestion, and watery eyes. Her cough is not productive but sounds wet. Her daughter and grandson were also sick. Her grandson tested positive for RSV.     Discussed supportive care. Patient has low grade temp in office. Lung sounds diminished throughout. Rhonchi in the upper lobes.     Will treat for bronchitis. Patient provided with azithromycin and albuterol inhaler.

## 2024-02-15 NOTE — PROGRESS NOTES
Chief Complaint   Patient presents with    Cough     Running nose/ watery eyes/ body ache/ hot and cold x 1.5 week                                                                                                                                       HPI:   Grisel presents today with the following.    Problem   Viral Upper Respiratory Tract Infection       Current Outpatient Medications   Medication Sig Dispense Refill    albuterol 108 (90 Base) MCG/ACT Aero Soln inhalation aerosol Inhale 2 Puffs every four hours as needed for Shortness of Breath. 8.5 g 5    clonazePAM (KLONOPIN) 0.5 MG Tab Take 1 Tablet by mouth every evening for 90 days. Indications: Feeling Anxious, Panic Disorder 90 Tablet 0    clonazePAM (KLONOPIN) 1 MG Tab Take 1 Tablet by mouth every morning for 90 days. Indications: Feeling Anxious, Panic Disorder 90 Tablet 0    HYDROcodone-acetaminophen (NORCO) 7.5-325 MG tab Take 1 Tablet by mouth every 8 hours as needed for Severe Pain or Moderate Pain for up to 30 days. 70 Tablet 0    [START ON 2/24/2024] HYDROcodone-acetaminophen (NORCO) 7.5-325 MG tab Take 1 Tablet by mouth every 8 hours as needed for Severe Pain or Moderate Pain for up to 30 days. 70 Tablet 0    morphine ER (MS CONTIN) 15 MG Tab CR tablet Take 1 Tablet by mouth every 12 hours for 30 days. 60 Tablet 0    [START ON 2/24/2024] morphine ER (MS CONTIN) 15 MG Tab CR tablet Take 1 Tablet by mouth every 12 hours for 30 days. 60 Tablet 0    Brexpiprazole (REXULTI) 2 MG Tab Take 1 Tablet by mouth every day. 90 Tablet 3    busPIRone (BUSPAR) 30 MG tablet Take 1 Tablet by mouth every day. 180 Tablet 3    cyclobenzaprine (FLEXERIL) 5 mg tablet Take 1 Tablet by mouth 2 times a day as needed for Muscle Spasms (restless leg). 90 Tablet 3    DULoxetine (CYMBALTA) 30 MG Cap DR Particles Take 3 Capsules by mouth every day. 270 Capsule 3    Empagliflozin (JARDIANCE) 25 MG Tab Take 1 Tablet by mouth every day. 100 Tablet 3    estradiol (ESTRACE) 0.5 MG  tablet Take 1 Tablet by mouth every day. 90 Tablet 3    gabapentin (NEURONTIN) 800 MG tablet Take 1.5 Tablets by mouth 2 times a day. 270 Tablet 3    levothyroxine (SYNTHROID) 50 MCG Tab Take 1 Tablet by mouth every morning on an empty stomach. 90 Tablet 3    lisinopril (PRINIVIL) 10 MG Tab Take 1 Tablet by mouth every day. 100 Tablet 3    metFORMIN ER (GLUCOPHAGE XR) 500 MG TABLET SR 24 HR Take 1 Tablet by mouth 2 times a day. 400 Tablet 0    metoprolol SR (TOPROL XL) 25 MG TABLET SR 24 HR Take 1 Tablet by mouth every day. 90 Tablet 3    rosuvastatin (CRESTOR) 10 MG Tab Take 1 Tablet by mouth every evening. 100 Tablet 3    Semaglutide, 1 MG/DOSE, 4 MG/3ML Solution Pen-injector Inject 1 mg under the skin every 7 days. 9 mL 3    pantoprazole (PROTONIX) 40 MG Tablet Delayed Response TAKE 1 TABLET BY MOUTH EVERY DAY 90 Tablet 1    benzonatate (TESSALON) 100 MG Cap Take 1-2 Capsules by mouth 3 times a day as needed for Cough. 60 Capsule 0    ipratropium (ATROVENT) 0.06 % Solution Administer 2 Sprays into affected nostril(S) 4 times a day. 15 mL 0    Blood Glucose Test Strips Use one Abbott Freedom Lite strip to test blood sugar twice daily and PRN. 270 Strip 0    Naloxone (NARCAN) 4 MG/0.1ML Liquid One spray in one nostril for overdose and call 911. 1 Each 1    estradiol (ESTRACE) 1 MG Tab Take 1 Tablet by mouth every day. 90 Tablet 3    pseudoephedrine (SUDAFED) 30 MG Tab Take 1-2 Tablets by mouth every 6 hours as needed for Congestion (sinus pressure not relieved by other measures). 30 Tablet 0    Blood Glucose Meter Kit Test blood sugar as recommended by provider. Abbott Freestyle Lite blood glucose monitoring kit. 1 Kit 0    Lancets Use one Abbott [place] Lite lancet to test blood sugar twice daily and PRN. 300 Each 3    Alcohol Swabs Wipe site with prep pad prior to injection. 100 Each 3    fluticasone (FLONASE) 50 MCG/ACT nasal spray Administer 1 Spray into affected nostril(S) every day. 16 g 11    cyanocobalamin  "(VITAMIN B-12) 500 MCG Tab Take 500 mcg by mouth every day.       No current facility-administered medications for this visit.       Allergies as of 02/14/2024    (No Known Allergies)        ROS:  All systems negative expect as addressed in assessment and plan.     /70 (BP Location: Left arm, Patient Position: Sitting)   Pulse 89   Temp 37.2 °C (99 °F) (Temporal)   Resp 16   Ht 1.6 m (5' 3\")   Wt 78.5 kg (173 lb)   LMP  (LMP Unknown)   SpO2 95%   BMI 30.65 kg/m²       Physical Exam  Vitals reviewed.   Constitutional:       Appearance: Normal appearance.   HENT:      Head: Normocephalic and atraumatic.      Mouth/Throat:      Mouth: Mucous membranes are moist.   Eyes:      Extraocular Movements: Extraocular movements intact.      Conjunctiva/sclera: Conjunctivae normal.   Cardiovascular:      Rate and Rhythm: Normal rate and regular rhythm.      Heart sounds: Normal heart sounds.   Pulmonary:      Effort: Pulmonary effort is normal.      Breath sounds: Examination of the right-upper field reveals decreased breath sounds and rhonchi. Examination of the left-upper field reveals decreased breath sounds and rhonchi. Examination of the right-middle field reveals decreased breath sounds. Examination of the right-lower field reveals decreased breath sounds. Examination of the left-lower field reveals decreased breath sounds. Decreased breath sounds and rhonchi present. No wheezing or rales.   Musculoskeletal:         General: Normal range of motion.      Cervical back: Normal range of motion.   Skin:     General: Skin is warm and dry.   Neurological:      General: No focal deficit present.      Mental Status: She is alert and oriented to person, place, and time.   Psychiatric:         Mood and Affect: Mood normal.         Behavior: Behavior normal.         Thought Content: Thought content normal.           Assessment and Plan:  68 y.o. female with the following issues.    1. Flu-like symptoms  POCT Cepheid " CoV-2, Flu A/B, RSV - PCR      2. Acute cough  POCT Cepheid CoV-2, Flu A/B, RSV - PCR    albuterol 108 (90 Base) MCG/ACT Aero Soln inhalation aerosol      3. Fever, unspecified fever cause  POCT Cepheid CoV-2, Flu A/B, RSV - PCR      4. Viral upper respiratory tract infection  albuterol 108 (90 Base) MCG/ACT Aero Soln inhalation aerosol           Viral upper respiratory tract infection  Patient reports having resp symptoms for the last 10 days. She reports that it started with body aches, cough, congestion, fever. Her last fever was Saturday. She continues to have a cough, congestion, and watery eyes. Her cough is not productive but sounds wet. Her daughter and grandson were also sick. Her grandson tested positive for RSV.     Discussed supportive care. Patient has low grade temp in office. Lung sounds diminished throughout. Rhonchi in the upper lobes.     Will treat for bronchitis. Patient provided with azithromycin and albuterol inhaler.       Return in about 26 days (around 3/11/2024) for Chronic pain.      Please note that this dictation was created using voice recognition software. I have worked with consultants from the vendor as well as technical experts from [facility] to optimize the interface. I have made every reasonable attempt to correct obvious errors, but I expect that there are errors of grammar and possibly content that I did not discover before finalizing the note.

## 2024-02-27 RX ORDER — PANTOPRAZOLE SODIUM 40 MG/1
40 TABLET, DELAYED RELEASE ORAL DAILY
Qty: 90 TABLET | Refills: 1 | Status: SHIPPED | OUTPATIENT
Start: 2024-02-27

## 2024-02-27 NOTE — TELEPHONE ENCOUNTER
Received request via: Patient    Was the patient seen in the last year in this department? Yes    Does the patient have an active prescription (recently filled or refills available) for medication(s) requested? No    Pharm[facility] Name[facility]    Does the patient have long term Plus and need 100 day supply (blood pressure, diabetes and cholesterol meds only)? Patient does not have SCP

## 2024-03-12 DIAGNOSIS — N95.1 VASOMOTOR SYMPTOMS DUE TO MENOPAUSE: ICD-10-CM

## 2024-03-18 ENCOUNTER — OFFICE VISIT (OUTPATIENT)
Dept: MEDICAL GROUP | Facility: PHYSICIAN GROUP | Age: 69
End: 2024-03-18
Payer: MEDICARE

## 2024-03-18 VITALS
DIASTOLIC BLOOD PRESSURE: 70 MMHG | RESPIRATION RATE: 16 BRPM | BODY MASS INDEX: 30.12 KG/M2 | HEART RATE: 64 BPM | SYSTOLIC BLOOD PRESSURE: 110 MMHG | TEMPERATURE: 97.9 F | OXYGEN SATURATION: 95 % | HEIGHT: 63 IN | WEIGHT: 170 LBS

## 2024-03-18 DIAGNOSIS — E66.9 DIABETES MELLITUS TYPE 2 IN OBESE: ICD-10-CM

## 2024-03-18 DIAGNOSIS — E11.59 HYPERTENSION ASSOCIATED WITH DIABETES (HCC): ICD-10-CM

## 2024-03-18 DIAGNOSIS — E11.42 TYPE 2 DIABETES MELLITUS WITH DIABETIC POLYNEUROPATHY, WITHOUT LONG-TERM CURRENT USE OF INSULIN (HCC): ICD-10-CM

## 2024-03-18 DIAGNOSIS — M54.42 CHRONIC BILATERAL LOW BACK PAIN WITH BILATERAL SCIATICA: ICD-10-CM

## 2024-03-18 DIAGNOSIS — M96.1 POSTLAMINECTOMY SYNDROME: ICD-10-CM

## 2024-03-18 DIAGNOSIS — I15.2 HYPERTENSION ASSOCIATED WITH DIABETES (HCC): ICD-10-CM

## 2024-03-18 DIAGNOSIS — F43.10 POSTTRAUMATIC STRESS DISORDER, DELAYED ONSET: ICD-10-CM

## 2024-03-18 DIAGNOSIS — F33.1 DEPRESSION, MAJOR, RECURRENT, MODERATE (HCC): ICD-10-CM

## 2024-03-18 DIAGNOSIS — F13.20 BENZODIAZEPINE DEPENDENCE (HCC): ICD-10-CM

## 2024-03-18 DIAGNOSIS — M54.41 CHRONIC BILATERAL LOW BACK PAIN WITH BILATERAL SCIATICA: ICD-10-CM

## 2024-03-18 DIAGNOSIS — G89.29 CHRONIC BILATERAL LOW BACK PAIN WITH BILATERAL SCIATICA: ICD-10-CM

## 2024-03-18 DIAGNOSIS — F11.20 OPIOID DEPENDENCE WITH CURRENT USE (HCC): ICD-10-CM

## 2024-03-18 DIAGNOSIS — F41.8 SITUATIONAL ANXIETY: ICD-10-CM

## 2024-03-18 DIAGNOSIS — E11.69 DIABETES MELLITUS TYPE 2 IN OBESE: ICD-10-CM

## 2024-03-18 DIAGNOSIS — M25.562 CHRONIC PAIN OF LEFT KNEE: ICD-10-CM

## 2024-03-18 DIAGNOSIS — F41.1 GENERALIZED ANXIETY DISORDER: ICD-10-CM

## 2024-03-18 DIAGNOSIS — G89.29 CHRONIC PAIN OF LEFT KNEE: ICD-10-CM

## 2024-03-18 LAB
HBA1C MFR BLD: 5.7 % (ref ?–5.8)
POCT INT CON NEG: NEGATIVE
POCT INT CON POS: POSITIVE

## 2024-03-18 PROCEDURE — 3078F DIAST BP <80 MM HG: CPT | Performed by: NURSE PRACTITIONER

## 2024-03-18 PROCEDURE — 99214 OFFICE O/P EST MOD 30 MIN: CPT | Performed by: NURSE PRACTITIONER

## 2024-03-18 PROCEDURE — 83036 HEMOGLOBIN GLYCOSYLATED A1C: CPT | Performed by: NURSE PRACTITIONER

## 2024-03-18 PROCEDURE — 3074F SYST BP LT 130 MM HG: CPT | Performed by: NURSE PRACTITIONER

## 2024-03-18 RX ORDER — MORPHINE SULFATE 15 MG/1
15 TABLET, FILM COATED, EXTENDED RELEASE ORAL EVERY 12 HOURS
Qty: 60 TABLET | Refills: 0 | Status: SHIPPED | OUTPATIENT
Start: 2024-04-27 | End: 2024-05-27

## 2024-03-18 RX ORDER — HYDROCODONE BITARTRATE AND ACETAMINOPHEN 10; 325 MG/1; MG/1
1 TABLET ORAL EVERY 6 HOURS PRN
Qty: 70 TABLET | Refills: 0 | Status: SHIPPED | OUTPATIENT
Start: 2024-05-26 | End: 2024-06-25

## 2024-03-18 RX ORDER — HYDROCODONE BITARTRATE AND ACETAMINOPHEN 10; 325 MG/1; MG/1
1 TABLET ORAL EVERY 6 HOURS PRN
Qty: 70 TABLET | Refills: 0 | Status: SHIPPED | OUTPATIENT
Start: 2024-04-25 | End: 2024-05-25

## 2024-03-18 RX ORDER — MORPHINE SULFATE 15 MG/1
15 TABLET, FILM COATED, EXTENDED RELEASE ORAL EVERY 12 HOURS
Qty: 60 TABLET | Refills: 0 | Status: SHIPPED | OUTPATIENT
Start: 2024-05-28 | End: 2024-06-27

## 2024-03-18 RX ORDER — CLONAZEPAM 0.5 MG/1
0.5 TABLET ORAL NIGHTLY
Qty: 90 TABLET | Refills: 0 | Status: SHIPPED | OUTPATIENT
Start: 2024-03-18 | End: 2024-06-16

## 2024-03-18 RX ORDER — HYDROCODONE BITARTRATE AND ACETAMINOPHEN 10; 325 MG/1; MG/1
1 TABLET ORAL EVERY 6 HOURS PRN
Qty: 70 TABLET | Refills: 0 | Status: SHIPPED | OUTPATIENT
Start: 2024-03-25 | End: 2024-04-24

## 2024-03-18 RX ORDER — CLONAZEPAM 1 MG/1
1 TABLET ORAL EVERY MORNING
Qty: 90 TABLET | Refills: 0 | Status: SHIPPED | OUTPATIENT
Start: 2024-03-18 | End: 2024-06-16

## 2024-03-18 RX ORDER — MORPHINE SULFATE 15 MG/1
15 TABLET, FILM COATED, EXTENDED RELEASE ORAL EVERY 12 HOURS
Qty: 60 TABLET | Refills: 0 | Status: SHIPPED | OUTPATIENT
Start: 2024-03-27 | End: 2024-04-26

## 2024-03-18 ASSESSMENT — FIBROSIS 4 INDEX: FIB4 SCORE: 1.91

## 2024-03-18 NOTE — PROGRESS NOTES
Chief Complaint   Patient presents with    Follow-Up     Chronic pain                                                                                                                                       HPI:   Grisel presents today with the following.    The patient is currently on a pain management regimen that includes morphine, although they report a decrease in its effectiveness. The patient describes a need to sit for an hour or two to prepare their back and body for the day. For additional pain relief, the patient takes Norco, typically at 11 a.m. and 4 p.m., and finds it to be more effective than morphine currently.    A recent incident involved the patient falling while attempting to enter the shower, resulting in a landing on their left side. Fortunately, the patient did not sustain any bruising from this fall.    The patient is experiencing nausea and vomiting as adverse effects of their Ozempic medication. The severity of the vomiting was such that it led to the rupture of blood vessels in the patient's face. These episodes of nausea and vomiting occur approximately twice a week, with the patient noting that at other times, the nausea is present but can be mitigated by consuming a piece of bread.    At this time, the patient reports that their anxiety is well-managed.    Current Outpatient Medications   Medication Sig Dispense Refill    [START ON 3/27/2024] morphine ER (MS CONTIN) 15 MG Tab CR tablet Take 1 Tablet by mouth every 12 hours for 30 days. Indications: Chronic Pain 60 Tablet 0    [START ON 4/27/2024] morphine ER (MS CONTIN) 15 MG Tab CR tablet Take 1 Tablet by mouth every 12 hours for 30 days. Indications: Chronic Pain 60 Tablet 0    [START ON 5/28/2024] morphine ER (MS CONTIN) 15 MG Tab CR tablet Take 1 Tablet by mouth every 12 hours for 30 days. Indications: Chronic Pain 60 Tablet 0    [START ON 3/25/2024] HYDROcodone/acetaminophen (NORCO)  MG Tab Take 1 Tablet by mouth every 6 hours  as needed for Severe Pain or Moderate Pain for up to 30 days. Indications: Pain 70 Tablet 0    [START ON 4/25/2024] HYDROcodone/acetaminophen (NORCO)  MG Tab Take 1 Tablet by mouth every 6 hours as needed for Severe Pain or Moderate Pain for up to 30 days. Indications: Pain 70 Tablet 0    [START ON 5/26/2024] HYDROcodone/acetaminophen (NORCO)  MG Tab Take 1 Tablet by mouth every 6 hours as needed for Severe Pain or Moderate Pain for up to 30 days. Indications: Pain 70 Tablet 0    Semaglutide,0.25 or 0.5MG/DOS, 2 MG/3ML Solution Pen-injector Inject 0.5 mg under the skin every 7 days. 9 mL 3    clonazePAM (KLONOPIN) 1 MG Tab Take 1 Tablet by mouth every morning for 90 days. Indications: Feeling Anxious, Panic Disorder 90 Tablet 0    clonazePAM (KLONOPIN) 0.5 MG Tab Take 1 Tablet by mouth every evening for 90 days. Indications: Feeling Anxious, Panic Disorder 90 Tablet 0    pantoprazole (PROTONIX) 40 MG Tablet Delayed Response TAKE 1 TABLET BY MOUTH EVERY DAY 90 Tablet 1    albuterol 108 (90 Base) MCG/ACT Aero Soln inhalation aerosol Inhale 2 Puffs every four hours as needed for Shortness of Breath. 8.5 g 5    Brexpiprazole (REXULTI) 2 MG Tab Take 1 Tablet by mouth every day. 90 Tablet 3    busPIRone (BUSPAR) 30 MG tablet Take 1 Tablet by mouth every day. 180 Tablet 3    cyclobenzaprine (FLEXERIL) 5 mg tablet Take 1 Tablet by mouth 2 times a day as needed for Muscle Spasms (restless leg). 90 Tablet 3    DULoxetine (CYMBALTA) 30 MG Cap DR Particles Take 3 Capsules by mouth every day. 270 Capsule 3    Empagliflozin (JARDIANCE) 25 MG Tab Take 1 Tablet by mouth every day. 100 Tablet 3    estradiol (ESTRACE) 0.5 MG tablet Take 1 Tablet by mouth every day. 90 Tablet 3    gabapentin (NEURONTIN) 800 MG tablet Take 1.5 Tablets by mouth 2 times a day. 270 Tablet 3    levothyroxine (SYNTHROID) 50 MCG Tab Take 1 Tablet by mouth every morning on an empty stomach. 90 Tablet 3    lisinopril (PRINIVIL) 10 MG Tab Take 1  "Tablet by mouth every day. 100 Tablet 3    metFORMIN ER (GLUCOPHAGE XR) 500 MG TABLET SR 24 HR Take 1 Tablet by mouth 2 times a day. 400 Tablet 0    metoprolol SR (TOPROL XL) 25 MG TABLET SR 24 HR Take 1 Tablet by mouth every day. 90 Tablet 3    rosuvastatin (CRESTOR) 10 MG Tab Take 1 Tablet by mouth every evening. 100 Tablet 3    benzonatate (TESSALON) 100 MG Cap Take 1-2 Capsules by mouth 3 times a day as needed for Cough. 60 Capsule 0    ipratropium (ATROVENT) 0.06 % Solution Administer 2 Sprays into affected nostril(S) 4 times a day. 15 mL 0    Blood Glucose Test Strips Use one Abbott Freedom Lite strip to test blood sugar twice daily and PRN. 270 Strip 0    Naloxone (NARCAN) 4 MG/0.1ML Liquid One spray in one nostril for overdose and call 911. 1 Each 1    estradiol (ESTRACE) 1 MG Tab Take 1 Tablet by mouth every day. 90 Tablet 3    pseudoephedrine (SUDAFED) 30 MG Tab Take 1-2 Tablets by mouth every 6 hours as needed for Congestion (sinus pressure not relieved by other measures). 30 Tablet 0    Blood Glucose Meter Kit Test blood sugar as recommended by provider. Abbott Freestyle Lite blood glucose monitoring kit. 1 Kit 0    Lancets Use one Abbott Wynne Lite lancet to test blood sugar twice daily and PRN. 300 Each 3    Alcohol Swabs Wipe site with prep pad prior to injection. 100 Each 3    fluticasone (FLONASE) 50 MCG/ACT nasal spray Administer 1 Spray into affected nostril(S) every day. 16 g 11    cyanocobalamin (VITAMIN B-12) 500 MCG Tab Take 500 mcg by mouth every day.       No current facility-administered medications for this visit.       Allergies as of 03/18/2024    (No Known Allergies)        ROS:  All systems negative expect as addressed in assessment and plan.     /70 (BP Location: Left arm, Patient Position: Sitting)   Pulse 64   Temp 36.6 °C (97.9 °F) (Temporal)   Resp 16   Ht 1.6 m (5' 3\")   Wt 77.1 kg (170 lb)   LMP  (LMP Unknown)   SpO2 95%   BMI 30.11 kg/m²       Physical " Exam  Vitals reviewed.   Constitutional:       Appearance: Normal appearance.   HENT:      Head: Normocephalic and atraumatic.      Mouth/Throat:      Mouth: Mucous membranes are moist.   Eyes:      Extraocular Movements: Extraocular movements intact.      Conjunctiva/sclera: Conjunctivae normal.   Pulmonary:      Effort: Pulmonary effort is normal.   Musculoskeletal:         General: Normal range of motion.      Cervical back: Normal range of motion.   Skin:     General: Skin is warm and dry.   Neurological:      General: No focal deficit present.      Mental Status: She is alert and oriented to person, place, and time.   Psychiatric:         Mood and Affect: Mood normal.         Behavior: Behavior normal.         Thought Content: Thought content normal.           Assessment and Plan:  68 y.o. female with the following issues.    1. Type 2 diabetes mellitus with diabetic polyneuropathy, without long-term current use of insulin ([facility])  POCT Hemoglobin A1C      2. Chronic pain of left knee  morphine ER (MS CONTIN) 15 MG Tab CR tablet    morphine ER (MS CONTIN) 15 MG Tab CR tablet    morphine ER (MS CONTIN) 15 MG Tab CR tablet    HYDROcodone/acetaminophen (NORCO)  MG Tab    HYDROcodone/acetaminophen (NORCO)  MG Tab    HYDROcodone/acetaminophen (NORCO)  MG Tab      3. Chronic bilateral low back pain with bilateral sciatica  morphine ER (MS CONTIN) 15 MG Tab CR tablet    morphine ER (MS CONTIN) 15 MG Tab CR tablet    morphine ER (MS CONTIN) 15 MG Tab CR tablet    HYDROcodone/acetaminophen (NORCO)  MG Tab    HYDROcodone/acetaminophen (NORCO)  MG Tab    HYDROcodone/acetaminophen (NORCO)  MG Tab      4. Postlaminectomy syndrome  morphine ER (MS CONTIN) 15 MG Tab CR tablet    morphine ER (MS CONTIN) 15 MG Tab CR tablet    HYDROcodone/acetaminophen (NORCO)  MG Tab    HYDROcodone/acetaminophen (NORCO)  MG Tab    HYDROcodone/acetaminophen (NORCO)  MG Tab      5. Diabetes  mellitus type 2 in obese ([facility])      Continue medication regimen, follow-up in 3 months for A1c reevaluation      6. Situational anxiety  clonazePAM (KLONOPIN) 1 MG Tab    clonazePAM (KLONOPIN) 0.5 MG Tab      7. Generalized anxiety disorder  clonazePAM (KLONOPIN) 1 MG Tab    clonazePAM (KLONOPIN) 0.5 MG Tab      8. Posttraumatic stress disorder, delayed onset  clonazePAM (KLONOPIN) 1 MG Tab    clonazePAM (KLONOPIN) 0.5 MG Tab      9. Opioid dependence with current use ([facility])        10. Benzodiazepine dependence ([facility])        11. Hypertension associated with diabetes ([facility])        12. Depression, major, recurrent, moderate ([facility])             Opioid dependence with current use ([facility])  Chronic stable.     Chronic bilateral low back pain with bilateral sciatica  Chronic unstable.        - Patient has been utilizing extended-release morphine and hydrocodone (Norco) for chronic pain. Morphine's effectiveness has diminished, taking nearly two hours to relieve back pain. Additional doses of Norco are used for escalating pain periods.     - Plan to continue morphine for baseline pain control and increase Norco from 7.5mg/325 to 10/325 dosage for better breakthrough pain management. Options include more frequent dosing or a higher strength. Reevaluation in three months for potential switch to extended-release oxycodone (OxyContin) if this change is ineffective.      - Prescriptions adjustments to be sent to [facility] pharmacy.     reviewed at time of visit. Increase in dose will bring patient to approx 50 MME per day. The risks and benefits were discussed with the patient.     Type 2 diabetes mellitus with diabetic polyneuropathy, without long-term current use of insulin ([facility])  Chronic stable.      Latest Reference Range & Units 03/18/24 13:31   Glycohemoglobin 5.8 % 5.7          - Nausea and vomiting linked to Ozempic dosage for diabetes management, causing facial petechiae from vomiting.     - Reduce Ozempic dosage to 0.5 mg  to alleviate symptoms. Monitor response and consider switching to an alternative GLP-1 receptor agonist like Trulicity if adverse effects continue.     - Patient to provide feedback on the adjusted regimen at the next visit.    Continue jardiance 25mg daily.     Generalized anxiety disorder  Chronic stable.        - Patient's anxiety is managed with benzodiazepines alongside opioid therapy for chronic pain. The combination is well-tolerated, supported by consistent Narcan (naloxone) use.     - Maintain current benzodiazepine regimen and ensure Narcan availability. Follow-up in three months to review the efficacy and safety of the anxiety and pain management strategies.    Continue clonazepam 1mg in the morning and 0.5mg in the evening. Continue buspar 30mg daily.     Benzodiazepine dependence (HCC)  Chronic stable.     Patient is managed on clonazepam daily for her anxiety.     Continue clonazepam 1mg in the morning and 0.5mg in the evening.     Hypertension associated with diabetes (HCC)  Chronic stable.     Continue lisinopril 10mg daily, metoprolol XL 25mg daily.         Depression, major, recurrent, moderate (HCC)  Chronic stable.     Continue cymbalta 30mg daily and rexulti 2mg daily.      - Next appointment scheduled for a knee injection on Monday, March 25, 2024, at 3:45 PM.    Return in about 3 months (around 6/18/2024) for Chronic pain.      Please note that this dictation was created using voice recognition software. I have worked with consultants from the vendor as well as technical experts from [facility] [employer] to optimize the interface. I have made every reasonable attempt to correct obvious errors, but I expect that there are errors of grammar and possibly content that I did not discover before finalizing the note.

## 2024-03-19 PROBLEM — J06.9 VIRAL UPPER RESPIRATORY TRACT INFECTION: Status: RESOLVED | Noted: 2024-02-14 | Resolved: 2024-03-19

## 2024-03-19 RX ORDER — ESTRADIOL 1 MG/1
1 TABLET ORAL
Qty: 90 TABLET | Refills: 3 | Status: SHIPPED | OUTPATIENT
Start: 2024-03-19

## 2024-03-19 NOTE — ASSESSMENT & PLAN NOTE
Chronic stable.        - Patient's anxiety is managed with benzodiazepines alongside opioid therapy for chronic pain. The combination is well-tolerated, supported by consistent Narcan (naloxone) use.     - Maintain current benzodiazepine regimen and ensure Narcan availability. Follow-up in three months to review the efficacy and safety of the anxiety and pain management strategies.    Continue clonazepam 1mg in the morning and 0.5mg in the evening. Continue buspar 30mg daily.

## 2024-03-19 NOTE — ASSESSMENT & PLAN NOTE
Chronic stable.      Latest Reference Range & Units 03/18/24 13:31   Glycohemoglobin 5.8 % 5.7          - Nausea and vomiting linked to Ozempic dosage for diabetes management, causing facial petechiae from vomiting.     - Reduce Ozempic dosage to 0.5 mg to alleviate symptoms. Monitor response and consider switching to an alternative GLP-1 receptor agonist like Trulicity if adverse effects continue.     - Patient to provide feedback on the adjusted regimen at the next visit.    Continue jardiance 25mg daily.

## 2024-03-19 NOTE — TELEPHONE ENCOUNTER
Received request via: Patient    Was the patient seen in the last year in this department? Yes    Does the patient have an active prescription (recently filled or refills available) for medication(s) requested? No    Ph[facility]    Does the patient have group home [facility] and need 100 day supply (blood pressure, diabetes and cholesterol meds only)? Patient does not have SCP

## 2024-03-19 NOTE — ASSESSMENT & PLAN NOTE
Chronic stable.     Patient is managed on clonazepam daily for her anxiety.     Continue clonazepam 1mg in the morning and 0.5mg in the evening.

## 2024-03-19 NOTE — ASSESSMENT & PLAN NOTE
Chronic unstable.        - Patient has been utilizing extended-release morphine and hydrocodone (Norco) for chronic pain. Morphine's effectiveness has diminished, taking nearly two hours to relieve back pain. Additional doses of Norco are used for escalating pain periods.     - Plan to continue morphine for baseline pain control and increase Norco from 7.5mg/325 to 10/325 dosage for better breakthrough pain management. Options include more frequent dosing or a higher strength. Reevaluation in three months for potential switch to extended-release oxycodone (OxyContin) if this change is ineffective.      - Prescriptions adjustments to be sent to [facility] pharmacy.     reviewed at time of visit. Increase in dose will bring patient to approx 50 MME per day. The risks and benefits were discussed with the patient.

## 2024-03-23 ENCOUNTER — OFFICE VISIT (OUTPATIENT)
Dept: URGENT CARE | Facility: PHYSICIAN GROUP | Age: 69
End: 2024-03-23
Payer: MEDICARE

## 2024-03-23 ENCOUNTER — HOSPITAL ENCOUNTER (OUTPATIENT)
Facility: MEDICAL CENTER | Age: 69
End: 2024-03-23
Payer: MEDICARE

## 2024-03-23 VITALS
TEMPERATURE: 98.2 F | OXYGEN SATURATION: 97 % | BODY MASS INDEX: 30.3 KG/M2 | HEART RATE: 66 BPM | SYSTOLIC BLOOD PRESSURE: 114 MMHG | HEIGHT: 63 IN | DIASTOLIC BLOOD PRESSURE: 76 MMHG | RESPIRATION RATE: 18 BRPM | WEIGHT: 171 LBS

## 2024-03-23 DIAGNOSIS — R30.0 DYSURIA: ICD-10-CM

## 2024-03-23 DIAGNOSIS — N30.01 ACUTE CYSTITIS WITH HEMATURIA: ICD-10-CM

## 2024-03-23 LAB
APPEARANCE UR: NORMAL
BILIRUB UR STRIP-MCNC: NEGATIVE MG/DL
COLOR UR AUTO: NORMAL
GLUCOSE UR STRIP.AUTO-MCNC: 500 MG/DL
KETONES UR STRIP.AUTO-MCNC: NEGATIVE MG/DL
LEUKOCYTE ESTERASE UR QL STRIP.AUTO: NORMAL
NITRITE UR QL STRIP.AUTO: POSITIVE
PH UR STRIP.AUTO: 5.5 [PH] (ref 5–8)
PROT UR QL STRIP: 300 MG/DL
RBC UR QL AUTO: NORMAL
SP GR UR STRIP.AUTO: 1.01
UROBILINOGEN UR STRIP-MCNC: 1 MG/DL

## 2024-03-23 PROCEDURE — 3078F DIAST BP <80 MM HG: CPT

## 2024-03-23 PROCEDURE — 81002 URINALYSIS NONAUTO W/O SCOPE: CPT

## 2024-03-23 PROCEDURE — 87086 URINE CULTURE/COLONY COUNT: CPT

## 2024-03-23 PROCEDURE — 3074F SYST BP LT 130 MM HG: CPT

## 2024-03-23 PROCEDURE — 99213 OFFICE O/P EST LOW 20 MIN: CPT

## 2024-03-23 RX ORDER — CEFDINIR 300 MG/1
300 CAPSULE ORAL 2 TIMES DAILY
Qty: 10 CAPSULE | Refills: 0 | Status: SHIPPED | OUTPATIENT
Start: 2024-03-23 | End: 2024-03-28

## 2024-03-23 ASSESSMENT — ENCOUNTER SYMPTOMS
ABDOMINAL PAIN: 0
NAUSEA: 0
STRIDOR: 0
FEVER: 0
MYALGIAS: 0
VOMITING: 0
SORE THROAT: 0
BACK PAIN: 0
SHORTNESS OF BREATH: 0
DIARRHEA: 0
HEADACHES: 0
NECK PAIN: 0
CHILLS: 0
DIZZINESS: 0
WEAKNESS: 0

## 2024-03-23 ASSESSMENT — FIBROSIS 4 INDEX: FIB4 SCORE: 1.91

## 2024-03-23 NOTE — PROGRESS NOTES
Subjective     Grisel Mark is a 68 y.o. female who presents with urinary pain x1 day.     HPI:   Grisel is a 67yo female presenting for urinary pain and hematuria x1 day. She has attempted Azo with moderate relief. Denies abnormal vaginal discharge or itching. No flank pain, denies fevers/chills. BMs regular. No shortness of breath or vomiting.     Review of Systems   Constitutional:  Negative for chills, fever and malaise/fatigue.   HENT:  Negative for sore throat.    Respiratory:  Negative for shortness of breath and stridor.    Gastrointestinal:  Negative for abdominal pain, diarrhea, nausea and vomiting.   Musculoskeletal:  Negative for back pain, myalgias and neck pain.   Neurological:  Negative for dizziness, weakness and headaches.     Past Medical History:   Diagnosis Date    Anxiety     Arrhythmia     Chronic back pain     Constipation     Depression     Diabetes (HCC)     Ear pain, left 9/23/2021    GERD (gastroesophageal reflux disease)     Hyperlipidemia     Hypertension     Thyroid disease     Tobacco use 10/11/2022      Past Surgical History:   Procedure Laterality Date    FL INJ LUMBAR/SACRAL,W/ IMAGING N/A 7/14/2023    Procedure: Caudal epidural steroid injection with catheter;  Surgeon: Paresh Ogden M.D.;  [facility];  Service: Pain Management    FL INJ LUMBAR/SACRAL,W/ IMAGING N/A 3/23/2023    Procedure: Caudal epidural steroid injection with catheter;  Surgeon: Paresh Ogden M.D.;  Location: [facility];  Service: Pain Management    FL INJ LUMBAR/SACRAL,W/ IMAGING N/A 10/31/2022    Procedure: Caudal epidural steroid injection with catheter;  Surgeon: Praesh Ogden M.D.;  Location: [facility];  Service: Pain Management    RADIO FREQUENCY ABLATION ADDITIONAL LEVEL Left 11/11/2021    Procedure: LEFT lumbar three through lumbar five radiofrequency ablation;  Surgeon: Paresh Ogden M.D.;  [facility];  Service: Pain Management    LUMBAR MEDIAL BRANCH BLOCKS Left 7/21/2021    Procedure: LEFT lumbar three through lumbar five medial branch blocks;  Surgeon: Paresh Ogden M.D.;  [facility]: [facility];  Service: Pain Management    LUMBAR TRANSFORAMINAL EPIDURAL STEROID INJECTION Left 5/20/2021    Procedure: LEFT lumbar four and lumbar five transforaminal epidural steroid injection;  Surgeon: Paresh Ogden M.D.;  [facility]: [facility];  Service: Pain Management    BLOCK EPIDURAL STEROID INJECTION Left 2/24/2021    Procedure: INJECTION, STEROID, EPIDURAL;  Surgeon: Paresh Ogden M.D.;  [facility]: [facility];  Service: Pain Management    LUMBAR MEDIAL BRANCH BLOCKS Left 9/9/2020    Procedure: BLOCK, NERVE, SPINAL, LUMBAR, POSTERIOR RAMUS, MEDIAL BRANCH;  Surgeon: Paresh Ogden M.D.;  [facility]: [facility];  Service: Pain Management    N[place] NJX AA&/STRD TFRML EPI LUMBAR/SACRAL 1 LEVEL Left 8/12/2020    Procedure: INJECTION, SPINE, LUMBOSACRAL, EPIDURAL, 1 LEVEL, TRANSFORAMINAL APPROACH;  Surgeon: Paresh Ogden M.D.;  [facility];  Service: Pain Management    [place] NJX AA&/STRD TFRML EPI LUMBAR/SACRAL EA ADDL Left 8/12/2020    Procedure: INJECTION, SPINE, LUMBOSACRAL, EPIDURAL, TRANSFORAMINAL APPROACH;  Surgeon: Paresh Ogden M.D.;  [facility]: [facility];  Service: Pain Management    LAMINOTOMY  1998    ABDOMINAL HYSTERECTOMY TOTAL      CARPAL TUNNEL RELEASE      CHOLECYSTECTOMY        Patient has no known allergies.     Current Outpatient Medications:     cefdinir (OMNICEF) 300 MG Cap, Take 1 Capsule by mouth 2 times a day for 5 days., Disp: 10 Capsule, Rfl: 0    estradiol (ESTRACE) 1 MG Tab, TAKE 1 TABLET BY MOUTH EVERY DAY, Disp: 90 Tablet, Rfl: 3    [START ON 3/27/2024] morphine ER (MS CONTIN) 15 MG Tab CR tablet, Take 1 Tablet by mouth every 12 hours for 30 days. Indications: Chronic  Pain, Disp: 60 Tablet, Rfl: 0    [START ON 4/27/2024] morphine ER (MS CONTIN) 15 MG Tab CR tablet, Take 1 Tablet by mouth every 12 hours for 30 days. Indications: Chronic Pain, Disp: 60 Tablet, Rfl: 0    [START ON 5/28/2024] morphine ER (MS CONTIN) 15 MG Tab CR tablet, Take 1 Tablet by mouth every 12 hours for 30 days. Indications: Chronic Pain, Disp: 60 Tablet, Rfl: 0    [START ON 3/25/2024] HYDROcodone/acetaminophen (NORCO)  MG Tab, Take 1 Tablet by mouth every 6 hours as needed for Severe Pain or Moderate Pain for up to 30 days. Indications: Pain, Disp: 70 Tablet, Rfl: 0    [START ON 4/25/2024] HYDROcodone/acetaminophen (NORCO)  MG Tab, Take 1 Tablet by mouth every 6 hours as needed for Severe Pain or Moderate Pain for up to 30 days. Indications: Pain, Disp: 70 Tablet, Rfl: 0    [START ON 5/26/2024] HYDROcodone/acetaminophen (NORCO)  MG Tab, Take 1 Tablet by mouth every 6 hours as needed for Severe Pain or Moderate Pain for up to 30 days. Indications: Pain, Disp: 70 Tablet, Rfl: 0    Semaglutide,0.25 or 0.5MG/DOS, 2 MG/3ML Solution Pen-injector, Inject 0.5 mg under the skin every 7 days., Disp: 9 mL, Rfl: 3    clonazePAM (KLONOPIN) 1 MG Tab, Take 1 Tablet by mouth every morning for 90 days. Indications: Feeling Anxious, Panic Disorder, Disp: 90 Tablet, Rfl: 0    clonazePAM (KLONOPIN) 0.5 MG Tab, Take 1 Tablet by mouth every evening for 90 days. Indications: Feeling Anxious, Panic Disorder, Disp: 90 Tablet, Rfl: 0    pantoprazole (PROTONIX) 40 MG Tablet Delayed Response, TAKE 1 TABLET BY MOUTH EVERY DAY, Disp: 90 Tablet, Rfl: 1    albuterol 108 (90 Base) MCG/ACT Aero Soln inhalation aerosol, Inhale 2 Puffs every four hours as needed for Shortness of Breath., Disp: 8.5 g, Rfl: 5    Brexpiprazole (REXULTI) 2 MG Tab, Take 1 Tablet by mouth every day., Disp: 90 Tablet, Rfl: 3    busPIRone (BUSPAR) 30 MG tablet, Take 1 Tablet by mouth every day., Disp: 180 Tablet, Rfl: 3    cyclobenzaprine (FLEXERIL)  5 mg tablet, Take 1 Tablet by mouth 2 times a day as needed for Muscle Spasms (restless leg)., Disp: 90 Tablet, Rfl: 3    DULoxetine (CYMBALTA) 30 MG Cap DR Particles, Take 3 Capsules by mouth every day., Disp: 270 Capsule, Rfl: 3    Empagliflozin (JARDIANCE) 25 MG Tab, Take 1 Tablet by mouth every day., Disp: 100 Tablet, Rfl: 3    estradiol (ESTRACE) 0.5 MG tablet, Take 1 Tablet by mouth every day., Disp: 90 Tablet, Rfl: 3    gabapentin (NEURONTIN) 800 MG tablet, Take 1.5 Tablets by mouth 2 times a day., Disp: 270 Tablet, Rfl: 3    levothyroxine (SYNTHROID) 50 MCG Tab, Take 1 Tablet by mouth every morning on an empty stomach., Disp: 90 Tablet, Rfl: 3    lisinopril (PRINIVIL) 10 MG Tab, Take 1 Tablet by mouth every day., Disp: 100 Tablet, Rfl: 3    metFORMIN ER (GLUCOPHAGE XR) 500 MG TABLET SR 24 HR, Take 1 Tablet by mouth 2 times a day., Disp: 400 Tablet, Rfl: 0    metoprolol SR (TOPROL XL) 25 MG TABLET SR 24 HR, Take 1 Tablet by mouth every day., Disp: 90 Tablet, Rfl: 3    rosuvastatin (CRESTOR) 10 MG Tab, Take 1 Tablet by mouth every evening., Disp: 100 Tablet, Rfl: 3    benzonatate (TESSALON) 100 MG Cap, Take 1-2 Capsules by mouth 3 times a day as needed for Cough., Disp: 60 Capsule, Rfl: 0    ipratropium (ATROVENT) 0.06 % Solution, Administer 2 Sprays into affected nostril(S) 4 times a day., Disp: 15 mL, Rfl: 0    Blood Glucose Test Strips, Use one Abbott Freedom Lite strip to test blood sugar twice daily and PRN., Disp: 270 Strip, Rfl: 0    Naloxone (NARCAN) 4 MG/0.1ML Liquid, One spray in one nostril for overdose and call 911., Disp: 1 Each, Rfl: 1    pseudoephedrine (SUDAFED) 30 MG Tab, Take 1-2 Tablets by mouth every 6 hours as needed for Congestion (sinus pressure not relieved by other measures)., Disp: 30 Tablet, Rfl: 0    Blood Glucose Meter Kit, Test blood sugar as recommended by provider. Abbott Freestyle Lite blood glucose monitoring kit., Disp: 1 Kit, Rfl: 0    Lancets, Use one Abbott Freedom Lite  "lancet to test blood sugar twice daily and PRN., Disp: 300 Each, Rfl: 3    Alcohol Swabs, Wipe site with prep pad prior to injection., Disp: 100 Each, Rfl: 3    fluticasone (FLONASE) 50 MCG/ACT nasal spray, Administer 1 Spray into affected nostril(S) every day., Disp: 16 g, Rfl: 11    cyanocobalamin (VITAMIN B-12) 500 MCG Tab, Take 500 mcg by mouth every day., Disp: , Rfl:      Social History     Tobacco Use    Smoking status: Every Day     Current packs/day: 0.00     Types: Cigarettes     Last attempt to quit: 2014     Years since quitting: 10.2    Smokeless tobacco: Never   Vaping Use    Vaping Use: Never used   Substance Use Topics    Alcohol use: Not Currently    Drug use: Not Currently      Family History   Problem Relation Age of Onset    Cancer Mother     Stroke Father         Heart attack    Dementia Sister     Stroke Brother         heart Attack    Cancer Brother         Skin    Diabetes Brother     Kidney Disease Brother     Cancer Brother     Parkinson's Disease Sister     No Known Problems Sister     Diabetes Daughter     Hypertension Daughter       Medications, Allergies, and current problem list reviewed today in Epic.      Objective     /76 (BP Location: Right arm, Patient Position: Sitting, BP Cuff Size: Adult)   Pulse 66   Temp 36.8 °C (98.2 °F) (Temporal)   Resp 18   Ht 1.6 m (5' 3\")   Wt 77.6 kg (171 lb)   LMP  (LMP Unknown)   SpO2 97%   BMI 30.29 kg/m²      Physical Exam  Vitals reviewed.   Constitutional:       General: She is not in acute distress.  HENT:      Nose: Nose normal.   Eyes:      Extraocular Movements: Extraocular movements intact.      Conjunctiva/sclera: Conjunctivae normal.      Pupils: Pupils are equal, round, and reactive to light.   Cardiovascular:      Rate and Rhythm: Normal rate and regular rhythm.      Pulses: Normal pulses.      Heart sounds: Normal heart sounds.   Pulmonary:      Effort: Pulmonary effort is normal. No tachypnea, accessory muscle usage, " prolonged expiration, respiratory distress or retractions.      Breath sounds: Normal breath sounds. No stridor. No wheezing, rhonchi or rales.   Abdominal:      Tenderness: There is no right CVA tenderness or left CVA tenderness.   Musculoskeletal:      Cervical back: Full passive range of motion without pain, normal range of motion and neck supple.   Skin:     General: Skin is warm and dry.   Neurological:      Mental Status: She is alert. Mental status is at baseline.   Psychiatric:         Mood and Affect: Mood normal.         Behavior: Behavior normal.         Thought Content: Thought content normal.       Results for orders placed or performed in visit on 03/23/24   POCT Urinalysis   Result Value Ref Range    POC Color JARED Negative    POC Appearance CLOUDY Negative    POC Glucose 500 Negative mg/dL    POC Bilirubin NEGATIVE Negative mg/dL    POC Ketones NEGATIVE Negative mg/dL    POC Specific Gravity 1.010 <1.005 - >1.030    POC Blood LARGE Negative    POC Urine PH 5.5 5.0 - 8.0    POC Protein 300 Negative mg/dL    POC Urobiligen 1.0 Negative (0.2) mg/dL    POC Nitrites POSITIVE Negative    POC Leukocyte Esterase LARGE Negative     Assessment & Plan     1. Acute cystitis with hematuria   - cefdinir (OMNICEF) 300 MG Cap; Take 1 Capsule by mouth 2 times a day for 5 days.  Dispense: 10 Capsule; Refill: 0    2. Dysuria   - POCT Urinalysis  - URINE CULTURE(NEW); Future       MDM/Comments:   Urinalysis and symptom presentation consistent with diagnosis of acute cystitis.   Patient will be prescribed Cefdinir based on age. Cockcroft-Gault method utilized to calculate CrCl, no renal dosing required.      Illness progression and alarm symptoms discussed with patient, emphasizing low threshold for returning to clinic/emergency department for worsening symptoms. Patient is agreeable to the plan and verbalizes understanding, and will follow up if warranted. Will return if body aches, chills, fever, back/flank pain  occur. Discussed signs of kidney infection.      Differential diagnosis, natural history, supportive care, and indications for immediate follow-up discussed.      Follow-up as needed if symptoms worsen or fail to improve to PCP, Urgent care or Emergency Room.       Discussed red flags and reasons to return to UC or ED.                    Electronically signed by AKILAH Keenan

## 2024-03-25 ENCOUNTER — OFFICE VISIT (OUTPATIENT)
Dept: MEDICAL GROUP | Facility: PHYSICIAN GROUP | Age: 69
End: 2024-03-25
Payer: MEDICARE

## 2024-03-25 VITALS
DIASTOLIC BLOOD PRESSURE: 80 MMHG | OXYGEN SATURATION: 94 % | HEART RATE: 61 BPM | RESPIRATION RATE: 14 BRPM | TEMPERATURE: 97.3 F | WEIGHT: 167 LBS | HEIGHT: 63 IN | BODY MASS INDEX: 29.59 KG/M2 | SYSTOLIC BLOOD PRESSURE: 128 MMHG

## 2024-03-25 DIAGNOSIS — G89.29 CHRONIC PAIN OF LEFT KNEE: ICD-10-CM

## 2024-03-25 DIAGNOSIS — M25.562 CHRONIC PAIN OF LEFT KNEE: ICD-10-CM

## 2024-03-25 LAB
BACTERIA UR CULT: NORMAL
SIGNIFICANT IND 70042: NORMAL
SITE SITE: NORMAL
SOURCE SOURCE: NORMAL

## 2024-03-25 RX ORDER — TRIAMCINOLONE ACETONIDE 40 MG/ML
40 INJECTION, SUSPENSION INTRA-ARTICULAR; INTRAMUSCULAR ONCE
Status: COMPLETED | OUTPATIENT
Start: 2024-03-25 | End: 2024-03-25

## 2024-03-25 RX ADMIN — TRIAMCINOLONE ACETONIDE 40 MG: 40 INJECTION, SUSPENSION INTRA-ARTICULAR; INTRAMUSCULAR at 16:26

## 2024-03-25 ASSESSMENT — FIBROSIS 4 INDEX: FIB4 SCORE: 1.91

## 2024-03-25 NOTE — PROGRESS NOTES
"  Chief Complaint   Patient presents with    Joint Injection                                                                                                                                       HPI:   Grisel presents today with the following.    Intra-articular steroid injection of the left knee. Previous injection 12/12/2023. Pt reported relief of pain and increased mobility with previous injection.     ROS:  All systems negative expect as addressed in assessment and plan.     /80 (BP Location: Left arm, Patient Position: Sitting, BP Cuff Size: Adult)   Pulse 61   Temp 36.3 °C (97.3 °F) (Temporal)   Resp 14   Ht 1.6 m (5' 3\")   Wt 75.8 kg (167 lb)   LMP  (LMP Unknown)   SpO2 94%   BMI 29.58 kg/m²     Objective:    Physical Exam  Musculoskeletal:      Left knee: Swelling present. Decreased range of motion. Tenderness present.         Assessment and Plan:  68 y.o. female with the following issues.    Problem List Items Addressed This Visit       Chronic pain of left knee     Discussed risks and benefits of triamcinolone injection.  Patient verbalizes understanding.     Written consent obtained.     Will perform intra-articular joint injection in office today.         Relevant Orders    Consent for all Surgical, Special Diagnostic or Therapeutic Procedures        Chronic pain of left knee  Discussed risks and benefits of triamcinolone injection.  Patient verbalizes understanding.     Written consent obtained.     Will perform intra-articular joint injection in office today.      Return in about 3 months (around 6/18/2024) for Chronic pain.      Please note that this dictation was created using voice recognition software. I have worked with consultants from the vendor as well as technical experts from [facility] to optimize the interface. I have made every reasonable attempt to correct obvious errors, but I expect that there are errors of grammar and possibly content that I did not discover before " finalizing the note.

## 2024-03-26 NOTE — PROCEDURES
Joint Inj - LG: knee, L knee on 3/25/2024 6:58 PM  Indications: pain  Details: 22 G needle, lateral approach  Medications: (40mg triamcinolone with 4mL 1% lidocaine)  Aspirate: 0 mL  Outcome: tolerated well, no immediate complications  Procedure, treatment alternatives, risks and benefits explained, specific risks discussed. Consent was given by the patient. Immediately prior to procedure a time out was called to verify the correct patient, procedure, equipment, support staff and site/side marked as required. Patient was prepped and draped in the usual sterile fashion.

## 2024-03-26 NOTE — ASSESSMENT & PLAN NOTE
Discussed risks and benefits of triamcinolone injection.  Patient verbalizes understanding.     Written consent obtained.     Will perform intra-articular joint injection in office today.

## 2024-03-29 ENCOUNTER — TELEPHONE (OUTPATIENT)
Dept: HEALTH INFORMATION MANAGEMENT | Facility: OTHER | Age: 69
End: 2024-03-29
Payer: MEDICARE

## 2024-04-30 ENCOUNTER — OFFICE VISIT (OUTPATIENT)
Dept: MEDICAL GROUP | Facility: PHYSICIAN GROUP | Age: 69
End: 2024-04-30
Payer: MEDICARE

## 2024-04-30 ENCOUNTER — HOSPITAL ENCOUNTER (OUTPATIENT)
Facility: MEDICAL CENTER | Age: 69
End: 2024-04-30
Attending: NURSE PRACTITIONER
Payer: MEDICARE

## 2024-04-30 VITALS
BODY MASS INDEX: 29.06 KG/M2 | WEIGHT: 164 LBS | DIASTOLIC BLOOD PRESSURE: 52 MMHG | HEART RATE: 79 BPM | SYSTOLIC BLOOD PRESSURE: 80 MMHG | RESPIRATION RATE: 16 BRPM | TEMPERATURE: 97.8 F | HEIGHT: 63 IN | OXYGEN SATURATION: 93 %

## 2024-04-30 DIAGNOSIS — B37.49 CANDIDAL URINARY TRACT INFECTION: ICD-10-CM

## 2024-04-30 DIAGNOSIS — N39.0 URINARY TRACT INFECTION WITHOUT HEMATURIA, SITE UNSPECIFIED: ICD-10-CM

## 2024-04-30 LAB
APPEARANCE UR: CLEAR
BILIRUB UR STRIP-MCNC: NORMAL MG/DL
COLOR UR AUTO: NORMAL
GLUCOSE UR STRIP.AUTO-MCNC: 500 MG/DL
KETONES UR STRIP.AUTO-MCNC: NORMAL MG/DL
LEUKOCYTE ESTERASE UR QL STRIP.AUTO: NORMAL
NITRITE UR QL STRIP.AUTO: NORMAL
PH UR STRIP.AUTO: 5.5 [PH] (ref 5–8)
PROT UR QL STRIP: 100 MG/DL
RBC UR QL AUTO: NORMAL
SP GR UR STRIP.AUTO: 1.01
UROBILINOGEN UR STRIP-MCNC: 4 MG/DL

## 2024-04-30 RX ORDER — CEPHALEXIN 500 MG/1
500 CAPSULE ORAL 2 TIMES DAILY
COMMUNITY
End: 2024-04-30

## 2024-04-30 RX ORDER — CIPROFLOXACIN 500 MG/1
500 TABLET, FILM COATED ORAL 2 TIMES DAILY
Qty: 14 TABLET | Refills: 0 | Status: SHIPPED | OUTPATIENT
Start: 2024-04-30 | End: 2024-05-07

## 2024-04-30 RX ORDER — FLUCONAZOLE 200 MG/1
200 TABLET ORAL DAILY
Qty: 10 TABLET | Refills: 0 | Status: SHIPPED | OUTPATIENT
Start: 2024-04-30 | End: 2024-05-10

## 2024-04-30 ASSESSMENT — FIBROSIS 4 INDEX: FIB4 SCORE: 1.91

## 2024-04-30 NOTE — PROGRESS NOTES
"  Chief Complaint   Patient presents with    Other     Kidney, UTI infection and Yeast Infection/ currently taking Cephalexin from hospital in [place]                                                                                                                                       HPI:   Grisel presents today with the following.    The patient attended the consultation today to discuss concerns regarding a urinary tract infection (UTI) and provide an update on the treatment progress.    The chief complaint presented by the patient is a UTI, for which she has been on antibiotics for seven days. Initially prescribed Augmentin, the antibiotic was switched to Keflex after two days. The patient has taken three or four pills of Keflex so far. She reports experiencing mild flank pain and tenderness, and her initial visit to acute care showed blood in her urine.    The patient has a history of a yeast infection, for which she received a one-time dose of fluconazole. Her blood pressure is reported to be low, but her heart rate remains normal. She has no known allergies to antibiotics.    The patient has been on Jardiance for almost six months, which has been associated with chronic UTIs and urinary yeast infections in some patients. Her urine analysis results showed the presence of yeast in her urine, suggesting a possible fungal infection in addition to the bacterial infection.    The patient's current health concerns, including the UTI, antibiotic treatment, and the potential for a fungal infection, have been discussed. Her medical history, including the yeast infection and the use of Jardiance, has also been reviewed.    ROS:  All systems negative expect as addressed in assessment and plan.     BP (!) 80/52 (BP Location: Left arm, Patient Position: Sitting)   Pulse 79   Temp 36.6 °C (97.8 °F) (Temporal)   Resp 16   Ht 1.6 m (5' 3\")   Wt 74.4 kg (164 lb)   LMP  (LMP Unknown)   SpO2 93%   BMI 29.05 kg/m² "     Objective:    Physical Exam  Vitals reviewed.   Constitutional:       Appearance: Normal appearance.   HENT:      Head: Normocephalic and atraumatic.      Mouth/Throat:      Mouth: Mucous membranes are moist.   Eyes:      Extraocular Movements: Extraocular movements intact.      Conjunctiva/sclera: Conjunctivae normal.   Pulmonary:      Effort: Pulmonary effort is normal.   Abdominal:      Tenderness: There is right CVA tenderness and left CVA tenderness.   Musculoskeletal:         General: Normal range of motion.      Cervical back: Normal range of motion.   Skin:     General: Skin is warm and dry.   Neurological:      General: No focal deficit present.      Mental Status: She is alert and oriented to person, place, and time.   Psychiatric:         Mood and Affect: Mood normal.         Behavior: Behavior normal.         Thought Content: Thought content normal.       Lab Results   Component Value Date/Time    POCCOLOR Red 04/30/2024 02:02 PM    POCAPPEAR Clear 04/30/2024 02:02 PM    POCLEUKEST Large 04/30/2024 02:02 PM    POCNITRITE Pos 04/30/2024 02:02 PM    POCUROBILIGE 4.0 04/30/2024 02:02 PM    POCPROTEIN 100 04/30/2024 02:02 PM    POCURPH 5.5 04/30/2024 02:02 PM    POCBLOOD Neg 04/30/2024 02:02 PM    POCSPGRV 1.010 04/30/2024 02:02 PM    POCKETONES Trace 04/30/2024 02:02 PM    POCBILIRUBIN Small 04/30/2024 02:02 PM    POCGLUCUA 500 04/30/2024 02:02 PM          Diagnostic Test Results:  - Urine analysis (UA): Presence of yeast  - Culture and sensitivity: Pending re-culture for updated results. Previous culture influenced antibiotic change from Augmentin to Keflex.      Assessment and Plan:  68 y.o. female with the following issues.    1. Urinary tract infection without hematuria, site unspecified  - POCT Urinalysis  - URINE CULTURE(NEW); Future  - ciprofloxacin (CIPRO) 500 MG Tab; Take 1 Tablet by mouth 2 times a day for 7 days.  Dispense: 14 Tablet; Refill: 0    2. Candidal urinary tract infection  -  fluconazole (DIFLUCAN) 200 MG Tab; Take 1 Tablet by mouth every day for 10 days.  Dispense: 10 Tablet; Refill: 0      1. Urinary Tract Infection (UTI):     - Patient has been on antibiotics for seven days with no improvement.     - Previous treatment with Augmentin and Keflex.     - Plan: Discontinue Keflex and start Ciprofloxacin for a 7-day course. Re-culture urine for culture and sensitivity results.    2. Fungal infection (yeast in urine):     - Patient was given a one-time dose of fluconazole for a vaginal yeast infection.     - Plan: Prescribe fluconazole 200 mg daily for 10 days. Re-culture urine to confirm the presence of yeast. If the culture does not show yeast, discontinue fluconazole.    3. Hypotension:     - Patient's blood pressure is low, likely due to the infection.     - Plan: Monitor blood pressure closely. Advise the patient to get up slowly and watch for dizziness when standing. If symptoms worsen, consider further evaluation.    4. Diabetes management:     - Patient is currently on Jardiance, which may be contributing to UTIs and yeast infections.     - Plan: Temporarily discontinue Jardiance while on antibiotics. Re-evaluate medication regimen at a later date, considering a lower dose of Jardiance or switching to Farxiga.    5. Probiotic recommendation:     - Patient is at risk for developing further yeast infections due to antibiotic use.     - Plan: Recommend daily yogurt or probiotic supplement while on antibiotics and for at least two weeks after completing the course.    6. Handicap placard:     - Patient requested a disability placard for their vehicle.     - Plan: Complete and sign the necessary paperwork for the patient to obtain a permanent disability placard.    7. Follow-up and communication:     - Plan: Call the patient with culture results and any necessary adjustments to the treatment plan. Add the patient's contact, Daniel, to their MyChart for easier communication. Schedule a  follow-up appointment in June.    Return in about 2 months (around 6/30/2024), or if symptoms worsen or fail to improve, for Chronic pain.      Please note that this dictation was created using voice recognition software. I have worked with consultants from the vendor as well as technical experts from [facility] to optimize the interface. I have made every reasonable attempt to correct obvious errors, but I expect that there are errors of grammar and possibly content that I did not discover before finalizing the note.

## 2024-05-03 LAB
BACTERIA UR CULT: NORMAL
SIGNIFICANT IND 70042: NORMAL
SITE SITE: NORMAL
SOURCE SOURCE: NORMAL

## 2024-05-08 ENCOUNTER — TELEPHONE (OUTPATIENT)
Dept: MEDICAL GROUP | Facility: PHYSICIAN GROUP | Age: 69
End: 2024-05-08
Payer: MEDICARE

## 2024-05-16 ENCOUNTER — OFFICE VISIT (OUTPATIENT)
Dept: MEDICAL GROUP | Facility: PHYSICIAN GROUP | Age: 69
End: 2024-05-16
Payer: MEDICARE

## 2024-05-16 VITALS
BODY MASS INDEX: 29.23 KG/M2 | SYSTOLIC BLOOD PRESSURE: 108 MMHG | TEMPERATURE: 97.5 F | RESPIRATION RATE: 16 BRPM | DIASTOLIC BLOOD PRESSURE: 62 MMHG | OXYGEN SATURATION: 94 % | WEIGHT: 165 LBS | HEART RATE: 83 BPM | HEIGHT: 63 IN

## 2024-05-16 DIAGNOSIS — Z87.448 HISTORY OF ACUTE PYELONEPHRITIS: ICD-10-CM

## 2024-05-16 DIAGNOSIS — R06.02 SOB (SHORTNESS OF BREATH): ICD-10-CM

## 2024-05-16 DIAGNOSIS — J18.9 PNEUMONIA OF LEFT LOWER LOBE DUE TO INFECTIOUS ORGANISM: ICD-10-CM

## 2024-05-16 DIAGNOSIS — R05.2 SUBACUTE COUGH: ICD-10-CM

## 2024-05-16 DIAGNOSIS — R30.0 DYSURIA: ICD-10-CM

## 2024-05-16 PROCEDURE — 99214 OFFICE O/P EST MOD 30 MIN: CPT | Performed by: NURSE PRACTITIONER

## 2024-05-16 PROCEDURE — 3078F DIAST BP <80 MM HG: CPT | Performed by: NURSE PRACTITIONER

## 2024-05-16 PROCEDURE — 3074F SYST BP LT 130 MM HG: CPT | Performed by: NURSE PRACTITIONER

## 2024-05-16 RX ORDER — AMOXICILLIN AND CLAVULANATE POTASSIUM 875; 125 MG/1; MG/1
1 TABLET, FILM COATED ORAL 2 TIMES DAILY
Qty: 14 TABLET | Refills: 0 | Status: SHIPPED | OUTPATIENT
Start: 2024-05-16 | End: 2024-05-23

## 2024-05-16 RX ORDER — AZITHROMYCIN 250 MG/1
TABLET, FILM COATED ORAL
Qty: 6 TABLET | Refills: 0 | Status: SHIPPED | OUTPATIENT
Start: 2024-05-16 | End: 2024-05-31

## 2024-05-16 ASSESSMENT — FIBROSIS 4 INDEX: FIB4 SCORE: 1.94

## 2024-05-16 NOTE — PROGRESS NOTES
Chief Complaint   Patient presents with    Cough     X 1 month                                                                                                                                       HPI:   Grisel presents today with the following.    The patient attended the consultation today to discuss a persistent cough that has been ongoing for approximately three to four weeks and has recently worsened. The chief complaint presented by the patient is a productive cough, with the patient coughing up brown, unpleasant material, suggesting possible pneumonia. The patient denies experiencing fever or chills but reports significant fatigue, which is impacting their daily functioning.    The patient's medical history is notable for recent respiratory issues, including a diagnosis of pneumonia, which is currently being treated presumptively based on clinical symptoms and physical examination findings. The patient has no known allergies to medications and has not previously been treated for pneumonia.    Additionally, the patient mentions ongoing issues with a yeast infection and a suspected kidney infection, although recent urine cultures were negative. The patient is currently on opiates, which they acknowledge could increase their risk for pneumonia due to suppressed respiratory drive. This is described as the worst health episode the patient has experienced, prompting the urgent consultation.    The patient has been prescribed Azithromycin and Augmentin to address the pneumonia and potential urinary tract infection. They have been advised to monitor for any worsening symptoms, particularly those that might indicate systemic infection, and to follow up with lab tests and a urine sample after completing the antibiotic course.    ROS:  All systems negative expect as addressed in assessment and plan.     /62 (BP Location: Left arm, Patient Position: Sitting)   Pulse 83   Temp 36.4 °C (97.5 °F) (Temporal)   Resp  "16   Ht 1.6 m (5' 3\")   Wt 74.8 kg (165 lb)   LMP  (LMP Unknown)   SpO2 94%   BMI 29.23 kg/m²     Objective:    Physical Exam  Vitals reviewed.   Constitutional:       Appearance: Normal appearance.   HENT:      Head: Normocephalic and atraumatic.      Mouth/Throat:      Mouth: Mucous membranes are moist.   Eyes:      Extraocular Movements: Extraocular movements intact.      Conjunctiva/sclera: Conjunctivae normal.   Pulmonary:      Effort: Pulmonary effort is normal.      Breath sounds: Examination of the right-lower field reveals decreased breath sounds and rales. Examination of the left-lower field reveals decreased breath sounds and rales. Decreased breath sounds and rales present. No wheezing or rhonchi.      Comments: LLL - Dull to percussion bottom third of the left lobe  RLL - slight dullness to percussion of the very bottom of the right lobe  Musculoskeletal:         General: Normal range of motion.      Cervical back: Normal range of motion.   Skin:     General: Skin is warm and dry.   Neurological:      General: No focal deficit present.      Mental Status: She is alert and oriented to person, place, and time.   Psychiatric:         Mood and Affect: Mood normal.         Behavior: Behavior normal.         Thought Content: Thought content normal.       Diagnostic Test Results:  - Urine culture: Negative for infection.  - Previous antibiotic treatments: Cipro and Ceftin for urinary tract infection, Azithromycin for RSV in February.  - Planned tests: Urinalysis and urine culture to be conducted post-antibiotic treatment in approximately two to three weeks.    Clinical Observations:  - Vital Signs:    - Blood pressure: Lower than usual normal, improved from two weeks ago.    - Physical Examination:    - Respiratory: Persistent productive cough with brown sputum, no reported fevers or chills, significant fatigue. Auscultation revealed good air entry in upper lung fields, presence of crackles in lower lung " fields, particularly left lower lobe. Percussion suggested dullness in left lower lobe, indicative of possible consolidation.    - General: No signs of acute distress observed.    Assessment and Plan:  69 y.o. female with the following issues.    1. Subacute cough  - DX-CHEST-2 VIEWS; Future    2. SOB (shortness of breath)  - DX-CHEST-2 VIEWS; Future    3. Dysuria  - URINE CULTURE(NEW); Future  - URINALYSIS; Future    4. History of acute pyelonephritis  - URINE CULTURE(NEW); Future  - URINALYSIS; Future    5. Pneumonia of left lower lobe due to infectious organism  - amoxicillin-clavulanate (AUGMENTIN) 875-125 MG Tab; Take 1 Tablet by mouth 2 times a day for 7 days.  Dispense: 14 Tablet; Refill: 0  - azithromycin (ZITHROMAX) 250 MG Tab; Take 2 tablets by mouth on first day and 1 tablet on days 2-5.  Dispense: 6 Tablet; Refill: 0      1. Suspected Community-Acquired Pneumonia:     - Patient presents with a persistent cough, fatigue, and production of brown sputum. Physical examination revealed crackles in the left lower lobe and dullness upon percussion, suggesting consolidation.     - Plan:          a. Prescribe Azithromycin (two tablets on the first day, one tablet on days two through five) and Augmentin (twice a day for seven days).          b. Instruct the patient to take medications with food to minimize stomach upset and to consume yogurt to reduce the risk of yeast infection or diarrhea.          c. If no improvement in the next couple of days, consider obtaining a chest x-ray.    2. Urinary Tract Infection (UTI) and Yeast Infection:     - Patient has a history of UTI and yeast infection. Urine culture was negative, but the patient still experiences burning during urination without itching or abnormal discharge.     - Plan:          a. Prescribe a more severe yeast infection treatment.          b. Instruct the patient to return for a urinalysis and urine culture in two to three weeks after completing the  antibiotics to ensure resolution of the UTI.          c. If symptoms persist, consider performing a vaginal swab to test for yeast, bacterial vaginosis, or trichomonas.    3. Follow-up and Monitoring:     - Plan:          a. Instruct the patient to seek immediate medical attention if they experience fever, shortness of breath, extreme weakness, or confusion, as these could be signs of sepsis.          b. Schedule a follow-up appointment in June to monitor the patient's progress and ensure resolution of all infections.    4. Labs:     - Plan: Order a urinalysis, urine culture, lab collect, clean catch, and dysuria test to be performed after the completion of antibiotics.    Return if symptoms worsen or fail to improve.      Please note that this dictation was created using voice recognition software. I have worked with consultants from the vendor as well as technical experts from [facility] [employer] to optimize the interface. I have made every reasonable attempt to correct obvious errors, but I expect that there are errors of grammar and possibly content that I did not discover before finalizing the note.

## 2024-05-23 ENCOUNTER — APPOINTMENT (OUTPATIENT)
Dept: RADIOLOGY | Facility: IMAGING CENTER | Age: 69
End: 2024-05-23
Attending: NURSE PRACTITIONER
Payer: MEDICARE

## 2024-05-23 ENCOUNTER — TELEPHONE (OUTPATIENT)
Dept: HEALTH INFORMATION MANAGEMENT | Facility: OTHER | Age: 69
End: 2024-05-23
Payer: MEDICARE

## 2024-05-23 DIAGNOSIS — R06.02 SOB (SHORTNESS OF BREATH): ICD-10-CM

## 2024-05-23 DIAGNOSIS — R05.2 SUBACUTE COUGH: ICD-10-CM

## 2024-05-23 PROCEDURE — 71046 X-RAY EXAM CHEST 2 VIEWS: CPT | Mod: TC | Performed by: NURSE PRACTITIONER

## 2024-05-23 NOTE — TELEPHONE ENCOUNTER
"Trying to get into [facility] for her xray, she does not want to go anywhere else, as she does not drive and will need to take an uber.    States she \"Popped out her rib\" reaching for something, reports this has happened before; making it harder for her to breath.No fever.     Sounds congested & uncomfortable on the phone; able to speak in full sentences.    I confirmed with [facility]  that xray was available today but patient cannot go until after 3pm. She would like to see Demi today but there are no appointments and does not wish to see another provider. She declined to be seen at urgent care. Advised to seek care regardless if she is having increasing shortness of breath. She says it is mostly the pain that is bothering her.         "

## 2024-05-23 NOTE — TELEPHONE ENCOUNTER
----- Message from JULEE MedranoRKRISTINE sent at 5/22/2024  6:02 PM PDT -----  Patient was seen last week and treated for PNA. She left voicemail today stating she is not feeling better. Please call and triage patient.     Thank you,       AKILAH Gunter

## 2024-05-29 ENCOUNTER — OFFICE VISIT (OUTPATIENT)
Dept: URGENT CARE | Facility: PHYSICIAN GROUP | Age: 69
End: 2024-05-29
Payer: MEDICARE

## 2024-05-29 ENCOUNTER — HOSPITAL ENCOUNTER (OUTPATIENT)
Facility: MEDICAL CENTER | Age: 69
End: 2024-05-29
Attending: PHYSICIAN ASSISTANT
Payer: MEDICARE

## 2024-05-29 VITALS
OXYGEN SATURATION: 94 % | SYSTOLIC BLOOD PRESSURE: 100 MMHG | HEIGHT: 63 IN | HEART RATE: 73 BPM | RESPIRATION RATE: 16 BRPM | TEMPERATURE: 97.4 F | DIASTOLIC BLOOD PRESSURE: 58 MMHG | WEIGHT: 159 LBS | BODY MASS INDEX: 28.17 KG/M2

## 2024-05-29 DIAGNOSIS — N76.1 CHRONIC VAGINITIS: ICD-10-CM

## 2024-05-29 DIAGNOSIS — R32 URINARY INCONTINENCE, UNSPECIFIED TYPE: ICD-10-CM

## 2024-05-29 LAB
APPEARANCE UR: NORMAL
BILIRUB UR STRIP-MCNC: NORMAL MG/DL
COLOR UR AUTO: NORMAL
GLUCOSE UR STRIP.AUTO-MCNC: 100 MG/DL
KETONES UR STRIP.AUTO-MCNC: NORMAL MG/DL
LEUKOCYTE ESTERASE UR QL STRIP.AUTO: NEGATIVE
NITRITE UR QL STRIP.AUTO: POSITIVE
PH UR STRIP.AUTO: 5 [PH] (ref 5–8)
PROT UR QL STRIP: 100 MG/DL
RBC UR QL AUTO: NEGATIVE
SP GR UR STRIP.AUTO: 1.02
UROBILINOGEN UR STRIP-MCNC: 2 MG/DL

## 2024-05-29 ASSESSMENT — FIBROSIS 4 INDEX: FIB4 SCORE: 1.94

## 2024-05-29 NOTE — PROGRESS NOTES
"Subjective:   Grisel Mark is a 69 y.o. female who presents for Bladder Infection (Had UTI and never went away, last visit in UC was 3/23/24/Still has pain in vaginal area )      HPI  The patient presents to the Urgent Care with complaints of vaginal irritation and burning for over 2 months.  She has been on multiple antibiotics and has had #2 negative urine cultures since then.  No vaginal pathogen swab was ordered yet.  However, she was treated empirically for possible candidal urine infection with 10 days of Diflucan as well.  Patient reports no improvement at all of her symptoms.  She has continued constant symptoms of a burning sensation to the vaginal area.  She also reports of occasional urinary incontinence.  The vaginal pain is no worse with urination.  She has been taking Azo and did take Azo today therefore, this may have contaminated the UA.  Also reports of mild creamy vaginal discharge.  Denies any vaginal itching.  The patient is not sexually active. Denies any fever, chills, abdominal pain, flank pain, urinary frequency, vaginal lesions, vaginal blisters.     Past Medical History:   Diagnosis Date    Anxiety     Arrhythmia     Chronic back pain     Constipation     Depression     Diabetes (HCC)     Ear pain, left 9/23/2021    GERD (gastroesophageal reflux disease)     Hyperlipidemia     Hypertension     Thyroid disease     Tobacco use 10/11/2022     No Known Allergies     Objective:     /58 (BP Location: Right arm, Patient Position: Sitting, BP Cuff Size: Adult)   Pulse 73   Temp 36.3 °C (97.4 °F) (Temporal)   Resp 16   Ht 1.6 m (5' 3\")   Wt 72.1 kg (159 lb)   LMP  (LMP Unknown)   SpO2 94%   BMI 28.17 kg/m²     Physical Exam  Vitals reviewed.   Constitutional:       General: She is not in acute distress.     Appearance: Normal appearance. She is not ill-appearing or toxic-appearing.   Eyes:      Conjunctiva/sclera: Conjunctivae normal.   Pulmonary:      Effort: Pulmonary effort " is normal.   Abdominal:      General: Abdomen is flat. There is no distension.      Palpations: Abdomen is soft. There is no mass.      Tenderness: There is no abdominal tenderness. There is no right CVA tenderness, left CVA tenderness, guarding or rebound.   Musculoskeletal:      Cervical back: Neck supple.   Neurological:      General: No focal deficit present.      Mental Status: She is alert and oriented to person, place, and time.   Psychiatric:         Mood and Affect: Mood normal.         Behavior: Behavior normal.       Results for orders placed or performed in visit on 05/29/24   POCT Urinalysis   Result Value Ref Range    POC Color brown Negative    POC Appearance cloudy Negative    POC Glucose 100 Negative mg/dL    POC Bilirubin small Negative mg/dL    POC Ketones trace Negative mg/dL    POC Specific Gravity 1.025 <1.005 - >1.030    POC Blood negative Negative    POC Urine PH 5.0 5.0 - 8.0    POC Protein 100 Negative mg/dL    POC Urobiligen 2.0 Negative (0.2) mg/dL    POC Nitrites positive Negative    POC Leukocyte Esterase negative Negative       Diagnosis and associated orders:     1. Chronic vaginitis  - VAGINAL PATHOGENS DNA PANEL; Future  - Referral to Urology  - POCT Urinalysis  - URINE CULTURE(NEW); Future    2. Urinary incontinence, unspecified type  - Referral to Urology       Comments/MDM:     This is a pleasant 69-year-old female with complaints of constant vaginal irritation and burning sensation for the last couple months.  Not necessarily worse with urination.  She has been on multiple antibiotics and a course of 10 days of Diflucan and yet has had negative urine cultures.  She has not had an evaluation with vaginal pathogens yet.  She has been having some urinary incontinence as well. Her mother has a history of bladder prolapse.  Discussed differential diagnosis to include but not limited to: vaginal candidiasis infection versus atrophic vaginitis versus interstitial cystitis.    Vaginal  pathogen swab pending  UA today contaminated from Azo.  Urine culture pending  Referral placed to urology.   She may try OTC vagisil. Increase fluid intake.  Will add on therapy or provide further instruction pending results.       I personally reviewed prior external notes and test results pertinent to today's visit. Pathogenesis of diagnosis discussed including typical length and natural progression. Supportive care, natural history, differential diagnoses, and indications for immediate follow-up discussed. Patient expresses understanding and agrees to plan. Patient denies any other questions or concerns.     Follow-up with the primary care physician for recheck, reevaluation, and consideration of further management.    Time spent evaluating the patient was 32 minutes which included preparing for the visit, obtaining history, examination, ordering labs/tests/procedures/medications, independent interpretation, discussion of plan, counseling/education, and documentation into chart.     Please note that this dictation was created using voice recognition software. I have made a reasonable attempt to correct obvious errors, but I expect that there are errors of grammar and possibly content that I did not discover before finalizing the note.    This note was electronically signed by Ata Haley PA-C

## 2024-05-29 NOTE — ASSESSMENT & PLAN NOTE
Chronic, stable. She takes Norco 10 mg & morphine ER 15 mg q12hr for chronic pain. She follows with pain management.

## 2024-05-29 NOTE — ASSESSMENT & PLAN NOTE
Chronic, stable. She takes klonopin 1 mg in the AM and 0.5 mg in the PM for her anxiety. Discussed sedating effects of this medication. No complications.

## 2024-05-29 NOTE — ASSESSMENT & PLAN NOTE
Chronic, Stable. BP in office today is ____. She does/does not check her BP at home. She currently takes lisinopril 10 mg daily and metoprolol 25 mg daily. Follow up with PCP for continued monitoring and management.

## 2024-05-29 NOTE — ASSESSMENT & PLAN NOTE
Chronic, stable. PHQ-9 in office today is ____. Currently taking Rexulti 2 mg daily, buspar 30 mg daily, klonopin for anxiety, & cymbalta 90 mg daily. States she feels this has well controlled her mood. She sees psych?? Therapy?? Follow up with PCP as needed.

## 2024-05-29 NOTE — ASSESSMENT & PLAN NOTE
Stable. Currently taking levothyroxine 50 mcg daily as prescribed. Denies complications. TSH stable.

## 2024-05-29 NOTE — ASSESSMENT & PLAN NOTE
Chronic, stable. Last A1c in March 2024 was 5.7, which is an amazing improvement from her prior A1c of 7.5. She does check her blood sugar at home. Currently takes metformin 500 mg BID and Ozempic 0.5 mg weekly. Last retinal screening was Sept 2023. Last microalbumin creat ratio in June 2023 was 395. Her PCP does her feet exams. She is on a statin. We discussed diet and lifestyle habits. Follow up with PCP for continued monitoring and management.

## 2024-05-29 NOTE — ASSESSMENT & PLAN NOTE
Chronic, stable. Last lipid panel in June 2023 results below. She currently takes rosuvastatin 10 mg daily. We discussed her dietary/lifestyle regimen. Follow up with PCP for continued monitoring and management.  Lab Results   Component Value Date/Time    CHOLSTRLTOT 136 06/21/2023 01:50 PM    TRIGLYCERIDE 180 (H) 06/21/2023 01:50 PM    HDL 36 (A) 06/21/2023 01:50 PM    LDL 64 06/21/2023 01:50 PM

## 2024-05-30 ENCOUNTER — APPOINTMENT (OUTPATIENT)
Dept: FAMILY PLANNING/WOMEN'S HEALTH CLINIC | Facility: PHYSICIAN GROUP | Age: 69
End: 2024-05-30
Payer: MEDICARE

## 2024-05-30 DIAGNOSIS — E11.42 TYPE 2 DIABETES MELLITUS WITH DIABETIC POLYNEUROPATHY, WITHOUT LONG-TERM CURRENT USE OF INSULIN (HCC): ICD-10-CM

## 2024-05-30 DIAGNOSIS — E11.69 HYPERLIPIDEMIA DUE TO TYPE 2 DIABETES MELLITUS (HCC): ICD-10-CM

## 2024-05-30 DIAGNOSIS — E03.9 HYPOTHYROIDISM (ACQUIRED): ICD-10-CM

## 2024-05-30 DIAGNOSIS — E78.5 HYPERLIPIDEMIA DUE TO TYPE 2 DIABETES MELLITUS (HCC): ICD-10-CM

## 2024-05-30 DIAGNOSIS — I15.2 HYPERTENSION ASSOCIATED WITH DIABETES (HCC): ICD-10-CM

## 2024-05-30 DIAGNOSIS — G47.31 PRIMARY CENTRAL SLEEP APNEA: ICD-10-CM

## 2024-05-30 DIAGNOSIS — F13.20 BENZODIAZEPINE DEPENDENCE (HCC): ICD-10-CM

## 2024-05-30 DIAGNOSIS — F11.20 OPIOID DEPENDENCE WITH CURRENT USE (HCC): ICD-10-CM

## 2024-05-30 DIAGNOSIS — F33.1 DEPRESSION, MAJOR, RECURRENT, MODERATE (HCC): ICD-10-CM

## 2024-05-30 DIAGNOSIS — K21.9 GASTROESOPHAGEAL REFLUX DISEASE WITHOUT ESOPHAGITIS: ICD-10-CM

## 2024-05-30 DIAGNOSIS — F41.1 GENERALIZED ANXIETY DISORDER: ICD-10-CM

## 2024-05-30 DIAGNOSIS — E11.59 HYPERTENSION ASSOCIATED WITH DIABETES (HCC): ICD-10-CM

## 2024-05-30 LAB
CANDIDA DNA VAG QL PROBE+SIG AMP: POSITIVE
G VAGINALIS DNA VAG QL PROBE+SIG AMP: NEGATIVE
T VAGINALIS DNA VAG QL PROBE+SIG AMP: NEGATIVE

## 2024-05-31 ENCOUNTER — TELEMEDICINE (OUTPATIENT)
Dept: MEDICAL GROUP | Facility: PHYSICIAN GROUP | Age: 69
End: 2024-05-31
Payer: MEDICARE

## 2024-05-31 VITALS — HEIGHT: 63 IN | BODY MASS INDEX: 28.17 KG/M2 | WEIGHT: 159 LBS

## 2024-05-31 DIAGNOSIS — N95.1 VASOMOTOR SYMPTOMS DUE TO MENOPAUSE: ICD-10-CM

## 2024-05-31 DIAGNOSIS — B37.31 VAGINAL CANDIDIASIS: ICD-10-CM

## 2024-05-31 DIAGNOSIS — N95.2 ATROPHIC VAGINITIS: ICD-10-CM

## 2024-05-31 LAB
BACTERIA UR CULT: NORMAL
SIGNIFICANT IND 70042: NORMAL
SITE SITE: NORMAL
SOURCE SOURCE: NORMAL

## 2024-05-31 PROCEDURE — 99214 OFFICE O/P EST MOD 30 MIN: CPT | Mod: 95 | Performed by: NURSE PRACTITIONER

## 2024-05-31 RX ORDER — ESTRADIOL 0.5 MG/1
0.5 TABLET ORAL DAILY
Qty: 90 TABLET | Refills: 3 | Status: SHIPPED | OUTPATIENT
Start: 2024-05-31

## 2024-05-31 RX ORDER — ESTRADIOL 0.1 MG/G
0.5 CREAM VAGINAL
Qty: 42.5 G | Refills: 3 | Status: SHIPPED | OUTPATIENT
Start: 2024-05-31

## 2024-05-31 RX ORDER — FLUCONAZOLE 150 MG/1
150 TABLET ORAL
Qty: 3 TABLET | Refills: 0 | Status: SHIPPED | OUTPATIENT
Start: 2024-05-31

## 2024-05-31 ASSESSMENT — FIBROSIS 4 INDEX: FIB4 SCORE: 1.94

## 2024-05-31 NOTE — PROGRESS NOTES
Virtual Visit: Established Patient   This visit was conducted via Zoom using secure and encrypted videoconferencing technology. The patient was in a private location in the state of [place].    The patient's identity was confirmed and verbal consent was obtained for this virtual visit.    Subjective:   CC: Follow up urgent care and yeast infection    Grisel Mark is a 69 y.o. female presenting for evaluation and management of:    No problems updated.    ROS   Denies any recent fevers or chills. No nausea or vomiting. No chest pains or shortness of breath.     No Known Allergies    Current medicines (including changes today)  Current Outpatient Medications   Medication Sig Dispense Refill    fluconazole (DIFLUCAN) 150 MG tablet Take 1 Tablet by mouth every 72 hours. 3 Tablet 0    estradiol (ESTRACE) 0.1 MG/GM vaginal cream Insert 0.5 g into the vagina every Monday, Wednesday, and Friday. 42.5 g 3    estradiol (ESTRACE) 0.5 MG tablet Take 1 Tablet by mouth every day. 90 Tablet 3    morphine ER (MS CONTIN) 15 MG Tab CR tablet Take 1 Tablet by mouth every 12 hours for 30 days. Indications: Chronic Pain 60 Tablet 0    HYDROcodone/acetaminophen (NORCO)  MG Tab Take 1 Tablet by mouth every 6 hours as needed for Severe Pain or Moderate Pain for up to 30 days. Indications: Pain 70 Tablet 0    Semaglutide,0.25 or 0.5MG/DOS, 2 MG/3ML Solution Pen-injector Inject 0.5 mg under the skin every 7 days. 9 mL 3    clonazePAM (KLONOPIN) 1 MG Tab Take 1 Tablet by mouth every morning for 90 days. Indications: Feeling Anxious, Panic Disorder 90 Tablet 0    clonazePAM (KLONOPIN) 0.5 MG Tab Take 1 Tablet by mouth every evening for 90 days. Indications: Feeling Anxious, Panic Disorder 90 Tablet 0    pantoprazole (PROTONIX) 40 MG Tablet Delayed Response TAKE 1 TABLET BY MOUTH EVERY DAY 90 Tablet 1    albuterol 108 (90 Base) MCG/ACT Aero Soln inhalation aerosol Inhale 2 Puffs every four hours as needed for Shortness of Breath. 8.5  g 5    Brexpiprazole (REXULTI) 2 MG Tab Take 1 Tablet by mouth every day. 90 Tablet 3    busPIRone (BUSPAR) 30 MG tablet Take 1 Tablet by mouth every day. 180 Tablet 3    cyclobenzaprine (FLEXERIL) 5 mg tablet Take 1 Tablet by mouth 2 times a day as needed for Muscle Spasms (restless leg). 90 Tablet 3    DULoxetine (CYMBALTA) 30 MG Cap DR Particles Take 3 Capsules by mouth every day. 270 Capsule 3    gabapentin (NEURONTIN) 800 MG tablet Take 1.5 Tablets by mouth 2 times a day. 270 Tablet 3    levothyroxine (SYNTHROID) 50 MCG Tab Take 1 Tablet by mouth every morning on an empty stomach. 90 Tablet 3    lisinopril (PRINIVIL) 10 MG Tab Take 1 Tablet by mouth every day. 100 Tablet 3    metFORMIN ER (GLUCOPHAGE XR) 500 MG TABLET SR 24 HR Take 1 Tablet by mouth 2 times a day. 400 Tablet 0    metoprolol SR (TOPROL XL) 25 MG TABLET SR 24 HR Take 1 Tablet by mouth every day. 90 Tablet 3    rosuvastatin (CRESTOR) 10 MG Tab Take 1 Tablet by mouth every evening. 100 Tablet 3    ipratropium (ATROVENT) 0.06 % Solution Administer 2 Sprays into affected nostril(S) 4 times a day. 15 mL 0    Blood Glucose Test Strips Use one Abbott Freedom Lite strip to test blood sugar twice daily and PRN. 270 Strip 0    Naloxone (NARCAN) 4 MG/0.1ML Liquid One spray in one nostril for overdose and call 911. 1 Each 1    pseudoephedrine (SUDAFED) 30 MG Tab Take 1-2 Tablets by mouth every 6 hours as needed for Congestion (sinus pressure not relieved by other measures). 30 Tablet 0    Blood Glucose Meter Kit Test blood sugar as recommended by provider. Abbott Freestyle Lite blood glucose monitoring kit. 1 Kit 0    Lancets Use one Abbott [place] Lite lancet to test blood sugar twice daily and PRN. 300 Each 3    Alcohol Swabs Wipe site with prep pad prior to injection. 100 Each 3    fluticasone (FLONASE) 50 MCG/ACT nasal spray Administer 1 Spray into affected nostril(S) every day. 16 g 11    cyanocobalamin (VITAMIN B-12) 500 MCG Tab Take 500 mcg by mouth  every day.       No current facility-administered medications for this visit.       Patient Active Problem List    Diagnosis Date Noted    Brain fog 12/12/2023    Postlaminectomy syndrome 09/22/2023    Chronic bilateral low back pain with bilateral sciatica 09/22/2023    Obesity (BMI 30-39.9) 02/21/2023    Chronic pain of left knee 01/04/2023    Stress bladder incontinence, female 12/19/2022    Chronic right shoulder pain 10/11/2022    Restless leg 02/03/2022    Cough 11/02/2021    Chronic ear pain, left 09/23/2021    Primary central sleep apnea 08/05/2021    Opioid dependence with current use (HCC) 05/26/2021    Benzodiazepine dependence (HCC) 05/26/2021    Generalized anxiety disorder 05/12/2020    Posttraumatic stress disorder, delayed onset 05/12/2020    Hypertension associated with diabetes (HCC) 02/05/2020    Hyperlipidemia due to type 2 diabetes mellitus (HCC) 02/05/2020    Type 2 diabetes mellitus with diabetic polyneuropathy, without long-term current use of insulin (HCC) 02/05/2020    Hypothyroidism (acquired) 02/05/2020    Depression, major, recurrent, moderate (HCC) 02/05/2020    Gastroesophageal reflux disease without esophagitis 02/05/2020    Situational anxiety 02/05/2020         She  has a past medical history of Anxiety, Arrhythmia, Chronic back pain, Constipation, Depression, Diabetes (HCC), Ear pain, left (9/23/2021), GERD (gastroesophageal reflux disease), Hyperlipidemia, Hypertension, Thyroid disease, and Tobacco use (10/11/2022).  She  has a past surgical history that includes cholecystectomy; carpal tunnel release; abdominal hysterectomy total; pr njx aa&/strd tfrml epi lumbar/sacral 1 level (Left, 8/12/2020); pr njx aa&/strd tfrml epi lumbar/sacral ea addl (Left, 8/12/2020); lumbar medial branch blocks (Left, 9/9/2020); laminotomy (1998); block epidural steroid injection (Left, 2/24/2021); lumbar transforaminal epidural steroid injection (Left, 5/20/2021); lumbar medial branch blocks (Left,  "7/21/2021); radio frequency ablation additional level (Left, 11/11/2021); pr inj lumbar/sacral,w/ imaging (N/A, 10/31/2022); pr inj lumbar/sacral,w/ imaging (N/A, 3/23/2023); and pr inj lumbar/sacral,w/ imaging (N/A, 7/14/2023).       Objective:   Ht 1.6 m (5' 3\")   Wt 72.1 kg (159 lb) Comment: per pt  LMP  (LMP Unknown)   BMI 28.17 kg/m²     Physical Exam:  Constitutional: Alert, no distress, well-groomed.  Skin: No rashes in visible areas.  Eye: Round. Conjunctiva clear, lids normal. No icterus.   ENMT: Lips pink without lesions, good dentition, moist mucous membranes. Phonation normal.  Neck: Moves freely without pain.  Respiratory: Unlabored respiratory effort, no cough or audible wheeze  Psych: Alert and oriented x3, normal affect and mood.       Assessment and Plan:   The following treatment plan was discussed:     1. Vasomotor symptoms due to menopause  estradiol (ESTRACE) 0.5 MG tablet          No problem-specific Assessment & Plan notes found for this encounter.        Follow-up: No follow-ups on file.       I spent a total of *** minutes with record review, exam, and communication with the patient, communication with other providers, and documentation of this encounter.    I have placed the below orders and discussed them with an approved delegating provider.  The MA is performing the below orders under the direction of ***.    Please note that this dictation was created using voice recognition software. I have worked with consultants from the vendor as well as technical experts from [employer] to optimize the interface. I have made every reasonable attempt to correct obvious errors, but I expect that there are errors of grammar and possibly content that I did not discover before finalizing the note.    " without lesions, good dentition, moist mucous membranes. Phonation normal.  Neck: Moves freely without pain.  Respiratory: Unlabored respiratory effort, no cough or audible wheeze  Psych: Alert and oriented x3, normal affect and mood.     Diagnostic Test Results and Labs:  - Urine Analysis: Positive for nitrates, suggesting a potential infection; however, specific identification is hindered by the patient's use of Azo. Culture initiated two days prior to the visit.  - Yeast Infection Test: Positive via Q-tip test.  - Previous treatments: Multiple courses of antibiotics administered over the last six weeks for presumed UTI, without symptom resolution.      Assessment and Plan:   The following treatment plan was discussed:     1. Vasomotor symptoms due to menopause  estradiol (ESTRACE) 0.5 MG tablet      2. Atrophic vaginitis  estradiol (ESTRACE) 0.1 MG/GM vaginal cream    estradiol (ESTRACE) 0.5 MG tablet      3. Vaginal candidiasis  fluconazole (DIFLUCAN) 150 MG tablet          1. Yeast Infection:     - Plan: Prescribe oral medication for yeast infection - Fluconazole 150 mg, one tablet every three days for a total of three doses. Instructed patient to discontinue the use of Azo and avoid douching, soaps, and perfumes inside the vagina.    2. Recurrent UTI:     - Patient has a history of recurrent UTIs and has been on multiple antibiotics in the past six weeks. Urine culture is still in progress.     - Plan: Hold off on prescribing antibiotics until the urine culture results are available. If the culture comes back positive, consider culture and sensitivity to determine the appropriate antibiotic.    3. Vaginal Atrophy:     - Patient is postmenopausal and may be experiencing vaginal atrophy.     - Plan: Start the patient on topical estrogen cream (Estradiol) to be applied in the vagina three times a week (Monday, Wednesday, Friday) in addition to her current 0.5 mg Estradiol oral tablets.    4. Urology Referral:      - Patient has been referred to [facility] for further evaluation of recurrent UTIs and possible bladder prolapse.     - Instructed patient to call the scheduling number to set up an appointment with a urologist.    5. Diabetes Management:     - Patient has a history of diabetes and was previously on Jardiance, which may have contributed to recurrent infections.     - Plan: Hold off on Jardiance for now and continue monitoring blood sugar levels. Reevaluate the need for Jardiance once the infections are under control.    Follow-up:     - Instructed patient to follow up with urology and to contact the clinic if the yeast infection does not improve with the prescribed treatment or if there are any concerns regarding her symptoms.      Follow-up: Return if symptoms worsen or fail to improve.         Please note that this dictation was created using voice recognition software. I have worked with consultants from the vendor as well as technical experts from [facility] to optimize the interface. I have made every reasonable attempt to correct obvious errors, but I expect that there are errors of grammar and possibly content that I did not discover before finalizing the note.

## 2024-06-01 LAB
BACTERIA UR CULT: NORMAL
SIGNIFICANT IND 70042: NORMAL
SITE SITE: NORMAL
SOURCE SOURCE: NORMAL

## 2024-06-07 ENCOUNTER — OFFICE VISIT (OUTPATIENT)
Dept: UROLOGY | Facility: MEDICAL CENTER | Age: 69
End: 2024-06-07
Payer: MEDICARE

## 2024-06-07 VITALS
TEMPERATURE: 96.8 F | HEIGHT: 63 IN | BODY MASS INDEX: 28.17 KG/M2 | WEIGHT: 159 LBS | HEART RATE: 81 BPM | DIASTOLIC BLOOD PRESSURE: 76 MMHG | SYSTOLIC BLOOD PRESSURE: 125 MMHG | OXYGEN SATURATION: 94 %

## 2024-06-07 DIAGNOSIS — R32 URINARY INCONTINENCE, UNSPECIFIED TYPE: ICD-10-CM

## 2024-06-07 LAB
POC POST-VOID: NORMAL
POC PRE-VOID: 134 ML

## 2024-06-07 PROCEDURE — 3074F SYST BP LT 130 MM HG: CPT | Performed by: STUDENT IN AN ORGANIZED HEALTH CARE EDUCATION/TRAINING PROGRAM

## 2024-06-07 PROCEDURE — 99204 OFFICE O/P NEW MOD 45 MIN: CPT | Mod: 25 | Performed by: STUDENT IN AN ORGANIZED HEALTH CARE EDUCATION/TRAINING PROGRAM

## 2024-06-07 PROCEDURE — 3078F DIAST BP <80 MM HG: CPT | Performed by: STUDENT IN AN ORGANIZED HEALTH CARE EDUCATION/TRAINING PROGRAM

## 2024-06-07 PROCEDURE — 51798 US URINE CAPACITY MEASURE: CPT | Performed by: STUDENT IN AN ORGANIZED HEALTH CARE EDUCATION/TRAINING PROGRAM

## 2024-06-07 RX ORDER — TROSPIUM CHLORIDE ER 60 MG/1
60 CAPSULE ORAL DAILY
Qty: 90 CAPSULE | Refills: 1 | Status: SHIPPED | OUTPATIENT
Start: 2024-06-07 | End: 2024-06-27

## 2024-06-07 ASSESSMENT — FIBROSIS 4 INDEX: FIB4 SCORE: 1.94

## 2024-06-07 NOTE — PROGRESS NOTES
Subjective  Grisel Mark is a 69 y.o. female who presents today for evaluation of vaginal burning as well as stress urinary incontinence.    She has vaginal dryness and burning which is unrelated to her bladder. She also has urinary frequency at baseline and nocturia, voiding 10x per day. She wears multiple pads a day for her incontinence which occurs with pushing to stand, coughing, laughing, and sneezing. The leakage is very bothersome to her. She had an episode of gross hematuria a month ago which has now resolved. No dysuria or urgency or UUI.    Family History   Problem Relation Age of Onset    Cancer Mother     Stroke Father         Heart attack    Dementia Sister     Stroke Brother         heart Attack    Cancer Brother         Skin    Diabetes Brother     Kidney Disease Brother     Cancer Brother     Parkinson's Disease Sister     No Known Problems Sister     Diabetes Daughter     Hypertension Daughter        Social History     Socioeconomic History    Marital status:      Spouse name: Not on file    Number of children: Not on file    Years of education: Not on file    Highest education level: Not on file   Occupational History    Not on file   Tobacco Use    Smoking status: Every Day     Current packs/day: 0.00     Types: Cigarettes     Last attempt to quit: 2014     Years since quitting: 10.4    Smokeless tobacco: Never   Vaping Use    Vaping status: Never Used   Substance and Sexual Activity    Alcohol use: Not Currently    Drug use: Not Currently    Sexual activity: Not Currently   Other Topics Concern     Service No    Blood Transfusions Yes    Caffeine Concern No    Occupational Exposure No    Hobby Hazards No    Sleep Concern Yes    Stress Concern Yes    Weight Concern Yes    Special Diet No    Back Care No    Exercise Yes    Bike Helmet No    Seat Belt Yes    Self-Exams Yes   Social History Narrative    Not on file     Social Determinants of Health     Financial Resource  Strain: Not on file   Food Insecurity: Not on file   Transportation Needs: Not on file   Physical Activity: Not on file   Stress: Not on file   Social Connections: Not on file   Intimate Partner Violence: Not on file   Housing Stability: Not on file       Past Surgical History:   Procedure Laterality Date    [place] INJ LUMBAR/SACRAL,W/ IMAGING N/A 7/14/2023    Procedure: Caudal epidural steroid injection with catheter;  Surgeon: Paresh Ogden M.D.;  [facility];  Service: Pain Management    [place] INJ LUMBAR/SACRAL,W/ IMAGING N/A 3/23/2023    Procedure: Caudal epidural steroid injection with catheter;  Surgeon: Parseh Ogden M.D.;  [facility];  Service: Pain Management    [place] INJ LUMBAR/SACRAL,W/ IMAGING N/A 10/31/2022    Procedure: Caudal epidural steroid injection with catheter;  Surgeon: Paresh Ogden M.D.;  [facility];  Service: Pain Management    RADIO FREQUENCY ABLATION ADDITIONAL LEVEL Left 11/11/2021    Procedure: LEFT lumbar three through lumbar five radiofrequency ablation;  Surgeon: Paresh Ogden M.D.;  [facility];  Service: Pain Management    LUMBAR MEDIAL BRANCH BLOCKS Left 7/21/2021    Procedure: LEFT lumbar three through lumbar five medial branch blocks;  Surgeon: Paresh Ogden M.D.;  [facility];  Service: Pain Management    LUMBAR TRANSFORAMINAL EPIDURAL STEROID INJECTION Left 5/20/2021    Procedure: LEFT lumbar four and lumbar five transforaminal epidural steroid injection;  Surgeon: Paresh Ogden M.D.;  [facility];  Service: Pain Management    BLOCK EPIDURAL STEROID INJECTION Left 2/24/2021    Procedure: INJECTION, STEROID, EPIDURAL;  Surgeon: Paresh Ogden M.D.;  [facility];  Service: Pain Management    LUMBAR MEDIAL BRANCH BLOCKS Left 9/9/2020    Procedure: BLOCK, NERVE, SPINAL, LUMBAR,  POSTERIOR RAMUS, MEDIAL BRANCH;  Surgeon: Paresh Ogden M.D.;  [facility];  Service: Pain Management    PA NJX AA&/STRD TFRML EPI LUMBAR/SACRAL 1 LEVEL Left 8/12/2020    Procedure: INJECTION, SPINE, LUMBOSACRAL, EPIDURAL, 1 LEVEL, TRANSFORAMINAL APPROACH;  Surgeon: Paresh Ogden M.D.;  [facility];  Service: Pain Management    PA NJX AA&/STRD TFRML EPI LUMBAR/SACRAL EA ADDL Left 8/12/2020    Procedure: INJECTION, SPINE, LUMBOSACRAL, EPIDURAL, TRANSFORAMINAL APPROACH;  Surgeon: Paresh Ogden M.D.;  [facility];  Service: Pain Management    LAMINOTOMY  1998    ABDOMINAL HYSTERECTOMY TOTAL      CARPAL TUNNEL RELEASE      CHOLECYSTECTOMY         Past Medical History:   Diagnosis Date    Anxiety     Arrhythmia     Chronic back pain     Constipation     Depression     Diabetes (HCC)     Ear pain, left 9/23/2021    GERD (gastroesophageal reflux disease)     Hyperlipidemia     Hypertension     Thyroid disease     Tobacco use 10/11/2022       Current Outpatient Medications   Medication Sig Dispense Refill    Trospium Chloride 60 MG CAPSULE SR 24 HR Take 1 Capsule by mouth every day for 180 days. 90 Capsule 1    fluconazole (DIFLUCAN) 150 MG tablet Take 1 Tablet by mouth every 72 hours. 3 Tablet 0    estradiol (ESTRACE) 0.1 MG/GM vaginal cream Insert 0.5 g into the vagina every Monday, Wednesday, and Friday. 42.5 g 3    estradiol (ESTRACE) 0.5 MG tablet Take 1 Tablet by mouth every day. 90 Tablet 3    morphine ER (MS CONTIN) 15 MG Tab CR tablet Take 1 Tablet by mouth every 12 hours for 30 days. Indications: Chronic Pain 60 Tablet 0    HYDROcodone/acetaminophen (NORCO)  MG Tab Take 1 Tablet by mouth every 6 hours as needed for Severe Pain or Moderate Pain for up to 30 days. Indications: Pain 70 Tablet 0    Semaglutide,0.25 or 0.5MG/DOS, 2 MG/3ML Solution Pen-injector Inject 0.5 mg under the skin every 7 days. 9 mL 3    clonazePAM  (KLONOPIN) 1 MG Tab Take 1 Tablet by mouth every morning for 90 days. Indications: Feeling Anxious, Panic Disorder 90 Tablet 0    clonazePAM (KLONOPIN) 0.5 MG Tab Take 1 Tablet by mouth every evening for 90 days. Indications: Feeling Anxious, Panic Disorder 90 Tablet 0    pantoprazole (PROTONIX) 40 MG Tablet Delayed Response TAKE 1 TABLET BY MOUTH EVERY DAY 90 Tablet 1    albuterol 108 (90 Base) MCG/ACT Aero Soln inhalation aerosol Inhale 2 Puffs every four hours as needed for Shortness of Breath. 8.5 g 5    Brexpiprazole (REXULTI) 2 MG Tab Take 1 Tablet by mouth every day. 90 Tablet 3    busPIRone (BUSPAR) 30 MG tablet Take 1 Tablet by mouth every day. 180 Tablet 3    cyclobenzaprine (FLEXERIL) 5 mg tablet Take 1 Tablet by mouth 2 times a day as needed for Muscle Spasms (restless leg). 90 Tablet 3    DULoxetine (CYMBALTA) 30 MG Cap DR Particles Take 3 Capsules by mouth every day. 270 Capsule 3    gabapentin (NEURONTIN) 800 MG tablet Take 1.5 Tablets by mouth 2 times a day. 270 Tablet 3    levothyroxine (SYNTHROID) 50 MCG Tab Take 1 Tablet by mouth every morning on an empty stomach. 90 Tablet 3    lisinopril (PRINIVIL) 10 MG Tab Take 1 Tablet by mouth every day. 100 Tablet 3    metFORMIN ER (GLUCOPHAGE XR) 500 MG TABLET SR 24 HR Take 1 Tablet by mouth 2 times a day. 400 Tablet 0    metoprolol SR (TOPROL XL) 25 MG TABLET SR 24 HR Take 1 Tablet by mouth every day. 90 Tablet 3    rosuvastatin (CRESTOR) 10 MG Tab Take 1 Tablet by mouth every evening. 100 Tablet 3    ipratropium (ATROVENT) 0.06 % Solution Administer 2 Sprays into affected nostril(S) 4 times a day. 15 mL 0    Blood Glucose Test Strips Use one Abbott Freedom Lite strip to test blood sugar twice daily and PRN. 270 Strip 0    Naloxone (NARCAN) 4 MG/0.1ML Liquid One spray in one nostril for overdose and call 911. 1 Each 1    pseudoephedrine (SUDAFED) 30 MG Tab Take 1-2 Tablets by mouth every 6 hours as needed for Congestion (sinus pressure not relieved by  "other measures). 30 Tablet 0    Blood Glucose Meter Kit Test blood sugar as recommended by provider. Abbott Freestyle Lite blood glucose monitoring kit. 1 Kit 0    Lancets Use one Abbott [place] Lite lancet to test blood sugar twice daily and PRN. 300 Each 3    Alcohol Swabs Wipe site with prep pad prior to injection. 100 Each 3    fluticasone (FLONASE) 50 MCG/ACT nasal spray Administer 1 Spray into affected nostril(S) every day. 16 g 11    cyanocobalamin (VITAMIN B-12) 500 MCG Tab Take 500 mcg by mouth every day.       No current facility-administered medications for this visit.       No Known Allergies    Objective  /76 (BP Location: Left arm, Patient Position: Sitting, BP Cuff Size: Adult)   Pulse 81   Temp 36 °C (96.8 °F) (Temporal)   Ht 1.6 m (5' 3\")   Wt 72.1 kg (159 lb)   SpO2 94%   Physical Exam  Constitutional:       Appearance: Normal appearance.   HENT:      Head: Normocephalic and atraumatic.   Pulmonary:      Effort: Pulmonary effort is normal.   Genitourinary:     Comments: Positive cough stress test    Pre-void:135 cc  Skin:     General: Skin is warm and dry.   Neurological:      General: No focal deficit present.      Mental Status: She is alert.   Psychiatric:         Mood and Affect: Mood normal.         Behavior: Behavior normal.         Labs:   BMP   Lab Results   Component Value Date/Time    SODIUM 139 06/21/2023 1350    POTASSIUM 4.2 06/21/2023 1350    CHLORIDE 102 06/21/2023 1350    CO2 25 06/21/2023 1350    GLUCOSE 148 (H) 06/21/2023 1350    BUN 13 06/21/2023 1350    CREATININE 0.56 06/21/2023 1350    CALCIUM 9.7 06/21/2023 1350         Imaging: none    Assessment    We reviewed the treatment options for stress urinary incontinence including pelvic floor physical therapy, vaginal pessary, urethral bulking agent, and mid urethral sling placement.  The risk and benefits of each option were discussed thoroughly with the patient.  Stress urinary incontinence was demonstrated on exam.  " Her postvoid residual was 20 cc. She is significantly bothered by her symptoms and wishes to proceed with urethral bulking agent injection.     We reviewed the treatment options for overactive bladder including bladder training, avoidance of bladder irritants (such as caffeinated beverages, spicy food, citrus, tomatoes, etc), reduction of fluid intake 2 hours before bed, constipation management ensuring one soft bowel movement per day, pelvic floor physical therapy, medical therapy (Trospium, Detrol, Myrbetriq, etc), cystoscopy with bladder botox injections, and Interstim placement.  I recommend anti-cholinergic medications - Trospium, pelvic floor PT, Transcutaenous tibial nerve stimulation . The patient would like to try treatment with anti-cholinergic medications - Trospium .    Urodynamics: not currently indicated.     We reviewed the signs and symptoms of  syndrome of menopause including vaginal atrophy, urinary frequency, urgency, urinary incontinence, pelvic pain, bladder pain, pain with intercourse, vaginal dryness, and risk for recurrent urinary tract infections.  We discussed the various treatment options for  syndrome of menopause which include vaginal estrogen therapy with either a vaginal cream, vaginal tablet suppository, or vaginal ring. Other therapies that can be beneficial for associated symptoms include pelvic floor physical therapy, OAB medications, sacral neuromodulation, and chemodenervation with bladder botox injections. I recommend vaginal estrogen.     Plan    Problem List Items Addressed This Visit    None  Visit Diagnoses       Urinary incontinence, unspecified type        Relevant Medications    Trospium Chloride 60 MG CAPSULE SR 24 HR    Other Relevant Orders    POCT Bladder Scan (Completed)    CT-ABDOMEN & PELVIS UROGRAM    Basic Metabolic Panel        Schedule for cystoscopy with possible biopsy, and bulkamid injection  CT urogram  Start Trospium 60 mg daily

## 2024-06-11 ENCOUNTER — APPOINTMENT (OUTPATIENT)
Dept: MEDICAL GROUP | Facility: PHYSICIAN GROUP | Age: 69
End: 2024-06-11
Payer: MEDICARE

## 2024-06-11 ENCOUNTER — TELEPHONE (OUTPATIENT)
Dept: UROLOGY | Facility: MEDICAL CENTER | Age: 69
End: 2024-06-11

## 2024-06-12 ENCOUNTER — APPOINTMENT (OUTPATIENT)
Dept: RADIOLOGY | Facility: MEDICAL CENTER | Age: 69
End: 2024-06-12
Attending: STUDENT IN AN ORGANIZED HEALTH CARE EDUCATION/TRAINING PROGRAM
Payer: MEDICARE

## 2024-06-12 ENCOUNTER — HOSPITAL ENCOUNTER (OUTPATIENT)
Dept: LAB | Facility: MEDICAL CENTER | Age: 69
End: 2024-06-12
Attending: STUDENT IN AN ORGANIZED HEALTH CARE EDUCATION/TRAINING PROGRAM
Payer: MEDICARE

## 2024-06-12 DIAGNOSIS — R32 URINARY INCONTINENCE, UNSPECIFIED TYPE: ICD-10-CM

## 2024-06-12 LAB
ANION GAP SERPL CALC-SCNC: 14 MMOL/L (ref 7–16)
BUN SERPL-MCNC: 9 MG/DL (ref 8–22)
CALCIUM SERPL-MCNC: 9.5 MG/DL (ref 8.5–10.5)
CHLORIDE SERPL-SCNC: 107 MMOL/L (ref 96–112)
CO2 SERPL-SCNC: 23 MMOL/L (ref 20–33)
CREAT SERPL-MCNC: 0.72 MG/DL (ref 0.5–1.4)
GFR SERPLBLD CREATININE-BSD FMLA CKD-EPI: 90 ML/MIN/1.73 M 2
GLUCOSE SERPL-MCNC: 95 MG/DL (ref 65–99)
POTASSIUM SERPL-SCNC: 4 MMOL/L (ref 3.6–5.5)
SODIUM SERPL-SCNC: 144 MMOL/L (ref 135–145)

## 2024-06-12 PROCEDURE — 36415 COLL VENOUS BLD VENIPUNCTURE: CPT

## 2024-06-12 PROCEDURE — 80048 BASIC METABOLIC PNL TOTAL CA: CPT

## 2024-06-14 ENCOUNTER — HOSPITAL ENCOUNTER (OUTPATIENT)
Dept: RADIOLOGY | Facility: MEDICAL CENTER | Age: 69
End: 2024-06-14
Attending: STUDENT IN AN ORGANIZED HEALTH CARE EDUCATION/TRAINING PROGRAM
Payer: MEDICARE

## 2024-06-14 DIAGNOSIS — R32 URINARY INCONTINENCE, UNSPECIFIED TYPE: ICD-10-CM

## 2024-06-14 PROCEDURE — 700117 HCHG RX CONTRAST REV CODE 255: Performed by: STUDENT IN AN ORGANIZED HEALTH CARE EDUCATION/TRAINING PROGRAM

## 2024-06-14 PROCEDURE — 74178 CT ABD&PLV WO CNTR FLWD CNTR: CPT

## 2024-06-14 RX ADMIN — IOHEXOL 100 ML: 350 INJECTION, SOLUTION INTRAVENOUS at 15:12

## 2024-06-20 ENCOUNTER — APPOINTMENT (OUTPATIENT)
Dept: MEDICAL GROUP | Facility: PHYSICIAN GROUP | Age: 69
End: 2024-06-20
Payer: MEDICARE

## 2024-06-24 ENCOUNTER — APPOINTMENT (OUTPATIENT)
Dept: ADMISSIONS | Facility: MEDICAL CENTER | Age: 69
End: 2024-06-24
Attending: STUDENT IN AN ORGANIZED HEALTH CARE EDUCATION/TRAINING PROGRAM
Payer: MEDICARE

## 2024-06-26 ENCOUNTER — OFFICE VISIT (OUTPATIENT)
Dept: MEDICAL GROUP | Facility: PHYSICIAN GROUP | Age: 69
End: 2024-06-26
Payer: MEDICARE

## 2024-06-26 ENCOUNTER — HOSPITAL ENCOUNTER (OUTPATIENT)
Facility: MEDICAL CENTER | Age: 69
End: 2024-06-26
Attending: NURSE PRACTITIONER
Payer: MEDICARE

## 2024-06-26 VITALS
HEIGHT: 63 IN | SYSTOLIC BLOOD PRESSURE: 108 MMHG | OXYGEN SATURATION: 95 % | RESPIRATION RATE: 16 BRPM | DIASTOLIC BLOOD PRESSURE: 62 MMHG | HEART RATE: 83 BPM | TEMPERATURE: 98.8 F | BODY MASS INDEX: 26.4 KG/M2 | WEIGHT: 149 LBS

## 2024-06-26 DIAGNOSIS — M54.42 CHRONIC BILATERAL LOW BACK PAIN WITH BILATERAL SCIATICA: ICD-10-CM

## 2024-06-26 DIAGNOSIS — M96.1 POSTLAMINECTOMY SYNDROME: ICD-10-CM

## 2024-06-26 DIAGNOSIS — N12 RECURRENT PYELONEPHRITIS: ICD-10-CM

## 2024-06-26 DIAGNOSIS — M54.41 CHRONIC BILATERAL LOW BACK PAIN WITH BILATERAL SCIATICA: ICD-10-CM

## 2024-06-26 DIAGNOSIS — G89.29 CHRONIC PAIN OF LEFT KNEE: ICD-10-CM

## 2024-06-26 DIAGNOSIS — F43.10 POSTTRAUMATIC STRESS DISORDER, DELAYED ONSET: ICD-10-CM

## 2024-06-26 DIAGNOSIS — F11.20 SEVERE OPIOID DEPENDENCE ON MAINTENANCE THERAPY (HCC): ICD-10-CM

## 2024-06-26 DIAGNOSIS — R10.9 FLANK PAIN: ICD-10-CM

## 2024-06-26 DIAGNOSIS — G89.29 CHRONIC BILATERAL LOW BACK PAIN WITH BILATERAL SCIATICA: ICD-10-CM

## 2024-06-26 DIAGNOSIS — M25.562 CHRONIC PAIN OF LEFT KNEE: ICD-10-CM

## 2024-06-26 DIAGNOSIS — F41.1 GENERALIZED ANXIETY DISORDER: ICD-10-CM

## 2024-06-26 DIAGNOSIS — F41.8 SITUATIONAL ANXIETY: ICD-10-CM

## 2024-06-26 DIAGNOSIS — F13.20 SEDATIVE, HYPNOTIC OR ANXIOLYTIC DEPENDENCE, UNCOMPLICATED (HCC): ICD-10-CM

## 2024-06-26 LAB
APPEARANCE UR: CLEAR
BILIRUB UR STRIP-MCNC: NORMAL MG/DL
COLOR UR AUTO: NORMAL
GLUCOSE UR STRIP.AUTO-MCNC: 250 MG/DL
KETONES UR STRIP.AUTO-MCNC: NORMAL MG/DL
LEUKOCYTE ESTERASE UR QL STRIP.AUTO: NORMAL
NITRITE UR QL STRIP.AUTO: NORMAL
PH UR STRIP.AUTO: 5 [PH] (ref 5–8)
PROT UR QL STRIP: 300 MG/DL
RBC UR QL AUTO: NORMAL
SP GR UR STRIP.AUTO: 1.01
UROBILINOGEN UR STRIP-MCNC: 4 MG/DL

## 2024-06-26 PROCEDURE — 87086 URINE CULTURE/COLONY COUNT: CPT

## 2024-06-26 PROCEDURE — 81002 URINALYSIS NONAUTO W/O SCOPE: CPT | Performed by: NURSE PRACTITIONER

## 2024-06-26 PROCEDURE — 87798 DETECT AGENT NOS DNA AMP: CPT | Mod: 91

## 2024-06-26 PROCEDURE — 99214 OFFICE O/P EST MOD 30 MIN: CPT | Performed by: NURSE PRACTITIONER

## 2024-06-26 PROCEDURE — 3078F DIAST BP <80 MM HG: CPT | Performed by: NURSE PRACTITIONER

## 2024-06-26 PROCEDURE — 3074F SYST BP LT 130 MM HG: CPT | Performed by: NURSE PRACTITIONER

## 2024-06-26 PROCEDURE — 80307 DRUG TEST PRSMV CHEM ANLYZR: CPT

## 2024-06-26 PROCEDURE — G0480 DRUG TEST DEF 1-7 CLASSES: HCPCS

## 2024-06-26 PROCEDURE — 87563 M. GENITALIUM AMP PROBE: CPT

## 2024-06-26 RX ORDER — MORPHINE SULFATE 15 MG/1
15 TABLET, FILM COATED, EXTENDED RELEASE ORAL EVERY 12 HOURS
Qty: 60 TABLET | Refills: 0 | Status: SHIPPED | OUTPATIENT
Start: 2024-07-27 | End: 2024-06-27

## 2024-06-26 RX ORDER — CLONAZEPAM 1 MG/1
1 TABLET ORAL EVERY MORNING
Qty: 90 TABLET | Refills: 0 | Status: SHIPPED | OUTPATIENT
Start: 2024-06-26 | End: 2024-09-24

## 2024-06-26 RX ORDER — HYDROCODONE BITARTRATE AND ACETAMINOPHEN 10; 325 MG/1; MG/1
1 TABLET ORAL EVERY 6 HOURS PRN
Qty: 70 TABLET | Refills: 0 | Status: SHIPPED | OUTPATIENT
Start: 2024-08-27 | End: 2024-06-27

## 2024-06-26 RX ORDER — CLONAZEPAM 0.5 MG/1
0.5 TABLET ORAL NIGHTLY
Qty: 90 TABLET | Refills: 0 | Status: SHIPPED | OUTPATIENT
Start: 2024-06-26 | End: 2024-09-24

## 2024-06-26 RX ORDER — MORPHINE SULFATE 15 MG/1
15 TABLET, FILM COATED, EXTENDED RELEASE ORAL EVERY 12 HOURS
Qty: 60 TABLET | Refills: 0 | Status: SHIPPED | OUTPATIENT
Start: 2024-06-26 | End: 2024-06-27

## 2024-06-26 RX ORDER — MORPHINE SULFATE 15 MG/1
15 TABLET, FILM COATED, EXTENDED RELEASE ORAL EVERY 12 HOURS
Qty: 60 TABLET | Refills: 0 | Status: SHIPPED | OUTPATIENT
Start: 2024-08-27 | End: 2024-06-27

## 2024-06-26 RX ORDER — HYDROCODONE BITARTRATE AND ACETAMINOPHEN 10; 325 MG/1; MG/1
1 TABLET ORAL EVERY 6 HOURS PRN
Qty: 70 TABLET | Refills: 0 | Status: SHIPPED | OUTPATIENT
Start: 2024-06-26 | End: 2024-07-26

## 2024-06-26 RX ORDER — HYDROCODONE BITARTRATE AND ACETAMINOPHEN 10; 325 MG/1; MG/1
1 TABLET ORAL EVERY 6 HOURS PRN
Qty: 70 TABLET | Refills: 0 | Status: SHIPPED | OUTPATIENT
Start: 2024-07-27 | End: 2024-06-27

## 2024-06-26 ASSESSMENT — FIBROSIS 4 INDEX: FIB4 SCORE: 1.94

## 2024-06-26 NOTE — PROGRESS NOTES
Chief Complaint   Patient presents with    Follow-Up    Other     Possible kidney infection                                                                                                                                       HPI:   Grisel presents today with the following.    The patient attended the consultation today to discuss persistent urinary symptoms, including discomfort and recurrent urinary tract infections, which have not resolved despite previous treatments. The patient also reported experiencing bilateral flank pain and back pain.    The chief complaint presented by the patient is the aforementioned persistent urinary symptoms and associated discomfort. The patient is currently using a prescribed cream as directed by Dr. Batista and taking Azo every four hours to manage these urinary symptoms.    The patient has a recent medical history of undergoing a CT abdomen pelvis urogram, which showed no stones or masses in the kidneys. A procedure with Dr. Batista is scheduled for the 23rd to address the patient's bladder issues. The patient is also taking Trospium, which has not provided relief for the urinary symptoms.    Regarding other medical conditions, the patient's blood sugars have been stable, with the last reading at 125. However, the patient continues to experience discomfort in the lower urinary tract, even after previous treatments. Multiple UTIs have occurred in the past, with the most recent cultures showing usual urogenital lucy and no significant growth.    The patient requires a medication refill and a pain management drug screen. Norco is currently being used for pain management, with a calculated morphine milligram equivalent (MME) of around 70. Additionally, the patient is experiencing back pain and is considering scheduling an appointment with Dr. Ogden for an injection.    The patient's current health management strategies and concerns have been discussed, with particular attention to the  "persistent urinary symptoms, bilateral flank pain, back pain, and the need for further treatment and pain management.    ROS:  All systems negative expect as addressed in assessment and plan.     /62 (BP Location: Left arm, Patient Position: Sitting)   Pulse 83   Temp 37.1 °C (98.8 °F) (Temporal)   Resp 16   Ht 1.6 m (5' 3\")   Wt 67.6 kg (149 lb)   LMP  (LMP Unknown)   SpO2 95%   BMI 26.39 kg/m²     Objective:    Physical Exam    Diagnostic Test Results:  - Imaging:    - CT Abdomen Pelvis Urogram: No stones or masses in the kidneys; no signs of inflammation inside the kidneys.    - Laboratory Tests:    - Urine Analysis: Presence of large amounts of protein, positive nitrates, large amount of white blood cells, and glucose.    - Kidney Function Test: Results within normal limits.    - Culture: Previous cultures showed usual urogenital lucy with no growth, suggesting possible significant inflammation rather than infection.    - Physical Examination:    - Bladder: Described as \"falling\" when urinating, with upcoming cystoscopy.    - Kidney: No signs of stones or masses observed in recent imaging; however, symptoms suggestive of kidney infection noted.    - Urine: Large amounts of protein, positive nitrates, and large amount of white blood cells indicating possible infection; glucose present; no blood detected in the current sample.    Assessment and Plan:  69 y.o. female with the following issues.    1. Flank pain  - POCT Urinalysis  - URINE CULTURE(NEW); Future    2. Recurrent pyelonephritis  - URINE CULTURE(NEW); Future    3. Chronic pain of left knee  - HYDROcodone/acetaminophen (NORCO)  MG Tab; Take 1 Tablet by mouth every 6 hours as needed for Severe Pain or Moderate Pain for up to 30 days. Indications: Pain  Dispense: 70 Tablet; Refill: 0  - morphine ER (MS CONTIN) 15 MG Tab CR tablet; Take 1 Tablet by mouth every 12 hours for 30 days. Indications: Chronic Pain  Dispense: 60 Tablet; Refill: " 0  - morphine ER (MS CONTIN) 15 MG Tab CR tablet; Take 1 Tablet by mouth every 12 hours for 30 days. Indications: Chronic Pain  Dispense: 60 Tablet; Refill: 0  - morphine ER (MS CONTIN) 15 MG Tab CR tablet; Take 1 Tablet by mouth every 12 hours for 30 days. Indications: Chronic Pain  Dispense: 60 Tablet; Refill: 0  - HYDROcodone/acetaminophen (NORCO)  MG Tab; Take 1 Tablet by mouth every 6 hours as needed for Severe Pain or Moderate Pain for up to 30 days. Indications: Pain  Dispense: 70 Tablet; Refill: 0  - HYDROcodone/acetaminophen (NORCO)  MG Tab; Take 1 Tablet by mouth every 6 hours as needed for Severe Pain or Moderate Pain for up to 30 days. Indications: Pain  Dispense: 70 Tablet; Refill: 0  - Consent for Opiate Prescription  - Controlled Substance Treatment Agreement  - PAIN MANAGEMENT SCRN, W/ RFLX TO QNT; Future    4. Chronic bilateral low back pain with bilateral sciatica  - HYDROcodone/acetaminophen (NORCO)  MG Tab; Take 1 Tablet by mouth every 6 hours as needed for Severe Pain or Moderate Pain for up to 30 days. Indications: Pain  Dispense: 70 Tablet; Refill: 0  - morphine ER (MS CONTIN) 15 MG Tab CR tablet; Take 1 Tablet by mouth every 12 hours for 30 days. Indications: Chronic Pain  Dispense: 60 Tablet; Refill: 0  - morphine ER (MS CONTIN) 15 MG Tab CR tablet; Take 1 Tablet by mouth every 12 hours for 30 days. Indications: Chronic Pain  Dispense: 60 Tablet; Refill: 0  - morphine ER (MS CONTIN) 15 MG Tab CR tablet; Take 1 Tablet by mouth every 12 hours for 30 days. Indications: Chronic Pain  Dispense: 60 Tablet; Refill: 0  - HYDROcodone/acetaminophen (NORCO)  MG Tab; Take 1 Tablet by mouth every 6 hours as needed for Severe Pain or Moderate Pain for up to 30 days. Indications: Pain  Dispense: 70 Tablet; Refill: 0  - HYDROcodone/acetaminophen (NORCO)  MG Tab; Take 1 Tablet by mouth every 6 hours as needed for Severe Pain or Moderate Pain for up to 30 days. Indications: Pain   Dispense: 70 Tablet; Refill: 0  - Consent for Opiate Prescription  - Controlled Substance Treatment Agreement  - PAIN MANAGEMENT SCRN, W/ RFLX TO QNT; Future    5. Postlaminectomy syndrome  - HYDROcodone/acetaminophen (NORCO)  MG Tab; Take 1 Tablet by mouth every 6 hours as needed for Severe Pain or Moderate Pain for up to 30 days. Indications: Pain  Dispense: 70 Tablet; Refill: 0  - morphine ER (MS CONTIN) 15 MG Tab CR tablet; Take 1 Tablet by mouth every 12 hours for 30 days. Indications: Chronic Pain  Dispense: 60 Tablet; Refill: 0  - morphine ER (MS CONTIN) 15 MG Tab CR tablet; Take 1 Tablet by mouth every 12 hours for 30 days. Indications: Chronic Pain  Dispense: 60 Tablet; Refill: 0  - morphine ER (MS CONTIN) 15 MG Tab CR tablet; Take 1 Tablet by mouth every 12 hours for 30 days. Indications: Chronic Pain  Dispense: 60 Tablet; Refill: 0  - HYDROcodone/acetaminophen (NORCO)  MG Tab; Take 1 Tablet by mouth every 6 hours as needed for Severe Pain or Moderate Pain for up to 30 days. Indications: Pain  Dispense: 70 Tablet; Refill: 0  - HYDROcodone/acetaminophen (NORCO)  MG Tab; Take 1 Tablet by mouth every 6 hours as needed for Severe Pain or Moderate Pain for up to 30 days. Indications: Pain  Dispense: 70 Tablet; Refill: 0  - Consent for Opiate Prescription  - Controlled Substance Treatment Agreement  - PAIN MANAGEMENT SCRN, W/ RFLX TO QNT; Future    6. Situational anxiety  - clonazePAM (KLONOPIN) 0.5 MG Tab; Take 1 Tablet by mouth every evening for 90 days. Indications: Feeling Anxious, Panic Disorder  Dispense: 90 Tablet; Refill: 0  - clonazePAM (KLONOPIN) 1 MG Tab; Take 1 Tablet by mouth every morning for 90 days. Indications: Feeling Anxious, Panic Disorder  Dispense: 90 Tablet; Refill: 0    7. Generalized anxiety disorder  - clonazePAM (KLONOPIN) 0.5 MG Tab; Take 1 Tablet by mouth every evening for 90 days. Indications: Feeling Anxious, Panic Disorder  Dispense: 90 Tablet; Refill: 0  -  clonazePAM (KLONOPIN) 1 MG Tab; Take 1 Tablet by mouth every morning for 90 days. Indications: Feeling Anxious, Panic Disorder  Dispense: 90 Tablet; Refill: 0    8. Posttraumatic stress disorder, delayed onset  - clonazePAM (KLONOPIN) 0.5 MG Tab; Take 1 Tablet by mouth every evening for 90 days. Indications: Feeling Anxious, Panic Disorder  Dispense: 90 Tablet; Refill: 0  - clonazePAM (KLONOPIN) 1 MG Tab; Take 1 Tablet by mouth every morning for 90 days. Indications: Feeling Anxious, Panic Disorder  Dispense: 90 Tablet; Refill: 0    9. Severe opioid dependence on maintenance therapy (HCC)  - HYDROcodone/acetaminophen (NORCO)  MG Tab; Take 1 Tablet by mouth every 6 hours as needed for Severe Pain or Moderate Pain for up to 30 days. Indications: Pain  Dispense: 70 Tablet; Refill: 0  - morphine ER (MS CONTIN) 15 MG Tab CR tablet; Take 1 Tablet by mouth every 12 hours for 30 days. Indications: Chronic Pain  Dispense: 60 Tablet; Refill: 0  - morphine ER (MS CONTIN) 15 MG Tab CR tablet; Take 1 Tablet by mouth every 12 hours for 30 days. Indications: Chronic Pain  Dispense: 60 Tablet; Refill: 0  - morphine ER (MS CONTIN) 15 MG Tab CR tablet; Take 1 Tablet by mouth every 12 hours for 30 days. Indications: Chronic Pain  Dispense: 60 Tablet; Refill: 0  - HYDROcodone/acetaminophen (NORCO)  MG Tab; Take 1 Tablet by mouth every 6 hours as needed for Severe Pain or Moderate Pain for up to 30 days. Indications: Pain  Dispense: 70 Tablet; Refill: 0  - HYDROcodone/acetaminophen (NORCO)  MG Tab; Take 1 Tablet by mouth every 6 hours as needed for Severe Pain or Moderate Pain for up to 30 days. Indications: Pain  Dispense: 70 Tablet; Refill: 0  - Consent for Opiate Prescription  - Controlled Substance Treatment Agreement  - PAIN MANAGEMENT SCRN, W/ RFLX TO QNT; Future    10. Sedative, hypnotic or anxiolytic dependence, uncomplicated (HCC)  - clonazePAM (KLONOPIN) 0.5 MG Tab; Take 1 Tablet by mouth every evening for 90  days. Indications: Feeling Anxious, Panic Disorder  Dispense: 90 Tablet; Refill: 0  - clonazePAM (KLONOPIN) 1 MG Tab; Take 1 Tablet by mouth every morning for 90 days. Indications: Feeling Anxious, Panic Disorder  Dispense: 90 Tablet; Refill: 0    1. Recurrent Urinary Tract Infections (UTIs):     - Assessment: Urinalysis shows large amounts of protein, positive nitrates, and large amount of white blood cells, indicating an infection.     - Plan:           a. Send urine for culture and await results.          b. Consult with provider for further recommendations.          c. Consider starting prophylactic antibiotic therapy if deemed necessary.          d. Instruct patient to go to the hospital if they develop fever or confusion, as these may be signs of a more severe UTI.    2. Pelvic Organ Prolapse/Bladder issue:     - Assessment: Patient scheduled for a procedure on the 23rd to address the issue.     - Plan:          a. Continue using prescribed cream as directed.          b. Follow up with provider for further management.    3. Chronic Back Pain:     - Assessment: Patient reports increased back pain, possibly related to kidney infection.     - Plan:          a. Encourage patient to schedule an appointment for possible cortisone injection.          b. Monitor pain levels and consider alternative pain management strategies if necessary.    4. Pain Management and Medication Refill:     - Assessment: Patient due for medication refill and pain management drug screen.     - Plan:          a. Refill medications as needed.          b. Perform pain management drug screen and have patient re-sign the controlled substance consent.          c. Monitor morphine milligram equivalent (MME) and adjust as needed.    5. Follow-up:     - Plan:          a. Schedule a follow-up appointment in three months for medication refill and evaluation of ongoing issues.          b. Encourage patient to return sooner if needed.          c.  Communicate with patient regarding recommendations and any changes in treatment plan.    Return in about 3 months (around 9/26/2024) for Chronic pain, Chronic Health Conditions.      Please note that this dictation was created using voice recognition software. I have worked with consultants from the vendor as well as technical experts from [facility] to optimize the interface. I have made every reasonable attempt to correct obvious errors, but I expect that there are errors of grammar and possibly content that I did not discover before finalizing the note.

## 2024-06-27 ENCOUNTER — PRE-ADMISSION TESTING (OUTPATIENT)
Dept: ADMISSIONS | Facility: MEDICAL CENTER | Age: 69
End: 2024-06-27
Attending: STUDENT IN AN ORGANIZED HEALTH CARE EDUCATION/TRAINING PROGRAM
Payer: MEDICARE

## 2024-06-27 DIAGNOSIS — Z01.812 PRE-OPERATIVE LABORATORY EXAMINATION: ICD-10-CM

## 2024-06-27 DIAGNOSIS — Z01.810 PRE-OPERATIVE CARDIOVASCULAR EXAMINATION: ICD-10-CM

## 2024-06-27 LAB — SPECIMEN SOURCE: NORMAL

## 2024-06-28 LAB
AMPHET CTO UR CFM-MCNC: NEGATIVE NG/ML
BARBITURATES CTO UR CFM-MCNC: NEGATIVE NG/ML
BENZODIAZ CTO UR CFM-MCNC: NEGATIVE NG/ML
BUPRENORPHINE UR-MCNC: NEGATIVE NG/ML
CANNABINOIDS CTO UR CFM-MCNC: NORMAL NG/ML
CARISOPRODOL UR-MCNC: NEGATIVE NG/ML
COCAINE CTO UR CFM-MCNC: NEGATIVE NG/ML
CREAT UR-MCNC: 62.8 MG/DL (ref 20–400)
DRUG SCREEN COMMENT UR-IMP: NORMAL
ETHYL GLUCURONIDE UR QL SCN: NORMAL NG/ML
FENTANYL UR-MCNC: NEGATIVE NG/ML
MEPERIDINE CTO UR SCN-MCNC: NEGATIVE NG/ML
METHADONE CTO UR CFM-MCNC: NEGATIVE NG/ML
OPIATES UR QL SCN: NORMAL NG/ML
OXYCDOXYM URSCRN 2005102: NEGATIVE NG/ML
PCP CTO UR CFM-MCNC: NEGATIVE NG/ML
PROPOXYPH CTO UR CFM-MCNC: NEGATIVE NG/ML
TAPENTADOL UR-MCNC: NEGATIVE NG/ML
TRAMADOL CTO UR SCN-MCNC: NEGATIVE NG/ML
ZOLPIDEM UR-MCNC: NORMAL NG/ML

## 2024-06-29 LAB
BACTERIA UR CULT: NORMAL
SIGNIFICANT IND 70042: NORMAL
SITE SITE: NORMAL
SOURCE SOURCE: NORMAL

## 2024-06-30 LAB
6MAM UR CFM-MCNC: <10 NG/ML
CODEINE UR CFM-MCNC: <20 NG/ML
ETHYL GLUCURONIDE UR CFM-MCNC: <100 NG/ML
ETHYL SULFATE UR CFM-MCNC: <100 NG/ML
HYDROCODONE UR CFM-MCNC: <20 NG/ML
HYDROMORPHONE UR CFM-MCNC: <20 NG/ML
MORPHINE UR CFM-MCNC: 1211 NG/ML
NORHYDROCODONE UR CFM-MCNC: <20 NG/ML
NOROXYCODONE UR CFM-MCNC: <20 NG/ML
OPIATES UR NOROXYM Q0836: <20 NG/ML
OXYCODONE UR CFM-MCNC: <20 NG/ML
OXYMORPHONE UR CFM-MCNC: <20 NG/ML

## 2024-07-01 LAB — THC UR CFM-MCNC: >500 NG/ML

## 2024-07-02 ENCOUNTER — TELEPHONE (OUTPATIENT)
Dept: UROLOGY | Facility: MEDICAL CENTER | Age: 69
End: 2024-07-02
Payer: MEDICARE

## 2024-07-02 LAB — ZOLPIDEM UR CFM-MCNC: <20 NG/ML

## 2024-07-03 ENCOUNTER — OFFICE VISIT (OUTPATIENT)
Dept: URGENT CARE | Facility: PHYSICIAN GROUP | Age: 69
End: 2024-07-03
Payer: MEDICARE

## 2024-07-03 ENCOUNTER — TELEPHONE (OUTPATIENT)
Dept: MEDICAL GROUP | Facility: PHYSICIAN GROUP | Age: 69
End: 2024-07-03

## 2024-07-03 VITALS
DIASTOLIC BLOOD PRESSURE: 84 MMHG | TEMPERATURE: 98.5 F | SYSTOLIC BLOOD PRESSURE: 138 MMHG | RESPIRATION RATE: 18 BRPM | WEIGHT: 161.6 LBS | OXYGEN SATURATION: 94 % | BODY MASS INDEX: 28.63 KG/M2 | HEIGHT: 63 IN | HEART RATE: 84 BPM

## 2024-07-03 DIAGNOSIS — N30.00 ACUTE CYSTITIS WITHOUT HEMATURIA: ICD-10-CM

## 2024-07-03 DIAGNOSIS — J06.9 UPPER RESPIRATORY INFECTION, ACUTE: ICD-10-CM

## 2024-07-03 LAB
FLUAV RNA SPEC QL NAA+PROBE: NEGATIVE
FLUBV RNA SPEC QL NAA+PROBE: NEGATIVE
M GENITALIUM DNA SPEC QL NAA+PROBE: NOT DETECTED
M HOMINIS DNA SPEC QL NAA+PROBE: NOT DETECTED
RSV RNA SPEC QL NAA+PROBE: NEGATIVE
S PYO DNA SPEC NAA+PROBE: NOT DETECTED
SARS-COV-2 RNA RESP QL NAA+PROBE: NEGATIVE
SPECIMEN SOURCE: NORMAL
U PARVUM DNA SPEC QL NAA+PROBE: NOT DETECTED
U UREALYTICUM DNA SPEC QL NAA+PROBE: NOT DETECTED

## 2024-07-03 PROCEDURE — 87651 STREP A DNA AMP PROBE: CPT

## 2024-07-03 PROCEDURE — 3075F SYST BP GE 130 - 139MM HG: CPT

## 2024-07-03 PROCEDURE — 3079F DIAST BP 80-89 MM HG: CPT

## 2024-07-03 PROCEDURE — 0241U POCT CEPHEID COV-2, FLU A/B, RSV - PCR: CPT

## 2024-07-03 PROCEDURE — 99213 OFFICE O/P EST LOW 20 MIN: CPT

## 2024-07-03 RX ORDER — DEXAMETHASONE SODIUM PHOSPHATE 10 MG/ML
10 INJECTION INTRAMUSCULAR; INTRAVENOUS ONCE
Status: COMPLETED | OUTPATIENT
Start: 2024-07-03 | End: 2024-07-03

## 2024-07-03 RX ADMIN — DEXAMETHASONE SODIUM PHOSPHATE 10 MG: 10 INJECTION INTRAMUSCULAR; INTRAVENOUS at 17:26

## 2024-07-03 ASSESSMENT — ENCOUNTER SYMPTOMS
FEVER: 0
ABDOMINAL PAIN: 0
SHORTNESS OF BREATH: 0
COUGH: 0
SORE THROAT: 0
NAUSEA: 0
VOMITING: 0

## 2024-07-03 ASSESSMENT — FIBROSIS 4 INDEX: FIB4 SCORE: 1.94

## 2024-07-08 ENCOUNTER — APPOINTMENT (OUTPATIENT)
Dept: MEDICAL GROUP | Facility: IMAGING CENTER | Age: 69
End: 2024-07-08
Payer: MEDICARE

## 2024-07-08 ENCOUNTER — HOSPITAL ENCOUNTER (OUTPATIENT)
Facility: MEDICAL CENTER | Age: 69
End: 2024-07-08
Attending: NURSE PRACTITIONER
Payer: MEDICARE

## 2024-07-08 ENCOUNTER — OFFICE VISIT (OUTPATIENT)
Dept: MEDICAL GROUP | Facility: PHYSICIAN GROUP | Age: 69
End: 2024-07-08
Payer: MEDICARE

## 2024-07-08 VITALS
OXYGEN SATURATION: 91 % | WEIGHT: 160.7 LBS | BODY MASS INDEX: 28.47 KG/M2 | HEIGHT: 63 IN | DIASTOLIC BLOOD PRESSURE: 70 MMHG | TEMPERATURE: 97.1 F | SYSTOLIC BLOOD PRESSURE: 120 MMHG | HEART RATE: 80 BPM

## 2024-07-08 DIAGNOSIS — N89.8 VAGINAL DISCHARGE: ICD-10-CM

## 2024-07-08 PROCEDURE — 87510 GARDNER VAG DNA DIR PROBE: CPT

## 2024-07-08 PROCEDURE — 3078F DIAST BP <80 MM HG: CPT | Performed by: NURSE PRACTITIONER

## 2024-07-08 PROCEDURE — 99213 OFFICE O/P EST LOW 20 MIN: CPT | Performed by: NURSE PRACTITIONER

## 2024-07-08 PROCEDURE — 3074F SYST BP LT 130 MM HG: CPT | Performed by: NURSE PRACTITIONER

## 2024-07-08 PROCEDURE — 87480 CANDIDA DNA DIR PROBE: CPT

## 2024-07-08 PROCEDURE — 87660 TRICHOMONAS VAGIN DIR PROBE: CPT

## 2024-07-08 ASSESSMENT — FIBROSIS 4 INDEX: FIB4 SCORE: 1.94

## 2024-07-09 ENCOUNTER — PRE-ADMISSION TESTING (OUTPATIENT)
Dept: ADMISSIONS | Facility: MEDICAL CENTER | Age: 69
End: 2024-07-09
Attending: STUDENT IN AN ORGANIZED HEALTH CARE EDUCATION/TRAINING PROGRAM
Payer: MEDICARE

## 2024-07-09 ENCOUNTER — TELEPHONE (OUTPATIENT)
Dept: UROLOGY | Facility: MEDICAL CENTER | Age: 69
End: 2024-07-09
Payer: MEDICARE

## 2024-07-09 DIAGNOSIS — B37.31 VAGINAL CANDIDIASIS: ICD-10-CM

## 2024-07-09 DIAGNOSIS — Z01.810 PRE-OPERATIVE CARDIOVASCULAR EXAMINATION: ICD-10-CM

## 2024-07-09 DIAGNOSIS — Z01.812 PRE-OPERATIVE LABORATORY EXAMINATION: ICD-10-CM

## 2024-07-09 DIAGNOSIS — N30.00 ACUTE CYSTITIS WITHOUT HEMATURIA: ICD-10-CM

## 2024-07-09 LAB
ANION GAP SERPL CALC-SCNC: 13 MMOL/L (ref 7–16)
BUN SERPL-MCNC: 8 MG/DL (ref 8–22)
CALCIUM SERPL-MCNC: 9.3 MG/DL (ref 8.5–10.5)
CANDIDA DNA VAG QL PROBE+SIG AMP: POSITIVE
CHLORIDE SERPL-SCNC: 101 MMOL/L (ref 96–112)
CO2 SERPL-SCNC: 25 MMOL/L (ref 20–33)
CREAT SERPL-MCNC: 0.51 MG/DL (ref 0.5–1.4)
EKG IMPRESSION: NORMAL
ERYTHROCYTE [DISTWIDTH] IN BLOOD BY AUTOMATED COUNT: 45.9 FL (ref 35.9–50)
EST. AVERAGE GLUCOSE BLD GHB EST-MCNC: 117 MG/DL
G VAGINALIS DNA VAG QL PROBE+SIG AMP: NEGATIVE
GFR SERPLBLD CREATININE-BSD FMLA CKD-EPI: 101 ML/MIN/1.73 M 2
GLUCOSE SERPL-MCNC: 116 MG/DL (ref 65–99)
HBA1C MFR BLD: 5.7 % (ref 4–5.6)
HCT VFR BLD AUTO: 42.6 % (ref 37–47)
HGB BLD-MCNC: 14.5 G/DL (ref 12–16)
MCH RBC QN AUTO: 31.7 PG (ref 27–33)
MCHC RBC AUTO-ENTMCNC: 34 G/DL (ref 32.2–35.5)
MCV RBC AUTO: 93 FL (ref 81.4–97.8)
PLATELET # BLD AUTO: 226 K/UL (ref 164–446)
PMV BLD AUTO: 10.4 FL (ref 9–12.9)
POTASSIUM SERPL-SCNC: 4.2 MMOL/L (ref 3.6–5.5)
RBC # BLD AUTO: 4.58 M/UL (ref 4.2–5.4)
SODIUM SERPL-SCNC: 139 MMOL/L (ref 135–145)
T VAGINALIS DNA VAG QL PROBE+SIG AMP: NEGATIVE
WBC # BLD AUTO: 13 K/UL (ref 4.8–10.8)

## 2024-07-09 PROCEDURE — 80048 BASIC METABOLIC PNL TOTAL CA: CPT

## 2024-07-09 PROCEDURE — 36415 COLL VENOUS BLD VENIPUNCTURE: CPT

## 2024-07-09 PROCEDURE — 85027 COMPLETE CBC AUTOMATED: CPT

## 2024-07-09 PROCEDURE — 83036 HEMOGLOBIN GLYCOSYLATED A1C: CPT

## 2024-07-09 PROCEDURE — 87086 URINE CULTURE/COLONY COUNT: CPT

## 2024-07-09 PROCEDURE — 93005 ELECTROCARDIOGRAM TRACING: CPT

## 2024-07-09 PROCEDURE — 93010 ELECTROCARDIOGRAM REPORT: CPT | Performed by: INTERNAL MEDICINE

## 2024-07-09 RX ORDER — FLUCONAZOLE 150 MG/1
150 TABLET ORAL DAILY
Qty: 1 TABLET | Refills: 0 | Status: SHIPPED | OUTPATIENT
Start: 2024-07-09 | End: 2024-07-10

## 2024-07-11 LAB
BACTERIA UR CULT: NORMAL
SIGNIFICANT IND 70042: NORMAL
SITE SITE: NORMAL
SOURCE SOURCE: NORMAL

## 2024-07-12 RX ORDER — FLUCONAZOLE 100 MG/1
100 TABLET ORAL ONCE
Qty: 1 TABLET | Refills: 0 | Status: SHIPPED | OUTPATIENT
Start: 2024-07-12 | End: 2024-07-12

## 2024-07-12 RX ORDER — NITROFURANTOIN 25; 75 MG/1; MG/1
100 CAPSULE ORAL 2 TIMES DAILY
Qty: 20 CAPSULE | Refills: 0 | Status: SHIPPED | OUTPATIENT
Start: 2024-07-12 | End: 2024-07-22

## 2024-07-23 ENCOUNTER — ANESTHESIA EVENT (OUTPATIENT)
Dept: SURGERY | Facility: MEDICAL CENTER | Age: 69
End: 2024-07-23
Payer: MEDICARE

## 2024-07-23 ENCOUNTER — HOSPITAL ENCOUNTER (OUTPATIENT)
Facility: MEDICAL CENTER | Age: 69
End: 2024-07-23
Attending: STUDENT IN AN ORGANIZED HEALTH CARE EDUCATION/TRAINING PROGRAM | Admitting: STUDENT IN AN ORGANIZED HEALTH CARE EDUCATION/TRAINING PROGRAM
Payer: MEDICARE

## 2024-07-23 ENCOUNTER — ANESTHESIA (OUTPATIENT)
Dept: SURGERY | Facility: MEDICAL CENTER | Age: 69
End: 2024-07-23
Payer: MEDICARE

## 2024-07-23 VITALS
HEART RATE: 75 BPM | WEIGHT: 155.87 LBS | HEIGHT: 63 IN | SYSTOLIC BLOOD PRESSURE: 97 MMHG | BODY MASS INDEX: 27.62 KG/M2 | DIASTOLIC BLOOD PRESSURE: 55 MMHG | RESPIRATION RATE: 14 BRPM | OXYGEN SATURATION: 90 % | TEMPERATURE: 97.6 F

## 2024-07-23 DIAGNOSIS — N39.3 STRESS BLADDER INCONTINENCE, FEMALE: ICD-10-CM

## 2024-07-23 LAB
GLUCOSE BLD STRIP.AUTO-MCNC: 84 MG/DL (ref 65–99)
GLUCOSE BLD STRIP.AUTO-MCNC: 86 MG/DL (ref 65–99)
GLUCOSE BLD STRIP.AUTO-MCNC: 86 MG/DL (ref 65–99)

## 2024-07-23 PROCEDURE — 700102 HCHG RX REV CODE 250 W/ 637 OVERRIDE(OP): Performed by: ANESTHESIOLOGY

## 2024-07-23 PROCEDURE — L8606 SYNTHETIC IMPLNT URINARY 1ML: HCPCS | Performed by: STUDENT IN AN ORGANIZED HEALTH CARE EDUCATION/TRAINING PROGRAM

## 2024-07-23 PROCEDURE — 160025 RECOVERY II MINUTES (STATS): Performed by: STUDENT IN AN ORGANIZED HEALTH CARE EDUCATION/TRAINING PROGRAM

## 2024-07-23 PROCEDURE — 160028 HCHG SURGERY MINUTES - 1ST 30 MINS LEVEL 3: Performed by: STUDENT IN AN ORGANIZED HEALTH CARE EDUCATION/TRAINING PROGRAM

## 2024-07-23 PROCEDURE — 700101 HCHG RX REV CODE 250: Performed by: ANESTHESIOLOGY

## 2024-07-23 PROCEDURE — 160048 HCHG OR STATISTICAL LEVEL 1-5: Performed by: STUDENT IN AN ORGANIZED HEALTH CARE EDUCATION/TRAINING PROGRAM

## 2024-07-23 PROCEDURE — 700105 HCHG RX REV CODE 258: Performed by: STUDENT IN AN ORGANIZED HEALTH CARE EDUCATION/TRAINING PROGRAM

## 2024-07-23 PROCEDURE — 160009 HCHG ANES TIME/MIN: Performed by: STUDENT IN AN ORGANIZED HEALTH CARE EDUCATION/TRAINING PROGRAM

## 2024-07-23 PROCEDURE — 160035 HCHG PACU - 1ST 60 MINS PHASE I: Performed by: STUDENT IN AN ORGANIZED HEALTH CARE EDUCATION/TRAINING PROGRAM

## 2024-07-23 PROCEDURE — 51715 ENDOSCOPIC INJECTION/IMPLANT: CPT | Performed by: STUDENT IN AN ORGANIZED HEALTH CARE EDUCATION/TRAINING PROGRAM

## 2024-07-23 PROCEDURE — 160002 HCHG RECOVERY MINUTES (STAT): Performed by: STUDENT IN AN ORGANIZED HEALTH CARE EDUCATION/TRAINING PROGRAM

## 2024-07-23 PROCEDURE — 160039 HCHG SURGERY MINUTES - EA ADDL 1 MIN LEVEL 3: Performed by: STUDENT IN AN ORGANIZED HEALTH CARE EDUCATION/TRAINING PROGRAM

## 2024-07-23 PROCEDURE — 700111 HCHG RX REV CODE 636 W/ 250 OVERRIDE (IP): Performed by: ANESTHESIOLOGY

## 2024-07-23 PROCEDURE — A9270 NON-COVERED ITEM OR SERVICE: HCPCS | Performed by: ANESTHESIOLOGY

## 2024-07-23 PROCEDURE — 160046 HCHG PACU - 1ST 60 MINS PHASE II: Performed by: STUDENT IN AN ORGANIZED HEALTH CARE EDUCATION/TRAINING PROGRAM

## 2024-07-23 PROCEDURE — 82962 GLUCOSE BLOOD TEST: CPT

## 2024-07-23 DEVICE — SYSTEM KIT BULKAMID URETHRAL BULKING (1/EA) ***MUST ORDER A MIN OF 5 EA***: Type: IMPLANTABLE DEVICE | Site: URETHRA | Status: FUNCTIONAL

## 2024-07-23 RX ORDER — SODIUM CHLORIDE, SODIUM LACTATE, POTASSIUM CHLORIDE, CALCIUM CHLORIDE 600; 310; 30; 20 MG/100ML; MG/100ML; MG/100ML; MG/100ML
INJECTION, SOLUTION INTRAVENOUS CONTINUOUS
Status: DISCONTINUED | OUTPATIENT
Start: 2024-07-23 | End: 2024-07-23 | Stop reason: HOSPADM

## 2024-07-23 RX ORDER — HYDRALAZINE HYDROCHLORIDE 20 MG/ML
5 INJECTION INTRAMUSCULAR; INTRAVENOUS
Status: DISCONTINUED | OUTPATIENT
Start: 2024-07-23 | End: 2024-07-23 | Stop reason: HOSPADM

## 2024-07-23 RX ORDER — HYDROCODONE BITARTRATE AND ACETAMINOPHEN 10; 325 MG/1; MG/1
1 TABLET ORAL ONCE
Status: COMPLETED | OUTPATIENT
Start: 2024-07-23 | End: 2024-07-23

## 2024-07-23 RX ORDER — OXYCODONE HCL 5 MG/5 ML
10 SOLUTION, ORAL ORAL
Status: DISCONTINUED | OUTPATIENT
Start: 2024-07-23 | End: 2024-07-23 | Stop reason: HOSPADM

## 2024-07-23 RX ORDER — MIDAZOLAM HYDROCHLORIDE 1 MG/ML
INJECTION INTRAMUSCULAR; INTRAVENOUS PRN
Status: DISCONTINUED | OUTPATIENT
Start: 2024-07-23 | End: 2024-07-23 | Stop reason: SURG

## 2024-07-23 RX ORDER — MEPERIDINE HYDROCHLORIDE 25 MG/ML
6.25 INJECTION INTRAMUSCULAR; INTRAVENOUS; SUBCUTANEOUS
Status: DISCONTINUED | OUTPATIENT
Start: 2024-07-23 | End: 2024-07-23 | Stop reason: HOSPADM

## 2024-07-23 RX ORDER — PHENYLEPHRINE HCL IN 0.9% NACL 1 MG/10 ML
SYRINGE (ML) INTRAVENOUS PRN
Status: DISCONTINUED | OUTPATIENT
Start: 2024-07-23 | End: 2024-07-23 | Stop reason: SURG

## 2024-07-23 RX ORDER — METOCLOPRAMIDE HYDROCHLORIDE 5 MG/ML
INJECTION INTRAMUSCULAR; INTRAVENOUS PRN
Status: DISCONTINUED | OUTPATIENT
Start: 2024-07-23 | End: 2024-07-23 | Stop reason: SURG

## 2024-07-23 RX ORDER — DEXAMETHASONE SODIUM PHOSPHATE 4 MG/ML
INJECTION, SOLUTION INTRA-ARTICULAR; INTRALESIONAL; INTRAMUSCULAR; INTRAVENOUS; SOFT TISSUE PRN
Status: DISCONTINUED | OUTPATIENT
Start: 2024-07-23 | End: 2024-07-23 | Stop reason: SURG

## 2024-07-23 RX ORDER — LIDOCAINE HYDROCHLORIDE 20 MG/ML
INJECTION, SOLUTION EPIDURAL; INFILTRATION; INTRACAUDAL; PERINEURAL PRN
Status: DISCONTINUED | OUTPATIENT
Start: 2024-07-23 | End: 2024-07-23 | Stop reason: SURG

## 2024-07-23 RX ORDER — CEFAZOLIN SODIUM 1 G/3ML
INJECTION, POWDER, FOR SOLUTION INTRAMUSCULAR; INTRAVENOUS PRN
Status: DISCONTINUED | OUTPATIENT
Start: 2024-07-23 | End: 2024-07-23 | Stop reason: SURG

## 2024-07-23 RX ORDER — HYDROMORPHONE HYDROCHLORIDE 1 MG/ML
0.1 INJECTION, SOLUTION INTRAMUSCULAR; INTRAVENOUS; SUBCUTANEOUS
Status: DISCONTINUED | OUTPATIENT
Start: 2024-07-23 | End: 2024-07-23 | Stop reason: HOSPADM

## 2024-07-23 RX ORDER — METOPROLOL TARTRATE 1 MG/ML
1 INJECTION, SOLUTION INTRAVENOUS
Status: DISCONTINUED | OUTPATIENT
Start: 2024-07-23 | End: 2024-07-23 | Stop reason: HOSPADM

## 2024-07-23 RX ORDER — OXYCODONE HCL 5 MG/5 ML
5 SOLUTION, ORAL ORAL
Status: DISCONTINUED | OUTPATIENT
Start: 2024-07-23 | End: 2024-07-23 | Stop reason: HOSPADM

## 2024-07-23 RX ORDER — ROCURONIUM BROMIDE 10 MG/ML
INJECTION, SOLUTION INTRAVENOUS PRN
Status: DISCONTINUED | OUTPATIENT
Start: 2024-07-23 | End: 2024-07-23 | Stop reason: SURG

## 2024-07-23 RX ORDER — HYDROMORPHONE HYDROCHLORIDE 1 MG/ML
0.4 INJECTION, SOLUTION INTRAMUSCULAR; INTRAVENOUS; SUBCUTANEOUS
Status: DISCONTINUED | OUTPATIENT
Start: 2024-07-23 | End: 2024-07-23 | Stop reason: HOSPADM

## 2024-07-23 RX ORDER — LIDOCAINE HYDROCHLORIDE 40 MG/ML
SOLUTION TOPICAL PRN
Status: DISCONTINUED | OUTPATIENT
Start: 2024-07-23 | End: 2024-07-23 | Stop reason: SURG

## 2024-07-23 RX ORDER — EPHEDRINE SULFATE 50 MG/ML
5 INJECTION, SOLUTION INTRAVENOUS
Status: DISCONTINUED | OUTPATIENT
Start: 2024-07-23 | End: 2024-07-23 | Stop reason: HOSPADM

## 2024-07-23 RX ORDER — SUCCINYLCHOLINE CHLORIDE 20 MG/ML
INJECTION INTRAMUSCULAR; INTRAVENOUS PRN
Status: DISCONTINUED | OUTPATIENT
Start: 2024-07-23 | End: 2024-07-23 | Stop reason: SURG

## 2024-07-23 RX ORDER — HYDROMORPHONE HYDROCHLORIDE 1 MG/ML
0.2 INJECTION, SOLUTION INTRAMUSCULAR; INTRAVENOUS; SUBCUTANEOUS
Status: DISCONTINUED | OUTPATIENT
Start: 2024-07-23 | End: 2024-07-23 | Stop reason: HOSPADM

## 2024-07-23 RX ORDER — ONDANSETRON 2 MG/ML
4 INJECTION INTRAMUSCULAR; INTRAVENOUS
Status: DISCONTINUED | OUTPATIENT
Start: 2024-07-23 | End: 2024-07-23 | Stop reason: HOSPADM

## 2024-07-23 RX ORDER — NITROFURANTOIN 25; 75 MG/1; MG/1
100 CAPSULE ORAL 2 TIMES DAILY
COMMUNITY

## 2024-07-23 RX ORDER — HALOPERIDOL 5 MG/ML
1 INJECTION INTRAMUSCULAR
Status: DISCONTINUED | OUTPATIENT
Start: 2024-07-23 | End: 2024-07-23 | Stop reason: HOSPADM

## 2024-07-23 RX ORDER — MORPHINE SULFATE 15 MG/1
15 TABLET, FILM COATED, EXTENDED RELEASE ORAL EVERY 12 HOURS
COMMUNITY

## 2024-07-23 RX ORDER — PHENAZOPYRIDINE HYDROCHLORIDE 200 MG/1
200 TABLET, FILM COATED ORAL 3 TIMES DAILY PRN
Qty: 6 TABLET | Refills: 0 | Status: SHIPPED | OUTPATIENT
Start: 2024-07-23 | End: 2024-07-25

## 2024-07-23 RX ORDER — DIPHENHYDRAMINE HYDROCHLORIDE 50 MG/ML
12.5 INJECTION INTRAMUSCULAR; INTRAVENOUS
Status: DISCONTINUED | OUTPATIENT
Start: 2024-07-23 | End: 2024-07-23 | Stop reason: HOSPADM

## 2024-07-23 RX ADMIN — MIDAZOLAM HYDROCHLORIDE 2 MG: 2 INJECTION, SOLUTION INTRAMUSCULAR; INTRAVENOUS at 12:07

## 2024-07-23 RX ADMIN — LIDOCAINE HYDROCHLORIDE 50 MG: 20 INJECTION, SOLUTION EPIDURAL; INFILTRATION; INTRACAUDAL at 12:10

## 2024-07-23 RX ADMIN — Medication 100 MCG: at 12:30

## 2024-07-23 RX ADMIN — SUCCINYLCHOLINE CHLORIDE 90 MG: 20 INJECTION, SOLUTION INTRAMUSCULAR; INTRAVENOUS at 12:10

## 2024-07-23 RX ADMIN — DEXAMETHASONE SODIUM PHOSPHATE 4 MG: 4 INJECTION INTRA-ARTICULAR; INTRALESIONAL; INTRAMUSCULAR; INTRAVENOUS; SOFT TISSUE at 12:22

## 2024-07-23 RX ADMIN — HYDROCODONE BITARTRATE AND ACETAMINOPHEN 1 TABLET: 10; 325 TABLET ORAL at 10:37

## 2024-07-23 RX ADMIN — SODIUM CHLORIDE, POTASSIUM CHLORIDE, SODIUM LACTATE AND CALCIUM CHLORIDE: 600; 310; 30; 20 INJECTION, SOLUTION INTRAVENOUS at 10:40

## 2024-07-23 RX ADMIN — METOCLOPRAMIDE 10 MG: 5 INJECTION, SOLUTION INTRAMUSCULAR; INTRAVENOUS at 12:23

## 2024-07-23 RX ADMIN — Medication 100 MCG: at 12:23

## 2024-07-23 RX ADMIN — Medication 100 MCG: at 12:49

## 2024-07-23 RX ADMIN — CEFAZOLIN 2 G: 1 INJECTION, POWDER, FOR SOLUTION INTRAMUSCULAR; INTRAVENOUS at 12:16

## 2024-07-23 RX ADMIN — LIDOCAINE HYDROCHLORIDE 4 ML: 40 SOLUTION TOPICAL at 12:11

## 2024-07-23 RX ADMIN — Medication 100 MCG: at 12:19

## 2024-07-23 RX ADMIN — ROCURONIUM BROMIDE 3 MG: 50 INJECTION, SOLUTION INTRAVENOUS at 12:10

## 2024-07-23 RX ADMIN — PROPOFOL 120 MG: 10 INJECTION, EMULSION INTRAVENOUS at 12:10

## 2024-07-23 ASSESSMENT — PAIN DESCRIPTION - PAIN TYPE
TYPE: SURGICAL PAIN
TYPE: CHRONIC PAIN

## 2024-07-23 ASSESSMENT — FIBROSIS 4 INDEX: FIB4 SCORE: 1.88

## 2024-07-24 DIAGNOSIS — E66.9 TYPE 2 DIABETES MELLITUS WITH OBESITY (HCC): ICD-10-CM

## 2024-07-24 DIAGNOSIS — E11.69 TYPE 2 DIABETES MELLITUS WITH OBESITY (HCC): ICD-10-CM

## 2024-07-25 ENCOUNTER — TELEPHONE (OUTPATIENT)
Dept: UROLOGY | Facility: MEDICAL CENTER | Age: 69
End: 2024-07-25
Payer: MEDICARE

## 2024-07-25 ENCOUNTER — TELEPHONE (OUTPATIENT)
Dept: MEDICAL GROUP | Facility: PHYSICIAN GROUP | Age: 69
End: 2024-07-25
Payer: MEDICARE

## 2024-07-25 DIAGNOSIS — G89.29 CHRONIC PAIN OF LEFT KNEE: ICD-10-CM

## 2024-07-25 DIAGNOSIS — M54.41 CHRONIC BILATERAL LOW BACK PAIN WITH BILATERAL SCIATICA: ICD-10-CM

## 2024-07-25 DIAGNOSIS — M96.1 POSTLAMINECTOMY SYNDROME: ICD-10-CM

## 2024-07-25 DIAGNOSIS — G89.29 CHRONIC BILATERAL LOW BACK PAIN WITH BILATERAL SCIATICA: ICD-10-CM

## 2024-07-25 DIAGNOSIS — M25.562 CHRONIC PAIN OF LEFT KNEE: ICD-10-CM

## 2024-07-25 DIAGNOSIS — M54.42 CHRONIC BILATERAL LOW BACK PAIN WITH BILATERAL SCIATICA: ICD-10-CM

## 2024-07-25 DIAGNOSIS — F11.20 SEVERE OPIOID DEPENDENCE ON MAINTENANCE THERAPY (HCC): ICD-10-CM

## 2024-07-25 RX ORDER — HYDROCODONE BITARTRATE AND ACETAMINOPHEN 10; 325 MG/1; MG/1
1 TABLET ORAL EVERY 6 HOURS PRN
Qty: 70 TABLET | Refills: 0 | Status: SHIPPED | OUTPATIENT
Start: 2024-07-27 | End: 2024-08-26

## 2024-07-25 RX ORDER — MORPHINE SULFATE 15 MG/1
15 TABLET, FILM COATED, EXTENDED RELEASE ORAL EVERY 12 HOURS
Qty: 60 TABLET | Refills: 0 | Status: SHIPPED | OUTPATIENT
Start: 2024-08-27 | End: 2024-09-26

## 2024-07-25 RX ORDER — MORPHINE SULFATE 15 MG/1
15 TABLET, FILM COATED, EXTENDED RELEASE ORAL EVERY 12 HOURS
Qty: 60 TABLET | Refills: 0 | Status: SHIPPED | OUTPATIENT
Start: 2024-07-27 | End: 2024-08-26

## 2024-07-25 RX ORDER — HYDROCODONE BITARTRATE AND ACETAMINOPHEN 10; 325 MG/1; MG/1
1 TABLET ORAL EVERY 6 HOURS PRN
Qty: 70 TABLET | Refills: 0 | Status: SHIPPED | OUTPATIENT
Start: 2024-08-27 | End: 2024-09-26

## 2024-07-25 NOTE — TELEPHONE ENCOUNTER
DOCUMENTATION OF PAR STATUS:    1. Name of Medication & Dose: Pyridium      2. Name of Prescription Coverage Company & phone #: [facility]    3. Date Prior Auth Submitted: 07/25/24    4. What information was given to obtain insurance decision? Office notes and icd10 codes    5. Prior Auth Status? Pending    6. Patient Notified: yes

## 2024-07-30 RX ORDER — METFORMIN HYDROCHLORIDE 500 MG/1
500 TABLET, EXTENDED RELEASE ORAL 2 TIMES DAILY
Qty: 200 TABLET | Refills: 3 | Status: SHIPPED | OUTPATIENT
Start: 2024-07-30

## 2024-08-05 DIAGNOSIS — I10 ESSENTIAL HYPERTENSION: ICD-10-CM

## 2024-08-05 DIAGNOSIS — F33.1 DEPRESSION, MAJOR, RECURRENT, MODERATE (HCC): ICD-10-CM

## 2024-08-06 NOTE — TELEPHONE ENCOUNTER
Received request via: Patient    Was the patient seen in the last year in this department? Yes    Does the patient have an active prescription (recently filled or refills available) for medication(s) requested? No    Pharm[facility] Name: [facility]    Does the patient have snf Plus and need 100 day supply (blood pressure, diabetes and cholesterol meds only)? Patient does not have SCP

## 2024-08-12 RX ORDER — LISINOPRIL 10 MG/1
10 TABLET ORAL DAILY
Qty: 100 TABLET | Refills: 3 | Status: SHIPPED | OUTPATIENT
Start: 2024-08-12

## 2024-08-12 RX ORDER — DULOXETIN HYDROCHLORIDE 30 MG/1
CAPSULE, DELAYED RELEASE ORAL
Qty: 270 CAPSULE | Refills: 3 | Status: SHIPPED | OUTPATIENT
Start: 2024-08-12

## 2024-08-20 ENCOUNTER — APPOINTMENT (OUTPATIENT)
Dept: MEDICAL GROUP | Facility: PHYSICIAN GROUP | Age: 69
End: 2024-08-20
Payer: MEDICARE

## 2024-08-23 RX ORDER — PANTOPRAZOLE SODIUM 40 MG/1
40 TABLET, DELAYED RELEASE ORAL DAILY
Qty: 90 TABLET | Refills: 1 | Status: SHIPPED | OUTPATIENT
Start: 2024-08-23

## 2024-08-23 NOTE — TELEPHONE ENCOUNTER
Received request via: Patient    Was the patient seen in the last year in this department? Yes    Does the patient have an active prescription (recently filled or refills available) for medication(s) requested? No    Pharm[facility] Name: [facility]    Does the patient have detention Plus and need 100-day supply? (This applies to ALL medications) Patient does not have SCP

## 2024-08-27 ENCOUNTER — OFFICE VISIT (OUTPATIENT)
Dept: MEDICAL GROUP | Facility: PHYSICIAN GROUP | Age: 69
End: 2024-08-27
Payer: MEDICARE

## 2024-08-27 VITALS
WEIGHT: 160 LBS | HEART RATE: 70 BPM | TEMPERATURE: 97.7 F | OXYGEN SATURATION: 94 % | HEIGHT: 63 IN | SYSTOLIC BLOOD PRESSURE: 90 MMHG | BODY MASS INDEX: 28.35 KG/M2 | RESPIRATION RATE: 16 BRPM | DIASTOLIC BLOOD PRESSURE: 60 MMHG

## 2024-08-27 DIAGNOSIS — E11.69 HYPERLIPIDEMIA DUE TO TYPE 2 DIABETES MELLITUS (HCC): ICD-10-CM

## 2024-08-27 DIAGNOSIS — M25.562 CHRONIC PAIN OF LEFT KNEE: ICD-10-CM

## 2024-08-27 DIAGNOSIS — E78.5 HYPERLIPIDEMIA DUE TO TYPE 2 DIABETES MELLITUS (HCC): ICD-10-CM

## 2024-08-27 DIAGNOSIS — E11.59 HYPERTENSION ASSOCIATED WITH DIABETES (HCC): ICD-10-CM

## 2024-08-27 DIAGNOSIS — M96.1 POSTLAMINECTOMY SYNDROME: ICD-10-CM

## 2024-08-27 DIAGNOSIS — J40 BRONCHITIS: ICD-10-CM

## 2024-08-27 DIAGNOSIS — F11.20 SEVERE OPIOID DEPENDENCE ON MAINTENANCE THERAPY (HCC): ICD-10-CM

## 2024-08-27 DIAGNOSIS — G89.29 CHRONIC PAIN OF LEFT KNEE: ICD-10-CM

## 2024-08-27 DIAGNOSIS — I15.2 HYPERTENSION ASSOCIATED WITH DIABETES (HCC): ICD-10-CM

## 2024-08-27 DIAGNOSIS — F41.8 SITUATIONAL ANXIETY: ICD-10-CM

## 2024-08-27 DIAGNOSIS — F41.1 GENERALIZED ANXIETY DISORDER: ICD-10-CM

## 2024-08-27 DIAGNOSIS — E03.9 HYPOTHYROIDISM (ACQUIRED): ICD-10-CM

## 2024-08-27 DIAGNOSIS — F13.20 SEDATIVE, HYPNOTIC OR ANXIOLYTIC DEPENDENCE, UNCOMPLICATED (HCC): ICD-10-CM

## 2024-08-27 DIAGNOSIS — M54.42 CHRONIC BILATERAL LOW BACK PAIN WITH BILATERAL SCIATICA: ICD-10-CM

## 2024-08-27 DIAGNOSIS — E55.9 VITAMIN D DEFICIENCY: ICD-10-CM

## 2024-08-27 DIAGNOSIS — E11.42 TYPE 2 DIABETES MELLITUS WITH DIABETIC POLYNEUROPATHY, WITHOUT LONG-TERM CURRENT USE OF INSULIN (HCC): ICD-10-CM

## 2024-08-27 DIAGNOSIS — F43.10 POSTTRAUMATIC STRESS DISORDER, DELAYED ONSET: ICD-10-CM

## 2024-08-27 DIAGNOSIS — G89.29 CHRONIC BILATERAL LOW BACK PAIN WITH BILATERAL SCIATICA: ICD-10-CM

## 2024-08-27 DIAGNOSIS — Z86.2 HISTORY OF ANEMIA: ICD-10-CM

## 2024-08-27 DIAGNOSIS — J32.9 CHRONIC RECURRENT SINUSITIS: ICD-10-CM

## 2024-08-27 DIAGNOSIS — M54.41 CHRONIC BILATERAL LOW BACK PAIN WITH BILATERAL SCIATICA: ICD-10-CM

## 2024-08-27 PROCEDURE — 3078F DIAST BP <80 MM HG: CPT | Performed by: NURSE PRACTITIONER

## 2024-08-27 PROCEDURE — 99214 OFFICE O/P EST MOD 30 MIN: CPT | Performed by: NURSE PRACTITIONER

## 2024-08-27 PROCEDURE — 3074F SYST BP LT 130 MM HG: CPT | Performed by: NURSE PRACTITIONER

## 2024-08-27 RX ORDER — CLONAZEPAM 1 MG/1
1 TABLET ORAL EVERY MORNING
Qty: 90 TABLET | Refills: 0 | Status: SHIPPED | OUTPATIENT
Start: 2024-09-26 | End: 2024-12-25

## 2024-08-27 RX ORDER — MORPHINE SULFATE 15 MG/1
15 TABLET, FILM COATED, EXTENDED RELEASE ORAL EVERY 12 HOURS
Qty: 60 TABLET | Refills: 0 | Status: SHIPPED | OUTPATIENT
Start: 2024-11-26 | End: 2024-12-26

## 2024-08-27 RX ORDER — HYDROCODONE BITARTRATE AND ACETAMINOPHEN 10; 325 MG/1; MG/1
1 TABLET ORAL EVERY 6 HOURS PRN
Qty: 70 TABLET | Refills: 0 | Status: SHIPPED | OUTPATIENT
Start: 2024-11-26 | End: 2024-12-26

## 2024-08-27 RX ORDER — MORPHINE SULFATE 15 MG/1
15 TABLET, FILM COATED, EXTENDED RELEASE ORAL EVERY 12 HOURS
Qty: 60 TABLET | Refills: 0 | Status: SHIPPED | OUTPATIENT
Start: 2024-09-25 | End: 2024-10-25

## 2024-08-27 RX ORDER — HYDROCODONE BITARTRATE AND ACETAMINOPHEN 10; 325 MG/1; MG/1
1 TABLET ORAL EVERY 6 HOURS PRN
Qty: 70 TABLET | Refills: 0 | Status: SHIPPED | OUTPATIENT
Start: 2024-10-26 | End: 2024-11-25

## 2024-08-27 RX ORDER — DOXYCYCLINE 100 MG/1
100 TABLET ORAL 2 TIMES DAILY
Qty: 14 TABLET | Refills: 0 | Status: SHIPPED | OUTPATIENT
Start: 2024-08-27 | End: 2024-09-03

## 2024-08-27 RX ORDER — CLONAZEPAM 0.5 MG/1
0.5 TABLET ORAL NIGHTLY
Qty: 90 TABLET | Refills: 0 | Status: SHIPPED | OUTPATIENT
Start: 2024-09-26 | End: 2024-12-25

## 2024-08-27 RX ORDER — HYDROCODONE BITARTRATE AND ACETAMINOPHEN 10; 325 MG/1; MG/1
1 TABLET ORAL EVERY 6 HOURS PRN
Qty: 70 TABLET | Refills: 0 | Status: SHIPPED | OUTPATIENT
Start: 2024-09-25 | End: 2024-10-25

## 2024-08-27 RX ORDER — FLUCONAZOLE 150 MG/1
TABLET ORAL
Qty: 2 TABLET | Refills: 1 | Status: SHIPPED | OUTPATIENT
Start: 2024-08-27

## 2024-08-27 RX ORDER — METHYLPREDNISOLONE 4 MG
TABLET, DOSE PACK ORAL
Qty: 21 TABLET | Refills: 0 | Status: SHIPPED | OUTPATIENT
Start: 2024-08-27

## 2024-08-27 RX ORDER — MORPHINE SULFATE 15 MG/1
15 TABLET, FILM COATED, EXTENDED RELEASE ORAL EVERY 12 HOURS
Qty: 60 TABLET | Refills: 0 | Status: SHIPPED | OUTPATIENT
Start: 2024-10-26 | End: 2024-11-25

## 2024-08-27 ASSESSMENT — FIBROSIS 4 INDEX: FIB4 SCORE: 1.88

## 2024-08-27 NOTE — PROGRESS NOTES
"  Chief Complaint   Patient presents with   • Medication Refill   • Knee Pain     Left knee                                                                                                                                       HPI:   Grisel presents today with the following.    ***    ROS:  All systems negative expect as addressed in assessment and plan.     BP 90/60 (BP Location: Left arm, Patient Position: Sitting)   Pulse 70   Temp 36.5 °C (97.7 °F) (Temporal)   Resp 16   Ht 1.6 m (5' 3\")   Wt 72.6 kg (160 lb)   LMP  (LMP Unknown)   SpO2 94%   BMI 28.34 kg/m²     Objective:    Physical Exam      ***    Assessment and Plan:  69 y.o. female with the following issues.    1. Situational anxiety  - clonazePAM (KLONOPIN) 0.5 MG Tab; Take 1 Tablet by mouth every evening for 90 days. Indications: Feeling Anxious, Panic Disorder  Dispense: 90 Tablet; Refill: 0  - clonazePAM (KLONOPIN) 1 MG Tab; Take 1 Tablet by mouth every morning for 90 days. Indications: Feeling Anxious, Panic Disorder  Dispense: 90 Tablet; Refill: 0    2. Generalized anxiety disorder  - clonazePAM (KLONOPIN) 0.5 MG Tab; Take 1 Tablet by mouth every evening for 90 days. Indications: Feeling Anxious, Panic Disorder  Dispense: 90 Tablet; Refill: 0  - clonazePAM (KLONOPIN) 1 MG Tab; Take 1 Tablet by mouth every morning for 90 days. Indications: Feeling Anxious, Panic Disorder  Dispense: 90 Tablet; Refill: 0    3. Posttraumatic stress disorder, delayed onset  - clonazePAM (KLONOPIN) 0.5 MG Tab; Take 1 Tablet by mouth every evening for 90 days. Indications: Feeling Anxious, Panic Disorder  Dispense: 90 Tablet; Refill: 0  - clonazePAM (KLONOPIN) 1 MG Tab; Take 1 Tablet by mouth every morning for 90 days. Indications: Feeling Anxious, Panic Disorder  Dispense: 90 Tablet; Refill: 0    4. Sedative, hypnotic or anxiolytic dependence, uncomplicated (HCC)  - clonazePAM (KLONOPIN) 0.5 MG Tab; Take 1 Tablet by mouth every evening for 90 days. Indications: " Feeling Anxious, Panic Disorder  Dispense: 90 Tablet; Refill: 0  - clonazePAM (KLONOPIN) 1 MG Tab; Take 1 Tablet by mouth every morning for 90 days. Indications: Feeling Anxious, Panic Disorder  Dispense: 90 Tablet; Refill: 0    5. Chronic pain of left knee  - HYDROcodone/acetaminophen (NORCO)  MG Tab; Take 1 Tablet by mouth every 6 hours as needed for Severe Pain or Moderate Pain for up to 30 days. Indications: Pain  Dispense: 70 Tablet; Refill: 0  - morphine ER (MS CONTIN) 15 MG Tab CR tablet; Take 1 Tablet by mouth every 12 hours for 30 days. Indications: Chronic Pain  Dispense: 60 Tablet; Refill: 0  - HYDROcodone/acetaminophen (NORCO)  MG Tab; Take 1 Tablet by mouth every 6 hours as needed for Severe Pain or Moderate Pain for up to 30 days. Indications: Pain  Dispense: 70 Tablet; Refill: 0  - HYDROcodone/acetaminophen (NORCO)  MG Tab; Take 1 Tablet by mouth every 6 hours as needed for Severe Pain or Moderate Pain for up to 30 days. Indications: Pain  Dispense: 70 Tablet; Refill: 0  - morphine ER (MS CONTIN) 15 MG Tab CR tablet; Take 1 Tablet by mouth every 12 hours for 30 days. Indications: Chronic Pain  Dispense: 60 Tablet; Refill: 0  - morphine ER (MS CONTIN) 15 MG Tab CR tablet; Take 1 Tablet by mouth every 12 hours for 30 days. Indications: Chronic Pain  Dispense: 60 Tablet; Refill: 0    6. Chronic bilateral low back pain with bilateral sciatica  - HYDROcodone/acetaminophen (NORCO)  MG Tab; Take 1 Tablet by mouth every 6 hours as needed for Severe Pain or Moderate Pain for up to 30 days. Indications: Pain  Dispense: 70 Tablet; Refill: 0  - morphine ER (MS CONTIN) 15 MG Tab CR tablet; Take 1 Tablet by mouth every 12 hours for 30 days. Indications: Chronic Pain  Dispense: 60 Tablet; Refill: 0  - HYDROcodone/acetaminophen (NORCO)  MG Tab; Take 1 Tablet by mouth every 6 hours as needed for Severe Pain or Moderate Pain for up to 30 days. Indications: Pain  Dispense: 70 Tablet;  Refill: 0  - HYDROcodone/acetaminophen (NORCO)  MG Tab; Take 1 Tablet by mouth every 6 hours as needed for Severe Pain or Moderate Pain for up to 30 days. Indications: Pain  Dispense: 70 Tablet; Refill: 0  - morphine ER (MS CONTIN) 15 MG Tab CR tablet; Take 1 Tablet by mouth every 12 hours for 30 days. Indications: Chronic Pain  Dispense: 60 Tablet; Refill: 0  - morphine ER (MS CONTIN) 15 MG Tab CR tablet; Take 1 Tablet by mouth every 12 hours for 30 days. Indications: Chronic Pain  Dispense: 60 Tablet; Refill: 0    7. Postlaminectomy syndrome  - HYDROcodone/acetaminophen (NORCO)  MG Tab; Take 1 Tablet by mouth every 6 hours as needed for Severe Pain or Moderate Pain for up to 30 days. Indications: Pain  Dispense: 70 Tablet; Refill: 0  - morphine ER (MS CONTIN) 15 MG Tab CR tablet; Take 1 Tablet by mouth every 12 hours for 30 days. Indications: Chronic Pain  Dispense: 60 Tablet; Refill: 0  - HYDROcodone/acetaminophen (NORCO)  MG Tab; Take 1 Tablet by mouth every 6 hours as needed for Severe Pain or Moderate Pain for up to 30 days. Indications: Pain  Dispense: 70 Tablet; Refill: 0  - HYDROcodone/acetaminophen (NORCO)  MG Tab; Take 1 Tablet by mouth every 6 hours as needed for Severe Pain or Moderate Pain for up to 30 days. Indications: Pain  Dispense: 70 Tablet; Refill: 0  - morphine ER (MS CONTIN) 15 MG Tab CR tablet; Take 1 Tablet by mouth every 12 hours for 30 days. Indications: Chronic Pain  Dispense: 60 Tablet; Refill: 0  - morphine ER (MS CONTIN) 15 MG Tab CR tablet; Take 1 Tablet by mouth every 12 hours for 30 days. Indications: Chronic Pain  Dispense: 60 Tablet; Refill: 0    8. Severe opioid dependence on maintenance therapy (HCC)  - HYDROcodone/acetaminophen (NORCO)  MG Tab; Take 1 Tablet by mouth every 6 hours as needed for Severe Pain or Moderate Pain for up to 30 days. Indications: Pain  Dispense: 70 Tablet; Refill: 0  - morphine ER (MS CONTIN) 15 MG Tab CR tablet; Take 1  Tablet by mouth every 12 hours for 30 days. Indications: Chronic Pain  Dispense: 60 Tablet; Refill: 0  - HYDROcodone/acetaminophen (NORCO)  MG Tab; Take 1 Tablet by mouth every 6 hours as needed for Severe Pain or Moderate Pain for up to 30 days. Indications: Pain  Dispense: 70 Tablet; Refill: 0  - HYDROcodone/acetaminophen (NORCO)  MG Tab; Take 1 Tablet by mouth every 6 hours as needed for Severe Pain or Moderate Pain for up to 30 days. Indications: Pain  Dispense: 70 Tablet; Refill: 0  - morphine ER (MS CONTIN) 15 MG Tab CR tablet; Take 1 Tablet by mouth every 12 hours for 30 days. Indications: Chronic Pain  Dispense: 60 Tablet; Refill: 0  - morphine ER (MS CONTIN) 15 MG Tab CR tablet; Take 1 Tablet by mouth every 12 hours for 30 days. Indications: Chronic Pain  Dispense: 60 Tablet; Refill: 0    9. Type 2 diabetes mellitus with diabetic polyneuropathy, without long-term current use of insulin (HCC)  - MICROALBUMIN CREAT RATIO URINE; Future  - Comp Metabolic Panel; Future    10. Hypertension associated with diabetes (HCC)  - Comp Metabolic Panel; Future    11. Hyperlipidemia due to type 2 diabetes mellitus (HCC)  - Lipid Profile; Future    12. Hypothyroidism (acquired)  - TSH; Future  - FREE THYROXINE; Future  - CBC WITH DIFFERENTIAL; Future    13. Vitamin D deficiency  - VITAMIN D,25 HYDROXY (DEFICIENCY); Future    14. History of anemia  - CBC WITH DIFFERENTIAL; Future  - IRON/TOTAL IRON BIND; Future  - VITAMIN B12; Future  - FERRITIN; Future    15. Bronchitis  - doxycycline monohydrate (ADOXA) 100 MG tablet; Take 1 Tablet by mouth 2 times a day for 7 days.  Dispense: 14 Tablet; Refill: 0  - methylPREDNISolone (MEDROL DOSEPAK) 4 MG Tablet Therapy Pack; As directed on the packaging label.  Dispense: 21 Tablet; Refill: 0  - fluconazole (DIFLUCAN) 150 MG tablet; Take one tablet by mouth once. Repeat dose 72 hours later if symptoms have not resolved.  Dispense: 2 Tablet; Refill: 1    16. Chronic recurrent  sinusitis  - doxycycline monohydrate (ADOXA) 100 MG tablet; Take 1 Tablet by mouth 2 times a day for 7 days.  Dispense: 14 Tablet; Refill: 0  - methylPREDNISolone (MEDROL DOSEPAK) 4 MG Tablet Therapy Pack; As directed on the packaging label.  Dispense: 21 Tablet; Refill: 0  - fluconazole (DIFLUCAN) 150 MG tablet; Take one tablet by mouth once. Repeat dose 72 hours later if symptoms have not resolved.  Dispense: 2 Tablet; Refill: 1        ***    Return in about 3 months (around 11/27/2024) for Chronic pain, Chronic Health Conditions.    I spent a total of *** minutes with record review, exam, and communication with the patient, communication with other providers, and documentation of this encounter. This does not include time spent on separately billable procedures/tests.    Please note that this dictation was created using voice recognition software. I have worked with consultants from the vendor as well as technical experts from [facility] to optimize the interface. I have made every reasonable attempt to correct obvious errors, but I expect that there are errors of grammar and possibly content that I did not discover before finalizing the note.        Future    12. Hypothyroidism (acquired)  - TSH; Future  - FREE THYROXINE; Future  - CBC WITH DIFFERENTIAL; Future    13. Vitamin D deficiency  - VITAMIN D,25 HYDROXY (DEFICIENCY); Future    14. History of anemia  - CBC WITH DIFFERENTIAL; Future  - IRON/TOTAL IRON BIND; Future  - VITAMIN B12; Future  - FERRITIN; Future    15. Bronchitis  - doxycycline monohydrate (ADOXA) 100 MG tablet; Take 1 Tablet by mouth 2 times a day for 7 days.  Dispense: 14 Tablet; Refill: 0  - methylPREDNISolone (MEDROL DOSEPAK) 4 MG Tablet Therapy Pack; As directed on the packaging label.  Dispense: 21 Tablet; Refill: 0  - fluconazole (DIFLUCAN) 150 MG tablet; Take one tablet by mouth once. Repeat dose 72 hours later if symptoms have not resolved.  Dispense: 2 Tablet; Refill: 1    16. Chronic recurrent sinusitis  - doxycycline monohydrate (ADOXA) 100 MG tablet; Take 1 Tablet by mouth 2 times a day for 7 days.  Dispense: 14 Tablet; Refill: 0  - methylPREDNISolone (MEDROL DOSEPAK) 4 MG Tablet Therapy Pack; As directed on the packaging label.  Dispense: 21 Tablet; Refill: 0  - fluconazole (DIFLUCAN) 150 MG tablet; Take one tablet by mouth once. Repeat dose 72 hours later if symptoms have not resolved.  Dispense: 2 Tablet; Refill: 1    1. Osteoarthritis - Knee and Shoulder:     - Plan:          a. Schedule her for knee and shoulder injections on Thursday at 3:20 PM.    2. Sinus infection and Bronchitis:     - Assessment: Patient presents with congestion, sinus pressure, cough, and thick brown phlegm. Her ears and throat appear normal, but sinuses are inflamed.     - Plan:          a. Prescribe Doxycycline 100 mg, one tablet twice a day for seven days to cover both her sinus infection and bronchitis.    3. Risk of yeast infection due to antibiotic use:     - Plan:          a. Prescribe Fluconazole 150 mg, one tablet as needed in case of yeast infection during or after her antibiotic treatment.          b. Advise her to consume yogurt daily while  on antibiotics and for one to two weeks after completing the course.    4. Allergies:     - Assessment: Patient reports itchy eyes and watery discharge, likely due to pollen exposure.     - Plan:          a. Encourage her to continue current allergy management strategies.    5. Tobacco use:     - Assessment: Patient reports using nicotine patches and plans to smoke while in [place].     - Plan:          a. Continue to encourage her smoking cessation and discuss potential risks associated with smoking.    6. Diabetes management:     - Assessment: Patient is currently on semaglutide 0.5 mg weekly injections.     - Plan:          a. Continue her current dosage and monitor weight loss progress.          b. Consider switching to an alternative medication if needed.    7. Medication refills:     - Plan:          a. Refill her Clonazepam, Morphine, and Norco prescriptions.          b. Instruct her to fill Clonazepam and pain medications on the same day to get back on track with fill dates.          c. Advised patient that it is not safe for her to consume marijuana with her current medications due to potential interactions. Discussed with patient that she needs to stop using marijuana or I will not be able to prescribe her medications any longer.     8. Labs and follow-up appointments:     - Plan:          a. Order yearly labs for her to complete before the next visit.          b. Cancel October appointments and schedule a follow-up appointment in December.    9. Methylprednisolone dose pack:     - Plan:          a. Prescribe methylprednisolone dose pack for her to bring to [place].          b. Instruct her to take the steroid if pressure and breathing issues persist after completing the antibiotic course.    Return in about 3 months (around 11/27/2024) for Chronic pain, Chronic Health Conditions.      Please note that this dictation was created using voice recognition software. I have worked with consultants from  the vendor as well as technical experts from [facility] to optimize the interface. I have made every reasonable attempt to correct obvious errors, but I expect that there are errors of grammar and possibly content that I did not discover before finalizing the note.

## 2024-08-28 ENCOUNTER — OFFICE VISIT (OUTPATIENT)
Dept: UROLOGY | Facility: MEDICAL CENTER | Age: 69
End: 2024-08-28
Payer: MEDICARE

## 2024-08-28 DIAGNOSIS — R32 URINARY INCONTINENCE, UNSPECIFIED TYPE: ICD-10-CM

## 2024-08-28 LAB
POC POST-VOID: NORMAL
POC PRE-VOID: 100 ML

## 2024-08-28 NOTE — PROGRESS NOTES
Subjective  Grisel Mark is a 69 y.o. female who presents today for follow up after cystoscopy with bulkamid injection.    She has not had any urinary incontinence since the procedure and has not had any frequency or urgency of urination. Denies dysuria or hematuria. She feels she is emptying her bladder. Overall she is very satisfied with the procedure. She has been taking Gemtesa but is unsure if she needs to continue with it. She is also using her vaginal estrogen cream three times a week.    Family History   Problem Relation Age of Onset    Cancer Mother     Stroke Father         Heart attack    Dementia Sister     Stroke Brother         heart Attack    Cancer Brother         Skin    Diabetes Brother     Kidney Disease Brother     Cancer Brother     Parkinson's Disease Sister     No Known Problems Sister     Diabetes Daughter     Hypertension Daughter        Social History     Socioeconomic History    Marital status:      Spouse name: Not on file    Number of children: Not on file    Years of education: Not on file    Highest education level: Not on file   Occupational History    Not on file   Tobacco Use    Smoking status: Every Day     Current packs/day: 0.00     Types: Cigarettes     Last attempt to quit: 2014     Years since quitting: 10.6    Smokeless tobacco: Never   Vaping Use    Vaping status: Never Used   Substance and Sexual Activity    Alcohol use: Not Currently    Drug use: Not Currently    Sexual activity: Not Currently   Other Topics Concern     Service No    Blood Transfusions Yes    Caffeine Concern No    Occupational Exposure No    Hobby Hazards No    Sleep Concern Yes    Stress Concern Yes    Weight Concern Yes    Special Diet No    Back Care No    Exercise Yes    Bike Helmet No    Seat Belt Yes    Self-Exams Yes   Social History Narrative    Not on file     Social Determinants of Health     Financial Resource Strain: Not on file   Food Insecurity: Not on file    Transportation Needs: Not on file   Physical Activity: Not on file   Stress: Not on file   Social Connections: Not on file   Intimate Partner Violence: Not on file   Housing Stability: Not on file       Past Surgical History:   Procedure Laterality Date    IN CYSTOURETHROSCOPY,BIOPSIES N/A 7/23/2024    Procedure: CYSTOSCOPY BLADDER BIOPSY, BULKING AGENT INJECTION;  Surgeon: Yelena Batista M.D.;  Location: SURGERY SAME DAY [facility];  Service: Urology    IN ENDOSCOPIC INJECTION/IMPLANT N/A 7/23/2024    Procedure: INJECTION, BULKING AGENT, BLADDER NECK, ENDOSCOPIC;  Surgeon: Yelena Batista M.D.;  Location: SURGERY SAME DAY [facility];  Service: Urology    IN INJ LUMBAR/SACRAL,W/ IMAGING N/A 7/14/2023    Procedure: Caudal epidural steroid injection with catheter;  Surgeon: Paresh Odgen M.D.;  Location: SURGERY REHAB PAIN MANAGEMENT;  Service: Pain Management    IN INJ LUMBAR/SACRAL,W/ IMAGING N/A 3/23/2023    Procedure: Caudal epidural steroid injection with catheter;  Surgeon: Paresh Ogden M.D.;  Location: SURGERY REHAB PAIN MANAGEMENT;  Service: Pain Management    IN INJ LUMBAR/SACRAL,W/ IMAGING N/A 10/31/2022    Procedure: Caudal epidural steroid injection with catheter;  Surgeon: Paresh Ogden M.D.;  Location: SURGERY REHAB PAIN MANAGEMENT;  Service: Pain Management    RADIO FREQUENCY ABLATION ADDITIONAL LEVEL Left 11/11/2021    Procedure: LEFT lumbar three through lumbar five radiofrequency ablation;  Surgeon: Paresh Ogden M.D.;  Location: SURGERY REHAB PAIN MANAGEMENT;  Service: Pain Management    LUMBAR MEDIAL BRANCH BLOCKS Left 7/21/2021    Procedure: LEFT lumbar three through lumbar five medial branch blocks;  Surgeon: Paresh Ogden M.D.;  Location: SURGERY REHAB PAIN MANAGEMENT;  Service: Pain Management    LUMBAR TRANSFORAMINAL EPIDURAL STEROID INJECTION Left 5/20/2021    Procedure: LEFT lumbar four and lumbar five transforaminal epidural steroid injection;  Surgeon: Paresh Ogden M.D.;   Location: SURGERY REHAB PAIN MANAGEMENT;  Service: Pain Management    BLOCK EPIDURAL STEROID INJECTION Left 2/24/2021    Procedure: INJECTION, STEROID, EPIDURAL;  Surgeon: Paresh Ogden M.D.;  Location: SURGERY REHAB PAIN MANAGEMENT;  Service: Pain Management    LUMBAR MEDIAL BRANCH BLOCKS Left 9/9/2020    Procedure: BLOCK, NERVE, SPINAL, LUMBAR, POSTERIOR RAMUS, MEDIAL BRANCH;  Surgeon: Paresh Ogden M.D.;  Location: SURGERY REHAB PAIN MANAGEMENT;  Service: Pain Management    VT NJX AA&/STRD TFRML EPI LUMBAR/SACRAL 1 LEVEL Left 8/12/2020    Procedure: INJECTION, SPINE, LUMBOSACRAL, EPIDURAL, 1 LEVEL, TRANSFORAMINAL APPROACH;  Surgeon: Paresh Ogden M.D.;  Location: SURGERY REHAB PAIN MANAGEMENT;  Service: Pain Management    VT NJX AA&/STRD TFRML EPI LUMBAR/SACRAL EA ADDL Left 8/12/2020    Procedure: INJECTION, SPINE, LUMBOSACRAL, EPIDURAL, TRANSFORAMINAL APPROACH;  Surgeon: Paresh Ogden M.D.;  Location: SURGERY REHAB PAIN MANAGEMENT;  Service: Pain Management    LAMINOTOMY  1998    ABDOMINAL HYSTERECTOMY TOTAL      CARPAL TUNNEL RELEASE      CHOLECYSTECTOMY         Past Medical History:   Diagnosis Date    Anxiety     Arrhythmia     Chronic back pain     Constipation     Depression     Diabetes (HCC)     ORAL    Ear pain, left 09/23/2021    GERD (gastroesophageal reflux disease)     Hyperlipidemia     Hypertension     Thyroid disease     Tobacco use 10/11/2022       Current Outpatient Medications   Medication Sig Dispense Refill    [START ON 9/26/2024] clonazePAM (KLONOPIN) 0.5 MG Tab Take 1 Tablet by mouth every evening for 90 days. Indications: Feeling Anxious, Panic Disorder 90 Tablet 0    [START ON 9/25/2024] HYDROcodone/acetaminophen (NORCO)  MG Tab Take 1 Tablet by mouth every 6 hours as needed for Severe Pain or Moderate Pain for up to 30 days. Indications: Pain 70 Tablet 0    [START ON 11/26/2024] morphine ER (MS CONTIN) 15 MG Tab CR tablet Take 1 Tablet by mouth every 12 hours for 30 days.  Indications: Chronic Pain 60 Tablet 0    [START ON 9/26/2024] clonazePAM (KLONOPIN) 1 MG Tab Take 1 Tablet by mouth every morning for 90 days. Indications: Feeling Anxious, Panic Disorder 90 Tablet 0    [START ON 10/26/2024] HYDROcodone/acetaminophen (NORCO)  MG Tab Take 1 Tablet by mouth every 6 hours as needed for Severe Pain or Moderate Pain for up to 30 days. Indications: Pain 70 Tablet 0    [START ON 11/26/2024] HYDROcodone/acetaminophen (NORCO)  MG Tab Take 1 Tablet by mouth every 6 hours as needed for Severe Pain or Moderate Pain for up to 30 days. Indications: Pain 70 Tablet 0    [START ON 9/25/2024] morphine ER (MS CONTIN) 15 MG Tab CR tablet Take 1 Tablet by mouth every 12 hours for 30 days. Indications: Chronic Pain 60 Tablet 0    [START ON 10/26/2024] morphine ER (MS CONTIN) 15 MG Tab CR tablet Take 1 Tablet by mouth every 12 hours for 30 days. Indications: Chronic Pain 60 Tablet 0    doxycycline monohydrate (ADOXA) 100 MG tablet Take 1 Tablet by mouth 2 times a day for 7 days. 14 Tablet 0    methylPREDNISolone (MEDROL DOSEPAK) 4 MG Tablet Therapy Pack As directed on the packaging label. 21 Tablet 0    fluconazole (DIFLUCAN) 150 MG tablet Take one tablet by mouth once. Repeat dose 72 hours later if symptoms have not resolved. 2 Tablet 1    pantoprazole (PROTONIX) 40 MG Tablet Delayed Response TAKE 1 TABLET BY MOUTH EVERY DAY 90 Tablet 1    DULoxetine (CYMBALTA) 30 MG Cap DR Particles TAKE 3 CAPSULES BY MOUTH DAILY 270 Capsule 3    lisinopril (PRINIVIL) 10 MG Tab TAKE 1 TABLET BY MOUTH EVERY  Tablet 3    metFORMIN ER (GLUCOPHAGE XR) 500 MG TABLET SR 24 HR TAKE 1 TABLET BY MOUTH TWICE DAILY 200 Tablet 3    nitrofurantoin (MACROBID) 100 MG Cap Take 100 mg by mouth 2 times a day.      morphine ER (MS CONTIN) 15 MG Tab CR tablet Take 15 mg by mouth every 12 hours.      estradiol (ESTRACE) 0.1 MG/GM vaginal cream Insert 0.5 g into the vagina every Monday, Wednesday, and Friday. (Patient  taking differently: Insert 0.5 g into the vagina every Monday, Wednesday, and Friday.   ) 42.5 g 3    Semaglutide,0.25 or 0.5MG/DOS, 2 MG/3ML Solution Pen-injector Inject 0.5 mg under the skin every 7 days. 9 mL 3    albuterol 108 (90 Base) MCG/ACT Aero Soln inhalation aerosol Inhale 2 Puffs every four hours as needed for Shortness of Breath. 8.5 g 5    Brexpiprazole (REXULTI) 2 MG Tab Take 1 Tablet by mouth every day. 90 Tablet 3    busPIRone (BUSPAR) 30 MG tablet Take 1 Tablet by mouth every day. (Patient taking differently: Take 30 mg by mouth every day.   ) 180 Tablet 3    cyclobenzaprine (FLEXERIL) 5 mg tablet Take 1 Tablet by mouth 2 times a day as needed for Muscle Spasms (restless leg). 90 Tablet 3    gabapentin (NEURONTIN) 800 MG tablet Take 1.5 Tablets by mouth 2 times a day. 270 Tablet 3    levothyroxine (SYNTHROID) 50 MCG Tab Take 1 Tablet by mouth every morning on an empty stomach. 90 Tablet 3    metoprolol SR (TOPROL XL) 25 MG TABLET SR 24 HR Take 1 Tablet by mouth every day. 90 Tablet 3    rosuvastatin (CRESTOR) 10 MG Tab Take 1 Tablet by mouth every evening. 100 Tablet 3    ipratropium (ATROVENT) 0.06 % Solution Administer 2 Sprays into affected nostril(S) 4 times a day. 15 mL 0    Naloxone (NARCAN) 4 MG/0.1ML Liquid One spray in one nostril for overdose and call 911. 1 Each 1    fluticasone (FLONASE) 50 MCG/ACT nasal spray Administer 1 Spray into affected nostril(S) every day. 16 g 11     No current facility-administered medications for this visit.       No Known Allergies    Objective  There were no vitals taken for this visit.  Physical Exam  Constitutional:       Appearance: Normal appearance.   HENT:      Head: Normocephalic and atraumatic.   Pulmonary:      Effort: Pulmonary effort is normal.   Skin:     General: Skin is warm and dry.   Neurological:      General: No focal deficit present.      Mental Status: She is alert.   Psychiatric:         Mood and Affect: Mood normal.         Behavior:  Behavior normal.         Labs: none    Imaging:   Results for orders placed or performed in visit on 08/28/24   POCT Bladder Scan   Result Value    POC Pre-Void 100    POC Post-Void          Assessment    Mrs. Mark underwent cystoscopy with bulkamid injection on 7/23/2024. She has not had any incontinence since surgery. Her frequency and urgency have also resolved. She is unsure if she needs to continue taking the Gemtesa. 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 She can discontinue the Gemtesa at this time. If her symptoms return then she can restart the Gemtesa.     Plan    Problem List Items Addressed This Visit    None  Visit Diagnoses       Urinary incontinence, unspecified type        Relevant Orders    POCT Bladder Scan (Completed)        RTC 3 months  Discontinue Gemtesa

## 2024-08-29 ENCOUNTER — OFFICE VISIT (OUTPATIENT)
Dept: MEDICAL GROUP | Facility: PHYSICIAN GROUP | Age: 69
End: 2024-08-29
Payer: MEDICARE

## 2024-08-29 VITALS
OXYGEN SATURATION: 94 % | TEMPERATURE: 98 F | DIASTOLIC BLOOD PRESSURE: 60 MMHG | WEIGHT: 160 LBS | BODY MASS INDEX: 28.35 KG/M2 | SYSTOLIC BLOOD PRESSURE: 108 MMHG | HEART RATE: 94 BPM | RESPIRATION RATE: 16 BRPM | HEIGHT: 63 IN

## 2024-08-29 DIAGNOSIS — G89.29 CHRONIC RIGHT SHOULDER PAIN: ICD-10-CM

## 2024-08-29 DIAGNOSIS — M25.511 CHRONIC RIGHT SHOULDER PAIN: ICD-10-CM

## 2024-08-29 DIAGNOSIS — G89.29 CHRONIC PAIN OF LEFT KNEE: ICD-10-CM

## 2024-08-29 DIAGNOSIS — M25.562 CHRONIC PAIN OF LEFT KNEE: ICD-10-CM

## 2024-08-29 PROBLEM — H92.02 CHRONIC EAR PAIN, LEFT: Status: RESOLVED | Noted: 2021-09-23 | Resolved: 2024-08-29

## 2024-08-29 RX ORDER — DEXAMETHASONE SODIUM PHOSPHATE 4 MG/ML
4 INJECTION, SOLUTION INTRA-ARTICULAR; INTRALESIONAL; INTRAMUSCULAR; INTRAVENOUS; SOFT TISSUE ONCE
Status: COMPLETED | OUTPATIENT
Start: 2024-08-29 | End: 2024-08-29

## 2024-08-29 RX ORDER — TRIAMCINOLONE ACETONIDE 40 MG/ML
40 INJECTION, SUSPENSION INTRA-ARTICULAR; INTRAMUSCULAR ONCE
Status: COMPLETED | OUTPATIENT
Start: 2024-08-29 | End: 2024-08-29

## 2024-08-29 RX ADMIN — DEXAMETHASONE SODIUM PHOSPHATE 4 MG: 4 INJECTION, SOLUTION INTRA-ARTICULAR; INTRALESIONAL; INTRAMUSCULAR; INTRAVENOUS; SOFT TISSUE at 16:40

## 2024-08-29 RX ADMIN — TRIAMCINOLONE ACETONIDE 40 MG: 40 INJECTION, SUSPENSION INTRA-ARTICULAR; INTRAMUSCULAR at 16:39

## 2024-08-29 ASSESSMENT — FIBROSIS 4 INDEX: FIB4 SCORE: 1.88

## 2024-08-29 NOTE — PROCEDURES
Joint Inj - LG: knee, L knee on 8/29/2024 4:41 PM  Indications: pain and joint swelling  Details: 22 G needle, anterolateral approach  Medications: (4mg dexamethasone w/ 4mL 1% lidocaine)  Aspirate: 0 mL  Outcome: tolerated well, no immediate complications  Procedure, treatment alternatives, risks and benefits explained, specific risks discussed. Consent was given by the patient. Immediately prior to procedure a time out was called to verify the correct patient, procedure, equipment, support staff and site/side marked as required. Patient was prepped and draped in the usual sterile fashion.       Joint Inj - LG: shoulder, R subacromial bursa on 8/29/2024 4:42 PM  Indications: pain and joint swelling  Details: 22 G needle, lateral approach  Medications: (40mg triamcinalone w/ 4mL 1% lidocaine)  Aspirate: 0 mL  Outcome: tolerated well, no immediate complications  Procedure, treatment alternatives, risks and benefits explained, specific risks discussed. Consent was given by the patient. Immediately prior to procedure a time out was called to verify the correct patient, procedure, equipment, support staff and site/side marked as required. Patient was prepped and draped in the usual sterile fashion.

## 2024-08-29 NOTE — PROGRESS NOTES
"  Chief Complaint   Patient presents with    Joint Injection     Right shoulder, left knee                                                                                                                                       HPI:   Grisel presents today with the following.    1. Chronic right shoulder pain    2. Chronic pain of left knee         ROS:  All systems negative expect as addressed in assessment and plan.     /60 (BP Location: Left arm, Patient Position: Sitting)   Pulse 94   Temp 36.7 °C (98 °F) (Temporal)   Resp 16   Ht 1.6 m (5' 3\")   Wt 72.6 kg (160 lb)   LMP  (LMP Unknown)   SpO2 94%   BMI 28.34 kg/m²     Objective:    Physical Exam  Vitals reviewed.   Constitutional:       Appearance: Normal appearance.   HENT:      Head: Normocephalic and atraumatic.      Mouth/Throat:      Mouth: Mucous membranes are moist.   Eyes:      Extraocular Movements: Extraocular movements intact.      Conjunctiva/sclera: Conjunctivae normal.   Pulmonary:      Effort: Pulmonary effort is normal.   Musculoskeletal:      Right shoulder: Tenderness, bony tenderness and crepitus present. Decreased range of motion.      Cervical back: Normal range of motion.      Left knee: Effusion, bony tenderness and crepitus present. Tenderness present.   Skin:     General: Skin is warm and dry.   Neurological:      General: No focal deficit present.      Mental Status: She is alert and oriented to person, place, and time.   Psychiatric:         Mood and Affect: Mood normal.         Behavior: Behavior normal.         Thought Content: Thought content normal.           Assessment and Plan:  69 y.o. female with the following issues.    1. Chronic right shoulder pain  - Consent for all Surgical, Special Diagnostic or Therapeutic Procedures  - triamcinolone acetonide (Kenalog-40) injection 40 mg  - Joint Inj - LG: shoulder, R subacromial bursa    2. Chronic pain of left knee  - Consent for all Surgical, Special Diagnostic or " Therapeutic Procedures  - dexamethasone (Decadron) injection 4 mg  - Joint Inj - LG: knee, L knee    Chronic pain of left knee  Chronic unstable.     Patient reports worsening knee pain. Her last knee injection was 3/25.     Will provide with intra-articular steroid injection. Written consent obtained. Patient wishes to proceed.     Chronic right shoulder pain  Chronic unstable.     Patient reports worsening right shoulder pain. She states she has had decreased ROM.     Written consent obtained. Patient wishes to proceed with injection.       Return if symptoms worsen or fail to improve.      Please note that this dictation was created using voice recognition software. I have worked with consultants from the vendor as well as technical experts from [facility] to optimize the interface. I have made every reasonable attempt to correct obvious errors, but I expect that there are errors of grammar and possibly content that I did not discover before finalizing the note.

## 2024-08-30 NOTE — ASSESSMENT & PLAN NOTE
Chronic unstable.     Patient reports worsening right shoulder pain. She states she has had decreased ROM.     Written consent obtained. Patient wishes to proceed with injection.

## 2024-08-30 NOTE — ASSESSMENT & PLAN NOTE
Chronic unstable.     Patient reports worsening knee pain. Her last knee injection was 3/25.     Will provide with intra-articular steroid injection. Written consent obtained. Patient wishes to proceed.

## 2024-09-12 DIAGNOSIS — F41.8 SITUATIONAL ANXIETY: ICD-10-CM

## 2024-09-13 DIAGNOSIS — I10 ESSENTIAL HYPERTENSION: ICD-10-CM

## 2024-09-13 NOTE — TELEPHONE ENCOUNTER
Received request via: Pharmacy    Was the patient seen in the last year in this department? Yes    Does the patient have an active prescription (recently filled or refills available) for medication(s) requested? No    [facility]    Does the patient have USP Plus and need 100-day supply? (This applies to ALL medications) Patient does not have SCP

## 2024-09-16 NOTE — TELEPHONE ENCOUNTER
Received request via: Patient    Was the patient seen in the last year in this department? Yes    Does the patient have an active prescription (recently filled or refills available) for medication(s) requested? No    Pharmacy Name:     [id number] [place], [place] - [facility] [address]  Phone: [phone] Fax: [phone]       Does the patient have [facility] Plus and need 100-day supply? (This applies to ALL medications) Yes, quantity updated to 100 days

## 2024-09-17 ENCOUNTER — TELEPHONE (OUTPATIENT)
Dept: UROLOGY | Facility: MEDICAL CENTER | Age: 69
End: 2024-09-17
Payer: MEDICARE

## 2024-09-17 DIAGNOSIS — N32.81 OVERACTIVE BLADDER: ICD-10-CM

## 2024-09-17 RX ORDER — TROSPIUM CHLORIDE ER 60 MG/1
1 CAPSULE ORAL DAILY
Qty: 90 CAPSULE | Refills: 2 | Status: SHIPPED | OUTPATIENT
Start: 2024-09-17 | End: 2025-06-14

## 2024-09-17 NOTE — TELEPHONE ENCOUNTER
This pt needs her Trospium refilled and sent to [facility] in [facility]          Received request via: Patient    Was the patient seen in the last year in this department? Yes    Does the patient have an active prescription (recently filled or refills available) for medication(s) requested? No    Pharmacy Name: Pharmacy:   [id number] [address]     Does the patient have [facility] Plus and need 100-day supply? (This applies to ALL medications) Yes, quantity updated to 100 days

## 2024-09-19 DIAGNOSIS — F41.8 SITUATIONAL ANXIETY: ICD-10-CM

## 2024-09-19 DIAGNOSIS — N95.2 ATROPHIC VAGINITIS: ICD-10-CM

## 2024-09-19 DIAGNOSIS — M96.1 POSTLAMINECTOMY SYNDROME: ICD-10-CM

## 2024-09-19 RX ORDER — BUSPIRONE HYDROCHLORIDE 30 MG/1
30 TABLET ORAL DAILY
Qty: 30 TABLET | Refills: 0 | Status: CANCELLED | OUTPATIENT
Start: 2024-09-19

## 2024-09-19 RX ORDER — METOPROLOL SUCCINATE 25 MG/1
25 TABLET, EXTENDED RELEASE ORAL DAILY
Qty: 90 TABLET | Refills: 3 | Status: SHIPPED | OUTPATIENT
Start: 2024-09-19

## 2024-09-19 RX ORDER — BUSPIRONE HYDROCHLORIDE 30 MG/1
30 TABLET ORAL DAILY
Qty: 30 TABLET | Refills: 0 | Status: SHIPPED | OUTPATIENT
Start: 2024-09-19 | End: 2024-09-19 | Stop reason: SDUPTHER

## 2024-09-19 RX ORDER — BUSPIRONE HYDROCHLORIDE 30 MG/1
30 TABLET ORAL DAILY
Qty: 30 TABLET | Refills: 0 | Status: SHIPPED | OUTPATIENT
Start: 2024-09-19

## 2024-09-20 RX ORDER — CYCLOBENZAPRINE HCL 5 MG
5 TABLET ORAL 2 TIMES DAILY PRN
Qty: 100 TABLET | Refills: 3 | Status: SHIPPED | OUTPATIENT
Start: 2024-09-20

## 2024-09-20 RX ORDER — ESTRADIOL 0.1 MG/G
0.5 CREAM VAGINAL
Qty: 42.5 G | Refills: 5 | Status: SHIPPED | OUTPATIENT
Start: 2024-09-20

## 2024-09-20 NOTE — TELEPHONE ENCOUNTER
Received request via: Pharmacy    Was the patient seen in the last year in this department? Yes    Does the patient have an active prescription (recently filled or refills available) for medication(s) requested? No    [facility]     Does the patient have [facility] Plus and need 100-day supply? (This applies to ALL medications) Yes, quantity updated to 100 days

## 2024-10-04 DIAGNOSIS — M96.1 POSTLAMINECTOMY SYNDROME: ICD-10-CM

## 2024-10-04 DIAGNOSIS — M54.16 LEFT LUMBAR RADICULITIS: ICD-10-CM

## 2024-10-06 DIAGNOSIS — F33.1 DEPRESSION, MAJOR, RECURRENT, MODERATE (HCC): ICD-10-CM

## 2024-10-08 RX ORDER — GABAPENTIN 800 MG/1
1200 TABLET ORAL 2 TIMES DAILY
Qty: 270 TABLET | Refills: 0 | Status: SHIPPED
Start: 2024-10-08

## 2024-10-09 RX ORDER — BREXPIPRAZOLE 2 MG/1
1 TABLET ORAL DAILY
Qty: 60 TABLET | Refills: 0 | Status: SHIPPED | OUTPATIENT
Start: 2024-10-09

## 2024-10-15 DIAGNOSIS — F41.8 SITUATIONAL ANXIETY: ICD-10-CM

## 2024-10-17 ENCOUNTER — APPOINTMENT (OUTPATIENT)
Dept: MEDICAL GROUP | Facility: PHYSICIAN GROUP | Age: 69
End: 2024-10-17
Payer: MEDICARE

## 2024-10-17 RX ORDER — BUSPIRONE HYDROCHLORIDE 30 MG/1
30 TABLET ORAL DAILY
Qty: 90 TABLET | Refills: 3 | Status: SHIPPED | OUTPATIENT
Start: 2024-10-17

## 2024-11-05 DIAGNOSIS — I10 ESSENTIAL HYPERTENSION: ICD-10-CM

## 2024-11-05 DIAGNOSIS — E11.69 TYPE 2 DIABETES MELLITUS WITH OBESITY (HCC): ICD-10-CM

## 2024-11-05 DIAGNOSIS — E66.9 TYPE 2 DIABETES MELLITUS WITH OBESITY (HCC): ICD-10-CM

## 2024-11-05 DIAGNOSIS — F33.1 DEPRESSION, MAJOR, RECURRENT, MODERATE (HCC): ICD-10-CM

## 2024-11-06 NOTE — TELEPHONE ENCOUNTER
Received request via: Patient    Was the patient seen in the last year in this department? Yes    Does the patient have an active prescription (recently filled or refills available) for medication(s) requested? No    Pharmacy Name: [facility]    Does the patient have [facility] Plus and need 100-day supply? (This applies to ALL medications) Patient does not have SCP    Patient is currently staying with sister as her brother in law passed away. She would like a short supply until she comes back home in 2 weeks.

## 2024-11-06 NOTE — TELEPHONE ENCOUNTER
Received request via: Patient    Was the patient seen in the last year in this department? Yes    Does the patient have an active prescription (recently filled or refills available) for medication(s) requested? No    Pharmacy Name: [facility]    Does the patient have FPC Plus and need 100-day supply? (This applies to ALL medications) Patient does not have SCP

## 2024-11-07 RX ORDER — METFORMIN HYDROCHLORIDE 500 MG/1
500 TABLET, EXTENDED RELEASE ORAL 2 TIMES DAILY
Qty: 28 TABLET | Refills: 0 | Status: SHIPPED | OUTPATIENT
Start: 2024-11-07

## 2024-11-07 RX ORDER — LISINOPRIL 10 MG/1
10 TABLET ORAL DAILY
Qty: 14 TABLET | Refills: 0 | Status: SHIPPED | OUTPATIENT
Start: 2024-11-07

## 2024-11-07 RX ORDER — BREXPIPRAZOLE 2 MG/1
1 TABLET ORAL DAILY
Qty: 60 TABLET | Refills: 0 | Status: SHIPPED | OUTPATIENT
Start: 2024-11-07

## 2024-11-26 ENCOUNTER — TELEPHONE (OUTPATIENT)
Dept: MEDICAL GROUP | Facility: PHYSICIAN GROUP | Age: 69
End: 2024-11-26
Payer: MEDICARE

## 2024-11-26 DIAGNOSIS — M25.562 CHRONIC PAIN OF LEFT KNEE: ICD-10-CM

## 2024-11-26 DIAGNOSIS — M25.511 CHRONIC RIGHT SHOULDER PAIN: ICD-10-CM

## 2024-11-26 DIAGNOSIS — F11.20 OPIOID DEPENDENCE WITH CURRENT USE (HCC): ICD-10-CM

## 2024-11-26 DIAGNOSIS — G89.29 CHRONIC PAIN OF LEFT KNEE: ICD-10-CM

## 2024-11-26 DIAGNOSIS — G89.29 CHRONIC RIGHT SHOULDER PAIN: ICD-10-CM

## 2024-11-27 ENCOUNTER — PHARMACY VISIT (OUTPATIENT)
Dept: PHARMACY | Facility: MEDICAL CENTER | Age: 69
End: 2024-11-27
Payer: COMMERCIAL

## 2024-11-27 PROCEDURE — RXMED WILLOW AMBULATORY MEDICATION CHARGE: Performed by: NURSE PRACTITIONER

## 2024-11-27 RX ORDER — MORPHINE SULFATE 15 MG/1
15 TABLET ORAL 3 TIMES DAILY
Qty: 90 TABLET | Refills: 0 | Status: SHIPPED | OUTPATIENT
Start: 2024-11-27 | End: 2024-12-27

## 2024-11-27 NOTE — TELEPHONE ENCOUNTER
VOICEMAIL  1. Caller Name: Grisel Ronel Davidson                       Call Back Number: There are no phone numbers on file.     2. Message: Patient called stating that she will be running out of her morphine tomorrow and would like us to call the pharmacy in [place] ([facility]) to approve an emergency fill until she gets back into town. She will be back on 11/29/24. She also stated that her pharmacy here is on back order for morphine.    Please advise?     3. Patient approves office to leave a detailed [email or web address] message: N\A

## 2024-11-27 NOTE — TELEPHONE ENCOUNTER
I called and spoke with patient and she stated that everyone is on back order for Morphine. She called pharmacies and they have a wait list for this medication and no word on when they will get more.  Patient was wondering if there is anything else you could prescribe in the meantime?

## 2024-12-12 DIAGNOSIS — F33.1 DEPRESSION, MAJOR, RECURRENT, MODERATE (HCC): ICD-10-CM

## 2024-12-12 RX ORDER — BREXPIPRAZOLE 2 MG/1
1 TABLET ORAL DAILY
Qty: 60 TABLET | Refills: 0 | Status: CANCELLED | OUTPATIENT
Start: 2024-12-12

## 2024-12-12 NOTE — TELEPHONE ENCOUNTER
Received request via: Patient    Was the patient seen in the last year in this department? Yes    Does the patient have an active prescription (recently filled or refills available) for medication(s) requested? Yes.    Pharmacy Name: [facility]    Does the patient have [facility] Plus and need 100-day supply? (This applies to ALL medications) Patient does not have SCP    Patient is back in town and now needs it filled here at [facility].

## 2024-12-13 ENCOUNTER — HOSPITAL ENCOUNTER (OUTPATIENT)
Dept: LAB | Facility: MEDICAL CENTER | Age: 69
End: 2024-12-13
Attending: NURSE PRACTITIONER
Payer: MEDICARE

## 2024-12-13 DIAGNOSIS — E11.42 TYPE 2 DIABETES MELLITUS WITH DIABETIC POLYNEUROPATHY, WITHOUT LONG-TERM CURRENT USE OF INSULIN (HCC): ICD-10-CM

## 2024-12-13 DIAGNOSIS — R10.9 FLANK PAIN: ICD-10-CM

## 2024-12-13 DIAGNOSIS — E11.69 HYPERLIPIDEMIA DUE TO TYPE 2 DIABETES MELLITUS (HCC): ICD-10-CM

## 2024-12-13 DIAGNOSIS — N12 RECURRENT PYELONEPHRITIS: ICD-10-CM

## 2024-12-13 DIAGNOSIS — Z86.2 HISTORY OF ANEMIA: ICD-10-CM

## 2024-12-13 DIAGNOSIS — E03.9 HYPOTHYROIDISM (ACQUIRED): ICD-10-CM

## 2024-12-13 DIAGNOSIS — E55.9 VITAMIN D DEFICIENCY: ICD-10-CM

## 2024-12-13 DIAGNOSIS — E11.59 HYPERTENSION ASSOCIATED WITH DIABETES (HCC): ICD-10-CM

## 2024-12-13 DIAGNOSIS — I15.2 HYPERTENSION ASSOCIATED WITH DIABETES (HCC): ICD-10-CM

## 2024-12-13 DIAGNOSIS — E78.5 HYPERLIPIDEMIA DUE TO TYPE 2 DIABETES MELLITUS (HCC): ICD-10-CM

## 2024-12-13 LAB
BASOPHILS # BLD AUTO: 0.6 % (ref 0–1.8)
BASOPHILS # BLD: 0.06 K/UL (ref 0–0.12)
EOSINOPHIL # BLD AUTO: 0.2 K/UL (ref 0–0.51)
EOSINOPHIL NFR BLD: 2 % (ref 0–6.9)
ERYTHROCYTE [DISTWIDTH] IN BLOOD BY AUTOMATED COUNT: 45.5 FL (ref 35.9–50)
HCT VFR BLD AUTO: 40.9 % (ref 37–47)
HGB BLD-MCNC: 13.1 G/DL (ref 12–16)
IMM GRANULOCYTES # BLD AUTO: 0.04 K/UL (ref 0–0.11)
IMM GRANULOCYTES NFR BLD AUTO: 0.4 % (ref 0–0.9)
LYMPHOCYTES # BLD AUTO: 2.92 K/UL (ref 1–4.8)
LYMPHOCYTES NFR BLD: 29.1 % (ref 22–41)
MCH RBC QN AUTO: 30.4 PG (ref 27–33)
MCHC RBC AUTO-ENTMCNC: 32 G/DL (ref 32.2–35.5)
MCV RBC AUTO: 94.9 FL (ref 81.4–97.8)
MONOCYTES # BLD AUTO: 0.79 K/UL (ref 0–0.85)
MONOCYTES NFR BLD AUTO: 7.9 % (ref 0–13.4)
NEUTROPHILS # BLD AUTO: 6.04 K/UL (ref 1.82–7.42)
NEUTROPHILS NFR BLD: 60 % (ref 44–72)
NRBC # BLD AUTO: 0 K/UL
NRBC BLD-RTO: 0 /100 WBC (ref 0–0.2)
PLATELET # BLD AUTO: 232 K/UL (ref 164–446)
PMV BLD AUTO: 12.6 FL (ref 9–12.9)
RBC # BLD AUTO: 4.31 M/UL (ref 4.2–5.4)
WBC # BLD AUTO: 10.1 K/UL (ref 4.8–10.8)

## 2024-12-13 PROCEDURE — 82043 UR ALBUMIN QUANTITATIVE: CPT

## 2024-12-13 PROCEDURE — 82306 VITAMIN D 25 HYDROXY: CPT

## 2024-12-13 PROCEDURE — 82570 ASSAY OF URINE CREATININE: CPT

## 2024-12-13 PROCEDURE — 80061 LIPID PANEL: CPT

## 2024-12-13 PROCEDURE — 84443 ASSAY THYROID STIM HORMONE: CPT

## 2024-12-13 PROCEDURE — 83540 ASSAY OF IRON: CPT

## 2024-12-13 PROCEDURE — 83550 IRON BINDING TEST: CPT

## 2024-12-13 PROCEDURE — 82607 VITAMIN B-12: CPT

## 2024-12-13 PROCEDURE — 84439 ASSAY OF FREE THYROXINE: CPT

## 2024-12-13 PROCEDURE — 80053 COMPREHEN METABOLIC PANEL: CPT

## 2024-12-13 PROCEDURE — 82728 ASSAY OF FERRITIN: CPT

## 2024-12-13 PROCEDURE — 36415 COLL VENOUS BLD VENIPUNCTURE: CPT

## 2024-12-13 PROCEDURE — 85025 COMPLETE CBC W/AUTO DIFF WBC: CPT

## 2024-12-14 LAB
25(OH)D3 SERPL-MCNC: 27 NG/ML (ref 30–100)
ALBUMIN SERPL BCP-MCNC: 4 G/DL (ref 3.2–4.9)
ALBUMIN/GLOB SERPL: 1.5 G/DL
ALP SERPL-CCNC: 54 U/L (ref 30–99)
ALT SERPL-CCNC: 9 U/L (ref 2–50)
ANION GAP SERPL CALC-SCNC: 10 MMOL/L (ref 7–16)
AST SERPL-CCNC: 15 U/L (ref 12–45)
BILIRUB SERPL-MCNC: 0.3 MG/DL (ref 0.1–1.5)
BUN SERPL-MCNC: 10 MG/DL (ref 8–22)
CALCIUM ALBUM COR SERPL-MCNC: 9 MG/DL (ref 8.5–10.5)
CALCIUM SERPL-MCNC: 9 MG/DL (ref 8.5–10.5)
CHLORIDE SERPL-SCNC: 103 MMOL/L (ref 96–112)
CHOLEST SERPL-MCNC: 99 MG/DL (ref 100–199)
CO2 SERPL-SCNC: 26 MMOL/L (ref 20–33)
CREAT SERPL-MCNC: 0.55 MG/DL (ref 0.5–1.4)
CREAT UR-MCNC: 108.49 MG/DL
FERRITIN SERPL-MCNC: 168 NG/ML (ref 10–291)
GFR SERPLBLD CREATININE-BSD FMLA CKD-EPI: 99 ML/MIN/1.73 M 2
GLOBULIN SER CALC-MCNC: 2.6 G/DL (ref 1.9–3.5)
GLUCOSE SERPL-MCNC: 83 MG/DL (ref 65–99)
HDLC SERPL-MCNC: 43 MG/DL
IRON SATN MFR SERPL: 36 % (ref 15–55)
IRON SERPL-MCNC: 77 UG/DL (ref 40–170)
LDLC SERPL CALC-MCNC: 37 MG/DL
MICROALBUMIN UR-MCNC: 10.7 MG/DL
MICROALBUMIN/CREAT UR: 99 MG/G (ref 0–30)
POTASSIUM SERPL-SCNC: 4.5 MMOL/L (ref 3.6–5.5)
PROT SERPL-MCNC: 6.6 G/DL (ref 6–8.2)
SODIUM SERPL-SCNC: 139 MMOL/L (ref 135–145)
T4 FREE SERPL-MCNC: 1.35 NG/DL (ref 0.93–1.7)
TIBC SERPL-MCNC: 216 UG/DL (ref 250–450)
TRIGL SERPL-MCNC: 94 MG/DL (ref 0–149)
TSH SERPL-ACNC: 1.15 UIU/ML (ref 0.35–5.5)
UIBC SERPL-MCNC: 139 UG/DL (ref 110–370)
VIT B12 SERPL-MCNC: 397 PG/ML (ref 211–911)

## 2024-12-16 ENCOUNTER — APPOINTMENT (OUTPATIENT)
Dept: RADIOLOGY | Facility: IMAGING CENTER | Age: 69
End: 2024-12-16
Attending: NURSE PRACTITIONER
Payer: MEDICARE

## 2024-12-16 ENCOUNTER — APPOINTMENT (OUTPATIENT)
Dept: MEDICAL GROUP | Facility: PHYSICIAN GROUP | Age: 69
End: 2024-12-16
Payer: MEDICARE

## 2024-12-16 VITALS
WEIGHT: 167 LBS | HEIGHT: 63 IN | BODY MASS INDEX: 29.59 KG/M2 | RESPIRATION RATE: 18 BRPM | OXYGEN SATURATION: 94 % | DIASTOLIC BLOOD PRESSURE: 68 MMHG | TEMPERATURE: 99.2 F | SYSTOLIC BLOOD PRESSURE: 102 MMHG | HEART RATE: 68 BPM

## 2024-12-16 DIAGNOSIS — F43.10 POSTTRAUMATIC STRESS DISORDER, DELAYED ONSET: ICD-10-CM

## 2024-12-16 DIAGNOSIS — M54.42 CHRONIC BILATERAL LOW BACK PAIN WITH BILATERAL SCIATICA: ICD-10-CM

## 2024-12-16 DIAGNOSIS — M25.562 CHRONIC PAIN OF LEFT KNEE: ICD-10-CM
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
## 2024-12-16 DIAGNOSIS — E78.2 MIXED HYPERLIPIDEMIA: ICD-10-CM

## 2024-12-16 DIAGNOSIS — G89.29 CHRONIC BILATERAL LOW BACK PAIN WITH BILATERAL SCIATICA: ICD-10-CM

## 2024-12-16 DIAGNOSIS — G89.29 CHRONIC PAIN OF LEFT KNEE: ICD-10-CM

## 2024-12-16 DIAGNOSIS — J40 BRONCHITIS: ICD-10-CM

## 2024-12-16 DIAGNOSIS — E03.9 HYPOTHYROIDISM (ACQUIRED): ICD-10-CM

## 2024-12-16 DIAGNOSIS — N39.3 STRESS BLADDER INCONTINENCE, FEMALE: ICD-10-CM

## 2024-12-16 DIAGNOSIS — F33.1 DEPRESSION, MAJOR, RECURRENT, MODERATE (HCC): ICD-10-CM

## 2024-12-16 DIAGNOSIS — F11.20 SEVERE OPIOID DEPENDENCE ON MAINTENANCE THERAPY (HCC): ICD-10-CM
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
## 2024-12-16 DIAGNOSIS — F41.1 GENERALIZED ANXIETY DISORDER: ICD-10-CM

## 2024-12-16 DIAGNOSIS — F41.8 SITUATIONAL ANXIETY: ICD-10-CM

## 2024-12-16 DIAGNOSIS — E66.9 TYPE 2 DIABETES MELLITUS WITH OBESITY (HCC): ICD-10-CM

## 2024-12-16 DIAGNOSIS — E11.69 TYPE 2 DIABETES MELLITUS WITH OBESITY (HCC): ICD-10-CM

## 2024-12-16 DIAGNOSIS — I10 ESSENTIAL HYPERTENSION: ICD-10-CM

## 2024-12-16 DIAGNOSIS — M96.1 POSTLAMINECTOMY SYNDROME: ICD-10-CM

## 2024-12-16 DIAGNOSIS — F13.20 SEDATIVE, HYPNOTIC OR ANXIOLYTIC DEPENDENCE, UNCOMPLICATED (HCC): ICD-10-CM

## 2024-12-16 DIAGNOSIS — M54.16 LEFT LUMBAR RADICULITIS: ICD-10-CM

## 2024-12-16 DIAGNOSIS — M54.41 CHRONIC BILATERAL LOW BACK PAIN WITH BILATERAL SCIATICA: ICD-10-CM

## 2024-12-16 PROBLEM — R41.89 BRAIN FOG: Status: RESOLVED | Noted: 2023-12-12 | Resolved: 2024-12-16

## 2024-12-16 PROCEDURE — 3078F DIAST BP <80 MM HG: CPT | Performed by: NURSE PRACTITIONER

## 2024-12-16 PROCEDURE — 3074F SYST BP LT 130 MM HG: CPT | Performed by: NURSE PRACTITIONER

## 2024-12-16 PROCEDURE — 99215 OFFICE O/P EST HI 40 MIN: CPT | Performed by: NURSE PRACTITIONER

## 2024-12-16 PROCEDURE — 73564 X-RAY EXAM KNEE 4 OR MORE: CPT | Mod: TC,LT | Performed by: RADIOLOGY

## 2024-12-16 RX ORDER — CLONAZEPAM 1 MG/1
1 TABLET ORAL EVERY MORNING
Qty: 90 TABLET | Refills: 0 | Status: SHIPPED | OUTPATIENT
Start: 2024-12-27 | End: 2025-03-27

## 2024-12-16 RX ORDER — LEVOTHYROXINE SODIUM 50 UG/1
50 TABLET ORAL
Qty: 90 TABLET | Refills: 3 | Status: SHIPPED | OUTPATIENT
Start: 2024-12-16

## 2024-12-16 RX ORDER — LISINOPRIL 10 MG/1
10 TABLET ORAL DAILY
Qty: 100 TABLET | Refills: 3 | Status: SHIPPED | OUTPATIENT
Start: 2024-12-16

## 2024-12-16 RX ORDER — HYDROCODONE BITARTRATE AND ACETAMINOPHEN 10; 325 MG/1; MG/1
1 TABLET ORAL EVERY 6 HOURS PRN
Qty: 70 TABLET | Refills: 0 | Status: SHIPPED | OUTPATIENT
Start: 2025-01-24 | End: 2025-02-23

## 2024-12-16 RX ORDER — HYDROCODONE BITARTRATE AND ACETAMINOPHEN 10; 325 MG/1; MG/1
1 TABLET ORAL EVERY 6 HOURS PRN
Qty: 70 TABLET | Refills: 0 | Status: SHIPPED | OUTPATIENT
Start: 2024-12-24 | End: 2025-01-23

## 2024-12-16 RX ORDER — BREXPIPRAZOLE 2 MG/1
1 TABLET ORAL DAILY
Qty: 90 TABLET | Refills: 3 | Status: SHIPPED | OUTPATIENT
Start: 2024-12-16

## 2024-12-16 RX ORDER — MORPHINE SULFATE 15 MG/1
15 TABLET, FILM COATED, EXTENDED RELEASE ORAL EVERY 12 HOURS
Qty: 60 TABLET | Refills: 0 | Status: SHIPPED | OUTPATIENT
Start: 2024-12-27 | End: 2025-01-26

## 2024-12-16 RX ORDER — METOPROLOL SUCCINATE 25 MG/1
25 TABLET, EXTENDED RELEASE ORAL DAILY
Qty: 90 TABLET | Refills: 3 | Status: SHIPPED | OUTPATIENT
Start: 2024-12-16

## 2024-12-16 RX ORDER — SEMAGLUTIDE 1.34 MG/ML
1 INJECTION, SOLUTION SUBCUTANEOUS
Qty: 9 ML | Refills: 3 | Status: SHIPPED | OUTPATIENT
Start: 2024-12-16

## 2024-12-16 RX ORDER — GABAPENTIN 800 MG/1
1200 TABLET ORAL 2 TIMES DAILY
Qty: 270 TABLET | Refills: 3 | Status: SHIPPED | OUTPATIENT
Start: 2024-12-16

## 2024-12-16 RX ORDER — AZITHROMYCIN 250 MG/1
TABLET, FILM COATED ORAL
Qty: 6 TABLET | Refills: 0 | Status: SHIPPED | OUTPATIENT
Start: 2024-12-16

## 2024-12-16 RX ORDER — BUSPIRONE HYDROCHLORIDE 30 MG/1
30 TABLET ORAL DAILY
Qty: 90 TABLET | Refills: 3 | Status: SHIPPED | OUTPATIENT
Start: 2024-12-16

## 2024-12-16 RX ORDER — ROSUVASTATIN CALCIUM 10 MG/1
10 TABLET, COATED ORAL EVERY EVENING
Qty: 100 TABLET | Refills: 3 | Status: SHIPPED | OUTPATIENT
Start: 2024-12-16

## 2024-12-16 RX ORDER — TROSPIUM CHLORIDE ER 60 MG/1
1 CAPSULE ORAL DAILY
Qty: 90 CAPSULE | Refills: 3 | Status: SHIPPED | OUTPATIENT
Start: 2024-12-16

## 2024-12-16 RX ORDER — MORPHINE SULFATE 15 MG/1
15 TABLET, FILM COATED, EXTENDED RELEASE ORAL EVERY 12 HOURS
Qty: 60 TABLET | Refills: 0 | Status: SHIPPED | OUTPATIENT
Start: 2025-01-27 | End: 2025-02-26

## 2024-12-16 RX ORDER — HYDROCODONE BITARTRATE AND ACETAMINOPHEN 10; 325 MG/1; MG/1
1 TABLET ORAL EVERY 6 HOURS PRN
Qty: 70 TABLET | Refills: 0 | Status: SHIPPED | OUTPATIENT
Start: 2025-02-24 | End: 2025-03-26

## 2024-12-16 RX ORDER — MORPHINE SULFATE 15 MG/1
15 TABLET, FILM COATED, EXTENDED RELEASE ORAL EVERY 12 HOURS
Qty: 60 TABLET | Refills: 0 | Status: SHIPPED | OUTPATIENT
Start: 2025-02-27 | End: 2025-03-29

## 2024-12-16 RX ORDER — METFORMIN HYDROCHLORIDE 500 MG/1
500 TABLET, EXTENDED RELEASE ORAL 2 TIMES DAILY
Qty: 100 TABLET | Refills: 3 | Status: SHIPPED | OUTPATIENT
Start: 2024-12-16

## 2024-12-16 RX ORDER — CLONAZEPAM 0.5 MG/1
0.5 TABLET ORAL NIGHTLY
Qty: 90 TABLET | Refills: 0 | Status: SHIPPED | OUTPATIENT
Start: 2024-12-27 | End: 2025-03-27

## 2024-12-16 ASSESSMENT — FIBROSIS 4 INDEX: FIB4 SCORE: 1.49

## 2024-12-16 NOTE — PROGRESS NOTES
Chief Complaint   Patient presents with    Knee Pain     Left knee/ Had a fall x 1 month    Cough     With sore throat/ running nose x 1 month                                                                                                                                       HPI:   Grisel presents today with the following.    Patient consented to the use of [employer] to record and transcribe notes during this visit.      The patient attended the consultation today to discuss recent weight loss, concerns about medication costs, and ongoing health issues including a persistent cough and a knee injury from a recent fall.    The chief complaints presented by the patient are recent weight loss, concerns about the cost of Ozempic (which is no longer covered by insurance), and a request for an increase in Ozempic dosage from 0.5 mg to 1 mg. The patient denies experiencing severe nausea or vomiting while on this medication.    The patient reports a cough that has persisted for approximately 1.5 months, accompanied by rust-colored sputum. They note minimal improvement despite using an inhaler and Bucenex. The patient has not consulted a healthcare provider specifically for this cough.    Regarding recent injuries, the patient suffered a fall around [facility] which resulted in a knee injury. This injury has not been medically evaluated, but the patient's sister has provided a new brace. The patient is requesting a cortisone shot for this injury.    The patient's current medication regimen includes Norco, which contains Tylenol. They are aware that this medication may suppress fever. The patient also uses an inhaler and Mucinex for their cough, though with minimal effect.    The patient's health management strategies and concerns have been discussed, with particular attention to weight management, medication costs, the persistent cough, and the knee injury from the recent fall. The patient has consented to the use of [employer] to  "record and transcribe notes during this visit.    ROS:  All systems negative expect as addressed in assessment and plan.     /68 (BP Location: Left arm, Patient Position: Sitting)   Pulse 68   Temp 37.3 °C (99.2 °F) (Temporal)   Resp 18   Ht 1.6 m (5' 3\")   Wt 75.8 kg (167 lb)   LMP  (LMP Unknown)   SpO2 94%   BMI 29.58 kg/m²     Objective:    Physical Exam  Pulmonary:      Effort: Pulmonary effort is normal.      Breath sounds: Examination of the right-upper field reveals rhonchi. Examination of the left-upper field reveals rhonchi. Examination of the right-middle field reveals rhonchi. Examination of the right-lower field reveals decreased breath sounds and rhonchi. Examination of the left-lower field reveals decreased breath sounds and rhonchi. Decreased breath sounds and rhonchi present. No wheezing or rales.   Musculoskeletal:      Left knee: Swelling present. Tenderness present.           Assessment and Plan:  69 y.o. female with the following issues.    1. Depression, major, recurrent, moderate (HCC)  - Brexpiprazole (REXULTI) 2 MG Tab; Take 1 Tablet by mouth every day.  Dispense: 90 Tablet; Refill: 3    2. Essential hypertension  - lisinopril (PRINIVIL) 10 MG Tab; Take 1 Tablet by mouth every day.  Dispense: 100 Tablet; Refill: 3  - metoprolol SR (TOPROL XL) 25 MG TABLET SR 24 HR; Take 1 Tablet by mouth every day.  Dispense: 90 Tablet; Refill: 3    3. Hypothyroidism (acquired)  - levothyroxine (SYNTHROID) 50 MCG Tab; Take 1 Tablet by mouth every morning on an empty stomach.  Dispense: 90 Tablet; Refill: 3    4. Type 2 diabetes mellitus with obesity (HCC)  - Semaglutide, 1 MG/DOSE, (OZEMPIC, 1 MG/DOSE,) 4 MG/3ML Solution Pen-injector; Inject 1 mg under the skin every 7 days.  Dispense: 9 mL; Refill: 3  - metFORMIN ER (GLUCOPHAGE XR) 500 MG TABLET SR 24 HR; Take 1 Tablet by mouth 2 times a day.  Dispense: 100 Tablet; Refill: 3    5. Postlaminectomy syndrome  - gabapentin (NEURONTIN) 800 MG " tablet; Take 1.5 Tablets by mouth 2 times a day.  Dispense: 270 Tablet; Refill: 3  - morphine ER (MS CONTIN) 15 MG Tab CR tablet; Take 1 Tablet by mouth every 12 hours for 30 days. Indications: Chronic Pain  Dispense: 60 Tablet; Refill: 0  - morphine ER (MS CONTIN) 15 MG Tab CR tablet; Take 1 Tablet by mouth every 12 hours for 30 days. Indications: Chronic Pain  Dispense: 60 Tablet; Refill: 0  - morphine ER (MS CONTIN) 15 MG Tab CR tablet; Take 1 Tablet by mouth every 12 hours for 30 days. Indications: Chronic Pain  Dispense: 60 Tablet; Refill: 0  - HYDROcodone/acetaminophen (NORCO)  MG Tab; Take 1 Tablet by mouth every 6 hours as needed for Severe Pain or Moderate Pain for up to 30 days. Indications: Pain  Dispense: 70 Tablet; Refill: 0  - HYDROcodone/acetaminophen (NORCO)  MG Tab; Take 1 Tablet by mouth every 6 hours as needed for Severe Pain or Moderate Pain for up to 30 days. Indications: Pain  Dispense: 70 Tablet; Refill: 0  - HYDROcodone/acetaminophen (NORCO)  MG Tab; Take 1 Tablet by mouth every 6 hours as needed for Severe Pain or Moderate Pain for up to 30 days. Indications: Pain  Dispense: 70 Tablet; Refill: 0    6. Left lumbar radiculitis  - gabapentin (NEURONTIN) 800 MG tablet; Take 1.5 Tablets by mouth 2 times a day.  Dispense: 270 Tablet; Refill: 3    7. Situational anxiety  - busPIRone (BUSPAR) 30 MG tablet; Take 1 Tablet by mouth every day.  Dispense: 90 Tablet; Refill: 3  - clonazePAM (KLONOPIN) 0.5 MG Tab; Take 1 Tablet by mouth every evening for 90 days. Indications: Feeling Anxious, Panic Disorder  Dispense: 90 Tablet; Refill: 0  - clonazePAM (KLONOPIN) 1 MG Tab; Take 1 Tablet by mouth every morning for 90 days. Indications: Feeling Anxious, Panic Disorder  Dispense: 90 Tablet; Refill: 0    8. Mixed hyperlipidemia  - rosuvastatin (CRESTOR) 10 MG Tab; Take 1 Tablet by mouth every evening.  Dispense: 100 Tablet; Refill: 3    9. Generalized anxiety disorder  - clonazePAM (KLONOPIN)  0.5 MG Tab; Take 1 Tablet by mouth every evening for 90 days. Indications: Feeling Anxious, Panic Disorder  Dispense: 90 Tablet; Refill: 0  - clonazePAM (KLONOPIN) 1 MG Tab; Take 1 Tablet by mouth every morning for 90 days. Indications: Feeling Anxious, Panic Disorder  Dispense: 90 Tablet; Refill: 0    10. Posttraumatic stress disorder, delayed onset  - clonazePAM (KLONOPIN) 0.5 MG Tab; Take 1 Tablet by mouth every evening for 90 days. Indications: Feeling Anxious, Panic Disorder  Dispense: 90 Tablet; Refill: 0  - clonazePAM (KLONOPIN) 1 MG Tab; Take 1 Tablet by mouth every morning for 90 days. Indications: Feeling Anxious, Panic Disorder  Dispense: 90 Tablet; Refill: 0    11. Sedative, hypnotic or anxiolytic dependence, uncomplicated (HCC)  - clonazePAM (KLONOPIN) 0.5 MG Tab; Take 1 Tablet by mouth every evening for 90 days. Indications: Feeling Anxious, Panic Disorder  Dispense: 90 Tablet; Refill: 0  - clonazePAM (KLONOPIN) 1 MG Tab; Take 1 Tablet by mouth every morning for 90 days. Indications: Feeling Anxious, Panic Disorder  Dispense: 90 Tablet; Refill: 0    12. Chronic pain of left knee  - morphine ER (MS CONTIN) 15 MG Tab CR tablet; Take 1 Tablet by mouth every 12 hours for 30 days. Indications: Chronic Pain  Dispense: 60 Tablet; Refill: 0  - morphine ER (MS CONTIN) 15 MG Tab CR tablet; Take 1 Tablet by mouth every 12 hours for 30 days. Indications: Chronic Pain  Dispense: 60 Tablet; Refill: 0  - morphine ER (MS CONTIN) 15 MG Tab CR tablet; Take 1 Tablet by mouth every 12 hours for 30 days. Indications: Chronic Pain  Dispense: 60 Tablet; Refill: 0  - HYDROcodone/acetaminophen (NORCO)  MG Tab; Take 1 Tablet by mouth every 6 hours as needed for Severe Pain or Moderate Pain for up to 30 days. Indications: Pain  Dispense: 70 Tablet; Refill: 0  - HYDROcodone/acetaminophen (NORCO)  MG Tab; Take 1 Tablet by mouth every 6 hours as needed for Severe Pain or Moderate Pain for up to 30 days. Indications:  Pain  Dispense: 70 Tablet; Refill: 0  - HYDROcodone/acetaminophen (NORCO)  MG Tab; Take 1 Tablet by mouth every 6 hours as needed for Severe Pain or Moderate Pain for up to 30 days. Indications: Pain  Dispense: 70 Tablet; Refill: 0  - DX-KNEE COMPLETE 4+ LEFT; Future    13. Chronic bilateral low back pain with bilateral sciatica  - morphine ER (MS CONTIN) 15 MG Tab CR tablet; Take 1 Tablet by mouth every 12 hours for 30 days. Indications: Chronic Pain  Dispense: 60 Tablet; Refill: 0  - morphine ER (MS CONTIN) 15 MG Tab CR tablet; Take 1 Tablet by mouth every 12 hours for 30 days. Indications: Chronic Pain  Dispense: 60 Tablet; Refill: 0  - morphine ER (MS CONTIN) 15 MG Tab CR tablet; Take 1 Tablet by mouth every 12 hours for 30 days. Indications: Chronic Pain  Dispense: 60 Tablet; Refill: 0  - HYDROcodone/acetaminophen (NORCO)  MG Tab; Take 1 Tablet by mouth every 6 hours as needed for Severe Pain or Moderate Pain for up to 30 days. Indications: Pain  Dispense: 70 Tablet; Refill: 0  - HYDROcodone/acetaminophen (NORCO)  MG Tab; Take 1 Tablet by mouth every 6 hours as needed for Severe Pain or Moderate Pain for up to 30 days. Indications: Pain  Dispense: 70 Tablet; Refill: 0  - HYDROcodone/acetaminophen (NORCO)  MG Tab; Take 1 Tablet by mouth every 6 hours as needed for Severe Pain or Moderate Pain for up to 30 days. Indications: Pain  Dispense: 70 Tablet; Refill: 0    14. Severe opioid dependence on maintenance therapy (HCC)  - morphine ER (MS CONTIN) 15 MG Tab CR tablet; Take 1 Tablet by mouth every 12 hours for 30 days. Indications: Chronic Pain  Dispense: 60 Tablet; Refill: 0  - morphine ER (MS CONTIN) 15 MG Tab CR tablet; Take 1 Tablet by mouth every 12 hours for 30 days. Indications: Chronic Pain  Dispense: 60 Tablet; Refill: 0  - morphine ER (MS CONTIN) 15 MG Tab CR tablet; Take 1 Tablet by mouth every 12 hours for 30 days. Indications: Chronic Pain  Dispense: 60 Tablet; Refill: 0  -  HYDROcodone/acetaminophen (NORCO)  MG Tab; Take 1 Tablet by mouth every 6 hours as needed for Severe Pain or Moderate Pain for up to 30 days. Indications: Pain  Dispense: 70 Tablet; Refill: 0  - HYDROcodone/acetaminophen (NORCO)  MG Tab; Take 1 Tablet by mouth every 6 hours as needed for Severe Pain or Moderate Pain for up to 30 days. Indications: Pain  Dispense: 70 Tablet; Refill: 0  - HYDROcodone/acetaminophen (NORCO)  MG Tab; Take 1 Tablet by mouth every 6 hours as needed for Severe Pain or Moderate Pain for up to 30 days. Indications: Pain  Dispense: 70 Tablet; Refill: 0    15. Fall in home, initial encounter  - DX-KNEE COMPLETE 4+ LEFT; Future    16. Stress bladder incontinence, female  - Trospium Chloride 60 MG CAPSULE SR 24 HR; Take 1 Capsule by mouth every day.  Dispense: 90 Capsule; Refill: 3    17. Bronchitis  - azithromycin (ZITHROMAX) 250 MG Tab; Take 2 tablets by mouth on day one and 1 tablet on days 2 through 5.  Dispense: 6 Tablet; Refill: 0  - amoxicillin-clavulanate (AUGMENTIN) 875-125 MG Tab; Take 1 Tablet by mouth 2 times a day for 7 days.  Dispense: 14 Tablet; Refill: 0      1. Weight loss:     - Plan: Encourage continuation of healthy habits.    2. Medication management:     - Plan:          a. Ozempic: Increase to 1 mg from 0.5 mg.          b. Rexulti: Refill; ensure availability in [place].          c. Lisinopril and Metformin: Monitor insurance coverage.          d. MS Contin: Attempt ER refill; keep IR morphine for emergencies.          e. Clonazepam and Norco: Monitor refill dates, adjust as needed.          f. Obtain flu shot and RSV vaccine at pharmacy when she is feeling better.    3. Persistent cough and possible bronchitis:     - Plan:          a. Azithromycin: 5-day course (2 tablets day 1, then 1 daily).          b. Augmentin: 7-day course (BID with food).          c. Monitor progress; seek medical attention if symptoms worsen/persist.    4. Knee pain and injury:     -  Plan:          a. Order x-ray to assess potential injury.          b. If negative, schedule follow-up for cortisone injection (consider insurance limitations).          c. Encourage walker use for mobility/pain management if needed.    5. Pharmacy and insurance:     - Plan:          a. Inform pt of potential pharmacy coverage changes; transfer Rx if necessary.          b. Ensure Medicaid coverage is current as secondary insurance.    Return in about 3 months (around 3/16/2025) for Chronic Health Conditions, Chronic pain.    I spent a total of 40 minutes with record review, exam, and communication with the patient, communication with other providers, and documentation of this encounter. This does not include time spent on separately billable procedures/tests.    Please note that this dictation was created using voice recognition software. I have worked with consultants from the vendor as well as technical experts from [facility] to optimize the interface. I have made every reasonable attempt to correct obvious errors, but I expect that there are errors of grammar and possibly content that I did not discover before finalizing the note.

## 2024-12-18 ENCOUNTER — APPOINTMENT (OUTPATIENT)
Dept: UROLOGY | Facility: MEDICAL CENTER | Age: 69
End: 2024-12-18
Payer: MEDICARE

## 2024-12-31 ENCOUNTER — OFFICE VISIT (OUTPATIENT)
Dept: MEDICAL GROUP | Facility: PHYSICIAN GROUP | Age: 69
End: 2024-12-31
Payer: MEDICARE

## 2024-12-31 VITALS
WEIGHT: 167 LBS | TEMPERATURE: 98.5 F | HEART RATE: 89 BPM | DIASTOLIC BLOOD PRESSURE: 60 MMHG | RESPIRATION RATE: 16 BRPM | HEIGHT: 63 IN | SYSTOLIC BLOOD PRESSURE: 92 MMHG | OXYGEN SATURATION: 93 % | BODY MASS INDEX: 29.59 KG/M2

## 2024-12-31 DIAGNOSIS — G89.29 CHRONIC PAIN OF LEFT KNEE: ICD-10-CM

## 2024-12-31 DIAGNOSIS — M25.562 CHRONIC PAIN OF LEFT KNEE: ICD-10-CM

## 2024-12-31 DIAGNOSIS — H92.02 LEFT EAR PAIN: ICD-10-CM

## 2024-12-31 RX ORDER — TRIAMCINOLONE ACETONIDE 40 MG/ML
40 INJECTION, SUSPENSION INTRA-ARTICULAR; INTRAMUSCULAR ONCE
Status: DISCONTINUED | OUTPATIENT
Start: 2024-12-31 | End: 2024-12-31

## 2024-12-31 RX ORDER — TRIAMCINOLONE ACETONIDE 40 MG/ML
80 INJECTION, SUSPENSION INTRA-ARTICULAR; INTRAMUSCULAR ONCE
Status: COMPLETED | OUTPATIENT
Start: 2024-12-31 | End: 2024-12-31

## 2024-12-31 RX ADMIN — TRIAMCINOLONE ACETONIDE 80 MG: 40 INJECTION, SUSPENSION INTRA-ARTICULAR; INTRAMUSCULAR at 12:40

## 2024-12-31 ASSESSMENT — FIBROSIS 4 INDEX: FIB4 SCORE: 1.49

## 2025-01-01 NOTE — PROCEDURES
Joint Inj - LG: knee, L knee on 12/31/2024 12:00 PM  Indications: pain and joint swelling  Details: 22 G needle, lateral approach  Medications: (80mg kenalog with 4mL 1% lidocaine w/o epi)  Aspirate: 0 mL  Outcome: tolerated well, no immediate complications  Procedure, treatment alternatives, risks and benefits explained, specific risks discussed. Consent was given by the patient. Immediately prior to procedure a time out was called to verify the correct patient, procedure, equipment, support staff and site/side marked as required. Patient was prepped and draped in the usual sterile fashion.

## 2025-01-01 NOTE — PROGRESS NOTES
"  Chief Complaint   Patient presents with    Joint Injection     Left knee injection     Otalgia     Behind left ear soreness/ felt a lump x 1 week    Paperwork     [place]                                                                                                                                        HPI:   Grisel presents today with the following.    Pt is present today for intra-articular knee injection of her left knee.     She is also reporting pain and pressure in her left ear. She states that she did have a tender lump behind her left ear last week, but this has resolved.     ROS:  All systems negative expect as addressed in assessment and plan.     BP 92/60 (BP Location: Left arm, Patient Position: Sitting)   Pulse 89   Temp 36.9 °C (98.5 °F) (Temporal)   Resp 16   Ht 1.6 m (5' 3\")   Wt 75.8 kg (167 lb)   LMP  (LMP Unknown)   SpO2 93%   BMI 29.58 kg/m²     Objective:    Physical Exam  HENT:      Left Ear: Ear canal normal. A middle ear effusion is present.   Musculoskeletal:      Left knee: Swelling, effusion, erythema and crepitus present. Decreased range of motion. Tenderness present over the medial joint line and lateral joint line.   Lymphadenopathy:      Head:      Left side of head: No preauricular or posterior auricular adenopathy.         Assessment and Plan:  69 y.o. female with the following issues.    1. Chronic pain of left knee  - Consent for all Surgical, Special Diagnostic or Therapeutic Procedures  - triamcinolone acetonide (Kenalog-40) injection 80 mg  - Joint Inj - LG: knee, L knee    2. Left ear pain  - Pt had recent illness with sinus infection treated about 3 weeks ago.      Plan: Mid ear effusion present, TM normal. Advised flonase or nasacort to help with inner ear drainage.       Chronic pain of left knee  Chronic unstable.     Patient reports significant pain in her left knee. She has been wearing a compression knee brace to help with her pain and mobility.     She had a " fall 3-4 weeks ago where she landed on her knee. She had an abrasion and bruising at the time. She continues to have swelling, pain, and decreased ROM.     Xray taken at last visit reviewed with patient in office.     Patient reports that the last knee injection in May was not as effective as the previous injection. Last injection dexamethasone was used. Will perform knee injection with triamcinolone, but increase dose to 80mg.     See procedure note for injection.       Return in about 3 months (around 3/31/2025) for Chronic pain.      Please note that this dictation was created using voice recognition software. I have worked with consultants from the vendor as well as technical experts from [facility] to optimize the interface. I have made every reasonable attempt to correct obvious errors, but I expect that there are errors of grammar and possibly content that I did not discover before finalizing the note.

## 2025-01-01 NOTE — ASSESSMENT & PLAN NOTE
Chronic unstable.     Patient reports significant pain in her left knee. She has been wearing a compression knee brace to help with her pain and mobility.     She had a fall 3-4 weeks ago where she landed on her knee. She had an abrasion and bruising at the time. She continues to have swelling, pain, and decreased ROM.     Xray taken at last visit reviewed with patient in office.     Patient reports that the last knee injection in May was not as effective as the previous injection. Last injection dexamethasone was used. Will perform knee injection with triamcinolone, but increase dose to 80mg.     See procedure note for injection.

## 2025-01-08 ENCOUNTER — APPOINTMENT (OUTPATIENT)
Dept: UROLOGY | Facility: MEDICAL CENTER | Age: 70
End: 2025-01-08
Payer: MEDICARE

## 2025-02-11 ENCOUNTER — APPOINTMENT (OUTPATIENT)
Dept: UROLOGY | Facility: MEDICAL CENTER | Age: 70
End: 2025-02-11
Payer: MEDICARE

## 2025-02-11 DIAGNOSIS — R32 URINARY INCONTINENCE, UNSPECIFIED TYPE: ICD-10-CM

## 2025-02-11 LAB
POC POST-VOID: 30 ML
POC PRE-VOID: NORMAL

## 2025-02-11 PROCEDURE — 51798 US URINE CAPACITY MEASURE: CPT | Performed by: STUDENT IN AN ORGANIZED HEALTH CARE EDUCATION/TRAINING PROGRAM

## 2025-02-11 PROCEDURE — 99214 OFFICE O/P EST MOD 30 MIN: CPT | Performed by: STUDENT IN AN ORGANIZED HEALTH CARE EDUCATION/TRAINING PROGRAM

## 2025-02-11 RX ORDER — ESTRADIOL 2 MG/1
1 RING VAGINAL
Qty: 1 EACH | Refills: 3 | Status: SHIPPED | OUTPATIENT
Start: 2025-02-11

## 2025-02-11 NOTE — PROGRESS NOTES
Subjective  Grisel Mark is a 69 y.o. female who presents today for follow up after cystoscopy with bulkamid injection.    She has not had any urinary incontinence since the procedure and has not had any frequency or urgency of urination. Denies dysuria or hematuria. She feels she is emptying her bladder. Overall she is very satisfied with the procedure. She has been taking Gemtesa but is unsure if she needs to continue with it. She is also using her vaginal estrogen cream three times a week.    Interval Updates:    2025: She has had recurrence of her frequency and urgency with occasionally urge incontinence. She has also had some stress incontinence with vomiting and coughing. No dysuria or hematuria. She will feel she has finished urinating but then when she wipes there is a gush of urine again which sounds like vaginal pooling. She is having difficulty with constipation.     Family History   Problem Relation Age of Onset    Cancer Mother     Stroke Father         Heart attack    Dementia Sister     Stroke Brother         heart Attack    Cancer Brother         Skin    Diabetes Brother     Kidney Disease Brother     Cancer Brother     Parkinson's Disease Sister     No Known Problems Sister     Diabetes Daughter     Hypertension Daughter        Social History     Socioeconomic History    Marital status:      Spouse name: Not on file    Number of children: Not on file    Years of education: Not on file    Highest education level: Not on file   Occupational History    Not on file   Tobacco Use    Smoking status: Every Day     Current packs/day: 0.00     Types: Cigarettes     Last attempt to quit:      Years since quittin.1    Smokeless tobacco: Never   Vaping Use    Vaping status: Never Used   Substance and Sexual Activity    Alcohol use: Not Currently    Drug use: Not Currently    Sexual activity: Not Currently   Other Topics Concern     Service No    Blood Transfusions Yes     Caffeine Concern No    Occupational Exposure No    Hobby Hazards No    Sleep Concern Yes    Stress Concern Yes    Weight Concern Yes    Special Diet No    Back Care No    Exercise Yes    Bike Helmet No    Seat Belt Yes    Self-Exams Yes   Social History Narrative    Not on file     Social Drivers of Health     Financial Resource Strain: Not on file   Food Insecurity: Not on file   Transportation Needs: Not on file   Physical Activity: Not on file   Stress: Not on file   Social Connections: Not on file   Intimate Partner Violence: Not on file   Housing Stability: Not on file       Past Surgical History:   Procedure Laterality Date    VT CYSTOURETHROSCOPY,BIOPSIES N/A 7/23/2024    Procedure: CYSTOSCOPY BLADDER BIOPSY, BULKING AGENT INJECTION;  Surgeon: Yelena Batista M.D.;  Location: SURGERY SAME DAY [facility];  Service: Urology    VT ENDOSCOPIC INJECTION/IMPLANT N/A 7/23/2024    Procedure: INJECTION, BULKING AGENT, BLADDER NECK, ENDOSCOPIC;  Surgeon: Yelena Batista M.D.;  Location: SURGERY SAME DAY [facility];  Service: Urology    VT INJ LUMBAR/SACRAL,W/ IMAGING N/A 7/14/2023    Procedure: Caudal epidural steroid injection with catheter;  Surgeon: Paresh Ogden M.D.;  Location: [facility];  Service: Pain Management    VT INJ LUMBAR/SACRAL,W/ IMAGING N/A 3/23/2023    Procedure: Caudal epidural steroid injection with catheter;  Surgeon: Paresh Ogden M.D.;  Location: [facility];  Service: Pain Management    VT INJ LUMBAR/SACRAL,W/ IMAGING N/A 10/31/2022    Procedure: Caudal epidural steroid injection with catheter;  Surgeon: Paresh Ogden M.D.;  Location: [facility];  Service: Pain Management    RADIO FREQUENCY ABLATION ADDITIONAL LEVEL Left 11/11/2021    Procedure: LEFT lumbar three through lumbar five radiofrequency ablation;  Surgeon: Paresh Ogden M.D.;  Location: [facility];  Service: Pain Management    LUMBAR MEDIAL BRANCH BLOCKS Left  7/21/2021    Procedure: LEFT lumbar three through lumbar five medial branch blocks;  Surgeon: Paresh Ogden M.D.;  Location: [facility];  Service: Pain Management    LUMBAR TRANSFORAMINAL EPIDURAL STEROID INJECTION Left 5/20/2021    Procedure: LEFT lumbar four and lumbar five transforaminal epidural steroid injection;  Surgeon: Paresh Ogden M.D.;  Location: [facility];  Service: Pain Management    BLOCK EPIDURAL STEROID INJECTION Left 2/24/2021    Procedure: INJECTION, STEROID, EPIDURAL;  Surgeon: Paresh Ogden M.D.;  Location: [facility];  Service: Pain Management    LUMBAR MEDIAL BRANCH BLOCKS Left 9/9/2020    Procedure: BLOCK, NERVE, SPINAL, LUMBAR, POSTERIOR RAMUS, MEDIAL BRANCH;  Surgeon: Paresh Ogden M.D.;  Location: [facility];  Service: Pain Management    AL NJX AA&/STRD TFRML EPI LUMBAR/SACRAL 1 LEVEL Left 8/12/2020    Procedure: INJECTION, SPINE, LUMBOSACRAL, EPIDURAL, 1 LEVEL, TRANSFORAMINAL APPROACH;  Surgeon: Paresh Ogden M.D.;  Location: [facility];  Service: Pain Management    AL NJX AA&/STRD TFRML EPI LUMBAR/SACRAL EA ADDL Left 8/12/2020    Procedure: INJECTION, SPINE, LUMBOSACRAL, EPIDURAL, TRANSFORAMINAL APPROACH;  Surgeon: Paresh Ogden M.D.;  Location: [facility];  Service: Pain Management    LAMINOTOMY  1998    ABDOMINAL HYSTERECTOMY TOTAL      CARPAL TUNNEL RELEASE      CHOLECYSTECTOMY         Past Medical History:   Diagnosis Date    Anxiety     Arrhythmia     Chronic back pain     Constipation     Depression     Diabetes (HCC)     ORAL    Ear pain, left 09/23/2021    GERD (gastroesophageal reflux disease)     Hyperlipidemia     Hypertension     Thyroid disease     Tobacco use 10/11/2022       Current Outpatient Medications   Medication Sig Dispense Refill    Estradiol (ESTRING) 7.5 MCG/24HR RING Insert 1 Each into the vagina every 3 months. 1 Each 3    Semaglutide,  1 MG/DOSE, (OZEMPIC, 1 MG/DOSE,) 4 MG/3ML Solution Pen-injector Inject 1 mg under the skin every 7 days. 9 mL 3    Brexpiprazole (REXULTI) 2 MG Tab Take 1 Tablet by mouth every day. 90 Tablet 3    lisinopril (PRINIVIL) 10 MG Tab Take 1 Tablet by mouth every day. 100 Tablet 3    levothyroxine (SYNTHROID) 50 MCG Tab Take 1 Tablet by mouth every morning on an empty stomach. 90 Tablet 3    metFORMIN ER (GLUCOPHAGE XR) 500 MG TABLET SR 24 HR Take 1 Tablet by mouth 2 times a day. 100 Tablet 3    gabapentin (NEURONTIN) 800 MG tablet Take 1.5 Tablets by mouth 2 times a day. 270 Tablet 3    busPIRone (BUSPAR) 30 MG tablet Take 1 Tablet by mouth every day. 90 Tablet 3    metoprolol SR (TOPROL XL) 25 MG TABLET SR 24 HR Take 1 Tablet by mouth every day. 90 Tablet 3    rosuvastatin (CRESTOR) 10 MG Tab Take 1 Tablet by mouth every evening. 100 Tablet 3    Trospium Chloride 60 MG CAPSULE SR 24 HR Take 1 Capsule by mouth every day. 90 Capsule 3    clonazePAM (KLONOPIN) 0.5 MG Tab Take 1 Tablet by mouth every evening for 90 days. Indications: Feeling Anxious, Panic Disorder 90 Tablet 0    clonazePAM (KLONOPIN) 1 MG Tab Take 1 Tablet by mouth every morning for 90 days. Indications: Feeling Anxious, Panic Disorder 90 Tablet 0    morphine ER (MS CONTIN) 15 MG Tab CR tablet Take 1 Tablet by mouth every 12 hours for 30 days. Indications: Chronic Pain 60 Tablet 0    [START ON 2/27/2025] morphine ER (MS CONTIN) 15 MG Tab CR tablet Take 1 Tablet by mouth every 12 hours for 30 days. Indications: Chronic Pain 60 Tablet 0    HYDROcodone/acetaminophen (NORCO)  MG Tab Take 1 Tablet by mouth every 6 hours as needed for Severe Pain or Moderate Pain for up to 30 days. Indications: Pain 70 Tablet 0    [START ON 2/24/2025] HYDROcodone/acetaminophen (NORCO)  MG Tab Take 1 Tablet by mouth every 6 hours as needed for Severe Pain or Moderate Pain for up to 30 days. Indications: Pain 70 Tablet 0    azithromycin (ZITHROMAX) 250 MG Tab Take 2  tablets by mouth on day one and 1 tablet on days 2 through 5. 6 Tablet 0    cyclobenzaprine (FLEXERIL) 5 mg tablet Take 1 Tablet by mouth 2 times a day as needed for Muscle Spasms (restless leg). 100 Tablet 3    pantoprazole (PROTONIX) 40 MG Tablet Delayed Response TAKE 1 TABLET BY MOUTH EVERY DAY 90 Tablet 1    DULoxetine (CYMBALTA) 30 MG Cap DR Particles TAKE 3 CAPSULES BY MOUTH DAILY 270 Capsule 3    morphine ER (MS CONTIN) 15 MG Tab CR tablet Take 15 mg by mouth every 12 hours.      albuterol 108 (90 Base) MCG/ACT Aero Soln inhalation aerosol Inhale 2 Puffs every four hours as needed for Shortness of Breath. 8.5 g 5    ipratropium (ATROVENT) 0.06 % Solution Administer 2 Sprays into affected nostril(S) 4 times a day. 15 mL 0    Naloxone (NARCAN) 4 MG/0.1ML Liquid One spray in one nostril for overdose and call 911. 1 Each 1    fluticasone (FLONASE) 50 MCG/ACT nasal spray Administer 1 Spray into affected nostril(S) every day. 16 g 11     No current facility-administered medications for this visit.       No Known Allergies    Objective  There were no vitals taken for this visit.  Physical Exam  Constitutional:       Appearance: Normal appearance.   HENT:      Head: Normocephalic and atraumatic.   Pulmonary:      Effort: Pulmonary effort is normal.   Skin:     General: Skin is warm and dry.   Neurological:      General: No focal deficit present.      Mental Status: She is alert.   Psychiatric:         Mood and Affect: Mood normal.         Behavior: Behavior normal.         Labs: none    Imaging:   Results for orders placed or performed in visit on 02/11/25   POCT Bladder Scan   Result Value    POC Pre-Void     POC Post-Void 30         Assessment    Mrs. Mark underwent cystoscopy with bulkamid injection on 7/23/2024. She has had recurrence of her overactive bladder symptoms and mixed urinary incontinence. She has been taking Gemtesa but had discontinued her vaginal estrogen. I recommend re-starting vaginal  estrogen, she would like to use the estrogen ring as the cream was not well tolerated. We discussed constipation management and she was provided with handout for a cleanout. She will follow up in a few weeks for estring placement in clinic.    Plan    Problem List Items Addressed This Visit    None  Visit Diagnoses       Urinary incontinence, unspecified type        Relevant Orders    POCT Bladder Scan (Completed)        3 weeks  continue Gemtesa  Counseled on constipation management  Start vaginal estrogen ring  Constipation management

## 2025-02-11 NOTE — PATIENT INSTRUCTIONS
Bowel Cleanout for Severe Constipation    Severe constipation can be uncomfortable and distressing, but with a comprehensive approach, it can be managed effectively. Below is a step-by-step guide on how to use dietary changes, lifestyle modifications, and over-the-counter treatments to help relieve constipation.    Step 1: Increase Fluid Intake   - Water: Drink plenty of water throughout the day (at least 8-10 cups). Proper hydration helps soften stool and promotes bowel movement.  - Prune Juice: Drink 4-8 ounces of prune juice daily. Prune juice contains sorbitol, a natural laxative that can soften stool and help relieve constipation.  - Herbal Teas: Certain herbal teas like senna or peppermint may help stimulate bowel movement. Consider drinking these after meals.    Step 2: Increase Dietary Fiber    Incorporate high-fiber foods to promote regular bowel movements:  - Prunes (Dried Plums): Eat 6-10 prunes daily. Prunes are rich in fiber and sorbitol, both of which are effective in relieving constipation.  - Fiber-rich Foods: Add whole grains, vegetables, beans, and fruits (like apples, pears, and berries) to your diet to help keep your digestive system moving    Step 3: Use Over-the-Counter Laxatives and Medications   For immediate relief of severe constipation, consider using one or more of the following treatments:    1. Miralax (Polyethylene Glycol)   - Dosage: Mix 17 grams (one capful) in 4-8 ounces of water and drink once a day. You may take it for 1-3 days until you have a bowel movement.  - How it works: Miralax draws water into the bowel, softening the stool and making it easier to pass.    2. Dulcolax (Bisacodyl)    - Dosage: Take 1-2 oral tablet (5-10 mg) at bedtime for overnight relief. You can also use Dulcolax suppositories if oral tablets do not provide relief.  - How it works: Dulcolax stimulates the muscles of the colon, encouraging bowel movements. It works within 6-12 hours when taken orally, or  15-30 minutes when used as a suppository.    3. Senna (Sennosides)    - Dosage: Take 1-2 tablets (8.6 mg each) at bedtime for gentle, overnight relief.  - How it works: Senna is a natural stimulant laxative that stimulates muscle contractions in the colon, promoting bowel movement. It typically works within 6-12 hours.    4. Docusate (Colace)    - Dosage: Take 100 mg of Docusate once or twice daily.  - How it works: Docusate is a stool softener that helps draw water into the stool, making it easier to pass. It works more gradually (usually in 1-3 days), but it can be helpful for softer stool.    5. Suppositories (Glycerin or Bisacodyl)    - Glycerin Suppositories: Insert 1 glycerin suppository into the rectum for prompt relief (usually within 30 minutes to an hour).  - Bisacodyl Suppositories: Insert **1 Bisacodyl suppository** into the rectum for quick relief (typically within 15-30 minutes).  - How they work: Suppositories directly stimulate the rectum or draw water into the stool, leading to a bowel movement.    Step 4: Lifestyle and Dietary Tips   - Stay Active: Regular physical activity, such as walking or gentle exercise, helps stimulate the bowel and encourages regular bowel movements.  -Establish a Routine: Try to use the toilet at the same time each day, especially after meals, to establish a routine and encourage bowel movements.  -Respond to the Urge: Go to the bathroom when you first feel the urge. Delaying can make constipation worse.    Step 5: Other Considerations    - Limit Use of Laxatives: While laxatives can provide immediate relief, avoid long-term or overuse as they can lead to dependence or electrolyte imbalances.  - Monitor Your Progress: If you're not seeing results after several days of treatment, or if you experience severe discomfort, please contact your healthcare provider for further guidance.    When to Seek Medical Help   Seek medical attention if you experience:  - Persistent  constipation despite these measures  - Severe abdominal pain or cramping  - Vomiting or inability to pass gas or stool  - Blood in the stool or black, tarry stools  - Unexplained weight loss or fatigue  - If you have chronic conditions such as heart disease or kidney problems, consult your physician before using certain medications (e.g., stimulant laxatives).    Additional Notes    -Miralax  and  Docusate  are generally considered safe for short-term use, but if symptoms persist for more than a week, or if you need long-term management, discuss options with your healthcare provider.  - Prunes and prune juice are excellent, natural remedies and can be safely consumed daily. They may take a bit longer to work compared to medications.  - Use suppositories for immediate relief if oral medications are not effective, but be mindful of their use for only short-term relief.

## 2025-02-19 RX ORDER — PANTOPRAZOLE SODIUM 40 MG/1
40 TABLET, DELAYED RELEASE ORAL DAILY
Qty: 90 TABLET | Refills: 0 | Status: SHIPPED | OUTPATIENT
Start: 2025-02-19

## 2025-02-19 NOTE — TELEPHONE ENCOUNTER
Received request via: Pharmacy    Was the patient seen in the last year in this department? Yes    Does the patient have an active prescription (recently filled or refills available) for medication(s) requested? No    Pharmacy Name:   [id number] - [place] - [address] [place] [address] [place] [address]  Phone: [phone] Fax: [phone]     Does the patient have [facility] Plus and need 100-day supply? (This applies to ALL medications) Yes, quantity updated to 100 days

## 2025-03-17 ENCOUNTER — APPOINTMENT (OUTPATIENT)
Dept: MEDICAL GROUP | Facility: PHYSICIAN GROUP | Age: 70
End: 2025-03-17
Payer: MEDICARE

## 2025-03-27 ENCOUNTER — HOSPITAL ENCOUNTER (OUTPATIENT)
Dept: RADIOLOGY | Facility: MEDICAL CENTER | Age: 70
End: 2025-03-27
Attending: NURSE PRACTITIONER
Payer: MEDICARE

## 2025-03-27 DIAGNOSIS — Z12.31 VISIT FOR SCREENING MAMMOGRAM: ICD-10-CM

## 2025-03-27 PROCEDURE — 77067 SCR MAMMO BI INCL CAD: CPT

## 2025-03-28 ENCOUNTER — APPOINTMENT (OUTPATIENT)
Dept: MEDICAL GROUP | Facility: PHYSICIAN GROUP | Age: 70
End: 2025-03-28
Payer: MEDICARE

## 2025-03-28 ENCOUNTER — OFFICE VISIT (OUTPATIENT)
Dept: URGENT CARE | Facility: PHYSICIAN GROUP | Age: 70
End: 2025-03-28
Payer: MEDICARE

## 2025-03-28 VITALS
DIASTOLIC BLOOD PRESSURE: 64 MMHG | OXYGEN SATURATION: 94 % | BODY MASS INDEX: 32.29 KG/M2 | RESPIRATION RATE: 15 BRPM | HEIGHT: 62 IN | TEMPERATURE: 98.2 F | SYSTOLIC BLOOD PRESSURE: 126 MMHG | HEART RATE: 81 BPM | WEIGHT: 175.49 LBS

## 2025-03-28 DIAGNOSIS — G89.29 CHRONIC BILATERAL LOW BACK PAIN WITH BILATERAL SCIATICA: ICD-10-CM

## 2025-03-28 DIAGNOSIS — M54.41 CHRONIC BILATERAL LOW BACK PAIN WITH BILATERAL SCIATICA: ICD-10-CM

## 2025-03-28 DIAGNOSIS — M54.42 CHRONIC BILATERAL LOW BACK PAIN WITH BILATERAL SCIATICA: ICD-10-CM

## 2025-03-28 DIAGNOSIS — F11.20 SEVERE OPIOID DEPENDENCE ON MAINTENANCE THERAPY (HCC): ICD-10-CM

## 2025-03-28 DIAGNOSIS — F13.20 SEDATIVE, HYPNOTIC OR ANXIOLYTIC DEPENDENCE, UNCOMPLICATED (HCC): ICD-10-CM

## 2025-03-28 DIAGNOSIS — G89.29 CHRONIC PAIN OF LEFT KNEE: ICD-10-CM

## 2025-03-28 DIAGNOSIS — M25.562 CHRONIC PAIN OF LEFT KNEE: ICD-10-CM

## 2025-03-28 DIAGNOSIS — F41.1 GENERALIZED ANXIETY DISORDER: ICD-10-CM

## 2025-03-28 DIAGNOSIS — M96.1 POSTLAMINECTOMY SYNDROME: ICD-10-CM

## 2025-03-28 PROCEDURE — 99213 OFFICE O/P EST LOW 20 MIN: CPT | Performed by: FAMILY MEDICINE

## 2025-03-28 PROCEDURE — 3078F DIAST BP <80 MM HG: CPT | Performed by: FAMILY MEDICINE

## 2025-03-28 PROCEDURE — 3074F SYST BP LT 130 MM HG: CPT | Performed by: FAMILY MEDICINE

## 2025-03-28 RX ORDER — HYDROCODONE BITARTRATE AND ACETAMINOPHEN 10; 325 MG/1; MG/1
1 TABLET ORAL EVERY 6 HOURS PRN
Qty: 70 TABLET | Refills: 0 | Status: SHIPPED | OUTPATIENT
Start: 2025-03-28 | End: 2025-04-27

## 2025-03-28 RX ORDER — CLONAZEPAM 0.5 MG/1
0.5 TABLET ORAL NIGHTLY
Qty: 30 TABLET | Refills: 0 | Status: SHIPPED | OUTPATIENT
Start: 2025-03-28 | End: 2025-04-27

## 2025-03-28 RX ORDER — CLONAZEPAM 1 MG/1
1 TABLET ORAL 2 TIMES DAILY
COMMUNITY
Start: 2024-12-29

## 2025-03-28 RX ORDER — CLONAZEPAM 1 MG/1
1 TABLET ORAL EVERY MORNING
Qty: 30 TABLET | Refills: 0 | Status: SHIPPED | OUTPATIENT
Start: 2025-03-28 | End: 2025-04-27

## 2025-03-28 RX ORDER — MORPHINE SULFATE 15 MG/1
15 TABLET, FILM COATED, EXTENDED RELEASE ORAL EVERY 12 HOURS
Qty: 60 TABLET | Refills: 0 | Status: SHIPPED | OUTPATIENT
Start: 2025-03-28 | End: 2025-04-27

## 2025-03-28 ASSESSMENT — FIBROSIS 4 INDEX: FIB4 SCORE: 1.49

## 2025-03-28 ASSESSMENT — ENCOUNTER SYMPTOMS: FEVER: 0

## 2025-03-28 NOTE — PROGRESS NOTES
"Subjective     Grisel Mark is a 69 y.o. female who presents with Medication Refill (Morphine /Clonazepam /Hydrocodone)            Chronic pain patient of Demi Farley.  Chelo presents today noting that Demi's office canceled appointment for refill of opiates and benzodiazepine.  She has been on the same treatment for years.  No new pain.  No myelopathy.  No other aggravating or alleviating factors.        Review of Systems   Constitutional:  Negative for fever.              Objective     /64 (BP Location: Right arm, Patient Position: Sitting, BP Cuff Size: Adult long)   Pulse 81   Temp 36.8 °C (98.2 °F) (Temporal)   Resp 15   Ht 1.575 m (5' 2\")   Wt 79.6 kg (175 lb 7.8 oz)   LMP  (LMP Unknown)   SpO2 94%   BMI 32.10 kg/m²      Physical Exam  Constitutional:       General: She is not in acute distress.     Appearance: She is well-developed.   HENT:      Head: Normocephalic and atraumatic.   Eyes:      Conjunctiva/sclera: Conjunctivae normal.   Cardiovascular:      Rate and Rhythm: Normal rate and regular rhythm.      Heart sounds: Normal heart sounds. No murmur heard.  Pulmonary:      Effort: Pulmonary effort is normal.      Breath sounds: Normal breath sounds. No wheezing.   Skin:     General: Skin is warm and dry.      Findings: No rash.   Neurological:      Mental Status: She is alert.                         1. Postlaminectomy syndrome  morphine ER (MS CONTIN) 15 MG Tab CR tablet    HYDROcodone/acetaminophen (NORCO)  MG Tab      2. Chronic pain of left knee  morphine ER (MS CONTIN) 15 MG Tab CR tablet    HYDROcodone/acetaminophen (NORCO)  MG Tab      3. Chronic bilateral low back pain with bilateral sciatica  morphine ER (MS CONTIN) 15 MG Tab CR tablet    HYDROcodone/acetaminophen (NORCO)  MG Tab      4. Severe opioid dependence on maintenance therapy (HCC)  morphine ER (MS CONTIN) 15 MG Tab CR tablet    HYDROcodone/acetaminophen (NORCO)  MG Tab      5. " Generalized anxiety disorder  clonazePAM (KLONOPIN) 1 MG Tab    clonazePAM (KLONOPIN) 0.5 MG Tab      6. Sedative, hypnotic or anxiolytic dependence, uncomplicated (HCC)  clonazePAM (KLONOPIN) 1 MG Tab    clonazePAM (KLONOPIN) 0.5 MG Tab        F/u primary care    Understands that urgent care will continue to refill opiate pain management

## 2025-03-31 ENCOUNTER — APPOINTMENT (OUTPATIENT)
Dept: MEDICAL GROUP | Facility: PHYSICIAN GROUP | Age: 70
End: 2025-03-31
Payer: MEDICARE

## 2025-03-31 NOTE — PROGRESS NOTES
Subjective:   No chief complaint on file.      History of Present Illness      Allergies: Patient has no known allergies.  ROS per [facility]  Health Maintenance: {COMPLETED:002981}   Objective:     LMP  (LMP Unknown)  There is no height or weight on file to calculate BMI.     Physical Exam   Results    Results for orders placed or performed in visit on 02/11/25   POCT Bladder Scan    Collection Time: 02/11/25  1:46 PM   Result Value Ref Range    POC Pre-Void      POC Post-Void 30 mL     *Note: Due to a large number of results and/or encounters for the requested time period, some results have not been displayed. A complete set of results can be found in Results Review.      Assessment and Plan:     The following treatment plan was discussed through shared decision making with the patient:    No diagnosis found.  Assessment & Plan        No follow-ups on file.       I spent a total of *** minutes with record review, exam, communication with the patient, communication with other providers, and documentation of this encounter.    Please note that this note was created using dictation with voice recognition software. I have made every reasonable attempt to correct obvious errors, but I expect that there are errors of grammar and possibly content that I did not discover before finalizing the note.    AKILAH Luis  [facility]  [facility]

## 2025-04-05 DIAGNOSIS — E78.2 MIXED HYPERLIPIDEMIA: ICD-10-CM

## 2025-04-07 NOTE — TELEPHONE ENCOUNTER
Received request via: Pharmacy    Was the patient seen in the last year in this department? Yes    Does the patient have an active prescription (recently filled or refills available) for medication(s) requested? No    Pharmacy Name:      [id number] [place], [place] - [facility] [address] [address]  Phone: [phone] Fax: [phone]       Does the patient have [facility] and need 100-day supply? (This applies to ALL medications) Yes, quantity updated to 100 days

## 2025-04-08 ENCOUNTER — TELEPHONE (OUTPATIENT)
Dept: HEALTH INFORMATION MANAGEMENT | Facility: OTHER | Age: 70
End: 2025-04-08
Payer: MEDICARE

## 2025-04-09 RX ORDER — ROSUVASTATIN CALCIUM 10 MG/1
10 TABLET, COATED ORAL EVERY EVENING
Qty: 400 TABLET | OUTPATIENT
Start: 2025-04-09

## 2025-04-10 DIAGNOSIS — E78.2 MIXED HYPERLIPIDEMIA: ICD-10-CM

## 2025-04-10 NOTE — TELEPHONE ENCOUNTER
Received request via: Pharmacy    Was the patient seen in the last year in this department? Yes    Does the patient have an active prescription (recently filled or refills available) for medication(s) requested? No    Pharmacy Name:      [id number] - [place], [place] - [facility] [address]  Phone: [phone] Fax: [phone]       Does the patient have snf Plus and need 100-day supply? (This applies to ALL medications) Patient does not have SCP

## 2025-04-12 RX ORDER — ROSUVASTATIN CALCIUM 10 MG/1
10 TABLET, COATED ORAL EVERY EVENING
Qty: 100 TABLET | Refills: 0 | Status: SHIPPED | OUTPATIENT
Start: 2025-04-12 | End: 2025-04-24 | Stop reason: SDUPTHER

## 2025-04-23 ENCOUNTER — APPOINTMENT (OUTPATIENT)
Dept: MEDICAL GROUP | Facility: PHYSICIAN GROUP | Age: 70
End: 2025-04-23
Payer: MEDICARE

## 2025-04-24 ENCOUNTER — HOSPITAL ENCOUNTER (OUTPATIENT)
Facility: MEDICAL CENTER | Age: 70
End: 2025-04-24
Attending: NURSE PRACTITIONER
Payer: MEDICARE

## 2025-04-24 ENCOUNTER — OFFICE VISIT (OUTPATIENT)
Dept: MEDICAL GROUP | Facility: PHYSICIAN GROUP | Age: 70
End: 2025-04-24
Payer: MEDICARE

## 2025-04-24 VITALS
BODY MASS INDEX: 32.94 KG/M2 | HEIGHT: 62 IN | WEIGHT: 179 LBS | TEMPERATURE: 99.2 F | RESPIRATION RATE: 18 BRPM | SYSTOLIC BLOOD PRESSURE: 132 MMHG | HEART RATE: 71 BPM | OXYGEN SATURATION: 96 % | DIASTOLIC BLOOD PRESSURE: 78 MMHG

## 2025-04-24 DIAGNOSIS — E66.9 TYPE 2 DIABETES MELLITUS WITH OBESITY (HCC): ICD-10-CM

## 2025-04-24 DIAGNOSIS — M25.562 CHRONIC PAIN OF LEFT KNEE: ICD-10-CM

## 2025-04-24 DIAGNOSIS — F13.20 SEDATIVE, HYPNOTIC OR ANXIOLYTIC DEPENDENCE, UNCOMPLICATED (HCC): ICD-10-CM

## 2025-04-24 DIAGNOSIS — M54.41 CHRONIC BILATERAL LOW BACK PAIN WITH BILATERAL SCIATICA: ICD-10-CM

## 2025-04-24 DIAGNOSIS — G89.29 CHRONIC PAIN OF LEFT KNEE: ICD-10-CM

## 2025-04-24 DIAGNOSIS — E78.2 MIXED HYPERLIPIDEMIA: ICD-10-CM

## 2025-04-24 DIAGNOSIS — F13.20 BENZODIAZEPINE DEPENDENCE (HCC): ICD-10-CM

## 2025-04-24 DIAGNOSIS — M54.42 CHRONIC BILATERAL LOW BACK PAIN WITH BILATERAL SCIATICA: ICD-10-CM

## 2025-04-24 DIAGNOSIS — F41.1 GENERALIZED ANXIETY DISORDER: ICD-10-CM

## 2025-04-24 DIAGNOSIS — E11.69 TYPE 2 DIABETES MELLITUS WITH OBESITY (HCC): ICD-10-CM

## 2025-04-24 DIAGNOSIS — M96.1 POSTLAMINECTOMY SYNDROME: ICD-10-CM

## 2025-04-24 DIAGNOSIS — F11.20 UNCOMPLICATED OPIOID DEPENDENCE (HCC): ICD-10-CM

## 2025-04-24 DIAGNOSIS — F33.1 DEPRESSION, MAJOR, RECURRENT, MODERATE (HCC): ICD-10-CM

## 2025-04-24 DIAGNOSIS — G89.29 CHRONIC BILATERAL LOW BACK PAIN WITH BILATERAL SCIATICA: ICD-10-CM

## 2025-04-24 DIAGNOSIS — E11.59 HYPERTENSION ASSOCIATED WITH DIABETES (HCC): ICD-10-CM

## 2025-04-24 DIAGNOSIS — I15.2 HYPERTENSION ASSOCIATED WITH DIABETES (HCC): ICD-10-CM

## 2025-04-24 DIAGNOSIS — F11.20 SEVERE OPIOID DEPENDENCE ON MAINTENANCE THERAPY (HCC): ICD-10-CM

## 2025-04-24 LAB
HBA1C MFR BLD: 5.4 % (ref ?–5.8)
POCT INT CON NEG: NEGATIVE
POCT INT CON POS: POSITIVE

## 2025-04-24 PROCEDURE — G0480 DRUG TEST DEF 1-7 CLASSES: HCPCS

## 2025-04-24 PROCEDURE — 99214 OFFICE O/P EST MOD 30 MIN: CPT | Performed by: NURSE PRACTITIONER

## 2025-04-24 PROCEDURE — 80307 DRUG TEST PRSMV CHEM ANLYZR: CPT

## 2025-04-24 PROCEDURE — 83036 HEMOGLOBIN GLYCOSYLATED A1C: CPT | Performed by: NURSE PRACTITIONER

## 2025-04-24 PROCEDURE — 3078F DIAST BP <80 MM HG: CPT | Performed by: NURSE PRACTITIONER

## 2025-04-24 PROCEDURE — 3075F SYST BP GE 130 - 139MM HG: CPT | Performed by: NURSE PRACTITIONER

## 2025-04-24 RX ORDER — ROSUVASTATIN CALCIUM 10 MG/1
10 TABLET, COATED ORAL EVERY EVENING
Qty: 100 TABLET | Refills: 3 | Status: SHIPPED | OUTPATIENT
Start: 2025-04-24

## 2025-04-24 RX ORDER — SEMAGLUTIDE 2.68 MG/ML
2 INJECTION, SOLUTION SUBCUTANEOUS
Qty: 9 ML | Refills: 3 | Status: SHIPPED | OUTPATIENT
Start: 2025-04-24

## 2025-04-24 RX ORDER — DULOXETIN HYDROCHLORIDE 30 MG/1
90 CAPSULE, DELAYED RELEASE ORAL DAILY
Qty: 270 CAPSULE | Refills: 3 | Status: SHIPPED | OUTPATIENT
Start: 2025-04-24

## 2025-04-24 RX ORDER — LISINOPRIL AND HYDROCHLOROTHIAZIDE 10; 12.5 MG/1; MG/1
1 TABLET ORAL DAILY
Qty: 100 TABLET | Refills: 3 | Status: SHIPPED | OUTPATIENT
Start: 2025-04-24 | End: 2026-05-29

## 2025-04-24 RX ORDER — CLONAZEPAM 1 MG/1
1 TABLET ORAL EVERY MORNING
Qty: 30 TABLET | Refills: 0 | Status: SHIPPED | OUTPATIENT
Start: 2025-04-28 | End: 2025-05-28

## 2025-04-24 RX ORDER — PANTOPRAZOLE SODIUM 40 MG/1
40 TABLET, DELAYED RELEASE ORAL DAILY
Qty: 90 TABLET | Refills: 3 | Status: SHIPPED | OUTPATIENT
Start: 2025-04-24

## 2025-04-24 RX ORDER — OXYCODONE 9 MG/1
9 CAPSULE, EXTENDED RELEASE ORAL EVERY 12 HOURS
Qty: 60 CAPSULE | Refills: 0 | Status: SHIPPED | OUTPATIENT
Start: 2025-04-28 | End: 2025-05-28

## 2025-04-24 RX ORDER — CLONAZEPAM 0.5 MG/1
0.5 TABLET ORAL NIGHTLY
Qty: 30 TABLET | Refills: 0 | Status: SHIPPED | OUTPATIENT
Start: 2025-04-28 | End: 2025-05-28

## 2025-04-24 RX ORDER — HYDROCODONE BITARTRATE AND ACETAMINOPHEN 10; 325 MG/1; MG/1
1 TABLET ORAL EVERY 8 HOURS PRN
Qty: 90 TABLET | Refills: 0 | Status: SHIPPED | OUTPATIENT
Start: 2025-04-28 | End: 2025-05-28

## 2025-04-24 ASSESSMENT — FIBROSIS 4 INDEX: FIB4 SCORE: 1.49

## 2025-04-24 ASSESSMENT — PATIENT HEALTH QUESTIONNAIRE - PHQ9: CLINICAL INTERPRETATION OF PHQ2 SCORE: 0

## 2025-04-24 NOTE — PROGRESS NOTES
Chief Complaint   Patient presents with    Medication Refill    Diabetes Follow-up                                                                                                                                       HPI:   Grisel presents today with the following.    Patient consented to the use of [employer] to record and transcribe notes during this visit.      The patient is here today for medication management and follow-up of her chronic pain and diabetes mellitus.    The patient's chief complaints are ongoing pain, particularly in her knee, and concerns about her current pain medication regimen. She reports inadequate pain relief from her current medications, noting that Vicodin is more effective than morphine. The patient is requesting a dosage increase. She is experiencing severe knee pain that is affecting her mobility and is seeking a specialist referral. The patient also reports bilateral ankle and foot swelling. She mentions experiencing distress during a recent 4-day period without pain medications.    Regarding substance use, the patient admits to marijuana use last month when she ran out of pain medications but denies current use. She expresses concerns about prescribing policies related to marijuana and alcohol use.    The patient's diabetes management has shown improvement, with her A1C now at 5.4, which is within the normal range. Ozempic has been effective for glycemic control.    The patient's medical history includes Type 2 Diabetes Mellitus, which is currently well-controlled, and chronic pain requiring long-term opioid therapy.    Current medications include Morphine (MS Contin) 15mg BID, which she finds less effective, and Hydrocodone (Norco) 10-325mg, which she finds more effective and takes 2 in the morning. Other medications include Lisinopril, Clonazepam (dose decreased), Rexulti, BuSpar, Cymbalta, Synthroid, Metformin, Ozempic, Pantoprazole, and Rosuvastatin.    In terms of social history,  "the patient reports recent marijuana use in February and occasional alcohol consumption. She is currently staying at her sister's house and mentions that her sister is having difficulties.    The review of systems reveals positive findings for ankle and foot swelling, knee pain, and difficulty walking.        ROS:  All systems negative expect as addressed in assessment and plan.     /78 (BP Location: Right arm, Patient Position: Sitting)   Pulse 71   Temp 37.3 °C (99.2 °F) (Temporal)   Resp 18   Ht 1.575 m (5' 2\")   Wt 81.2 kg (179 lb)   LMP  (LMP Unknown)   SpO2 96%   BMI 32.74 kg/m²     Objective:    Physical Exam  Vitals reviewed.   Constitutional:       Appearance: Normal appearance.   HENT:      Head: Normocephalic and atraumatic.      Mouth/Throat:      Mouth: Mucous membranes are moist.   Eyes:      Extraocular Movements: Extraocular movements intact.      Conjunctiva/sclera: Conjunctivae normal.   Pulmonary:      Effort: Pulmonary effort is normal.   Musculoskeletal:         General: Normal range of motion.      Cervical back: Normal range of motion.      Left knee: Swelling present.   Skin:     General: Skin is warm and dry.   Neurological:      General: No focal deficit present.      Mental Status: She is alert and oriented to person, place, and time.   Psychiatric:         Mood and Affect: Mood normal.         Behavior: Behavior normal.         Thought Content: Thought content normal.          Latest Reference Range & Units 05/22/23 14:44 03/18/24 13:31 07/09/24 16:13 04/24/25 14:50   Glycohemoglobin <=5.8 % 7.5 (H) 5.7 5.7 (H) 5.4   (H): Data is abnormally high    Assessment and Plan:  69 y.o. female with the following issues.    1. Type 2 diabetes mellitus with obesity (HCC)  - POCT Hemoglobin A1C  - PAIN MANAGEMENT SCRN, W/ RFLX TO QNT; Future  - Controlled Substance Treatment Agreement  - Consent for Opiate Prescription  - Semaglutide, 2 MG/DOSE, (OZEMPIC, 2 MG/DOSE,) 8 MG/3ML Solution " Pen-injector; Inject 2 mg under the skin every 7 days.  Dispense: 9 mL; Refill: 3    2. Chronic pain of left knee  - PAIN MANAGEMENT SCRN, W/ RFLX TO QNT; Future  - Controlled Substance Treatment Agreement  - Consent for Opiate Prescription  - Referral to Orthopedics  - HYDROcodone/acetaminophen (NORCO)  MG Tab; Take 1 Tablet by mouth every 8 hours as needed for Severe Pain or Moderate Pain for up to 30 days. Indications: Pain  Dispense: 90 Tablet; Refill: 0  - oxyCODONE ER (XTAMPZA ER) 9 MG Capsule Extended Release 12 hour Abuse-Deterrent; Take 9 mg by mouth every 12 hours for 30 days.  Dispense: 60 Capsule; Refill: 0    3. Chronic bilateral low back pain with bilateral sciatica  - PAIN MANAGEMENT SCRN, W/ RFLX TO QNT; Future  - Controlled Substance Treatment Agreement  - Consent for Opiate Prescription  - HYDROcodone/acetaminophen (NORCO)  MG Tab; Take 1 Tablet by mouth every 8 hours as needed for Severe Pain or Moderate Pain for up to 30 days. Indications: Pain  Dispense: 90 Tablet; Refill: 0  - oxyCODONE ER (XTAMPZA ER) 9 MG Capsule Extended Release 12 hour Abuse-Deterrent; Take 9 mg by mouth every 12 hours for 30 days.  Dispense: 60 Capsule; Refill: 0    4. Postlaminectomy syndrome  - PAIN MANAGEMENT SCRN, W/ RFLX TO QNT; Future  - Controlled Substance Treatment Agreement  - Consent for Opiate Prescription  - HYDROcodone/acetaminophen (NORCO)  MG Tab; Take 1 Tablet by mouth every 8 hours as needed for Severe Pain or Moderate Pain for up to 30 days. Indications: Pain  Dispense: 90 Tablet; Refill: 0  - oxyCODONE ER (XTAMPZA ER) 9 MG Capsule Extended Release 12 hour Abuse-Deterrent; Take 9 mg by mouth every 12 hours for 30 days.  Dispense: 60 Capsule; Refill: 0    5. Benzodiazepine dependence (HCC)  - PAIN MANAGEMENT SCRN, W/ RFLX TO QNT; Future  - Controlled Substance Treatment Agreement    6. Uncomplicated opioid dependence (HCC)  - PAIN MANAGEMENT SCRN, W/ RFLX TO QNT; Future  - Controlled  Substance Treatment Agreement  - Consent for Opiate Prescription    7. Severe opioid dependence on maintenance therapy (HCC)  - HYDROcodone/acetaminophen (NORCO)  MG Tab; Take 1 Tablet by mouth every 8 hours as needed for Severe Pain or Moderate Pain for up to 30 days. Indications: Pain  Dispense: 90 Tablet; Refill: 0  - oxyCODONE ER (XTAMPZA ER) 9 MG Capsule Extended Release 12 hour Abuse-Deterrent; Take 9 mg by mouth every 12 hours for 30 days.  Dispense: 60 Capsule; Refill: 0    8. Generalized anxiety disorder  - clonazePAM (KLONOPIN) 0.5 MG Tab; Take 1 Tablet by mouth every evening for 30 days. Indications: Feeling Anxious, Panic Disorder  Dispense: 30 Tablet; Refill: 0  - clonazePAM (KLONOPIN) 1 MG Tab; Take 1 Tablet by mouth every morning for 30 days. Indications: Feeling Anxious, Panic Disorder  Dispense: 30 Tablet; Refill: 0    9. Sedative, hypnotic or anxiolytic dependence, uncomplicated (HCC)  - clonazePAM (KLONOPIN) 0.5 MG Tab; Take 1 Tablet by mouth every evening for 30 days. Indications: Feeling Anxious, Panic Disorder  Dispense: 30 Tablet; Refill: 0  - clonazePAM (KLONOPIN) 1 MG Tab; Take 1 Tablet by mouth every morning for 30 days. Indications: Feeling Anxious, Panic Disorder  Dispense: 30 Tablet; Refill: 0    10. Depression, major, recurrent, moderate (HCC)  - DULoxetine (CYMBALTA) 30 MG Cap DR Particles; Take 3 Capsules by mouth every day.  Dispense: 270 Capsule; Refill: 3    11. Mixed hyperlipidemia  - rosuvastatin (CRESTOR) 10 MG Tab; Take 1 Tablet by mouth every evening.  Dispense: 100 Tablet; Refill: 3    12. Type 2 diabetes mellitus with obesity (HCC)  - POCT Hemoglobin A1C  - PAIN MANAGEMENT SCRN, W/ RFLX TO QNT; Future  - Controlled Substance Treatment Agreement  - Consent for Opiate Prescription  - Semaglutide, 2 MG/DOSE, (OZEMPIC, 2 MG/DOSE,) 8 MG/3ML Solution Pen-injector; Inject 2 mg under the skin every 7 days.  Dispense: 9 mL; Refill: 3    13. Hypertension associated with diabetes  (HCC)  - lisinopril-hydrochlorothiazide (PRINZIDE) 10-12.5 MG per tablet; Take 1 Tablet by mouth every day.  Dispense: 100 Tablet; Refill: 3    Other orders  - pantoprazole (PROTONIX) 40 MG Tablet Delayed Response; Take 1 Tablet by mouth every day.  Dispense: 90 Tablet; Refill: 3    1. Chronic Pain:     - Plan:          a. Discontinue MS Contin 15 mg BID.          b. Start OxyContin 9 mg BID.          c. Continue Norco  mg, increase to 90 tablets/30 days (max 3/day PRN).          d. Perform urine drug screen today.          e. Have patient sign controlled substance agreement.          f. Fill at [facility] on 4/26 due to travel.          g. Follow up in 3 months.    2. Knee Pain:     - Plan:          a. Refer to Dr. Hart at [facility] for evaluation.          b. Schedule knee injection for next visit.    3. Lower Extremity Edema:     - Plan:          a. Add HCTZ to lisinopril regimen.          b. Prescribe lisinopril/HCTZ combo to replace lisinopril.          c. Send new Rx to [facility].          d. Instruct patient to start new combo med.    4. Type 2 Diabetes Mellitus:     - Plan:          a. Continue metformin.          b. Consider increasing Ozempic to 2 mg, monitor for hypoglycemia.          c. Continue blood glucose monitoring.    5. Medication Management:     - Plan:          a. Rexulti and buspirone: Continue, refills available.          b. Duloxetine: Refill 3 capsules for 90 days.          c. Levothyroxine: Continue current regimen.          d. Pantoprazole: Provide refills.          e. Rosuvastatin: Transfer Rx from [facility] to usual pharmacy.    [facility] Form    Diagnosis to address: F33.1 - Depression, major, recurrent, moderate (HCC)  Assessment and plan: Chronic, stable. Continue with current defined treatment plan: Continue rexulti 2mg daily and cymbalta 90mg daily. Follow-up at least annually.  Diagnosis: E11.59, I15.2 - Hypertension associated with diabetes (HCC)  Assessment and plan: Chronic,  stable. Continue with current defined treatment plan: Continue lisinopril 10mg daily. Add HCTZ 12.5mg daily to help with BLE swelling and periodic elevation in BP. Follow-up at least annually.  Diagnosis: E11.69, E78.5 - Hyperlipidemia due to type 2 diabetes mellitus (HCC)  Assessment and plan: Chronic, stable. Continue with current defined treatment plan: Continue rosuvastatin 10mg daily. Follow-up at least annually.  Diagnosis: E11.42 - Type 2 diabetes mellitus with diabetic polyneuropathy, without long-term current use of insulin (HCC)  Assessment and plan: Chronic, stable. Continue with current defined treatment plan: Patient has achieved excellent glycemic control with Ozempic and metformin. Continue metformin 500mg BID. Continue Ozempic 2mg every 7 days. Follow-up at least annually.  Last edited 04/25/25 08:43 PDT by CHAD Medrano.           Return in about 3 months (around 7/24/2025) for Chronic pain.        Please note that this dictation was created using voice recognition software. I have worked with consultants from the vendor as well as technical experts from [facility] to optimize the interface. I have made every reasonable attempt to correct obvious errors, but I expect that there are errors of grammar and possibly content that I did not discover before finalizing the note.

## 2025-04-24 NOTE — Clinical Note
REFERRAL APPROVAL NOTICE         Sent on April 24, 2025                   Grisel Mark  [address]   [address]  [place] [address]                   Dear Ms. Mark,    After a careful review of the medical information and benefit coverage, [facility] has processed your referral. See below for additional details.    If applicable, you must be actively enrolled with your insurance for coverage of the authorized service. If you have any questions regarding your coverage, please contact your insurance directly.    REFERRAL INFORMATION   Referral #:  [id number]  Referred-To Provider    Referred-By Provider:  Orthopedic Surgery    FANNY Medrano RUEBEN      [address]  [address]  [place] [id number] [phone] [address]  [place] [id number] [phone]    Referral Start Date:  04/24/2025  Referral End Date:   04/24/2026             SCHEDULING  If you do not already have an appointment, please call [phone] to make an appointment.     MORE INFORMATION  If you do not already have a [facility] account, sign up at: [email or web address]  You can access your medical information, make appointments, see lab results, billing information, and more.  If you have questions regarding this referral, please contact  the [facility] Referrals department at:             [phone]. Monday - Friday 8:00AM - 5:00PM.     Sincerely,    [facility]

## 2025-04-25 ENCOUNTER — RESULTS FOLLOW-UP (OUTPATIENT)
Dept: MEDICAL GROUP | Facility: PHYSICIAN GROUP | Age: 70
End: 2025-04-25

## 2025-04-26 LAB
AMPHET CTO UR CFM-MCNC: NEGATIVE NG/ML
BARBITURATES CTO UR CFM-MCNC: NEGATIVE NG/ML
BENZODIAZ CTO UR CFM-MCNC: NEGATIVE NG/ML
BUPRENORPHINE UR-MCNC: NEGATIVE NG/ML
CANNABINOIDS CTO UR CFM-MCNC: NORMAL NG/ML
CARISOPRODOL UR-MCNC: NEGATIVE NG/ML
COCAINE CTO UR CFM-MCNC: NEGATIVE NG/ML
CREAT UR-MCNC: 28.6 MG/DL (ref 20–400)
DRUG SCREEN COMMENT UR-IMP: NORMAL
ETHYL GLUCURONIDE UR QL SCN: NEGATIVE NG/ML
FENTANYL UR-MCNC: NEGATIVE NG/ML
MEPERIDINE CTO UR SCN-MCNC: NEGATIVE NG/ML
METHADONE CTO UR CFM-MCNC: NEGATIVE NG/ML
OPIATES UR QL SCN: NORMAL NG/ML
OXYCDOXYM URSCRN 2005102: NEGATIVE NG/ML
PCP CTO UR CFM-MCNC: NEGATIVE NG/ML
PROPOXYPH CTO UR CFM-MCNC: NEGATIVE NG/ML
TAPENTADOL UR-MCNC: NEGATIVE NG/ML
TRAMADOL CTO UR SCN-MCNC: NEGATIVE NG/ML
ZOLPIDEM UR-MCNC: NEGATIVE NG/ML

## 2025-04-28 LAB — THC UR CFM-MCNC: 142 NG/ML

## 2025-05-01 LAB
6MAM UR CFM-MCNC: <10 NG/ML
CODEINE UR CFM-MCNC: <20 NG/ML
HYDROCODONE UR CFM-MCNC: <20 NG/ML
HYDROMORPHONE UR CFM-MCNC: <20 NG/ML
MORPHINE UR CFM-MCNC: 127 NG/ML
NORHYDROCODONE UR CFM-MCNC: 78 NG/ML
NOROXYCODONE UR CFM-MCNC: <20 NG/ML
OPIATES UR NOROXYM Q0836: <20 NG/ML
OXYCODONE UR CFM-MCNC: <20 NG/ML
OXYMORPHONE UR CFM-MCNC: <20 NG/ML

## 2025-05-05 ENCOUNTER — TELEPHONE (OUTPATIENT)
Dept: MEDICAL GROUP | Facility: PHYSICIAN GROUP | Age: 70
End: 2025-05-05
Payer: MEDICARE

## 2025-05-05 NOTE — TELEPHONE ENCOUNTER
VOICEMAIL  1. Caller Name: Grisel Ronel Davidson                        Call Back Number: [phone] (home)       2. Message: Patient is calling regarding the medication that was supposed to be sent in for her swelling in her feet.  She reports that Demi was going to mix the Cymbalta with the Pee pill    3. Patient approves office to leave a detailed voicemail/MyChart message: yes

## 2025-05-19 ENCOUNTER — TELEPHONE (OUTPATIENT)
Dept: MEDICAL GROUP | Facility: PHYSICIAN GROUP | Age: 70
End: 2025-05-19
Payer: MEDICARE

## 2025-05-19 NOTE — TELEPHONE ENCOUNTER
VOICEMAIL  1. Caller Name: Grisel Ronel Davidson                       Call Back Number: [phone] (home)      2. Message: Patient called stating that the new pain medication you prescribe (OxyContin 9 mg BID) is not working.     Please advise?    3. Patient approves office to leave a detailed voicemail/MyChart message: N\A   OBDULIO Damon/Physician/Nursing (3) no apparent problem

## 2025-05-21 ENCOUNTER — TELEPHONE (OUTPATIENT)
Dept: MEDICAL GROUP | Facility: PHYSICIAN GROUP | Age: 70
End: 2025-05-21
Payer: MEDICARE

## 2025-05-21 DIAGNOSIS — M54.16 LEFT LUMBAR RADICULITIS: ICD-10-CM

## 2025-05-21 DIAGNOSIS — M54.41 CHRONIC BILATERAL LOW BACK PAIN WITH BILATERAL SCIATICA: Primary | ICD-10-CM

## 2025-05-21 DIAGNOSIS — G89.29 CHRONIC BILATERAL LOW BACK PAIN WITH BILATERAL SCIATICA: Primary | ICD-10-CM

## 2025-05-21 DIAGNOSIS — F41.1 GENERALIZED ANXIETY DISORDER: ICD-10-CM

## 2025-05-21 DIAGNOSIS — G89.29 CHRONIC BILATERAL LOW BACK PAIN WITH BILATERAL SCIATICA: ICD-10-CM

## 2025-05-21 DIAGNOSIS — M96.1 POSTLAMINECTOMY SYNDROME: ICD-10-CM

## 2025-05-21 DIAGNOSIS — F13.20 SEDATIVE, HYPNOTIC OR ANXIOLYTIC DEPENDENCE, UNCOMPLICATED (HCC): ICD-10-CM

## 2025-05-21 DIAGNOSIS — F11.20 OPIOID DEPENDENCE WITH CURRENT USE (HCC): ICD-10-CM

## 2025-05-21 DIAGNOSIS — M54.41 CHRONIC BILATERAL LOW BACK PAIN WITH BILATERAL SCIATICA: ICD-10-CM

## 2025-05-21 DIAGNOSIS — M54.42 CHRONIC BILATERAL LOW BACK PAIN WITH BILATERAL SCIATICA: Primary | ICD-10-CM

## 2025-05-21 DIAGNOSIS — F13.20 BENZODIAZEPINE DEPENDENCE (HCC): ICD-10-CM

## 2025-05-21 DIAGNOSIS — F11.20 SEVERE OPIOID DEPENDENCE ON MAINTENANCE THERAPY (HCC): ICD-10-CM

## 2025-05-21 DIAGNOSIS — M25.562 CHRONIC PAIN OF LEFT KNEE: ICD-10-CM

## 2025-05-21 DIAGNOSIS — M54.42 CHRONIC BILATERAL LOW BACK PAIN WITH BILATERAL SCIATICA: ICD-10-CM

## 2025-05-21 DIAGNOSIS — G89.29 CHRONIC PAIN OF LEFT KNEE: ICD-10-CM

## 2025-05-21 NOTE — TELEPHONE ENCOUNTER
Received request via: Patient    Was the patient seen in the last year in this department? Yes    Does the patient have an active prescription (recently filled or refills available) for medication(s) requested? Yes    Pharmacy Name: [facility]    Does the patient have USP Plus and need 100-day supply? (This applies to ALL medications) Yes, quantity updated to 100 days      Rx was sent to a different pharmacy and pharmacy does not have the Rx. They need a new one per patient.

## 2025-05-21 NOTE — TELEPHONE ENCOUNTER
1. Caller Name: Grisel Ronel Davidson                         Call Back Number: [phone] (home)        How would the patient prefer to be contacted with a response: Phone call OK to leave a detailed message    Patient has called multiple times about her prescription.   She left me a VM last night and I called her back earlier today. Patient wanted a refill of her clonazePAM (KLONOPIN) 1 MG Tab, oxyCODONE ER, HYDROcodone/acetaminophen (NORCO)  MG Tab. Patient stated that she was still in [place] and won't be heading back to [place] until this weekend. I informed patient that she was not quite due but that I would let Paige know. I informed patient that I would call her back when I got an answer but that we are in clinic all day. She verbally understood.  Paige heard my conversation with patient and she stated that she would look into it as she was about to head into see the next patient. Paige did give me a verbally to have patient schedule an appt for June and that she would drug test her again since he came back positive for THC. Paige also stated that she was not going to approve an early refill.      Patient called back right after lunch again. She left me a VM and the second time she did not (I just missed her call.) She wanted an update. I had not had a chance to call her back with that info from Paige, after our first call.   I called patient back and informed her that she would need an appt and that Paige would be drug testing her again due to the abnormal results. Patient then stated that, she should stop doing THC. I asked patient, if she was still doing THC and once again she said yes, she was. I then informed patient that she is not supposed to be doing any type of drugs while on these medication, and that is why Paige require another drug screen. I informed patient that if the next drug screen come back positive that Paige might not prescribe her medication anymore. She verbally understood. I was able to schedule  her an appt with Paige in June.  I also told patient that she was too early to fill her scripts. She is not due until 5/25/25. Patient then stated that she was going off of the bottle date and so she thought she was due earlier then her start date of 5/28/25. I also told her that I would let Paige know that she still needs a refill soon and that I will call her back when that is done. She verbally understood.

## 2025-05-22 NOTE — TELEPHONE ENCOUNTER
VOICEMAIL  1. Caller Name: Grisel Ronel Davidson                       Call Back Number: [phone] (home)      2. Message: Patient called me last night and was very confused. She was stumbling over her words and wanted to know about her refills. She said Gabapentin but then she said it was not. It was very hard to understand what medication she was referring to. She also stated that she has brain fog but wanted me to call her back and something about 3 refills.     I spoke with her multiple times yesterday and she was happy and verbally understood everything I said but on this last VM she seemed very confused and its like we did not even speak earlier.    3. Patient approves office to leave a detailed voicemail/MyChart message: N\A

## 2025-05-23 NOTE — TELEPHONE ENCOUNTER
Phone Number Called: [phone] (home)      Call outcome: Spoke to patient regarding message below.    Message: Patient left me 3 VM from last night asking about her medication refills. Patient was asking to have her Norco, oxyCodone, clonazepam, and her gabapentin refilled and that she needs them now. Patient sounded distressed on her VMs.     I called and spoke with patient and once again I informed her that she is not due for her refills until 5/28/25. Patient then stated that the pharmacy told her she was due 5/24/25. I informed patient that on the scripts that Paige sent to them was of a start date of 4/28/25 and end date of 5/28/25 and if she picked them up early and is now out, that we were not able to approve an early refill. She would have to wait until 5/28/25. She stated that she did not want to withdraw. Per Paige's verbally earlier in the week, I told patient that I apologize but Paige can't do an early refill for her. She verbally agreed.    In regards to the gabapentin, I told her that Paige has not review it yet. She then stated that she found a full bottle of gabapentin in her suit case and that she did not need a refill. Patient told me she had enough for 30 days. She was still wondering if Paige can still send a refill so that the pharmacy has a refill on file for when she is due. I told patient that I will still keep the refill request pending for Paige to review. She also verbally agreed.     She also stated that she was vomiting all last night and was not feeling good today.

## 2025-05-23 NOTE — PROGRESS NOTES
Patient's UDS positive for THC despite multiple discussions that she cannot use marijuana with her clonazepam and opioid prescriptions.     Patient has been contacted to notify of positive THC. Despite this the patient has called multiple times this week to ask for an early refill. The  was reviewed and she would not be due

## 2025-05-27 ENCOUNTER — TELEPHONE (OUTPATIENT)
Dept: MEDICAL GROUP | Facility: PHYSICIAN GROUP | Age: 70
End: 2025-05-27
Payer: MEDICARE

## 2025-05-27 ENCOUNTER — PATIENT MESSAGE (OUTPATIENT)
Dept: MEDICAL GROUP | Facility: PHYSICIAN GROUP | Age: 70
End: 2025-05-27
Payer: MEDICARE

## 2025-05-27 RX ORDER — GABAPENTIN 800 MG/1
1200 TABLET ORAL 2 TIMES DAILY
Qty: 270 TABLET | Refills: 3 | OUTPATIENT
Start: 2025-05-27

## 2025-05-27 NOTE — TELEPHONE ENCOUNTER
MA called yelena to verify last  date. Per [facility] her gabapentin prescription was transferred to a [facility] in [place]. My MA then called the pharmacy the prescription was transferred to and verified with the pharmacist that gabapentin 800mg tablets for a quantity of 270 was picked up on March 19th.     The patient is too early for her refill.

## 2025-05-27 NOTE — TELEPHONE ENCOUNTER
PT daughter Daniel came into [place] requesting MA call her regarding the end Flexeril. She is concerned that there may be seizures etc. If there isn't something else that can be prescribed.

## 2025-05-27 NOTE — TELEPHONE ENCOUNTER
Phone Number Called: [phone] (home)      Call outcome: Spoke to patient regarding message below.    Message: I called and informed patient that Paige has cancelled her CS medications and will no longer be prescribing them. I also informed her that Paige will be placing a referral to Pain Management and to follow up with them.  Patient was wondering what she should do about her withdrawal?    Please advise?    Patient was getting Gabapentin and clonazepam confused. She meant her refill of clonazepam.     In the background you can hear a lady helping answering and helping patient understand.

## 2025-05-27 NOTE — TELEPHONE ENCOUNTER
Phone Number Called: [phone]    Call outcome: Spoke to patient regarding message below.    Message: I called and spoke with my  (Chantell) and informed her of the issue. At this time there is not much we could do since Paige is not in office this afternoon. Chantell did instructed me to call the patient and Daniel and to let them know that if she has/ starts to having symptoms to please go to the ER. Chantell also stated that she would speak with Paige tomorrow.    I called and spoke with Daniel and informed her of the message. I told her I would have an answer for them tomorrow. She understood and thanked me for all that I have done and for calling her back.

## 2025-05-27 NOTE — TELEPHONE ENCOUNTER
Phone Number Called: [phone]    Call outcome: Spoke to patient regarding message below.    Message: I called and spoke with daughter, Daniel. I was concerned because of the message above.   Daniel is concerned that patient will have withdrawals from her stopping Clonazepam Rx. Medication was discontinue on 5/23/25 due to THC shown positive on drug screen (please see telephone encounter from 5/21/25.) Patient would be due for a refill on 5/28/25. Since the medication was cancel and Paige will no longer be prescribing it Daniel is worried that patient will start to have seizures and would like to know what to do?    Please advise?

## 2025-05-27 NOTE — TELEPHONE ENCOUNTER
Patient's UDS came back positive for THC. The patient has been told several times during her visits that she cannot use THC with her narcotic prescriptions. The patient continues to use THC despite this conversation and the contract she has signed. She has also been calling for the last week asking for an early refill.     All future narcotic prescriptions have been discontinued. The pharmacy has been contacted to verify that they received the discontinue order and to not dispense any narcotic prescription if one remained in their system. I will place a referral for pain management.

## 2025-05-27 NOTE — TELEPHONE ENCOUNTER
I called and informed patient. Patient stated that she found a bottle of her Gabapentin and no longer needs a refill.

## 2025-05-28 ENCOUNTER — TELEPHONE (OUTPATIENT)
Dept: MEDICAL GROUP | Facility: PHYSICIAN GROUP | Age: 70
End: 2025-05-28
Payer: MEDICARE

## 2025-05-28 NOTE — TELEPHONE ENCOUNTER
Called patient, let her know after rounding with Demi Farley she will no longer be getting any controlled substances due violation of pain contract. Paige recommended patient to go to the ER for any withdrawal symptoms. Patient not happy but voiced understanding.

## 2025-05-28 NOTE — TELEPHONE ENCOUNTER
VOICEMAIL  1. Caller Name: Grisel Ronel Davidson                       Call Back Number: [phone] (home)      2. Message: Patient called asking if Paige has responded back. She declined going to the hospital.    3. Patient approves office to leave a detailed voicemail/MyChart message: N\A

## 2025-06-02 NOTE — Clinical Note
REFERRAL APPROVAL NOTICE         Sent on June 2, 2025                   Grisel Mark  [address]   [address]  [place] [address]                   Dear Ms. Mark,    After a careful review of the medical information and benefit coverage, [facility] has processed your referral. See below for additional details.    If applicable, you must be actively enrolled with your insurance for coverage of the authorized service. If you have any questions regarding your coverage, please contact your insurance directly.    REFERRAL INFORMATION   Referral #:  [id number]  Referred-To Provider    Referred-By Provider:  Pain Medicine    FANNY Medrano   [facility]      [address]  [address]  [place] [id number] [phone] [address]  [place] [address]  [phone]    Referral Start Date:  05/27/2025  Referral End Date:   05/27/2026             SCHEDULING  If you do not already have an appointment, please call [phone] to make an appointment.     MORE INFORMATION  If you do not already have a [employer] account, sign up at: [email or web address]  You can access your medical information, make appointments, see lab results, billing information, and more.  If you have questions regarding this referral, please contact  the [facility] Referrals department at:             [phone]. Monday - Friday 8:00AM - 5:00PM.     Sincerely,    [facility]

## 2025-06-12 ENCOUNTER — TELEPHONE (OUTPATIENT)
Dept: SCHEDULING | Facility: IMAGING CENTER | Age: 70
End: 2025-06-12
Payer: MEDICARE

## 2025-06-17 DIAGNOSIS — I10 ESSENTIAL HYPERTENSION: ICD-10-CM

## 2025-06-17 RX ORDER — METOPROLOL SUCCINATE 25 MG/1
25 TABLET, EXTENDED RELEASE ORAL DAILY
Qty: 100 TABLET | Refills: 0 | Status: SHIPPED | OUTPATIENT
Start: 2025-06-17

## 2025-06-17 NOTE — TELEPHONE ENCOUNTER
Received request via: Pharmacy    Was the patient seen in the last year in this department? Yes    Does the patient have an active prescription (recently filled or refills available) for medication(s) requested? No    Pharm[facility]:   [id number] [place] - [address] [place] [address] [place] [address]  Phone: [phone] Fax: [phone]       Does the patient have longterm Plus and need 100-day supply? (This applies to ALL medications) Yes, quantity updated to 100 days

## 2025-06-25 DIAGNOSIS — E03.9 HYPOTHYROIDISM (ACQUIRED): ICD-10-CM

## 2025-06-25 RX ORDER — LEVOTHYROXINE SODIUM 50 UG/1
50 TABLET ORAL
Qty: 90 TABLET | Refills: 0 | Status: SHIPPED | OUTPATIENT
Start: 2025-06-25

## 2025-06-25 NOTE — TELEPHONE ENCOUNTER
Received request via: Pharmacy    Was the patient seen in the last year in this department? Yes    Does the patient have an active prescription (recently filled or refills available) for medication(s) requested? No    Pharm[facility]:   [id number] [place] - [address] AT [facility] & [place] [address] [place] [address]  Phone: [phone] Fax: [phone]       Does the patient have FDC Plus and need 100-day supply? (This applies to ALL medications) Patient does not have SCP

## 2025-07-05 ENCOUNTER — HOSPITAL ENCOUNTER (OUTPATIENT)
Dept: RADIOLOGY | Facility: MEDICAL CENTER | Age: 70
End: 2025-07-05
Payer: MEDICARE

## 2025-07-05 DIAGNOSIS — M54.16 LUMBAR RADICULOPATHY: ICD-10-CM

## 2025-07-07 ENCOUNTER — APPOINTMENT (OUTPATIENT)
Dept: MEDICAL GROUP | Facility: PHYSICIAN GROUP | Age: 70
End: 2025-07-07
Payer: MEDICARE

## 2025-07-07 VITALS
DIASTOLIC BLOOD PRESSURE: 70 MMHG | HEART RATE: 66 BPM | TEMPERATURE: 97.8 F | RESPIRATION RATE: 16 BRPM | OXYGEN SATURATION: 97 % | BODY MASS INDEX: 30.73 KG/M2 | SYSTOLIC BLOOD PRESSURE: 116 MMHG | HEIGHT: 62 IN | WEIGHT: 167 LBS

## 2025-07-07 DIAGNOSIS — N39.3 STRESS BLADDER INCONTINENCE, FEMALE: ICD-10-CM

## 2025-07-07 DIAGNOSIS — E11.59 HYPERTENSION ASSOCIATED WITH DIABETES (HCC): ICD-10-CM

## 2025-07-07 DIAGNOSIS — E11.42 TYPE 2 DIABETES MELLITUS WITH DIABETIC POLYNEUROPATHY, WITHOUT LONG-TERM CURRENT USE OF INSULIN (HCC): Primary | ICD-10-CM

## 2025-07-07 DIAGNOSIS — E11.69 HYPERLIPIDEMIA DUE TO TYPE 2 DIABETES MELLITUS (HCC): ICD-10-CM

## 2025-07-07 DIAGNOSIS — F41.1 GENERALIZED ANXIETY DISORDER: ICD-10-CM

## 2025-07-07 DIAGNOSIS — E78.5 HYPERLIPIDEMIA DUE TO TYPE 2 DIABETES MELLITUS (HCC): ICD-10-CM

## 2025-07-07 DIAGNOSIS — F43.10 POSTTRAUMATIC STRESS DISORDER, DELAYED ONSET: ICD-10-CM

## 2025-07-07 DIAGNOSIS — I15.2 HYPERTENSION ASSOCIATED WITH DIABETES (HCC): ICD-10-CM

## 2025-07-07 DIAGNOSIS — E03.9 HYPOTHYROIDISM (ACQUIRED): ICD-10-CM

## 2025-07-07 DIAGNOSIS — K21.9 GASTROESOPHAGEAL REFLUX DISEASE WITHOUT ESOPHAGITIS: ICD-10-CM

## 2025-07-07 DIAGNOSIS — F33.1 DEPRESSION, MAJOR, RECURRENT, MODERATE (HCC): ICD-10-CM

## 2025-07-07 PROBLEM — R05.9 COUGH: Status: RESOLVED | Noted: 2021-11-02 | Resolved: 2025-07-07

## 2025-07-07 PROBLEM — F13.20 BENZODIAZEPINE DEPENDENCE (HCC): Status: RESOLVED | Noted: 2021-05-26 | Resolved: 2025-07-07

## 2025-07-07 PROBLEM — F11.20 OPIOID DEPENDENCE WITH CURRENT USE (HCC): Status: RESOLVED | Noted: 2021-05-26 | Resolved: 2025-07-07

## 2025-07-07 PROCEDURE — 3074F SYST BP LT 130 MM HG: CPT | Performed by: FAMILY MEDICINE

## 2025-07-07 PROCEDURE — 3078F DIAST BP <80 MM HG: CPT | Performed by: FAMILY MEDICINE

## 2025-07-07 PROCEDURE — 99214 OFFICE O/P EST MOD 30 MIN: CPT | Performed by: FAMILY MEDICINE

## 2025-07-07 RX ORDER — TROSPIUM CHLORIDE ER 60 MG/1
1 CAPSULE ORAL DAILY
Qty: 90 CAPSULE | Refills: 3 | Status: SHIPPED | OUTPATIENT
Start: 2025-07-07

## 2025-07-07 ASSESSMENT — FIBROSIS 4 INDEX: FIB4 SCORE: 1.51

## 2025-07-07 NOTE — PROGRESS NOTES
"Verbal consent was acquired by the patient to use [email or web address] ambient listening note generation during this visit YES    Subjective:     HPI:   History of Present Illness    Chief Complaint   Patient presents with    Establish Care     This is a 70-year-old female, new patient to me, here to establish care.  Chart reviewed including last office visit with prior PCP and most recent labs.    Patient with well-controlled diabetes, last hemoglobin A1c 5.4 in April 2025.    Patient with what appears to be complex psychiatric and pain history.  It appears that prior PCP just recently stopped prescribing controlled substances for patient, due to violation of pain contract.      The patient presents to establish care.    Diabetes Management  - Currently on Ozempic 2 mg  - Experiencing nerve pain in her feet  - Numbness in her toes  - Presence of calluses on the soles of her feet  - Interested in getting a referral to a podiatrist for toenail clipping    Depression and Anxiety  - Previously on Cymbalta for depression and pain  - Cymbalta was discontinued during her last visit  - Considering resuming Cymbalta  - Mood is overall stable on current medications  - Clozapine prescribed, clonazepam discontinued  - Increased anxiety levels  - Not currently taking BuSpar  - Not interested in a referral to a psychiatrist    Bladder Medication  - Seeking a refill of her bladder medication  - Finds bladder medication effective    Pain Management  - Currently under the care of a pain specialist at [facility]  - Recently started seeing the pain specialist    Cardiac History  - No history of heart attack or heart failure  - Known heart murmur    Supplemental information: She does not see a psychiatrist or therapist.      Health Maintenance: Completed    Objective:     Exam:  /70 (BP Location: Right arm, Patient Position: Sitting, BP Cuff Size: Adult)   Pulse 66   Temp 36.6 °C (97.8 °F) (Temporal)   Resp 16   Ht 1.575 m (5' 2\")   Wt " 75.8 kg (167 lb)   LMP  (LMP Unknown)   SpO2 97%   BMI 30.54 kg/m²  Body mass index is 30.54 kg/m².    Physical Exam  Vitals and nursing note reviewed.   Constitutional:       Appearance: Normal appearance.   HENT:      Head: Normocephalic and atraumatic.   Cardiovascular:      Rate and Rhythm: Normal rate and regular rhythm.      Heart sounds: Murmur (II/VI IHSAN) heard.   Pulmonary:      Effort: Pulmonary effort is normal.      Breath sounds: Normal breath sounds.   Musculoskeletal:      Comments: Monofilament testing with a 10 gram force: sensation: decreased bilaterally  Visual Inspection: Feet without maceration, ulcers, or fissures.  Pedal pulses: intact bilaterally   Thick callous medial aspect of right great toe   Neurological:      General: No focal deficit present.      Mental Status: She is alert and oriented to person, place, and time.   Psychiatric:         Mood and Affect: Mood normal.         Behavior: Behavior normal.             Results  - Laboratory Studies:    - A1c: 5.4    Assessment & Plan:     1. Type 2 diabetes mellitus with diabetic polyneuropathy, without long-term current use of insulin ([facility])  Diabetic Monofilament LE Exam    Referral to Podiatry      2. Stress bladder incontinence, female  Trospium Chloride 60 MG CAPSULE SR 24 HR      3. Hyperlipidemia due to type 2 diabetes mellitus ([facility])        4. Hypertension associated with diabetes ([facility])        5. Hypothyroidism (acquired)        6. Depression, major, recurrent, moderate ([facility])        7. Gastroesophageal reflux disease without esophagitis        8. Generalized anxiety disorder        9. Posttraumatic stress disorder, delayed onset            Assessment & Plan  1. Diabetes Mellitus: Stable. A1c 5.4.  - Continue semaglutide 2 mg, Metformin 500mg BID.  - Diabetic foot exam performed today, revealing good pulses and no ulcers.  - Referral to podiatry for diabetic neuropathy and foot care. Patient asking for assistance with nail care and  callouses .    2. Depression and Anxiety: Chronic.  - Restart Cymbalta at 90 mg. Patient thought prior PCP discontinued this, but note reviewed and there is no mention. Patient feels depression and anxiety worse since stopping. Will restart and let me know if any further concerns.   - Declined referral to psychiatrist.    3. Urinary Incontinence.   - Well managed with Trospium. Prescription refill sent.     4. Chronic pain - lumbar spine and knees. Says she has recently established with a pain specialist at [facility].     Follow-up  - Follow up in 3 months for a diabetic visit.          Return in about 3 months (around 10/7/2025) for f/u Diabetes, get HbA1c.    Please note that this dictation was created using voice recognition software. I have made every reasonable attempt to correct obvious errors, but I expect that there are errors of grammar and possibly content that I did not discover before finalizing the note.

## 2025-07-10 NOTE — Clinical Note
REFERRAL APPROVAL NOTICE         Sent on July 10, 2025                   Grisel Mark  [address]   [address]  [place] [address]                   Dear Ms. Mark,    After a careful review of the medical information and benefit coverage, [facility] has processed your referral. See below for additional details.    If applicable, you must be actively enrolled with your insurance for coverage of the authorized service. If you have any questions regarding your coverage, please contact your insurance directly.    REFERRAL INFORMATION   Referral #:  [id number]  Referred-To Provider    Referred-By Provider:  Podiatry    Evelin Resendez M.D.   [facility]      [address]  [address]  [place] [address]  [phone] [address]  [place] [address]  [phone]    Referral Start Date:  07/07/2025  Referral End Date:   07/07/2026             SCHEDULING  If you do not already have an appointment, please call [phone] to make an appointment.     MORE INFORMATION  If you do not already have a [employer] account, sign up at: [email or web address]  You can access your medical information, make appointments, see lab results, billing information, and more.  If you have questions regarding this referral, please contact  the [facility] Referrals department at:             [phone]. Monday - Friday 8:00AM - 5:00PM.     Sincerely,    [facility]

## 2025-07-14 ENCOUNTER — PATIENT MESSAGE (OUTPATIENT)
Dept: MEDICAL GROUP | Facility: PHYSICIAN GROUP | Age: 70
End: 2025-07-14
Payer: MEDICARE

## 2025-07-14 DIAGNOSIS — J30.1 SEASONAL ALLERGIC RHINITIS DUE TO POLLEN: ICD-10-CM

## 2025-07-14 DIAGNOSIS — I15.2 HYPERTENSION ASSOCIATED WITH DIABETES (HCC): ICD-10-CM

## 2025-07-14 DIAGNOSIS — F41.8 SITUATIONAL ANXIETY: ICD-10-CM

## 2025-07-14 DIAGNOSIS — E11.59 HYPERTENSION ASSOCIATED WITH DIABETES (HCC): ICD-10-CM

## 2025-07-14 DIAGNOSIS — E78.2 MIXED HYPERLIPIDEMIA: ICD-10-CM

## 2025-07-14 NOTE — TELEPHONE ENCOUNTER
Received request via: Pharmacy    Was the patient seen in the last year in this department? Yes    Does the patient have an active prescription (recently filled or refills available) for medication(s) requested? No    [facility]   [id number] [place] - [address] AT [place] [address] [place] [address]  Phone: [phone] Fax: [phone]       Does the patient have intermediate Plus and need 100-day supply? (This applies to ALL medications) Yes, quantity updated to 100 days

## 2025-07-15 RX ORDER — FLUTICASONE PROPIONATE 50 MCG
1 SPRAY, SUSPENSION (ML) NASAL DAILY
Qty: 16 G | Refills: 11 | Status: SHIPPED | OUTPATIENT
Start: 2025-07-15

## 2025-07-15 RX ORDER — LISINOPRIL AND HYDROCHLOROTHIAZIDE 10; 12.5 MG/1; MG/1
1 TABLET ORAL DAILY
Qty: 100 TABLET | Refills: 3 | Status: SHIPPED | OUTPATIENT
Start: 2025-07-15 | End: 2026-08-19

## 2025-07-15 RX ORDER — BUSPIRONE HYDROCHLORIDE 30 MG/1
30 TABLET ORAL DAILY
Qty: 90 TABLET | Refills: 3 | Status: SHIPPED | OUTPATIENT
Start: 2025-07-15

## 2025-07-15 RX ORDER — ROSUVASTATIN CALCIUM 10 MG/1
10 TABLET, COATED ORAL EVERY EVENING
Qty: 100 TABLET | Refills: 3 | Status: SHIPPED | OUTPATIENT
Start: 2025-07-15

## 2025-07-15 NOTE — TELEPHONE ENCOUNTER
Received request via: Patient    Was the patient seen in the last year in this department? Yes    Does the patient have an active prescription (recently filled or refills available) for medication(s) requested? No    Pharmacy Name:   [id number] - [place] - [address] AT [facility] & [place] [address] [place] [address]  Phone: [phone] Fax: [phone]      Does the patient have detention Plus and need 100-day supply? (This applies to ALL medications) Yes, quantity updated to 100 days

## 2025-07-15 NOTE — PROGRESS NOTES
Can you please just order and send a new glucose monitor - whatever is covered by insurance.   thanks

## 2025-07-22 ENCOUNTER — APPOINTMENT (OUTPATIENT)
Dept: MEDICAL GROUP | Facility: PHYSICIAN GROUP | Age: 70
End: 2025-07-22
Payer: MEDICARE

## 2025-07-24 DIAGNOSIS — E11.69 TYPE 2 DIABETES MELLITUS WITH OBESITY (HCC): ICD-10-CM

## 2025-07-24 DIAGNOSIS — E66.9 TYPE 2 DIABETES MELLITUS WITH OBESITY (HCC): ICD-10-CM

## 2025-07-24 RX ORDER — SEMAGLUTIDE 2.68 MG/ML
2 INJECTION, SOLUTION SUBCUTANEOUS
Qty: 9 ML | Refills: 3 | Status: SHIPPED | OUTPATIENT
Start: 2025-07-24

## 2025-07-24 NOTE — TELEPHONE ENCOUNTER
Received request via: Patient    Was the patient seen in the last year in this department? Yes    Does the patient have an active prescription (recently filled or refills available) for medication(s) requested? No    Pharm[facility]:   [id number] - [place] - [address] AT [facility] & [place] [address] [place] [address]  Phone: [phone] Fax: [phone]      Does the patient have half-way Plus and need 100-day supply? (This applies to ALL medications) Yes, quantity updated to 100 days

## 2025-08-25 DIAGNOSIS — M54.16 LEFT LUMBAR RADICULITIS: ICD-10-CM

## 2025-08-25 DIAGNOSIS — M96.1 POSTLAMINECTOMY SYNDROME: ICD-10-CM

## 2025-08-25 RX ORDER — GABAPENTIN 800 MG/1
1200 TABLET ORAL 2 TIMES DAILY
Qty: 270 TABLET | Refills: 0 | Status: SHIPPED | OUTPATIENT
Start: 2025-08-25

## 2025-08-26 DIAGNOSIS — E66.9 TYPE 2 DIABETES MELLITUS WITH OBESITY (HCC): ICD-10-CM

## 2025-08-26 DIAGNOSIS — E11.69 TYPE 2 DIABETES MELLITUS WITH OBESITY (HCC): ICD-10-CM

## 2025-08-26 RX ORDER — METFORMIN HYDROCHLORIDE 500 MG/1
500 TABLET, EXTENDED RELEASE ORAL 2 TIMES DAILY
Qty: 300 TABLET | Refills: 3 | Status: SHIPPED | OUTPATIENT
Start: 2025-08-26

## (undated) DEVICE — JELLY SURGILUBE STERILE TUBE 4.25 OZ (1/EA)

## (undated) DEVICE — CANNULA O2 COMFORT SOFT EAR ADULT 7 FT TUBING (50/CA)

## (undated) DEVICE — ELECTRODE BIPOLAR REGULAR CUTTING LOOP URO 24/26 FR (6EA/PK)

## (undated) DEVICE — SET IRRIGATION CYSTOSCOPY TUBE L80 IN (20EA/CA)

## (undated) DEVICE — GOWN WARMING STANDARD FLEX - (30/CA)

## (undated) DEVICE — LACTATED RINGERS INJ 1000 ML - (14EA/CA 60CA/PF)

## (undated) DEVICE — KIT  I.V. START (100EA/CA)

## (undated) DEVICE — PACK CYSTO III (2EA/CA)

## (undated) DEVICE — ELECTRODE DUAL RETURN W/ CORD - (50/PK)

## (undated) DEVICE — SET LEADWIRE 5 LEAD BEDSIDE DISPOSABLE ECG (1SET OF 5/EA)

## (undated) DEVICE — SLEEVE VASO CALF MED - (10PR/CA)

## (undated) DEVICE — TUBING CLEARLINK DUO-VENT - C-FLO (48EA/CA)

## (undated) DEVICE — TUBE CONNECTING SUCTION - CLEAR PLASTIC STERILE 72 IN (50EA/CA)

## (undated) DEVICE — TOWEL STOP TIMEOUT SAFETY FLAG (40EA/CA)

## (undated) DEVICE — CANISTER SUCTION 3000ML MECHANICAL FILTER AUTO SHUTOFF MEDI-VAC NONSTERILE LF DISP  (40EA/CA)

## (undated) DEVICE — SENSOR OXIMETER ADULT SPO2 RD SET (20EA/BX)

## (undated) DEVICE — SUTURE GENERAL

## (undated) DEVICE — SODIUM CHL IRRIGATION 0.9% 1000ML (12EA/CA)

## (undated) DEVICE — TUBE E-T HI-LO CUFF 7.0MM (10EA/PK)

## (undated) DEVICE — SUCTION INSTRUMENT YANKAUER BULBOUS TIP W/O VENT (50EA/CA)

## (undated) DEVICE — WATER IRRIGATION STERILE 1000ML (12EA/CA)

## (undated) DEVICE — Device

## (undated) DEVICE — GLOVE BIOGEL SZ 6.5 SURGICAL PF LTX (50PR/BX 4BX/CA)

## (undated) DEVICE — CANISTER SUCTION RIGID RED 1500CC (40EA/CA)

## (undated) DEVICE — BAG URODRAIN WITH TUBING - (20/CA)

## (undated) DEVICE — SPONGE GAUZESTER 4 X 4 4PLY - (128PK/CA)

## (undated) DEVICE — MASK OXYGEN VNYL ADLT MED CONC WITH 7 FOOT TUBING  - (50EA/CA)

## (undated) DEVICE — WATER IRRIG. STER 3000 ML - (4/CA)